# Patient Record
Sex: MALE | Race: WHITE | Employment: OTHER
[De-identification: names, ages, dates, MRNs, and addresses within clinical notes are randomized per-mention and may not be internally consistent; named-entity substitution may affect disease eponyms.]

---

## 2017-02-02 ENCOUNTER — OFFICE VISIT (OUTPATIENT)
Dept: CARDIOLOGY | Facility: CLINIC | Age: 67
End: 2017-02-02

## 2017-02-02 VITALS
HEIGHT: 72 IN | SYSTOLIC BLOOD PRESSURE: 134 MMHG | WEIGHT: 174.8 LBS | BODY MASS INDEX: 23.68 KG/M2 | DIASTOLIC BLOOD PRESSURE: 82 MMHG | HEART RATE: 72 BPM

## 2017-02-02 DIAGNOSIS — I25.10 ASHD (ARTERIOSCLEROTIC HEART DISEASE): ICD-10-CM

## 2017-02-02 DIAGNOSIS — E78.5 DYSLIPIDEMIA: ICD-10-CM

## 2017-02-02 DIAGNOSIS — I20.8 CHRONIC STABLE ANGINA (HCC): Primary | ICD-10-CM

## 2017-02-02 DIAGNOSIS — I10 ESSENTIAL HYPERTENSION: ICD-10-CM

## 2017-02-02 DIAGNOSIS — Z95.820 S/P ANGIOPLASTY WITH STENT: ICD-10-CM

## 2017-02-02 PROCEDURE — 1123F ACP DISCUSS/DSCN MKR DOCD: CPT | Performed by: INTERNAL MEDICINE

## 2017-02-02 PROCEDURE — 93000 ELECTROCARDIOGRAM COMPLETE: CPT | Performed by: INTERNAL MEDICINE

## 2017-02-02 PROCEDURE — 3017F COLORECTAL CA SCREEN DOC REV: CPT | Performed by: INTERNAL MEDICINE

## 2017-02-02 PROCEDURE — 99215 OFFICE O/P EST HI 40 MIN: CPT | Performed by: INTERNAL MEDICINE

## 2017-02-02 PROCEDURE — G8420 CALC BMI NORM PARAMETERS: HCPCS | Performed by: INTERNAL MEDICINE

## 2017-02-02 PROCEDURE — G8427 DOCREV CUR MEDS BY ELIG CLIN: HCPCS | Performed by: INTERNAL MEDICINE

## 2017-02-02 PROCEDURE — G8598 ASA/ANTIPLAT THER USED: HCPCS | Performed by: INTERNAL MEDICINE

## 2017-02-02 PROCEDURE — 1036F TOBACCO NON-USER: CPT | Performed by: INTERNAL MEDICINE

## 2017-02-02 PROCEDURE — 4040F PNEUMOC VAC/ADMIN/RCVD: CPT | Performed by: INTERNAL MEDICINE

## 2017-02-02 PROCEDURE — G8484 FLU IMMUNIZE NO ADMIN: HCPCS | Performed by: INTERNAL MEDICINE

## 2017-02-02 RX ORDER — ISOSORBIDE MONONITRATE 30 MG/1
30 TABLET, EXTENDED RELEASE ORAL DAILY
Qty: 30 TABLET | Refills: 3 | Status: SHIPPED | OUTPATIENT
Start: 2017-02-02 | End: 2017-03-01 | Stop reason: SINTOL

## 2017-02-03 LAB
ABSOLUTE BASO #: 0.1 K/UL (ref 0–0.1)
ABSOLUTE EOS #: 0.2 K/UL (ref 0.1–0.4)
ABSOLUTE LYMPH #: 2.6 K/UL (ref 0.8–5.2)
ABSOLUTE MONO #: 0.8 K/UL (ref 0.1–0.9)
ABSOLUTE NEUT #: 4 K/UL (ref 1.3–9.1)
ANION GAP SERPL CALCULATED.3IONS-SCNC: 16 MMOL/L
BASOPHILS RELATIVE PERCENT: 1.3 % (ref 0–1)
BUN BLDV-MCNC: 19 MG/DL (ref 10–20)
CALCIUM SERPL-MCNC: 10.1 MG/DL (ref 8.7–10.8)
CHLORIDE BLD-SCNC: 102 MMOL/L (ref 95–111)
CO2: 24 MMOL/L (ref 21–32)
CREAT SERPL-MCNC: 1.1 MG/DL (ref 0.5–1.3)
EGFR AFRICAN AMERICAN: 81
EGFR IF NONAFRICAN AMERICAN: 67
EOSINOPHILS RELATIVE PERCENT: 3.1 % (ref 1–4)
GLUCOSE: 196 MG/DL (ref 70–100)
HCT VFR BLD CALC: 39.8 % (ref 41.4–51)
HEMOGLOBIN: 13.1 G/DL (ref 13.8–17)
LYMPHOCYTE %: 33.9 % (ref 16–48)
MCH RBC QN AUTO: 28.3 PG (ref 27–34)
MCHC RBC AUTO-ENTMCNC: 32.9 G/DL (ref 31–36)
MCV RBC AUTO: 86 FL (ref 80–100)
MONOCYTES # BLD: 10.2 % (ref 1–8)
NEUTROPHILS RELATIVE PERCENT: 50.9 % (ref 45–75)
PDW BLD-RTO: 13.1 % (ref 10.8–14.8)
PLATELETS: 234 K/UL (ref 150–450)
POTASSIUM SERPL-SCNC: 5.1 MMOL/L (ref 3.5–5.4)
RBC: 4.63 M/UL (ref 4–5.5)
SODIUM BLD-SCNC: 137 MMOL/L (ref 134–147)
WBC: 7.8 K/UL (ref 3.7–10.8)

## 2017-02-09 ENCOUNTER — HOSPITAL ENCOUNTER (OUTPATIENT)
Dept: CARDIAC CATH/INVASIVE PROCEDURES | Age: 67
Discharge: HOME | End: 2017-02-09
Payer: MEDICARE

## 2017-02-09 VITALS
OXYGEN SATURATION: 99 % | HEIGHT: 72 IN | TEMPERATURE: 98 F | SYSTOLIC BLOOD PRESSURE: 151 MMHG | RESPIRATION RATE: 20 BRPM | DIASTOLIC BLOOD PRESSURE: 77 MMHG | HEART RATE: 67 BPM | WEIGHT: 174 LBS | BODY MASS INDEX: 23.57 KG/M2

## 2017-02-09 PROCEDURE — 93454 CORONARY ARTERY ANGIO S&I: CPT | Performed by: INTERNAL MEDICINE

## 2017-02-09 PROCEDURE — C1769 GUIDE WIRE: HCPCS

## 2017-02-09 PROCEDURE — C1894 INTRO/SHEATH, NON-LASER: HCPCS

## 2017-02-09 PROCEDURE — 6360000002 HC RX W HCPCS

## 2017-02-09 PROCEDURE — C1725 CATH, TRANSLUMIN NON-LASER: HCPCS

## 2017-02-09 PROCEDURE — C1887 CATHETER, GUIDING: HCPCS

## 2017-02-09 RX ORDER — ONDANSETRON 2 MG/ML
4 INJECTION INTRAMUSCULAR; INTRAVENOUS EVERY 6 HOURS PRN
Status: DISCONTINUED | OUTPATIENT
Start: 2017-02-09 | End: 2017-02-10 | Stop reason: HOSPADM

## 2017-02-09 RX ORDER — ACETAMINOPHEN 325 MG/1
650 TABLET ORAL EVERY 4 HOURS PRN
Status: DISCONTINUED | OUTPATIENT
Start: 2017-02-09 | End: 2017-02-10 | Stop reason: HOSPADM

## 2017-02-09 RX ORDER — SODIUM CHLORIDE 0.9 % (FLUSH) 0.9 %
10 SYRINGE (ML) INJECTION EVERY 12 HOURS SCHEDULED
Status: DISCONTINUED | OUTPATIENT
Start: 2017-02-09 | End: 2017-02-10 | Stop reason: HOSPADM

## 2017-02-09 RX ORDER — SODIUM CHLORIDE 9 MG/ML
INJECTION, SOLUTION INTRAVENOUS CONTINUOUS
Status: DISCONTINUED | OUTPATIENT
Start: 2017-02-09 | End: 2017-02-10 | Stop reason: HOSPADM

## 2017-02-09 RX ORDER — SODIUM CHLORIDE 0.9 % (FLUSH) 0.9 %
10 SYRINGE (ML) INJECTION PRN
Status: DISCONTINUED | OUTPATIENT
Start: 2017-02-09 | End: 2017-02-10 | Stop reason: HOSPADM

## 2017-02-09 ASSESSMENT — PAIN SCALES - GENERAL: PAINLEVEL_OUTOF10: 0

## 2017-03-01 ENCOUNTER — OFFICE VISIT (OUTPATIENT)
Dept: CARDIOLOGY | Facility: CLINIC | Age: 67
End: 2017-03-01

## 2017-03-01 VITALS
DIASTOLIC BLOOD PRESSURE: 75 MMHG | HEART RATE: 69 BPM | HEIGHT: 72 IN | SYSTOLIC BLOOD PRESSURE: 149 MMHG | OXYGEN SATURATION: 99 % | WEIGHT: 177 LBS | BODY MASS INDEX: 23.98 KG/M2

## 2017-03-01 DIAGNOSIS — Z95.820 S/P ANGIOPLASTY WITH STENT: ICD-10-CM

## 2017-03-01 DIAGNOSIS — I10 ESSENTIAL HYPERTENSION: ICD-10-CM

## 2017-03-01 DIAGNOSIS — I25.10 ASHD (ARTERIOSCLEROTIC HEART DISEASE): Primary | ICD-10-CM

## 2017-03-01 DIAGNOSIS — E78.5 DYSLIPIDEMIA: ICD-10-CM

## 2017-03-01 DIAGNOSIS — I25.2 HISTORY OF MYOCARDIAL INFARCTION: ICD-10-CM

## 2017-03-01 PROCEDURE — G8427 DOCREV CUR MEDS BY ELIG CLIN: HCPCS | Performed by: INTERNAL MEDICINE

## 2017-03-01 PROCEDURE — G8420 CALC BMI NORM PARAMETERS: HCPCS | Performed by: INTERNAL MEDICINE

## 2017-03-01 PROCEDURE — 1123F ACP DISCUSS/DSCN MKR DOCD: CPT | Performed by: INTERNAL MEDICINE

## 2017-03-01 PROCEDURE — 4040F PNEUMOC VAC/ADMIN/RCVD: CPT | Performed by: INTERNAL MEDICINE

## 2017-03-01 PROCEDURE — G8598 ASA/ANTIPLAT THER USED: HCPCS | Performed by: INTERNAL MEDICINE

## 2017-03-01 PROCEDURE — G8484 FLU IMMUNIZE NO ADMIN: HCPCS | Performed by: INTERNAL MEDICINE

## 2017-03-01 PROCEDURE — 1036F TOBACCO NON-USER: CPT | Performed by: INTERNAL MEDICINE

## 2017-03-01 PROCEDURE — 3017F COLORECTAL CA SCREEN DOC REV: CPT | Performed by: INTERNAL MEDICINE

## 2017-03-01 PROCEDURE — 99214 OFFICE O/P EST MOD 30 MIN: CPT | Performed by: INTERNAL MEDICINE

## 2017-03-01 RX ORDER — RANOLAZINE 500 MG/1
500 TABLET, EXTENDED RELEASE ORAL 2 TIMES DAILY
Qty: 60 TABLET | Refills: 3
Start: 2017-03-01 | End: 2017-04-04 | Stop reason: SDUPTHER

## 2017-03-01 RX ORDER — ATENOLOL 25 MG/1
25 TABLET ORAL DAILY
Qty: 30 TABLET | Refills: 3 | Status: SHIPPED | OUTPATIENT
Start: 2017-03-01 | End: 2017-11-10 | Stop reason: SDUPTHER

## 2017-03-06 RX ORDER — NITROGLYCERIN 0.4 MG/1
0.4 TABLET SUBLINGUAL EVERY 5 MIN PRN
Qty: 25 TABLET | Refills: 3 | Status: SHIPPED | OUTPATIENT
Start: 2017-03-06 | End: 2021-12-16 | Stop reason: SDUPTHER

## 2017-03-09 ENCOUNTER — TELEPHONE (OUTPATIENT)
Dept: CARDIOLOGY | Facility: CLINIC | Age: 67
End: 2017-03-09

## 2017-03-20 RX ORDER — CLOPIDOGREL BISULFATE 75 MG/1
75 TABLET ORAL DAILY
Qty: 90 TABLET | Refills: 3 | Status: SHIPPED | OUTPATIENT
Start: 2017-03-20 | End: 2018-04-10 | Stop reason: SDUPTHER

## 2017-04-04 ENCOUNTER — TELEPHONE (OUTPATIENT)
Dept: CARDIOLOGY | Age: 67
End: 2017-04-04

## 2017-04-04 RX ORDER — RANOLAZINE 500 MG/1
500 TABLET, EXTENDED RELEASE ORAL 2 TIMES DAILY
Qty: 180 TABLET | Refills: 3 | Status: SHIPPED | OUTPATIENT
Start: 2017-04-04 | End: 2017-12-06 | Stop reason: ALTCHOICE

## 2017-05-22 ENCOUNTER — TELEPHONE (OUTPATIENT)
Dept: CARDIOLOGY | Age: 67
End: 2017-05-22

## 2017-06-08 ENCOUNTER — OFFICE VISIT (OUTPATIENT)
Dept: CARDIOLOGY | Age: 67
End: 2017-06-08
Payer: MEDICARE

## 2017-06-08 VITALS
HEIGHT: 72 IN | SYSTOLIC BLOOD PRESSURE: 148 MMHG | WEIGHT: 171 LBS | BODY MASS INDEX: 23.16 KG/M2 | HEART RATE: 66 BPM | DIASTOLIC BLOOD PRESSURE: 83 MMHG

## 2017-06-08 DIAGNOSIS — I25.2 HISTORY OF MYOCARDIAL INFARCTION: ICD-10-CM

## 2017-06-08 DIAGNOSIS — I25.10 ASHD (ARTERIOSCLEROTIC HEART DISEASE): Primary | ICD-10-CM

## 2017-06-08 DIAGNOSIS — E78.5 DYSLIPIDEMIA: ICD-10-CM

## 2017-06-08 DIAGNOSIS — Z95.820 S/P ANGIOPLASTY WITH STENT: ICD-10-CM

## 2017-06-08 DIAGNOSIS — I10 ESSENTIAL HYPERTENSION: ICD-10-CM

## 2017-06-08 PROCEDURE — G8598 ASA/ANTIPLAT THER USED: HCPCS | Performed by: INTERNAL MEDICINE

## 2017-06-08 PROCEDURE — G8420 CALC BMI NORM PARAMETERS: HCPCS | Performed by: INTERNAL MEDICINE

## 2017-06-08 PROCEDURE — G8427 DOCREV CUR MEDS BY ELIG CLIN: HCPCS | Performed by: INTERNAL MEDICINE

## 2017-06-08 PROCEDURE — 4040F PNEUMOC VAC/ADMIN/RCVD: CPT | Performed by: INTERNAL MEDICINE

## 2017-06-08 PROCEDURE — 3017F COLORECTAL CA SCREEN DOC REV: CPT | Performed by: INTERNAL MEDICINE

## 2017-06-08 PROCEDURE — 1036F TOBACCO NON-USER: CPT | Performed by: INTERNAL MEDICINE

## 2017-06-08 PROCEDURE — 93000 ELECTROCARDIOGRAM COMPLETE: CPT | Performed by: INTERNAL MEDICINE

## 2017-06-08 PROCEDURE — 99214 OFFICE O/P EST MOD 30 MIN: CPT | Performed by: INTERNAL MEDICINE

## 2017-06-08 PROCEDURE — 1123F ACP DISCUSS/DSCN MKR DOCD: CPT | Performed by: INTERNAL MEDICINE

## 2017-07-07 RX ORDER — AMLODIPINE BESYLATE 10 MG/1
TABLET ORAL
Qty: 45 TABLET | Refills: 3 | Status: SHIPPED | OUTPATIENT
Start: 2017-07-07 | End: 2018-01-16 | Stop reason: SDUPTHER

## 2017-07-10 ENCOUNTER — TELEPHONE (OUTPATIENT)
Dept: CASE MANAGEMENT | Age: 67
End: 2017-07-10

## 2017-07-12 ENCOUNTER — TELEPHONE (OUTPATIENT)
Dept: CASE MANAGEMENT | Age: 67
End: 2017-07-12

## 2017-07-19 ENCOUNTER — HOSPITAL ENCOUNTER (OUTPATIENT)
Dept: CT IMAGING | Age: 67
Discharge: HOME | End: 2017-07-19
Payer: MEDICARE

## 2017-07-19 DIAGNOSIS — Z13.6 SCREENING FOR AAA (ABDOMINAL AORTIC ANEURYSM): ICD-10-CM

## 2017-07-19 DIAGNOSIS — F17.200 SMOKER: ICD-10-CM

## 2017-07-19 PROCEDURE — 71250 CT THORAX DX C-: CPT

## 2017-09-12 ENCOUNTER — TELEPHONE (OUTPATIENT)
Dept: CASE MANAGEMENT | Age: 67
End: 2017-09-12

## 2017-09-22 ENCOUNTER — HOSPITAL ENCOUNTER (OUTPATIENT)
Dept: PHYSICAL THERAPY | Age: 67
Setting detail: THERAPIES SERIES
Discharge: HOME | End: 2017-09-22
Payer: MEDICARE

## 2017-09-22 PROCEDURE — G8984 CARRY CURRENT STATUS: HCPCS

## 2017-09-22 PROCEDURE — G0283 ELEC STIM OTHER THAN WOUND: HCPCS

## 2017-09-22 PROCEDURE — 97162 PT EVAL MOD COMPLEX 30 MIN: CPT

## 2017-09-22 PROCEDURE — 97035 APP MDLTY 1+ULTRASOUND EA 15: CPT

## 2017-09-22 PROCEDURE — G8985 CARRY GOAL STATUS: HCPCS

## 2017-09-25 ENCOUNTER — HOSPITAL ENCOUNTER (OUTPATIENT)
Dept: PHYSICAL THERAPY | Age: 67
Setting detail: THERAPIES SERIES
Discharge: HOME | End: 2017-09-25
Payer: MEDICARE

## 2017-09-25 PROCEDURE — 97035 APP MDLTY 1+ULTRASOUND EA 15: CPT

## 2017-09-25 PROCEDURE — 97140 MANUAL THERAPY 1/> REGIONS: CPT

## 2017-09-25 PROCEDURE — 97110 THERAPEUTIC EXERCISES: CPT

## 2017-09-25 PROCEDURE — G0283 ELEC STIM OTHER THAN WOUND: HCPCS

## 2017-09-26 ENCOUNTER — APPOINTMENT (OUTPATIENT)
Dept: PHYSICAL THERAPY | Age: 67
End: 2017-09-26
Payer: MEDICARE

## 2017-09-27 ENCOUNTER — HOSPITAL ENCOUNTER (OUTPATIENT)
Dept: PHYSICAL THERAPY | Age: 67
Setting detail: THERAPIES SERIES
Discharge: HOME | End: 2017-09-27
Payer: MEDICARE

## 2017-09-27 PROCEDURE — G0283 ELEC STIM OTHER THAN WOUND: HCPCS

## 2017-09-27 PROCEDURE — 97140 MANUAL THERAPY 1/> REGIONS: CPT

## 2017-09-27 PROCEDURE — 97035 APP MDLTY 1+ULTRASOUND EA 15: CPT

## 2017-09-28 ASSESSMENT — PAIN SCALES - GENERAL: PAINLEVEL_OUTOF10: 6

## 2017-09-29 ENCOUNTER — HOSPITAL ENCOUNTER (OUTPATIENT)
Dept: PHYSICAL THERAPY | Age: 67
Setting detail: THERAPIES SERIES
Discharge: HOME | End: 2017-09-29
Payer: MEDICARE

## 2017-09-29 PROCEDURE — 97035 APP MDLTY 1+ULTRASOUND EA 15: CPT

## 2017-09-29 PROCEDURE — G0283 ELEC STIM OTHER THAN WOUND: HCPCS

## 2017-09-29 PROCEDURE — 97140 MANUAL THERAPY 1/> REGIONS: CPT

## 2017-09-29 ASSESSMENT — PAIN SCALES - GENERAL: PAINLEVEL_OUTOF10: 2

## 2017-10-02 ENCOUNTER — HOSPITAL ENCOUNTER (OUTPATIENT)
Dept: PHYSICAL THERAPY | Age: 67
Setting detail: THERAPIES SERIES
Discharge: HOME | End: 2017-10-02
Payer: MEDICARE

## 2017-10-02 PROCEDURE — 97035 APP MDLTY 1+ULTRASOUND EA 15: CPT

## 2017-10-02 PROCEDURE — 97140 MANUAL THERAPY 1/> REGIONS: CPT

## 2017-10-02 PROCEDURE — 97110 THERAPEUTIC EXERCISES: CPT

## 2017-10-02 PROCEDURE — G0283 ELEC STIM OTHER THAN WOUND: HCPCS

## 2017-10-04 ENCOUNTER — HOSPITAL ENCOUNTER (OUTPATIENT)
Dept: PHYSICAL THERAPY | Age: 67
Setting detail: THERAPIES SERIES
Discharge: HOME | End: 2017-10-04
Payer: MEDICARE

## 2017-10-04 PROCEDURE — 97110 THERAPEUTIC EXERCISES: CPT

## 2017-10-04 PROCEDURE — 97140 MANUAL THERAPY 1/> REGIONS: CPT

## 2017-10-04 PROCEDURE — 97035 APP MDLTY 1+ULTRASOUND EA 15: CPT

## 2017-10-04 PROCEDURE — G0283 ELEC STIM OTHER THAN WOUND: HCPCS

## 2017-10-04 ASSESSMENT — PAIN SCALES - GENERAL: PAINLEVEL_OUTOF10: 2

## 2017-10-06 ENCOUNTER — HOSPITAL ENCOUNTER (OUTPATIENT)
Dept: PHYSICAL THERAPY | Age: 67
Setting detail: THERAPIES SERIES
Discharge: HOME | End: 2017-10-06
Payer: MEDICARE

## 2017-10-06 PROCEDURE — 97140 MANUAL THERAPY 1/> REGIONS: CPT

## 2017-10-06 PROCEDURE — 97110 THERAPEUTIC EXERCISES: CPT

## 2017-10-06 PROCEDURE — G0283 ELEC STIM OTHER THAN WOUND: HCPCS

## 2017-10-06 PROCEDURE — 97035 APP MDLTY 1+ULTRASOUND EA 15: CPT

## 2017-10-06 ASSESSMENT — PAIN SCALES - GENERAL: PAINLEVEL_OUTOF10: 3

## 2017-10-06 ASSESSMENT — PAIN DESCRIPTION - LOCATION: LOCATION: NECK

## 2017-10-06 NOTE — PROGRESS NOTES
Phone: Kylee           Fax: [phone]                           Outpatient Physical Therapy                                                                            Daily Note    Patient: Adina Hernandez : 1950  [facility] #: [id number]   Referring Practitioner:  Dr. Stephenson Every    Referral Date : 17     Date: 10/6/2017    Diagnosis: Pain of R scapular (M89.8X1)  Treatment Diagnosis: R scapular pain    Onset Date: 17  PT Insurance Information: Medicare  Total # of Visits Approved: 18 Per Physician Order  Total # of Visits to Date: 7  No Show: 0  Canceled Appointment: 0      Pre-Treatment Pain:  3/10  Subjective: pt reported 3/10 neck pain prior to tx. pt reported he is feeling more aches and stiffness this morning--\"likely d/t the weather\"    Exercises:  Exercise 2: Pulleys 5min  Exercise 3: shrugs, rolls, retraction 4# x 15  Exercise 4: rows/ext RTB x 20  Exercise 5: 90/90 x 15 2#  Exercise 7: Joystick x15 CW, CCW    Manual:  Soft Tissue Mobalization: upper cervical and mid trap     Modalities:  Ultrasound: x8mins 1.2W/cm2   E-stim (parameters): IFC [id number] R UT and thoracic paraspinal region for muscle tension and pain     Assessment  Assessment: Continued to progress pt per POC and pt tolerance. pt reporting increased cervical tightness/pain--STM to increase mobility and decrease muscle guarding. Patient Education  Initiated new therex--pt able to demonstrate properly. Pt verbalized/demonstrated good understanding:     [x] Yes         [] No, pt required further clarification.     Post Treatment Pain:  3/10    Plan  Times per week: 3  Plan weeks: 6    Goals  (Total # of Visits to Date: 6)   Short Term Goals - Time Frame for Short term goals: 3 weeks   Short term goal 1: Pt will initiate HEP.--met                                         [x]Met  []Partially met  []Not met   Short term goal 2: Pt will be instructed in proper posturing to improve

## 2017-10-09 ENCOUNTER — HOSPITAL ENCOUNTER (OUTPATIENT)
Dept: PHYSICAL THERAPY | Age: 67
Setting detail: THERAPIES SERIES
Discharge: HOME | End: 2017-10-09
Payer: MEDICARE

## 2017-10-09 PROCEDURE — G0283 ELEC STIM OTHER THAN WOUND: HCPCS

## 2017-10-09 PROCEDURE — 97110 THERAPEUTIC EXERCISES: CPT

## 2017-10-09 PROCEDURE — 97140 MANUAL THERAPY 1/> REGIONS: CPT

## 2017-10-09 ASSESSMENT — PAIN SCALES - GENERAL: PAINLEVEL_OUTOF10: 3

## 2017-10-09 NOTE — PROGRESS NOTES
will be instructed in proper posturing to improve cervical and spinal alignment and biomechanics--met/cont  []Met   []Partially met  []Not met      []Met   []Partially met  []Not met      []Met  []Partially met  []Not met     Long Term Goals - Time Frame for Long term goals : 6 weeks   Long term goal 1: Pt will be independent and compliant with HEP. []Met  []Partially met  []Not met   Long term goal 2: Pt will report min/no TTP R upper thoracic paraspinals to indicate improved tissue integrity. []Met  []Partially met  []Not met   Long term goal 3: Pt will report 75% improvement in R scapular pain and overall function for ease of lifting/carrying tasks. []Met  []Partially met  []Not met   Long term goal 4: Pt will improve scapular strength grossly to 4+/5 for improved upright posturing and ease of carrying tasks.   []Met  []Partially met  []Not met     []Met  []Partially met  []Not met       Minutes Tracking:  Time In: 0830  Time Out: 0930  Minutes: 60    Altaf Valdez PTA Date: 10/9/2017

## 2017-10-11 ENCOUNTER — HOSPITAL ENCOUNTER (OUTPATIENT)
Dept: PHYSICAL THERAPY | Age: 67
Setting detail: THERAPIES SERIES
Discharge: HOME | End: 2017-10-11
Payer: MEDICARE

## 2017-10-11 PROCEDURE — G0283 ELEC STIM OTHER THAN WOUND: HCPCS

## 2017-10-11 PROCEDURE — 97110 THERAPEUTIC EXERCISES: CPT

## 2017-10-11 PROCEDURE — 97140 MANUAL THERAPY 1/> REGIONS: CPT

## 2017-10-11 ASSESSMENT — PAIN SCALES - GENERAL: PAINLEVEL_OUTOF10: 3

## 2017-10-11 NOTE — PROGRESS NOTES
Phone: Kylee           Fax: [phone]                           Outpatient Physical Therapy                                                                            Daily Note    Patient: Rainer Elias : 1950  [facility] #: [id number]   Referring Practitioner:  Dr. Aleksandra Smith    Referral Date : 17     Date: 10/11/2017    Diagnosis: Pain of R scapular (M89.8X1)  Treatment Diagnosis: R scapular pain    Onset Date: 17  PT Insurance Information: Medicare  Total # of Visits Approved: 18 Per Physician Order  Total # of Visits to Date: 9  No Show: 0  Canceled Appointment: 0      Pre-Treatment Pain:  3/10  Subjective: Had sharp pain following last session. [place] like pain shooting down scap. Even hurt taking deep breath. Pain this AM is better 3/10     Exercises:        Exercise 3: shrugs, rolls, retraction 4# x 15  Exercise 4: rows/ext RTB x 20  Exercise 5: 90/90 x 15 3#     Exercise 7: Joystick x15 CW, CCW  Exercise 8: UBE 5/5                                              Manual:  Joint mobilization: scapular region                    Modalities:  Moist heat: x15mins--not today       Ultrasound: x8mins 1.2W/cm2   E-stim (parameters): IFC [id number] R UT and thoracic paraspinal region for muscle tension and pain                 Assessment  Assessment: PA mobs to thoracic and scapular region. Restricted mobility noted. SWoft tissue tightness was moderate. Strengthis 4/5 in shld due to pain with over use. Cont to focus on postural exercise and flexabilty     Patient Education   HEP  Pt verbalized/demonstrated good understanding:     [x] Yes         [] No, pt required further clarification.     Post Treatment Pain:  3/10      Plan  Times per week: 3  Plan weeks: 6      Goals  (Total # of Visits to Date: 8)   Short Term Goals - Time Frame for Short term goals: 3 weeks     Short term goal 1: Pt will initiate HEP.--met                                         []Met

## 2017-10-13 ENCOUNTER — HOSPITAL ENCOUNTER (OUTPATIENT)
Dept: PHYSICAL THERAPY | Age: 67
Setting detail: THERAPIES SERIES
Discharge: HOME | End: 2017-10-13
Payer: MEDICARE

## 2017-10-13 PROCEDURE — 97110 THERAPEUTIC EXERCISES: CPT

## 2017-10-13 PROCEDURE — G0283 ELEC STIM OTHER THAN WOUND: HCPCS

## 2017-10-13 PROCEDURE — 97140 MANUAL THERAPY 1/> REGIONS: CPT

## 2017-10-13 PROCEDURE — G8984 CARRY CURRENT STATUS: HCPCS

## 2017-10-13 PROCEDURE — G8985 CARRY GOAL STATUS: HCPCS

## 2017-10-13 NOTE — PROGRESS NOTES
[]Partially met  []Not met   Short term goal 2: Pt will be instructed in proper posturing to improve cervical and spinal alignment and biomechanics--met/cont  [x]Met   []Partially met  []Not met      []Met   []Partially met  []Not met      []Met   []Partially met  []Not met     Long Term Goals - Time Frame for Long term goals : 6 weeks   Long term goal 1: Pt will be independent and compliant with HEP. []Met  []Partially met  []Not met   Long term goal 2: Pt will report min/no TTP R upper thoracic paraspinals to indicate improved tissue integrity. []Met  []Partially met  []Not met   Long term goal 3: Pt will report 75% improvement in R scapular pain and overall function for ease of lifting/carrying tasks. - MET [x]Met  []Partially met  []Not met   Long term goal 4: Pt will improve scapular strength grossly to 4+/5 for improved upright posturing and ease of carrying tasks.   []Met  []Partially met  []Not met     []Met  []Partially met  []Not met       Minutes Tracking:  Time In: 1015  Time Out: [address]  Minutes: [address] [place], [place], DPT  Date: 10/13/2017

## 2017-10-16 ENCOUNTER — HOSPITAL ENCOUNTER (OUTPATIENT)
Dept: PHYSICAL THERAPY | Age: 67
Setting detail: THERAPIES SERIES
Discharge: HOME | End: 2017-10-16
Payer: MEDICARE

## 2017-10-16 PROCEDURE — 97140 MANUAL THERAPY 1/> REGIONS: CPT

## 2017-10-16 PROCEDURE — 97110 THERAPEUTIC EXERCISES: CPT

## 2017-10-16 PROCEDURE — G0283 ELEC STIM OTHER THAN WOUND: HCPCS

## 2017-10-16 NOTE — PROGRESS NOTES
Physical Therapy  Phone: Kylee           Fax: [phone]                           Outpatient Physical Therapy                                                                            Daily Note    Patient: Lynne Smith : [birth year]  [facility] #: [id number]   Referring Practitioner:  Dr. Mike Lu    Referral Date : 17     Date: 10/16/2017    Diagnosis: Pain of R scapular (M89.8X1)  Treatment Diagnosis: R scapular pain    Onset Date: 17  PT Insurance Information: Medicare  Total # of Visits Approved: 18 Per Physician Order  Total # of Visits to Date: 11  No Show: 0  Canceled Appointment: 0      Pre-Treatment Pain:  0/10  Subjective: Pt states he liked the ThermoStim last session and has little or no pain since. He feels the knot is decreased. Exercises:     Exercise 2: Pulleys 5min     Exercise 4: rows/ext RTB x 20  Exercise 5: 90/90 x 15 3#  Exercise 6: cane bench and flexion 9# bar   Exercise 7: Joystick x15 CW, CCW  Exercise 8: UBE 4/4       Manual:  Joint mobilization: scapular region    Soft Tissue Mobalization: ThermoStim probe with heat [id number] R scap region    Modalities:  Moist heat: [id number] IFC   E-stim (parameters): IFC [id number] R UT and thoracic paraspinal region for muscle tension and pain       Assessment  Assessment: Pt did well with exercise, but needs VCs to prevent UT hike substitution pattern. He has decreased tightness through R scapular region, but continues TTP R UT. Patient Education  Decreasing UT compensation with exercise. Pt verbalized/demonstrated good understanding:     [x] Yes         [] No, pt required further clarification. Post Treatment Pain:  0/10      Plan   Continue light strengthening as he tolerates.          Goals  (Total # of Visits to Date: 10)   Short Term Goals - Time Frame for Short term goals: 3 weeks     Short term goal 1: Pt will initiate HEP.--met                                         [x]Met

## 2017-10-18 ENCOUNTER — HOSPITAL ENCOUNTER (OUTPATIENT)
Dept: PHYSICAL THERAPY | Age: 67
Setting detail: THERAPIES SERIES
Discharge: HOME | End: 2017-10-18
Payer: MEDICARE

## 2017-10-18 PROCEDURE — 97110 THERAPEUTIC EXERCISES: CPT

## 2017-10-18 PROCEDURE — 97140 MANUAL THERAPY 1/> REGIONS: CPT

## 2017-10-18 PROCEDURE — G0283 ELEC STIM OTHER THAN WOUND: HCPCS

## 2017-10-18 NOTE — PROGRESS NOTES
Phone: Kylee           Fax: [phone]                           Outpatient Physical Therapy                                                                            Daily Note    Patient: Timothy Villalpando : 1950  CSN #: [id number]   Referring Practitioner:  Dr. Anjali Sanabria    Referral Date : 17     Date: 10/18/2017    Diagnosis: Pain of R scapular (M89.8X1)  Treatment Diagnosis: R scapular pain    Onset Date: 17  PT Insurance Information: Medicare  Total # of Visits Approved: 18 Per Physician Order  Total # of Visits to Date: 12  No Show: 0  Canceled Appointment: 0      Pre-Treatment Pain:  0/10  Subjective: Pt states he feels as though he has made near full recovery and has not has any pain since last Tuesday. Pt states he feels as though he would like to cont with ex at home at this time. Exercises:        Exercise 3: shrugs, rolls, retraction 4# x 15  Exercise 4: rows/ext RTB x 20  Exercise 5: 90/90 x 15 3#     Exercise 7: Joystick x15 CW, CCW  Exercise 8: UBE 4/4                   Manual:     Soft Tissue Mobalization: ThermoStim probe with heat [id number] R scap region    Modalities:  Moist heat: [id number] IFC           E-stim (parameters): IFC [id number] R UT and thoracic paraspinal region for muscle tension and pain       Assessment  Assessment: Pt has completed 12 PT visits to date to address R scapular pain. Pt currently demo min TTP in R superomedial scapula, with palpable tension noted. However, TTP has decreased based on pt report since eval. Pt demonstrates scapular strength grossly 4 to 4+/5. Pt reports >75% improvement thus far. Pt stating he no longer sees need for PT; educated pt on plan for 2 week hold to assess compliance with HEP and to call if he has any issues. Pt demo good understanding. Educated pt on plan to D/C from PT following 2 week hold if no issues reported.      Patient Education  Educated on plan for 2 week hold and

## 2017-10-20 ENCOUNTER — APPOINTMENT (OUTPATIENT)
Dept: PHYSICAL THERAPY | Age: 67
End: 2017-10-20
Payer: MEDICARE

## 2017-10-23 ENCOUNTER — APPOINTMENT (OUTPATIENT)
Dept: PHYSICAL THERAPY | Age: 67
End: 2017-10-23
Payer: MEDICARE

## 2017-10-27 ENCOUNTER — APPOINTMENT (OUTPATIENT)
Dept: PHYSICAL THERAPY | Age: 67
End: 2017-10-27
Payer: MEDICARE

## 2017-11-10 RX ORDER — ATENOLOL 25 MG/1
25 TABLET ORAL DAILY
Qty: 90 TABLET | Refills: 3 | Status: SHIPPED | OUTPATIENT
Start: 2017-11-10 | End: 2018-02-05 | Stop reason: SDUPTHER

## 2017-12-06 ENCOUNTER — OFFICE VISIT (OUTPATIENT)
Dept: CARDIOLOGY | Age: 67
End: 2017-12-06
Payer: MEDICARE

## 2017-12-06 VITALS
HEART RATE: 69 BPM | OXYGEN SATURATION: 98 % | SYSTOLIC BLOOD PRESSURE: 133 MMHG | BODY MASS INDEX: 23.98 KG/M2 | HEIGHT: 72 IN | DIASTOLIC BLOOD PRESSURE: 80 MMHG | WEIGHT: 177 LBS | RESPIRATION RATE: 18 BRPM

## 2017-12-06 DIAGNOSIS — Z72.0 TOBACCO ABUSE: ICD-10-CM

## 2017-12-06 DIAGNOSIS — I25.10 ASHD (ARTERIOSCLEROTIC HEART DISEASE): Primary | ICD-10-CM

## 2017-12-06 DIAGNOSIS — E78.5 DYSLIPIDEMIA: ICD-10-CM

## 2017-12-06 DIAGNOSIS — I25.2 HISTORY OF MI (MYOCARDIAL INFARCTION): ICD-10-CM

## 2017-12-06 DIAGNOSIS — Z71.6 TOBACCO ABUSE COUNSELING: ICD-10-CM

## 2017-12-06 DIAGNOSIS — I10 ESSENTIAL HYPERTENSION: ICD-10-CM

## 2017-12-06 PROCEDURE — 99214 OFFICE O/P EST MOD 30 MIN: CPT | Performed by: INTERNAL MEDICINE

## 2017-12-06 PROCEDURE — 1123F ACP DISCUSS/DSCN MKR DOCD: CPT | Performed by: INTERNAL MEDICINE

## 2017-12-06 PROCEDURE — 4040F PNEUMOC VAC/ADMIN/RCVD: CPT | Performed by: INTERNAL MEDICINE

## 2017-12-06 PROCEDURE — G8484 FLU IMMUNIZE NO ADMIN: HCPCS | Performed by: INTERNAL MEDICINE

## 2017-12-06 PROCEDURE — G8420 CALC BMI NORM PARAMETERS: HCPCS | Performed by: INTERNAL MEDICINE

## 2017-12-06 PROCEDURE — 3017F COLORECTAL CA SCREEN DOC REV: CPT | Performed by: INTERNAL MEDICINE

## 2017-12-06 PROCEDURE — G8598 ASA/ANTIPLAT THER USED: HCPCS | Performed by: INTERNAL MEDICINE

## 2017-12-06 PROCEDURE — G8427 DOCREV CUR MEDS BY ELIG CLIN: HCPCS | Performed by: INTERNAL MEDICINE

## 2017-12-06 PROCEDURE — 1036F TOBACCO NON-USER: CPT | Performed by: INTERNAL MEDICINE

## 2017-12-07 NOTE — PATIENT INSTRUCTIONS
Patient Education        Taking Aspirin to Prevent Heart Attack and Stroke: Care Instructions  Your Care Instructions    Aspirin acts as a \"blood thinner. \" It prevents blood clots from forming. When taken during and after a heart attack, it can reduce your chance of dying. And it's used if you have a stent in your coronary artery. Also, aspirin helps certain people lower their risk of a heart attack or stroke. Be sure you know what dose of aspirin to take and how often to take it. Low-dose aspirin is typically 81 mg. But the dose for daily aspirin can range from 81 mg to 325 mg. Taking aspirin every day can cause bleeding. It may not be safe if you have stomach ulcers. And it may not be safe if you have high blood pressure that is not controlled. If you take aspirin pills every day, do not take ones that have other ingredients such as caffeine or sodium. Before you start to take aspirin, tell your doctor all the medicines, vitamins, herbal products, and supplements you take. Follow-up care is a key part of your treatment and safety. Be sure to make and go to all appointments, and call your doctor if you are having problems. It's also a good idea to know your test results and keep a list of the medicines you take. How can you care for yourself at home? · Take aspirin with a full glass of water unless your doctor tells you not to. Do not lie down right after you take it. · If you have a stent in your coronary artery, take your aspirin as your heart doctor says to. If another doctor says to stop taking the aspirin for any reason, talk to your heart doctor before you stop. · Do not chew or crush the coated or sustained-release forms of aspirin. · Ask your doctor if you can drink alcohol while you take aspirin. And ask how much you can drink. Too much alcohol with aspirin can cause stomach bleeding. · Do not take aspirin if you are pregnant, unless your doctor says it is okay.   · Keep all aspirin out of children's reach. · Throw aspirin away if it starts to smell like vinegar. · Do not take aspirin if you have gout or if you take prescription blood thinners, unless your doctor has told you to. · Do not take prescription or over-the-counter medicines, vitamins, herbal products, or supplements without talking to your doctor first. Olman Pedlar the label before you take another over-the-counter medicine. Many contain aspirin. So they could cause you to take too much aspirin. · Talk with your doctor before you take a pain medicine. Ask which type of medicine you can take and how to take it safely with aspirin. · Tell your doctor or dentist before a surgery or procedure that you take aspirin. He or she will tell you if you should stop taking aspirin before your surgery or procedure. Make sure that you understand exactly what your doctor wants you to do. Where can you learn more? Go to https://[email or web address]. org and sign in to your [employer] account. Enter M694 in the bSafe box to learn more about \"Taking Aspirin to Prevent Heart Attack and Stroke: Care Instructions. \"     If you do not have an account, please click on the \"Sign Up Now\" link. Current as of: September 21, 2016  Content Version: 11.4  © 5118-7120 Healthwise, Incorporated. Care instructions adapted under license by [facility] ([facility]). If you have questions about a medical condition or this instruction, always ask your healthcare professional. [place] [address] any warranty or liability for your use of this information.

## 2017-12-12 NOTE — DISCHARGE SUMMARY
posturing to improve cervical and spinal alignment and biomechanics--met/cont    Long term goals  Long term goal 1: Pt will be independent and compliant with HEP. - met  Long term goal 2: Pt will report min/no TTP R upper thoracic paraspinals to indicate improved tissue integrity. - met  Long term goal 3: Pt will report 75% improvement in R scapular pain and overall function for ease of lifting/carrying tasks. - MET  Long term goal 4: Pt will improve scapular strength grossly to 4+/5 for improved upright posturing and ease of carrying tasks.  - met      Reason for Discharge  [x] Goals Achieved                       [] Poor Follow Through/Attendance                  [] Optimal Function Achieved     [] Patient Discharged Self    [] Hospitalization                         [] Physician discharge      Thank you for this referral      Ellie Dumont PT, DPT                    Date: 12/12/2017

## 2017-12-18 RX ORDER — ATORVASTATIN CALCIUM 20 MG/1
20 TABLET, FILM COATED ORAL DAILY
Qty: 90 TABLET | Refills: 3 | Status: SHIPPED | OUTPATIENT
Start: 2017-12-18 | End: 2018-03-19 | Stop reason: SDUPTHER

## 2018-01-02 RX ORDER — CLOPIDOGREL BISULFATE 75 MG/1
75 TABLET ORAL DAILY
Qty: 90 TABLET | Refills: 3 | Status: CANCELLED | OUTPATIENT
Start: 2018-01-02

## 2018-01-02 RX ORDER — AMLODIPINE BESYLATE 10 MG/1
TABLET ORAL
Qty: 45 TABLET | Refills: 3 | Status: CANCELLED | OUTPATIENT
Start: 2018-01-02

## 2018-01-02 RX ORDER — ATENOLOL 25 MG/1
25 TABLET ORAL DAILY
Qty: 90 TABLET | Refills: 3 | Status: CANCELLED | OUTPATIENT
Start: 2018-01-02

## 2018-01-02 RX ORDER — LINAGLIPTIN 5 MG/1
5 TABLET, FILM COATED ORAL DAILY
Qty: 90 TABLET | Refills: 3 | Status: CANCELLED | OUTPATIENT
Start: 2018-01-02

## 2018-01-16 RX ORDER — AMLODIPINE BESYLATE 10 MG/1
TABLET ORAL
Qty: 45 TABLET | Refills: 3 | Status: SHIPPED | OUTPATIENT
Start: 2018-01-16 | End: 2019-01-15 | Stop reason: SDUPTHER

## 2018-02-05 RX ORDER — ATENOLOL 25 MG/1
25 TABLET ORAL DAILY
Qty: 90 TABLET | Refills: 3 | Status: SHIPPED | OUTPATIENT
Start: 2018-02-05 | End: 2019-01-15 | Stop reason: SDUPTHER

## 2018-03-19 RX ORDER — ATORVASTATIN CALCIUM 20 MG/1
20 TABLET, FILM COATED ORAL DAILY
Qty: 90 TABLET | Refills: 3 | Status: SHIPPED | OUTPATIENT
Start: 2018-03-19 | End: 2019-04-08

## 2018-03-28 ENCOUNTER — HOSPITAL ENCOUNTER (OUTPATIENT)
Dept: MRI IMAGING | Age: 68
Discharge: HOME | End: 2018-03-30
Payer: MEDICARE

## 2018-03-28 DIAGNOSIS — M48.02 CERVICAL SPINAL STENOSIS: ICD-10-CM

## 2018-03-28 PROCEDURE — 72141 MRI NECK SPINE W/O DYE: CPT

## 2018-04-10 RX ORDER — CLOPIDOGREL BISULFATE 75 MG/1
75 TABLET ORAL DAILY
Qty: 90 TABLET | Refills: 3 | Status: SHIPPED | OUTPATIENT
Start: 2018-04-10 | End: 2019-05-20

## 2018-06-11 ENCOUNTER — OFFICE VISIT (OUTPATIENT)
Dept: CARDIOLOGY | Age: 68
End: 2018-06-11
Payer: MEDICARE

## 2018-06-11 ENCOUNTER — HOSPITAL ENCOUNTER (OUTPATIENT)
Dept: NON INVASIVE DIAGNOSTICS | Age: 68
Discharge: HOME | End: 2018-06-11
Payer: MEDICARE

## 2018-06-11 VITALS
HEART RATE: 70 BPM | BODY MASS INDEX: 22.89 KG/M2 | HEIGHT: 72 IN | OXYGEN SATURATION: 98 % | DIASTOLIC BLOOD PRESSURE: 63 MMHG | SYSTOLIC BLOOD PRESSURE: 125 MMHG | RESPIRATION RATE: 18 BRPM | WEIGHT: 169 LBS

## 2018-06-11 DIAGNOSIS — I25.10 CORONARY ARTERY DISEASE INVOLVING NATIVE CORONARY ARTERY OF NATIVE HEART WITHOUT ANGINA PECTORIS: ICD-10-CM

## 2018-06-11 DIAGNOSIS — I10 ESSENTIAL HYPERTENSION: ICD-10-CM

## 2018-06-11 DIAGNOSIS — E78.5 DYSLIPIDEMIA: ICD-10-CM

## 2018-06-11 DIAGNOSIS — Z72.0 TOBACCO ABUSE: ICD-10-CM

## 2018-06-11 DIAGNOSIS — I25.10 ASHD (ARTERIOSCLEROTIC HEART DISEASE): Primary | ICD-10-CM

## 2018-06-11 DIAGNOSIS — Z71.6 TOBACCO ABUSE COUNSELING: ICD-10-CM

## 2018-06-11 DIAGNOSIS — I25.10 ASHD (ARTERIOSCLEROTIC HEART DISEASE): ICD-10-CM

## 2018-06-11 LAB
LV EF: 60 %
LVEF MODALITY: NORMAL

## 2018-06-11 PROCEDURE — 4040F PNEUMOC VAC/ADMIN/RCVD: CPT | Performed by: INTERNAL MEDICINE

## 2018-06-11 PROCEDURE — 3017F COLORECTAL CA SCREEN DOC REV: CPT | Performed by: INTERNAL MEDICINE

## 2018-06-11 PROCEDURE — 1036F TOBACCO NON-USER: CPT | Performed by: INTERNAL MEDICINE

## 2018-06-11 PROCEDURE — 93000 ELECTROCARDIOGRAM COMPLETE: CPT | Performed by: INTERNAL MEDICINE

## 2018-06-11 PROCEDURE — 93306 TTE W/DOPPLER COMPLETE: CPT

## 2018-06-11 PROCEDURE — 99214 OFFICE O/P EST MOD 30 MIN: CPT | Performed by: INTERNAL MEDICINE

## 2018-06-11 PROCEDURE — G8427 DOCREV CUR MEDS BY ELIG CLIN: HCPCS | Performed by: INTERNAL MEDICINE

## 2018-06-11 PROCEDURE — G8420 CALC BMI NORM PARAMETERS: HCPCS | Performed by: INTERNAL MEDICINE

## 2018-06-11 PROCEDURE — 1123F ACP DISCUSS/DSCN MKR DOCD: CPT | Performed by: INTERNAL MEDICINE

## 2018-06-11 PROCEDURE — G8598 ASA/ANTIPLAT THER USED: HCPCS | Performed by: INTERNAL MEDICINE

## 2018-07-30 ENCOUNTER — TELEPHONE (OUTPATIENT)
Dept: ONCOLOGY | Age: 68
End: 2018-07-30

## 2018-07-30 DIAGNOSIS — Z87.891 PERSONAL HISTORY OF TOBACCO USE: Primary | ICD-10-CM

## 2018-07-30 NOTE — TELEPHONE ENCOUNTER
Our records indicate patient is overdue for their annual lung cancer screening follow up testing. I have faxed the order for the scan to  provider.

## 2018-08-29 ENCOUNTER — HOSPITAL ENCOUNTER (OUTPATIENT)
Dept: CT IMAGING | Age: 68
Discharge: HOME | End: 2018-08-31
Payer: MEDICARE

## 2018-08-29 DIAGNOSIS — Z12.2 ENCOUNTER FOR SCREENING FOR LUNG CANCER: ICD-10-CM

## 2018-08-29 PROCEDURE — G0297 LDCT FOR LUNG CA SCREEN: HCPCS

## 2018-10-23 ENCOUNTER — HOSPITAL ENCOUNTER (OUTPATIENT)
Dept: MRI IMAGING | Age: 68
Discharge: HOME | End: 2018-10-25
Payer: MEDICARE

## 2018-10-23 ENCOUNTER — APPOINTMENT (OUTPATIENT)
Dept: LAB | Age: 68
End: 2018-10-23
Payer: MEDICARE

## 2018-10-23 DIAGNOSIS — R43.0 LOSS OF SMELL: ICD-10-CM

## 2018-10-23 DIAGNOSIS — H53.8 BLURRY VISION: ICD-10-CM

## 2018-10-23 DIAGNOSIS — R43.2 LOSS OF TASTE: ICD-10-CM

## 2018-10-23 DIAGNOSIS — R26.89 LOSS OF BALANCE: ICD-10-CM

## 2018-10-23 PROCEDURE — 6360000004 HC RX CONTRAST MEDICATION: Performed by: NURSE PRACTITIONER

## 2018-10-23 PROCEDURE — 70553 MRI BRAIN STEM W/O & W/DYE: CPT

## 2018-10-23 PROCEDURE — A9579 GAD-BASE MR CONTRAST NOS,1ML: HCPCS | Performed by: NURSE PRACTITIONER

## 2018-10-23 RX ADMIN — GADOTERIDOL 15 ML: 279.3 INJECTION, SOLUTION INTRAVENOUS at 12:59

## 2018-10-26 ENCOUNTER — TELEPHONE (OUTPATIENT)
Dept: CARDIOLOGY | Age: 68
End: 2018-10-26

## 2018-11-14 ENCOUNTER — OFFICE VISIT (OUTPATIENT)
Dept: SURGERY | Age: 68
End: 2018-11-14
Payer: MEDICARE

## 2018-11-14 VITALS
DIASTOLIC BLOOD PRESSURE: 72 MMHG | BODY MASS INDEX: 21.86 KG/M2 | TEMPERATURE: 98.1 F | WEIGHT: 161.4 LBS | RESPIRATION RATE: 18 BRPM | HEART RATE: 67 BPM | SYSTOLIC BLOOD PRESSURE: 136 MMHG | HEIGHT: 72 IN

## 2018-11-14 DIAGNOSIS — R10.13 ABDOMINAL PAIN, EPIGASTRIC: Primary | ICD-10-CM

## 2018-11-14 DIAGNOSIS — R63.0 POOR APPETITE: ICD-10-CM

## 2018-11-14 PROCEDURE — G8484 FLU IMMUNIZE NO ADMIN: HCPCS | Performed by: SURGERY

## 2018-11-14 PROCEDURE — 1101F PT FALLS ASSESS-DOCD LE1/YR: CPT | Performed by: SURGERY

## 2018-11-14 PROCEDURE — G8598 ASA/ANTIPLAT THER USED: HCPCS | Performed by: SURGERY

## 2018-11-14 PROCEDURE — 4004F PT TOBACCO SCREEN RCVD TLK: CPT | Performed by: SURGERY

## 2018-11-14 PROCEDURE — G8420 CALC BMI NORM PARAMETERS: HCPCS | Performed by: SURGERY

## 2018-11-14 PROCEDURE — 1123F ACP DISCUSS/DSCN MKR DOCD: CPT | Performed by: SURGERY

## 2018-11-14 PROCEDURE — 3017F COLORECTAL CA SCREEN DOC REV: CPT | Performed by: SURGERY

## 2018-11-14 PROCEDURE — G8427 DOCREV CUR MEDS BY ELIG CLIN: HCPCS | Performed by: SURGERY

## 2018-11-14 PROCEDURE — 99204 OFFICE O/P NEW MOD 45 MIN: CPT | Performed by: SURGERY

## 2018-11-14 PROCEDURE — 4040F PNEUMOC VAC/ADMIN/RCVD: CPT | Performed by: SURGERY

## 2018-11-14 RX ORDER — LISINOPRIL 20 MG/1
20 TABLET ORAL
COMMUNITY
End: 2018-11-14

## 2018-11-14 RX ORDER — TERBINAFINE HYDROCHLORIDE 250 MG/1
TABLET ORAL
COMMUNITY
Start: 2018-08-13 | End: 2018-12-11

## 2018-11-14 RX ORDER — IBUPROFEN 600 MG/1
TABLET ORAL
Refills: 0 | COMMUNITY
Start: 2018-08-09 | End: 2019-11-05

## 2018-11-14 RX ORDER — CLOPIDOGREL BISULFATE 75 MG/1
75 TABLET ORAL
COMMUNITY
End: 2018-11-14

## 2018-11-14 RX ORDER — GLIMEPIRIDE 2 MG/1
2 TABLET ORAL
COMMUNITY
End: 2018-11-14

## 2018-11-14 RX ORDER — ATENOLOL 25 MG/1
25 TABLET ORAL
COMMUNITY
End: 2018-11-14

## 2018-11-14 RX ORDER — AMLODIPINE BESYLATE 10 MG/1
10 TABLET ORAL
COMMUNITY
End: 2018-11-14

## 2018-11-14 RX ORDER — BACLOFEN 10 MG/1
TABLET ORAL
Refills: 0 | COMMUNITY
Start: 2018-10-03 | End: 2018-12-11 | Stop reason: ALTCHOICE

## 2018-11-14 RX ORDER — ZINC GLUCONATE 50 MG
50 TABLET ORAL
COMMUNITY
End: 2019-08-30 | Stop reason: ALTCHOICE

## 2018-11-14 NOTE — PROGRESS NOTES
GENERAL SURGERY CONSULTATION/OFFICE VISIT      Patient's Name/ Date of Birth/ Gender: Kashmir Koch / [birth year] (76 y.o.) / male     PCP: Yaneth Walker    History of present Illness:  Patient is a pleasant 77 yo male kindly referred for abdominal pain. He had foot/toe fungus and says a couple months ago was started on Lamisil and began experiencing side effects from the medicine such as loss os taste and smell sensation, poor appetite weight loss, and epigastric abdominal pain. No fevers or chills, no jaundice. No nausea or vomiting. He was on the medication he says for at least 2 months. He was referred for EGD for epigastric pain. He denies diarrhea, melena, or hematochezia. Labs reviewed, normal.    Past Medical History:  has a past medical history of Abnormal cardiac cath; Actinic keratosis; Arthritis; Calculus of kidney; Cervical stenosis of spine; Diabetes mellitus ([address] GERD (gastroesophageal reflux disease); Gout; H/O echocardiogram; History of cardiac cath; Hyperlipidemia; Hypertension; Raynaud's phenomenon; and S/P angioplasty with stent. Past Surgical History:   Past Surgical History:   Procedure Laterality Date    CARDIAC CATHETERIZATION  2007    patient states had 2 small blockages    CARDIAC CATHETERIZATION  02/09/2017    LMCA: Lesion on LMCA: Distal subsection 20% stenosis. LAD: Lesion on 1st Diag: Mid subsection 75% stenosis. RCA: Lesion on Mid RCA: Mid subsection 100% stenosis.  CARDIAC CATHETERIZATION  02/09/2017    Attempted PCI to chronic total occlusion of RCA was not successful. Unable to cross with multiple wires due to heavy calcification and toruosity.     CATARACT REMOVAL  4/6/15    Right eye - Dr. Soy Escobar  6/8/15    Dr. [employer] - left eye    COLONOSCOPY  1/11/2006    Dr. Shania Madison - internal hemorrhoids, no polps    COLONOSCOPY  5/23/2016    -polyp    HEMORRHOID SURGERY      HERNIA REPAIR      NECK SURGERY  1997    c4-5 laminectomy and fusion    OTHER SURGICAL HISTORY      Right foot - 7 from heal and 1 from great toe at [address]  12/11/14    pain injection - cervical    UPPER GASTROINTESTINAL ENDOSCOPY  12/28/2005    Dr. Le Winston - mild esophagitis and gastritis       Social History:  reports that he has been smoking Cigarettes. He has a 50.00 pack-year smoking history. He has never used smokeless tobacco. He reports that he does not drink alcohol or use drugs. Family History: family history includes Cancer in his brother; Diabetes in his father and mother; Heart Disease in his brother, brother, brother, mother, and sister; High Blood Pressure in his mother. Review of Systems:   General: Denies fever, chills, night sweats, weight loss, malaise, fatigue  HEENT: Denies sore throat, sinus problems, allergic rhinosinusitis  Card: Denies chest pain, palpitations, orthopnea/PND. HTN.+ CAD, heart stent, asa and plavix  Pulm: Denies cough, shortness of breath, dyspnea on exertion  GI:  per HPI. gerd  : Denies polyuria, dysuria, hematuria. + hx kidney stones  Endo: DM  Heme: neg  Neuro: Denies h/o CVA, TIA  Skin: Denies rashes, ulcers. raynauds. Musculoskeletal: no gross motor dysfunction. Arthritis. Allergies: Niacin and related;  Oxycodone hcl; and Other    Current Meds:  Current Outpatient Prescriptions:     linagliptin (TRADJENTA) 5 MG tablet, Take 5 mg by mouth daily , Disp: , Rfl:     clopidogrel (PLAVIX) 75 MG tablet, Take 1 tablet by mouth daily, Disp: 90 tablet, Rfl: 3    atorvastatin (LIPITOR) 20 MG tablet, Take 1 tablet by mouth daily, Disp: 90 tablet, Rfl: 3    atenolol (TENORMIN) 25 MG tablet, Take 1 tablet by mouth daily, Disp: 90 tablet, Rfl: 3    amLODIPine (NORVASC) 10 MG tablet, TAKE ONE-HALF TABLET BY MOUTH DAILY, Disp: 45 tablet, Rfl: 3    metFORMIN (GLUCOPHAGE) 1000 MG tablet, Take 1 tablet by mouth 2 times daily (with meals), Disp: 60 tablet, Rfl: 3    Ranitidine HCl (ZANTAC PO), Take 1 tablet by mouth as needed , Disp: , Rfl:     TRADJENTA 5 MG tablet, Take 5 mg by mouth daily, Disp: , Rfl:     lisinopril (PRINIVIL;ZESTRIL) 20 MG tablet, Take 1 tablet by mouth daily, Disp: 60 tablet, Rfl: 3    glimepiride (AMARYL) 2 MG tablet, Take 1 tablet by mouth 2 times daily, Disp: 180 tablet, Rfl: 3    aspirin 81 MG tablet, Take 81 mg by mouth daily. , Disp: , Rfl:     vitamin A 8000 units capsule, Take 8,000 Units by mouth, Disp: , Rfl:     zinc gluconate 50 MG tablet, Take 50 mg by mouth, Disp: , Rfl:     baclofen (LIORESAL) 10 MG tablet, TAKE 1/2 TO 1 TABLET BY MOUTH TWICE A DAY AS NEEDED FOR MUSCLE SPASMS, Disp: , Rfl: 0    ibuprofen (ADVIL;MOTRIN) 600 MG tablet, TAKE 1 TABLET BY MOUTH WITH FOOD OR MILK AS NEEDED THREE TIMES A DAY FOR 10 DAYS, Disp: , Rfl: 0    terbinafine (LAMISIL) 250 MG tablet, , Disp: , Rfl:     nitroGLYCERIN (NITROSTAT) 0.4 MG SL tablet, Place 1 tablet under the tongue every 5 minutes as needed for Chest pain, Disp: 25 tablet, Rfl: 3    bisacodyl (DULCOLAX) 5 MG EC tablet, Take 5 mg by mouth daily as needed for Constipation Takes 2 daily, Disp: , Rfl:     Physical Exam:  Vital signs and Nurse's note reviewed  Gen:  A&Ox3, NAD. Pleasant and cooperative. HEENT: NCAT, PERRLA, EOMI, no scleral icterus. Forehead cyst noted  Neck: Supple, no goiter  CVS: Regular rate and rhythm  Resp: Good bilateral air entry, clear B/L, no active wheezing, no labored breathing  Abd: soft, non-tender, non-distended, bowel sounds present. Ext: No clubbing, cyanosis, edema   CNS: Moves all extremities, no gross focal motor deficits  Skin: No erythema or ulcerations . Labs:   Scanned to media, October 2018, reviewed    Impressions/Recommendations:     Recent abdominal pain epigastric region, loss of appetite, loss of sense of taste and smell due to medication side effects.     After detailed history obtained and now that patient is symptoms free, I do not think EGD is warranted at this time

## 2018-12-11 ENCOUNTER — OFFICE VISIT (OUTPATIENT)
Dept: CARDIOLOGY | Age: 68
End: 2018-12-11
Payer: MEDICARE

## 2018-12-11 VITALS
WEIGHT: 162 LBS | BODY MASS INDEX: 21.94 KG/M2 | RESPIRATION RATE: 20 BRPM | OXYGEN SATURATION: 98 % | SYSTOLIC BLOOD PRESSURE: 117 MMHG | DIASTOLIC BLOOD PRESSURE: 68 MMHG | HEIGHT: 72 IN | HEART RATE: 68 BPM

## 2018-12-11 DIAGNOSIS — Z95.820 S/P ANGIOPLASTY WITH STENT: ICD-10-CM

## 2018-12-11 DIAGNOSIS — I10 ESSENTIAL HYPERTENSION: ICD-10-CM

## 2018-12-11 DIAGNOSIS — I25.10 ASHD (ARTERIOSCLEROTIC HEART DISEASE): Primary | ICD-10-CM

## 2018-12-11 DIAGNOSIS — Z71.6 TOBACCO ABUSE COUNSELING: ICD-10-CM

## 2018-12-11 DIAGNOSIS — Z72.0 TOBACCO ABUSE: ICD-10-CM

## 2018-12-11 DIAGNOSIS — E78.5 DYSLIPIDEMIA: ICD-10-CM

## 2018-12-11 PROCEDURE — 99213 OFFICE O/P EST LOW 20 MIN: CPT | Performed by: INTERNAL MEDICINE

## 2018-12-11 PROCEDURE — G8427 DOCREV CUR MEDS BY ELIG CLIN: HCPCS | Performed by: INTERNAL MEDICINE

## 2018-12-11 PROCEDURE — G8484 FLU IMMUNIZE NO ADMIN: HCPCS | Performed by: INTERNAL MEDICINE

## 2018-12-11 PROCEDURE — 1101F PT FALLS ASSESS-DOCD LE1/YR: CPT | Performed by: INTERNAL MEDICINE

## 2018-12-11 PROCEDURE — G8598 ASA/ANTIPLAT THER USED: HCPCS | Performed by: INTERNAL MEDICINE

## 2018-12-11 PROCEDURE — 4004F PT TOBACCO SCREEN RCVD TLK: CPT | Performed by: INTERNAL MEDICINE

## 2018-12-11 PROCEDURE — 1123F ACP DISCUSS/DSCN MKR DOCD: CPT | Performed by: INTERNAL MEDICINE

## 2018-12-11 PROCEDURE — 4040F PNEUMOC VAC/ADMIN/RCVD: CPT | Performed by: INTERNAL MEDICINE

## 2018-12-11 PROCEDURE — G8420 CALC BMI NORM PARAMETERS: HCPCS | Performed by: INTERNAL MEDICINE

## 2018-12-11 PROCEDURE — 3017F COLORECTAL CA SCREEN DOC REV: CPT | Performed by: INTERNAL MEDICINE

## 2018-12-11 RX ORDER — PANTOPRAZOLE SODIUM 20 MG/1
40 TABLET, DELAYED RELEASE ORAL 2 TIMES DAILY
COMMUNITY
End: 2019-06-18 | Stop reason: ALTCHOICE

## 2018-12-11 NOTE — PROGRESS NOTES
HEENT: Normocephalic and atraumatic. No JVD present. Carotid bruit is not present. No mass and no thyromegaly present. No lymphadenopathy present. Cardiovascular: Normal rate, regular rhythm, normal heart sounds and intact distal pulses. Exam reveals no gallop and no friction rub. No Murmur  Pulmonary/Chest: Effort normal and breath sounds normal. No respiratory distress. He has no wheezes, rhonchi or rales. Abdominal: Soft, non-tender. Bowel sounds and aorta are normal. He exhibits no organomegaly, mass or bruit. Extremities:  No edema, cyanosis, or clubbing. Pulses are 2+ radial/carotid/femoral/1-2+ dorsalis pedis and posterior tibial bilaterally. Feet warm. Neurological: He is alert and oriented to person, place, and time. No evidence of gross cranial nerve deficit. Coordination appeared normal.   Skin: Skin is warm and dry. There is no rash or diaphoresis. Psychiatric: He has a normal mood and affect. His speech is normal and behavior is normal.        Assessment:    1-Coronary artery disease s/p PCI in in 2003 and 2011. 2-Possible inferior wall myocardial infarction 1/2017, cath showing new total occlusion of mid RCA. Failed attempt to open the infarct related artery. 4-Essential hypertension. PLAN:   Patient with extensive cardiac history as outlined in HPI  Currently stable from cardiovascular standpoint, no chest pain or significant shortness of breath. Atherosclerotic Heart Disease:  Antiplatelet Agent: Continue Aspirin 81 mg daily and clopidogrel (Plavix) 75 mg daily. I also reminded him to watch for signs of blood in his stool or black tarry stools and stop the medication immediately if this develops as this could be life threatening. Beta Blocker Therapy: Continue Atenolol     Anti-anginal medications: Continue amlodipine (Norvasc) 10 mg daily.  I also discussed the potential side effects of this medication including lightheadedness and dizziness and told him to call the office if

## 2018-12-21 ENCOUNTER — TELEPHONE (OUTPATIENT)
Dept: SURGERY | Age: 68
End: 2018-12-21

## 2018-12-28 ENCOUNTER — TELEPHONE (OUTPATIENT)
Dept: GASTROENTEROLOGY | Age: 68
End: 2018-12-28

## 2018-12-28 DIAGNOSIS — R10.13 EPIGASTRIC PAIN: ICD-10-CM

## 2018-12-28 DIAGNOSIS — R10.13 DYSPEPSIA: Primary | ICD-10-CM

## 2019-01-04 PROBLEM — R10.13 DYSPEPSIA: Status: ACTIVE | Noted: 2019-01-04

## 2019-01-04 PROBLEM — R10.13 EPIGASTRIC PAIN: Status: ACTIVE | Noted: 2019-01-04

## 2019-01-13 ENCOUNTER — HOSPITAL ENCOUNTER (OUTPATIENT)
Age: 69
Setting detail: OBSERVATION
LOS: 1 days | Discharge: HOME | End: 2019-01-14
Attending: EMERGENCY MEDICINE | Admitting: EMERGENCY MEDICINE
Payer: MEDICARE

## 2019-01-13 ENCOUNTER — HOSPITAL ENCOUNTER (EMERGENCY)
Age: 69
Discharge: ANOTHER HOSPITAL | End: 2019-01-13
Attending: EMERGENCY MEDICINE
Payer: MEDICARE

## 2019-01-13 ENCOUNTER — APPOINTMENT (OUTPATIENT)
Dept: GENERAL RADIOLOGY | Age: 69
End: 2019-01-13
Payer: MEDICARE

## 2019-01-13 VITALS
RESPIRATION RATE: 18 BRPM | SYSTOLIC BLOOD PRESSURE: 125 MMHG | TEMPERATURE: 96.6 F | OXYGEN SATURATION: 97 % | HEART RATE: 63 BPM | HEIGHT: 72 IN | BODY MASS INDEX: 21.67 KG/M2 | WEIGHT: 160 LBS | DIASTOLIC BLOOD PRESSURE: 65 MMHG

## 2019-01-13 DIAGNOSIS — R07.9 CHEST PAIN, UNSPECIFIED TYPE: Primary | ICD-10-CM

## 2019-01-13 DIAGNOSIS — I20.9 ANGINA PECTORIS (HCC): Primary | ICD-10-CM

## 2019-01-13 LAB
ABSOLUTE EOS #: 0.31 K/UL (ref 0–0.44)
ABSOLUTE IMMATURE GRANULOCYTE: 0.03 K/UL (ref 0–0.3)
ABSOLUTE LYMPH #: 2.41 K/UL (ref 1.1–3.7)
ABSOLUTE MONO #: 0.84 K/UL (ref 0.1–1.2)
ANION GAP SERPL CALCULATED.3IONS-SCNC: 11 MMOL/L (ref 9–17)
BASOPHILS # BLD: 1 % (ref 0–2)
BASOPHILS ABSOLUTE: 0.09 K/UL (ref 0–0.2)
BUN BLDV-MCNC: 19 MG/DL (ref 8–23)
BUN/CREAT BLD: 16 (ref 9–20)
CALCIUM SERPL-MCNC: 9.1 MG/DL (ref 8.6–10.4)
CHLORIDE BLD-SCNC: 104 MMOL/L (ref 98–107)
CO2: 23 MMOL/L (ref 20–31)
CREAT SERPL-MCNC: 1.18 MG/DL (ref 0.7–1.2)
DIFFERENTIAL TYPE: ABNORMAL
EKG ATRIAL RATE: 62 BPM
EKG P AXIS: 57 DEGREES
EKG P-R INTERVAL: 202 MS
EKG Q-T INTERVAL: 390 MS
EKG QRS DURATION: 96 MS
EKG QTC CALCULATION (BAZETT): 395 MS
EKG R AXIS: 58 DEGREES
EKG T AXIS: 71 DEGREES
EKG VENTRICULAR RATE: 62 BPM
EOSINOPHILS RELATIVE PERCENT: 4 % (ref 1–4)
GFR AFRICAN AMERICAN: >60 ML/MIN
GFR NON-AFRICAN AMERICAN: >60 ML/MIN
GFR SERPL CREATININE-BSD FRML MDRD: ABNORMAL ML/MIN/{1.73_M2}
GFR SERPL CREATININE-BSD FRML MDRD: ABNORMAL ML/MIN/{1.73_M2}
GLUCOSE BLD-MCNC: 215 MG/DL (ref 70–99)
HCT VFR BLD CALC: 37.5 % (ref 40.7–50.3)
HEMOGLOBIN: 12.1 G/DL (ref 13–17)
IMMATURE GRANULOCYTES: 0 %
LYMPHOCYTES # BLD: 31 % (ref 24–43)
MCH RBC QN AUTO: 29.2 PG (ref 25.2–33.5)
MCHC RBC AUTO-ENTMCNC: 32.3 G/DL (ref 28.4–34.8)
MCV RBC AUTO: 90.6 FL (ref 82.6–102.9)
MONOCYTES # BLD: 11 % (ref 3–12)
NRBC AUTOMATED: 0 PER 100 WBC
PDW BLD-RTO: 14.1 % (ref 11.8–14.4)
PLATELET # BLD: 217 K/UL (ref 138–453)
PLATELET ESTIMATE: ABNORMAL
PMV BLD AUTO: 10.7 FL (ref 8.1–13.5)
POTASSIUM SERPL-SCNC: 4.2 MMOL/L (ref 3.7–5.3)
RBC # BLD: 4.14 M/UL (ref 4.21–5.77)
RBC # BLD: ABNORMAL 10*6/UL
SEG NEUTROPHILS: 53 % (ref 36–65)
SEGMENTED NEUTROPHILS ABSOLUTE COUNT: 4.15 K/UL (ref 1.5–8.1)
SODIUM BLD-SCNC: 138 MMOL/L (ref 135–144)
TROPONIN INTERP: NORMAL
TROPONIN T: <0.03 NG/ML
TROPONIN, HIGH SENSITIVITY: NORMAL NG/L (ref 0–22)
WBC # BLD: 7.8 K/UL (ref 3.5–11.3)
WBC # BLD: ABNORMAL 10*3/UL

## 2019-01-13 PROCEDURE — 85025 COMPLETE CBC W/AUTO DIFF WBC: CPT

## 2019-01-13 PROCEDURE — 36415 COLL VENOUS BLD VENIPUNCTURE: CPT

## 2019-01-13 PROCEDURE — 99285 EMERGENCY DEPT VISIT HI MDM: CPT

## 2019-01-13 PROCEDURE — 93005 ELECTROCARDIOGRAM TRACING: CPT

## 2019-01-13 PROCEDURE — 84484 ASSAY OF TROPONIN QUANT: CPT

## 2019-01-13 PROCEDURE — 71045 X-RAY EXAM CHEST 1 VIEW: CPT

## 2019-01-13 PROCEDURE — 80048 BASIC METABOLIC PNL TOTAL CA: CPT

## 2019-01-13 ASSESSMENT — ENCOUNTER SYMPTOMS
ALLERGIC/IMMUNOLOGIC NEGATIVE: 1
EYES NEGATIVE: 1
RESPIRATORY NEGATIVE: 1
GASTROINTESTINAL NEGATIVE: 1

## 2019-01-13 ASSESSMENT — HEART SCORE
ECG: 0
ECG: 0

## 2019-01-13 ASSESSMENT — PAIN DESCRIPTION - PAIN TYPE: TYPE: ACUTE PAIN

## 2019-01-13 ASSESSMENT — PAIN DESCRIPTION - LOCATION: LOCATION: CHEST

## 2019-01-13 ASSESSMENT — PAIN SCALES - GENERAL: PAINLEVEL_OUTOF10: 1

## 2019-01-14 ENCOUNTER — APPOINTMENT (OUTPATIENT)
Dept: CARDIAC CATH/INVASIVE PROCEDURES | Age: 69
End: 2019-01-14
Payer: MEDICARE

## 2019-01-14 VITALS
SYSTOLIC BLOOD PRESSURE: 116 MMHG | RESPIRATION RATE: 18 BRPM | OXYGEN SATURATION: 97 % | DIASTOLIC BLOOD PRESSURE: 58 MMHG | HEIGHT: 72 IN | BODY MASS INDEX: 21.67 KG/M2 | HEART RATE: 56 BPM | TEMPERATURE: 98.3 F | WEIGHT: 160 LBS

## 2019-01-14 PROBLEM — R07.2 SUBSTERNAL CHEST PAIN: Status: ACTIVE | Noted: 2019-01-13

## 2019-01-14 LAB
EKG ATRIAL RATE: 59 BPM
EKG ATRIAL RATE: 68 BPM
EKG P AXIS: 6 DEGREES
EKG P AXIS: 61 DEGREES
EKG P-R INTERVAL: 196 MS
EKG P-R INTERVAL: 214 MS
EKG Q-T INTERVAL: 378 MS
EKG Q-T INTERVAL: 394 MS
EKG QRS DURATION: 92 MS
EKG QRS DURATION: 92 MS
EKG QTC CALCULATION (BAZETT): 390 MS
EKG QTC CALCULATION (BAZETT): 401 MS
EKG R AXIS: 5 DEGREES
EKG R AXIS: 69 DEGREES
EKG T AXIS: -5 DEGREES
EKG T AXIS: 72 DEGREES
EKG VENTRICULAR RATE: 59 BPM
EKG VENTRICULAR RATE: 68 BPM
ESTIMATED AVERAGE GLUCOSE: 143 MG/DL
GLUCOSE BLD-MCNC: 136 MG/DL (ref 75–110)
HBA1C MFR BLD: 6.6 % (ref 4–6)
TROPONIN INTERP: NORMAL
TROPONIN INTERP: NORMAL
TROPONIN T: NORMAL NG/ML
TROPONIN T: NORMAL NG/ML
TROPONIN, HIGH SENSITIVITY: 6 NG/L (ref 0–22)
TROPONIN, HIGH SENSITIVITY: 8 NG/L (ref 0–22)

## 2019-01-14 PROCEDURE — C1725 CATH, TRANSLUMIN NON-LASER: HCPCS

## 2019-01-14 PROCEDURE — 93458 L HRT ARTERY/VENTRICLE ANGIO: CPT | Performed by: INTERNAL MEDICINE

## 2019-01-14 PROCEDURE — 84484 ASSAY OF TROPONIN QUANT: CPT

## 2019-01-14 PROCEDURE — 2500000003 HC RX 250 WO HCPCS

## 2019-01-14 PROCEDURE — 36415 COLL VENOUS BLD VENIPUNCTURE: CPT

## 2019-01-14 PROCEDURE — 93005 ELECTROCARDIOGRAM TRACING: CPT

## 2019-01-14 PROCEDURE — G0378 HOSPITAL OBSERVATION PER HR: HCPCS

## 2019-01-14 PROCEDURE — C1894 INTRO/SHEATH, NON-LASER: HCPCS

## 2019-01-14 PROCEDURE — 6370000000 HC RX 637 (ALT 250 FOR IP): Performed by: EMERGENCY MEDICINE

## 2019-01-14 PROCEDURE — 83036 HEMOGLOBIN GLYCOSYLATED A1C: CPT

## 2019-01-14 PROCEDURE — 93880 EXTRACRANIAL BILAT STUDY: CPT

## 2019-01-14 PROCEDURE — 6360000004 HC RX CONTRAST MEDICATION

## 2019-01-14 PROCEDURE — C1769 GUIDE WIRE: HCPCS

## 2019-01-14 PROCEDURE — 82947 ASSAY GLUCOSE BLOOD QUANT: CPT

## 2019-01-14 PROCEDURE — 6360000002 HC RX W HCPCS

## 2019-01-14 PROCEDURE — 2709999900 HC NON-CHARGEABLE SUPPLY

## 2019-01-14 RX ORDER — CLOPIDOGREL BISULFATE 75 MG/1
75 TABLET ORAL DAILY
Status: DISCONTINUED | OUTPATIENT
Start: 2019-01-14 | End: 2019-01-15 | Stop reason: HOSPADM

## 2019-01-14 RX ORDER — PANTOPRAZOLE SODIUM 20 MG/1
20 TABLET, DELAYED RELEASE ORAL 4 TIMES DAILY
Status: DISCONTINUED | OUTPATIENT
Start: 2019-01-14 | End: 2019-01-15 | Stop reason: HOSPADM

## 2019-01-14 RX ORDER — ZINC GLUCONATE 50 MG
50 TABLET ORAL DAILY
Status: DISCONTINUED | OUTPATIENT
Start: 2019-01-14 | End: 2019-01-15 | Stop reason: HOSPADM

## 2019-01-14 RX ORDER — SODIUM CHLORIDE 0.9 % (FLUSH) 0.9 %
10 SYRINGE (ML) INJECTION EVERY 12 HOURS SCHEDULED
Status: DISCONTINUED | OUTPATIENT
Start: 2019-01-14 | End: 2019-01-15 | Stop reason: HOSPADM

## 2019-01-14 RX ORDER — ACETAMINOPHEN 325 MG/1
650 TABLET ORAL EVERY 4 HOURS PRN
Status: DISCONTINUED | OUTPATIENT
Start: 2019-01-14 | End: 2019-01-15 | Stop reason: HOSPADM

## 2019-01-14 RX ORDER — DEXTROSE MONOHYDRATE 50 MG/ML
100 INJECTION, SOLUTION INTRAVENOUS PRN
Status: DISCONTINUED | OUTPATIENT
Start: 2019-01-14 | End: 2019-01-15 | Stop reason: HOSPADM

## 2019-01-14 RX ORDER — ATENOLOL 25 MG/1
25 TABLET ORAL DAILY
Status: DISCONTINUED | OUTPATIENT
Start: 2019-01-14 | End: 2019-01-15 | Stop reason: HOSPADM

## 2019-01-14 RX ORDER — GLIMEPIRIDE 2 MG/1
2 TABLET ORAL 2 TIMES DAILY
Status: DISCONTINUED | OUTPATIENT
Start: 2019-01-14 | End: 2019-01-15 | Stop reason: HOSPADM

## 2019-01-14 RX ORDER — ONDANSETRON 2 MG/ML
4 INJECTION INTRAMUSCULAR; INTRAVENOUS EVERY 8 HOURS PRN
Status: DISCONTINUED | OUTPATIENT
Start: 2019-01-14 | End: 2019-01-15 | Stop reason: HOSPADM

## 2019-01-14 RX ORDER — ACETAMINOPHEN 325 MG/1
650 TABLET ORAL EVERY 4 HOURS PRN
Status: CANCELLED | OUTPATIENT
Start: 2019-01-14

## 2019-01-14 RX ORDER — SODIUM CHLORIDE 0.9 % (FLUSH) 0.9 %
10 SYRINGE (ML) INJECTION PRN
Status: CANCELLED | OUTPATIENT
Start: 2019-01-14

## 2019-01-14 RX ORDER — SODIUM CHLORIDE 0.9 % (FLUSH) 0.9 %
10 SYRINGE (ML) INJECTION EVERY 12 HOURS SCHEDULED
Status: CANCELLED | OUTPATIENT
Start: 2019-01-14

## 2019-01-14 RX ORDER — NICOTINE POLACRILEX 4 MG
15 LOZENGE BUCCAL PRN
Status: DISCONTINUED | OUTPATIENT
Start: 2019-01-14 | End: 2019-01-15 | Stop reason: HOSPADM

## 2019-01-14 RX ORDER — NITROGLYCERIN 0.4 MG/1
0.4 TABLET SUBLINGUAL EVERY 5 MIN PRN
Status: DISCONTINUED | OUTPATIENT
Start: 2019-01-14 | End: 2019-01-15 | Stop reason: HOSPADM

## 2019-01-14 RX ORDER — ATORVASTATIN CALCIUM 20 MG/1
20 TABLET, FILM COATED ORAL DAILY
Status: DISCONTINUED | OUTPATIENT
Start: 2019-01-14 | End: 2019-01-15 | Stop reason: HOSPADM

## 2019-01-14 RX ORDER — ISOSORBIDE MONONITRATE 30 MG/1
30 TABLET, EXTENDED RELEASE ORAL DAILY
Status: CANCELLED | OUTPATIENT
Start: 2019-01-14

## 2019-01-14 RX ORDER — FENTANYL CITRATE 50 UG/ML
50 INJECTION, SOLUTION INTRAMUSCULAR; INTRAVENOUS
Status: DISCONTINUED | OUTPATIENT
Start: 2019-01-14 | End: 2019-01-15 | Stop reason: HOSPADM

## 2019-01-14 RX ORDER — ASPIRIN 81 MG/1
81 TABLET, CHEWABLE ORAL DAILY
Status: DISCONTINUED | OUTPATIENT
Start: 2019-01-14 | End: 2019-01-15 | Stop reason: HOSPADM

## 2019-01-14 RX ORDER — DEXTROSE MONOHYDRATE 25 G/50ML
12.5 INJECTION, SOLUTION INTRAVENOUS PRN
Status: DISCONTINUED | OUTPATIENT
Start: 2019-01-14 | End: 2019-01-15 | Stop reason: HOSPADM

## 2019-01-14 RX ORDER — AMLODIPINE BESYLATE 10 MG/1
10 TABLET ORAL DAILY
Status: DISCONTINUED | OUTPATIENT
Start: 2019-01-14 | End: 2019-01-15 | Stop reason: HOSPADM

## 2019-01-14 RX ORDER — LISINOPRIL 20 MG/1
20 TABLET ORAL DAILY
Status: DISCONTINUED | OUTPATIENT
Start: 2019-01-14 | End: 2019-01-15 | Stop reason: HOSPADM

## 2019-01-14 RX ORDER — SODIUM CHLORIDE 0.9 % (FLUSH) 0.9 %
10 SYRINGE (ML) INJECTION PRN
Status: DISCONTINUED | OUTPATIENT
Start: 2019-01-14 | End: 2019-01-15 | Stop reason: HOSPADM

## 2019-01-14 RX ORDER — SODIUM CHLORIDE 9 MG/ML
INJECTION, SOLUTION INTRAVENOUS CONTINUOUS
Status: DISCONTINUED | OUTPATIENT
Start: 2019-01-14 | End: 2019-01-15 | Stop reason: HOSPADM

## 2019-01-14 RX ORDER — FENTANYL CITRATE 50 UG/ML
25 INJECTION, SOLUTION INTRAMUSCULAR; INTRAVENOUS
Status: DISCONTINUED | OUTPATIENT
Start: 2019-01-14 | End: 2019-01-15 | Stop reason: HOSPADM

## 2019-01-14 RX ADMIN — CLOPIDOGREL 75 MG: 75 TABLET, FILM COATED ORAL at 10:12

## 2019-01-14 RX ADMIN — ASPIRIN 81 MG: 81 TABLET, CHEWABLE ORAL at 10:12

## 2019-01-14 RX ADMIN — LISINOPRIL 20 MG: 20 TABLET ORAL at 10:12

## 2019-01-14 RX ADMIN — PANTOPRAZOLE SODIUM 20 MG: 20 TABLET, DELAYED RELEASE ORAL at 10:12

## 2019-01-14 RX ADMIN — ATENOLOL 25 MG: 25 TABLET ORAL at 10:12

## 2019-01-14 RX ADMIN — ATORVASTATIN CALCIUM 20 MG: 20 TABLET, FILM COATED ORAL at 10:11

## 2019-01-14 ASSESSMENT — ENCOUNTER SYMPTOMS
NAUSEA: 0
RHINORRHEA: 0
VOMITING: 0
SHORTNESS OF BREATH: 1
EYE PAIN: 0
ABDOMINAL PAIN: 0
COUGH: 0

## 2019-01-14 ASSESSMENT — PAIN SCALES - GENERAL
PAINLEVEL_OUTOF10: 0

## 2019-01-15 RX ORDER — ATENOLOL 25 MG/1
25 TABLET ORAL DAILY
Qty: 90 TABLET | Refills: 3 | Status: SHIPPED | OUTPATIENT
Start: 2019-01-15 | End: 2020-02-13 | Stop reason: SDUPTHER

## 2019-01-15 RX ORDER — AMLODIPINE BESYLATE 5 MG/1
5 TABLET ORAL DAILY
Qty: 90 TABLET | Refills: 3 | Status: SHIPPED | OUTPATIENT
Start: 2019-01-15 | End: 2020-03-16

## 2019-01-18 ENCOUNTER — HOSPITAL ENCOUNTER (OUTPATIENT)
Dept: NON INVASIVE DIAGNOSTICS | Age: 69
Discharge: HOME | End: 2019-01-18
Payer: MEDICARE

## 2019-01-18 ENCOUNTER — OFFICE VISIT (OUTPATIENT)
Dept: CARDIOLOGY | Age: 69
End: 2019-01-18
Payer: MEDICARE

## 2019-01-18 ENCOUNTER — HOSPITAL ENCOUNTER (OUTPATIENT)
Age: 69
Discharge: HOME | End: 2019-01-18
Payer: MEDICARE

## 2019-01-18 VITALS
DIASTOLIC BLOOD PRESSURE: 70 MMHG | BODY MASS INDEX: 22.11 KG/M2 | RESPIRATION RATE: 16 BRPM | WEIGHT: 163.2 LBS | SYSTOLIC BLOOD PRESSURE: 129 MMHG | HEART RATE: 64 BPM | HEIGHT: 72 IN

## 2019-01-18 DIAGNOSIS — I10 ESSENTIAL HYPERTENSION: ICD-10-CM

## 2019-01-18 DIAGNOSIS — E78.2 MIXED HYPERLIPIDEMIA: ICD-10-CM

## 2019-01-18 DIAGNOSIS — I25.2 HISTORY OF MYOCARDIAL INFARCTION: ICD-10-CM

## 2019-01-18 DIAGNOSIS — I25.10 ASHD (ARTERIOSCLEROTIC HEART DISEASE): ICD-10-CM

## 2019-01-18 DIAGNOSIS — Z72.0 TOBACCO ABUSE: ICD-10-CM

## 2019-01-18 DIAGNOSIS — I51.9 LEFT VENTRICULAR SYSTOLIC DYSFUNCTION: ICD-10-CM

## 2019-01-18 DIAGNOSIS — I25.10 ASHD (ARTERIOSCLEROTIC HEART DISEASE): Primary | ICD-10-CM

## 2019-01-18 LAB
CHOLESTEROL/HDL RATIO: 3.1
CHOLESTEROL: 89 MG/DL
HDLC SERPL-MCNC: 29 MG/DL
LDL CHOLESTEROL: 35 MG/DL (ref 0–130)
LV EF: 65 %
LVEF MODALITY: NORMAL
TRIGL SERPL-MCNC: 127 MG/DL
VLDLC SERPL CALC-MCNC: ABNORMAL MG/DL (ref 1–30)

## 2019-01-18 PROCEDURE — G8598 ASA/ANTIPLAT THER USED: HCPCS | Performed by: INTERNAL MEDICINE

## 2019-01-18 PROCEDURE — G8484 FLU IMMUNIZE NO ADMIN: HCPCS | Performed by: INTERNAL MEDICINE

## 2019-01-18 PROCEDURE — G8420 CALC BMI NORM PARAMETERS: HCPCS | Performed by: INTERNAL MEDICINE

## 2019-01-18 PROCEDURE — 4004F PT TOBACCO SCREEN RCVD TLK: CPT | Performed by: INTERNAL MEDICINE

## 2019-01-18 PROCEDURE — 99214 OFFICE O/P EST MOD 30 MIN: CPT | Performed by: INTERNAL MEDICINE

## 2019-01-18 PROCEDURE — 3017F COLORECTAL CA SCREEN DOC REV: CPT | Performed by: INTERNAL MEDICINE

## 2019-01-18 PROCEDURE — 4040F PNEUMOC VAC/ADMIN/RCVD: CPT | Performed by: INTERNAL MEDICINE

## 2019-01-18 PROCEDURE — 1111F DSCHRG MED/CURRENT MED MERGE: CPT | Performed by: INTERNAL MEDICINE

## 2019-01-18 PROCEDURE — 1123F ACP DISCUSS/DSCN MKR DOCD: CPT | Performed by: INTERNAL MEDICINE

## 2019-01-18 PROCEDURE — 80061 LIPID PANEL: CPT

## 2019-01-18 PROCEDURE — G8427 DOCREV CUR MEDS BY ELIG CLIN: HCPCS | Performed by: INTERNAL MEDICINE

## 2019-01-18 PROCEDURE — 1101F PT FALLS ASSESS-DOCD LE1/YR: CPT | Performed by: INTERNAL MEDICINE

## 2019-01-18 PROCEDURE — 93306 TTE W/DOPPLER COMPLETE: CPT

## 2019-01-18 PROCEDURE — 36415 COLL VENOUS BLD VENIPUNCTURE: CPT

## 2019-01-18 RX ORDER — LISINOPRIL 20 MG/1
10 TABLET ORAL DAILY
Qty: 90 TABLET | Refills: 3 | Status: SHIPPED | OUTPATIENT
Start: 2019-01-18 | End: 2021-12-16 | Stop reason: SDUPTHER

## 2019-01-18 RX ORDER — ISOSORBIDE MONONITRATE 30 MG/1
30 TABLET, EXTENDED RELEASE ORAL DAILY
Qty: 90 TABLET | Refills: 3 | Status: SHIPPED | OUTPATIENT
Start: 2019-01-18 | End: 2019-04-22

## 2019-01-18 RX ORDER — FERROUS SULFATE 325(65) MG
325 TABLET ORAL
COMMUNITY
End: 2019-06-18 | Stop reason: ALTCHOICE

## 2019-01-18 RX ORDER — CHLORPHENIRAMINE MALEATE 4 MG/1
4 TABLET ORAL EVERY 6 HOURS PRN
COMMUNITY
End: 2021-08-12

## 2019-01-18 RX ORDER — LISINOPRIL 20 MG/1
10 TABLET ORAL DAILY
Qty: 90 TABLET | Refills: 3 | Status: CANCELLED | OUTPATIENT
Start: 2019-01-18

## 2019-01-21 ENCOUNTER — TELEPHONE (OUTPATIENT)
Dept: CARDIOLOGY | Age: 69
End: 2019-01-21

## 2019-02-05 ENCOUNTER — HOSPITAL ENCOUNTER (OUTPATIENT)
Age: 69
Setting detail: OUTPATIENT SURGERY
Discharge: HOME | End: 2019-02-05
Attending: INTERNAL MEDICINE | Admitting: INTERNAL MEDICINE
Payer: MEDICARE

## 2019-02-05 ENCOUNTER — ANESTHESIA (OUTPATIENT)
Dept: OPERATING ROOM | Age: 69
End: 2019-02-05
Payer: MEDICARE

## 2019-02-05 ENCOUNTER — ANESTHESIA EVENT (OUTPATIENT)
Dept: OPERATING ROOM | Age: 69
End: 2019-02-05
Payer: MEDICARE

## 2019-02-05 VITALS
HEIGHT: 72 IN | WEIGHT: 162 LBS | SYSTOLIC BLOOD PRESSURE: 145 MMHG | DIASTOLIC BLOOD PRESSURE: 74 MMHG | BODY MASS INDEX: 21.94 KG/M2 | RESPIRATION RATE: 16 BRPM | TEMPERATURE: 97.8 F | HEART RATE: 64 BPM | OXYGEN SATURATION: 99 %

## 2019-02-05 VITALS
DIASTOLIC BLOOD PRESSURE: 61 MMHG | OXYGEN SATURATION: 100 % | SYSTOLIC BLOOD PRESSURE: 122 MMHG | RESPIRATION RATE: 16 BRPM

## 2019-02-05 PROCEDURE — 7100000011 HC PHASE II RECOVERY - ADDTL 15 MIN: Performed by: INTERNAL MEDICINE

## 2019-02-05 PROCEDURE — 6360000002 HC RX W HCPCS: Performed by: NURSE ANESTHETIST, CERTIFIED REGISTERED

## 2019-02-05 PROCEDURE — 2500000003 HC RX 250 WO HCPCS: Performed by: NURSE ANESTHETIST, CERTIFIED REGISTERED

## 2019-02-05 PROCEDURE — 2580000003 HC RX 258: Performed by: INTERNAL MEDICINE

## 2019-02-05 PROCEDURE — 2709999900 HC NON-CHARGEABLE SUPPLY: Performed by: INTERNAL MEDICINE

## 2019-02-05 PROCEDURE — 43239 EGD BIOPSY SINGLE/MULTIPLE: CPT | Performed by: INTERNAL MEDICINE

## 2019-02-05 PROCEDURE — 87077 CULTURE AEROBIC IDENTIFY: CPT

## 2019-02-05 PROCEDURE — 82947 ASSAY GLUCOSE BLOOD QUANT: CPT

## 2019-02-05 PROCEDURE — 7100000010 HC PHASE II RECOVERY - FIRST 15 MIN: Performed by: INTERNAL MEDICINE

## 2019-02-05 PROCEDURE — 3609012400 HC EGD TRANSORAL BIOPSY SINGLE/MULTIPLE: Performed by: INTERNAL MEDICINE

## 2019-02-05 PROCEDURE — 3700000000 HC ANESTHESIA ATTENDED CARE: Performed by: INTERNAL MEDICINE

## 2019-02-05 PROCEDURE — 3700000001 HC ADD 15 MINUTES (ANESTHESIA): Performed by: INTERNAL MEDICINE

## 2019-02-05 RX ORDER — PROPOFOL 10 MG/ML
INJECTION, EMULSION INTRAVENOUS PRN
Status: DISCONTINUED | OUTPATIENT
Start: 2019-02-05 | End: 2019-02-05 | Stop reason: SDUPTHER

## 2019-02-05 RX ORDER — LIDOCAINE HYDROCHLORIDE 20 MG/ML
INJECTION, SOLUTION EPIDURAL; INFILTRATION; INTRACAUDAL; PERINEURAL PRN
Status: DISCONTINUED | OUTPATIENT
Start: 2019-02-05 | End: 2019-02-05 | Stop reason: SDUPTHER

## 2019-02-05 RX ORDER — SODIUM CHLORIDE, SODIUM LACTATE, POTASSIUM CHLORIDE, CALCIUM CHLORIDE 600; 310; 30; 20 MG/100ML; MG/100ML; MG/100ML; MG/100ML
INJECTION, SOLUTION INTRAVENOUS CONTINUOUS
Status: DISCONTINUED | OUTPATIENT
Start: 2019-02-05 | End: 2019-02-05 | Stop reason: HOSPADM

## 2019-02-05 RX ADMIN — PROPOFOL 80 MG: 10 INJECTION, EMULSION INTRAVENOUS at 11:34

## 2019-02-05 RX ADMIN — LIDOCAINE HYDROCHLORIDE 200 MG: 20 INJECTION, SOLUTION EPIDURAL; INFILTRATION; INTRACAUDAL; PERINEURAL at 11:34

## 2019-02-05 RX ADMIN — SODIUM CHLORIDE, POTASSIUM CHLORIDE, SODIUM LACTATE AND CALCIUM CHLORIDE: 600; 310; 30; 20 INJECTION, SOLUTION INTRAVENOUS at 10:36

## 2019-02-05 ASSESSMENT — LIFESTYLE VARIABLES: SMOKING_STATUS: 1

## 2019-02-05 ASSESSMENT — PAIN SCALES - GENERAL
PAINLEVEL_OUTOF10: 0
PAINLEVEL_OUTOF10: 0

## 2019-02-05 ASSESSMENT — ENCOUNTER SYMPTOMS: SHORTNESS OF BREATH: 0

## 2019-02-06 LAB
DIRECT EXAM: NEGATIVE
Lab: NORMAL
SPECIMEN DESCRIPTION: NORMAL
STATUS: NORMAL

## 2019-02-11 LAB — GLUCOSE BLD-MCNC: 113 MG/DL (ref 74–100)

## 2019-04-08 RX ORDER — ATORVASTATIN CALCIUM 20 MG/1
20 TABLET, FILM COATED ORAL DAILY
Qty: 90 TABLET | Refills: 3 | Status: SHIPPED | OUTPATIENT
Start: 2019-04-08 | End: 2020-12-15 | Stop reason: SDUPTHER

## 2019-04-22 RX ORDER — ISOSORBIDE MONONITRATE 30 MG/1
30 TABLET, EXTENDED RELEASE ORAL DAILY
Qty: 90 TABLET | Refills: 3 | Status: SHIPPED | OUTPATIENT
Start: 2019-04-22 | End: 2019-06-18 | Stop reason: DRUGHIGH

## 2019-05-20 RX ORDER — CLOPIDOGREL BISULFATE 75 MG/1
75 TABLET ORAL DAILY
Qty: 90 TABLET | Refills: 3 | Status: SHIPPED | OUTPATIENT
Start: 2019-05-20 | End: 2020-03-16

## 2019-06-18 ENCOUNTER — OFFICE VISIT (OUTPATIENT)
Dept: CARDIOLOGY | Age: 69
End: 2019-06-18
Payer: MEDICARE

## 2019-06-18 VITALS
HEART RATE: 70 BPM | DIASTOLIC BLOOD PRESSURE: 71 MMHG | BODY MASS INDEX: 24.79 KG/M2 | OXYGEN SATURATION: 96 % | RESPIRATION RATE: 16 BRPM | HEIGHT: 70 IN | SYSTOLIC BLOOD PRESSURE: 137 MMHG | WEIGHT: 173.2 LBS

## 2019-06-18 DIAGNOSIS — I10 ESSENTIAL HYPERTENSION: ICD-10-CM

## 2019-06-18 DIAGNOSIS — E78.2 MIXED HYPERLIPIDEMIA: ICD-10-CM

## 2019-06-18 DIAGNOSIS — Z95.820 S/P ANGIOPLASTY WITH STENT: ICD-10-CM

## 2019-06-18 DIAGNOSIS — I25.2 HISTORY OF MYOCARDIAL INFARCTION: ICD-10-CM

## 2019-06-18 DIAGNOSIS — I25.10 ASHD (ARTERIOSCLEROTIC HEART DISEASE): Primary | ICD-10-CM

## 2019-06-18 DIAGNOSIS — Z72.0 TOBACCO ABUSE: ICD-10-CM

## 2019-06-18 PROCEDURE — 3017F COLORECTAL CA SCREEN DOC REV: CPT | Performed by: INTERNAL MEDICINE

## 2019-06-18 PROCEDURE — 4040F PNEUMOC VAC/ADMIN/RCVD: CPT | Performed by: INTERNAL MEDICINE

## 2019-06-18 PROCEDURE — G8598 ASA/ANTIPLAT THER USED: HCPCS | Performed by: INTERNAL MEDICINE

## 2019-06-18 PROCEDURE — 99214 OFFICE O/P EST MOD 30 MIN: CPT | Performed by: INTERNAL MEDICINE

## 2019-06-18 PROCEDURE — 4004F PT TOBACCO SCREEN RCVD TLK: CPT | Performed by: INTERNAL MEDICINE

## 2019-06-18 PROCEDURE — 1123F ACP DISCUSS/DSCN MKR DOCD: CPT | Performed by: INTERNAL MEDICINE

## 2019-06-18 PROCEDURE — 93000 ELECTROCARDIOGRAM COMPLETE: CPT | Performed by: INTERNAL MEDICINE

## 2019-06-18 PROCEDURE — G8427 DOCREV CUR MEDS BY ELIG CLIN: HCPCS | Performed by: INTERNAL MEDICINE

## 2019-06-18 PROCEDURE — G8420 CALC BMI NORM PARAMETERS: HCPCS | Performed by: INTERNAL MEDICINE

## 2019-06-18 RX ORDER — ISOSORBIDE MONONITRATE 60 MG/1
60 TABLET, EXTENDED RELEASE ORAL DAILY
Qty: 90 TABLET | Refills: 3 | Status: SHIPPED | OUTPATIENT
Start: 2019-06-18 | End: 2019-09-11

## 2019-06-18 NOTE — PATIENT INSTRUCTIONS
SURVEY:    You may be receiving a survey from [employer] regarding your visit today. Please complete the survey to enable us to provide the highest quality of care to you and your family. If you cannot score us a very good on any question, please call the office to discuss how we could have made your experience a very good one. Thank you.

## 2019-06-18 NOTE — PROGRESS NOTES
Kurtis Laird am scribing for and in the presence of Oscar Peng MD, F.A.C.C..    Patient: Bela Smith  : 1950  Date of Visit: 2019    REASON FOR VISIT / CONSULTATION:   Chief Complaint   Patient presents with    6 Month Follow-Up     Hx: CAD, HTN, Hypelipidemia, MI, Stent. Echo . Patient feels radha good. just this week he had a couple stabbing pains in his chest that last 5 mins both times. Denies: SOB, lightheaded/dizziness,palpitations. Dear Dr. Armen Griffin had the pleasure of seeing Bela Smith today for follow up on his cardiac status. As you know, Mr. Damaris Nolasco is 76 y.o male with PMH of hypertension, hyperlipidemia, type II diabetes and coronary artery disease with  heart cath done on 2013 at [facility]. Vs at that time he has NEDRA to OM1. The cath also showed mild disease of the other coronaries and normal left ventricular function by ventriculogram.      Earlier in , patient had an episode of prolonged chest pain. Retrosternal, burning in nature without radiation. Pain occurred at rest with partial relief with sublingual nitrates. Repeat cardiac cath on  2017 showed new totally occluded mid RCA. An attempt to open the totally occluded RCA on 2017 has failed. Patient treated medically. Echo done on 2018, global left ventricular systolic function appears preserved with an estimated ejection fraction of 60%. Mildly increased left ventricular wall thickness with a normal left ventricular cavity size. No definite specific wall motion abnormalities were identified. No significant valvular abnormalities. Evidence of mild diastolic dysfunction is seen. Echo done on 2019 that showed EF of 65%. LV wall thickness is mildly increased. Aortic valve leaflets are mildly thickened. Mr. Damaris Nolasco is here for his 6 month follow up.  Two days ago he had a stabbing chest pain that did not radiate (when he would cough, it would let up) and lased for around 5 minutes. He stated he did not have to take any Nitro for this pain. He has been staying active and working on his grandsons siding for his house. He states he is drinking enough fluids and trying to stay hydrated. He currently denies any more chest pain, pressure or tightness. No significant shortness of breath. No orthopnea or PND. ECG today showed no acute ischemic changes. Past Medical History:   Diagnosis Date    Abnormal cardiac cath 9/20/13    LMCA; Mild irregularities 10-20%. LAD: Abnormal.  Mild to moderate irregularities throughout the LAD and branches. LCx:  abnormal.  80-90% stenosis in a moderate sized OM1. RCA: Abnormal.  40-50% mid RCA stenosis.  Actinic keratosis     Arthritis     neck, fingers    CAD (coronary artery disease)     Calculus of kidney     Cervical stenosis of spine     Diabetes mellitus (HCC)     GERD (gastroesophageal reflux disease)     Gout     H/O echocardiogram 04/29/2016    Stress echo. Normal resting LV systolic function,normal systolic restponse to exercise. No evidence of reversible  ischemia on this maximal,symptom limited,treadmill exerxiess stress echocardiography study    History of cardiac cath 02/09/2017    LMCA: Lesion on LMCA: Distal subsection . 20% stenosis. LAD: Lesion on 1st Diag: Mid subsection . 75% stenosis. RCA: Lesion on Mid RCA: Mid subsection . 100% stenosis. Comments: The distal RCA fills by left to right collateralization. Dominance: Mixed. LV function assessed as: Normal. EF 65%.  History of echocardiogram 06/11/2018    EF 60%. Mildly increased LV wall thickness. Evidence of mild diastolic dysfunction seen.  History of echocardiogram 01/18/2019    EF of 65%. LV wall thickness is mildly increased. Aortic valve leaflets are mildly thickened.      Hyperlipidemia     Hypertension     Raynaud's phenomenon     S/P angioplasty with stent 9/20/13    PTCA-NEDRA of  the OM 1    Tobacco abuse     1 ppd         Past Surgical History:   Procedure Laterality Date    CARDIAC CATHETERIZATION      patient states had 2 small blockages    CARDIAC CATHETERIZATION  2017    LMCA: Lesion on LMCA: Distal subsection 20% stenosis. LAD: Lesion on 1st Diag: Mid subsection 75% stenosis. RCA: Lesion on Mid RCA: Mid subsection 100% stenosis.  CARDIAC CATHETERIZATION  2017    Attempted PCI to chronic total occlusion of RCA was not successful. Unable to cross with multiple wires due to heavy calcification and toruosity.  CARDIAC CATHETERIZATION  2019    CATARACT REMOVAL  4/6/15    Right eye - Dr. Abena Royal  6/8/15    Dr. Lo Sanchez - left eye    COLONOSCOPY  2006    Dr. Castro - internal hemorrhoids, no polps    COLONOSCOPY  2016    -polyp    HEMORRHOID SURGERY      HERNIA REPAIR      NECK SURGERY      c4-5 laminectomy and fusion    OTHER SURGICAL HISTORY      Right foot - 7 from heal and 1 from great toe at [address]  14    pain injection - cervical    UPPER GASTROINTESTINAL ENDOSCOPY  2005    Dr. Castro - mild esophagitis and gastritis    UPPER GASTROINTESTINAL ENDOSCOPY N/A 2019    -bx(neg H-Pylori)     Family History   Problem Relation Age of Onset    Heart Disease Mother     Diabetes Mother     High Blood Pressure Mother     Diabetes Father     Heart Disease Sister         CABG    Heart Disease Brother     Heart Disease Brother     Heart Disease Brother     Cancer Brother          Social History     Tobacco Use    Smoking status: Current Every Day Smoker     Packs/day: 0.50     Years: 50.00     Pack years: 25.00     Types: Cigarettes     Last attempt to quit: 2013     Years since quittin.7    Smokeless tobacco: Never Used    Tobacco comment: Patient recently had accupuncture to help stop.   Goes next week for another   Substance Use Topics    Alcohol use: No    Drug use: No     Family History   Problem Relation Age of Onset    Heart Disease Mother     Diabetes Mother     High Blood Pressure Mother     Diabetes Father     Heart Disease Sister         CABG    Heart Disease Brother     Heart Disease Brother     Heart Disease Brother     Cancer Brother          Current Outpatient Medications:     sitaGLIPtan-metformin (JANUMET)  MG per tablet, Take 1 tablet by mouth 2 times daily (with meals), Disp: , Rfl:     isosorbide mononitrate (IMDUR) 60 MG extended release tablet, Take 1 tablet by mouth daily, Disp: 90 tablet, Rfl: 3    clopidogrel (PLAVIX) 75 MG tablet, Take 1 tablet by mouth daily, Disp: 90 tablet, Rfl: 3    atorvastatin (LIPITOR) 20 MG tablet, Take 1 tablet by mouth daily, Disp: 90 tablet, Rfl: 3    chlorpheniramine (EQ CHLORTABS) 4 MG tablet, Take 4 mg by mouth every 6 hours as needed for Allergies, Disp: , Rfl:     lisinopril (PRINIVIL;ZESTRIL) 20 MG tablet, Take 0.5 tablets by mouth daily, Disp: 90 tablet, Rfl: 3    atenolol (TENORMIN) 25 MG tablet, Take 1 tablet by mouth daily (Patient taking differently: Take 12.5 mg by mouth daily ), Disp: 90 tablet, Rfl: 3    amLODIPine (NORVASC) 5 MG tablet, Take 1 tablet by mouth daily, Disp: 90 tablet, Rfl: 3    vitamin A 8000 units capsule, Take 8,000 Units by mouth, Disp: , Rfl:     ibuprofen (ADVIL;MOTRIN) 600 MG tablet, TAKE 1 TABLET BY MOUTH WITH FOOD OR MILK AS NEEDED THREE TIMES A DAY FOR 10 DAYS, Disp: , Rfl: 0    nitroGLYCERIN (NITROSTAT) 0.4 MG SL tablet, Place 1 tablet under the tongue every 5 minutes as needed for Chest pain, Disp: 25 tablet, Rfl: 3    glimepiride (AMARYL) 2 MG tablet, Take 1 tablet by mouth 2 times daily, Disp: 180 tablet, Rfl: 3    aspirin 81 MG tablet, Take 81 mg by mouth daily. , Disp: , Rfl:     zinc gluconate 50 MG tablet, Take 50 mg by mouth, Disp: , Rfl:     Ranitidine HCl (ZANTAC PO), Take 2 tablets by mouth 2 times daily , Disp: , Rfl:     Allergies   Allergen Reactions    Niacin And Related     Oxycodone Hcl     Other Nausea And Vomiting     Patient states on all pain medications he gets nausea and vomiting. ROS: 14 systems reviewed and negative except for pertinent positives as noted above. PHYSICAL EXAM:   /71 (Site: Right Upper Arm, Position: Sitting, Cuff Size: Medium Adult)   Pulse 70   Resp 16   Ht 5' 10\" (1.778 m)   Wt 173 lb 3.2 oz (78.6 kg)   SpO2 96%   BMI 24.85 kg/m²  Body mass index is 24.85 kg/m². Constitutional: He is oriented to person, place, and time. He appears well-developed and well-nourished. In no acute distress. HEENT: Normocephalic and atraumatic. No JVD present. Carotid bruit is not present. No mass and no thyromegaly present. No lymphadenopathy present. Cardiovascular: Normal rate, regular rhythm, normal heart sounds. Exam reveals no gallop and no friction rubs. No heart murmur heard. Pulmonary/Chest: Effort normal and breath sounds normal. No respiratory distress. He has no wheezes, rhonchi or rales. Abdominal: Soft, non-tender. Bowel sounds and aorta are normal. He exhibits no organomegaly, mass or bruit. Extremities: No edema. No cyanosis and no clubbing. Pulses are 2+ radial and carotid pulses. 2+ dorsalis pedis and posterior tibial pulses bilaterally. Neurological: He is alert and oriented to person, place, and time. No evidence of gross cranial nerve deficit. Coordination appeared normal.   Skin: Skin is warm and dry. There is no rash or diaphoresis. Psychiatric: He has a normal mood and affect. His speech is normal and behavior is normal.       Diagnosis Orders   1. ASHD (arteriosclerotic heart disease)  EKG 12 lead    isosorbide mononitrate (IMDUR) 60 MG extended release tablet   2. Essential hypertension  isosorbide mononitrate (IMDUR) 60 MG extended release tablet   3. Mixed hyperlipidemia  isosorbide mononitrate (IMDUR) 60 MG extended release tablet   4. Tobacco abuse     5.  History of myocardial infarction 6. S/P angioplasty with stent       Assessment:    1-Coronary artery disease s/p PCI in in 2003 and 2011. 2-Possible inferior wall myocardial infarction 1/2017, cath showing new total occlusion of mid RCA. Failed attempt to open the infarct related artery. 3-Essential hypertension. 4- Dyslipidemia  5-Tobacco abuse    PLAN:   Atherosclerotic Heart Disease: S/P PCI in in 2003 and 2011. Antiplatelet Agent: Continue Aspirin 81 mg daily and clopidogrel (Plavix) 75 mg daily. I also reminded him to watch for signs of blood in his stool or black tarry stools and stop the medication immediately if this develops as this could be life threatening. Beta Blocker Therapy: Continue Atenolol 12.5 mg daily  Anti-anginal medications: Continue amlodipine (Norvasc) 5 mg daily. I also discussed the potential side effects of this medication including lightheadedness and dizziness and told him to call the office if this occurs. Anti-anginal medications: Increase isosorbide mononitrate (Imdur) 60 mg daily. I also discussed the potential side effects of this medication including lightheadedness and dizziness and told him to call the office if this occurs. Statin Therapy: Continue atorvastatin (Lipitor) 20 mg nightly. His most recent chest pain is nonischemic by history. Pain increases with coughing. No new ECG changes noted. Counseled patient extensively to come to the emergency room if he has any persistent chest pain that lasted more than 5 to 7 minutes. Encouraged the patient to take nitroglycerin as needed. Essential Hypertension: Controlled  ACE Inibitor/ARB: Continue lisinopril 10 mg once daily. I also discussed the potential side effects of this medication including lightheadedness and dizziness and told him to stop the medication of this occurs and call our office if this occurs.   Calcium Channel Blocker: Continue amlodipine (Norvasc)      Beta Blocker Therapy: Continue atenolol 12.5 mg  daily     Increase

## 2019-07-09 ENCOUNTER — TELEPHONE (OUTPATIENT)
Dept: CARDIOLOGY | Age: 69
End: 2019-07-09

## 2019-08-21 ENCOUNTER — TELEPHONE (OUTPATIENT)
Dept: ONCOLOGY | Age: 69
End: 2019-08-21

## 2019-08-30 ENCOUNTER — HOSPITAL ENCOUNTER (EMERGENCY)
Age: 69
Discharge: ANOTHER HOSPITAL | End: 2019-08-30
Attending: EMERGENCY MEDICINE
Payer: MEDICARE

## 2019-08-30 ENCOUNTER — APPOINTMENT (OUTPATIENT)
Dept: GENERAL RADIOLOGY | Age: 69
End: 2019-08-30
Payer: MEDICARE

## 2019-08-30 VITALS
TEMPERATURE: 98.3 F | SYSTOLIC BLOOD PRESSURE: 114 MMHG | RESPIRATION RATE: 18 BRPM | BODY MASS INDEX: 24.68 KG/M2 | HEART RATE: 62 BPM | OXYGEN SATURATION: 94 % | WEIGHT: 172 LBS | DIASTOLIC BLOOD PRESSURE: 65 MMHG

## 2019-08-30 DIAGNOSIS — R07.9 ACUTE CHEST PAIN: Primary | ICD-10-CM

## 2019-08-30 LAB
ABSOLUTE EOS #: 0.28 K/UL (ref 0–0.44)
ABSOLUTE IMMATURE GRANULOCYTE: 0.04 K/UL (ref 0–0.3)
ABSOLUTE LYMPH #: 2.16 K/UL (ref 1.1–3.7)
ABSOLUTE MONO #: 0.84 K/UL (ref 0.1–1.2)
ALBUMIN SERPL-MCNC: 4.4 G/DL (ref 3.5–5.2)
ALBUMIN/GLOBULIN RATIO: 1.6 (ref 1–2.5)
ALP BLD-CCNC: 65 U/L (ref 40–129)
ALT SERPL-CCNC: 26 U/L (ref 5–41)
ANION GAP SERPL CALCULATED.3IONS-SCNC: 14 MMOL/L (ref 9–17)
AST SERPL-CCNC: 23 U/L
BASOPHILS # BLD: 1 % (ref 0–2)
BASOPHILS ABSOLUTE: 0.08 K/UL (ref 0–0.2)
BILIRUB SERPL-MCNC: 0.39 MG/DL (ref 0.3–1.2)
BNP INTERPRETATION: NORMAL
BUN BLDV-MCNC: 17 MG/DL (ref 8–23)
BUN/CREAT BLD: 11 (ref 9–20)
CALCIUM SERPL-MCNC: 9.5 MG/DL (ref 8.6–10.4)
CHLORIDE BLD-SCNC: 106 MMOL/L (ref 98–107)
CO2: 22 MMOL/L (ref 20–31)
CREAT SERPL-MCNC: 1.56 MG/DL (ref 0.7–1.2)
D-DIMER QUANTITATIVE: 0.38 MG/L FEU (ref 0.19–0.5)
DIFFERENTIAL TYPE: ABNORMAL
EKG ATRIAL RATE: 68 BPM
EKG P AXIS: 15 DEGREES
EKG P-R INTERVAL: 190 MS
EKG Q-T INTERVAL: 382 MS
EKG QRS DURATION: 92 MS
EKG QTC CALCULATION (BAZETT): 406 MS
EKG R AXIS: 41 DEGREES
EKG T AXIS: 51 DEGREES
EKG VENTRICULAR RATE: 68 BPM
EOSINOPHILS RELATIVE PERCENT: 3 % (ref 1–4)
GFR AFRICAN AMERICAN: 54 ML/MIN
GFR NON-AFRICAN AMERICAN: 44 ML/MIN
GFR SERPL CREATININE-BSD FRML MDRD: ABNORMAL ML/MIN/{1.73_M2}
GFR SERPL CREATININE-BSD FRML MDRD: ABNORMAL ML/MIN/{1.73_M2}
GLUCOSE BLD-MCNC: 75 MG/DL (ref 70–99)
HCT VFR BLD CALC: 36.2 % (ref 40.7–50.3)
HEMOGLOBIN: 11.9 G/DL (ref 13–17)
IMMATURE GRANULOCYTES: 0 %
LIPASE: 40 U/L (ref 13–60)
LYMPHOCYTES # BLD: 24 % (ref 24–43)
MCH RBC QN AUTO: 29.8 PG (ref 25.2–33.5)
MCHC RBC AUTO-ENTMCNC: 32.9 G/DL (ref 28.4–34.8)
MCV RBC AUTO: 90.7 FL (ref 82.6–102.9)
MONOCYTES # BLD: 9 % (ref 3–12)
NRBC AUTOMATED: 0 PER 100 WBC
PDW BLD-RTO: 13.1 % (ref 11.8–14.4)
PLATELET # BLD: 214 K/UL (ref 138–453)
PLATELET ESTIMATE: ABNORMAL
PMV BLD AUTO: 10.9 FL (ref 8.1–13.5)
POTASSIUM SERPL-SCNC: 4.4 MMOL/L (ref 3.7–5.3)
PRO-BNP: 96 PG/ML
RBC # BLD: 3.99 M/UL (ref 4.21–5.77)
RBC # BLD: ABNORMAL 10*6/UL
SEG NEUTROPHILS: 63 % (ref 36–65)
SEGMENTED NEUTROPHILS ABSOLUTE COUNT: 5.6 K/UL (ref 1.5–8.1)
SODIUM BLD-SCNC: 142 MMOL/L (ref 135–144)
TOTAL PROTEIN: 7.1 G/DL (ref 6.4–8.3)
TROPONIN INTERP: NORMAL
TROPONIN INTERP: NORMAL
TROPONIN T: <0.03 NG/ML
TROPONIN T: <0.03 NG/ML
TROPONIN, HIGH SENSITIVITY: NORMAL NG/L (ref 0–22)
TROPONIN, HIGH SENSITIVITY: NORMAL NG/L (ref 0–22)
WBC # BLD: 9 K/UL (ref 3.5–11.3)
WBC # BLD: ABNORMAL 10*3/UL

## 2019-08-30 PROCEDURE — 83880 ASSAY OF NATRIURETIC PEPTIDE: CPT

## 2019-08-30 PROCEDURE — 84484 ASSAY OF TROPONIN QUANT: CPT

## 2019-08-30 PROCEDURE — 80053 COMPREHEN METABOLIC PANEL: CPT

## 2019-08-30 PROCEDURE — 99285 EMERGENCY DEPT VISIT HI MDM: CPT

## 2019-08-30 PROCEDURE — 85025 COMPLETE CBC W/AUTO DIFF WBC: CPT

## 2019-08-30 PROCEDURE — 85379 FIBRIN DEGRADATION QUANT: CPT

## 2019-08-30 PROCEDURE — 6370000000 HC RX 637 (ALT 250 FOR IP): Performed by: EMERGENCY MEDICINE

## 2019-08-30 PROCEDURE — 93010 ELECTROCARDIOGRAM REPORT: CPT | Performed by: INTERNAL MEDICINE

## 2019-08-30 PROCEDURE — 83690 ASSAY OF LIPASE: CPT

## 2019-08-30 PROCEDURE — 93005 ELECTROCARDIOGRAM TRACING: CPT | Performed by: EMERGENCY MEDICINE

## 2019-08-30 PROCEDURE — 36415 COLL VENOUS BLD VENIPUNCTURE: CPT

## 2019-08-30 PROCEDURE — 71045 X-RAY EXAM CHEST 1 VIEW: CPT

## 2019-08-30 RX ORDER — NITROGLYCERIN 0.4 MG/1
0.4 TABLET SUBLINGUAL EVERY 5 MIN PRN
Status: DISCONTINUED | OUTPATIENT
Start: 2019-08-30 | End: 2019-08-30 | Stop reason: HOSPADM

## 2019-08-30 RX ORDER — ASPIRIN 325 MG
325 TABLET ORAL ONCE
Status: COMPLETED | OUTPATIENT
Start: 2019-08-30 | End: 2019-08-30

## 2019-08-30 RX ADMIN — ASPIRIN 325 MG: 325 TABLET, COATED ORAL at 17:00

## 2019-08-30 ASSESSMENT — PAIN DESCRIPTION - DESCRIPTORS: DESCRIPTORS: TIGHTNESS

## 2019-08-30 ASSESSMENT — PAIN DESCRIPTION - ORIENTATION: ORIENTATION: MID;ANTERIOR

## 2019-08-30 ASSESSMENT — PAIN SCALES - GENERAL
PAINLEVEL_OUTOF10: 7
PAINLEVEL_OUTOF10: 1

## 2019-08-30 ASSESSMENT — ENCOUNTER SYMPTOMS
EYE PAIN: 0
ABDOMINAL PAIN: 0
SHORTNESS OF BREATH: 1

## 2019-08-30 ASSESSMENT — PAIN DESCRIPTION - PAIN TYPE: TYPE: ACUTE PAIN

## 2019-08-30 ASSESSMENT — PAIN DESCRIPTION - LOCATION: LOCATION: CHEST

## 2019-08-30 NOTE — ED PROVIDER NOTES
[facility] ED  eMERGENCY dEPARTMENT eNCOUnter      Pt Name: Arleen Jon  MRN: [id number]  [place] 1950  Date of evaluation: 8/30/2019  Provider: Tello Tay MD    CHIEF COMPLAINT     Chief Complaint   Patient presents with    Chest Pain     mid sternal pain , onset in the past 30 min. HISTORY OF PRESENT ILLNESS    Arleen Jon is a 71 y.o. male who presents to the emergency department for evaluation of chest pain. Pain is located to the middle of his chest.  He describes the pain as pressure. Pain has been intermittent over the last month and getting worse for the last 2 days. Pain is worse with exertion. He has associated shortness of breath. Patient has a history of coronary artery disease requiring one stent in the past.  Patient states he also has a history of diabetes, hypertension, hyperlipidemia. Patient also smokes. PAST MEDICAL HISTORY     Past Medical History:   Diagnosis Date    Abnormal cardiac cath 9/20/13    LMCA; Mild irregularities 10-20%. LAD: Abnormal.  Mild to moderate irregularities throughout the LAD and branches. LCx:  abnormal.  80-90% stenosis in a moderate sized OM1. RCA: Abnormal.  40-50% mid RCA stenosis.  Actinic keratosis     Arthritis     neck, fingers    CAD (coronary artery disease)     Calculus of kidney     Cervical stenosis of spine     Diabetes mellitus (HCC)     GERD (gastroesophageal reflux disease)     Gout     H/O echocardiogram 04/29/2016    Stress echo. Normal resting LV systolic function,normal systolic restponse to exercise. No evidence of reversible  ischemia on this maximal,symptom limited,treadmill exerxiess stress echocardiography study    History of cardiac cath 02/09/2017    LMCA: Lesion on LMCA: Distal subsection . 20% stenosis. LAD: Lesion on 1st Diag: Mid subsection . 75% stenosis. RCA: Lesion on Mid RCA: Mid subsection . 100% stenosis. Comments: The distal RCA fills by left to right collateralization. MG TABLET    Take 1 tablet by mouth daily    ATORVASTATIN (LIPITOR) 20 MG TABLET    Take 1 tablet by mouth daily    CHLORPHENIRAMINE (EQ CHLORTABS) 4 MG TABLET    Take 4 mg by mouth every 6 hours as needed for Allergies    CLOPIDOGREL (PLAVIX) 75 MG TABLET    Take 1 tablet by mouth daily    GLIMEPIRIDE (AMARYL) 2 MG TABLET    Take 1 tablet by mouth 2 times daily    IBUPROFEN (ADVIL;MOTRIN) 600 MG TABLET    TAKE 1 TABLET BY MOUTH WITH FOOD OR MILK AS NEEDED THREE TIMES A DAY FOR 10 DAYS    ISOSORBIDE MONONITRATE (IMDUR) 60 MG EXTENDED RELEASE TABLET    Take 1 tablet by mouth daily    LISINOPRIL (PRINIVIL;ZESTRIL) 20 MG TABLET    Take 0.5 tablets by mouth daily    NITROGLYCERIN (NITROSTAT) 0.4 MG SL TABLET    Place 1 tablet under the tongue every 5 minutes as needed for Chest pain    RANITIDINE HCL (ZANTAC PO)    Take 2 tablets by mouth 2 times daily     SITAGLIPTAN-METFORMIN (JANUMET)  MG PER TABLET    Take 1 tablet by mouth 2 times daily (with meals)       ALLERGIES     Niacin and related;  Oxycodone hcl; and Other    FAMILY HISTORY       Family History   Problem Relation Age of Onset    Heart Disease Mother     Diabetes Mother     High Blood Pressure Mother     Diabetes Father     Heart Disease Sister         CABG    Heart Disease Brother     Heart Disease Brother     Heart Disease Brother     Cancer Brother         SOCIAL HISTORY       Social History     Socioeconomic History    Marital status:      Spouse name: Not on file    Number of children: Not on file    Years of education: Not on file    Highest education level: Not on file   Occupational History    Not on file   Social Needs    Financial resource strain: Not on file    Food insecurity:     Worry: Not on file     Inability: Not on file    Transportation needs:     Medical: Not on file     Non-medical: Not on file   Tobacco Use    Smoking status: Current Some Day Smoker     Packs/day: 0.50     Years: 50.00     Pack years:

## 2019-09-09 ENCOUNTER — TELEPHONE (OUTPATIENT)
Dept: ONCOLOGY | Age: 69
End: 2019-09-09

## 2019-09-11 ENCOUNTER — OFFICE VISIT (OUTPATIENT)
Dept: CARDIOLOGY | Age: 69
End: 2019-09-11
Payer: MEDICARE

## 2019-09-11 VITALS
OXYGEN SATURATION: 97 % | HEIGHT: 71 IN | HEART RATE: 69 BPM | DIASTOLIC BLOOD PRESSURE: 65 MMHG | SYSTOLIC BLOOD PRESSURE: 113 MMHG | RESPIRATION RATE: 18 BRPM | WEIGHT: 171.4 LBS | BODY MASS INDEX: 24 KG/M2

## 2019-09-11 DIAGNOSIS — R00.2 PALPITATIONS: ICD-10-CM

## 2019-09-11 DIAGNOSIS — I25.2 HISTORY OF MYOCARDIAL INFARCTION: ICD-10-CM

## 2019-09-11 DIAGNOSIS — E78.5 DYSLIPIDEMIA: ICD-10-CM

## 2019-09-11 DIAGNOSIS — Z95.820 S/P ANGIOPLASTY WITH STENT: ICD-10-CM

## 2019-09-11 DIAGNOSIS — E78.2 MIXED HYPERLIPIDEMIA: ICD-10-CM

## 2019-09-11 DIAGNOSIS — I25.10 ASHD (ARTERIOSCLEROTIC HEART DISEASE): Primary | ICD-10-CM

## 2019-09-11 DIAGNOSIS — Z72.0 TOBACCO ABUSE: ICD-10-CM

## 2019-09-11 DIAGNOSIS — I10 ESSENTIAL HYPERTENSION: ICD-10-CM

## 2019-09-11 PROCEDURE — 99214 OFFICE O/P EST MOD 30 MIN: CPT | Performed by: INTERNAL MEDICINE

## 2019-09-11 PROCEDURE — 4040F PNEUMOC VAC/ADMIN/RCVD: CPT | Performed by: INTERNAL MEDICINE

## 2019-09-11 PROCEDURE — 4004F PT TOBACCO SCREEN RCVD TLK: CPT | Performed by: INTERNAL MEDICINE

## 2019-09-11 PROCEDURE — 1123F ACP DISCUSS/DSCN MKR DOCD: CPT | Performed by: INTERNAL MEDICINE

## 2019-09-11 PROCEDURE — 3017F COLORECTAL CA SCREEN DOC REV: CPT | Performed by: INTERNAL MEDICINE

## 2019-09-11 PROCEDURE — G8420 CALC BMI NORM PARAMETERS: HCPCS | Performed by: INTERNAL MEDICINE

## 2019-09-11 PROCEDURE — G8427 DOCREV CUR MEDS BY ELIG CLIN: HCPCS | Performed by: INTERNAL MEDICINE

## 2019-09-11 PROCEDURE — G8598 ASA/ANTIPLAT THER USED: HCPCS | Performed by: INTERNAL MEDICINE

## 2019-09-11 RX ORDER — ISOSORBIDE MONONITRATE 30 MG/1
60 TABLET, EXTENDED RELEASE ORAL DAILY
Qty: 90 TABLET | Refills: 3 | Status: SHIPPED | OUTPATIENT
Start: 2019-09-11 | End: 2019-09-26 | Stop reason: SINTOL

## 2019-09-11 NOTE — PROGRESS NOTES
wall thickness. Evidence of mild diastolic dysfunction seen.  History of echocardiogram 01/18/2019    EF of 65%. LV wall thickness is mildly increased. Aortic valve leaflets are mildly thickened.  Hyperlipidemia     Hypertension     Raynaud's phenomenon     S/P angioplasty with stent 9/20/13    PTCA-NEDRA of  the OM 1    Tobacco abuse     1 ppd         Past Surgical History:   Procedure Laterality Date    CARDIAC CATHETERIZATION  2007    patient states had 2 small blockages    CARDIAC CATHETERIZATION  02/09/2017    LMCA: Lesion on LMCA: Distal subsection 20% stenosis. LAD: Lesion on 1st Diag: Mid subsection 75% stenosis. RCA: Lesion on Mid RCA: Mid subsection 100% stenosis.  CARDIAC CATHETERIZATION  02/09/2017    Attempted PCI to chronic total occlusion of RCA was not successful. Unable to cross with multiple wires due to heavy calcification and toruosity.     CARDIAC CATHETERIZATION  01/14/2019    CATARACT REMOVAL  4/6/15    Right eye - Dr. Sanchez  6/8/15    Dr. Nicholas Joy - left eye    COLONOSCOPY  1/11/2006    Dr. Rosina Morgan - internal hemorrhoids, no polps    COLONOSCOPY  5/23/2016    -polyp    HEMORRHOID SURGERY      HERNIA REPAIR      NECK SURGERY  1997    c4-5 laminectomy and fusion    OTHER SURGICAL HISTORY      Right foot - 7 from heal and 1 from great toe at [address]  12/11/14    pain injection - cervical    UPPER GASTROINTESTINAL ENDOSCOPY  12/28/2005    Dr. Rosina Morgan - mild esophagitis and gastritis    UPPER GASTROINTESTINAL ENDOSCOPY N/A 2/5/2019    -bx(neg H-Pylori)     Family History   Problem Relation Age of Onset    Heart Disease Mother     Diabetes Mother     High Blood Pressure Mother     Diabetes Father     Heart Disease Sister         CABG    Heart Disease Brother     Heart Disease Brother     Heart Disease Brother     Cancer Brother          Social History     Tobacco Use    Smoking status:

## 2019-09-11 NOTE — PATIENT INSTRUCTIONS
SURVEY:    You may be receiving a survey from [employer] regarding your visit today. Please complete the survey to enable us to provide the highest quality of care to you and your family. If you cannot score us a very good on any question, please call the office to discuss how we could have made your experience a very good one. Thank you.

## 2019-09-12 ENCOUNTER — TELEPHONE (OUTPATIENT)
Dept: CARDIOLOGY | Age: 69
End: 2019-09-12

## 2019-09-20 ENCOUNTER — HOSPITAL ENCOUNTER (OUTPATIENT)
Dept: NON INVASIVE DIAGNOSTICS | Age: 69
Discharge: HOME | End: 2019-09-20
Payer: MEDICARE

## 2019-09-20 DIAGNOSIS — I25.10 ASHD (ARTERIOSCLEROTIC HEART DISEASE): ICD-10-CM

## 2019-09-20 DIAGNOSIS — R00.2 PALPITATIONS: ICD-10-CM

## 2019-09-20 LAB
LV EF: 65 %
LVEF MODALITY: NORMAL

## 2019-09-20 PROCEDURE — 93306 TTE W/DOPPLER COMPLETE: CPT | Performed by: INTERNAL MEDICINE

## 2019-09-20 PROCEDURE — 93225 XTRNL ECG REC<48 HRS REC: CPT

## 2019-09-20 PROCEDURE — 93306 TTE W/DOPPLER COMPLETE: CPT

## 2019-09-20 PROCEDURE — 93226 XTRNL ECG REC<48 HR SCAN A/R: CPT

## 2019-09-23 ENCOUNTER — HOSPITAL ENCOUNTER (OUTPATIENT)
Dept: CT IMAGING | Age: 69
Discharge: HOME | End: 2019-09-25
Payer: MEDICARE

## 2019-09-23 DIAGNOSIS — Z87.891 PERSONAL HISTORY OF NICOTINE DEPENDENCE: ICD-10-CM

## 2019-09-23 DIAGNOSIS — F17.209 NICOTINE DEPENDENCE WITH NICOTINE-INDUCED DISORDER, UNSPECIFIED NICOTINE PRODUCT TYPE: ICD-10-CM

## 2019-09-23 PROCEDURE — G0297 LDCT FOR LUNG CA SCREEN: HCPCS

## 2019-09-24 ENCOUNTER — TELEPHONE (OUTPATIENT)
Dept: CARDIOLOGY | Age: 69
End: 2019-09-24

## 2019-09-24 LAB
ACQUISITION DURATION: NORMAL S
AVERAGE HEART RATE: 69 BPM
EKG DIAGNOSIS: NORMAL
HOLTER MAX HEART RATE: 110 BPM
HOOKUP DATE: NORMAL
HOOKUP TIME: NORMAL
MAX HEART RATE TIME/DATE: NORMAL
MIN HEART RATE TIME/DATE: NORMAL
MIN HEART RATE: 54 BPM
NUMBER OF QRS COMPLEXES: NORMAL
NUMBER OF SUPRAVENTRICULAR BEATS IN RUNS: 0
NUMBER OF SUPRAVENTRICULAR COUPLETS: 0
NUMBER OF SUPRAVENTRICULAR ECTOPICS: 9
NUMBER OF SUPRAVENTRICULAR ISOLATED BEATS: 9
NUMBER OF SUPRAVENTRICULAR RUNS: 0
NUMBER OF VENTRICULAR BEATS IN RUNS: 0
NUMBER OF VENTRICULAR BIGEMINAL CYCLES: 0
NUMBER OF VENTRICULAR COUPLETS: 0
NUMBER OF VENTRICULAR ECTOPICS: 0
NUMBER OF VENTRICULAR ISOLATED BEATS: 0
NUMBER OF VENTRICULAR RUNS: 0

## 2019-09-26 ENCOUNTER — OFFICE VISIT (OUTPATIENT)
Dept: PULMONOLOGY | Age: 69
End: 2019-09-26
Payer: MEDICARE

## 2019-09-26 VITALS
OXYGEN SATURATION: 98 % | RESPIRATION RATE: 16 BRPM | HEIGHT: 72 IN | WEIGHT: 173 LBS | SYSTOLIC BLOOD PRESSURE: 115 MMHG | DIASTOLIC BLOOD PRESSURE: 60 MMHG | TEMPERATURE: 97.7 F | HEART RATE: 63 BPM | BODY MASS INDEX: 23.43 KG/M2

## 2019-09-26 DIAGNOSIS — F17.210 SMOKING GREATER THAN 40 PACK YEARS: ICD-10-CM

## 2019-09-26 DIAGNOSIS — J44.9 COPD, SEVERITY TO BE DETERMINED (HCC): ICD-10-CM

## 2019-09-26 DIAGNOSIS — R91.1 PULMONARY NODULE/LESION, SOLITARY: Primary | ICD-10-CM

## 2019-09-26 PROCEDURE — 1123F ACP DISCUSS/DSCN MKR DOCD: CPT | Performed by: INTERNAL MEDICINE

## 2019-09-26 PROCEDURE — 99204 OFFICE O/P NEW MOD 45 MIN: CPT | Performed by: INTERNAL MEDICINE

## 2019-09-26 PROCEDURE — G8427 DOCREV CUR MEDS BY ELIG CLIN: HCPCS | Performed by: INTERNAL MEDICINE

## 2019-09-26 PROCEDURE — 3023F SPIROM DOC REV: CPT | Performed by: INTERNAL MEDICINE

## 2019-09-26 PROCEDURE — 4004F PT TOBACCO SCREEN RCVD TLK: CPT | Performed by: INTERNAL MEDICINE

## 2019-09-26 PROCEDURE — G8420 CALC BMI NORM PARAMETERS: HCPCS | Performed by: INTERNAL MEDICINE

## 2019-09-26 PROCEDURE — 4040F PNEUMOC VAC/ADMIN/RCVD: CPT | Performed by: INTERNAL MEDICINE

## 2019-09-26 PROCEDURE — G8926 SPIRO NO PERF OR DOC: HCPCS | Performed by: INTERNAL MEDICINE

## 2019-09-26 PROCEDURE — G8598 ASA/ANTIPLAT THER USED: HCPCS | Performed by: INTERNAL MEDICINE

## 2019-09-26 PROCEDURE — 3017F COLORECTAL CA SCREEN DOC REV: CPT | Performed by: INTERNAL MEDICINE

## 2019-09-26 ASSESSMENT — ENCOUNTER SYMPTOMS
EYES NEGATIVE: 1
SHORTNESS OF BREATH: 1
COUGH: 1

## 2019-09-26 NOTE — PROGRESS NOTES
Subjective:      Patient ID: Arleen Jon is a 71 y.o. male. HPI  New patient visit, referred by Monika Hernandez, for evaluation of enlarging pulmonary nodule. Retired over the road . Nearly 100-pack-year smoking history. Currently smoking 1/2 to 1 pack/day. Previously had smoked 2-3 packs/day. As part of his health evaluation, the patient had a low-dose screening CT scan of the chest dating back to 7/19/2017. The radiologist reported a 7mm nodule in the right upper lobe laterally. Most current scan done on 9/23/2019 the nodule had increased to 8 mm x 5 mm from previously measured 7 x 4 mm in 2017. Mild centrilobular emphysema was noted primarily in the apices. All imaging reviewed. Patient reports shortness of breath with moderate to heavy exertion. Daily cough productive of mucoid phlegm. Denies hemoptysis. Previous episode of spontaneous left pneumothorax requiring chest tube in 1980s. Also history of arteriosclerotic heart disease post PTCA and stenting. There is. No family history of lung disease or cancer. Denies constitutional symptoms such as weight loss or decreased appetite. No recent pulmonary function studies. Eyes childhood asthma or allergies.     Current Outpatient Medications   Medication Sig Dispense Refill    sitaGLIPtan-metformin (JANUMET)  MG per tablet Take 1 tablet by mouth 2 times daily (with meals)      clopidogrel (PLAVIX) 75 MG tablet Take 1 tablet by mouth daily 90 tablet 3    atorvastatin (LIPITOR) 20 MG tablet Take 1 tablet by mouth daily 90 tablet 3    chlorpheniramine (EQ CHLORTABS) 4 MG tablet Take 4 mg by mouth every 6 hours as needed for Allergies      lisinopril (PRINIVIL;ZESTRIL) 20 MG tablet Take 0.5 tablets by mouth daily 90 tablet 3    atenolol (TENORMIN) 25 MG tablet Take 1 tablet by mouth daily 90 tablet 3    amLODIPine (NORVASC) 5 MG tablet Take 1 tablet by mouth daily 90 tablet 3    ibuprofen (ADVIL;MOTRIN) 600 MG tablet heart rate  69 ranging from 54 to110 bpm.2. Rare premature supraventricular ectopic beats consisting of 9 isolated PACs. 3. There were no ventricular ectopic beats. 4. Diary returned with entry of \"sharp pain middle of chest\" with no ectopy noted. Sinus rhythm with rare isolated PACs, otherwise unremarkable Holter. Symptoms as above, were not correlated with any heart rate or rhythm abnormalities. Confirmed by Raj Carrasco ([id number]) on 9/24/2019 12:51:11 PM       Assessment:      1. Pulmonary nodule/lesion, solitary    2. Smoking greater than 40 pack years    3. COPD, severity to be determined ([facility] [address]          Plan:      1. Slow growth over period of 2 years argues against malignancy, however high risk factor profile and size are mandates for further evaluation. 2. Proceed with whole-body PET scan. 3. Pulmonary function studies to screen for and stage COPD. 4. Complete smoking cessation. Discussed strategies. 5. Flu shot in fall. 6. Return after above. Thanks for the kind referral.  We will keep you posted as the work-up proceeds.       Electronically signed by Kamini Tony DO on 9/26/2019 at 4:03 PM

## 2019-10-02 ENCOUNTER — HOSPITAL ENCOUNTER (OUTPATIENT)
Dept: PET IMAGING | Age: 69
Discharge: HOME | End: 2019-10-04
Payer: MEDICARE

## 2019-10-02 DIAGNOSIS — R91.1 PULMONARY NODULE/LESION, SOLITARY: ICD-10-CM

## 2019-10-02 PROCEDURE — A9552 F18 FDG: HCPCS | Performed by: INTERNAL MEDICINE

## 2019-10-02 PROCEDURE — 3430000000 HC RX DIAGNOSTIC RADIOPHARMACEUTICAL: Performed by: INTERNAL MEDICINE

## 2019-10-02 PROCEDURE — 78815 PET IMAGE W/CT SKULL-THIGH: CPT

## 2019-10-02 RX ORDER — FLUDEOXYGLUCOSE F 18 200 MCI/ML
13 INJECTION, SOLUTION INTRAVENOUS
Status: COMPLETED | OUTPATIENT
Start: 2019-10-02 | End: 2019-10-02

## 2019-10-02 RX ADMIN — FLUDEOXYGLUCOSE F 18 13 MILLICURIE: 200 INJECTION, SOLUTION INTRAVENOUS at 11:15

## 2019-10-04 ENCOUNTER — TELEPHONE (OUTPATIENT)
Dept: GASTROENTEROLOGY | Age: 69
End: 2019-10-04

## 2019-10-04 ENCOUNTER — TELEPHONE (OUTPATIENT)
Dept: PULMONOLOGY | Age: 69
End: 2019-10-04

## 2019-10-04 DIAGNOSIS — R93.3 ABNORMAL CT SCAN, STOMACH: ICD-10-CM

## 2019-10-04 DIAGNOSIS — R94.8 ABNORMAL GASTROINTESTINAL PET SCAN: Primary | ICD-10-CM

## 2019-10-05 PROBLEM — R94.8 ABNORMAL GASTROINTESTINAL PET SCAN: Status: ACTIVE | Noted: 2019-10-05

## 2019-10-05 PROBLEM — R93.3 ABNORMAL CT SCAN, STOMACH: Status: ACTIVE | Noted: 2019-10-05

## 2019-10-08 ENCOUNTER — HOSPITAL ENCOUNTER (OUTPATIENT)
Dept: PULMONOLOGY | Age: 69
Discharge: HOME | End: 2019-10-08
Payer: MEDICARE

## 2019-10-08 DIAGNOSIS — J44.9 COPD, SEVERITY TO BE DETERMINED (HCC): ICD-10-CM

## 2019-10-08 PROCEDURE — 94729 DIFFUSING CAPACITY: CPT

## 2019-10-08 PROCEDURE — 94060 EVALUATION OF WHEEZING: CPT

## 2019-10-08 PROCEDURE — 94726 PLETHYSMOGRAPHY LUNG VOLUMES: CPT

## 2019-10-08 PROCEDURE — 94664 DEMO&/EVAL PT USE INHALER: CPT

## 2019-10-08 PROCEDURE — 6360000002 HC RX W HCPCS: Performed by: INTERNAL MEDICINE

## 2019-10-08 RX ORDER — ALBUTEROL SULFATE 2.5 MG/3ML
2.5 SOLUTION RESPIRATORY (INHALATION) ONCE
Status: COMPLETED | OUTPATIENT
Start: 2019-10-08 | End: 2019-10-08

## 2019-10-08 RX ADMIN — ALBUTEROL SULFATE 2.5 MG: 2.5 SOLUTION RESPIRATORY (INHALATION) at 11:01

## 2019-10-15 ENCOUNTER — TELEPHONE (OUTPATIENT)
Dept: CASE MANAGEMENT | Age: 69
End: 2019-10-15

## 2019-10-22 ENCOUNTER — ANESTHESIA EVENT (OUTPATIENT)
Dept: OPERATING ROOM | Age: 69
End: 2019-10-22
Payer: MEDICARE

## 2019-10-22 ENCOUNTER — HOSPITAL ENCOUNTER (OUTPATIENT)
Age: 69
Setting detail: OUTPATIENT SURGERY
Discharge: HOME | End: 2019-10-22
Attending: INTERNAL MEDICINE | Admitting: INTERNAL MEDICINE
Payer: MEDICARE

## 2019-10-22 ENCOUNTER — ANESTHESIA (OUTPATIENT)
Dept: OPERATING ROOM | Age: 69
End: 2019-10-22
Payer: MEDICARE

## 2019-10-22 VITALS
RESPIRATION RATE: 14 BRPM | OXYGEN SATURATION: 96 % | SYSTOLIC BLOOD PRESSURE: 89 MMHG | DIASTOLIC BLOOD PRESSURE: 50 MMHG

## 2019-10-22 VITALS
SYSTOLIC BLOOD PRESSURE: 99 MMHG | BODY MASS INDEX: 23.43 KG/M2 | RESPIRATION RATE: 16 BRPM | HEART RATE: 62 BPM | TEMPERATURE: 97.1 F | DIASTOLIC BLOOD PRESSURE: 61 MMHG | WEIGHT: 173 LBS | HEIGHT: 72 IN | OXYGEN SATURATION: 96 %

## 2019-10-22 PROCEDURE — 7100000011 HC PHASE II RECOVERY - ADDTL 15 MIN: Performed by: INTERNAL MEDICINE

## 2019-10-22 PROCEDURE — 87077 CULTURE AEROBIC IDENTIFY: CPT

## 2019-10-22 PROCEDURE — 3609012400 HC EGD TRANSORAL BIOPSY SINGLE/MULTIPLE: Performed by: INTERNAL MEDICINE

## 2019-10-22 PROCEDURE — 6360000002 HC RX W HCPCS: Performed by: NURSE ANESTHETIST, CERTIFIED REGISTERED

## 2019-10-22 PROCEDURE — 43239 EGD BIOPSY SINGLE/MULTIPLE: CPT | Performed by: INTERNAL MEDICINE

## 2019-10-22 PROCEDURE — 3700000001 HC ADD 15 MINUTES (ANESTHESIA): Performed by: INTERNAL MEDICINE

## 2019-10-22 PROCEDURE — 6370000000 HC RX 637 (ALT 250 FOR IP): Performed by: NURSE ANESTHETIST, CERTIFIED REGISTERED

## 2019-10-22 PROCEDURE — 2500000003 HC RX 250 WO HCPCS: Performed by: NURSE ANESTHETIST, CERTIFIED REGISTERED

## 2019-10-22 PROCEDURE — 2580000003 HC RX 258: Performed by: INTERNAL MEDICINE

## 2019-10-22 PROCEDURE — 2709999900 HC NON-CHARGEABLE SUPPLY: Performed by: INTERNAL MEDICINE

## 2019-10-22 PROCEDURE — 3700000000 HC ANESTHESIA ATTENDED CARE: Performed by: INTERNAL MEDICINE

## 2019-10-22 PROCEDURE — 7100000010 HC PHASE II RECOVERY - FIRST 15 MIN: Performed by: INTERNAL MEDICINE

## 2019-10-22 RX ORDER — PROPOFOL 10 MG/ML
INJECTION, EMULSION INTRAVENOUS PRN
Status: DISCONTINUED | OUTPATIENT
Start: 2019-10-22 | End: 2019-10-22 | Stop reason: SDUPTHER

## 2019-10-22 RX ORDER — ALBUTEROL SULFATE 90 UG/1
AEROSOL, METERED RESPIRATORY (INHALATION) PRN
Status: DISCONTINUED | OUTPATIENT
Start: 2019-10-22 | End: 2019-10-22 | Stop reason: SDUPTHER

## 2019-10-22 RX ORDER — LIDOCAINE HYDROCHLORIDE 20 MG/ML
INJECTION, SOLUTION EPIDURAL; INFILTRATION; INTRACAUDAL; PERINEURAL PRN
Status: DISCONTINUED | OUTPATIENT
Start: 2019-10-22 | End: 2019-10-22 | Stop reason: SDUPTHER

## 2019-10-22 RX ORDER — SODIUM CHLORIDE, SODIUM LACTATE, POTASSIUM CHLORIDE, CALCIUM CHLORIDE 600; 310; 30; 20 MG/100ML; MG/100ML; MG/100ML; MG/100ML
INJECTION, SOLUTION INTRAVENOUS CONTINUOUS
Status: DISCONTINUED | OUTPATIENT
Start: 2019-10-22 | End: 2019-10-22 | Stop reason: HOSPADM

## 2019-10-22 RX ORDER — CLONIDINE 100 UG/ML
INJECTION, SOLUTION EPIDURAL PRN
Status: DISCONTINUED | OUTPATIENT
Start: 2019-10-22 | End: 2019-10-22 | Stop reason: SDUPTHER

## 2019-10-22 RX ORDER — FENTANYL CITRATE 50 UG/ML
INJECTION, SOLUTION INTRAMUSCULAR; INTRAVENOUS PRN
Status: DISCONTINUED | OUTPATIENT
Start: 2019-10-22 | End: 2019-10-22 | Stop reason: SDUPTHER

## 2019-10-22 RX ADMIN — CLONIDINE HYDROCHLORIDE 50 MCG: 100 INJECTION, SOLUTION INTRAVENOUS at 08:20

## 2019-10-22 RX ADMIN — LIDOCAINE HYDROCHLORIDE 5 ML: 20 INJECTION, SOLUTION EPIDURAL; INFILTRATION; INTRACAUDAL at 08:14

## 2019-10-22 RX ADMIN — Medication 2 PUFF: at 08:05

## 2019-10-22 RX ADMIN — CLONIDINE HYDROCHLORIDE 50 MCG: 100 INJECTION, SOLUTION INTRAVENOUS at 08:16

## 2019-10-22 RX ADMIN — SODIUM CHLORIDE, POTASSIUM CHLORIDE, SODIUM LACTATE AND CALCIUM CHLORIDE: 600; 310; 30; 20 INJECTION, SOLUTION INTRAVENOUS at 07:51

## 2019-10-22 RX ADMIN — PROPOFOL 50 MG: 10 INJECTION, EMULSION INTRAVENOUS at 08:20

## 2019-10-22 RX ADMIN — PROPOFOL 50 MG: 10 INJECTION, EMULSION INTRAVENOUS at 08:22

## 2019-10-22 RX ADMIN — PROPOFOL 50 MG: 10 INJECTION, EMULSION INTRAVENOUS at 08:24

## 2019-10-22 RX ADMIN — PROPOFOL 20 MG: 10 INJECTION, EMULSION INTRAVENOUS at 08:18

## 2019-10-22 RX ADMIN — FENTANYL CITRATE 50 MCG: 50 INJECTION INTRAMUSCULAR; INTRAVENOUS at 08:12

## 2019-10-22 RX ADMIN — PROPOFOL 30 MG: 10 INJECTION, EMULSION INTRAVENOUS at 08:16

## 2019-10-22 ASSESSMENT — PAIN SCALES - GENERAL
PAINLEVEL_OUTOF10: 0
PAINLEVEL_OUTOF10: 0

## 2019-10-22 ASSESSMENT — PAIN - FUNCTIONAL ASSESSMENT: PAIN_FUNCTIONAL_ASSESSMENT: 0-10

## 2019-10-22 ASSESSMENT — LIFESTYLE VARIABLES: SMOKING_STATUS: 1

## 2019-10-22 ASSESSMENT — ENCOUNTER SYMPTOMS: SHORTNESS OF BREATH: 1

## 2019-10-23 LAB
DIRECT EXAM: NEGATIVE
Lab: NORMAL
SPECIMEN DESCRIPTION: NORMAL

## 2019-10-29 ENCOUNTER — TELEPHONE (OUTPATIENT)
Dept: GASTROENTEROLOGY | Age: 69
End: 2019-10-29

## 2019-11-05 ENCOUNTER — OFFICE VISIT (OUTPATIENT)
Dept: GASTROENTEROLOGY | Age: 69
End: 2019-11-05
Payer: MEDICARE

## 2019-11-05 VITALS
BODY MASS INDEX: 21.97 KG/M2 | TEMPERATURE: 97.6 F | HEART RATE: 70 BPM | SYSTOLIC BLOOD PRESSURE: 105 MMHG | RESPIRATION RATE: 18 BRPM | DIASTOLIC BLOOD PRESSURE: 57 MMHG | WEIGHT: 171.2 LBS | HEIGHT: 74 IN

## 2019-11-05 DIAGNOSIS — R93.3 ABNORMAL CT SCAN, STOMACH: Primary | ICD-10-CM

## 2019-11-05 DIAGNOSIS — R19.8 ABNORMAL FINDINGS ON ESOPHAGOGASTRODUODENOSCOPY (EGD): ICD-10-CM

## 2019-11-05 DIAGNOSIS — R94.8 ABNORMAL GASTROINTESTINAL PET SCAN: ICD-10-CM

## 2019-11-05 PROCEDURE — G8420 CALC BMI NORM PARAMETERS: HCPCS | Performed by: INTERNAL MEDICINE

## 2019-11-05 PROCEDURE — 4004F PT TOBACCO SCREEN RCVD TLK: CPT | Performed by: INTERNAL MEDICINE

## 2019-11-05 PROCEDURE — G8598 ASA/ANTIPLAT THER USED: HCPCS | Performed by: INTERNAL MEDICINE

## 2019-11-05 PROCEDURE — G8484 FLU IMMUNIZE NO ADMIN: HCPCS | Performed by: INTERNAL MEDICINE

## 2019-11-05 PROCEDURE — G8427 DOCREV CUR MEDS BY ELIG CLIN: HCPCS | Performed by: INTERNAL MEDICINE

## 2019-11-05 PROCEDURE — 1123F ACP DISCUSS/DSCN MKR DOCD: CPT | Performed by: INTERNAL MEDICINE

## 2019-11-05 PROCEDURE — 3017F COLORECTAL CA SCREEN DOC REV: CPT | Performed by: INTERNAL MEDICINE

## 2019-11-05 PROCEDURE — 4040F PNEUMOC VAC/ADMIN/RCVD: CPT | Performed by: INTERNAL MEDICINE

## 2019-11-05 PROCEDURE — 99213 OFFICE O/P EST LOW 20 MIN: CPT | Performed by: INTERNAL MEDICINE

## 2019-12-12 ENCOUNTER — OFFICE VISIT (OUTPATIENT)
Dept: CARDIOLOGY | Age: 69
End: 2019-12-12
Payer: MEDICARE

## 2019-12-12 VITALS
SYSTOLIC BLOOD PRESSURE: 137 MMHG | DIASTOLIC BLOOD PRESSURE: 66 MMHG | HEART RATE: 62 BPM | WEIGHT: 171 LBS | OXYGEN SATURATION: 93 % | RESPIRATION RATE: 18 BRPM | HEIGHT: 71 IN | BODY MASS INDEX: 23.94 KG/M2

## 2019-12-12 DIAGNOSIS — I10 ESSENTIAL HYPERTENSION: ICD-10-CM

## 2019-12-12 DIAGNOSIS — E78.2 MIXED HYPERLIPIDEMIA: ICD-10-CM

## 2019-12-12 DIAGNOSIS — Z71.6 TOBACCO ABUSE COUNSELING: ICD-10-CM

## 2019-12-12 DIAGNOSIS — I25.10 ASHD (ARTERIOSCLEROTIC HEART DISEASE): Primary | ICD-10-CM

## 2019-12-12 DIAGNOSIS — Z95.820 S/P ANGIOPLASTY WITH STENT: ICD-10-CM

## 2019-12-12 PROCEDURE — G8427 DOCREV CUR MEDS BY ELIG CLIN: HCPCS | Performed by: INTERNAL MEDICINE

## 2019-12-12 PROCEDURE — G8484 FLU IMMUNIZE NO ADMIN: HCPCS | Performed by: INTERNAL MEDICINE

## 2019-12-12 PROCEDURE — G8598 ASA/ANTIPLAT THER USED: HCPCS | Performed by: INTERNAL MEDICINE

## 2019-12-12 PROCEDURE — 4040F PNEUMOC VAC/ADMIN/RCVD: CPT | Performed by: INTERNAL MEDICINE

## 2019-12-12 PROCEDURE — 99214 OFFICE O/P EST MOD 30 MIN: CPT | Performed by: INTERNAL MEDICINE

## 2019-12-12 PROCEDURE — 4004F PT TOBACCO SCREEN RCVD TLK: CPT | Performed by: INTERNAL MEDICINE

## 2019-12-12 PROCEDURE — 3017F COLORECTAL CA SCREEN DOC REV: CPT | Performed by: INTERNAL MEDICINE

## 2019-12-12 PROCEDURE — 1123F ACP DISCUSS/DSCN MKR DOCD: CPT | Performed by: INTERNAL MEDICINE

## 2019-12-12 PROCEDURE — G8420 CALC BMI NORM PARAMETERS: HCPCS | Performed by: INTERNAL MEDICINE

## 2019-12-12 RX ORDER — ISOSORBIDE MONONITRATE 30 MG/1
TABLET, EXTENDED RELEASE ORAL
COMMUNITY
Start: 2019-11-13 | End: 2020-02-13 | Stop reason: SDUPTHER

## 2020-02-13 ENCOUNTER — TELEPHONE (OUTPATIENT)
Dept: CARDIOLOGY | Age: 70
End: 2020-02-13

## 2020-02-13 RX ORDER — ATENOLOL 25 MG/1
25 TABLET ORAL DAILY
Qty: 90 TABLET | Refills: 3 | Status: SHIPPED | OUTPATIENT
Start: 2020-02-13 | End: 2020-12-15 | Stop reason: SDUPTHER

## 2020-02-13 RX ORDER — ISOSORBIDE MONONITRATE 30 MG/1
30 TABLET, EXTENDED RELEASE ORAL DAILY
Qty: 90 TABLET | Refills: 3 | Status: SHIPPED | OUTPATIENT
Start: 2020-02-13 | End: 2021-01-20

## 2020-03-16 RX ORDER — CLOPIDOGREL BISULFATE 75 MG/1
75 TABLET ORAL DAILY
Qty: 90 TABLET | Refills: 3 | Status: SHIPPED | OUTPATIENT
Start: 2020-03-16 | End: 2020-12-15 | Stop reason: SDUPTHER

## 2020-03-16 RX ORDER — AMLODIPINE BESYLATE 5 MG/1
5 TABLET ORAL DAILY
Qty: 90 TABLET | Refills: 3 | Status: SHIPPED | OUTPATIENT
Start: 2020-03-16 | End: 2020-12-15 | Stop reason: SDUPTHER

## 2020-04-06 ENCOUNTER — TELEPHONE (OUTPATIENT)
Dept: CASE MANAGEMENT | Age: 70
End: 2020-04-06

## 2020-04-06 NOTE — TELEPHONE ENCOUNTER
Patient should schedule for follow up for other pulmonary issues. Current office schedule closed due to [address].

## 2020-04-08 ENCOUNTER — TELEPHONE (OUTPATIENT)
Dept: CASE MANAGEMENT | Age: 70
End: 2020-04-08

## 2020-04-28 ENCOUNTER — TELEPHONE (OUTPATIENT)
Dept: CASE MANAGEMENT | Age: 70
End: 2020-04-28

## 2020-05-21 ENCOUNTER — TELEPHONE (OUTPATIENT)
Dept: ONCOLOGY | Age: 70
End: 2020-05-21

## 2020-06-10 ENCOUNTER — TELEPHONE (OUTPATIENT)
Dept: CASE MANAGEMENT | Age: 70
End: 2020-06-10

## 2020-12-15 ENCOUNTER — OFFICE VISIT (OUTPATIENT)
Dept: CARDIOLOGY | Age: 70
End: 2020-12-15
Payer: MEDICARE

## 2020-12-15 VITALS
BODY MASS INDEX: 23.85 KG/M2 | RESPIRATION RATE: 18 BRPM | SYSTOLIC BLOOD PRESSURE: 125 MMHG | WEIGHT: 170.4 LBS | HEIGHT: 71 IN | DIASTOLIC BLOOD PRESSURE: 62 MMHG | HEART RATE: 63 BPM | OXYGEN SATURATION: 100 %

## 2020-12-15 PROCEDURE — G8420 CALC BMI NORM PARAMETERS: HCPCS | Performed by: INTERNAL MEDICINE

## 2020-12-15 PROCEDURE — G8427 DOCREV CUR MEDS BY ELIG CLIN: HCPCS | Performed by: INTERNAL MEDICINE

## 2020-12-15 PROCEDURE — 3017F COLORECTAL CA SCREEN DOC REV: CPT | Performed by: INTERNAL MEDICINE

## 2020-12-15 PROCEDURE — 4040F PNEUMOC VAC/ADMIN/RCVD: CPT | Performed by: INTERNAL MEDICINE

## 2020-12-15 PROCEDURE — 93005 ELECTROCARDIOGRAM TRACING: CPT | Performed by: INTERNAL MEDICINE

## 2020-12-15 PROCEDURE — 1123F ACP DISCUSS/DSCN MKR DOCD: CPT | Performed by: INTERNAL MEDICINE

## 2020-12-15 PROCEDURE — 93010 ELECTROCARDIOGRAM REPORT: CPT | Performed by: INTERNAL MEDICINE

## 2020-12-15 PROCEDURE — 99211 OFF/OP EST MAY X REQ PHY/QHP: CPT | Performed by: INTERNAL MEDICINE

## 2020-12-15 PROCEDURE — 99214 OFFICE O/P EST MOD 30 MIN: CPT | Performed by: INTERNAL MEDICINE

## 2020-12-15 PROCEDURE — 4004F PT TOBACCO SCREEN RCVD TLK: CPT | Performed by: INTERNAL MEDICINE

## 2020-12-15 PROCEDURE — G8484 FLU IMMUNIZE NO ADMIN: HCPCS | Performed by: INTERNAL MEDICINE

## 2020-12-15 RX ORDER — ATENOLOL 25 MG/1
25 TABLET ORAL DAILY
Qty: 90 TABLET | Refills: 3 | Status: SHIPPED | OUTPATIENT
Start: 2020-12-15 | End: 2021-06-07 | Stop reason: SDUPTHER

## 2020-12-15 RX ORDER — CLOPIDOGREL BISULFATE 75 MG/1
75 TABLET ORAL DAILY
Qty: 90 TABLET | Refills: 3 | Status: SHIPPED | OUTPATIENT
Start: 2020-12-15 | End: 2021-02-18

## 2020-12-15 RX ORDER — ATORVASTATIN CALCIUM 20 MG/1
20 TABLET, FILM COATED ORAL DAILY
Qty: 90 TABLET | Refills: 3 | Status: SHIPPED | OUTPATIENT
Start: 2020-12-15

## 2020-12-15 RX ORDER — AMLODIPINE BESYLATE 5 MG/1
5 TABLET ORAL DAILY
Qty: 90 TABLET | Refills: 3 | Status: SHIPPED | OUTPATIENT
Start: 2020-12-15 | End: 2021-02-18

## 2020-12-15 NOTE — PROGRESS NOTES
Stanley Mahmood am scribing for and in the presence of Dora Mcgee MD, F.A.C.C. Patient: Sincere Dumont  : 1950  Date of Visit: December 15, 2020    REASON FOR VISIT / CONSULTATION:   Chief Complaint   Patient presents with    1 Year Follow Up     EKG done today. HX:ASHD, s/p stent, HTN, HLD, tobacco abuse Pt is here for yearly follow up he states doing well other than  he had some sharp CP that lasted most of day he took Nitro seemed to help he did paint house Sat Denies:SOB,lightheaded/dizziness,palpitaitons     Dear Dr. Jaida Johnson had the pleasure of seeing Sincere Dumont today for follow up on his cardiac status. As you know, Mr. Sarahi Be is 71 y.o male with PMH of hypertension, hyperlipidemia, type II diabetes and coronary artery disease with  heart cath done on 2013 at [facility]. Vs at that time he has NEDRA to OM1. The cath also showed mild disease of the other coronaries and normal left ventricular function by ventriculogram.      Earlier in , patient had an episode of prolonged chest pain. Retrosternal, burning in nature without radiation. Pain occurred at rest with partial relief with sublingual nitrates. Repeat cardiac cath on  2017 showed new totally occluded mid RCA. An attempt to open the totally occluded RCA on 2017 has failed. Patient treated medically. Echo done on 2018, global left ventricular systolic function appears preserved with an estimated ejection fraction of 60%. Mild LVH. No definite specific wall motion abnormalities were identified. No significant valvular abnormalities. Evidence of mild diastolic dysfunction is seen. Echo done on 2019 that showed EF of 65%. LV wall thickness is mildly increased. Aortic valve leaflets are mildly thickened. Mr. Sarahi Be is here for yearly follow up today. He has been doing good heart wise. He states he had some chest pain on  that was sharp and lasted half of the day on and off. He reports he also took a Nitro and that did seem to help ease the pain. He was also painting his living room on Saturday. No further episodes of chest pain since. He is very active physically and he has normal exertional symptoms. No chest pain at rest.  Denies any shortness of breath, orthopnea or PND. No problems with his current medications. No fever or cough. No nausea, vomiting or abdominal pain. No problem moving his bowel. No blood in his urine or stool. He has gained some weight recently, he said he is eating better. He states he is drinking enough fluids and trying to stay hydrated. EKG done today in office 12/15/2020- sinus Bradycardia 58 bpm    Past Medical History:   Diagnosis Date    Abnormal cardiac cath 9/20/13    LMCA; Mild irregularities 10-20%. LAD: Abnormal.  Mild to moderate irregularities throughout the LAD and branches. LCx:  abnormal.  80-90% stenosis in a moderate sized OM1. RCA: Abnormal.  40-50% mid RCA stenosis.  Actinic keratosis     Arthritis     neck, fingers    CAD (coronary artery disease)     Calculus of kidney     Cervical stenosis of spine     Diabetes mellitus (HCC)     GERD (gastroesophageal reflux disease)     Gout     H/O echocardiogram 04/29/2016    Stress echo. Normal resting LV systolic function,normal systolic restponse to exercise. No evidence of reversible  ischemia on this maximal,symptom limited,treadmill exerxiess stress echocardiography study    History of cardiac cath 02/09/2017    LMCA: Lesion on LMCA: Distal subsection . 20% stenosis. LAD: Lesion on 1st Diag: Mid subsection . 75% stenosis. RCA: Lesion on Mid RCA: Mid subsection . 100% stenosis. Comments: The distal RCA fills by left to right collateralization. Dominance: Mixed. LV function assessed as: Normal. EF 65%.  History of echocardiogram 06/11/2018    EF 60%. Mildly increased LV wall thickness. Evidence of mild diastolic dysfunction seen.     History of echocardiogram 01/18/2019    EF of 65%. LV wall thickness is mildly increased. Aortic valve leaflets are mildly thickened.  History of heart attack     Hyperlipidemia     Hypertension     Raynaud's phenomenon     S/P angioplasty with stent 9/20/13    PTCA-NEDRA of  the OM 1    Tobacco abuse     1 ppd         Past Surgical History:   Procedure Laterality Date    CARDIAC CATHETERIZATION  2007    patient states had 2 small blockages    CARDIAC CATHETERIZATION  02/09/2017    LMCA: Lesion on LMCA: Distal subsection 20% stenosis. LAD: Lesion on 1st Diag: Mid subsection 75% stenosis. RCA: Lesion on Mid RCA: Mid subsection 100% stenosis.  CARDIAC CATHETERIZATION  02/09/2017    Attempted PCI to chronic total occlusion of RCA was not successful. Unable to cross with multiple wires due to heavy calcification and toruosity.     CARDIAC CATHETERIZATION  01/14/2019    CATARACT REMOVAL  4/6/15    Right eye - Dr. Hayley Nayak  6/8/15    Dr. [employer] - left eye    COLONOSCOPY  1/11/2006    Dr. Betsy Musa - internal hemorrhoids, no polps    COLONOSCOPY  5/23/2016    -polyp    HEMORRHOID SURGERY      HERNIA REPAIR      NECK SURGERY  1997    c4-5 laminectomy and fusion    OTHER SURGICAL HISTORY      Right foot - 7 from heal and 1 from great toe at [address]  12/11/14    pain injection - cervical    UPPER GASTROINTESTINAL ENDOSCOPY  12/28/2005    Dr. Betsy Musa - mild esophagitis and gastritis    UPPER GASTROINTESTINAL ENDOSCOPY N/A 2/5/2019    -bx(neg H-Pylori)retained gastric content, possible submucosal mass of gastric cardia    UPPER GASTROINTESTINAL ENDOSCOPY N/A 10/22/2019    EGD BIOPSY performed by Blanca Trujillo MD at [facility] History   Problem Relation Age of Onset    Heart Disease Mother     Diabetes Mother     High Blood Pressure Mother     Diabetes Father     Alzheimer's Disease Father     Heart Disease Sister         CABG    Heart 3    nitroGLYCERIN (NITROSTAT) 0.4 MG SL tablet, Place 1 tablet under the tongue every 5 minutes as needed for Chest pain, Disp: 25 tablet, Rfl: 3    glimepiride (AMARYL) 2 MG tablet, Take 1 tablet by mouth 2 times daily, Disp: 180 tablet, Rfl: 3    aspirin 81 MG tablet, Take 81 mg by mouth daily. , Disp: , Rfl:     Allergies   Allergen Reactions    Niacin And Related     Oxycodone Hcl     Other Nausea And Vomiting     Patient states on all pain medications he gets nausea and vomiting. ROS: 14 systems reviewed and negative except for pertinent positives as noted above. PHYSICAL EXAM:   /62 (Site: Left Upper Arm, Position: Sitting, Cuff Size: Medium Adult)   Pulse 63   Resp 18   Ht 5' 11\" (1.803 m)   Wt 170 lb 6.4 oz (77.3 kg)   SpO2 100%   BMI 23.77 kg/m²  Body mass index is 23.77 kg/m². Constitutional: He is oriented to person, place, and time. He appears well-developed and well-nourished. In no acute distress. HEENT: Normocephalic and atraumatic. No JVD present. Carotid bruit is not present. No mass and no thyromegaly present. No lymphadenopathy present. Cardiovascular: Normal rate, regular rhythm, normal heart sounds. Exam reveals no gallop and no friction rubs. No heart murmur heard. Pulmonary/Chest: Effort normal and breath sounds normal. No respiratory distress. He has no wheezes, rhonchi or rales. Abdominal: Soft, non-tender. Bowel sounds and aorta are normal. He exhibits no organomegaly, mass or bruit. Extremities: No edema. No cyanosis and no clubbing. Pulses are 2+ radial and carotid pulses. 2+ dorsalis pedis and posterior tibial pulses bilaterally. Neurological: He is alert and oriented to person, place, and time. No evidence of gross cranial nerve deficit. Coordination appeared normal.   Skin: Skin is warm and dry. There is no rash or diaphoresis. Psychiatric: He has a normal mood and affect.  His speech is normal and behavior is normal.       Diagnosis Orders Continue atorvastatin (Lipitor) 20 mg nightly. Tobacco Abuse Counseling: I spent several minutes discussing the dangers of tobacco abuse as well as multiple methods for trying to quit smoking. In the end, Mr. Michelle Oneil said that he did not want any assistance at this time but would continue to try and quit reduce and eventually quit smoking in the near future. He did report that he has cut back a lot on his smoking since last visit. · Atypical Chest Pain   Antiplatelet Agent: Continue clopidogrel (Plavix) 75 mg daily. I also reminded him to watch for signs of blood in his stool or black tarry stools and stop the medication immediately if this develops as this could be life threatening. · Beta Blocker Therapy: Continue Atenolol 25 mg daily    · Calcium Channel Blocker: Continue amlodipine (Norvasc) 5 mg daily     Other Anti-anginal medications: Continue Isosorbide mononitrate (Imdur) 15 mg daily   ·  Statin Therapy: Continue atorvastatin (Lipitor) 20 mg nightly. · Additional Testing: I do not think further ischemic work-up is indicated at this point. He has no further episodes of pain. I told him to call my cell phone number if he has any further chest pain or simply come to the emergency room. Patient verbalized understanding. · Counseling: I advised Mr. Michelle Oneil to call our office or go to the emergency room if he develops worsening or persistent chest pain or shortness of breath as this could be life threatening. Finally, I recommended that he continue his other medications and follow up with you as previously scheduled. FOLLOW UP:   I told Mr. Michelle Oneil to call my office if he had any problems, but otherwise told him to Return in about 1 year (around 12/15/2021). However, I would be happy to see him sooner should the need arise.       Sincerely,  Zeke [address], [address] [place] [address]  Phone: [phone]; Fax: [phone]    I believe that

## 2020-12-15 NOTE — PATIENT INSTRUCTIONS
SURVEY:    You may be receiving a survey from [employer] regarding your visit today. Please complete the survey to enable us to provide the highest quality of care to you and your family. If you cannot score us a very good on any question, please call the office to discuss how we could have made your experience a very good one. Thank you.     Your MA today was Ovidio Aragon

## 2020-12-21 ENCOUNTER — HOSPITAL ENCOUNTER (OUTPATIENT)
Age: 70
Discharge: HOME | End: 2020-12-23
Payer: MEDICARE

## 2020-12-21 ENCOUNTER — HOSPITAL ENCOUNTER (OUTPATIENT)
Dept: GENERAL RADIOLOGY | Age: 70
Discharge: HOME | End: 2020-12-23
Payer: MEDICARE

## 2020-12-21 PROCEDURE — 73590 X-RAY EXAM OF LOWER LEG: CPT

## 2020-12-25 ENCOUNTER — APPOINTMENT (OUTPATIENT)
Dept: GENERAL RADIOLOGY | Age: 70
End: 2020-12-25
Payer: MEDICARE

## 2020-12-25 ENCOUNTER — HOSPITAL ENCOUNTER (EMERGENCY)
Age: 70
Discharge: HOME | End: 2020-12-26
Attending: EMERGENCY MEDICINE
Payer: MEDICARE

## 2020-12-25 VITALS
TEMPERATURE: 97.7 F | HEIGHT: 72 IN | RESPIRATION RATE: 16 BRPM | SYSTOLIC BLOOD PRESSURE: 141 MMHG | DIASTOLIC BLOOD PRESSURE: 64 MMHG | OXYGEN SATURATION: 100 % | BODY MASS INDEX: 23.3 KG/M2 | WEIGHT: 172 LBS | HEART RATE: 73 BPM

## 2020-12-25 PROCEDURE — 96372 THER/PROPH/DIAG INJ SC/IM: CPT

## 2020-12-25 PROCEDURE — 99283 EMERGENCY DEPT VISIT LOW MDM: CPT

## 2020-12-25 PROCEDURE — 6360000002 HC RX W HCPCS: Performed by: EMERGENCY MEDICINE

## 2020-12-25 PROCEDURE — 73610 X-RAY EXAM OF ANKLE: CPT

## 2020-12-25 PROCEDURE — 71101 X-RAY EXAM UNILAT RIBS/CHEST: CPT

## 2020-12-25 RX ORDER — ORPHENADRINE CITRATE 30 MG/ML
60 INJECTION INTRAMUSCULAR; INTRAVENOUS ONCE
Status: COMPLETED | OUTPATIENT
Start: 2020-12-25 | End: 2020-12-25

## 2020-12-25 RX ORDER — TIZANIDINE 4 MG/1
4 TABLET ORAL EVERY 8 HOURS PRN
Qty: 15 TABLET | Refills: 0 | Status: SHIPPED | OUTPATIENT
Start: 2020-12-25 | End: 2021-12-16 | Stop reason: SDUPTHER

## 2020-12-25 RX ORDER — LIDOCAINE 50 MG/G
1 PATCH TOPICAL DAILY
Qty: 10 PATCH | Refills: 0 | Status: SHIPPED | OUTPATIENT
Start: 2020-12-25 | End: 2021-01-04

## 2020-12-25 RX ADMIN — ORPHENADRINE CITRATE 60 MG: 60 INJECTION INTRAMUSCULAR; INTRAVENOUS at 23:07

## 2020-12-25 ASSESSMENT — PAIN DESCRIPTION - PAIN TYPE: TYPE: ACUTE PAIN

## 2020-12-25 ASSESSMENT — PAIN DESCRIPTION - ORIENTATION: ORIENTATION: RIGHT

## 2020-12-25 ASSESSMENT — PAIN DESCRIPTION - LOCATION: LOCATION: RIB CAGE

## 2020-12-25 ASSESSMENT — PAIN SCALES - GENERAL: PAINLEVEL_OUTOF10: 6

## 2020-12-26 ASSESSMENT — ENCOUNTER SYMPTOMS
ABDOMINAL PAIN: 0
CHEST TIGHTNESS: 0
COLOR CHANGE: 0
NAUSEA: 0
VOMITING: 0
SHORTNESS OF BREATH: 0

## 2020-12-26 NOTE — ED PROVIDER NOTES
[facility] ED  Emergency Department Encounter  EmergencyMedicine Attending     Pt Ashley Horn  MRN: [id number]  [place] 1950  Date of evaluation: 12/25/20  PCP:  [facility]       Chief Complaint   Patient presents with    Rib Pain     Pt states he turned in bed and heard a pop in his rt ribs. HISTORY OF PRESENT ILLNESS  (Location/Symptom, Timing/Onset, Context/Setting, Quality, Duration, Modifying Factors, Severity.)      Adrienne Jon is a 79 y.o. male who presents with right lower rib pain, point tenderness over the 11th rib laterally on the right side. Patient states that he turned in bed, heard a pop in his right lower rib and then after that developed exquisite pain with point tenderness. Pain at this time is 8 out of 10, right lateral lower rib, no radiations, started today, no radiations. Also complaining of pain in his right ankle, no fall today however recently a few days ago he did have a fall, head x-rays done of his leg already and was evaluated at the hospital already but reports not getting any x-ray of his right ankle where he has been having pain since the event. PAST MEDICAL / SURGICAL / SOCIAL / FAMILY HISTORY      has a past medical history of Abnormal cardiac cath, Actinic keratosis, Arthritis, CAD (coronary artery disease), Calculus of kidney, Cervical stenosis of spine, Diabetes mellitus ([address] GERD (gastroesophageal reflux disease), Gout, H/O echocardiogram, History of cardiac cath, History of echocardiogram, History of echocardiogram, History of heart attack, Hyperlipidemia, Hypertension, Raynaud's phenomenon, S/P angioplasty with stent, and Tobacco abuse.     has a past surgical history that includes Cardiac catheterization (2007); Hemorrhoid surgery; hernia repair; Neck surgery (1997); other surgical history (); other surgical history (12/11/14); Cataract removal (4/6/15);  Upper gastrointestinal endoscopy  Alzheimer's Disease Father     Heart Disease Sister         CABG    Heart Disease Brother     Heart Disease Brother     Heart Disease Brother     Cancer Brother        Allergies:  Niacin and related, Oxycodone hcl, and Other    Home Medications:  Prior to Admission medications    Medication Sig Start Date End Date Taking?  Authorizing Provider   tiZANidine (ZANAFLEX) 4 MG tablet Take 1 tablet by mouth every 8 hours as needed (Rib pain) 12/25/20  Yes Cory Shirley MD   lidocaine (LIDODERM) 5 % Place 1 patch onto the skin daily for 10 days 12 hours on, 12 hours off. 12/25/20 1/4/21 Yes Cory Shirley MD   Insulin Glargine, 2 Unit Dial, 300 UNIT/ML SOPN Insulin Glargine Insulin Glargine U-300 Conc Active 10 UNIT Subcutaneous Every morning May 7th, 2020 11:47am 05-  [facility] ([id number]) 5/7/20  Yes Historical Provider, MD   atenolol (TENORMIN) 25 MG tablet Take 1 tablet by mouth daily 12/15/20  Yes King Meneses MD   clopidogrel (PLAVIX) 75 MG tablet Take 1 tablet by mouth daily 12/15/20  Yes King Meneses MD   amLODIPine (NORVASC) 5 MG tablet Take 1 tablet by mouth daily 12/15/20  Yes King Meneses MD   atorvastatin (LIPITOR) 20 MG tablet Take 1 tablet by mouth daily 12/15/20  Yes King Meneses MD   isosorbide mononitrate (IMDUR) 30 MG extended release tablet Take 1 tablet by mouth daily  Patient taking differently: Take 15 mg by mouth daily  2/13/20  Yes King Meneses MD   sitaGLIPtan-metformin (JANUMET)  MG per tablet Take 1 tablet by mouth 2 times daily (with meals)   Yes Historical Provider, MD   lisinopril (PRINIVIL;ZESTRIL) 20 MG tablet Take 0.5 tablets by mouth daily  Patient taking differently: Take 20 mg by mouth daily  1/18/19  Yes King Meneses MD   nitroGLYCERIN (NITROSTAT) 0.4 MG SL tablet Place 1 tablet under the tongue every 5 minutes as needed for Chest pain 3/6/17  Yes King Meneses MD   glimepiride (AMARYL) 2 MG tablet Take 1 tablet by mouth 2 times daily 5/22/15  Yes Mari Hicks MD   aspirin 81 MG tablet Take 81 mg by mouth daily. Yes Historical Provider, MD   chlorpheniramine (EQ CHLORTABS) 4 MG tablet Take 4 mg by mouth every 6 hours as needed for Allergies    Historical Provider, MD       REVIEW OF SYSTEMS    (2-9 systems for level 4, 10 or more for level 5)      Review of Systems   Constitutional: Negative for chills and fever. Respiratory: Negative for chest tightness and shortness of breath. Cardiovascular: Negative for chest pain.        + rib pain   Gastrointestinal: Negative for abdominal pain, nausea and vomiting. Musculoskeletal: Positive for arthralgias. Skin: Negative for color change. Neurological: Negative for weakness and numbness. Psychiatric/Behavioral: Negative for confusion. PHYSICAL EXAM   (up to 7 for level 4, 8 or more for level 5)      INITIAL VITALS:   BP (!) 141/64   Pulse 73   Temp 97.7 °F (36.5 °C) (Oral)   Resp 16   Ht 6' (1.829 m)   Wt 172 lb (78 kg)   SpO2 100%   BMI 23.33 kg/m²     Physical Exam  Vitals signs reviewed. Constitutional:       General: He is not in acute distress. Appearance: He is well-developed. HENT:      Head: Normocephalic and atraumatic. Cardiovascular:      Rate and Rhythm: Normal rate and regular rhythm. Heart sounds: Normal heart sounds. No murmur. No friction rub. No gallop. Pulmonary:      Effort: Pulmonary effort is normal. No respiratory distress. Breath sounds: Normal breath sounds. No wheezing or rales. Abdominal:      General: There is no distension. Palpations: Abdomen is soft. Tenderness: There is no abdominal tenderness. There is no guarding or rebound. Musculoskeletal:         General: Tenderness present. Comments: Exquisite point tenderness over the right lateral lower ribs mostly around ribs 11 and 12    Medial swelling of the right ankle with tenderness medially. No tenderness over the foot itself.    Skin: General: Skin is warm and dry. Findings: No erythema. DIFFERENTIAL  DIAGNOSIS     PLAN (LABS / IMAGING / EKG):  Orders Placed This Encounter   Procedures    XR RIBS RIGHT INCLUDE CHEST (MIN 3 VIEWS)    XR ANKLE RIGHT (MIN 3 VIEWS)    Incentive spirometry nursing       MEDICATIONS ORDERED:  Orders Placed This Encounter   Medications    orphenadrine (NORFLEX) injection 60 mg    tiZANidine (ZANAFLEX) 4 MG tablet     Sig: Take 1 tablet by mouth every 8 hours as needed (Rib pain)     Dispense:  15 tablet     Refill:  0    lidocaine (LIDODERM) 5 %     Sig: Place 1 patch onto the skin daily for 10 days 12 hours on, 12 hours off. Dispense:  10 patch     Refill:  0       DDX: Rib fracture versus rib contusion    DIAGNOSTIC RESULTS / EMERGENCY DEPARTMENT COURSE / MDM   :  No results found for this visit on 12/25/20. IMPRESSION: 66-year-old male comes in with right lateral rib pain, felt a pop, significant amount of tenderness on examination there is reproducible. Rib series with chest x-ray did not show any acute fracture, right ankle did not show any fracture either, patient is able to bear weight and ambulate. We will continue pain control, give the patient an incentive spirometer, and send him home with pain control with muscle relaxers and lidocaine patches. Follow-up with PCP, return to ER worsening symptoms. RADIOLOGY:  None    EKG  None    All EKG's are interpreted by the Emergency Department Physician who either signs or Co-signs this chart in the absence of a cardiologist.      PROCEDURES:  None    CONSULTS:  None    CRITICAL CARE:  None    FINAL IMPRESSION      1.  Contusion of rib on right side, initial encounter          DISPOSITION / PLAN     DISPOSITION Decision To Discharge 12/25/2020 11:50:37 PM      PATIENT REFERRED TO:  [address]  [facility] [id number]  [phone]    Call in 1 day        DISCHARGE MEDICATIONS:  Discharge Medication List as of 12/25/2020 11:52 PM      START taking these medications    Details   tiZANidine (ZANAFLEX) 4 MG tablet Take 1 tablet by mouth every 8 hours as needed (Rib pain), Disp-15 tablet, R-0Print      lidocaine (LIDODERM) 5 % Place 1 patch onto the skin daily for 10 days 12 hours on, 12 hours off., Disp-10 patch, R-0Print             Tito Olivo MD  Emergency Medicine Attending    (Please note that portions of thisnote were completed with a voice recognition program.  Efforts were made to edit the dictations but occasionally words are mis-transcribed.)       Tito Olivo MD  12/26/20 [id number]

## 2021-01-20 ENCOUNTER — TELEPHONE (OUTPATIENT)
Dept: CASE MANAGEMENT | Age: 71
End: 2021-01-20

## 2021-01-20 DIAGNOSIS — I10 ESSENTIAL HYPERTENSION: ICD-10-CM

## 2021-01-20 DIAGNOSIS — I25.10 ASHD (ARTERIOSCLEROTIC HEART DISEASE): ICD-10-CM

## 2021-01-20 DIAGNOSIS — Z95.820 S/P ANGIOPLASTY WITH STENT: ICD-10-CM

## 2021-01-20 RX ORDER — ISOSORBIDE MONONITRATE 30 MG/1
TABLET, EXTENDED RELEASE ORAL
Qty: 90 TABLET | Refills: 3 | Status: SHIPPED | OUTPATIENT
Start: 2021-01-20 | End: 2021-09-22 | Stop reason: SDUPTHER

## 2021-02-18 RX ORDER — AMLODIPINE BESYLATE 5 MG/1
TABLET ORAL
Qty: 90 TABLET | Refills: 3 | Status: SHIPPED | OUTPATIENT
Start: 2021-02-18 | End: 2021-03-12 | Stop reason: SDUPTHER

## 2021-02-18 RX ORDER — CLOPIDOGREL BISULFATE 75 MG/1
TABLET ORAL
Qty: 90 TABLET | Refills: 3 | Status: SHIPPED | OUTPATIENT
Start: 2021-02-18 | End: 2021-03-12 | Stop reason: SDUPTHER

## 2021-03-04 ENCOUNTER — TELEPHONE (OUTPATIENT)
Dept: CASE MANAGEMENT | Age: 71
End: 2021-03-04

## 2021-03-04 NOTE — TELEPHONE ENCOUNTER
Layne Guadarrama   5/18/2018   Anticoagulation Therapy Visit    Description:  76 year old female   Provider:  Hannah Quiroz, RN   Department:  [facility]           INR as of 5/18/2018     Today's INR 3.30!      Anticoagulation Summary as of 5/18/2018     INR goal 2.0-3.0   Today's INR 3.30!   Full instructions 5/23: 1.5 mg; Otherwise 1.5 mg on Mon, Fri; 3 mg all other days   Next INR check 5/25/2018    Indications   Long-term (current) use of anticoagulants [Z79.01] [Z79.01]  Pulmonary embolism with infarction (HCC) [I26.99] [I26.99]  LVAD (left ventricular assist device) present (H) [Z95.811]         May 2018 Details    Sun Mon Tue Wed Thu Fri Sat       1               2               3               4               5                 6               7               8               9               10               11               12                 13               14               15               16               17               18      1.5 mg   See details      19      3 mg           20      3 mg         21      1.5 mg         22      3 mg         23      1.5 mg         24      3 mg         25            26                 27               28               29               30               31                  Date Details   05/18 This INR check       Date of next INR:  5/25/2018         How to take your warfarin dose     To take:  1.5 mg Take 0.5 of a 3 mg tablet.    To take:  3 mg Take 1 of the 3 mg tablets.            Lung Navigator reviewing chart and pt. Overdue for Lung Screening F/U. Mara/Leticia please have scheduling reach out to pt.  Please, Thanks, Char Chávez

## 2021-03-12 RX ORDER — CLOPIDOGREL BISULFATE 75 MG/1
75 TABLET ORAL DAILY
Qty: 90 TABLET | Refills: 3 | Status: SHIPPED | OUTPATIENT
Start: 2021-03-12 | End: 2021-03-22 | Stop reason: SDUPTHER

## 2021-03-12 RX ORDER — AMLODIPINE BESYLATE 5 MG/1
5 TABLET ORAL DAILY
Qty: 90 TABLET | Refills: 3 | Status: SHIPPED | OUTPATIENT
Start: 2021-03-12 | End: 2021-03-22 | Stop reason: SDUPTHER

## 2021-03-22 DIAGNOSIS — I25.10 ASHD (ARTERIOSCLEROTIC HEART DISEASE): Primary | ICD-10-CM

## 2021-03-22 DIAGNOSIS — I25.10 ASHD (ARTERIOSCLEROTIC HEART DISEASE): ICD-10-CM

## 2021-03-22 RX ORDER — CLOPIDOGREL BISULFATE 75 MG/1
75 TABLET ORAL DAILY
Qty: 30 TABLET | Refills: 0 | Status: SHIPPED | OUTPATIENT
Start: 2021-03-22 | End: 2021-07-15 | Stop reason: SDUPTHER

## 2021-03-22 RX ORDER — AMLODIPINE BESYLATE 5 MG/1
5 TABLET ORAL DAILY
Qty: 30 TABLET | Refills: 0 | Status: SHIPPED | OUTPATIENT
Start: 2021-03-22 | End: 2021-07-15 | Stop reason: SDUPTHER

## 2021-03-22 RX ORDER — AMLODIPINE BESYLATE 5 MG/1
5 TABLET ORAL DAILY
Qty: 90 TABLET | Refills: 3 | Status: SHIPPED | OUTPATIENT
Start: 2021-03-22 | End: 2021-03-22 | Stop reason: SDUPTHER

## 2021-03-22 RX ORDER — CLOPIDOGREL BISULFATE 75 MG/1
75 TABLET ORAL DAILY
Qty: 90 TABLET | Refills: 3 | Status: SHIPPED | OUTPATIENT
Start: 2021-03-22 | End: 2021-03-22 | Stop reason: SDUPTHER

## 2021-05-05 ENCOUNTER — TELEPHONE (OUTPATIENT)
Dept: CASE MANAGEMENT | Age: 71
End: 2021-05-05

## 2021-05-05 DIAGNOSIS — R91.1 LUNG NODULE: Primary | ICD-10-CM

## 2021-05-05 NOTE — TELEPHONE ENCOUNTER
Lung Navigator reviewing chart and pt. Overdue for Lung Screening F/U . Franco /Mara pend new order if needed and have scheduling reach out.

## 2021-05-26 ENCOUNTER — HOSPITAL ENCOUNTER (OUTPATIENT)
Dept: GENERAL RADIOLOGY | Age: 71
Discharge: HOME | End: 2021-05-28
Payer: MEDICARE

## 2021-05-26 ENCOUNTER — HOSPITAL ENCOUNTER (OUTPATIENT)
Age: 71
Discharge: HOME | End: 2021-05-28
Payer: MEDICARE

## 2021-05-26 DIAGNOSIS — M54.50 LOW BACK PAIN, UNSPECIFIED BACK PAIN LATERALITY, UNSPECIFIED CHRONICITY, UNSPECIFIED WHETHER SCIATICA PRESENT: ICD-10-CM

## 2021-05-26 DIAGNOSIS — M47.9 ARTHRITIS OF BACK: ICD-10-CM

## 2021-05-26 DIAGNOSIS — M47.9 ADVANCED OSTEOARTHRITIS OF SPINE: ICD-10-CM

## 2021-05-26 PROCEDURE — 72114 X-RAY EXAM L-S SPINE BENDING: CPT

## 2021-05-26 PROCEDURE — 72072 X-RAY EXAM THORAC SPINE 3VWS: CPT

## 2021-05-26 PROCEDURE — 73522 X-RAY EXAM HIPS BI 3-4 VIEWS: CPT

## 2021-05-27 ENCOUNTER — HOSPITAL ENCOUNTER (EMERGENCY)
Age: 71
Discharge: HOME | End: 2021-05-27
Attending: EMERGENCY MEDICINE
Payer: MEDICARE

## 2021-05-27 ENCOUNTER — APPOINTMENT (OUTPATIENT)
Dept: CT IMAGING | Age: 71
End: 2021-05-27
Payer: MEDICARE

## 2021-05-27 VITALS
DIASTOLIC BLOOD PRESSURE: 51 MMHG | TEMPERATURE: 97.5 F | RESPIRATION RATE: 16 BRPM | WEIGHT: 173 LBS | HEART RATE: 55 BPM | SYSTOLIC BLOOD PRESSURE: 120 MMHG | OXYGEN SATURATION: 97 % | BODY MASS INDEX: 23.46 KG/M2

## 2021-05-27 DIAGNOSIS — S39.012A STRAIN OF LUMBAR REGION, INITIAL ENCOUNTER: Primary | ICD-10-CM

## 2021-05-27 LAB
ABSOLUTE EOS #: 0.1 K/UL (ref 0–0.44)
ABSOLUTE IMMATURE GRANULOCYTE: 0.04 K/UL (ref 0–0.3)
ABSOLUTE LYMPH #: 1.14 K/UL (ref 1.1–3.7)
ABSOLUTE MONO #: 0.31 K/UL (ref 0.1–1.2)
ALBUMIN SERPL-MCNC: 4 G/DL (ref 3.5–5.2)
ALBUMIN/GLOBULIN RATIO: 1.4 (ref 1–2.5)
ALP BLD-CCNC: 79 U/L (ref 40–129)
ALT SERPL-CCNC: 23 U/L (ref 5–41)
AMYLASE: 68 U/L (ref 28–100)
ANION GAP SERPL CALCULATED.3IONS-SCNC: 10 MMOL/L (ref 9–17)
AST SERPL-CCNC: 19 U/L
BASOPHILS # BLD: 1 % (ref 0–2)
BASOPHILS ABSOLUTE: 0.06 K/UL (ref 0–0.2)
BILIRUB SERPL-MCNC: 0.42 MG/DL (ref 0.3–1.2)
BILIRUBIN DIRECT: <0.08 MG/DL
BILIRUBIN, INDIRECT: NORMAL MG/DL (ref 0–1)
BUN BLDV-MCNC: 15 MG/DL (ref 8–23)
BUN/CREAT BLD: 16 (ref 9–20)
CALCIUM SERPL-MCNC: 8.9 MG/DL (ref 8.6–10.4)
CHLORIDE BLD-SCNC: 106 MMOL/L (ref 98–107)
CO2: 23 MMOL/L (ref 20–31)
CREAT SERPL-MCNC: 0.95 MG/DL (ref 0.7–1.2)
DIFFERENTIAL TYPE: ABNORMAL
EOSINOPHILS RELATIVE PERCENT: 2 % (ref 1–4)
GFR AFRICAN AMERICAN: >60 ML/MIN
GFR NON-AFRICAN AMERICAN: >60 ML/MIN
GFR SERPL CREATININE-BSD FRML MDRD: ABNORMAL ML/MIN/{1.73_M2}
GFR SERPL CREATININE-BSD FRML MDRD: ABNORMAL ML/MIN/{1.73_M2}
GLOBULIN: NORMAL G/DL (ref 1.5–3.8)
GLUCOSE BLD-MCNC: 232 MG/DL (ref 70–99)
HCT VFR BLD CALC: 35.3 % (ref 40.7–50.3)
HEMOGLOBIN: 11.4 G/DL (ref 13–17)
IMMATURE GRANULOCYTES: 1 %
LIPASE: 36 U/L (ref 13–60)
LYMPHOCYTES # BLD: 17 % (ref 24–43)
MCH RBC QN AUTO: 28.7 PG (ref 25.2–33.5)
MCHC RBC AUTO-ENTMCNC: 32.3 G/DL (ref 28.4–34.8)
MCV RBC AUTO: 88.9 FL (ref 82.6–102.9)
MONOCYTES # BLD: 5 % (ref 3–12)
NRBC AUTOMATED: 0 PER 100 WBC
PDW BLD-RTO: 14.6 % (ref 11.8–14.4)
PLATELET # BLD: 199 K/UL (ref 138–453)
PLATELET ESTIMATE: ABNORMAL
PMV BLD AUTO: 10.7 FL (ref 8.1–13.5)
POTASSIUM SERPL-SCNC: 4.4 MMOL/L (ref 3.7–5.3)
RBC # BLD: 3.97 M/UL (ref 4.21–5.77)
RBC # BLD: ABNORMAL 10*6/UL
SEG NEUTROPHILS: 74 % (ref 36–65)
SEGMENTED NEUTROPHILS ABSOLUTE COUNT: 5.24 K/UL (ref 1.5–8.1)
SODIUM BLD-SCNC: 139 MMOL/L (ref 135–144)
TOTAL PROTEIN: 6.8 G/DL (ref 6.4–8.3)
WBC # BLD: 6.9 K/UL (ref 3.5–11.3)
WBC # BLD: ABNORMAL 10*3/UL

## 2021-05-27 PROCEDURE — 80048 BASIC METABOLIC PNL TOTAL CA: CPT

## 2021-05-27 PROCEDURE — 96376 TX/PRO/DX INJ SAME DRUG ADON: CPT

## 2021-05-27 PROCEDURE — 6360000002 HC RX W HCPCS: Performed by: EMERGENCY MEDICINE

## 2021-05-27 PROCEDURE — 82150 ASSAY OF AMYLASE: CPT

## 2021-05-27 PROCEDURE — 6360000004 HC RX CONTRAST MEDICATION: Performed by: EMERGENCY MEDICINE

## 2021-05-27 PROCEDURE — 96375 TX/PRO/DX INJ NEW DRUG ADDON: CPT

## 2021-05-27 PROCEDURE — 36415 COLL VENOUS BLD VENIPUNCTURE: CPT

## 2021-05-27 PROCEDURE — 83690 ASSAY OF LIPASE: CPT

## 2021-05-27 PROCEDURE — 2580000003 HC RX 258: Performed by: EMERGENCY MEDICINE

## 2021-05-27 PROCEDURE — 99284 EMERGENCY DEPT VISIT MOD MDM: CPT

## 2021-05-27 PROCEDURE — 96374 THER/PROPH/DIAG INJ IV PUSH: CPT

## 2021-05-27 PROCEDURE — 74174 CTA ABD&PLVS W/CONTRAST: CPT

## 2021-05-27 PROCEDURE — 85025 COMPLETE CBC W/AUTO DIFF WBC: CPT

## 2021-05-27 PROCEDURE — 80076 HEPATIC FUNCTION PANEL: CPT

## 2021-05-27 RX ORDER — OXYCODONE HYDROCHLORIDE AND ACETAMINOPHEN 5; 325 MG/1; MG/1
1 TABLET ORAL EVERY 6 HOURS PRN
Qty: 15 TABLET | Refills: 0 | Status: SHIPPED | OUTPATIENT
Start: 2021-05-27 | End: 2021-06-01

## 2021-05-27 RX ORDER — ETODOLAC 400 MG/1
400 TABLET, FILM COATED ORAL 2 TIMES DAILY
Qty: 30 TABLET | Refills: 0 | Status: SHIPPED | OUTPATIENT
Start: 2021-05-27 | End: 2021-12-16

## 2021-05-27 RX ORDER — PROMETHAZINE HYDROCHLORIDE 25 MG/ML
12.5 INJECTION, SOLUTION INTRAMUSCULAR; INTRAVENOUS ONCE
Status: COMPLETED | OUTPATIENT
Start: 2021-05-27 | End: 2021-05-27

## 2021-05-27 RX ORDER — ORPHENADRINE CITRATE 30 MG/ML
30 INJECTION INTRAMUSCULAR; INTRAVENOUS ONCE
Status: COMPLETED | OUTPATIENT
Start: 2021-05-27 | End: 2021-05-27

## 2021-05-27 RX ORDER — KETOROLAC TROMETHAMINE 15 MG/ML
30 INJECTION, SOLUTION INTRAMUSCULAR; INTRAVENOUS ONCE
Status: COMPLETED | OUTPATIENT
Start: 2021-05-27 | End: 2021-05-27

## 2021-05-27 RX ORDER — SODIUM CHLORIDE 9 MG/ML
150 INJECTION, SOLUTION INTRAVENOUS CONTINUOUS
Status: DISCONTINUED | OUTPATIENT
Start: 2021-05-27 | End: 2021-05-27 | Stop reason: HOSPADM

## 2021-05-27 RX ORDER — PROMETHAZINE HYDROCHLORIDE 25 MG/1
25 TABLET ORAL EVERY 4 HOURS PRN
Qty: 30 TABLET | Refills: 0 | Status: SHIPPED | OUTPATIENT
Start: 2021-05-27 | End: 2021-06-03

## 2021-05-27 RX ORDER — METHYLPREDNISOLONE SODIUM SUCCINATE 125 MG/2ML
125 INJECTION, POWDER, LYOPHILIZED, FOR SOLUTION INTRAMUSCULAR; INTRAVENOUS ONCE
Status: COMPLETED | OUTPATIENT
Start: 2021-05-27 | End: 2021-05-27

## 2021-05-27 RX ORDER — HYDROCODONE BITARTRATE AND ACETAMINOPHEN 5; 325 MG/1; MG/1
1 TABLET ORAL EVERY 6 HOURS PRN
COMMUNITY
End: 2021-05-27

## 2021-05-27 RX ADMIN — SODIUM CHLORIDE 150 ML/HR: 9 INJECTION, SOLUTION INTRAVENOUS at 11:29

## 2021-05-27 RX ADMIN — METHYLPREDNISOLONE SODIUM SUCCINATE 125 MG: 125 INJECTION, POWDER, FOR SOLUTION INTRAMUSCULAR; INTRAVENOUS at 11:31

## 2021-05-27 RX ADMIN — HYDROMORPHONE HYDROCHLORIDE 1 MG: 1 INJECTION, SOLUTION INTRAMUSCULAR; INTRAVENOUS; SUBCUTANEOUS at 14:59

## 2021-05-27 RX ADMIN — ORPHENADRINE CITRATE 30 MG: 60 INJECTION INTRAMUSCULAR; INTRAVENOUS at 11:36

## 2021-05-27 RX ADMIN — PROMETHAZINE HYDROCHLORIDE 12.5 MG: 25 INJECTION, SOLUTION INTRAMUSCULAR; INTRAVENOUS at 11:32

## 2021-05-27 RX ADMIN — HYDROMORPHONE HYDROCHLORIDE 1 MG: 1 INJECTION, SOLUTION INTRAMUSCULAR; INTRAVENOUS; SUBCUTANEOUS at 11:38

## 2021-05-27 RX ADMIN — KETOROLAC TROMETHAMINE 30 MG: 15 INJECTION, SOLUTION INTRAMUSCULAR; INTRAVENOUS at 11:29

## 2021-05-27 RX ADMIN — IOPAMIDOL 75 ML: 755 INJECTION, SOLUTION INTRAVENOUS at 12:57

## 2021-05-27 ASSESSMENT — ENCOUNTER SYMPTOMS
ABDOMINAL DISTENTION: 0
CONSTIPATION: 0
TROUBLE SWALLOWING: 0
BACK PAIN: 1
CHOKING: 0
DIARRHEA: 0
COUGH: 0
SHORTNESS OF BREATH: 0
ABDOMINAL PAIN: 1
CHEST TIGHTNESS: 0
NAUSEA: 0
VOMITING: 0

## 2021-05-27 ASSESSMENT — PAIN DESCRIPTION - ORIENTATION: ORIENTATION: LOWER

## 2021-05-27 ASSESSMENT — PAIN DESCRIPTION - PAIN TYPE
TYPE: ACUTE PAIN
TYPE: ACUTE PAIN

## 2021-05-27 ASSESSMENT — PAIN DESCRIPTION - DESCRIPTORS
DESCRIPTORS: ACHING
DESCRIPTORS: SHARP;SHOOTING;STABBING

## 2021-05-27 ASSESSMENT — PAIN DESCRIPTION - FREQUENCY: FREQUENCY: CONTINUOUS

## 2021-05-27 ASSESSMENT — PAIN SCALES - GENERAL
PAINLEVEL_OUTOF10: 2
PAINLEVEL_OUTOF10: 8
PAINLEVEL_OUTOF10: 2
PAINLEVEL_OUTOF10: 3

## 2021-05-27 ASSESSMENT — PAIN DESCRIPTION - LOCATION
LOCATION: BACK
LOCATION: BACK

## 2021-05-27 ASSESSMENT — PAIN DESCRIPTION - PROGRESSION: CLINICAL_PROGRESSION: GRADUALLY IMPROVING

## 2021-05-27 NOTE — ED PROVIDER NOTES
[address] [facility] [place] ED  EMERGENCY DEPARTMENT ENCOUNTER      Pt Leighton Graham  MRN: [id number]  Birthdate 1950  Date of evaluation: 5/27/2021  Provider: Ruben Perez MD    [address] [place] [address]     Chief Complaint   Patient presents with    Back Pain     lower back pain for past 3 weeks worse today         HISTORY OF PRESENT ILLNESS   (Location/Symptom, Timing/Onset, Context/Setting, Quality, Duration, Modifying Factors, Severity)  Note limiting factors. HPI the patient is a 43-year-old male who presents with right lower lumbar pain. The pain started after he cleaned out his garage. This was 3 weeks ago. He currently has a level 10 pain. Any movement makes the pain worse. Palpation of the abdomen increases his abdominal pain. He knows he has osteoarthritis. He has not been incontinent of urine or stool. He has had no fever or chills. He is having upper abdominal pain. Nursing Notes were reviewed. REVIEW OF SYSTEMS    (2-9 systems for level 4, 10 or more for level 5)     Review of Systems   Constitutional: Positive for activity change. Negative for fever. HENT: Negative for congestion and trouble swallowing. Respiratory: Negative for cough, choking, chest tightness and shortness of breath. Cardiovascular: Negative for chest pain, palpitations and leg swelling. Gastrointestinal: Positive for abdominal pain. Negative for abdominal distention, constipation, diarrhea, nausea and vomiting. Genitourinary: Negative for difficulty urinating, dysuria, flank pain and frequency. Musculoskeletal: Positive for back pain and gait problem. Negative for neck pain. Skin: Negative. Neurological: Negative for dizziness, light-headedness and headaches. Psychiatric/Behavioral: Negative for confusion, decreased concentration and dysphoric mood.               MEDICAL HISTORY     Past Medical History:   Diagnosis Date    Abnormal cardiac cath 9/20/13    LMCA; Mild irregularities 10-20%. LAD: Abnormal.  Mild to moderate irregularities throughout the LAD and branches. LCx:  abnormal.  80-90% stenosis in a moderate sized OM1. RCA: Abnormal.  40-50% mid RCA stenosis.  Actinic keratosis     Arthritis     neck, fingers    CAD (coronary artery disease)     Calculus of kidney     Cervical stenosis of spine     Diabetes mellitus (HCC)     GERD (gastroesophageal reflux disease)     Gout     H/O echocardiogram 04/29/2016    Stress echo. Normal resting LV systolic function,normal systolic restponse to exercise. No evidence of reversible  ischemia on this maximal,symptom limited,treadmill exerxiess stress echocardiography study    History of cardiac cath 02/09/2017    LMCA: Lesion on LMCA: Distal subsection . 20% stenosis. LAD: Lesion on 1st Diag: Mid subsection . 75% stenosis. RCA: Lesion on Mid RCA: Mid subsection . 100% stenosis. Comments: The distal RCA fills by left to right collateralization. Dominance: Mixed. LV function assessed as: Normal. EF 65%.  History of echocardiogram 06/11/2018    EF 60%. Mildly increased LV wall thickness. Evidence of mild diastolic dysfunction seen.  History of echocardiogram 01/18/2019    EF of 65%. LV wall thickness is mildly increased. Aortic valve leaflets are mildly thickened.  History of heart attack     Hyperlipidemia     Hypertension     Raynaud's phenomenon     S/P angioplasty with stent 9/20/13    PTCA-NEDRA of  the OM 1    Tobacco abuse     1 ppd         SURGICAL HISTORY       Past Surgical History:   Procedure Laterality Date    CARDIAC CATHETERIZATION  2007    patient states had 2 small blockages    CARDIAC CATHETERIZATION  02/09/2017    LMCA: Lesion on LMCA: Distal subsection 20% stenosis. LAD: Lesion on 1st Diag: Mid subsection 75% stenosis. RCA: Lesion on Mid RCA: Mid subsection 100% stenosis.  CARDIAC CATHETERIZATION  02/09/2017    Attempted PCI to chronic total occlusion of RCA was not successful.  Unable to cross with multiple wires due to heavy calcification and toruosity.  CARDIAC CATHETERIZATION  01/14/2019    CATARACT REMOVAL  4/6/15    Right eye - Dr. Franks Butt  6/8/15    Dr. Trent Marin - left eye    COLONOSCOPY  1/11/2006    Dr. Jumana Vogel - internal hemorrhoids, no polps    COLONOSCOPY  5/23/2016    -polyp    HEMORRHOID SURGERY      HERNIA REPAIR      NECK SURGERY  1997    c4-5 laminectomy and fusion    OTHER SURGICAL HISTORY      Right foot - 7 from heal and 1 from great toe at [address]  12/11/14    pain injection - cervical    UPPER GASTROINTESTINAL ENDOSCOPY  12/28/2005    Dr. Jumana Vogel - mild esophagitis and gastritis    UPPER GASTROINTESTINAL ENDOSCOPY N/A 2/5/2019    -bx(neg H-Pylori)retained gastric content, possible submucosal mass of gastric cardia    UPPER GASTROINTESTINAL ENDOSCOPY N/A 10/22/2019    EGD BIOPSY performed by Cruz Gutiérrez MD at [facility]       Previous Medications    AMLODIPINE (NORVASC) 5 MG TABLET    Take 1 tablet by mouth daily    ASPIRIN 81 MG TABLET    Take 81 mg by mouth daily.     ATENOLOL (TENORMIN) 25 MG TABLET    Take 1 tablet by mouth daily    ATORVASTATIN (LIPITOR) 20 MG TABLET    Take 1 tablet by mouth daily    CHLORPHENIRAMINE (EQ CHLORTABS) 4 MG TABLET    Take 4 mg by mouth every 6 hours as needed for Allergies    CLOPIDOGREL (PLAVIX) 75 MG TABLET    Take 1 tablet by mouth daily    GLIMEPIRIDE (AMARYL) 2 MG TABLET    Take 1 tablet by mouth 2 times daily    INSULIN GLARGINE, 2 UNIT DIAL, 300 UNIT/ML SOPN    Insulin Glargine Insulin Glargine U-300 Conc Active 10 UNIT Subcutaneous Every morning May 7th, 2020 11:47am 05-  [facility] ([id number])    ISOSORBIDE MONONITRATE (IMDUR) 30 MG EXTENDED RELEASE TABLET    TAKE 1 TABLET DAILY    LISINOPRIL (PRINIVIL;ZESTRIL) 20 MG TABLET    Take 0.5 tablets by mouth daily    NITROGLYCERIN (NITROSTAT) 0.4 MG SL TABLET Place 1 tablet under the tongue every 5 minutes as needed for Chest pain    SITAGLIPTAN-METFORMIN (JANUMET)  MG PER TABLET    Take 1 tablet by mouth 2 times daily (with meals)    TIZANIDINE (ZANAFLEX) 4 MG TABLET    Take 1 tablet by mouth every 8 hours as needed (Rib pain)       ALLERGIES     Niacin and related, Oxycodone hcl, and Other    FAMILY HISTORY       Family History   Problem Relation Age of Onset    Heart Disease Mother     Diabetes Mother     High Blood Pressure Mother     Diabetes Father     Alzheimer's Disease Father     Heart Disease Sister         CABG    Heart Disease Brother     Heart Disease Brother     Heart Disease Brother     Cancer Brother           SOCIAL HISTORY       Social History     Socioeconomic History    Marital status:      Spouse name: None    Number of children: None    Years of education: None    Highest education level: None   Occupational History    None   Tobacco Use    Smoking status: Current Some Day Smoker     Years: 54.00     Types: Cigarettes    Smokeless tobacco: Never Used    Tobacco comment: Patient recently had accupuncture to help stop. Goes next week for another   Vaping Use    Vaping Use: Never used   Substance and Sexual Activity    Alcohol use: No    Drug use: No    Sexual activity: None   Other Topics Concern    None   Social History Narrative    None     Social Determinants of Health     Financial Resource Strain:     Difficulty of Paying Living Expenses:    Food Insecurity:     Worried About Running Out of Food in the Last Year:     Ran Out of Food in the Last Year:    Transportation Needs:     Lack of Transportation (Medical):      Lack of Transportation (Non-Medical):    Physical Activity:     Days of Exercise per Week:     Minutes of Exercise per Session:    Stress:     Feeling of Stress :    Social Connections:     Frequency of Communication with Friends and Family:     Frequency of Social Gatherings with Friends and Family:     Attends [employer] Services:     Active Member of Clubs or Organizations:     Attends Club or Organization Meetings:     Marital Status:    Intimate Partner Violence:     Fear of Current or Ex-Partner:     Emotionally Abused:     Physically Abused:     Sexually Abused:        SCREENINGS             PHYSICAL EXAM  (up to 7 for level 4, 8 or more for level 5)     ED Triage Vitals   BP Temp Temp src Pulse Resp SpO2 Height Weight   -- -- -- -- -- -- -- --       Physical Exam  Constitutional:       Appearance: Normal appearance. He is normal weight. Comments: The patient appears to be very uncomfortable. HENT:      Head: Normocephalic and atraumatic. Mouth/Throat:      Mouth: Mucous membranes are moist.      Pharynx: Oropharynx is clear. Eyes:      Extraocular Movements: Extraocular movements intact. Conjunctiva/sclera: Conjunctivae normal.      Pupils: Pupils are equal, round, and reactive to light. Cardiovascular:      Rate and Rhythm: Normal rate and regular rhythm. Pulses: Normal pulses. Heart sounds: Normal heart sounds. Pulmonary:      Effort: Pulmonary effort is normal.      Breath sounds: Normal breath sounds. Abdominal:      General: Abdomen is flat. Palpations: There is no mass. Tenderness: There is abdominal tenderness. Musculoskeletal:         General: No swelling or tenderness. Normal range of motion. Cervical back: Normal range of motion and neck supple. Skin:     General: Skin is warm and dry. Neurological:      General: No focal deficit present. Mental Status: He is alert and oriented to person, place, and time. Mental status is at baseline. Cranial Nerves: No cranial nerve deficit. Gait: Gait abnormal.   Psychiatric:         Mood and Affect: Mood normal.         Behavior: Behavior normal.         Thought Content:  Thought content normal.         Judgment: Judgment normal.         DIAGNOSTIC RESULTS EKG: All EKG's are interpreted by the Emergency Department Physician whoeither signs or Co-signs this chart in the absence of a cardiologist.      RADIOLOGY:   Non-plain film images such as CT, Ultrasound and MRI are read by the radiologist. Plain radiographic images are visualized and preliminarily interpreted by the emergency physician     Interpretation per the Radiologist below, if available at the time of this note:    CTA ABDOMEN PELVIS W WO CONTRAST   Preliminary Result   1. Predominantly moderate scattered calcified plaque in the aorta and major   branches without evidence of aneurysm or dissection. Largest aortic size 2.3   x 2.7 cm.      2.  Numerous small gallstones in the gallbladder. Mildly distended jejunal loops, fluid-filled suggesting enteritis. 3.  Prosthetic enlargement. Fat-containing inguinal hernias without   strangulation. Other findings as above. ED BEDSIDE ULTRASOUND:   Performed by ED Physician - none    LABS:  Labs Reviewed   BASIC METABOLIC PANEL - Abnormal; Notable for the following components:       Result Value    Glucose 232 (*)     All other components within normal limits   CBC WITH AUTO DIFFERENTIAL - Abnormal; Notable for the following components:    RBC 3.97 (*)     Hemoglobin 11.4 (*)     Hematocrit 35.3 (*)     RDW 14.6 (*)     Seg Neutrophils 74 (*)     Lymphocytes 17 (*)     Immature Granulocytes 1 (*)     All other components within normal limits   HEPATIC FUNCTION PANEL   AMYLASE   LIPASE       EMERGENCY DEPARTMENT COURSE and DIFFERENTIAL DIAGNOSIS/MDM:   Vitals:    Vitals:    05/27/21 1215 05/27/21 1230 05/27/21 1245 05/27/21 1339   BP: (!) 125/54 (!) 116/39 (!) 118/43 (!) 125/47   Pulse:   50 55   Resp:   16 16   Temp:       TempSrc:       SpO2: 95% 97% 96% 96%   Weight:               MDM patient is doing much better at time of discharge. I will give him Percocet to get him through the weekend.   He has been advised to follow-up with his PCP.    CONSULTS:  None    PROCEDURES:  Unless otherwise noted below, none     Procedures    FINAL IMPRESSION      1. Strain of lumbar region, initial encounter          DISPOSITION/PLAN   DISPOSITION Decision To Discharge 2021 02:47:36 PM      PATIENT REFERRED TO:  Yaneth Walker  [address] [address]  [phone]    Schedule an appointment as soon as possible for a visit         DISCHARGE MEDICATIONS:  New Prescriptions    ETODOLAC (LODINE) 400 MG TABLET    Take 1 tablet by mouth 2 times daily    OXYCODONE-ACETAMINOPHEN (PERCOCET) 5-325 MG PER TABLET    Take 1 tablet by mouth every 6 hours as needed for Pain for up to 5 days.     PROMETHAZINE (PHENERGAN) 25 MG TABLET    Take 1 tablet by mouth every 4 hours as needed for Nausea              (Please note that portions ofthis note were completed with a voice recognition program.  Efforts were made to edit the dictations but occasionally words are mis-transcribed.)      Adolfo Beckham MD (electronically signed)  Attending Emergency Physician          Nelly Gaytan MD  [phone]

## 2021-06-07 DIAGNOSIS — I10 ESSENTIAL HYPERTENSION: ICD-10-CM

## 2021-06-07 DIAGNOSIS — Z95.820 S/P ANGIOPLASTY WITH STENT: ICD-10-CM

## 2021-06-07 DIAGNOSIS — I25.10 ASHD (ARTERIOSCLEROTIC HEART DISEASE): ICD-10-CM

## 2021-06-07 RX ORDER — ATENOLOL 25 MG/1
25 TABLET ORAL DAILY
Qty: 90 TABLET | Refills: 3 | Status: SHIPPED | OUTPATIENT
Start: 2021-06-07 | End: 2022-09-06 | Stop reason: SDUPTHER

## 2021-06-10 ENCOUNTER — HOSPITAL ENCOUNTER (OUTPATIENT)
Dept: GENERAL RADIOLOGY | Age: 71
Discharge: HOME | End: 2021-06-12
Payer: MEDICARE

## 2021-06-10 ENCOUNTER — HOSPITAL ENCOUNTER (OUTPATIENT)
Age: 71
Discharge: HOME | End: 2021-06-12
Payer: MEDICARE

## 2021-06-10 ENCOUNTER — HOSPITAL ENCOUNTER (OUTPATIENT)
Dept: CT IMAGING | Age: 71
Discharge: HOME | End: 2021-06-12
Payer: MEDICARE

## 2021-06-10 DIAGNOSIS — M47.9 ARTHRITIS OF BACK: ICD-10-CM

## 2021-06-10 DIAGNOSIS — R91.1 LUNG NODULE: ICD-10-CM

## 2021-06-10 PROCEDURE — 71250 CT THORAX DX C-: CPT

## 2021-06-10 PROCEDURE — 72072 X-RAY EXAM THORAC SPINE 3VWS: CPT

## 2021-06-10 PROCEDURE — 73522 X-RAY EXAM HIPS BI 3-4 VIEWS: CPT

## 2021-06-10 PROCEDURE — 72114 X-RAY EXAM L-S SPINE BENDING: CPT

## 2021-06-11 DIAGNOSIS — R91.1 LUNG NODULE: Primary | ICD-10-CM

## 2021-06-30 ENCOUNTER — HOSPITAL ENCOUNTER (OUTPATIENT)
Dept: PET IMAGING | Age: 71
Discharge: HOME | End: 2021-07-02
Payer: MEDICARE

## 2021-06-30 DIAGNOSIS — R91.1 LUNG NODULE: ICD-10-CM

## 2021-06-30 PROCEDURE — A9552 F18 FDG: HCPCS | Performed by: NURSE PRACTITIONER

## 2021-06-30 PROCEDURE — 78815 PET IMAGE W/CT SKULL-THIGH: CPT

## 2021-06-30 PROCEDURE — 3430000000 HC RX DIAGNOSTIC RADIOPHARMACEUTICAL: Performed by: NURSE PRACTITIONER

## 2021-06-30 RX ORDER — FLUDEOXYGLUCOSE F 18 200 MCI/ML
14.99 INJECTION, SOLUTION INTRAVENOUS
Status: COMPLETED | OUTPATIENT
Start: 2021-06-30 | End: 2021-06-30

## 2021-06-30 RX ADMIN — FLUDEOXYGLUCOSE F 18 14.99 MILLICURIE: 200 INJECTION, SOLUTION INTRAVENOUS at 07:40

## 2021-07-07 ENCOUNTER — TELEPHONE (OUTPATIENT)
Dept: PULMONOLOGY | Age: 71
End: 2021-07-07

## 2021-07-07 NOTE — TELEPHONE ENCOUNTER
Please review my note to you regarding the patient results and that he hasnt been seen in the clinic since 2019. Should we bring him into the clinic?

## 2021-07-08 NOTE — TELEPHONE ENCOUNTER
Spoke with Dr. Noah Reyes and he recommended the patient to f/u in the clinic. Called patient and [facility] regarding calling and making an appt. Review the results.

## 2021-07-15 ENCOUNTER — OFFICE VISIT (OUTPATIENT)
Dept: PULMONOLOGY | Age: 71
End: 2021-07-15
Payer: MEDICARE

## 2021-07-15 VITALS
OXYGEN SATURATION: 98 % | SYSTOLIC BLOOD PRESSURE: 121 MMHG | DIASTOLIC BLOOD PRESSURE: 59 MMHG | HEIGHT: 72 IN | HEART RATE: 69 BPM | TEMPERATURE: 97.6 F | RESPIRATION RATE: 18 BRPM | WEIGHT: 167.1 LBS | BODY MASS INDEX: 22.63 KG/M2

## 2021-07-15 DIAGNOSIS — J41.1 MUCOPURULENT CHRONIC BRONCHITIS (HCC): ICD-10-CM

## 2021-07-15 DIAGNOSIS — F17.210 SMOKING GREATER THAN 40 PACK YEARS: ICD-10-CM

## 2021-07-15 DIAGNOSIS — I25.10 ASHD (ARTERIOSCLEROTIC HEART DISEASE): ICD-10-CM

## 2021-07-15 DIAGNOSIS — R91.1 RIGHT MIDDLE LOBE PULMONARY NODULE: Primary | ICD-10-CM

## 2021-07-15 PROCEDURE — 4040F PNEUMOC VAC/ADMIN/RCVD: CPT | Performed by: INTERNAL MEDICINE

## 2021-07-15 PROCEDURE — G8420 CALC BMI NORM PARAMETERS: HCPCS | Performed by: INTERNAL MEDICINE

## 2021-07-15 PROCEDURE — 99214 OFFICE O/P EST MOD 30 MIN: CPT | Performed by: INTERNAL MEDICINE

## 2021-07-15 PROCEDURE — G8427 DOCREV CUR MEDS BY ELIG CLIN: HCPCS | Performed by: INTERNAL MEDICINE

## 2021-07-15 PROCEDURE — 1123F ACP DISCUSS/DSCN MKR DOCD: CPT | Performed by: INTERNAL MEDICINE

## 2021-07-15 PROCEDURE — 3023F SPIROM DOC REV: CPT | Performed by: INTERNAL MEDICINE

## 2021-07-15 PROCEDURE — 4004F PT TOBACCO SCREEN RCVD TLK: CPT | Performed by: INTERNAL MEDICINE

## 2021-07-15 PROCEDURE — 3017F COLORECTAL CA SCREEN DOC REV: CPT | Performed by: INTERNAL MEDICINE

## 2021-07-15 PROCEDURE — G8926 SPIRO NO PERF OR DOC: HCPCS | Performed by: INTERNAL MEDICINE

## 2021-07-15 RX ORDER — CLOPIDOGREL BISULFATE 75 MG/1
75 TABLET ORAL DAILY
Qty: 90 TABLET | Refills: 3 | Status: SHIPPED | OUTPATIENT
Start: 2021-07-15 | End: 2022-03-04 | Stop reason: SDUPTHER

## 2021-07-15 RX ORDER — AMLODIPINE BESYLATE 5 MG/1
5 TABLET ORAL DAILY
Qty: 90 TABLET | Refills: 3 | Status: SHIPPED | OUTPATIENT
Start: 2021-07-15 | End: 2022-03-21 | Stop reason: SDUPTHER

## 2021-07-15 ASSESSMENT — ENCOUNTER SYMPTOMS
EYES NEGATIVE: 1
COUGH: 1

## 2021-07-16 ENCOUNTER — TELEPHONE (OUTPATIENT)
Dept: PULMONOLOGY | Age: 71
End: 2021-07-16

## 2021-07-16 NOTE — PROGRESS NOTES
Subjective:      Patient ID: Dixie Jacobsen is a 70 y.o. male being seen in my clinic for   Chief Complaint   Patient presents with    Follow-up     f/u PET scan       HPI  Follow-up visit for enlarging right middle lobe fissural pulmonary nodule. Patient was last seen nearly 2 years ago. At that time he had an approximately 8-9 mm nodule which had grown slightly since CT scan done in 2018. Whole-body PET scan done in October 2019 showed no FDG uptake. Parenthetically, the nodule in 2018 was unchanged when compared with CT scan done in 2017. There was also uptake in the fundus of the stomach but apparently upper endoscopy was negative. Patient is a chronic smoker and continues to smoke. 40-pack-year history. Pulmonary function studies done in October 2019 showed normal flows and lung volumes. Diffusing capacity was mildly decreased. The patient did not follow through with a repeat CT scan last year. He had a low-dose screening CT scan of the chest done in June which showed significant increase in the size of the right middle lobe nodule. It now was approximately 1 x 1.6 cm and had spiculations. This prompted a whole-body PET scan which showed FDG uptake in this nodule with an SUV of 3.2. There was a tiny punctate area of uptake in the right hilum. Some mild uptake of unclear clinical significance was noted in the cervical and thoracic spine. All imaging reviewed    The patient denies weight loss, hemoptysis, or chest pain. No back pain. Denies shortness of breath. Daily cough productive of mucoid phlegm. Patient currently on Plavix and aspirin for previous PTCA and stenting of the coronary arteries. Many years ago. Patient states that he received both of his Covid vaccinations. Review of Systems   Constitutional: Negative. HENT: Negative. Eyes: Negative. Respiratory: Positive for cough. Cardiovascular: Negative.     All other systems reviewed and are Exam  Vitals and nursing note reviewed. Constitutional:       Appearance: He is well-developed. HENT:      Nose: Nose normal. No congestion or rhinorrhea. Mouth/Throat:      Mouth: Mucous membranes are moist.      Pharynx: No oropharyngeal exudate. Eyes:      General: No scleral icterus. Conjunctiva/sclera: Conjunctivae normal.   Neck:      Thyroid: No thyromegaly. Vascular: No JVD. Trachea: No tracheal deviation. Cardiovascular:      Rate and Rhythm: Normal rate and regular rhythm. Heart sounds: Normal heart sounds. No murmur heard. No gallop. Pulmonary:      Effort: Pulmonary effort is normal. No respiratory distress. Breath sounds: No wheezing or rales. Chest:      Chest wall: No tenderness. Abdominal:      Palpations: Abdomen is soft. Tenderness: There is no abdominal tenderness. Musculoskeletal:      Cervical back: Neck supple. Right lower leg: No edema. Left lower leg: No edema. Comments: Not clubbed   Lymphadenopathy:      Cervical: No cervical adenopathy. Skin:     General: Skin is warm and dry. Neurological:      Mental Status: He is alert and oriented to person, place, and time.        Wt Readings from Last 3 Encounters:   07/15/21 167 lb 1.6 oz (75.8 kg)   05/27/21 173 lb (78.5 kg)   12/25/20 172 lb (78 kg)     Results for orders placed or performed during the hospital encounter of 62/91/99   Basic Metabolic Panel   Result Value Ref Range    Glucose 232 (H) 70 - 99 mg/dL    BUN 15 8 - 23 mg/dL    CREATININE 0.95 0.70 - 1.20 mg/dL    Bun/Cre Ratio 16 9 - 20    Calcium 8.9 8.6 - 10.4 mg/dL    Sodium 139 135 - 144 mmol/L    Potassium 4.4 3.7 - 5.3 mmol/L    Chloride 106 98 - 107 mmol/L    CO2 23 20 - 31 mmol/L    Anion Gap 10 9 - 17 mmol/L    GFR Non-African American >60 >60 mL/min    GFR African American >60 >60 mL/min    GFR Comment          GFR Staging         Hepatic Function Panel   Result Value Ref Range    Albumin 4.0 3.5 - 5.2 g/dL    Alkaline Phosphatase 79 40 - 129 U/L    ALT 23 5 - 41 U/L    AST 19 <40 U/L    Total Bilirubin 0.42 0.3 - 1.2 mg/dL    Bilirubin, Direct <0.08 <0.31 mg/dL    Bilirubin, Indirect CANNOT BE CALCULATED 0.00 - 1.00 mg/dL    Total Protein 6.8 6.4 - 8.3 g/dL    Globulin NOT REPORTED 1.5 - 3.8 g/dL    Albumin/Globulin Ratio 1.4 1.0 - 2.5   Amylase   Result Value Ref Range    Amylase 68 28 - 100 U/L   Lipase   Result Value Ref Range    Lipase 36 13 - 60 U/L   CBC Auto Differential   Result Value Ref Range    WBC 6.9 3.5 - 11.3 k/uL    RBC 3.97 (L) 4.21 - 5.77 m/uL    Hemoglobin 11.4 (L) 13.0 - 17.0 g/dL    Hematocrit 35.3 (L) 40.7 - 50.3 %    MCV 88.9 82.6 - 102.9 fL    MCH 28.7 25.2 - 33.5 pg    MCHC 32.3 28.4 - 34.8 g/dL    RDW 14.6 (H) 11.8 - 14.4 %    Platelets 728 266 - 983 k/uL    MPV 10.7 8.1 - 13.5 fL    NRBC Automated 0.0 0.0 per 100 WBC    Differential Type NOT REPORTED     Seg Neutrophils 74 (H) 36 - 65 %    Lymphocytes 17 (L) 24 - 43 %    Monocytes 5 3 - 12 %    Eosinophils % 2 1 - 4 %    Basophils 1 0 - 2 %    Immature Granulocytes 1 (H) 0 %    Segs Absolute 5.24 1.50 - 8.10 k/uL    Absolute Lymph # 1.14 1.10 - 3.70 k/uL    Absolute Mono # 0.31 0.10 - 1.20 k/uL    Absolute Eos # 0.10 0.00 - 0.44 k/uL    Basophils Absolute 0.06 0.00 - 0.20 k/uL    Absolute Immature Granulocyte 0.04 0.00 - 0.30 k/uL    WBC Morphology NOT REPORTED     RBC Morphology NOT REPORTED     Platelet Estimate NOT REPORTED        :      1. Right middle lobe pulmonary nodule    2. Smoking greater than 40 pack years    3. Mucopurulent chronic bronchitis ([facility]      Patient Active Problem List   Diagnosis    Angina pectoris ([address]    ASHD (arteriosclerotic heart disease)    Hypertension    Diabetes ([facility]    Hyperlipidemia    Shortness of breath, post procedure improving probably Effient side effect.      S/P angioplasty with stent, obtuse marginal    Type II or unspecified type diabetes mellitus without mention of complication, not stated as uncontrolled    Calculus of kidney    Actinic keratosis    Senile nuclear sclerosis    Dyspepsia    Epigastric pain    Substernal chest pain    Abnormal CT scan, stomach    Abnormal gastrointestinal PET scan    Strain of lumbar region         Plan:      1. Discussed with patient. Growth and FDG uptake suggest a malignant diagnosis. 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 2. Proceed with transthoracic needle aspiration. 3. Hold Plavix and aspirin for 5 days prior to above. 4. Strongly admonished him to quit smoking. 5. Follow-up thereafter. No orders of the defined types were placed in this encounter. Orders Placed This Encounter   Procedures    CT NEEDLE BIOPSY LUNG PERCUTANEOUS     Standing Status:   Future     Standing Expiration Date:   7/15/2022     Scheduling Instructions:      Pt on Plavix and ASA; Hold for 5 days. Order Specific Question:   Reason for exam:     Answer:   enlarging RLL nodule with FDG uptake and high risk factor profile     Return for After testing complete.        Electronically signed by Jose Lee DO on 7/15/2021at 9:41 PM

## 2021-07-28 ENCOUNTER — HOSPITAL ENCOUNTER (INPATIENT)
Dept: CT IMAGING | Age: 71
LOS: 2 days | Discharge: HOME | DRG: 201 | End: 2021-07-30
Attending: INTERNAL MEDICINE | Admitting: INTERNAL MEDICINE
Payer: MEDICARE

## 2021-07-28 DIAGNOSIS — R91.1 RIGHT MIDDLE LOBE PULMONARY NODULE: ICD-10-CM

## 2021-07-28 PROBLEM — J95.811 PNEUMOTHORAX AFTER BIOPSY: Status: ACTIVE | Noted: 2021-07-28

## 2021-07-28 PROBLEM — J95.811 PNEUMOTHORAX, POST BIOPSY, RIGHT: Status: ACTIVE | Noted: 2021-07-28

## 2021-07-28 LAB
ABSOLUTE EOS #: 0.26 K/UL (ref 0–0.44)
ABSOLUTE IMMATURE GRANULOCYTE: 0.03 K/UL (ref 0–0.3)
ABSOLUTE LYMPH #: 2.19 K/UL (ref 1.1–3.7)
ABSOLUTE MONO #: 0.96 K/UL (ref 0.1–1.2)
ALBUMIN SERPL-MCNC: 4.6 G/DL (ref 3.5–5.2)
ALBUMIN/GLOBULIN RATIO: 1.6 (ref 1–2.5)
ALP BLD-CCNC: 71 U/L (ref 40–129)
ALT SERPL-CCNC: 21 U/L (ref 5–41)
ANION GAP SERPL CALCULATED.3IONS-SCNC: 11 MMOL/L (ref 9–17)
AST SERPL-CCNC: 20 U/L
BASOPHILS # BLD: 1 % (ref 0–2)
BASOPHILS ABSOLUTE: 0.1 K/UL (ref 0–0.2)
BILIRUB SERPL-MCNC: 0.51 MG/DL (ref 0.3–1.2)
BUN BLDV-MCNC: 21 MG/DL (ref 8–23)
BUN/CREAT BLD: 18 (ref 9–20)
CALCIUM SERPL-MCNC: 9.3 MG/DL (ref 8.6–10.4)
CHLORIDE BLD-SCNC: 104 MMOL/L (ref 98–107)
CO2: 26 MMOL/L (ref 20–31)
CREAT SERPL-MCNC: 1.18 MG/DL (ref 0.7–1.2)
DIFFERENTIAL TYPE: ABNORMAL
EOSINOPHILS RELATIVE PERCENT: 3 % (ref 1–4)
GFR AFRICAN AMERICAN: >60 ML/MIN
GFR NON-AFRICAN AMERICAN: >60 ML/MIN
GFR SERPL CREATININE-BSD FRML MDRD: ABNORMAL ML/MIN/{1.73_M2}
GFR SERPL CREATININE-BSD FRML MDRD: ABNORMAL ML/MIN/{1.73_M2}
GLUCOSE BLD-MCNC: 104 MG/DL (ref 70–99)
GLUCOSE BLD-MCNC: 128 MG/DL (ref 74–100)
GLUCOSE BLD-MCNC: 214 MG/DL (ref 74–100)
HCT VFR BLD CALC: 38.1 % (ref 40.7–50.3)
HEMOGLOBIN: 12.4 G/DL (ref 13–17)
IMMATURE GRANULOCYTES: 0 %
INR BLD: 1
LYMPHOCYTES # BLD: 24 % (ref 24–43)
MCH RBC QN AUTO: 29.2 PG (ref 25.2–33.5)
MCHC RBC AUTO-ENTMCNC: 32.5 G/DL (ref 28.4–34.8)
MCV RBC AUTO: 89.9 FL (ref 82.6–102.9)
MONOCYTES # BLD: 11 % (ref 3–12)
NRBC AUTOMATED: 0 PER 100 WBC
PARTIAL THROMBOPLASTIN TIME: 27.7 SEC (ref 23.9–33.8)
PDW BLD-RTO: 14.2 % (ref 11.8–14.4)
PLATELET # BLD: 190 K/UL (ref 138–453)
PLATELET ESTIMATE: ABNORMAL
PMV BLD AUTO: 10.4 FL (ref 8.1–13.5)
POTASSIUM SERPL-SCNC: 4.5 MMOL/L (ref 3.7–5.3)
PROTHROMBIN TIME: 13.4 SEC (ref 11.5–14.2)
RBC # BLD: 4.24 M/UL (ref 4.21–5.77)
RBC # BLD: ABNORMAL 10*6/UL
SEG NEUTROPHILS: 61 % (ref 36–65)
SEGMENTED NEUTROPHILS ABSOLUTE COUNT: 5.49 K/UL (ref 1.5–8.1)
SODIUM BLD-SCNC: 141 MMOL/L (ref 135–144)
TOTAL PROTEIN: 7.4 G/DL (ref 6.4–8.3)
WBC # BLD: 9 K/UL (ref 3.5–11.3)
WBC # BLD: ABNORMAL 10*3/UL

## 2021-07-28 PROCEDURE — 99222 1ST HOSP IP/OBS MODERATE 55: CPT | Performed by: SURGERY

## 2021-07-28 PROCEDURE — 6370000000 HC RX 637 (ALT 250 FOR IP): Performed by: RADIOLOGY

## 2021-07-28 PROCEDURE — 88313 SPECIAL STAINS GROUP 2: CPT

## 2021-07-28 PROCEDURE — 2500000003 HC RX 250 WO HCPCS: Performed by: RADIOLOGY

## 2021-07-28 PROCEDURE — 88305 TISSUE EXAM BY PATHOLOGIST: CPT

## 2021-07-28 PROCEDURE — 2709999900 CT NEEDLE BIOPSY LUNG PERCUTANEOUS W IMAGING GUIDANCE

## 2021-07-28 PROCEDURE — 2580000003 HC RX 258: Performed by: NURSE PRACTITIONER

## 2021-07-28 PROCEDURE — 88312 SPECIAL STAINS GROUP 1: CPT

## 2021-07-28 PROCEDURE — 6360000002 HC RX W HCPCS: Performed by: RADIOLOGY

## 2021-07-28 PROCEDURE — 2580000003 HC RX 258: Performed by: RADIOLOGY

## 2021-07-28 PROCEDURE — 82947 ASSAY GLUCOSE BLOOD QUANT: CPT

## 2021-07-28 PROCEDURE — 88333 PATH CONSLTJ SURG CYTO XM 1: CPT

## 2021-07-28 PROCEDURE — 94761 N-INVAS EAR/PLS OXIMETRY MLT: CPT

## 2021-07-28 PROCEDURE — 93005 ELECTROCARDIOGRAM TRACING: CPT | Performed by: NURSE PRACTITIONER

## 2021-07-28 PROCEDURE — 85610 PROTHROMBIN TIME: CPT

## 2021-07-28 PROCEDURE — 6370000000 HC RX 637 (ALT 250 FOR IP): Performed by: NURSE PRACTITIONER

## 2021-07-28 PROCEDURE — 1200000000 HC SEMI PRIVATE

## 2021-07-28 PROCEDURE — 85025 COMPLETE CBC W/AUTO DIFF WBC: CPT

## 2021-07-28 PROCEDURE — 0W9930Z DRAINAGE OF RIGHT PLEURAL CAVITY WITH DRAINAGE DEVICE, PERCUTANEOUS APPROACH: ICD-10-PCS | Performed by: RADIOLOGY

## 2021-07-28 PROCEDURE — 85730 THROMBOPLASTIN TIME PARTIAL: CPT

## 2021-07-28 PROCEDURE — 80053 COMPREHEN METABOLIC PANEL: CPT

## 2021-07-28 RX ORDER — FENTANYL CITRATE 50 UG/ML
INJECTION, SOLUTION INTRAMUSCULAR; INTRAVENOUS
Status: COMPLETED | OUTPATIENT
Start: 2021-07-28 | End: 2021-07-28

## 2021-07-28 RX ORDER — SODIUM CHLORIDE 9 MG/ML
25 INJECTION, SOLUTION INTRAVENOUS PRN
Status: DISCONTINUED | OUTPATIENT
Start: 2021-07-28 | End: 2021-07-30 | Stop reason: HOSPADM

## 2021-07-28 RX ORDER — DEXTROSE MONOHYDRATE 50 MG/ML
100 INJECTION, SOLUTION INTRAVENOUS PRN
Status: DISCONTINUED | OUTPATIENT
Start: 2021-07-28 | End: 2021-07-30 | Stop reason: HOSPADM

## 2021-07-28 RX ORDER — FAMOTIDINE 20 MG/1
20 TABLET, FILM COATED ORAL 2 TIMES DAILY
Status: DISCONTINUED | OUTPATIENT
Start: 2021-07-28 | End: 2021-07-30 | Stop reason: HOSPADM

## 2021-07-28 RX ORDER — POLYETHYLENE GLYCOL 3350 17 G/17G
17 POWDER, FOR SOLUTION ORAL DAILY PRN
Status: DISCONTINUED | OUTPATIENT
Start: 2021-07-28 | End: 2021-07-30 | Stop reason: HOSPADM

## 2021-07-28 RX ORDER — SODIUM CHLORIDE 9 MG/ML
INJECTION, SOLUTION INTRAVENOUS CONTINUOUS
Status: DISCONTINUED | OUTPATIENT
Start: 2021-07-28 | End: 2021-07-28

## 2021-07-28 RX ORDER — CLOPIDOGREL BISULFATE 75 MG/1
75 TABLET ORAL DAILY
Status: DISCONTINUED | OUTPATIENT
Start: 2021-07-28 | End: 2021-07-30 | Stop reason: HOSPADM

## 2021-07-28 RX ORDER — HYDROCODONE BITARTRATE AND ACETAMINOPHEN 5; 325 MG/1; MG/1
1 TABLET ORAL EVERY 6 HOURS PRN
Status: DISCONTINUED | OUTPATIENT
Start: 2021-07-28 | End: 2021-07-30 | Stop reason: HOSPADM

## 2021-07-28 RX ORDER — POLYETHYLENE GLYCOL 3350 17 G/17G
17 POWDER, FOR SOLUTION ORAL DAILY PRN
Status: DISCONTINUED | OUTPATIENT
Start: 2021-07-28 | End: 2021-07-28

## 2021-07-28 RX ORDER — DEXTROSE MONOHYDRATE 25 G/50ML
12.5 INJECTION, SOLUTION INTRAVENOUS PRN
Status: DISCONTINUED | OUTPATIENT
Start: 2021-07-28 | End: 2021-07-30 | Stop reason: HOSPADM

## 2021-07-28 RX ORDER — ACETAMINOPHEN 325 MG/1
650 TABLET ORAL EVERY 6 HOURS PRN
Status: DISCONTINUED | OUTPATIENT
Start: 2021-07-28 | End: 2021-07-30 | Stop reason: HOSPADM

## 2021-07-28 RX ORDER — INSULIN GLARGINE 100 [IU]/ML
8 INJECTION, SOLUTION SUBCUTANEOUS EVERY MORNING
Status: DISCONTINUED | OUTPATIENT
Start: 2021-07-29 | End: 2021-07-30 | Stop reason: HOSPADM

## 2021-07-28 RX ORDER — SODIUM CHLORIDE 0.9 % (FLUSH) 0.9 %
10 SYRINGE (ML) INJECTION EVERY 12 HOURS SCHEDULED
Status: DISCONTINUED | OUTPATIENT
Start: 2021-07-28 | End: 2021-07-30 | Stop reason: HOSPADM

## 2021-07-28 RX ORDER — ACETAMINOPHEN 650 MG/1
650 SUPPOSITORY RECTAL EVERY 6 HOURS PRN
Status: DISCONTINUED | OUTPATIENT
Start: 2021-07-28 | End: 2021-07-30 | Stop reason: HOSPADM

## 2021-07-28 RX ORDER — ONDANSETRON 2 MG/ML
4 INJECTION INTRAMUSCULAR; INTRAVENOUS EVERY 6 HOURS PRN
Status: DISCONTINUED | OUTPATIENT
Start: 2021-07-28 | End: 2021-07-30 | Stop reason: HOSPADM

## 2021-07-28 RX ORDER — AMLODIPINE BESYLATE 5 MG/1
5 TABLET ORAL DAILY
Status: DISCONTINUED | OUTPATIENT
Start: 2021-07-29 | End: 2021-07-30 | Stop reason: HOSPADM

## 2021-07-28 RX ORDER — LIDOCAINE HYDROCHLORIDE 10 MG/ML
INJECTION, SOLUTION EPIDURAL; INFILTRATION; INTRACAUDAL; PERINEURAL
Status: COMPLETED | OUTPATIENT
Start: 2021-07-28 | End: 2021-07-28

## 2021-07-28 RX ORDER — GLIPIZIDE 5 MG/1
5 TABLET ORAL
Status: DISCONTINUED | OUTPATIENT
Start: 2021-07-29 | End: 2021-07-30 | Stop reason: HOSPADM

## 2021-07-28 RX ORDER — ASPIRIN 81 MG/1
81 TABLET ORAL DAILY
Status: DISCONTINUED | OUTPATIENT
Start: 2021-07-28 | End: 2021-07-30 | Stop reason: HOSPADM

## 2021-07-28 RX ORDER — ACETAMINOPHEN 325 MG/1
650 TABLET ORAL EVERY 6 HOURS PRN
Status: DISCONTINUED | OUTPATIENT
Start: 2021-07-28 | End: 2021-07-28

## 2021-07-28 RX ORDER — MIDAZOLAM HYDROCHLORIDE 1 MG/ML
INJECTION INTRAMUSCULAR; INTRAVENOUS
Status: COMPLETED | OUTPATIENT
Start: 2021-07-28 | End: 2021-07-28

## 2021-07-28 RX ORDER — ISOSORBIDE MONONITRATE 30 MG/1
15 TABLET, EXTENDED RELEASE ORAL DAILY
Status: DISCONTINUED | OUTPATIENT
Start: 2021-07-29 | End: 2021-07-30 | Stop reason: HOSPADM

## 2021-07-28 RX ORDER — LISINOPRIL 20 MG/1
20 TABLET ORAL DAILY
Status: DISCONTINUED | OUTPATIENT
Start: 2021-07-29 | End: 2021-07-30 | Stop reason: HOSPADM

## 2021-07-28 RX ORDER — SODIUM CHLORIDE 0.9 % (FLUSH) 0.9 %
10 SYRINGE (ML) INJECTION PRN
Status: DISCONTINUED | OUTPATIENT
Start: 2021-07-28 | End: 2021-07-30 | Stop reason: HOSPADM

## 2021-07-28 RX ORDER — ACETAMINOPHEN 650 MG/1
650 SUPPOSITORY RECTAL EVERY 6 HOURS PRN
Status: DISCONTINUED | OUTPATIENT
Start: 2021-07-28 | End: 2021-07-28

## 2021-07-28 RX ORDER — NICOTINE POLACRILEX 4 MG
15 LOZENGE BUCCAL PRN
Status: DISCONTINUED | OUTPATIENT
Start: 2021-07-28 | End: 2021-07-30 | Stop reason: HOSPADM

## 2021-07-28 RX ORDER — FENTANYL CITRATE 50 UG/ML
50 INJECTION, SOLUTION INTRAMUSCULAR; INTRAVENOUS ONCE
Status: DISCONTINUED | OUTPATIENT
Start: 2021-07-28 | End: 2021-07-30 | Stop reason: HOSPADM

## 2021-07-28 RX ORDER — ACETAMINOPHEN 500 MG
1000 TABLET ORAL EVERY 8 HOURS
Status: DISCONTINUED | OUTPATIENT
Start: 2021-07-28 | End: 2021-07-30 | Stop reason: HOSPADM

## 2021-07-28 RX ORDER — ATENOLOL 25 MG/1
25 TABLET ORAL DAILY
Status: DISCONTINUED | OUTPATIENT
Start: 2021-07-29 | End: 2021-07-30 | Stop reason: HOSPADM

## 2021-07-28 RX ORDER — ATORVASTATIN CALCIUM 20 MG/1
20 TABLET, FILM COATED ORAL DAILY
Status: DISCONTINUED | OUTPATIENT
Start: 2021-07-29 | End: 2021-07-30 | Stop reason: HOSPADM

## 2021-07-28 RX ORDER — ONDANSETRON 4 MG/1
4 TABLET, ORALLY DISINTEGRATING ORAL EVERY 8 HOURS PRN
Status: DISCONTINUED | OUTPATIENT
Start: 2021-07-28 | End: 2021-07-30 | Stop reason: HOSPADM

## 2021-07-28 RX ORDER — TIZANIDINE 4 MG/1
4 TABLET ORAL EVERY 8 HOURS PRN
Status: DISCONTINUED | OUTPATIENT
Start: 2021-07-28 | End: 2021-07-30 | Stop reason: HOSPADM

## 2021-07-28 RX ORDER — KETOROLAC TROMETHAMINE 30 MG/ML
30 INJECTION, SOLUTION INTRAMUSCULAR; INTRAVENOUS ONCE
Status: COMPLETED | OUTPATIENT
Start: 2021-07-28 | End: 2021-07-28

## 2021-07-28 RX ORDER — ONDANSETRON 4 MG/1
4 TABLET, ORALLY DISINTEGRATING ORAL EVERY 8 HOURS PRN
Status: DISCONTINUED | OUTPATIENT
Start: 2021-07-28 | End: 2021-07-28

## 2021-07-28 RX ORDER — ALOGLIPTIN 25 MG/1
25 TABLET, FILM COATED ORAL DAILY
Status: DISCONTINUED | OUTPATIENT
Start: 2021-07-29 | End: 2021-07-30 | Stop reason: HOSPADM

## 2021-07-28 RX ORDER — SODIUM CHLORIDE 0.9 % (FLUSH) 0.9 %
5-40 SYRINGE (ML) INJECTION EVERY 12 HOURS SCHEDULED
Status: DISCONTINUED | OUTPATIENT
Start: 2021-07-28 | End: 2021-07-30 | Stop reason: HOSPADM

## 2021-07-28 RX ORDER — ONDANSETRON 2 MG/ML
4 INJECTION INTRAMUSCULAR; INTRAVENOUS EVERY 6 HOURS PRN
Status: DISCONTINUED | OUTPATIENT
Start: 2021-07-28 | End: 2021-07-28

## 2021-07-28 RX ADMIN — KETOROLAC TROMETHAMINE 30 MG: 30 INJECTION, SOLUTION INTRAMUSCULAR; INTRAVENOUS at 11:43

## 2021-07-28 RX ADMIN — Medication 25 MCG: at 10:50

## 2021-07-28 RX ADMIN — FAMOTIDINE 20 MG: 20 TABLET ORAL at 20:33

## 2021-07-28 RX ADMIN — SODIUM CHLORIDE: 9 INJECTION, SOLUTION INTRAVENOUS at 10:13

## 2021-07-28 RX ADMIN — MIDAZOLAM 1 MG: 1 INJECTION INTRAMUSCULAR; INTRAVENOUS at 10:16

## 2021-07-28 RX ADMIN — ACETAMINOPHEN 1000 MG: 500 TABLET, FILM COATED ORAL at 18:38

## 2021-07-28 RX ADMIN — LIDOCAINE HYDROCHLORIDE 3 ML: 10 INJECTION, SOLUTION EPIDURAL; INFILTRATION; INTRACAUDAL; PERINEURAL at 10:19

## 2021-07-28 RX ADMIN — METFORMIN HYDROCHLORIDE 1000 MG: 500 TABLET ORAL at 18:38

## 2021-07-28 RX ADMIN — SODIUM CHLORIDE, PRESERVATIVE FREE 10 ML: 5 INJECTION INTRAVENOUS at 20:33

## 2021-07-28 RX ADMIN — Medication 25 MCG: at 11:08

## 2021-07-28 RX ADMIN — Medication 25 MCG: at 10:31

## 2021-07-28 RX ADMIN — ACETAMINOPHEN 1000 MG: 500 TABLET, FILM COATED ORAL at 11:45

## 2021-07-28 RX ADMIN — LIDOCAINE HYDROCHLORIDE 1 ML: 10 INJECTION, SOLUTION EPIDURAL; INFILTRATION; INTRACAUDAL; PERINEURAL at 10:21

## 2021-07-28 ASSESSMENT — PAIN SCALES - GENERAL
PAINLEVEL_OUTOF10: 10
PAINLEVEL_OUTOF10: 10
PAINLEVEL_OUTOF10: 0
PAINLEVEL_OUTOF10: 9

## 2021-07-28 ASSESSMENT — PAIN DESCRIPTION - LOCATION: LOCATION: CHEST

## 2021-07-28 ASSESSMENT — PAIN DESCRIPTION - DESCRIPTORS: DESCRIPTORS: STABBING

## 2021-07-28 ASSESSMENT — PAIN DESCRIPTION - ORIENTATION: ORIENTATION: RIGHT

## 2021-07-28 ASSESSMENT — PAIN DESCRIPTION - PAIN TYPE: TYPE: ACUTE PAIN;SURGICAL PAIN

## 2021-07-28 ASSESSMENT — PAIN - FUNCTIONAL ASSESSMENT: PAIN_FUNCTIONAL_ASSESSMENT: 0-10

## 2021-07-28 NOTE — PLAN OF CARE
Problem: Pain:  Goal: Pain level will decrease  Description: Pain level will decrease  Outcome: Ongoing  Note: Patient complains of no pain at this time  Goal: Control of acute pain  Description: Control of acute pain  Outcome: Ongoing  Note: Patient in no acute pain     Problem: Skin Integrity:  Goal: Will show no infection signs and symptoms  Description: Will show no infection signs and symptoms  Outcome: Ongoing  Note: Patients WBC remains WNL

## 2021-07-28 NOTE — PROGRESS NOTES
Pt doing much better after toradol, pain tolerable currently, satting well on room air. IR will not be in house tomorrow, 7/29. Please call Radiology to get a hold of me with any questions.     Ramiro Nelson MD  Interventional Radiology  Pager: [phone]

## 2021-07-28 NOTE — PROGRESS NOTES
Family at bedside. Respirations easy. Saturations WNL on room air. Toradol and tylenol administered. Dr Brennan Rodriguez checked in on patient. To order another dose of Fentanyl.

## 2021-07-28 NOTE — BRIEF OP NOTE
Brief Postoperative Note    Mundo Walker  YOB: 1950  [id number]    Pre-operative Diagnosis: R middle lobe nodule    Post-operative Diagnosis: Same    Procedure: R middle lobe nodule core bx, 8F chest tube insertion    Anesthesia: Local and Moderate Sedation    Surgeons/Assistants: Zachary Jackson MD    Estimated Blood Loss: less than 5mL     Complications: small R pneumothorax     Specimens: Was Obtained: 20g core x 2    Findings: successful R middle lobe nodule bx, small ptx treated immediately with 8F chest tube insertion     Electronically signed by Zachary Jackson MD on 7/28/2021 at 11:26 AM

## 2021-07-28 NOTE — PROGRESS NOTES
Patient returned to pre/post area. Alert and oriented, rates pain 9/10 to right chest tube insertion area. Will continue to monitor.

## 2021-07-28 NOTE — PROGRESS NOTES
Patient brought to [facility] . Ivana REZA given report. Patient alert,oriented, and denies pain. No respiratory distress noted. Family at bedside.

## 2021-07-28 NOTE — POST SEDATION
Sedation Post Procedure Note    Patient Name: Klarissa Wan   YOB: 1950  Room/Bed: Room/bed info not found  Medical Record Number: [id number]  Date: 7/28/2021   Time: 11:25 AM         Physicians/Assistants: Zo Mckeon MD, MD    Procedure Performed:  R lung bx    Post-Sedation Vital Signs:  Vitals:    07/28/21 1120   BP: 138/60   Pulse: 57   Resp: 20   Temp:    SpO2: 96%      Vital signs were reviewed and were stable after the procedure (see flow sheet for vitals)            Post-Sedation Exam: pt remains stable           Complications: small ptx, chest tube placed     Electronically signed by Zo Mckeon MD on 7/28/2021 at 11:25 AM

## 2021-07-28 NOTE — PRE SEDATION
Sedation Pre-Procedure Note    Patient Name: Becca Thurman   YOB: 1950  Room/Bed: Room/bed info not found  Medical Record Number: [id number]  Date: 7/28/2021   Time: 9:32 AM       Indication:  R lung nodule bx    Consent: I have discussed with the patient and/or the patient representative the indication, alternatives, and the possible risks and/or complications of the planned procedure and the anesthesia methods. The patient and/or patient representative appear to understand and agree to proceed. Vital Signs:   Vitals:    07/28/21 0904   BP: (!) 150/48   Pulse: 54   Resp: 16   Temp: 97.5 °F (36.4 °C)   SpO2: 100%       Past Medical History:   has a past medical history of Abnormal cardiac cath, Actinic keratosis, Arthritis, CAD (coronary artery disease), Calculus of kidney, Cervical stenosis of spine, Diabetes mellitus ([facility] [address] GERD (gastroesophageal reflux disease), Gout, H/O echocardiogram, History of cardiac cath, History of echocardiogram, History of echocardiogram, History of heart attack, Hyperlipidemia, Hypertension, Raynaud's phenomenon, S/P angioplasty with stent, and Tobacco abuse. Past Surgical History:   has a past surgical history that includes Cardiac catheterization (2007); Hemorrhoid surgery; hernia repair; Neck surgery (1997); other surgical history (); other surgical history (12/11/14); Cataract removal (4/6/15); Upper gastrointestinal endoscopy (12/28/2005); Cataract removal (6/8/15); Colonoscopy (1/11/2006); Colonoscopy (5/23/2016); Cardiac catheterization (02/09/2017); Cardiac catheterization (02/09/2017); Cardiac catheterization (01/14/2019); Upper gastrointestinal endoscopy (N/A, 2/5/2019); and Upper gastrointestinal endoscopy (N/A, 10/22/2019). Medications:   Scheduled Meds:   Continuous Infusions:    sodium chloride       PRN Meds:   Home Meds:   Prior to Admission medications    Medication Sig Start Date End Date Taking?  Authorizing Provider   amLODIPine (NORVASC) 5 MG tablet Take 1 tablet by mouth daily 7/15/21  Yes Darnell Woodard MD   clopidogrel (PLAVIX) 75 MG tablet Take 1 tablet by mouth daily 7/15/21  Yes Darnell Woodard MD   atenolol (TENORMIN) 25 MG tablet Take 1 tablet by mouth daily 6/7/21  Yes Darnell Woodard MD   Insulin Glargine, 2 Unit Dial, 300 UNIT/ML SOPN Insulin Glargine Insulin Glargine U-300 Conc Active 10 UNIT Subcutaneous Every morning May 7th, 2020 11:47am 05-  [facility] ([id number]) 5/7/20  Yes Historical Provider, MD   atorvastatin (LIPITOR) 20 MG tablet Take 1 tablet by mouth daily 12/15/20  Yes Darnell Woodard MD   sitaGLIPtan-metformin (JANUMET)  MG per tablet Take 1 tablet by mouth 2 times daily (with meals)   Yes Historical Provider, MD   lisinopril (PRINIVIL;ZESTRIL) 20 MG tablet Take 0.5 tablets by mouth daily  Patient taking differently: Take 20 mg by mouth daily  1/18/19  Yes Darnell Woodard MD   glimepiride (AMARYL) 2 MG tablet Take 1 tablet by mouth 2 times daily 5/22/15  Yes John Shepard MD   aspirin 81 MG tablet Take 81 mg by mouth daily.    Yes Historical Provider, MD   etodolac (LODINE) 400 MG tablet Take 1 tablet by mouth 2 times daily 5/27/21   Jocelyn Estrada MD   isosorbide mononitrate (IMDUR) 30 MG extended release tablet TAKE 1 TABLET DAILY  Patient taking differently: Take 15 mg by mouth daily  1/20/21   Darnell Woodard MD   tiZANidine (ZANAFLEX) 4 MG tablet Take 1 tablet by mouth every 8 hours as needed (Rib pain) 12/25/20   Godfrey Pimentel MD   chlorpheniramine (EQ CHLORTABS) 4 MG tablet Take 4 mg by mouth every 6 hours as needed for Allergies    Historical Provider, MD   nitroGLYCERIN (NITROSTAT) 0.4 MG SL tablet Place 1 tablet under the tongue every 5 minutes as needed for Chest pain 3/6/17   Darnell Woodard MD     Coumadin Use Last 7 Days:  no  Antiplatelet drug therapy use last 7 days: yes - ASA/plavix (held 5 d)  Other anticoagulant use last 7 days: no     Pre-Sedation Documentation and Exam:   I have reviewed the patient's history and review of systems.     Mallampati Airway Assessment:  Mallampati Class II - (soft palate, fauces & uvula are visible)    Prior History of Anesthesia Complications:   none    ASA Classification:  Class 2 - A normal healthy patient with mild systemic disease    Sedation/ Anesthesia Plan:   intravenous sedation    Medications Planned:   midazolam (Versed) intravenously and fentanyl intravenously    Patient is an appropriate candidate for plan of sedation: yes    Electronically signed by Zo Mckeon MD on 7/28/2021 at 9:32 AM

## 2021-07-28 NOTE — SEDATION DOCUMENTATION
Dr placed needle to site and readjusted placement based on CT scan. Patient tolerating procedure well.

## 2021-07-28 NOTE — CONSULTS
GENERAL SURGERY CONSULTATION- Inpatient      Patient's Name/ Date of Birth/ Gender: Arleen Velasco / 1950 [de-identified]70 y.o.) / male     PCP: Yaneth Walker  Referring:     History of present Illness:  Patient is a pleasant 70 y.o. male   Underwent CT guided lung biopsy right side 7/28/21 am, iatrogenic pneumothorax with immediate chest tube placed. Stable. Admitted. General surgery consulted for chest tube monitoring/management. I received call from RN for consult at 7:40 PM. Imaging reviewed. Patient doing well. Seen and examined this am. Family present. Chart reviewed notes and imaging. Abnormal PET scan. Heavy smoker. Up in chair, no SOB. No chest pain. Pleasant and conversational.     Past Medical History:  has a past medical history of Abnormal cardiac cath, Actinic keratosis, Arthritis, CAD (coronary artery disease), Calculus of kidney, Cervical stenosis of spine, Diabetes mellitus ([facility] [address] GERD (gastroesophageal reflux disease), Gout, H/O echocardiogram, History of cardiac cath, History of echocardiogram, History of echocardiogram, History of heart attack, Hyperlipidemia, Hypertension, Raynaud's phenomenon, S/P angioplasty with stent, and Tobacco abuse. Past Surgical History:   Past Surgical History:   Procedure Laterality Date    CARDIAC CATHETERIZATION  2007    patient states had 2 small blockages    CARDIAC CATHETERIZATION  02/09/2017    LMCA: Lesion on LMCA: Distal subsection 20% stenosis. LAD: Lesion on 1st Diag: Mid subsection 75% stenosis. RCA: Lesion on Mid RCA: Mid subsection 100% stenosis.  CARDIAC CATHETERIZATION  02/09/2017    Attempted PCI to chronic total occlusion of RCA was not successful. Unable to cross with multiple wires due to heavy calcification and toruosity.     CARDIAC CATHETERIZATION  01/14/2019    CATARACT REMOVAL  04/06/2015    Right eye - Dr. Angelica Yusuf  06/08/2015    Dr. Dom Arce - left eye    COLONOSCOPY  01/11/2006    Dr. Elena Yeager - internal hemorrhoids, no polps    COLONOSCOPY  05/23/2016    -polyp    CT NEEDLE BIOPSY LUNG PERCUTANEOUS  7/28/2021    CT NEEDLE BIOPSY LUNG PERCUTANEOUS 7/28/2021 [facility] CT SCAN    HEMORRHOID SURGERY      HERNIA REPAIR      LUNG BIOPSY Right 07/28/2021    Dr Jono Cervantes    c4-5 laminectomy and fusion    OTHER SURGICAL HISTORY  10/2014    Right foot - 7 from heal and 1 from great toe at [address]  12/11/2014    pain injection - cervical    UPPER GASTROINTESTINAL ENDOSCOPY  12/28/2005    Dr. Romayne Bunk - mild esophagitis and gastritis    UPPER GASTROINTESTINAL ENDOSCOPY N/A 02/05/2019    -bx(neg H-Pylori)retained gastric content, possible submucosal mass of gastric cardia    UPPER GASTROINTESTINAL ENDOSCOPY N/A 10/22/2019    EGD BIOPSY performed by Jackie Khan MD at [address] [place] [address] History:  reports that he has been smoking cigarettes. He has a 13.50 pack-year smoking history. He has never used smokeless tobacco. He reports that he does not drink alcohol and does not use drugs. Family History: family history includes Alzheimer's Disease in his father; Cancer in his brother; Diabetes in his father and mother; Heart Disease in his brother, brother, brother, mother, and sister; High Blood Pressure in his mother.     Review of Systems:   General: Completed and, except as mentioned above, was negative or noncontributory  Psychological:  Completed and, except as mentioned above, was negative or noncontributory  Ophthalmic:  Completed and, except as mentioned above, was negative or noncontributory  ENT:  Completed and, except as mentioned above, was negative or noncontributory  Allergy and Immunology:  Completed and, except as mentioned above, was negative or noncontributory  Hematological and Lymphatic:  Completed and, except as mentioned above, was negative or noncontributory  Endocrine: Completed and, except as mentioned above, was negative 15 mg at 07/29/21 0844    lisinopril (PRINIVIL;ZESTRIL) tablet 20 mg, 20 mg, Oral, Daily, ALONDRA Don - CNP, 20 mg at 07/29/21 0844    tiZANidine (ZANAFLEX) tablet 4 mg, 4 mg, Oral, Q8H PRN, Brittney Riccit, APRN - CNP    glucose (GLUTOSE) 40 % oral gel 15 g, 15 g, Oral, PRN, Brittney Riccit, APRN - CNP    dextrose 50 % IV solution, 12.5 g, Intravenous, PRN, Brittney Keiryt, APRN - CNP    glucagon (rDNA) injection 1 mg, 1 mg, Intramuscular, PRN, Brittney Shepard, APRN - CNP    dextrose 5 % solution, 100 mL/hr, Intravenous, PRN, Brittney Riccit, APRN - CNP    sodium chloride flush 0.9 % injection 10 mL, 10 mL, Intravenous, 2 times per day, Brittney Shepard, APRN - CNP, 10 mL at 07/29/21 0845    sodium chloride flush 0.9 % injection 10 mL, 10 mL, Intravenous, PRN, Brittney Shepard, APRN - CNP    0.9 % sodium chloride infusion, 25 mL, Intravenous, PRN, Brittney Shepard, APRN - CNP    ondansetron (ZOFRAN-ODT) disintegrating tablet 4 mg, 4 mg, Oral, Q8H PRN **OR** ondansetron (ZOFRAN) injection 4 mg, 4 mg, Intravenous, Q6H PRN, Brittney Shepard, APRN - CNP    polyethylene glycol (GLYCOLAX) packet 17 g, 17 g, Oral, Daily PRN, Brittney Devyn, APRN - CNP    famotidine (PEPCID) tablet 20 mg, 20 mg, Oral, BID, Brittney Shepard, APRN - CNP, 20 mg at 07/29/21 0844    acetaminophen (TYLENOL) tablet 650 mg, 650 mg, Oral, Q6H PRN **OR** acetaminophen (TYLENOL) suppository 650 mg, 650 mg, Rectal, Q6H PRN, Brittney Keiryt, APRN - CNP    sodium chloride flush 0.9 % injection 5-40 mL, 5-40 mL, Intravenous, 2 times per day, Freda Shah MD, 10 mL at 07/29/21 0953    sodium chloride flush 0.9 % injection 10 mL, 10 mL, Intravenous, PRN, Freda Shah MD    0.9 % sodium chloride infusion, 25 mL, Intravenous, PRN, Freda Shah MD    HYDROcodone-acetaminophen [facility]) 5-325 MG per tablet 1 tablet, 1 tablet, Oral, Q6H PRN, Freda Shah MD    alogliptin (NESINA) tablet 25 mg, 25 mg, Oral, Daily, Buddy Ibanez APRN - CNP, 25 mg at 07/29/21 8204    metFORMIN (GLUCOPHAGE) tablet 1,000 mg, 1,000 mg, Oral, BID WC, Buddy Ibanez APRN - CNP, 1,000 mg at 07/29/21 0845    Physical Exam:  Vital signs and Nurse's note reviewed. /66   Pulse 56   Temp 96.8 °F (36 °C) (Temporal)   Resp 18   Ht 6' (1.829 m)   Wt 167 lb 3.2 oz (75.8 kg)   SpO2 97%   BMI 22.68 kg/m²    height is 6' (1.829 m) and weight is 167 lb 3.2 oz (75.8 kg). His temporal temperature is 96.8 °F (36 °C). His blood pressure is 127/66 and his pulse is 56. His respiration is 18 and oxygen saturation is 97%. Gen:  A&Ox3, NAD. Pleasant and cooperative. HEENT: PERRLA, EOMI, no scleral icterus  Neck:  no goiter  CVS: Regular rate and rhythm  Resp: right small Armenian chest tube clamped. CXR residual apical ptx.    Abd: soft, non-tender, non-distended, bowel sounds present  Ext: Moves all extremities, no gross focal motor deficits  Skin: No erythema or ulcerations     Labs:   Lab Results   Component Value Date    WBC 9.4 07/29/2021    HGB 11.2 07/29/2021    HCT 34.2 07/29/2021    MCV 90.7 07/29/2021     07/29/2021     Lab Results   Component Value Date     07/29/2021    K 4.5 07/29/2021     07/29/2021    CO2 22 07/29/2021    BUN 25 07/29/2021    CREATININE 1.00 07/29/2021    GLUCOSE 87 07/29/2021    GLUCOSE 196 02/02/2017    CALCIUM 9.0 07/29/2021     Lab Results   Component Value Date    ALKPHOS 71 07/28/2021    ALT 21 07/28/2021    AST 20 07/28/2021    PROT 7.4 07/28/2021    BILITOT 0.51 07/28/2021    BILIDIR <0.08 05/27/2021    LABALBU 4.6 07/28/2021     Lab Results   Component Value Date    AMYLASE 68 05/27/2021     Lab Results   Component Value Date    LIPASE 36 05/27/2021     Lab Results   Component Value Date    INR 1.0 07/28/2021       Radiologic Studies:  PROCEDURE:   CT NEEDLE BIOPSY LUNG PERCUTANEOUS       MODERATE CONSCIOUS SEDATION       7/28/2021       HISTORY:   ORDERING SYSTEM PROVIDED HISTORY: Right middle lobe pulmonary nodule   TECHNOLOGIST PROVIDED HISTORY:   enlarging RLL nodule with FDG uptake and high risk factor profile       TECHNIQUE:   Dose modulation, iterative reconstruction, and/or weight based adjustment of   the mA/kV was utilized to reduce the radiation dose to as low as reasonably   achievable.       CONTRAST:   None       SEDATION:   1 mg versed and 75 mcg fentanyl were titrated intravenously for moderate       sedation monitored under my direction.  Total intra-service time of sedation       was 30 minutes.  The patient's vital signs were monitored throughout the       procedure and recorded in the patient's medical record by a qualified   procedure nurse.       DESCRIPTION OF PROCEDURE:   Informed consent was obtained after a detailed discussion about the procedure   including the risk, benefits, and alternatives.  Universal protocol was   followed including a time-out to confirm the correct patient and procedure.       The patient was positioned supine on the CT table. Axial CT images were   obtained through the chest and a suitable skin site was prepped and draped in   sterile fashion.  Local anesthesia was achieved with lidocaine.  Under repeat   CT guidance, a 19 gauge coaxial needle was advanced into the right middle   lobe pulmonary nodule.  Final needle position was confirmed prior to biopsy. Three 20 gauge core biopsy specimens were obtained through the coaxial needle   and given to Pathology who was present to help confirm sample adequacy.    However, the 3rd sample appeared to contain minimal solid tissue.  Repeat CT   was performed demonstrating a small but significant pneumothorax.  A chest   tube was placed immediately.  The coaxial needle was repositioned under   repeat CT guidance until air was freely aspirated.  A 0.018 wire was advanced   through the needle.  The needle was removed and a 6 Albanian coaxial dilator   and sheath was advanced over the wire.  The wire and dilator were removed and   a 0.038 guidewire was advanced through the sheath.  The sheath was removed   and an 8 Tanzanian pigtail catheter was advanced over the wire.  The wire was   removed and the pigtail formed.  The catheter was sutured in place with 2-0   silk.  Air was aspirated and the catheter was connected to an atrium.  A   sterile occlusive dressing was applied.  Final CT was performed.       Patient tolerated the procedure well.  Estimated blood loss: Less than 5 mL.       FINDINGS:    CT redemonstrates the right middle lobe pulmonary nodule and guides   coaxial needle placement.  CT immediately prior to biopsy demonstrates   satisfactory coaxial needle placement in the periphery of the nodule.  CT   after biopsy demonstrates a small but significant right pneumothorax.  Final   CT demonstrates satisfactory right chest tube placement with near complete   aspiration of the pneumothorax.           Impression   Successful right middle lobe pulmonary nodule biopsy.       Iatrogenic right pneumothorax treated immediately with 8 Tanzanian chest tube   insertion.             EXAMINATION:   TWO XRAY VIEWS OF THE CHEST       7/29/2021 9:15 am       COMPARISON:   12/25/2020       HISTORY:   ORDERING SYSTEM PROVIDED HISTORY: Right pneumothorax status post right middle   lobe pulmonary nodule biopsy   TECHNOLOGIST PROVIDED HISTORY:   chest tube       FINDINGS:   Right chest tube projects over the right mid chest.  Trace right apical   pneumothorax suspected.  The lungs otherwise appear clear.  Cardiomediastinal   silhouette within normal limits.  Calcifications noted of the aortic arch. No acute osseous abnormality.           Impression   Trace right apical pneumothorax suspected.             Impressions/Recommendations:     Right sided pneumothorax iatrogenic. Residual apical ptx on cxr. Clamped. Repeat 4 hours. Incentive spirometry. Very stable. Family present and updated. RN present.    I will remove chest tube when indicated.     Adilson Painter DO, MPH, [address] [facility] Surgery, [address] [place] [address] Rd office [phone]  [place] office [phone]

## 2021-07-29 ENCOUNTER — APPOINTMENT (OUTPATIENT)
Dept: GENERAL RADIOLOGY | Age: 71
DRG: 201 | End: 2021-07-29
Attending: INTERNAL MEDICINE
Payer: MEDICARE

## 2021-07-29 LAB
ABSOLUTE EOS #: 0.27 K/UL (ref 0–0.44)
ABSOLUTE IMMATURE GRANULOCYTE: 0.04 K/UL (ref 0–0.3)
ABSOLUTE LYMPH #: 1.89 K/UL (ref 1.1–3.7)
ABSOLUTE MONO #: 0.93 K/UL (ref 0.1–1.2)
ANION GAP SERPL CALCULATED.3IONS-SCNC: 10 MMOL/L (ref 9–17)
BASOPHILS # BLD: 1 % (ref 0–2)
BASOPHILS ABSOLUTE: 0.06 K/UL (ref 0–0.2)
BUN BLDV-MCNC: 25 MG/DL (ref 8–23)
BUN/CREAT BLD: 25 (ref 9–20)
CALCIUM SERPL-MCNC: 9 MG/DL (ref 8.6–10.4)
CHLORIDE BLD-SCNC: 107 MMOL/L (ref 98–107)
CO2: 22 MMOL/L (ref 20–31)
CREAT SERPL-MCNC: 1 MG/DL (ref 0.7–1.2)
DIFFERENTIAL TYPE: ABNORMAL
EKG ATRIAL RATE: 51 BPM
EKG P AXIS: 67 DEGREES
EKG P-R INTERVAL: 210 MS
EKG Q-T INTERVAL: 438 MS
EKG QRS DURATION: 102 MS
EKG QTC CALCULATION (BAZETT): 403 MS
EKG R AXIS: 53 DEGREES
EKG T AXIS: 69 DEGREES
EKG VENTRICULAR RATE: 51 BPM
EOSINOPHILS RELATIVE PERCENT: 3 % (ref 1–4)
GFR AFRICAN AMERICAN: >60 ML/MIN
GFR NON-AFRICAN AMERICAN: >60 ML/MIN
GFR SERPL CREATININE-BSD FRML MDRD: ABNORMAL ML/MIN/{1.73_M2}
GFR SERPL CREATININE-BSD FRML MDRD: ABNORMAL ML/MIN/{1.73_M2}
GLUCOSE BLD-MCNC: 101 MG/DL (ref 74–100)
GLUCOSE BLD-MCNC: 195 MG/DL (ref 74–100)
GLUCOSE BLD-MCNC: 77 MG/DL (ref 74–100)
GLUCOSE BLD-MCNC: 81 MG/DL (ref 74–100)
GLUCOSE BLD-MCNC: 87 MG/DL (ref 70–99)
HCT VFR BLD CALC: 34.2 % (ref 40.7–50.3)
HEMOGLOBIN: 11.2 G/DL (ref 13–17)
IMMATURE GRANULOCYTES: 0 %
LYMPHOCYTES # BLD: 20 % (ref 24–43)
MCH RBC QN AUTO: 29.7 PG (ref 25.2–33.5)
MCHC RBC AUTO-ENTMCNC: 32.7 G/DL (ref 28.4–34.8)
MCV RBC AUTO: 90.7 FL (ref 82.6–102.9)
MONOCYTES # BLD: 10 % (ref 3–12)
NRBC AUTOMATED: 0 PER 100 WBC
PDW BLD-RTO: 14.2 % (ref 11.8–14.4)
PLATELET # BLD: 166 K/UL (ref 138–453)
PLATELET ESTIMATE: ABNORMAL
PMV BLD AUTO: 11 FL (ref 8.1–13.5)
POTASSIUM SERPL-SCNC: 4.5 MMOL/L (ref 3.7–5.3)
RBC # BLD: 3.77 M/UL (ref 4.21–5.77)
RBC # BLD: ABNORMAL 10*6/UL
SEG NEUTROPHILS: 66 % (ref 36–65)
SEGMENTED NEUTROPHILS ABSOLUTE COUNT: 6.17 K/UL (ref 1.5–8.1)
SODIUM BLD-SCNC: 139 MMOL/L (ref 135–144)
WBC # BLD: 9.4 K/UL (ref 3.5–11.3)
WBC # BLD: ABNORMAL 10*3/UL

## 2021-07-29 PROCEDURE — 6370000000 HC RX 637 (ALT 250 FOR IP): Performed by: NURSE PRACTITIONER

## 2021-07-29 PROCEDURE — 71046 X-RAY EXAM CHEST 2 VIEWS: CPT

## 2021-07-29 PROCEDURE — 97162 PT EVAL MOD COMPLEX 30 MIN: CPT

## 2021-07-29 PROCEDURE — 6370000000 HC RX 637 (ALT 250 FOR IP): Performed by: RADIOLOGY

## 2021-07-29 PROCEDURE — 94761 N-INVAS EAR/PLS OXIMETRY MLT: CPT

## 2021-07-29 PROCEDURE — 97116 GAIT TRAINING THERAPY: CPT

## 2021-07-29 PROCEDURE — 2580000003 HC RX 258: Performed by: NURSE PRACTITIONER

## 2021-07-29 PROCEDURE — 80048 BASIC METABOLIC PNL TOTAL CA: CPT

## 2021-07-29 PROCEDURE — 2580000003 HC RX 258: Performed by: INTERNAL MEDICINE

## 2021-07-29 PROCEDURE — 85025 COMPLETE CBC W/AUTO DIFF WBC: CPT

## 2021-07-29 PROCEDURE — 97166 OT EVAL MOD COMPLEX 45 MIN: CPT

## 2021-07-29 PROCEDURE — 82947 ASSAY GLUCOSE BLOOD QUANT: CPT

## 2021-07-29 PROCEDURE — 36415 COLL VENOUS BLD VENIPUNCTURE: CPT

## 2021-07-29 PROCEDURE — 1200000000 HC SEMI PRIVATE

## 2021-07-29 PROCEDURE — 93010 ELECTROCARDIOGRAM REPORT: CPT | Performed by: FAMILY MEDICINE

## 2021-07-29 RX ADMIN — FAMOTIDINE 20 MG: 20 TABLET ORAL at 08:44

## 2021-07-29 RX ADMIN — METFORMIN HYDROCHLORIDE 1000 MG: 500 TABLET ORAL at 16:51

## 2021-07-29 RX ADMIN — GLIPIZIDE 5 MG: 5 TABLET ORAL at 07:22

## 2021-07-29 RX ADMIN — LISINOPRIL 20 MG: 20 TABLET ORAL at 08:44

## 2021-07-29 RX ADMIN — SODIUM CHLORIDE, PRESERVATIVE FREE 10 ML: 5 INJECTION INTRAVENOUS at 08:45

## 2021-07-29 RX ADMIN — ACETAMINOPHEN 1000 MG: 500 TABLET, FILM COATED ORAL at 18:51

## 2021-07-29 RX ADMIN — SODIUM CHLORIDE, PRESERVATIVE FREE 10 ML: 5 INJECTION INTRAVENOUS at 09:53

## 2021-07-29 RX ADMIN — INSULIN GLARGINE 8 UNITS: 100 INJECTION, SOLUTION SUBCUTANEOUS at 08:45

## 2021-07-29 RX ADMIN — SODIUM CHLORIDE, PRESERVATIVE FREE 10 ML: 5 INJECTION INTRAVENOUS at 20:47

## 2021-07-29 RX ADMIN — ALOGLIPTIN 25 MG: 25 TABLET, FILM COATED ORAL at 09:24

## 2021-07-29 RX ADMIN — AMLODIPINE BESYLATE 5 MG: 5 TABLET ORAL at 08:44

## 2021-07-29 RX ADMIN — METFORMIN HYDROCHLORIDE 1000 MG: 500 TABLET ORAL at 08:45

## 2021-07-29 RX ADMIN — ATORVASTATIN CALCIUM 20 MG: 20 TABLET, FILM COATED ORAL at 08:43

## 2021-07-29 RX ADMIN — ACETAMINOPHEN 1000 MG: 500 TABLET, FILM COATED ORAL at 03:45

## 2021-07-29 RX ADMIN — ISOSORBIDE MONONITRATE 15 MG: 30 TABLET, EXTENDED RELEASE ORAL at 08:44

## 2021-07-29 ASSESSMENT — PAIN SCALES - GENERAL
PAINLEVEL_OUTOF10: 0
PAINLEVEL_OUTOF10: 2
PAINLEVEL_OUTOF10: 3
PAINLEVEL_OUTOF10: 0

## 2021-07-29 ASSESSMENT — PAIN DESCRIPTION - PAIN TYPE
TYPE: ACUTE PAIN
TYPE: ACUTE PAIN

## 2021-07-29 ASSESSMENT — PAIN DESCRIPTION - LOCATION
LOCATION: CHEST
LOCATION: CHEST

## 2021-07-29 ASSESSMENT — PAIN DESCRIPTION - PROGRESSION: CLINICAL_PROGRESSION: NOT CHANGED

## 2021-07-29 ASSESSMENT — PAIN - FUNCTIONAL ASSESSMENT: PAIN_FUNCTIONAL_ASSESSMENT: ACTIVITIES ARE NOT PREVENTED

## 2021-07-29 ASSESSMENT — PAIN DESCRIPTION - DESCRIPTORS
DESCRIPTORS: ACHING
DESCRIPTORS: ACHING

## 2021-07-29 ASSESSMENT — PAIN DESCRIPTION - ORIENTATION
ORIENTATION: RIGHT
ORIENTATION: RIGHT

## 2021-07-29 ASSESSMENT — PAIN DESCRIPTION - ONSET: ONSET: ON-GOING

## 2021-07-29 ASSESSMENT — PAIN DESCRIPTION - FREQUENCY: FREQUENCY: CONTINUOUS

## 2021-07-29 NOTE — PROGRESS NOTES
Occupational Therapy   Occupational Therapy Initial Assessment  Date: 2021   Patient Name: Klarissa Wan  MRN: [id number]     : 1950    Date of Service: 2021    Discharge Recommendations:  Continue to assess pending progress, Home with assist PRN       Assessment   Performance deficits / Impairments: Decreased functional mobility ; Decreased balance;Decreased coordination;Decreased ADL status; Decreased endurance;Decreased strength  Assessment: Patient is a 70year old male admitted for solitary pulmonary nodule. 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 The patient presentse with decreased strength and endurance with functional mobility and overall ability to complete ADL/IADLs as he did previously. The patient would benefit from occupational therapy services to address these deficits and improve independence. Treatment Diagnosis: generalized weakness  Prognosis: Fair  Decision Making: Medium Complexity  OT Education: OT Role;Plan of Care  REQUIRES OT FOLLOW UP: Yes  Activity Tolerance  Activity Tolerance: Patient limited by fatigue  Activity Tolerance: min fatigue with ambulation to therapy room  Safety Devices  Safety Devices in place: Yes  Type of devices:  (pt left in care of treating PTA)           Patient Diagnosis(es): The encounter diagnosis was Right middle lobe pulmonary nodule.      has a past medical history of Abnormal cardiac cath, Actinic keratosis, Arthritis, CAD (coronary artery disease), Calculus of kidney, Cervical stenosis of spine, Diabetes mellitus ([address] GERD (gastroesophageal reflux disease), Gout, H/O echocardiogram, History of cardiac cath, History of echocardiogram, History of echocardiogram, History of heart attack, Hyperlipidemia, Hypertension, Raynaud's phenomenon, S/P angioplasty with stent, and Tobacco abuse.   has a past surgical history that includes Cardiac catheterization (2007); Hemorrhoid surgery; hernia repair; Neck surgery (1997); other surgical history (10/2014); other surgical history (12/11/2014); Cataract removal (04/06/2015); Upper gastrointestinal endoscopy (12/28/2005); Cataract removal (06/08/2015); Colonoscopy (01/11/2006); Colonoscopy (05/23/2016); Cardiac catheterization (02/09/2017); Cardiac catheterization (02/09/2017); Cardiac catheterization (01/14/2019); Upper gastrointestinal endoscopy (N/A, 02/05/2019); Upper gastrointestinal endoscopy (N/A, 10/22/2019); Lung biopsy (Right, 07/28/2021); and CT NEEDLE BIOPSY LUNG PERCUTANEOUS (7/28/2021). Treatment Diagnosis: generalized weakness      Restrictions       Subjective   General  Chart Reviewed: Yes  Patient assessed for rehabilitation services?: Yes  Family / Caregiver Present: Yes  Referring Practitioner: Dr. Antonio Linder  Diagnosis: solitary pulmonary nodule  General Comment  Comments: Patient laying in bed upon OT arrival. Agreeable to evaluation.   Patient Currently in Pain: Yes  Pain Assessment  Pain Assessment: 0-10  Pain Level: 0  Vital Signs  Temp: 97.5 °F (36.4 °C)  Temp Source: Temporal  Pulse: 67  Heart Rate Source: Monitor  Resp: 16  BP: (!) 125/54  BP Location: Right upper arm  MAP (mmHg): 85  Patient Position: Semi fowlers  Level of Consciousness: Alert (0)  MEWS Score: 1  Patient Currently in Pain: Yes  Oxygen Therapy  SpO2: 100 %  Pulse Oximeter Device Mode: Intermittent  Pulse Oximeter Device Location: Left;Finger  O2 Device: None (Room air)  Social/Functional History  Social/Functional History  Lives With: Spouse  Type of Home: House  Home Layout: Two level, Able to Live on Main level with bedroom/bathroom  Home Access: Stairs to enter with rails  Entrance Stairs - Number of Steps: 4  Entrance Stairs - Rails: Right  Bathroom Shower/Tub: Tub/Shower unit  ADL Assistance: Independent  Homemaking Assistance: Independent  Homemaking Responsibilities: Yes  Ambulation Assistance: Independent  Transfer Assistance: Independent  Active : Yes  Occupation: Retired, Other(comment) (remodels homes)       Objective   Vision: Impaired  Vision Exceptions: Wears glasses for reading  H[facility]       Observation/Palpation  Posture: Good  Observation: no concerns and helps monitor tubes  Balance  Sitting Balance: Stand by assistance  Standing Balance: Stand by assistance  Functional Mobility  Functional - Mobility Device: No device  Activity: To/From therapy gym  Assist Level: Stand by assistance  ADL  Feeding: Independent  Grooming: Stand by assistance  UE Bathing: Stand by assistance  LE Bathing: Stand by assistance  UE Dressing: Stand by assistance  LE Dressing: Stand by assistance  Toileting: Stand by assistance        Bed mobility  Supine to Sit: Modified independent  Sit to Supine: Stand by assistance  Scooting: Modified independent  Transfers  Sit to stand: Modified independent; Independent  Stand to sit: Modified independent; Independent       Plan   Plan  Times per week: 7  Times per day: Daily  Current Treatment Recommendations: Strengthening, Endurance Training, Self-Care / ADL, Functional Mobility Training, Safety Education & Training, Balance Training      AM-PAC Score        -[facility] Inpatient Daily Activity Raw Score: 22 (07/29/21 1459)  -PAC Inpatient ADL T-Scale Score : 47.1 (07/29/21 1459)  ADL Inpatient CMS 0-100% Score: 25.8 (07/29/21 1459)  ADL Inpatient CMS G-Code Modifier : Gearl Tiago (07/29/21 1459)    Goals  Short term goals  Time Frame for Short term goals: 20 visits  Short term goal 1: Patient will tolerate >10 minutes of BUE ther ex with min fatigue to improve functional strength for ADLs  Short term goal 2: Patient will complete self-care activitiy standing sinkside with min fatigue to improve functional endurance for ADLs. Short term goal 3: Patient will complete functional mobility activity with LRAD to improve safety and independence with mobility.        Therapy Time   Individual Concurrent Group Co-treatment   Time In 0230         Time Out 0245         Minutes Ilsa. [place] , [place]

## 2021-07-29 NOTE — PROGRESS NOTES
Comprehensive Nutrition Assessment    Type and Reason for Visit:  Initial    Nutrition Recommendations/Plan:  Encourage 3 meals daily    Nutrition Assessment:  Increased nutrient needs r/t acute injury or trauma, AEB post op biopsy healing. Known diabetes, not consistent in eating pattern pta. History of weight declines recently, but attributes this to working on [employer]. Denies ingestion issues, n/v etc. Well controlled diabetes per available data. Discussed eating 3 regularly timed meals if nothing else for his diabetes and weight stability. Malnutrition Assessment:  Malnutrition Status: At risk for malnutrition (Comment)    Context:  Acute Illness     Findings of the 6 clinical characteristics of malnutrition:  Energy Intake:  No significant decrease in energy intake  Weight Loss:  No significant weight loss     Body Fat Loss:  No significant body fat loss     Muscle Mass Loss:  No significant muscle mass loss    Fluid Accumulation:  No significant fluid accumulation     Strength:  Not Performed    Estimated Daily Nutrient Needs:  Energy (kcal):  7343-3864 (23-28); Weight Used for Energy Requirements:  Current     Protein (g):  91-99 (1.2-1.3); Weight Used for Protein Requirements:  Current        Fluid (ml/day):  2100; Method Used for Fluid Requirements:  1 ml/kcal      Nutrition Related Findings:  no malnutrition indices      Wounds:  Open Wounds (chest tube)       Current Nutrition Therapies:    ADULT DIET; Regular; 3 carb choices (45 gm/meal)    Anthropometric Measures:  · Height: 6' (182.9 cm)  · Current Body Weight: 167 lb 3.2 oz (75.8 kg)   · Admission Body Weight: 167 lb (75.8 kg)    · Usual Body Weight: 173 lb (78.5 kg)     · Ideal Body Weight: 178 lbs; % Ideal Body Weight 93.9 %   · BMI: 22.7  · Adjusted Body Weight:  ; No Adjustment   · BMI Categories: Normal Weight (BMI 18.5-24. 9)       Nutrition Diagnosis:   · Increased nutrient needs related to acute injury/trauma as evidenced by wounds    Lab Results   Component Value Date     07/29/2021    K 4.5 07/29/2021     07/29/2021    CO2 22 07/29/2021    BUN 25 (H) 07/29/2021    CREATININE 1.00 07/29/2021    GLUCOSE 87 07/29/2021    CALCIUM 9.0 07/29/2021    PROT 7.4 07/28/2021    LABALBU 4.6 07/28/2021    BILITOT 0.51 07/28/2021    ALKPHOS 71 07/28/2021    AST 20 07/28/2021    ALT 21 07/28/2021    LABGLOM >60 07/29/2021    GFRAA >60 07/29/2021    GLOB NOT REPORTED 05/27/2021     Lab Results   Component Value Date    LABA1C 6.6 (H) 01/14/2019     Lab Results   Component Value Date     01/14/2019     No results found for: VITD25    Nutrition Interventions:   Food and/or Nutrient Delivery:  Continue Current Diet  Nutrition Education/Counseling:  Education initiated   Coordination of Nutrition Care:  Continue to monitor while inpatient    Goals:  PO >75% x3 meals daily, now and at home       Nutrition Monitoring and Evaluation:   Behavioral-Environmental Outcomes:  Readiness for Change   Food/Nutrient Intake Outcomes:  Food and Nutrient Intake  Physical Signs/Symptoms Outcomes:  Biochemical Data, Weight     Discharge Planning:    No discharge needs at this time     Electronically signed by Bela Galaviz RD, LD on 7/29/21 at 11:51 AM EDT    Contact: [phone]

## 2021-07-29 NOTE — PROGRESS NOTES
Pt. Is alert and oriented x 4. Laying in bed at time of assessment. Pt. Reports pain 3/10 at chest tub site. Dressing assessed, dressing has a scant amount of bloody drainage on it.chest tube checked, no leaks or kinks in it no apparent complications at this time. Pt. Is tolerating well. Pt. Vitals are assessed, vitals are WDL. Pt. In bed resting, Call light is in reach gripper socks are on, no current signs of distress. Will continue to monitor.

## 2021-07-29 NOTE — PROGRESS NOTES
Chest tube clamped and removed from suction, Pt instructed on incentive spirometry pt. Demonstrates understanding.

## 2021-07-29 NOTE — PLAN OF CARE
Problem: Pain:  Description: Pain management should include both nonpharmacologic and pharmacologic interventions. Goal: Pain level will decrease  Description: Pain level will decrease  7/29/2021 0954 by Gabriella Hill RN  Note: Pain assessed every four hours and as needed using 0-10 pain scale. Pt educated on scale and uses scale appropriately. Encourage pt to notify staff of pain before pain becomes uncontrollable. Correlate periods of heavy activity after pain medication administration. Use pharmacological and non pharmacological methods for pain relief such as: warm blankets, ice, television, reading, or rest.        Problem: Skin Integrity:  Goal: Will show no infection signs and symptoms  Description: Will show no infection signs and symptoms  7/29/2021 0954 by Gabriella Hill RN  Note: Robbie scale monitoring per protocol. Inspect skin for breakdown frequently. Encourage pt to make frequent large adjustments in position or assist patient with turning. Document all areas of breakdown. Problem: Infection:  Goal: Will remain free from infection  Description: Will remain free from infection  7/29/2021 0954 by Gabriella Hill RN  Note: Monitor lab work. IV antibiotics per orders. Will continue to monitor. Problem: Safety:  Goal: Free from accidental physical injury  Description: Free from accidental physical injury  7/29/2021 0954 by Gabriella Hill RN  Note: Monitor pt., hourly rounding. Problem: Daily Care:  Goal: Daily care needs are met  Description: Daily care needs are met  7/29/2021 0954 by Gabriella Hill RN  Note: Needs assessed hourly, pt alert and oriented able to express needs or meet needs independently.  cares

## 2021-07-29 NOTE — PROGRESS NOTES
Physical Therapy    Facility/Department: [facility] MED SURG  Initial Assessment    NAME: Senait Gregorio  : 1950  MRN: [id number]    Date of Service: 2021    Discharge Recommendations:  Continue to assess pending progress        Assessment   Assessment: Pt was referred post-op. Pt will be progressed as tolerated until he is independent with mobility to improve endurance with hospitalization. Treatment Diagnosis: limited mobility  Prognosis: Good  Decision Making: Medium Complexity  Patient Education: Pt educated regarding chest tube and PT POC  REQUIRES PT FOLLOW UP: Yes  Activity Tolerance  Activity Tolerance: Patient Tolerated treatment well       Patient Diagnosis(es): The encounter diagnosis was Right middle lobe pulmonary nodule. has a past medical history of Abnormal cardiac cath, Actinic keratosis, Arthritis, CAD (coronary artery disease), Calculus of kidney, Cervical stenosis of spine, Diabetes mellitus ([address] GERD (gastroesophageal reflux disease), Gout, H/O echocardiogram, History of cardiac cath, History of echocardiogram, History of echocardiogram, History of heart attack, Hyperlipidemia, Hypertension, Raynaud's phenomenon, S/P angioplasty with stent, and Tobacco abuse.   has a past surgical history that includes Cardiac catheterization (); Hemorrhoid surgery; hernia repair; Neck surgery (); other surgical history (10/2014); other surgical history (2014); Cataract removal (2015); Upper gastrointestinal endoscopy (2005); Cataract removal (2015); Colonoscopy (2006); Colonoscopy (2016); Cardiac catheterization (2017); Cardiac catheterization (2017); Cardiac catheterization (2019); Upper gastrointestinal endoscopy (N/A, 2019); Upper gastrointestinal endoscopy (N/A, 10/22/2019); Lung biopsy (Right, 2021); and CT NEEDLE BIOPSY LUNG PERCUTANEOUS (2021).     Restrictions     Vision/Hearing  Vision: Impaired  Vision Exceptions: Wears glasses for reading  Hearing: Within functional limits     Subjective  General  Chart Reviewed: Yes  Follows Commands: Within Functional Limits  General Comment  Comments: Chest tube in place and can be disconnected but stand needs to be maintained under chest level  Subjective  Subjective: Pt states his pain is pretty high this morning and states about 4/10 but he just took a pill.   Pain Screening  Patient Currently in Pain: Yes          Orientation  Orientation  Overall Orientation Status: Within Normal Limits  Social/Functional History  Social/Functional History  Lives With: Spouse  Type of Home: House  Home Layout: Two level, Able to Live on Main level with bedroom/bathroom  Home Access: Stairs to enter with rails  Entrance Stairs - Number of Steps: 4  Entrance Stairs - Rails: Right  Ambulation Assistance: Independent  Transfer Assistance: Independent  Active : Yes  Occupation: Retired, Other(comment) ([employer])  Cognition    Alert and oriented x4    Objective     Observation/Palpation  Posture: Good  Observation: no concerns and helps monitor tubes    AROM RLE (degrees)  RLE AROM: WFL  AROM LLE (degrees)  LLE AROM : WFL  Strength RLE  Strength RLE: WFL  Strength LLE  Strength LLE: WFL        Bed mobility  Rolling to Right: Stand by assistance  Supine to Sit: Stand by assistance  Sit to Supine: Stand by assistance  Scooting: Stand by assistance  Comment: writer assisted with lines and to assure drain stayed in proper position  Transfers  Sit to Stand: Stand by assistance  Stand to sit: Stand by assistance  Bed to Chair: Stand by assistance  Stand Pivot Transfers: Stand by assistance  Comment: writer assisted with drain and tubes for safety  Ambulation  Ambulation?: Yes  WB Status: unrestricted  More Ambulation?: Yes  Ambulation 1  Surface: level tile  Device: No Device  Assistance: Modified Independent  Distance: 10 feet to bedside chair     Balance  Posture: Good  Sitting - Static:

## 2021-07-29 NOTE — PLAN OF CARE
Problem: Pain:  Goal: Pain level will decrease  Description: Pain level will decrease  7/28/2021 2059 by Libia Jacobs RN  Outcome: Ongoing  Note: Pain assessed routinely and as needed with a 0-10 scale. PRN pain medication given as appropriate per orders. Pain reassessed within an hour, will continue to monitor    7/28/2021 1654 by Kevin Liang RN  Outcome: Ongoing  Note: Patient complains of no pain at this time  Goal: Control of acute pain  Description: Control of acute pain  7/28/2021 2059 by Libia Jacobs RN  Outcome: Ongoing  7/28/2021 1654 by Kevin Liang RN  Outcome: Ongoing  Note: Patient in no acute pain  Goal: Control of chronic pain  Description: Control of chronic pain  Outcome: Ongoing  Goal: Patient's pain/discomfort is manageable  Description: Patient's pain/discomfort is manageable  Outcome: Ongoing     Problem: Skin Integrity:  Goal: Will show no infection signs and symptoms  Description: Will show no infection signs and symptoms  7/28/2021 2059 by Libia Jacobs RN  Outcome: Ongoing  7/28/2021 1654 by Kevin Liang RN  Outcome: Ongoing  Note: Patients WBC remains WNL  Goal: Absence of new skin breakdown  Description: Absence of new skin breakdown  Outcome: Ongoing     Problem: Infection:  Goal: Will remain free from infection  Description: Will remain free from infection  Outcome: Ongoing  Note: No s/s of infection, afebrile. Will continue to monitor. Problem: Safety:  Goal: Free from accidental physical injury  Description: Free from accidental physical injury  Outcome: Ongoing  Note: Bed in lowest position, wheels are locked, call light within reach, ID band on. Fall risk is assessed. Will continue to monitor.      Goal: Free from intentional harm  Description: Free from intentional harm  Outcome: Ongoing     Problem: Daily Care:  Goal: Daily care needs are met  Description: Daily care needs are met  Outcome: Ongoing  Note: Daily care needs are met with assistance with maximum

## 2021-07-29 NOTE — H&P
LAD: Lesion on 1st Diag: Mid subsection . 75% stenosis. RCA: Lesion on Mid RCA: Mid subsection . 100% stenosis. Comments: The distal RCA fills by left to right collateralization. Dominance: Mixed. LV function assessed as: Normal. EF 65%.  History of echocardiogram 06/11/2018    EF 60%. Mildly increased LV wall thickness. Evidence of mild diastolic dysfunction seen.  History of echocardiogram 01/18/2019    EF of 65%. LV wall thickness is mildly increased. Aortic valve leaflets are mildly thickened.  History of heart attack     Hyperlipidemia     Hypertension     Raynaud's phenomenon     S/P angioplasty with stent 9/20/13    PTCA-NEDRA of  the OM 1    Tobacco abuse     1 ppd       Past Surgical History:        Procedure Laterality Date    CARDIAC CATHETERIZATION  2007    patient states had 2 small blockages    CARDIAC CATHETERIZATION  02/09/2017    LMCA: Lesion on LMCA: Distal subsection 20% stenosis. LAD: Lesion on 1st Diag: Mid subsection 75% stenosis. RCA: Lesion on Mid RCA: Mid subsection 100% stenosis.  CARDIAC CATHETERIZATION  02/09/2017    Attempted PCI to chronic total occlusion of RCA was not successful. Unable to cross with multiple wires due to heavy calcification and toruosity.     CARDIAC CATHETERIZATION  01/14/2019    CATARACT REMOVAL  04/06/2015    Right eye - Dr. Melonie Cockayne  06/08/2015     [facility] - left eye    COLONOSCOPY  01/11/2006    Dr. Romayne Bunk - internal hemorrhoids, no polps    COLONOSCOPY  05/23/2016    -polyp    CT NEEDLE BIOPSY LUNG PERCUTANEOUS  7/28/2021    CT NEEDLE BIOPSY LUNG PERCUTANEOUS 7/28/2021 [facility] CT SCAN    HEMORRHOID SURGERY      HERNIA REPAIR      LUNG BIOPSY Right 07/28/2021    Dr Jono Cervantes    c4-5 laminectomy and fusion    OTHER SURGICAL HISTORY  10/2014    Right foot - 7 from heal and 1 from great toe at [address]  12/11/2014    pain injection - cervical    UPPER GASTROINTESTINAL ENDOSCOPY  12/28/2005    Dr. Tiffany Mims - mild esophagitis and gastritis    UPPER GASTROINTESTINAL ENDOSCOPY N/A 02/05/2019    -bx(neg H-Pylori)retained gastric content, possible submucosal mass of gastric cardia    UPPER GASTROINTESTINAL ENDOSCOPY N/A 10/22/2019    EGD BIOPSY performed by Rebecca Mendoza MD at [place] [address] History:       Problem Relation Age of Onset    Heart Disease Mother     Diabetes Mother     High Blood Pressure Mother     Diabetes Father     Alzheimer's Disease Father     Heart Disease Sister         CABG    Heart Disease Brother     Heart Disease Brother     Heart Disease Brother     Cancer Brother        Social History:   TOBACCO:   reports that he has been smoking cigarettes. He has a 13.50 pack-year smoking history. He has never used smokeless tobacco.  ETOH:   reports no history of alcohol use. ELICIT DRUG USE:    Social History     Substance and Sexual Activity   Drug Use No     OCCUPATION: Retired      Allergies:  Niacin and related, Oxycodone hcl, and Other    Medications Prior to Admission:    Prior to Admission medications    Medication Sig Start Date End Date Taking?  Authorizing Provider   amLODIPine (NORVASC) 5 MG tablet Take 1 tablet by mouth daily 7/15/21  Yes Abbie Heimlich, MD   clopidogrel (PLAVIX) 75 MG tablet Take 1 tablet by mouth daily 7/15/21  Yes Abbie Heimlich, MD   atenolol (TENORMIN) 25 MG tablet Take 1 tablet by mouth daily 6/7/21  Yes Abbie Heimlich, MD   Insulin Glargine, 2 Unit Dial, 300 UNIT/ML SOPN Insulin Glargine Insulin Glargine U-300 Conc Active 10 UNIT Subcutaneous Every morning May 7th, 2020 11:47am 05-  [facility] ([id number]) 5/7/20  Yes Historical Provider, MD   atorvastatin (LIPITOR) 20 MG tablet Take 1 tablet by mouth daily 12/15/20  Yes Abbie Heimlich, MD   sitaGLIPtan-metformin (JANUMET)  MG per tablet Take 1 tablet by mouth 2 times daily (with meals)   Yes Historical Provider, MD   lisinopril (PRINIVIL;ZESTRIL) 20 MG tablet Take 0.5 tablets by mouth daily  Patient taking differently: Take 20 mg by mouth daily  1/18/19  Yes Wilfredo Argueta MD   glimepiride (AMARYL) 2 MG tablet Take 1 tablet by mouth 2 times daily 5/22/15  Yes Linh Pacheco MD   aspirin 81 MG tablet Take 81 mg by mouth daily. Yes Historical Provider, MD   etodolac (LODINE) 400 MG tablet Take 1 tablet by mouth 2 times daily 5/27/21   Amanda Manuel MD   isosorbide mononitrate (IMDUR) 30 MG extended release tablet TAKE 1 TABLET DAILY  Patient taking differently: Take 15 mg by mouth daily  1/20/21   Wilfredo Argueta MD   tiZANidine (ZANAFLEX) 4 MG tablet Take 1 tablet by mouth every 8 hours as needed (Rib pain) 12/25/20   Levi Winter MD   chlorpheniramine (EQ CHLORTABS) 4 MG tablet Take 4 mg by mouth every 6 hours as needed for Allergies    Historical Provider, MD   nitroGLYCERIN (NITROSTAT) 0.4 MG SL tablet Place 1 tablet under the tongue every 5 minutes as needed for Chest pain 3/6/17   Wilfredo Argueta MD       Review of Systems:  Constitutional:negative  for fevers, and negative for chills. Eyes: negative for visual disturbance   ENT: negative for sore throat, negative nasal congestion, and negative for earache  Respiratory: negative for shortness of breath, negative for cough, and negative for wheezing  Cardiovascular: negative for chest pain, negative for palpitations, and negative for syncope  Gastrointestinal: negative for abdominal pain, negative for nausea,negative for vomiting, negative for diarrhea, negative for constipation, and negative for hematochezia or melena  Genitourinary: negative for dysuria, negative for urinary urgency, negative for urinary frequency, and negative for hematuria  Skin: negative for skin rash, and negative for skin lesions  Neurological: negative for unilateral weakness, numbness or tingling.     Physical Exam:    Vitals:   Temp: 96.8 °F (36 °C)  BP: 127/66  Resp: 18  Pulse: 56  SpO2: 97 %  24HR INTAKE/OUTPUT:      Intake/Output Summary (Last 24 hours) at 7/29/2021 1227  Last data filed at 7/29/2021 1010  Gross per 24 hour   Intake 2010 ml   Output 900 ml   Net 1110 ml       Exam:  GEN:  alert and oriented to person, place and time, well-developed and well-nourished, in no acute distress  EYES: No gross abnormalities. , PERRL and EOMI  NECK: normal, supple, no lymphadenopathy,  no carotid bruits  PULM: clear to auscultation bilaterally- no wheezes, rales or rhonchi, normal air movement, no respiratory distress and decreased breath sounds noted-right.   Dressing to right chest clean dry and intact, patient currently on suction to the wall with 80 mmHg and 20 mmHg on the atrium  COR: regular rate & rhythm, no murmurs, no gallops, S1 normal and S2 normal  ABD:  soft, non-tender, non-distended, normal bowel sounds, no masses or organomegaly  EXT:   no cyanosis, clubbing or edema present    NEURO: follows commands, PACK, no deficits  SKIN:  no rashes or significant lesions  -----------------------------------------------------------------  Diagnostic Data:   Lab Results   Component Value Date    WBC 9.4 07/29/2021    HGB 11.2 (L) 07/29/2021     07/29/2021       Lab Results   Component Value Date    BUN 25 (H) 07/29/2021    CREATININE 1.00 07/29/2021     07/29/2021    K 4.5 07/29/2021    CALCIUM 9.0 07/29/2021     07/29/2021    CO2 22 07/29/2021    LABGLOM >60 07/29/2021       No results found for: Lorette Person, EPITHUA, LEUKOCYTESUR, SPECGRAV, GLUCOSEU, KETUA, PROTEINU, HGBUR, CASTUA, CRYSTUA, BACTERIA, YEAST    Lab Results   Component Value Date    TROPONINT <0.03 08/30/2019    PROBNP 96 08/30/2019       XR CHEST (2 VW)    Result Date: 7/29/2021  EXAMINATION: TWO XRAY VIEWS OF THE CHEST 7/29/2021 9:15 am COMPARISON: 12/25/2020 HISTORY: ORDERING SYSTEM PROVIDED HISTORY: Right pneumothorax status post right middle lobe pulmonary nodule biopsy TECHNOLOGIST PROVIDED HISTORY: chest tube FINDINGS: Right chest tube projects over the right mid chest.  Trace right apical pneumothorax suspected. The lungs otherwise appear clear. Cardiomediastinal silhouette within normal limits. Calcifications noted of the aortic arch. No acute osseous abnormality. Trace right apical pneumothorax suspected. CT NEEDLE BIOPSY LUNG PERCUTANEOUS    Result Date: 7/28/2021  PROCEDURE: CT NEEDLE BIOPSY LUNG PERCUTANEOUS MODERATE CONSCIOUS SEDATION 7/28/2021 HISTORY: ORDERING SYSTEM PROVIDED HISTORY: Right middle lobe pulmonary nodule TECHNOLOGIST PROVIDED HISTORY: enlarging RLL nodule with FDG uptake and high risk factor profile TECHNIQUE: Dose modulation, iterative reconstruction, and/or weight based adjustment of the mA/kV was utilized to reduce the radiation dose to as low as reasonably achievable. CONTRAST: None SEDATION: 1 mg versed and 75 mcg fentanyl were titrated intravenously for moderate sedation monitored under my direction. Total intra-service time of sedation was 30 minutes. The patient's vital signs were monitored throughout the procedure and recorded in the patient's medical record by a qualified procedure nurse. DESCRIPTION OF PROCEDURE: Informed consent was obtained after a detailed discussion about the procedure including the risk, benefits, and alternatives. Universal protocol was followed including a time-out to confirm the correct patient and procedure. The patient was positioned supine on the CT table. Axial CT images were obtained through the chest and a suitable skin site was prepped and draped in sterile fashion. Local anesthesia was achieved with lidocaine. Under repeat CT guidance, a 19 gauge coaxial needle was advanced into the right middle lobe pulmonary nodule. Final needle position was confirmed prior to biopsy.  Three 20 gauge core biopsy specimens were obtained through the coaxial needle and given to Pathology who was present to help confirm sample adequacy. However, the 3rd sample appeared to contain minimal solid tissue. Repeat CT was performed demonstrating a small but significant pneumothorax. A chest tube was placed immediately. The coaxial needle was repositioned under repeat CT guidance until air was freely aspirated. A 0.018 wire was advanced through the needle. The needle was removed and a 6 Cambodian coaxial dilator and sheath was advanced over the wire. The wire and dilator were removed and a 0.038 guidewire was advanced through the sheath. The sheath was removed and an 8 Cambodian pigtail catheter was advanced over the wire. The wire was removed and the pigtail formed. The catheter was sutured in place with 2-0 silk. Air was aspirated and the catheter was connected to an atrium. A sterile occlusive dressing was applied. Final CT was performed. Patient tolerated the procedure well. Estimated blood loss: Less than 5 mL. FINDINGS:  CT redemonstrates the right middle lobe pulmonary nodule and guides coaxial needle placement. CT immediately prior to biopsy demonstrates satisfactory coaxial needle placement in the periphery of the nodule. CT after biopsy demonstrates a small but significant right pneumothorax. Final CT demonstrates satisfactory right chest tube placement with near complete aspiration of the pneumothorax. Successful right middle lobe pulmonary nodule biopsy. Iatrogenic right pneumothorax treated immediately with 8 Cambodian chest tube insertion. XR CHEST (2 VW)   Final Result   Trace right apical pneumothorax suspected. CT NEEDLE BIOPSY LUNG PERCUTANEOUS   Final Result   Successful right middle lobe pulmonary nodule biopsy. Iatrogenic right pneumothorax treated immediately with 8 Cambodian chest tube   insertion.          XR CHEST (2 VW)    (Results Pending)       Assessment:    Active Problems:    Pneumothorax after biopsy    Pneumothorax, post biopsy, right  Resolved Problems:    * No resolved hospital problems. *      Patient Active Problem List    Diagnosis Date Noted    Pneumothorax after biopsy 07/28/2021    Pneumothorax, post biopsy, right 07/28/2021    Strain of lumbar region 05/27/2021    Abnormal CT scan, stomach 10/05/2019    Abnormal gastrointestinal PET scan 10/05/2019    Substernal chest pain 01/13/2019    Dyspepsia 01/04/2019    Epigastric pain 01/04/2019    Senile nuclear sclerosis 06/08/2015    Type II or unspecified type diabetes mellitus without mention of complication, not stated as uncontrolled     Calculus of kidney     Actinic keratosis     Shortness of breath, post procedure improving probably Effient side effect. 09/24/2013    S/P angioplasty with stent, obtuse marginal 09/24/2013    Angina pectoris ([address] 09/10/2013    ASHD (arteriosclerotic heart disease) 09/10/2013    Hypertension 09/10/2013    Diabetes ([facility] [address] 09/10/2013    Hyperlipidemia 09/10/2013       Plan:     · This patient requires inpatient admission because of pneumothorax after biopsy  · Factors affecting the medical complexity of this patient include hypertension, diabetes  · Estimated length of stay is 2 days  · Pneumothorax after biopsy  · X-ray this morning-shows residual right pneumothorax  · Appreciate general surgery  · Will clamp chest tube  · Repeat chest x-ray at 2 PM this afternoon.   If no pneumothorax or very small residual we will plan to remove chest tube  · Pain control  · DVT prophylaxis: Status post lung biopsy with pneumothorax and chest tube placement  · Peptic ulcer prophylaxis: Pepcid  · High risk medications: none  · Social Service and Case Management consults for DC planning  · Dietician consult initiated    CORE MEASURES  DVT prophylaxis: Status post lung biopsy with chest tube placement due to pneumothorax  Decubitus ulcer present on admission: No  CODE STATUS: FULL CODE  Nutrition Status: good   Physical therapy: No   Old Charts reviewed: Yes  EKG Reviewed: Yes  Advance Directive Addressed: Yes    ALONDRA Lynch - CNP, APRFELICIA, NP-C  7/29/2021, 12:27 PM

## 2021-07-29 NOTE — PROGRESS NOTES
SW met with pt, spouse and dtr to complete assessment. Pt is alert and oriented and cooperative with assessment. Pt is 70year old  male admitted for lung pulmonary nodule biopsy and right sided pneumothorax iatrogenic. Pt lives in his home in [facility] with his spouse. Pt does not use any DME or community services at home. Pt drives himself to appointments and has no concerns with this. Pt is a full code and follows with Yaneth Walker CNP as PCP. Pt reports that he has a living will at home and copy requested at their convenience. Pt states that his spouse and dtr would be decision makers for him if he was unable to answer. Pt reports that his medications are affordable with his insurance. Pt plans to return home at discharge. Pt and spouse identify no discharge needs or concerns at this time. Spouse reports that she will assist with dressing changes as needed. SW will remain available as needed.  Dragan PATELW 7/29/2021

## 2021-07-29 NOTE — PROGRESS NOTES
Physical Therapy  Facility/Department: [facility] AT THE [facility]  Daily Treatment Note  NAME: Racquel Forrest  : 1950  MRN: [id number]    Date of Service: 2021    Discharge Recommendations:  Continue to assess pending progress     Patient Diagnosis(es): The encounter diagnosis was Right middle lobe pulmonary nodule. has a past medical history of Abnormal cardiac cath, Actinic keratosis, Arthritis, CAD (coronary artery disease), Calculus of kidney, Cervical stenosis of spine, Diabetes mellitus ([address] GERD (gastroesophageal reflux disease), Gout, H/O echocardiogram, History of cardiac cath, History of echocardiogram, History of echocardiogram, History of heart attack, Hyperlipidemia, Hypertension, Raynaud's phenomenon, S/P angioplasty with stent, and Tobacco abuse.   has a past surgical history that includes Cardiac catheterization (); Hemorrhoid surgery; hernia repair; Neck surgery (); other surgical history (10/2014); other surgical history (2014); Cataract removal (2015); Upper gastrointestinal endoscopy (2005); Cataract removal (2015); Colonoscopy (2006); Colonoscopy (2016); Cardiac catheterization (2017); Cardiac catheterization (2017); Cardiac catheterization (2019); Upper gastrointestinal endoscopy (N/A, 2019); Upper gastrointestinal endoscopy (N/A, 10/22/2019); Lung biopsy (Right, 2021); and CT NEEDLE BIOPSY LUNG PERCUTANEOUS (2021). Restrictions     Subjective   General  Chart Reviewed: Yes  Response To Previous Treatment: Patient with no complaints from previous session. Family / Caregiver Present: Yes  Subjective  Subjective: Pt denied pain prior to treatment, pleasant and agreeable to going for a walk but declined completing therex despite 2 attempts to complete this date. Pt reporting he was too tired to complete therex.           Orientation  Orientation  Overall Orientation Status: Within Functional Limits  Cognition      Objective      Transfers  Sit to Stand: Stand by assistance  Stand to sit: Stand by assistance  Stand Pivot Transfers: Stand by assistance  Ambulation  Ambulation?: Yes  Ambulation 1  Surface: level tile  Device: No Device  Assistance: Modified Independent  Distance: 150ft  Comments: No LOB. Pt able to complete 180 degree turn x 2 without LOB. Balance  Standing - Static: Good  Standing - Dynamic: Good    Goals  Short term goals  Time Frame for Short term goals: 20 days  Short term goal 1: Pt will be able to ambulate independently about the room and in halls as desired to maintain endurance for discharge home. Short term goal 2: Pt will negotiate at least 4 steps with 1 rail to allow him to enter and exit his home with good safety. Short term goal 3: Pt high level balance will be good to allow sharpened rhomberg of at least 30 seconds to reduce risk for fall and ambulation on non-compliant terrain. Patient Goals   Patient goals : return to his longterm job and to home    Plan    Plan  Times per week: 7  Times per day: Twice a day (with exception of weekends, daily)  Current Treatment Recommendations: Endurance Training, Balance Training, Functional Mobility Training, Gait Training, Stair training  Plan Comment: Initiate POC  Safety Devices  Type of devices: All fall risk precautions in place, Call light within reach, Left in chair, Nurse notified (No alarm present upon entering pt room.  RN notified to tend to pt tubes following tx.)     Therapy Time   Individual Concurrent Group Co-treatment   Time In 0758         Time Out 1201         Minutes 10         Timed Code Treatment Minutes: Tiny 149, Juanita 26

## 2021-07-29 NOTE — PROGRESS NOTES
Christiano Rush RN received okay for pt to go off unit for chest xray with chest tube disconnected from wall suction. Pt disconnected at this time, and taken by wheelchair to radiology.

## 2021-07-30 ENCOUNTER — APPOINTMENT (OUTPATIENT)
Dept: GENERAL RADIOLOGY | Age: 71
DRG: 201 | End: 2021-07-30
Attending: INTERNAL MEDICINE
Payer: MEDICARE

## 2021-07-30 VITALS
SYSTOLIC BLOOD PRESSURE: 120 MMHG | RESPIRATION RATE: 16 BRPM | BODY MASS INDEX: 22.11 KG/M2 | WEIGHT: 163.2 LBS | TEMPERATURE: 98.4 F | HEART RATE: 61 BPM | DIASTOLIC BLOOD PRESSURE: 54 MMHG | OXYGEN SATURATION: 97 % | HEIGHT: 72 IN

## 2021-07-30 LAB
ABSOLUTE EOS #: 0.31 K/UL (ref 0–0.44)
ABSOLUTE IMMATURE GRANULOCYTE: 0.03 K/UL (ref 0–0.3)
ABSOLUTE LYMPH #: 1.9 K/UL (ref 1.1–3.7)
ABSOLUTE MONO #: 0.87 K/UL (ref 0.1–1.2)
ANION GAP SERPL CALCULATED.3IONS-SCNC: 13 MMOL/L (ref 9–17)
BASOPHILS # BLD: 1 % (ref 0–2)
BASOPHILS ABSOLUTE: 0.05 K/UL (ref 0–0.2)
BUN BLDV-MCNC: 23 MG/DL (ref 8–23)
BUN/CREAT BLD: 24 (ref 9–20)
CALCIUM SERPL-MCNC: 9.1 MG/DL (ref 8.6–10.4)
CHLORIDE BLD-SCNC: 106 MMOL/L (ref 98–107)
CO2: 21 MMOL/L (ref 20–31)
CREAT SERPL-MCNC: 0.97 MG/DL (ref 0.7–1.2)
DIFFERENTIAL TYPE: ABNORMAL
EOSINOPHILS RELATIVE PERCENT: 4 % (ref 1–4)
GFR AFRICAN AMERICAN: >60 ML/MIN
GFR NON-AFRICAN AMERICAN: >60 ML/MIN
GFR SERPL CREATININE-BSD FRML MDRD: ABNORMAL ML/MIN/{1.73_M2}
GFR SERPL CREATININE-BSD FRML MDRD: ABNORMAL ML/MIN/{1.73_M2}
GLUCOSE BLD-MCNC: 109 MG/DL (ref 74–100)
GLUCOSE BLD-MCNC: 111 MG/DL (ref 70–99)
GLUCOSE BLD-MCNC: 117 MG/DL (ref 74–100)
GLUCOSE BLD-MCNC: 214 MG/DL (ref 74–100)
HCT VFR BLD CALC: 35.7 % (ref 40.7–50.3)
HEMOGLOBIN: 11.4 G/DL (ref 13–17)
IMMATURE GRANULOCYTES: 0 %
LYMPHOCYTES # BLD: 22 % (ref 24–43)
MCH RBC QN AUTO: 28.9 PG (ref 25.2–33.5)
MCHC RBC AUTO-ENTMCNC: 31.9 G/DL (ref 28.4–34.8)
MCV RBC AUTO: 90.6 FL (ref 82.6–102.9)
MONOCYTES # BLD: 10 % (ref 3–12)
NRBC AUTOMATED: 0 PER 100 WBC
PDW BLD-RTO: 14 % (ref 11.8–14.4)
PLATELET # BLD: 173 K/UL (ref 138–453)
PLATELET ESTIMATE: ABNORMAL
PMV BLD AUTO: 11.2 FL (ref 8.1–13.5)
POTASSIUM SERPL-SCNC: 4.4 MMOL/L (ref 3.7–5.3)
RBC # BLD: 3.94 M/UL (ref 4.21–5.77)
RBC # BLD: ABNORMAL 10*6/UL
SEG NEUTROPHILS: 63 % (ref 36–65)
SEGMENTED NEUTROPHILS ABSOLUTE COUNT: 5.38 K/UL (ref 1.5–8.1)
SODIUM BLD-SCNC: 140 MMOL/L (ref 135–144)
SURGICAL PATHOLOGY REPORT: NORMAL
WBC # BLD: 8.5 K/UL (ref 3.5–11.3)
WBC # BLD: ABNORMAL 10*3/UL

## 2021-07-30 PROCEDURE — 85025 COMPLETE CBC W/AUTO DIFF WBC: CPT

## 2021-07-30 PROCEDURE — 32552 REMOVE LUNG CATHETER: CPT | Performed by: SURGERY

## 2021-07-30 PROCEDURE — 82947 ASSAY GLUCOSE BLOOD QUANT: CPT

## 2021-07-30 PROCEDURE — 71046 X-RAY EXAM CHEST 2 VIEWS: CPT

## 2021-07-30 PROCEDURE — 6370000000 HC RX 637 (ALT 250 FOR IP): Performed by: RADIOLOGY

## 2021-07-30 PROCEDURE — 36415 COLL VENOUS BLD VENIPUNCTURE: CPT

## 2021-07-30 PROCEDURE — 2580000003 HC RX 258: Performed by: NURSE PRACTITIONER

## 2021-07-30 PROCEDURE — 6370000000 HC RX 637 (ALT 250 FOR IP): Performed by: NURSE PRACTITIONER

## 2021-07-30 PROCEDURE — 80048 BASIC METABOLIC PNL TOTAL CA: CPT

## 2021-07-30 RX ADMIN — FAMOTIDINE 20 MG: 20 TABLET ORAL at 08:42

## 2021-07-30 RX ADMIN — SODIUM CHLORIDE, PRESERVATIVE FREE 10 ML: 5 INJECTION INTRAVENOUS at 08:42

## 2021-07-30 RX ADMIN — ACETAMINOPHEN 1000 MG: 500 TABLET, FILM COATED ORAL at 11:04

## 2021-07-30 RX ADMIN — METFORMIN HYDROCHLORIDE 1000 MG: 500 TABLET ORAL at 08:42

## 2021-07-30 RX ADMIN — ALOGLIPTIN 25 MG: 25 TABLET, FILM COATED ORAL at 08:42

## 2021-07-30 RX ADMIN — ATORVASTATIN CALCIUM 20 MG: 20 TABLET, FILM COATED ORAL at 08:41

## 2021-07-30 RX ADMIN — ATENOLOL 25 MG: 25 TABLET ORAL at 08:41

## 2021-07-30 RX ADMIN — ISOSORBIDE MONONITRATE 15 MG: 30 TABLET, EXTENDED RELEASE ORAL at 08:41

## 2021-07-30 RX ADMIN — LISINOPRIL 20 MG: 20 TABLET ORAL at 08:41

## 2021-07-30 RX ADMIN — AMLODIPINE BESYLATE 5 MG: 5 TABLET ORAL at 08:41

## 2021-07-30 RX ADMIN — INSULIN GLARGINE 8 UNITS: 100 INJECTION, SOLUTION SUBCUTANEOUS at 08:42

## 2021-07-30 RX ADMIN — GLIPIZIDE 5 MG: 5 TABLET ORAL at 07:29

## 2021-07-30 ASSESSMENT — PAIN DESCRIPTION - ORIENTATION: ORIENTATION: RIGHT

## 2021-07-30 ASSESSMENT — PAIN SCALES - GENERAL
PAINLEVEL_OUTOF10: 2
PAINLEVEL_OUTOF10: 0

## 2021-07-30 ASSESSMENT — PAIN DESCRIPTION - FREQUENCY: FREQUENCY: INTERMITTENT

## 2021-07-30 ASSESSMENT — PAIN DESCRIPTION - ONSET: ONSET: GRADUAL

## 2021-07-30 ASSESSMENT — PAIN - FUNCTIONAL ASSESSMENT: PAIN_FUNCTIONAL_ASSESSMENT: ACTIVITIES ARE NOT PREVENTED

## 2021-07-30 ASSESSMENT — PAIN DESCRIPTION - DESCRIPTORS: DESCRIPTORS: ACHING

## 2021-07-30 ASSESSMENT — PAIN DESCRIPTION - LOCATION: LOCATION: OTHER (COMMENT)

## 2021-07-30 ASSESSMENT — PAIN DESCRIPTION - PAIN TYPE: TYPE: ACUTE PAIN

## 2021-07-30 NOTE — PROGRESS NOTES
Progress Note    SUBJECTIVE:    Patient seen for f/u of right pneumothorax. He is up ambulating in the room without difficulty. Denies short of breath. States he is sore today. He is currently off suction. Afebrile. ROS:   Constitutional: negative  for fevers, and negative for chills. Respiratory: negative for shortness of breath, negative for cough, and negative for wheezing  Cardiovascular: negative for chest pain, and negative for palpitations  Gastrointestinal: negative for abdominal pain, negative for nausea,negative for vomiting, negative for diarrhea, and negative for constipation     All other systems were reviewed with the patient and are negative unless otherwise stated in HPI      OBJECTIVE:      Vitals:   Vitals:    07/30/21 0721   BP: (!) 120/54   Pulse: 61   Resp: 16   Temp: 98.4 °F (36.9 °C)   SpO2: 97%     Weight: 163 lb 3.2 oz (74 kg)   Height: 6' (182.9 cm)   -----------------------------------------------------------------  Exam:    GEN:  alert and oriented to person, place and time, well-developed and well-nourished, in no acute distress  EYES:  No gross abnormalities. , PERRL and EOMI  NECK: normal, supple, no lymphadenopathy,  no carotid bruits  PULM: clear to auscultation bilaterally- no wheezes, rales or rhonchi, normal air movement, no respiratory distress and decreased breath sounds noted-right.   Dressing to right chest clean dry and intact, patient currently off wall suction   COR:   regular rate & rhythm, no murmurs, no gallops, S1 normal and S2 normal  ABD:    soft, non-tender, non-distended, normal bowel sounds, no masses or organomegaly  EXT:    no cyanosis, clubbing or edema present    NEURO: follows commands, PACK, no deficits  SKIN:   no rashes or significant lesions  -----------------------------------------------------------------    Diagnostic Data:    · All available data reviewed  Lab Results   Component Value Date    WBC 8.5 07/30/2021    HGB 11.4 (L) 07/30/2021    MCV 90.6 07/30/2021     07/30/2021      Lab Results   Component Value Date    GLUCOSE 111 (H) 07/30/2021    BUN 23 07/30/2021    CREATININE 0.97 07/30/2021     07/30/2021    K 4.4 07/30/2021    CALCIUM 9.1 07/30/2021     07/30/2021    CO2 21 07/30/2021       XR CHEST (2 VW)   Final Result   Right chest tube with stable small right apical pneumothorax         XR CHEST (2 VW)   Final Result   Right chest tube without definite change in small right apical pneumothorax         XR CHEST (2 VW)   Final Result   Trace right apical pneumothorax suspected. CT NEEDLE BIOPSY LUNG PERCUTANEOUS   Final Result   Successful right middle lobe pulmonary nodule biopsy. Iatrogenic right pneumothorax treated immediately with 8 Trinidadian chest tube   insertion. ASSESSMENT / PLAN:  · Pneumothorax after biopsy  ? X-ray this morning-shows residual right pneumothorax  ? Appreciate general surgery  § Continue to clamp chest tube  § Repeat chest x-ray today-Stable small pneumothorax  § Incentive spirometer  ?  Pain control  · DVT prophylaxis: Status post lung biopsy with pneumothorax and chest tube placement  · Peptic ulcer prophylaxis: Pepcid  · High risk medications: none  · Disposition:  Discharge plan is home      ALONDRA Lynch - CNP , APRN, NP-C

## 2021-07-30 NOTE — PROGRESS NOTES
7/30/21- inpatient rounds    EXAMINATION:   TWO XRAY VIEWS OF THE CHEST       7/30/2021 6:47 am       COMPARISON:   July 29, 2021, chest examination       HISTORY:   ORDERING SYSTEM PROVIDED HISTORY: pneumo   TECHNOLOGIST PROVIDED HISTORY:   pneumo       FINDINGS:   Normal cardiopericardial silhouette       stable position/appearance of right chest tube without significant change in   small right apical pneumothorax with 28 mm craniocaudad dimension       There are no new significant pleural, parenchymal or mediastinal findings       Remote remodeled right rib fracture           Impression   Right chest tube with stable small right apical pneumothorax         Patient stable. No issues overnight. Chest tube clamped for over 24 hours. Chest tube removed. Procedure:    Diagnosis: iatrogenic right side pneumothorax s/p CT guided lung biopsy, chest tube was placed by radiology. Surgery consulted to remove chest tube  Surgeon: Dr. Joaquin Caballero  Anesthesia: none needed  Procedure: removal chest tube right side  EBL: none  Complications: none  Specimens: none  Details: patient positioned appropriately. Dressing removed. Chest tube disconnected from Pleuravac. Sutures cut. Site prepped. The pigtail black suture wire was cut to release the pig tail tip of tube catheter in the chest cavity. The securing small blue C-clamp released. The patient exhales as the tube catheter is removed easily and occlusive dressing placed. Patient tolerated well. Discharge instructions discussed with patient and RN and CNP for discharge planning today.

## 2021-07-30 NOTE — DISCHARGE SUMMARY
Discharge Summary    Katarina Mcneil  :  1950  MRN:  [id number]    Admit date:  2021      Discharge date: 2021     Admitting Physician:  Ligia Blandon MD    Discharge Diagnoses: Active Problems:    Pneumothorax after biopsy    Pneumothorax, post biopsy, right  Resolved Problems:    * No resolved hospital problems. *      Hospital Course:   Katarina Mcneil is a 70 y.o. male admitted with Pneumothorax after lung biopsy. He presented for elective lung biopsy as an outpatient yesterday. Patient tolerated the procedure well however during the procedure he developed a right pneumothorax. Patient had chest tube placed by interventional radiologist and was admitted. Patient stated that several years ago he had a spontaneous pneumothorax when in an airplane. He stated he was in a small plane that was going up and down in the air and he developed the pneumothorax. Patient stated approximately 1 year ago there was a spot on his lung however they continue to watch. He stated repeat study showed us another spot that was next to it and biopsy was planned. Patient currently is sitting up denies pain. No respiratory distress or hypoxia. Repeat chest x-ray this morning showed residual pneumothorax. General surgery was consulted. Chest tube was clamped for 24 hours. Patient continues to have a small but stable pneumothorax. Chest tube was removed and he was cleared for discharge by general surgery. He will resume Plavix and ASA. Consultants:  Dr. Hailey Carlson, gen surg    Procedures: none    Complications: none    Discharge Condition: fair    Exam:  GEN:  alert and oriented to person, place and time, well-developed and well-nourished, in no acute distress  EYES:  No gross abnormalities. , PERRL and EOMI  NECK: normal, supple, no lymphadenopathy,  no carotid bruits  PULM: clear to auscultation bilaterally- no wheezes, rales or rhonchi, normal air movement, no respiratory distress and decreased breath sounds noted-right.  Dressing to right chest clean dry and intact, patient currently off wall suction   COR:   regular rate & rhythm, no murmurs, no gallops, S1 normal and S2 normal  ABD:    soft, non-tender, non-distended, normal bowel sounds, no masses or organomegaly  EXT:    no cyanosis, clubbing or edema present    NEURO: follows commands, PACK, no deficits  SKIN:   no rashes or significant lesions    Significant Diagnostic Studies:   Lab Results   Component Value Date    WBC 8.5 07/30/2021    HGB 11.4 (L) 07/30/2021     07/30/2021       Lab Results   Component Value Date    BUN 23 07/30/2021    CREATININE 0.97 07/30/2021     07/30/2021    K 4.4 07/30/2021    CALCIUM 9.1 07/30/2021     07/30/2021    CO2 21 07/30/2021    LABGLOM >60 07/30/2021       No results found for: [place], EPITHUA, LEUKOCYTESUR, SPECGRAV, GLUCOSEU, KETUA, PROTEINU, HGBUR, CASTUA, CRYSTUA, BACTERIA, YEAST    XR CHEST (2 VW)    Result Date: 7/29/2021  EXAMINATION: TWO XRAY VIEWS OF THE CHEST 7/29/2021 2:03 pm COMPARISON: July 29, 2021, chest examination HISTORY: ORDERING SYSTEM PROVIDED HISTORY: follow up of pneumothorax TECHNOLOGIST PROVIDED HISTORY: follow up of pneumothorax FINDINGS: Stable normal cardiopericardial silhouette Stable right chest tube without definite change in small right apical pneumothorax No significant pleural process Remote remodeled left rib fracture. Postsurgical changes of the lower cervical spine     Right chest tube without definite change in small right apical pneumothorax     XR CHEST (2 VW)    Result Date: 7/29/2021  EXAMINATION: TWO XRAY VIEWS OF THE CHEST 7/29/2021 9:15 am COMPARISON: 12/25/2020 HISTORY: ORDERING SYSTEM PROVIDED HISTORY: Right pneumothorax status post right middle lobe pulmonary nodule biopsy TECHNOLOGIST PROVIDED HISTORY: chest tube FINDINGS: Right chest tube projects over the right mid chest.  Trace right apical pneumothorax suspected.   The lungs otherwise appear clear. Cardiomediastinal silhouette within normal limits. Calcifications noted of the aortic arch. No acute osseous abnormality. Trace right apical pneumothorax suspected. CT NEEDLE BIOPSY LUNG PERCUTANEOUS    Result Date: 7/28/2021  PROCEDURE: CT NEEDLE BIOPSY LUNG PERCUTANEOUS MODERATE CONSCIOUS SEDATION 7/28/2021 HISTORY: ORDERING SYSTEM PROVIDED HISTORY: Right middle lobe pulmonary nodule TECHNOLOGIST PROVIDED HISTORY: enlarging RLL nodule with FDG uptake and high risk factor profile TECHNIQUE: Dose modulation, iterative reconstruction, and/or weight based adjustment of the mA/kV was utilized to reduce the radiation dose to as low as reasonably achievable. CONTRAST: None SEDATION: 1 mg versed and 75 mcg fentanyl were titrated intravenously for moderate sedation monitored under my direction. Total intra-service time of sedation was 30 minutes. The patient's vital signs were monitored throughout the procedure and recorded in the patient's medical record by a qualified procedure nurse. DESCRIPTION OF PROCEDURE: Informed consent was obtained after a detailed discussion about the procedure including the risk, benefits, and alternatives. Universal protocol was followed including a time-out to confirm the correct patient and procedure. The patient was positioned supine on the CT table. Axial CT images were obtained through the chest and a suitable skin site was prepped and draped in sterile fashion. Local anesthesia was achieved with lidocaine. Under repeat CT guidance, a 19 gauge coaxial needle was advanced into the right middle lobe pulmonary nodule. Final needle position was confirmed prior to biopsy. Three 20 gauge core biopsy specimens were obtained through the coaxial needle and given to Pathology who was present to help confirm sample adequacy. However, the 3rd sample appeared to contain minimal solid tissue. Repeat CT was performed demonstrating a small but significant pneumothorax.   A chest tube was placed immediately. The coaxial needle was repositioned under repeat CT guidance until air was freely aspirated. A 0.018 wire was advanced through the needle. The needle was removed and a 6 [place]utanese coaxial dilator and sheath was advanced over the wire. The wire and dilator were removed and a 0.038 guidewire was advanced through the sheath. The sheath was removed and an 8 [place] pigtail catheter was advanced over the wire. The wire was removed and the pigtail formed. The catheter was sutured in place with 2-0 silk. Air was aspirated and the catheter was connected to an atrium. A sterile occlusive dressing was applied. Final CT was performed. Patient tolerated the procedure well. Estimated blood loss: Less than 5 mL. FINDINGS:  CT redemonstrates the right middle lobe pulmonary nodule and guides coaxial needle placement. CT immediately prior to biopsy demonstrates satisfactory coaxial needle placement in the periphery of the nodule. CT after biopsy demonstrates a small but significant right pneumothorax. Final CT demonstrates satisfactory right chest tube placement with near complete aspiration of the pneumothorax. Successful right middle lobe pulmonary nodule biopsy. Iatrogenic right pneumothorax treated immediately with 8 [place] chest tube insertion.        Assessment and Plan:  Patient Active Problem List    Diagnosis Date Noted    Pneumothorax after biopsy 07/28/2021    Pneumothorax, post biopsy, right 07/28/2021    Strain of lumbar region 05/27/2021    Abnormal CT scan, stomach 10/05/2019    Abnormal gastrointestinal PET scan 10/05/2019    Substernal chest pain 01/13/2019    Dyspepsia 01/04/2019    Epigastric pain 01/04/2019    Senile nuclear sclerosis 06/08/2015    Type II or unspecified type diabetes mellitus without mention of complication, not stated as uncontrolled     Calculus of kidney     Actinic keratosis     Shortness of breath, post procedure improving probably Effient side effect. 09/24/2013    S/P angioplasty with stent, obtuse marginal 09/24/2013    Angina pectoris ([facility] [address] 09/10/2013    ASHD (arteriosclerotic heart disease) 09/10/2013    Hypertension 09/10/2013    Diabetes ([facility] [address] 09/10/2013    Hyperlipidemia 09/10/2013        Discharge Medications:       Sathya Beckett   Home Medication Instructions RWO:[id number]    Printed on:07/30/21 1148   Medication Information                      amLODIPine (NORVASC) 5 MG tablet  Take 1 tablet by mouth daily             aspirin 81 MG tablet  Take 81 mg by mouth daily. atenolol (TENORMIN) 25 MG tablet  Take 1 tablet by mouth daily             atorvastatin (LIPITOR) 20 MG tablet  Take 1 tablet by mouth daily             chlorpheniramine (EQ CHLORTABS) 4 MG tablet  Take 4 mg by mouth every 6 hours as needed for Allergies             clopidogrel (PLAVIX) 75 MG tablet  Take 1 tablet by mouth daily             etodolac (LODINE) 400 MG tablet  Take 1 tablet by mouth 2 times daily             glimepiride (AMARYL) 2 MG tablet  Take 1 tablet by mouth 2 times daily             Insulin Glargine, 2 Unit Dial, 300 UNIT/ML SOPN  Insulin Glargine Insulin Glargine U-300 Conc Active 10 UNIT Subcutaneous Every morning May 7th, 2020 11:47am 05-  [facility] ([id number])             isosorbide mononitrate (IMDUR) 30 MG extended release tablet  TAKE 1 TABLET DAILY             lisinopril (PRINIVIL;ZESTRIL) 20 MG tablet  Take 0.5 tablets by mouth daily             nitroGLYCERIN (NITROSTAT) 0.4 MG SL tablet  Place 1 tablet under the tongue every 5 minutes as needed for Chest pain             sitaGLIPtan-metformin (JANUMET)  MG per tablet  Take 1 tablet by mouth 2 times daily (with meals)             tiZANidine (ZANAFLEX) 4 MG tablet  Take 1 tablet by mouth every 8 hours as needed (Rib pain)                 Patient Instructions:    Activity: activity as tolerated  Diet: regular diet  Wound Care: keep wound clean and dry x 24 hours then remove  Other: None     Disposition:   Discharge to Home    Follow up:  Patient will be followed by Alexander Velez in 1-2 weeks    CORE MEASURES on Discharge (if applicable)  ACE/ARB in CHF: NA  Statin in MI: NA  ASA in MI: NA  Statin in CVA: NA  Antiplatelet in CVA: NA    Total time spent on discharge services: 40 minutes    Including the following activities:  Evaluation and Management of patient  Discussion with patient and/or surrogate about current care plan  Coordination with Case Management and/or   Coordination of care with Consultants (if applicable)   Coordination of care with Receiving Facility Physician (if applicable)  Completion of DME forms (if applicable)  Preparation of Discharge Summary  Preparation of Medication Reconciliation  Preparation of Discharge Prescriptions    Signed:  ALONDRA Manning CNP, ALONDRA, NP-C  7/30/2021, 11:47 AM

## 2021-07-30 NOTE — PROGRESS NOTES
[facility]  Inpatient/Observation/Outpatient Rehabilitation    Date: 2021  Patient Name: Amy Dey       [x] Inpatient Acute/Observation       []  Outpatient  : 1950       Attempted to see pt but he refused and stated \"I didn't sleep well last night and don't want to do any walking or exercises. \" Educated pt at length on the importance of participating in physical therapy treatment with pt continuing to refuse.        Stefanie Najera Date: 2021

## 2021-07-30 NOTE — PLAN OF CARE
Problem: Pain:  Goal: Pain level will decrease  Description: Pain level will decrease  Outcome: Ongoing  Pt able to verbalize when in pain. States a 0 on a 0-10 pain scale. Will continue to monitor. Problem: Skin Integrity:  Goal: Will show no infection signs and symptoms  Description: Will show no infection signs and symptoms  Outcome: Ongoing  Pt has incision to right chest for chest tube. Dressing is dry, intact, small amount of old drainage noted.       Problem: Infection:  Goal: Will remain free from infection  Description: Will remain free from infection  Outcome: Ongoing     Problem: Daily Care:  Goal: Daily care needs are met  Description: Daily care needs are met  Outcome: Ongoing

## 2021-07-30 NOTE — PROGRESS NOTES
Writer at beside for second assessment. Pt A&Ox4. Pt denies shortness of breath or chest pain. Chest tube remains in place to right chest. Chest tube is clamped at this time. Old drainage noted to dressing to right chest. Pt resting comfortably with eyes closed and call light and belongings within reach. Denies any needs at this time.

## 2021-08-12 ENCOUNTER — OFFICE VISIT (OUTPATIENT)
Dept: PULMONOLOGY | Age: 71
End: 2021-08-12
Payer: MEDICARE

## 2021-08-12 VITALS
SYSTOLIC BLOOD PRESSURE: 103 MMHG | HEIGHT: 72 IN | TEMPERATURE: 97.9 F | RESPIRATION RATE: 18 BRPM | BODY MASS INDEX: 22.12 KG/M2 | OXYGEN SATURATION: 98 % | HEART RATE: 68 BPM | DIASTOLIC BLOOD PRESSURE: 57 MMHG | WEIGHT: 163.3 LBS

## 2021-08-12 DIAGNOSIS — J41.1 MUCOPURULENT CHRONIC BRONCHITIS (HCC): ICD-10-CM

## 2021-08-12 DIAGNOSIS — R43.2 DYSGEUSIA: ICD-10-CM

## 2021-08-12 DIAGNOSIS — R91.1 RIGHT MIDDLE LOBE PULMONARY NODULE: Primary | ICD-10-CM

## 2021-08-12 DIAGNOSIS — Z87.891 STOPPED SMOKING WITH GREATER THAN 40 PACK YEAR HISTORY: ICD-10-CM

## 2021-08-12 PROCEDURE — G8427 DOCREV CUR MEDS BY ELIG CLIN: HCPCS | Performed by: INTERNAL MEDICINE

## 2021-08-12 PROCEDURE — 3023F SPIROM DOC REV: CPT | Performed by: INTERNAL MEDICINE

## 2021-08-12 PROCEDURE — 3017F COLORECTAL CA SCREEN DOC REV: CPT | Performed by: INTERNAL MEDICINE

## 2021-08-12 PROCEDURE — 1123F ACP DISCUSS/DSCN MKR DOCD: CPT | Performed by: INTERNAL MEDICINE

## 2021-08-12 PROCEDURE — 99213 OFFICE O/P EST LOW 20 MIN: CPT | Performed by: INTERNAL MEDICINE

## 2021-08-12 PROCEDURE — 1111F DSCHRG MED/CURRENT MED MERGE: CPT | Performed by: INTERNAL MEDICINE

## 2021-08-12 PROCEDURE — 99214 OFFICE O/P EST MOD 30 MIN: CPT | Performed by: INTERNAL MEDICINE

## 2021-08-12 PROCEDURE — 4040F PNEUMOC VAC/ADMIN/RCVD: CPT | Performed by: INTERNAL MEDICINE

## 2021-08-12 PROCEDURE — G8420 CALC BMI NORM PARAMETERS: HCPCS | Performed by: INTERNAL MEDICINE

## 2021-08-12 PROCEDURE — G8926 SPIRO NO PERF OR DOC: HCPCS | Performed by: INTERNAL MEDICINE

## 2021-08-12 PROCEDURE — 1036F TOBACCO NON-USER: CPT | Performed by: INTERNAL MEDICINE

## 2021-08-12 ASSESSMENT — ENCOUNTER SYMPTOMS
COUGH: 1
EYES NEGATIVE: 1

## 2021-08-12 NOTE — PATIENT INSTRUCTIONS
SURVEY:    You may be receiving a survey from [employer] regarding your visit today. Please complete the survey to enable us to provide the highest quality of care to you and your family. If you cannot score us a very good on any question, please call the office to discuss how we could have made your experience a very good one. Thank you.

## 2021-08-12 NOTE — PROGRESS NOTES
Subjective:      Patient ID: Racquel Forrest is a 70 y.o. male being seen in my clinic for   Chief Complaint   Patient presents with    Pulmonary Nodule       HPI  Follow-up visit for needle biopsy right middle lobe nodule on 7/28. Biopsy showed benign lung parenchyma with focal fibrosis and mild chronic inflammation with intra-alveolar histiocytes. GMS and AFB stains were negative. Unfortunately, the comp the procedure was complicated by pneumothorax requiring tube thoracostomy and hospitalization for 3 days. He has since recovered. He reports dyschezia. Claims that he lost his taste about 2 months ago. Family believes that this is been a chronic problem which has waxed and waned much longer. Specifically denies symptoms of COVID-19 infection. Received both of his vaccinations. No medications typically noted provoke this symptom. States that he is losing weight because his food does not taste good. I suggested he follow-up with his ear nose and throat physician. Patient states that he is quit smoking now for a month. \"I will never go back. \"  Cough improved. Review of Systems   Constitutional: Positive for unexpected weight change. HENT: Negative. Eyes: Negative. Respiratory: Positive for cough. Cardiovascular: Negative. All other systems reviewed and are negative.       Objective:     Vitals:    08/12/21 1639   BP: (!) 103/57   Site: Left Upper Arm   Position: Sitting   Cuff Size: Medium Adult   Pulse: 68   Resp: 18   Temp: 97.9 °F (36.6 °C)   TempSrc: Temporal   SpO2: 98%   Weight: 163 lb 4.8 oz (74.1 kg)   Height: 6' (1.829 m)     Current Outpatient Medications   Medication Sig Dispense Refill    amLODIPine (NORVASC) 5 MG tablet Take 1 tablet by mouth daily 90 tablet 3    clopidogrel (PLAVIX) 75 MG tablet Take 1 tablet by mouth daily 90 tablet 3    atenolol (TENORMIN) 25 MG tablet Take 1 tablet by mouth daily 90 tablet 3    etodolac (LODINE) 400 MG tablet Take 1 tablet by mouth 2 times daily 30 tablet 0    isosorbide mononitrate (IMDUR) 30 MG extended release tablet TAKE 1 TABLET DAILY (Patient taking differently: Take 15 mg by mouth daily ) 90 tablet 3    tiZANidine (ZANAFLEX) 4 MG tablet Take 1 tablet by mouth every 8 hours as needed (Rib pain) 15 tablet 0    Insulin Glargine, 2 Unit Dial, 300 UNIT/ML SOPN Insulin Glargine Insulin Glargine U-300 Conc Active 10 UNIT Subcutaneous Every morning May 7th, 2020 11:47am 05-  [facility] ([id number])      atorvastatin (LIPITOR) 20 MG tablet Take 1 tablet by mouth daily 90 tablet 3    sitaGLIPtan-metformin (JANUMET)  MG per tablet Take 1 tablet by mouth 2 times daily (with meals)      lisinopril (PRINIVIL;ZESTRIL) 20 MG tablet Take 0.5 tablets by mouth daily (Patient taking differently: Take 20 mg by mouth daily ) 90 tablet 3    nitroGLYCERIN (NITROSTAT) 0.4 MG SL tablet Place 1 tablet under the tongue every 5 minutes as needed for Chest pain 25 tablet 3    glimepiride (AMARYL) 2 MG tablet Take 1 tablet by mouth 2 times daily 180 tablet 3    aspirin 81 MG tablet Take 81 mg by mouth daily. No current facility-administered medications for this visit. Physical Exam  Vitals and nursing note reviewed. Constitutional:       Appearance: He is well-developed. HENT:      Nose: Nose normal. No congestion. Mouth/Throat:      Mouth: Mucous membranes are moist.      Pharynx: Oropharynx is clear. No oropharyngeal exudate. Eyes:      General: No scleral icterus. Conjunctiva/sclera: Conjunctivae normal.   Neck:      Thyroid: No thyromegaly. Vascular: No JVD. Trachea: No tracheal deviation. Comments: No adenopathy. Cardiovascular:      Rate and Rhythm: Normal rate and regular rhythm. Heart sounds: Normal heart sounds. No murmur heard. No gallop. Pulmonary:      Effort: Pulmonary effort is normal. No respiratory distress. Breath sounds: No wheezing or rales. Chest:      Chest wall: No tenderness. Abdominal:      Palpations: Abdomen is soft. Tenderness: There is no abdominal tenderness. Musculoskeletal:      Cervical back: Neck supple. Lymphadenopathy:      Cervical: No cervical adenopathy. Skin:     General: Skin is warm and dry. Neurological:      Mental Status: He is alert and oriented to person, place, and time.        Wt Readings from Last 3 Encounters:   08/12/21 163 lb 4.8 oz (74.1 kg)   07/30/21 163 lb 3.2 oz (74 kg)   07/15/21 167 lb 1.6 oz (75.8 kg)     Results for orders placed or performed during the hospital encounter of 07/28/21   Protime-INR   Result Value Ref Range    Protime 13.4 11.5 - 14.2 sec    INR 1.0    CBC auto differential   Result Value Ref Range    WBC 9.0 3.5 - 11.3 k/uL    RBC 4.24 4.21 - 5.77 m/uL    Hemoglobin 12.4 (L) 13.0 - 17.0 g/dL    Hematocrit 38.1 (L) 40.7 - 50.3 %    MCV 89.9 82.6 - 102.9 fL    MCH 29.2 25.2 - 33.5 pg    MCHC 32.5 28.4 - 34.8 g/dL    RDW 14.2 11.8 - 14.4 %    Platelets 540 243 - 824 k/uL    MPV 10.4 8.1 - 13.5 fL    NRBC Automated 0.0 0.0 per 100 WBC    Differential Type NOT REPORTED     Seg Neutrophils 61 36 - 65 %    Lymphocytes 24 24 - 43 %    Monocytes 11 3 - 12 %    Eosinophils % 3 1 - 4 %    Basophils 1 0 - 2 %    Immature Granulocytes 0 0 %    Segs Absolute 5.49 1.50 - 8.10 k/uL    Absolute Lymph # 2.19 1.10 - 3.70 k/uL    Absolute Mono # 0.96 0.10 - 1.20 k/uL    Absolute Eos # 0.26 0.00 - 0.44 k/uL    Basophils Absolute 0.10 0.00 - 0.20 k/uL    Absolute Immature Granulocyte 0.03 0.00 - 0.30 k/uL    WBC Morphology NOT REPORTED     RBC Morphology NOT REPORTED     Platelet Estimate NOT REPORTED    Comprehensive metabolic panel   Result Value Ref Range    Glucose 104 (H) 70 - 99 mg/dL    BUN 21 8 - 23 mg/dL    CREATININE 1.18 0.70 - 1.20 mg/dL    Bun/Cre Ratio 18 9 - 20    Calcium 9.3 8.6 - 10.4 mg/dL    Sodium 141 135 - 144 mmol/L    Potassium 4.5 3.7 - 5.3 mmol/L    Chloride 104 98 - 107 mmol/L CO2 26 20 - 31 mmol/L    Anion Gap 11 9 - 17 mmol/L    Alkaline Phosphatase 71 40 - 129 U/L    ALT 21 5 - 41 U/L    AST 20 <40 U/L    Total Bilirubin 0.51 0.3 - 1.2 mg/dL    Total Protein 7.4 6.4 - 8.3 g/dL    Albumin 4.6 3.5 - 5.2 g/dL    Albumin/Globulin Ratio 1.6 1.0 - 2.5    GFR Non-African American >60 >60 mL/min    GFR African American >60 >60 mL/min    GFR Comment          GFR Staging         APTT   Result Value Ref Range    PTT 27.7 23.9 - 33.8 sec   Surgical Pathology   Result Value Ref Range    Surgical Pathology Report       -- Diagnosis --  RIGHT LUNG, NEEDLE CORE BIOPSY:  -BENIGN LUNG PARENCHYMA WITH FOCAL FIBROSIS AND MILD CHRONIC  INFLAMMATION WITH INTRA-ALVEOLAR HISTIOCYTES. Rama Ramirez M.D  **Electronically Signed Out**         sam/2021       Clinical Information  Operative Findings:  R LUNG BIOPSY     Source of Specimen  1: R LUNG BIOPSY    Gross Description  \"ANA DAVIS, R LUNG\" Two tan cores, 0.6 and 0.7 cm in length, <  0.1 cm in diameter. There is also a tan tissue fragment, 0.1 cm in  greatest dimension. Entirely 1cs. mpb tm     Intraoperative Diagnosis  Core 1:  Atypical cells. Core 2:  Rare atypical cells. Core 3:  Blood. Procedure stopped due to pneumothorax.  (SAM)    Microscopic Description  Microscopic examination performed. Sections show small cores of  benign lung parenchyma with focal fibrosis and chronic inflammation  with intra-alveolar histiocytes with cytoplasmic debris. GMS and AFB  stains are negative. Iron stain is negative in the intra-alveolar  histioc ytes. Controls react as expected. SURGICAL PATHOLOGY CONSULTATION       Patient Name: Taya Chao: [id number]  Path Number: [id number]    [facility] [address]   [facility], [address]  [phone]  Fax: [phone]     Basic Metabolic Panel w/ Reflex to MG   Result Value Ref Range    Glucose 87 70 - 99 mg/dL    BUN 25 (H) 8 - 23 mg/dL    CREATININE 1.00 0.70 - 1.20 mg/dL    Bun/Cre Ratio 25 (H) 9 - 20    Calcium 9.0 8.6 - 10.4 mg/dL    Sodium 139 135 - 144 mmol/L    Potassium 4.5 3.7 - 5.3 mmol/L    Chloride 107 98 - 107 mmol/L    CO2 22 20 - 31 mmol/L    Anion Gap 10 9 - 17 mmol/L    GFR Non-African American >60 >60 mL/min    GFR African American >60 >60 mL/min    GFR Comment          GFR Staging         CBC auto differential   Result Value Ref Range    WBC 9.4 3.5 - 11.3 k/uL    RBC 3.77 (L) 4.21 - 5.77 m/uL    Hemoglobin 11.2 (L) 13.0 - 17.0 g/dL    Hematocrit 34.2 (L) 40.7 - 50.3 %    MCV 90.7 82.6 - 102.9 fL    MCH 29.7 25.2 - 33.5 pg    MCHC 32.7 28.4 - 34.8 g/dL    RDW 14.2 11.8 - 14.4 %    Platelets 644 574 - 545 k/uL    MPV 11.0 8.1 - 13.5 fL    NRBC Automated 0.0 0.0 per 100 WBC    Differential Type NOT REPORTED     Seg Neutrophils 66 (H) 36 - 65 %    Lymphocytes 20 (L) 24 - 43 %    Monocytes 10 3 - 12 %    Eosinophils % 3 1 - 4 %    Basophils 1 0 - 2 %    Immature Granulocytes 0 0 %    Segs Absolute 6.17 1.50 - 8.10 k/uL    Absolute Lymph # 1.89 1.10 - 3.70 k/uL    Absolute Mono # 0.93 0.10 - 1.20 k/uL    Absolute Eos # 0.27 0.00 - 0.44 k/uL    Basophils Absolute 0.06 0.00 - 0.20 k/uL    Absolute Immature Granulocyte 0.04 0.00 - 0.30 k/uL    WBC Morphology NOT REPORTED     RBC Morphology NOT REPORTED     Platelet Estimate NOT REPORTED    Glucose, Whole Blood   Result Value Ref Range    POC Glucose 128 (H) 74 - 100 mg/dL   Glucose, Whole Blood   Result Value Ref Range    POC Glucose 214 (H) 74 - 100 mg/dL   Glucose, Whole Blood   Result Value Ref Range    POC Glucose 101 (H) 74 - 100 mg/dL   Glucose, Whole Blood   Result Value Ref Range    POC Glucose 195 (H) 74 - 100 mg/dL   Glucose, Whole Blood   Result Value Ref Range    POC Glucose 77 74 - 100 mg/dL   Basic Metabolic Panel w/ Reflex to MG   Result Value Ref Range    Glucose 111 (H) 70 - 99 mg/dL    BUN 23 8 - 23 mg/dL    CREATININE 0.97 0.70 - 1.20 mg/dL    Bun/Cre Ratio 24 (H) 9 - 20    Calcium 9.1 8.6 - 10.4 mg/dL    Sodium 140 135 - 144 mmol/L    Potassium 4.4 3.7 - 5.3 mmol/L    Chloride 106 98 - 107 mmol/L    CO2 21 20 - 31 mmol/L    Anion Gap 13 9 - 17 mmol/L    GFR Non-African American >60 >60 mL/min    GFR African American >60 >60 mL/min    GFR Comment          GFR Staging         CBC auto differential   Result Value Ref Range    WBC 8.5 3.5 - 11.3 k/uL    RBC 3.94 (L) 4.21 - 5.77 m/uL    Hemoglobin 11.4 (L) 13.0 - 17.0 g/dL    Hematocrit 35.7 (L) 40.7 - 50.3 %    MCV 90.6 82.6 - 102.9 fL    MCH 28.9 25.2 - 33.5 pg    MCHC 31.9 28.4 - 34.8 g/dL    RDW 14.0 11.8 - 14.4 %    Platelets 319 173 - 953 k/uL    MPV 11.2 8.1 - 13.5 fL    NRBC Automated 0.0 0.0 per 100 WBC    Differential Type NOT REPORTED     Seg Neutrophils 63 36 - 65 %    Lymphocytes 22 (L) 24 - 43 %    Monocytes 10 3 - 12 %    Eosinophils % 4 1 - 4 %    Basophils 1 0 - 2 %    Immature Granulocytes 0 0 %    Segs Absolute 5.38 1.50 - 8.10 k/uL    Absolute Lymph # 1.90 1.10 - 3.70 k/uL    Absolute Mono # 0.87 0.10 - 1.20 k/uL    Absolute Eos # 0.31 0.00 - 0.44 k/uL    Basophils Absolute 0.05 0.00 - 0.20 k/uL    Absolute Immature Granulocyte 0.03 0.00 - 0.30 k/uL    WBC Morphology NOT REPORTED     RBC Morphology NOT REPORTED     Platelet Estimate NOT REPORTED    Glucose, Whole Blood   Result Value Ref Range    POC Glucose 81 74 - 100 mg/dL   Glucose, Whole Blood   Result Value Ref Range    POC Glucose 117 (H) 74 - 100 mg/dL   Glucose, Whole Blood   Result Value Ref Range    POC Glucose 109 (H) 74 - 100 mg/dL   Glucose, Whole Blood   Result Value Ref Range    POC Glucose 214 (H) 74 - 100 mg/dL   EKG 12 lead   Result Value Ref Range    Ventricular Rate 51 BPM    Atrial Rate 51 BPM    P-R Interval 210 ms    QRS Duration 102 ms    Q-T Interval 438 ms    QTc Calculation (Bazett) 403 ms    P Axis 67 degrees    R Axis 53 degrees    T Axis 69 degrees       :      1. Right middle lobe pulmonary nodule 2. Stopped smoking with greater than 40 pack year history    3. Mucopurulent chronic bronchitis ([address]    4. Dysgeusia      Patient Active Problem List   Diagnosis    Angina pectoris ([address]    ASHD (arteriosclerotic heart disease)    Hypertension    Diabetes ([address]    Hyperlipidemia    Shortness of breath, post procedure improving probably Effient side effect.  S/P angioplasty with stent, obtuse marginal    Type II or unspecified type diabetes mellitus without mention of complication, not stated as uncontrolled    Calculus of kidney    Actinic keratosis    Senile nuclear sclerosis    Dyspepsia    Epigastric pain    Substernal chest pain    Abnormal CT scan, stomach    Abnormal gastrointestinal PET scan    Strain of lumbar region    Pneumothorax after biopsy    Pneumothorax, post biopsy, right         Plan:      1. Discussed with patient and family. Negative cytology reassuring, however malignancy not completely excluded. Will need 2 years of documented stability to exclude malignancy. 2. Repeat CT scan of the chest in 3 months. If lesion grows, recommend excisional biopsy. 3. Continued smoking cessation. 4. Return in 3 months. No orders of the defined types were placed in this encounter. Orders Placed This Encounter   Procedures    CT CHEST WO CONTRAST     Standing Status:   Future     Standing Expiration Date:   8/12/2022     Order Specific Question:   Reason for exam:     Answer:   FDG avid RML nodule with neg cytology and high risk factor profile. Interval change     Return in about 3 months (around 11/12/2021).        Electronically signed by Sherrie Wilson DO on 8/12/2021at 5:07 PM

## 2021-08-19 ENCOUNTER — HOSPITAL ENCOUNTER (OUTPATIENT)
Dept: GENERAL RADIOLOGY | Age: 71
Discharge: HOME | End: 2021-08-21
Payer: MEDICARE

## 2021-08-19 ENCOUNTER — HOSPITAL ENCOUNTER (OUTPATIENT)
Age: 71
Discharge: HOME | End: 2021-08-21
Payer: MEDICARE

## 2021-08-19 DIAGNOSIS — K59.00 CONSTIPATION, UNSPECIFIED CONSTIPATION TYPE: ICD-10-CM

## 2021-08-19 PROCEDURE — 74018 RADEX ABDOMEN 1 VIEW: CPT

## 2021-09-22 DIAGNOSIS — I25.10 ASHD (ARTERIOSCLEROTIC HEART DISEASE): ICD-10-CM

## 2021-09-22 DIAGNOSIS — I10 ESSENTIAL HYPERTENSION: ICD-10-CM

## 2021-09-22 DIAGNOSIS — Z95.820 S/P ANGIOPLASTY WITH STENT: ICD-10-CM

## 2021-09-22 RX ORDER — ISOSORBIDE MONONITRATE 30 MG/1
15 TABLET, EXTENDED RELEASE ORAL DAILY
Qty: 30 TABLET | Refills: 5 | Status: SHIPPED
Start: 2021-09-22 | End: 2021-12-16 | Stop reason: SDUPTHER

## 2021-11-02 ENCOUNTER — HOSPITAL ENCOUNTER (OUTPATIENT)
Dept: CT IMAGING | Age: 71
Discharge: HOME | End: 2021-11-04
Payer: MEDICARE

## 2021-11-02 DIAGNOSIS — R91.1 RIGHT MIDDLE LOBE PULMONARY NODULE: ICD-10-CM

## 2021-11-02 DIAGNOSIS — Z87.891 STOPPED SMOKING WITH GREATER THAN 40 PACK YEAR HISTORY: ICD-10-CM

## 2021-11-02 PROCEDURE — 71250 CT THORAX DX C-: CPT

## 2021-11-11 ENCOUNTER — OFFICE VISIT (OUTPATIENT)
Dept: PULMONOLOGY | Age: 71
End: 2021-11-11
Payer: MEDICARE

## 2021-11-11 VITALS
HEIGHT: 72 IN | DIASTOLIC BLOOD PRESSURE: 61 MMHG | SYSTOLIC BLOOD PRESSURE: 110 MMHG | BODY MASS INDEX: 22.35 KG/M2 | TEMPERATURE: 97.6 F | HEART RATE: 71 BPM | WEIGHT: 165 LBS | OXYGEN SATURATION: 99 % | RESPIRATION RATE: 18 BRPM

## 2021-11-11 DIAGNOSIS — J41.1 MUCOPURULENT CHRONIC BRONCHITIS (HCC): ICD-10-CM

## 2021-11-11 DIAGNOSIS — R91.1 RIGHT MIDDLE LOBE PULMONARY NODULE: Primary | ICD-10-CM

## 2021-11-11 DIAGNOSIS — Z87.891 STOPPED SMOKING WITH GREATER THAN 40 PACK YEAR HISTORY: ICD-10-CM

## 2021-11-11 PROCEDURE — G8926 SPIRO NO PERF OR DOC: HCPCS | Performed by: INTERNAL MEDICINE

## 2021-11-11 PROCEDURE — 4040F PNEUMOC VAC/ADMIN/RCVD: CPT | Performed by: INTERNAL MEDICINE

## 2021-11-11 PROCEDURE — G8427 DOCREV CUR MEDS BY ELIG CLIN: HCPCS | Performed by: INTERNAL MEDICINE

## 2021-11-11 PROCEDURE — 3023F SPIROM DOC REV: CPT | Performed by: INTERNAL MEDICINE

## 2021-11-11 PROCEDURE — 99214 OFFICE O/P EST MOD 30 MIN: CPT | Performed by: INTERNAL MEDICINE

## 2021-11-11 PROCEDURE — 1036F TOBACCO NON-USER: CPT | Performed by: INTERNAL MEDICINE

## 2021-11-11 PROCEDURE — G8484 FLU IMMUNIZE NO ADMIN: HCPCS | Performed by: INTERNAL MEDICINE

## 2021-11-11 PROCEDURE — 3017F COLORECTAL CA SCREEN DOC REV: CPT | Performed by: INTERNAL MEDICINE

## 2021-11-11 PROCEDURE — G8420 CALC BMI NORM PARAMETERS: HCPCS | Performed by: INTERNAL MEDICINE

## 2021-11-11 PROCEDURE — 1123F ACP DISCUSS/DSCN MKR DOCD: CPT | Performed by: INTERNAL MEDICINE

## 2021-11-11 ASSESSMENT — ENCOUNTER SYMPTOMS
EYES NEGATIVE: 1
RESPIRATORY NEGATIVE: 1

## 2021-11-11 NOTE — PROGRESS NOTES
Subjective:      Patient ID: Justin Hammond is a 70 y.o. male being seen in my clinic for   Chief Complaint   Patient presents with    Follow-up     CT scan follow up       HPI  Follow-up visit for right middle lobe pulmonary nodule. Since his last visit 3 months ago he had a repeat CT scan of the chest done in 11/2/2021. The previously biopsied, somewhat bilobed nodule involving the right middle lobe is larger. Both visually and by measurements. Now appears to be 1.8 x 1.0 cm as opposed to 1.8 x 0.6 cm. Biopsy done on 7/28/2021 showed benign parenchyma with some chronic inflammation. The lesion was FDG positive. Pulmonary function studies done in 2019 showed normal lung function. FEV1 was 3.37 L (98% of predicted). States that he quit smoking more than a year ago. 54-pack-year smoking history. Denies cough, sputum production, shortness of breath, or hemoptysis. No constitutional symptoms. Review of Systems   Constitutional: Negative. HENT: Negative. Eyes: Negative. Respiratory: Negative. Cardiovascular: Negative. All other systems reviewed and are negative.       Objective:     Vitals:    11/11/21 1157   BP: 110/61   Site: Left Upper Arm   Position: Sitting   Cuff Size: Medium Adult   Pulse: 71   Resp: 18   Temp: 97.6 °F (36.4 °C)   TempSrc: Temporal   SpO2: 99%   Weight: 165 lb (74.8 kg)   Height: 6' (1.829 m)     Current Outpatient Medications   Medication Sig Dispense Refill    isosorbide mononitrate (IMDUR) 30 MG extended release tablet Take 0.5 tablets by mouth daily 30 tablet 5    amLODIPine (NORVASC) 5 MG tablet Take 1 tablet by mouth daily 90 tablet 3    clopidogrel (PLAVIX) 75 MG tablet Take 1 tablet by mouth daily 90 tablet 3    atenolol (TENORMIN) 25 MG tablet Take 1 tablet by mouth daily 90 tablet 3    etodolac (LODINE) 400 MG tablet Take 1 tablet by mouth 2 times daily 30 tablet 0    tiZANidine (ZANAFLEX) 4 MG tablet Take 1 tablet by mouth every 8 hours as needed (Rib pain) 15 tablet 0    Insulin Glargine, 2 Unit Dial, 300 UNIT/ML SOPN Insulin Glargine Insulin Glargine U-300 Conc Active 10 UNIT Subcutaneous Every morning May 7th, 2020 11:47am 05-  [facility] ([id number])      atorvastatin (LIPITOR) 20 MG tablet Take 1 tablet by mouth daily 90 tablet 3    sitaGLIPtan-metformin (JANUMET)  MG per tablet Take 1 tablet by mouth 2 times daily (with meals)      lisinopril (PRINIVIL;ZESTRIL) 20 MG tablet Take 0.5 tablets by mouth daily (Patient taking differently: Take 20 mg by mouth daily ) 90 tablet 3    nitroGLYCERIN (NITROSTAT) 0.4 MG SL tablet Place 1 tablet under the tongue every 5 minutes as needed for Chest pain 25 tablet 3    glimepiride (AMARYL) 2 MG tablet Take 1 tablet by mouth 2 times daily 180 tablet 3    aspirin 81 MG tablet Take 81 mg by mouth daily. No current facility-administered medications for this visit. Physical Exam  Vitals and nursing note reviewed. Constitutional:       Appearance: He is well-developed. Eyes:      General: No scleral icterus. Conjunctiva/sclera: Conjunctivae normal.   Neck:      Thyroid: No thyromegaly. Vascular: No JVD. Trachea: No tracheal deviation. Cardiovascular:      Rate and Rhythm: Normal rate and regular rhythm. Heart sounds: Normal heart sounds. No murmur heard. No gallop. Pulmonary:      Effort: Pulmonary effort is normal. No respiratory distress. Breath sounds: No wheezing or rales. Chest:      Chest wall: No tenderness. Abdominal:      Palpations: Abdomen is soft. Tenderness: There is no abdominal tenderness. Musculoskeletal:      Cervical back: Neck supple. Lymphadenopathy:      Cervical: No cervical adenopathy. Skin:     General: Skin is warm and dry. Neurological:      Mental Status: He is alert and oriented to person, place, and time.        Wt Readings from Last 3 Encounters:   11/11/21 165 lb (74.8 kg)   08/12/21 163 lb 4.8 oz (74.1 kg)   07/30/21 163 lb 3.2 oz (74 kg)     Results for orders placed or performed during the hospital encounter of 07/28/21   Protime-INR   Result Value Ref Range    Protime 13.4 11.5 - 14.2 sec    INR 1.0    CBC auto differential   Result Value Ref Range    WBC 9.0 3.5 - 11.3 k/uL    RBC 4.24 4.21 - 5.77 m/uL    Hemoglobin 12.4 (L) 13.0 - 17.0 g/dL    Hematocrit 38.1 (L) 40.7 - 50.3 %    MCV 89.9 82.6 - 102.9 fL    MCH 29.2 25.2 - 33.5 pg    MCHC 32.5 28.4 - 34.8 g/dL    RDW 14.2 11.8 - 14.4 %    Platelets 303 808 - 116 k/uL    MPV 10.4 8.1 - 13.5 fL    NRBC Automated 0.0 0.0 per 100 WBC    Differential Type NOT REPORTED     Seg Neutrophils 61 36 - 65 %    Lymphocytes 24 24 - 43 %    Monocytes 11 3 - 12 %    Eosinophils % 3 1 - 4 %    Basophils 1 0 - 2 %    Immature Granulocytes 0 0 %    Segs Absolute 5.49 1.50 - 8.10 k/uL    Absolute Lymph # 2.19 1.10 - 3.70 k/uL    Absolute Mono # 0.96 0.10 - 1.20 k/uL    Absolute Eos # 0.26 0.00 - 0.44 k/uL    Basophils Absolute 0.10 0.00 - 0.20 k/uL    Absolute Immature Granulocyte 0.03 0.00 - 0.30 k/uL    WBC Morphology NOT REPORTED     RBC Morphology NOT REPORTED     Platelet Estimate NOT REPORTED    Comprehensive metabolic panel   Result Value Ref Range    Glucose 104 (H) 70 - 99 mg/dL    BUN 21 8 - 23 mg/dL    CREATININE 1.18 0.70 - 1.20 mg/dL    Bun/Cre Ratio 18 9 - 20    Calcium 9.3 8.6 - 10.4 mg/dL    Sodium 141 135 - 144 mmol/L    Potassium 4.5 3.7 - 5.3 mmol/L    Chloride 104 98 - 107 mmol/L    CO2 26 20 - 31 mmol/L    Anion Gap 11 9 - 17 mmol/L    Alkaline Phosphatase 71 40 - 129 U/L    ALT 21 5 - 41 U/L    AST 20 <40 U/L    Total Bilirubin 0.51 0.3 - 1.2 mg/dL    Total Protein 7.4 6.4 - 8.3 g/dL    Albumin 4.6 3.5 - 5.2 g/dL    Albumin/Globulin Ratio 1.6 1.0 - 2.5    GFR Non-African American >60 >60 mL/min    GFR African American >60 >60 mL/min    GFR Comment          GFR Staging         APTT   Result Value Ref Range    PTT 27.7 23.9 - 33.8 sec   Surgical Pathology   Result Value Ref Range    Surgical Pathology Report       -- Diagnosis --  RIGHT LUNG, NEEDLE CORE BIOPSY:  -BENIGN LUNG PARENCHYMA WITH FOCAL FIBROSIS AND MILD CHRONIC  INFLAMMATION WITH INTRA-ALVEOLAR HISTIOCYTES. Anne Good M.D  **Electronically Signed Out**         sam/7/29/2021       Clinical Information  Operative Findings:  R LUNG BIOPSY     Source of Specimen  1: R LUNG BIOPSY    Gross Description  \"ANA DAVIS, R LUNG\" Two tan cores, 0.6 and 0.7 cm in length, <  0.1 cm in diameter. There is also a tan tissue fragment, 0.1 cm in  greatest dimension. Entirely 1cs. mpb tm     Intraoperative Diagnosis  Core 1:  Atypical cells. Core 2:  Rare atypical cells. Core 3:  Blood. Procedure stopped due to pneumothorax.  (SAM)    Microscopic Description  Microscopic examination performed. Sections show small cores of  benign lung parenchyma with focal fibrosis and chronic inflammation  with intra-alveolar histiocytes with cytoplasmic debris. GMS and AFB  stains are negative. Iron stain is negative in the intra-alveolar  histioc ytes. Controls react as expected. SURGICAL PATHOLOGY CONSULTATION       Patient Name: Frantz Galvez: [id number]  Path Number: [id number]    [facility] [address]   [facility], [address]  [phone]  Fax: [phone]     Basic Metabolic Panel w/ Reflex to MG   Result Value Ref Range    Glucose 87 70 - 99 mg/dL    BUN 25 (H) 8 - 23 mg/dL    CREATININE 1.00 0.70 - 1.20 mg/dL    Bun/Cre Ratio 25 (H) 9 - 20    Calcium 9.0 8.6 - 10.4 mg/dL    Sodium 139 135 - 144 mmol/L    Potassium 4.5 3.7 - 5.3 mmol/L    Chloride 107 98 - 107 mmol/L    CO2 22 20 - 31 mmol/L    Anion Gap 10 9 - 17 mmol/L    GFR Non-African American >60 >60 mL/min    GFR African American >60 >60 mL/min    GFR Comment          GFR Staging         CBC auto differential   Result Value Ref Range    WBC 9.4 3.5 - 11.3 k/uL    RBC 3.77 (L) 4.21 - 5.77 m/uL    Hemoglobin 11.2 (L) 13.0 - 17.0 g/dL    Hematocrit 34.2 (L) 40.7 - 50.3 %    MCV 90.7 82.6 - 102.9 fL    MCH 29.7 25.2 - 33.5 pg    MCHC 32.7 28.4 - 34.8 g/dL    RDW 14.2 11.8 - 14.4 %    Platelets 542 128 - 999 k/uL    MPV 11.0 8.1 - 13.5 fL    NRBC Automated 0.0 0.0 per 100 WBC    Differential Type NOT REPORTED     Seg Neutrophils 66 (H) 36 - 65 %    Lymphocytes 20 (L) 24 - 43 %    Monocytes 10 3 - 12 %    Eosinophils % 3 1 - 4 %    Basophils 1 0 - 2 %    Immature Granulocytes 0 0 %    Segs Absolute 6.17 1.50 - 8.10 k/uL    Absolute Lymph # 1.89 1.10 - 3.70 k/uL    Absolute Mono # 0.93 0.10 - 1.20 k/uL    Absolute Eos # 0.27 0.00 - 0.44 k/uL    Basophils Absolute 0.06 0.00 - 0.20 k/uL    Absolute Immature Granulocyte 0.04 0.00 - 0.30 k/uL    WBC Morphology NOT REPORTED     RBC Morphology NOT REPORTED     Platelet Estimate NOT REPORTED    Glucose, Whole Blood   Result Value Ref Range    POC Glucose 128 (H) 74 - 100 mg/dL   Glucose, Whole Blood   Result Value Ref Range    POC Glucose 214 (H) 74 - 100 mg/dL   Glucose, Whole Blood   Result Value Ref Range    POC Glucose 101 (H) 74 - 100 mg/dL   Glucose, Whole Blood   Result Value Ref Range    POC Glucose 195 (H) 74 - 100 mg/dL   Glucose, Whole Blood   Result Value Ref Range    POC Glucose 77 74 - 100 mg/dL   Basic Metabolic Panel w/ Reflex to MG   Result Value Ref Range    Glucose 111 (H) 70 - 99 mg/dL    BUN 23 8 - 23 mg/dL    CREATININE 0.97 0.70 - 1.20 mg/dL    Bun/Cre Ratio 24 (H) 9 - 20    Calcium 9.1 8.6 - 10.4 mg/dL    Sodium 140 135 - 144 mmol/L    Potassium 4.4 3.7 - 5.3 mmol/L    Chloride 106 98 - 107 mmol/L    CO2 21 20 - 31 mmol/L    Anion Gap 13 9 - 17 mmol/L    GFR Non-African American >60 >60 mL/min    GFR African American >60 >60 mL/min    GFR Comment          GFR Staging         CBC auto differential   Result Value Ref Range    WBC 8.5 3.5 - 11.3 k/uL    RBC 3.94 (L) 4.21 - 5.77 m/uL    Hemoglobin 11.4 (L) 13.0 - 17.0 g/dL    Hematocrit 35.7 (L) 40.7 - 50.3 %    MCV 90.6 82.6 - 102.9 fL    MCH 28.9 25.2 - 33.5 pg    MCHC 31.9 28.4 - 34.8 g/dL    RDW 14.0 11.8 - 14.4 %    Platelets 544 191 - 819 k/uL    MPV 11.2 8.1 - 13.5 fL    NRBC Automated 0.0 0.0 per 100 WBC    Differential Type NOT REPORTED     Seg Neutrophils 63 36 - 65 %    Lymphocytes 22 (L) 24 - 43 %    Monocytes 10 3 - 12 %    Eosinophils % 4 1 - 4 %    Basophils 1 0 - 2 %    Immature Granulocytes 0 0 %    Segs Absolute 5.38 1.50 - 8.10 k/uL    Absolute Lymph # 1.90 1.10 - 3.70 k/uL    Absolute Mono # 0.87 0.10 - 1.20 k/uL    Absolute Eos # 0.31 0.00 - 0.44 k/uL    Basophils Absolute 0.05 0.00 - 0.20 k/uL    Absolute Immature Granulocyte 0.03 0.00 - 0.30 k/uL    WBC Morphology NOT REPORTED     RBC Morphology NOT REPORTED     Platelet Estimate NOT REPORTED    Glucose, Whole Blood   Result Value Ref Range    POC Glucose 81 74 - 100 mg/dL   Glucose, Whole Blood   Result Value Ref Range    POC Glucose 117 (H) 74 - 100 mg/dL   Glucose, Whole Blood   Result Value Ref Range    POC Glucose 109 (H) 74 - 100 mg/dL   Glucose, Whole Blood   Result Value Ref Range    POC Glucose 214 (H) 74 - 100 mg/dL   EKG 12 lead   Result Value Ref Range    Ventricular Rate 51 BPM    Atrial Rate 51 BPM    P-R Interval 210 ms    QRS Duration 102 ms    Q-T Interval 438 ms    QTc Calculation (Bazett) 403 ms    P Axis 67 degrees    R Axis 53 degrees    T Axis 69 degrees       :      1. Right middle lobe pulmonary nodule    2. Mucopurulent chronic bronchitis (HCC)    3. Stopped smoking with greater than 40 pack year history      Patient Active Problem List   Diagnosis    Angina pectoris ([facility] [address]    ASHD (arteriosclerotic heart disease)    Hypertension    Diabetes ([facility] [address]    Hyperlipidemia    Shortness of breath, post procedure improving probably Effient side effect.      S/P angioplasty with stent, obtuse marginal    Type II or unspecified type diabetes mellitus without mention of complication, not stated as uncontrolled    Calculus of kidney    Actinic keratosis    Senile nuclear sclerosis    Dyspepsia    Epigastric pain    Substernal chest pain    Abnormal CT scan, stomach    Abnormal gastrointestinal PET scan    Strain of lumbar region    Pneumothorax after biopsy    Pneumothorax, post biopsy, right         Plan:      1. Long discussion with patient. Negative biopsy does not exclude malignancy given high risk factor profile and modest growth on CT scan. Recommend excisional biopsy. 2. Refer to Dr. Kassandra Ryan. 3. Patient received both of his Covid vaccinations. 4. Return after above. No orders of the defined types were placed in this encounter. Orders Placed This Encounter   Procedures   Katlin Grossman MD, Cardiothoracic Surgery, [place]     Referral Priority:   Routine     Referral Type:   Eval and Treat     Referral Reason:   Specialty Services Required     Referred to Provider:   Hellen Irvin MD     Requested Specialty:   Cardiothoracic Surgery     Number of Visits Requested:   1     Return for After testing complete.        Electronically signed by Mily Garcia DO on 11/11/2021at 1:01 PM

## 2021-11-15 ENCOUNTER — TELEPHONE (OUTPATIENT)
Dept: CASE MANAGEMENT | Age: 71
End: 2021-11-15

## 2021-11-15 NOTE — TELEPHONE ENCOUNTER
Lung Navigator reviewing pulmonary notes and pt. Needing excisional biopsy. Pt. Referred to Dr. Mariela Patten, plan to follow.

## 2021-11-18 ENCOUNTER — TELEPHONE (OUTPATIENT)
Dept: CASE MANAGEMENT | Age: 71
End: 2021-11-18

## 2021-11-18 DIAGNOSIS — R91.1 PULMONARY NODULE: Primary | ICD-10-CM

## 2021-11-18 NOTE — TELEPHONE ENCOUNTER
Lung Navigator reviewing chart and following up with CTS office on referral for excisional Bx? Writer spoke with David Cordon and brought to her attention. Office will reach out to pt. Plan to follow.

## 2021-11-19 ENCOUNTER — TELEPHONE (OUTPATIENT)
Dept: VASCULAR SURGERY | Age: 71
End: 2021-11-19

## 2021-11-19 NOTE — TELEPHONE ENCOUNTER
----- Message from Michelle Bahena LPN sent at 23/63/0548  1:14 PM EST -----  Regarding: Schedule Consult visit Jon - Referrl in chart  LVM for the patient to call back and schedule consult appt - referred by Kaylin RT middle lobe pulmonary nodule.   #chest CT 11/2 - needs PFT (ordered) which needs to be signed

## 2021-12-07 ENCOUNTER — HOSPITAL ENCOUNTER (OUTPATIENT)
Dept: PULMONOLOGY | Age: 71
Discharge: HOME | End: 2021-12-07
Payer: MEDICARE

## 2021-12-07 DIAGNOSIS — R91.1 PULMONARY NODULE: ICD-10-CM

## 2021-12-07 PROCEDURE — 94726 PLETHYSMOGRAPHY LUNG VOLUMES: CPT

## 2021-12-07 PROCEDURE — 94729 DIFFUSING CAPACITY: CPT

## 2021-12-07 PROCEDURE — 94010 BREATHING CAPACITY TEST: CPT

## 2021-12-15 ENCOUNTER — OFFICE VISIT (OUTPATIENT)
Dept: CARDIOLOGY | Age: 71
End: 2021-12-15
Payer: MEDICARE

## 2021-12-15 ENCOUNTER — HOSPITAL ENCOUNTER (OUTPATIENT)
Dept: NON INVASIVE DIAGNOSTICS | Age: 71
Discharge: HOME | End: 2021-12-15
Payer: MEDICARE

## 2021-12-15 VITALS
RESPIRATION RATE: 18 BRPM | OXYGEN SATURATION: 98 % | BODY MASS INDEX: 22.27 KG/M2 | SYSTOLIC BLOOD PRESSURE: 122 MMHG | HEART RATE: 58 BPM | HEIGHT: 72 IN | WEIGHT: 164.4 LBS | DIASTOLIC BLOOD PRESSURE: 62 MMHG

## 2021-12-15 DIAGNOSIS — R07.89 ATYPICAL CHEST PAIN: ICD-10-CM

## 2021-12-15 DIAGNOSIS — E78.2 MIXED HYPERLIPIDEMIA: ICD-10-CM

## 2021-12-15 DIAGNOSIS — Z95.820 S/P ANGIOPLASTY WITH STENT: ICD-10-CM

## 2021-12-15 DIAGNOSIS — Z01.810 PREOP CARDIOVASCULAR EXAM: ICD-10-CM

## 2021-12-15 DIAGNOSIS — I25.10 ASHD (ARTERIOSCLEROTIC HEART DISEASE): Primary | ICD-10-CM

## 2021-12-15 DIAGNOSIS — Z71.6 TOBACCO ABUSE COUNSELING: ICD-10-CM

## 2021-12-15 DIAGNOSIS — I10 ESSENTIAL HYPERTENSION: ICD-10-CM

## 2021-12-15 DIAGNOSIS — I25.10 ASHD (ARTERIOSCLEROTIC HEART DISEASE): ICD-10-CM

## 2021-12-15 PROBLEM — R91.1 PULMONARY NODULE: Status: ACTIVE | Noted: 2021-12-15

## 2021-12-15 LAB
LV EF: 60 %
LVEF MODALITY: NORMAL

## 2021-12-15 PROCEDURE — 4040F PNEUMOC VAC/ADMIN/RCVD: CPT | Performed by: INTERNAL MEDICINE

## 2021-12-15 PROCEDURE — 1123F ACP DISCUSS/DSCN MKR DOCD: CPT | Performed by: INTERNAL MEDICINE

## 2021-12-15 PROCEDURE — G8484 FLU IMMUNIZE NO ADMIN: HCPCS | Performed by: INTERNAL MEDICINE

## 2021-12-15 PROCEDURE — 93306 TTE W/DOPPLER COMPLETE: CPT

## 2021-12-15 PROCEDURE — G8420 CALC BMI NORM PARAMETERS: HCPCS | Performed by: INTERNAL MEDICINE

## 2021-12-15 PROCEDURE — 1036F TOBACCO NON-USER: CPT | Performed by: INTERNAL MEDICINE

## 2021-12-15 PROCEDURE — G8427 DOCREV CUR MEDS BY ELIG CLIN: HCPCS | Performed by: INTERNAL MEDICINE

## 2021-12-15 PROCEDURE — 99214 OFFICE O/P EST MOD 30 MIN: CPT | Performed by: INTERNAL MEDICINE

## 2021-12-15 PROCEDURE — 3017F COLORECTAL CA SCREEN DOC REV: CPT | Performed by: INTERNAL MEDICINE

## 2021-12-15 PROCEDURE — 99211 OFF/OP EST MAY X REQ PHY/QHP: CPT | Performed by: INTERNAL MEDICINE

## 2021-12-15 NOTE — PATIENT INSTRUCTIONS
Patient Education        Learning About Lung Nodules  What is a lung nodule? A lung nodule is a growth in the lung. A single nodule surrounded by lung tissue is called a solitary pulmonary nodule. A lung nodule might not cause any symptoms. Your doctor may have found one or more nodules on your lung when you were having a chest X-ray or CT scan. Or it may have been found during a lung cancer screening. A lung nodule may be caused by an old infection or cancer. It might also be a noncancerous growth. Lung nodules can cause a screening to give an abnormal result. Most nodules do not cause any harm. But without further tests, your doctor can't tell whether an abnormal finding is cancer, a harmless nodule, or something else. What can you expect when you have a lung nodule? Your doctor will look at several risk factors to see how likely it is that the nodule is cancer. He or she will look at:  · Whether you smoke or have ever smoked. · Your age and your family's medical history. · Whether you have ever had lung cancer. · The size, density, and other characteristics of the nodule. · Whether the nodule has changed in size. Your doctor may look at past chest X-rays or CT scans, if available, and compare them. Or you may have a series of CT scans to see if the nodule grows over time. What happens next depends on the risk of the nodule being cancer. · If you have no risk factors and the nodule is small, your doctor may advise doing nothing. · If the risk is small, your doctor may schedule follow-up appointments and CT scans later to see if the nodule is growing. · If there's a higher risk of cancer, your doctor may:  ? Do a PET scan, which may help tell if the nodule is cancerous or not. ? Take a sample of tissue from the nodule for testing. This is called a biopsy. ? Remove the nodule with surgery. Follow-up care is a key part of your treatment and safety.  Be sure to make and go to all appointments, and call your doctor if you are having problems. It's also a good idea to know your test results and keep a list of the medicines you take. Where can you learn more? Go to [email or web address] org and sign in to your [employer] account. Enter G712 in the [employer] box to learn more about \"Learning About Lung Nodules. \"     If you do not have an account, please click on the \"Sign Up Now\" link. Current as of: December 17, 2020               Content Version: 13.0  © 6314-1046 [employer], [employer]. Care instructions adapted under license by [facility] ([facility]). If you have questions about a medical condition or this instruction, always ask your healthcare professional. [address] any warranty or liability for your use of this information.

## 2021-12-15 NOTE — PROGRESS NOTES
[facility] Cardiothoracic Surgery  CONSULTATION      CC: Pulmonary nodule, right middle lobe    HPI:  Mr. Amena Villagomez is a 70 y.o. [address] [facility] male who presents with a known pulmonary nodule of the right middle lobe. Pertinent medical history includes CAD with previous stenting, diabetes, hypertension and hyperlipidemia and history of smoking. Patient quit smoking 2 months ago. He has been smoking since he was 15years old with a 2 ppd habit. CT lung screening in June revealed the nodule had increased in size and subsequent PET scan was positive for metabolic activity, including possible lymph node involvement. Patient underwent a needle biopsy that was negative. Patient denies chest pain, shortness of breath, fever/chills, unplanned weight loss and night sweats. He has been referred for consideration of surgical resection. PMH:   has a past medical history of Abnormal cardiac cath (09/20/2013), Actinic keratosis, Arthritis, CAD (coronary artery disease), Calculus of kidney, Cervical stenosis of spine, Diabetes mellitus ([facility] [address] Full dentures, GERD (gastroesophageal reflux disease), H/O echocardiogram (04/29/2016), History of cardiac cath (02/09/2017), History of echocardiogram (06/11/2018), History of echocardiogram (01/18/2019), History of heart attack, History of tobacco abuse, Hyperlipidemia, Hypertension, Pulmonary nodule (12/2021), Raynaud's phenomenon, S/P angioplasty with stent (09/20/2013), and Wears hearing aid in both ears. PSH:   has a past surgical history that includes Cardiac catheterization (2007); Hemorrhoid surgery; Neck surgery (1997); other surgical history (10/2014); other surgical history (12/11/2014); Cataract removal (04/06/2015); Upper gastrointestinal endoscopy (12/28/2005); Cataract removal (06/08/2015); Colonoscopy (01/11/2006); Colonoscopy (05/23/2016); Cardiac catheterization (02/09/2017); Cardiac catheterization (02/09/2017); Cardiac catheterization (01/14/2019);  Upper gastrointestinal endoscopy (N/A, 02/05/2019); Upper gastrointestinal endoscopy (N/A, 10/22/2019); Lung biopsy (Right, 07/28/2021); CT NEEDLE BIOPSY LUNG PERCUTANEOUS (7/28/2021); and hernia repair (Left). Allergies: Allergies   Allergen Reactions    Niacin And Related      Turns red, no trouble breathing    Minocycline Hcl Rash    Other Nausea And Vomiting     Patient states on all pain medications he gets nausea and vomiting. Doctor ordered a medicine (nausea) to take before pain medication    Oxycodone Hcl      vomits       Medications:   Current Outpatient Medications:     ferrous sulfate (IRON 325) 325 (65 Fe) MG tablet, Take 325 mg by mouth daily , Disp: , Rfl:     isosorbide mononitrate (IMDUR) 30 MG extended release tablet, 1/2 tablet in the morning, Disp: , Rfl:     lisinopril (PRINIVIL;ZESTRIL) 20 MG tablet, Take 20 mg by mouth daily , Disp: , Rfl:     nitroGLYCERIN (NITRODUR) 0.4 MG/HR, 1 dose under tongue as needed for chest pain.  max of 3 in one day, Disp: , Rfl:     pantoprazole (PROTONIX) 40 MG injection, as needed , Disp: , Rfl:     amLODIPine (NORVASC) 5 MG tablet, Take 1 tablet by mouth daily, Disp: 90 tablet, Rfl: 3    clopidogrel (PLAVIX) 75 MG tablet, Take 1 tablet by mouth daily (Patient taking differently: Take 75 mg by mouth daily Ordered by heart doctor, Dr. Magy Hodge, [place]), Disp: 90 tablet, Rfl: 3    atenolol (TENORMIN) 25 MG tablet, Take 1 tablet by mouth daily, Disp: 90 tablet, Rfl: 3    Insulin Glargine, 2 Unit Dial, 300 UNIT/ML SOPN, Insulin Glargine Insulin Glargine U-300 Conc Active 10 UNIT Subcutaneous Every morning May 7th, 2020 11:47am 05-  [facility] ([id number]), Disp: , Rfl:     atorvastatin (LIPITOR) 20 MG tablet, Take 1 tablet by mouth daily, Disp: 90 tablet, Rfl: 3    sitaGLIPtan-metformin (JANUMET)  MG per tablet, Take 1 tablet by mouth 2 times daily (with meals), Disp: , Rfl:     glimepiride (AMARYL) 2 MG tablet, Take 1 tablet by mouth 2 times daily, Disp: 180 tablet, Rfl: 3    aspirin 81 MG tablet, Take 81 mg by mouth daily Ordered by heart doctor, Chan Caruso, Disp: , Rfl:     Social Hx:   reports that he quit smoking about 2 months ago. His smoking use included cigarettes. He has a 13.50 pack-year smoking history. He has never used smokeless tobacco.    Family Hx: family history includes Alzheimer's Disease in his father; Cancer in his brother; Diabetes in his father and mother; Heart Disease in his brother, brother, brother, mother, and sister; High Blood Pressure in his mother. ROS:    Review of Systems   Constitutional: Negative for activity change, appetite change, chills, diaphoresis, fatigue, fever and unexpected weight change. HENT: Negative for congestion. Eyes: Negative for visual disturbance. Respiratory: Negative for shortness of breath. Cardiovascular: Negative for chest pain. Gastrointestinal: Negative for abdominal distention, constipation and diarrhea. Genitourinary: Negative for dysuria. Musculoskeletal: Negative for gait problem. Skin: Negative for color change. Neurological: Negative for dizziness and weakness. Psychiatric/Behavioral: Negative for suicidal ideas. The patient is not nervous/anxious. Physical Examination     Vitals:  Vitals:    12/16/21 0839   BP: 131/65   Pulse: 62   Resp: 17   Temp: 98.4 °F (36.9 °C)   SpO2: 99%     Physical Exam  Constitutional:       General: He is not in acute distress. Appearance: Normal appearance. He is normal weight. He is not ill-appearing. HENT:      Head: Normocephalic. Nose: Nose normal.      Mouth/Throat:      Mouth: Mucous membranes are moist.      Pharynx: Oropharynx is clear. Eyes:      Conjunctiva/sclera: Conjunctivae normal.      Pupils: Pupils are equal, round, and reactive to light. Cardiovascular:      Rate and Rhythm: Normal rate and regular rhythm. Pulses: Normal pulses. Heart sounds: Normal heart sounds. Pulmonary:      Effort: Pulmonary effort is normal.      Breath sounds: Normal breath sounds. Abdominal:      Palpations: Abdomen is soft. Tenderness: There is no abdominal tenderness. Musculoskeletal:         General: Normal range of motion. Cervical back: Normal range of motion. Right lower leg: No edema. Left lower leg: No edema. Skin:     General: Skin is warm and dry. Capillary Refill: Capillary refill takes less than 2 seconds. Neurological:      General: No focal deficit present. Mental Status: He is alert and oriented to person, place, and time. Psychiatric:         Mood and Affect: Mood normal.         Behavior: Behavior normal.         Labs:   CBC:   Recent Labs     12/16/21  1254   WBC 8.4   HGB 12.4*   HCT 38.5*   MCV 91.9        BMP:  Recent Labs     12/16/21  1254      K 4.7      CO2 25   BUN 17   CREATININE 0.98     Accucheck Glucoses:No results for input(s): POCGLU in the last 72 hours. Cardiac Enzymes: No results for input(s): CKTOTAL, CKMB, CKMBINDEX, TROPONINI in the last 72 hours. PT/INR:   Recent Labs     12/16/21  1254   PROTIME 10.3   INR 1.0     APTT:   Recent Labs     12/16/21  1254   APTT 23.9     Liver Profile:  Lab Results   Component Value Date    AST 18 12/16/2021    ALT 17 12/16/2021    BILIDIR <0.08 05/27/2021    BILITOT 0.35 12/16/2021    ALKPHOS 70 12/16/2021     Lab Results   Component Value Date    CHOL 89 01/18/2019    HDL 29 01/18/2019    TRIG 127 01/18/2019     TSH: No results found for: TSH  UA: No results found for: NITRITE, COLORU, PHUR, LABCAST, WBCUA, RBCUA, MUCUS, TRICHOMONAS, YEAST, BACTERIA, CLARITYU, SPECGRAV, LEUKOCYTESUR, UROBILINOGEN, BILIRUBINUR, BLOODU, GLUCOSEU, AMORPHOUS      Testing:  Pet Scan:   WHOLE BODY PET/CT       6/30/2021       TECHNIQUE:   Following IV injection of 15 mCi of F 18 -FDG, PET  tumor imaging was   acquired from the base of the skull to the mid thighs.   Computed tomography   was used for purposes of attenuation correction and anatomic localization. Fusion imaging was utilized for interpretation. Uptake time 55 mins. Glucose level 94 mg/dl. COMPARISON:   Low-dose chest CT 06/10/2021. CT abdomen and pelvis 05/27/2021. PET-CT   10/02/2019. Chest CT 09/23/2019. HISTORY:   ORDERING SYSTEM PROVIDED HISTORY: Lung nodule       FINDINGS:   Reference values:       Mediastinal/blood pool SUV max: 2.4       Liver SUV max: 2.9       HEAD/NECK: No abnormal metabolic activity. CHEST: Previously described irregular nodule in the right middle lobe   demonstrates abnormal metabolic activity (SUV max 3.4). Tiny focus of   increased metabolic activity in the right hilum is noted (SUV max 3.2),   suggestive of underlying lymph node, which may represent disease involvement. ABDOMEN/PELVIS: No abnormal metabolic activity. Nonspecific diffuse activity   in the stomach and bowel. BONES/SOFT TISSUE: No convincing evidence for focal metabolic activity. Soft   tissue overlap is noted in regions in the upper cervical, upper thoracic and   lumbar spine, which may be accounted for by motion artifact and   misregistration. INCIDENTAL CT FINDINGS: Calcified atheromatous plaque and coronary   calcification. Impression   1. Previously noted growing right middle lobe nodule is now metabolically   active, consistent with developing neoplastic disease. 2.  Tiny focus of metabolic activity in the right hilum suspicious for   disease involvement. 3.  Areas of metabolic activity overlapping the cervical, thoracic and lumbar   spine without convincing evidence for a discrete bone lesion. This may be   accounted for by slight motion artifact and misregistration. If symptoms   warrant, consider further evaluation for bone scan to exclude underlying   osseous metastatic disease. 4.  Nonspecific relatively diffuse metabolic activity in the stomach.   The fat density area in the gastric cardia is not well delineated on this exam.         Pathology:  -- Diagnosis --   RIGHT LUNG, NEEDLE CORE BIOPSY:   -BENIGN LUNG PARENCHYMA WITH FOCAL FIBROSIS AND MILD CHRONIC   INFLAMMATION WITH INTRA-ALVEOLAR HISTIOCYTES. Becki Olivia M.D   **Electronically Signed Out**         sam/7/29/2021        Clinical Information   Operative Findings:  R LUNG BIOPSY     Source of Specimen   1: R LUNG BIOPSY     Gross Description   \"ANA DAVIS, R LUNG\" Two tan cores, 0.6 and 0.7 cm in length, <  0.1 cm in diameter. There is also a tan tissue fragment, 0.1 cm in   greatest dimension. Entirely 1cs. mpb tm     Intraoperative Diagnosis   Core 1:  Atypical cells. Core 2:  Rare atypical cells. Core 3:  Blood. Procedure stopped due to pneumothorax.  (SAM)     Microscopic Description   Microscopic examination performed. Sections show small cores of   benign lung parenchyma with focal fibrosis and chronic inflammation   with intra-alveolar histiocytes with cytoplasmic debris. GMS and AFB   stains are negative. Iron stain is negative in the intra-alveolar   histiocytes. Controls react as expected. SURGICAL PATHOLOGY CONSULTATION        Patient Name: Mychal Ar: [id number]   Path Number: [id number]     [address] [place] [address]. [facility], [address]   [phone]   Fax: [phone]       Echo:   Davian Ja left ventricular systolic function appears preserved with an  estimated ejection fraction of >60%. The left ventricular cavity size is within normal limits and the left  ventricular wall thickness is within normal limits. No definite specific wall motion abnormalities were identified. No significant valvular abnormalities.   Mild diastolic dysfunction.     Compared to the previous study of 9/20/2019, no significant change was seen.     Signature  ----------------------------------------------------------------------------   Electronically signed by Alysia Thomas(Sonographer) on 12/15/2021 09:50   AM  ----------------------------------------------------------------------------     ----------------------------------------------------------------------------   Electronically signed by [place] Stahl(Interpreting physician) on 12/15/2021   03:14 PM  ----------------------------------------------------------------------------  FINDINGS  Left Atrium  Left atrium is normal in size. Left Ventricle  Global left ventricular systolic function appears preserved with an  estimated ejection fraction of >60%. The left ventricular cavity size is within normal limits and the left  ventricular wall thickness is within normal limits. No definite specific wall motion abnormalities were identified. Right Atrium  Right atrium is normal in size. Right Ventricle  Normal right ventricular size and function. Mitral Valve  Normal mitral valve structure with trivial mitral regurgitation. Aortic Valve  Aortic leaflets show calcification without restriction of motion. Tricuspid Valve  Normal tricuspid valve structure with trivial tricuspid regurgitation. Pulmonic Valve  The pulmonic valve is normal in structure with trivial regurgitation. Pericardial Effusion  An anterior echo free space is seen suggestive of a small effusion or fat  pad.     Miscellaneous  Normal aortic root diameter. Mild diastolic dysfunction.       CT scan:   CT OF THE CHEST WITHOUT CONTRAST 11/2/2021 7:31 am       TECHNIQUE:   CT of the chest was performed without the administration of intravenous   contrast. Multiplanar reformatted images are provided for review. Dose   modulation, iterative reconstruction, and/or weight based adjustment of the   mA/kV was utilized to reduce the radiation dose to as low as reasonably   achievable. COMPARISON:   Low-dose CT chest Ker[place] 10, 2021. HISTORY:   ORDERING SYSTEM PROVIDED HISTORY: Right middle lobe pulmonary nodule   TECHNOLOGIST PROVIDED HISTORY:   FDG avid RML nodule with neg cytology and high risk factor profile. Interval   change       FINDINGS:   Mediastinum: Thoracic aorta demonstrates moderate calcification without   aneurysm. Pulmonary trunk appears nondilated. No pericardial effusion. No   lymphadenopathy. The esophagus is grossly unremarkable. Small hiatal hernia. Lungs/pleura: Emphysematous changes with biapical scarring. No focal   consolidation, pneumothorax or pleural effusion. The previously biopsied somewhat bilobed nodule involving the right middle   lobe has slightly increased in size since the prior study from [place] 10, 2021   now measuring 1.8 x 1.0 cm, once measuring 1.8 x 0.6 cm. There appears to be   some spiculation. No new pulmonary nodules are noted. Upper Abdomen: Cholelithiasis. No acute findings. Soft Tissues/Bones: Visualized soft tissues surrounding the chest wall   demonstrate no acute findings. Osseous structures demonstrate degenerative   change. Impression   The previously biopsied, somewhat bilobed nodule involving right middle lobe   is slightly increased in size since the prior study now measuring 1.8 x 1.0   cm, once measuring 1.8 x 0.6 cm. There does appear to be some spiculation. No new pulmonary nodule is seen. RECOMMENDATIONS:   Lung rads:       Category 4B, Very Suspicious (Findings for which additional diagnostic   testing and/or tissue sampling is recommended). Management: Chest CT with or without contrast, PET/CT and/or tissue sampling   depending on the probability of malignancy and comorbidities. PET/CT may be   used when there is a > 8 mm solid component. For new large nodules that   develop on an annual repeat screening CT, a 1 month LDCT may be recommended   to address potentially infectious or inflammatory conditions.        LUNGRADS ASSESSMENT^LUNGRADS^LUNG RADS SCREENING ASSESSMENT^LUNGRADS^4B         Imaging Studies:  I have personally reviewed the testing/imaging above with Dr Dewayne White, he is in agreement with the findings listed above. In summary, Mr. Meka Matthews is a 70y.o. year-old male with a known pulmonary nodule. Problem List:    Pulmonary nodule of the right middle lobe  o Increased in size, 1.8 x 1.0 cm from 1.8 x 0.6 cm  o Bilobed, spiculated  o S/p needle biopsy - negative  o Metabolically active on PET with SUV 3.4  - Possible lymph node involvement noted to right hilum, SUV 3.2   Mucopurulent chronic bronchitis   Multivessel CAD s/p stenting   Angina pectoris   Diabetes mellitus type 2 with long term use of insulin   Hypertension   Hyperlipidemia      Recommendation: Per discussion with Dr Dewayne White, I believe Mr. Meka Matthews would benefit optimally from right middle lobectomy with lymph node dissection. I have discussed the proposed procedure, along with its risk, benefits, and therapeutic alternatives. Specific risks discussedincluded death, stroke, mediastinitis, re-operation for bleeding, and atrial fibrillation. We have also discussed the potential need for blood transfusion, along with its risks and benefits. He appears to understand,and consents to proceed. Surgical intervention has been tentatively scheduled for Tuesday, 12/21/2021. On this date 12/16/2021 I have spent 50 minutes reviewing previous notes, test results and face to face with the patient discussing the diagnosis and importance of compliance with the treatment plan as well as documenting on the day of the visit. At least 50% of the time documented was spent with the patient to provide counseling and/or coordination of care. Agree with the above  Plan for wedge biopsy and possible middle lobectomy  Have discussed the options of surgery open versus minimally invasive with patient.   He will decide which approach he feels most comfortable with  I do appreciate the consult and very happy to help assistance patient's care  Naomi Chaudhari, APRN - CNP

## 2021-12-15 NOTE — PATIENT INSTRUCTIONS
SURVEY:    You may be receiving a survey from [employer] regarding your visit today. Please complete the survey to enable us to provide the highest quality of care to you and your family. If you cannot score us a very good on any question, please call the office to discuss how we could have made your experience a very good one. Thank you.

## 2021-12-15 NOTE — PROGRESS NOTES
Rolando Madison am scribing for and in the presence of Sridhar Angulo MD, F.A.C.C. Patient: Laurie Bautista  : 1950  Date of Visit: December 15, 2021    REASON FOR VISIT / CONSULTATION:   Chief Complaint   Patient presents with    Follow-up     Hx: ASHD, HTN, HLD, tobacco, CP. Pt is here for 1 year f/u he states he is doing well he has spot on lung and is seeing surgeon tomorrow for consult Denies:CP,SOB,lightheaded/dizziness,palp     Dear Dr. Kevin Blum had the pleasure of seeing Laurie Bautista today for follow up on his cardiac status. As you know, Mr. Rowdy Sparrow is 71 y.o male with PMH of hypertension, hyperlipidemia, type II diabetes and coronary artery disease with  heart cath done on 2013 at [facility]. Vs at that time he has NEDRA to OM1. The cath also showed mild disease of the other coronaries and normal left ventricular function by ventriculogram.      Earlier in , patient had an episode of prolonged chest pain. Retrosternal, burning in nature without radiation. Pain occurred at rest with partial relief with sublingual nitrates. Repeat cardiac cath on  2017 showed new totally occluded mid RCA. An attempt to open the totally occluded RCA on 2017 has failed. Patient treated medically. Echo done on 2018, global left ventricular systolic function appears preserved with an estimated ejection fraction of 60%. Mild LVH. No definite specific wall motion abnormalities were identified. No significant valvular abnormalities. Evidence of mild diastolic dysfunction is seen. Echo done on 2019 that showed EF of 65%. LV wall thickness is mildly increased. Aortic valve leaflets are mildly thickened. Mr. Rowdy Sparrow is here for yearly follow up today. He has been doing good heart wise. He is seeing surgeon tomorrow for consultation of mass of lung seen on CT. No further episodes of chest pain since I saw him last time.   He is very active physically and he has no significant exertional symptoms. No chest pain at rest.  Denies any shortness of breath, orthopnea or PND. No problems with his current medications. No fever or cough. No nausea, vomiting or abdominal pain. No problem moving his bowel. No blood in his urine or stool. Denies cough or fever. He has lost about 7 pounds since last visit but reports not having much of appetite since grandson passed away due to [employer]. EKG done in office 12/15/2020- sinus Bradycardia 58 bpm.  No acute ischemic changes. Past Medical History:   Diagnosis Date    Abnormal cardiac cath 9/20/13    LMCA; Mild irregularities 10-20%. LAD: Abnormal.  Mild to moderate irregularities throughout the LAD and branches. LCx:  abnormal.  80-90% stenosis in a moderate sized OM1. RCA: Abnormal.  40-50% mid RCA stenosis.  Actinic keratosis     Arthritis     neck, fingers    CAD (coronary artery disease)     Calculus of kidney     Cervical stenosis of spine     Diabetes mellitus (HCC)     GERD (gastroesophageal reflux disease)     Gout     H/O echocardiogram 04/29/2016    Stress echo. Normal resting LV systolic function,normal systolic restponse to exercise. No evidence of reversible  ischemia on this maximal,symptom limited,treadmill exerxiess stress echocardiography study    History of cardiac cath 02/09/2017    LMCA: Lesion on LMCA: Distal subsection . 20% stenosis. LAD: Lesion on 1st Diag: Mid subsection . 75% stenosis. RCA: Lesion on Mid RCA: Mid subsection . 100% stenosis. Comments: The distal RCA fills by left to right collateralization. Dominance: Mixed. LV function assessed as: Normal. EF 65%.  History of echocardiogram 06/11/2018    EF 60%. Mildly increased LV wall thickness. Evidence of mild diastolic dysfunction seen.  History of echocardiogram 01/18/2019    EF of 65%. LV wall thickness is mildly increased. Aortic valve leaflets are mildly thickened.      History of heart attack     Hyperlipidemia     Hypertension     Raynaud's phenomenon     S/P angioplasty with stent 9/20/13    PTCA-NEDRA of  the OM 1    Tobacco abuse     1 ppd         Past Surgical History:   Procedure Laterality Date    CARDIAC CATHETERIZATION  2007    patient states had 2 small blockages    CARDIAC CATHETERIZATION  02/09/2017    LMCA: Lesion on LMCA: Distal subsection 20% stenosis. LAD: Lesion on 1st Diag: Mid subsection 75% stenosis. RCA: Lesion on Mid RCA: Mid subsection 100% stenosis.  CARDIAC CATHETERIZATION  02/09/2017    Attempted PCI to chronic total occlusion of RCA was not successful. Unable to cross with multiple wires due to heavy calcification and toruosity.     CARDIAC CATHETERIZATION  01/14/2019    CATARACT REMOVAL  04/06/2015    Right eye - Dr. Juliet Blair  06/08/2015    Dr. Eneida Patino - left eye    COLONOSCOPY  01/11/2006    Dr. Bethel Brito - internal hemorrhoids, no polps    COLONOSCOPY  05/23/2016    -polyp    CT NEEDLE BIOPSY LUNG PERCUTANEOUS  7/28/2021    CT NEEDLE BIOPSY LUNG PERCUTANEOUS 7/28/2021 [facility] CT SCAN    HEMORRHOID SURGERY      HERNIA REPAIR      LUNG BIOPSY Right 07/28/2021    Dr Sherrel Gosselin    c4-5 laminectomy and fusion    OTHER SURGICAL HISTORY  10/2014    Right foot - 7 from heal and 1 from great toe at [address]  12/11/2014    pain injection - cervical    UPPER GASTROINTESTINAL ENDOSCOPY  12/28/2005    Dr. Bethel Brito - mild esophagitis and gastritis    UPPER GASTROINTESTINAL ENDOSCOPY N/A 02/05/2019    -bx(neg H-Pylori)retained gastric content, possible submucosal mass of gastric cardia    UPPER GASTROINTESTINAL ENDOSCOPY N/A 10/22/2019    EGD BIOPSY performed by Zandra Mathew MD at [place] [id number] History   Problem Relation Age of Onset    Heart Disease Mother     Diabetes Mother     High Blood Pressure Mother     Diabetes Father     Alzheimer's Disease Father     Heart Disease Sister CABG    Heart Disease Brother     Heart Disease Brother     Heart Disease Brother     Cancer Brother          Social History     Tobacco Use    Smoking status: Former Smoker     Packs/day: 0.25     Years: 54.00     Pack years: 13.50     Types: Cigarettes     Quit date: 10/1/2021     Years since quittin.2    Smokeless tobacco: Never Used   Vaping Use    Vaping Use: Never used   Substance Use Topics    Alcohol use: No    Drug use: No     Family History   Problem Relation Age of Onset    Heart Disease Mother     Diabetes Mother     High Blood Pressure Mother     Diabetes Father     Alzheimer's Disease Father     Heart Disease Sister         CABG    Heart Disease Brother     Heart Disease Brother     Heart Disease Brother     Cancer Brother          Current Outpatient Medications:     isosorbide mononitrate (IMDUR) 30 MG extended release tablet, Take 0.5 tablets by mouth daily, Disp: 30 tablet, Rfl: 5    amLODIPine (NORVASC) 5 MG tablet, Take 1 tablet by mouth daily, Disp: 90 tablet, Rfl: 3    clopidogrel (PLAVIX) 75 MG tablet, Take 1 tablet by mouth daily, Disp: 90 tablet, Rfl: 3    atenolol (TENORMIN) 25 MG tablet, Take 1 tablet by mouth daily, Disp: 90 tablet, Rfl: 3    tiZANidine (ZANAFLEX) 4 MG tablet, Take 1 tablet by mouth every 8 hours as needed (Rib pain), Disp: 15 tablet, Rfl: 0    Insulin Glargine, 2 Unit Dial, 300 UNIT/ML SOPN, Insulin Glargine Insulin Glargine U-300 Conc Active 10 UNIT Subcutaneous Every morning May 7th, 2020 11:47am 2020  [facility] ([id number]), Disp: , Rfl:     atorvastatin (LIPITOR) 20 MG tablet, Take 1 tablet by mouth daily, Disp: 90 tablet, Rfl: 3    sitaGLIPtan-metformin (JANUMET)  MG per tablet, Take 1 tablet by mouth 2 times daily (with meals), Disp: , Rfl:     lisinopril (PRINIVIL;ZESTRIL) 20 MG tablet, Take 0.5 tablets by mouth daily (Patient taking differently: Take 20 mg by mouth daily ), Disp: 90 tablet, Rfl: 3    nitroGLYCERIN (NITROSTAT) 0.4 MG SL tablet, Place 1 tablet under the tongue every 5 minutes as needed for Chest pain, Disp: 25 tablet, Rfl: 3    glimepiride (AMARYL) 2 MG tablet, Take 1 tablet by mouth 2 times daily, Disp: 180 tablet, Rfl: 3    aspirin 81 MG tablet, Take 81 mg by mouth daily. , Disp: , Rfl:     etodolac (LODINE) 400 MG tablet, Take 1 tablet by mouth 2 times daily (Patient not taking: Reported on 12/15/2021), Disp: 30 tablet, Rfl: 0    Allergies   Allergen Reactions    Niacin And Related     Oxycodone Hcl     Other Nausea And Vomiting     Patient states on all pain medications he gets nausea and vomiting. ROS: 14 systems reviewed and negative except for pertinent positives as noted above. PHYSICAL EXAM:   /62 (Site: Left Upper Arm, Position: Sitting, Cuff Size: Medium Adult)   Pulse 58   Resp 18   Ht 6' (1.829 m)   Wt 164 lb 6.4 oz (74.6 kg)   SpO2 98%   BMI 22.30 kg/m²  Body mass index is 22.3 kg/m². Constitutional: He is oriented to person, place, and time. He appears well-developed and well-nourished. In no acute distress. HEENT: Normocephalic and atraumatic. No JVD present. Carotid bruit is not present. No mass and no thyromegaly present. No lymphadenopathy present. Cardiovascular: Normal rate, regular rhythm, normal heart sounds. Exam reveals no gallop and no friction rubs. No heart murmur heard. Pulmonary/Chest: Effort normal and breath sounds normal. No respiratory distress. He has no wheezes, rhonchi or rales. Abdominal: Soft, non-tender. Bowel sounds and aorta are normal. He exhibits no organomegaly, mass or bruit. Extremities: No edema. No cyanosis and no clubbing. Pulses are 2+ radial and carotid pulses. 2+ dorsalis pedis and posterior tibial pulses bilaterally. Neurological: He is alert and oriented to person, place, and time. No evidence of gross cranial nerve deficit. Coordination appeared normal.   Skin: Skin is warm and dry.  There is no rash or diaphoresis. Psychiatric: He has a normal mood and affect. His speech is normal and behavior is normal.       Diagnosis Orders   1. ASHD (arteriosclerotic heart disease)     2. S/P angioplasty with stent, obtuse marginal     3. Essential hypertension     4. Mixed hyperlipidemia     5. Tobacco abuse counseling     6. Atypical chest pain       PLAN:   Atherosclerotic Heart Disease: S/P PCI in in 2003 and 2011. Cardiac cath in 2017 showed totally occluded RCA, an attempt to open the RCA failed. Patient has good left-to-right collaterals. Treated medically since that time and is doing very good. Antiplatelet Agent: Continue Aspirin 81 mg daily and clopidogrel (Plavix) 75 mg daily. I also reminded him to watch for signs of blood in his stool or black tarry stools and stop the medication immediately if this develops as this could be life threatening. Beta Blocker Therapy: Continue Atenolol 25 mg daily  Anti-anginal medications: Continue amlodipine (Norvasc) 5 mg daily. I also discussed the potential side effects of this medication including lightheadedness and dizziness and told him to call the office if this occurs. Anti-anginal medications: Continue isosorbide mononitrate (Imdur) 15 mg daily I also discussed the potential side effects of this medication including lightheadedness and dizziness and told him to call the office if this occurs. Statin Therapy: Continue atorvastatin (Lipitor) 20 mg nightly. Excellent LDL. Essential Hypertension: Controlled  ACE Inibitor/ARB: Continue lisinopril 10 mg once daily. I also discussed the potential side effects of this medication including lightheadedness and dizziness and told him to stop the medication of this occurs and call our office if this occurs.   Calcium Channel Blocker: Continue amlodipine (Norvasc)      Beta Blocker Therapy: Continue atenolol 25 mg  daily       · Hyperlipidemia: Mixed, LDL done on 1/18/2019 was 35 mg/dL  · Statin Therapy: Continue atorvastatin (Lipitor) 20 mg nightly. Tobacco Abuse Counseling: I spent several minutes discussing the dangers of tobacco abuse as well as multiple methods for trying to quit smoking. In the end, Mr. Robert Simons said that he did not want any assistance at this time but would continue to try and quit reduce and eventually quit smoking in the near future. He did report that he quit about 2 months ago. · Atypical Chest Pain   Antiplatelet Agent: Continue aspirin 81 mg daily and clopidogrel (Plavix 75 mg daily. I also reminded him to watch for signs of blood in his stool or black tarry stools and stop the medication immediately if this develops as this could be life threatening. · Beta Blocker Therapy: Continue Atenolol 25 mg daily    · Calcium Channel Blocker: Continue amlodipine (Norvasc) 5 mg daily     Other Anti-anginal medications: Continue Isosorbide mononitrate (Imdur) 15 mg daily   ·  Statin Therapy: Continue atorvastatin (Lipitor) 20 mg nightly. · Counseling: I advised Mr. Robert Simons to call our office or go to the emergency room if he develops worsening or persistent chest pain or shortness of breath as this could be life threatening. · Pre-Op Clearance: Excisional  Biopsy not scheduled yet. · Currently stable from cardiovascular standpoint. · Denies any chest pain or worsening shortness of breath. · ECG showed no acute ischemic changes. · No evidence of acute coronary syndrome, heart failure or significant arrhythmia. · No significant valvular abnormalities on clinical examination.     · Pre-Operative Risk assessment using 2014 ACC/AHA guidelines   · Emergent procedure No  · Active Cardiac Condition No (decompensated HF, Arrhythmia, MI <3 weeks, severe valve disease)  · Risk Level of Procedure Intermediate Risk (intraperitoneal, intrathoracic, HENT, orthopedic, or carotid endarterectomy, etc.)  · Revised Cardiac Risk Index Risk factors: History of ischemic heart disease  · Measurement of Exercise Tolerance before Surgery >4 No  · According to the 2014 ACC/AHA pre-operative risk assessment guidelines Salvador Carballo is at intermediate risk for major cardiac complications during a intermediate risk procedure and may continue as planned. Specific medication recommendations are listed below. Medications recommended to continue should be taken with a sip of water even when NPO.  Medical management to reduce perioperative risk:   Antiplatelet Agent: I would prefer that his aspirin 81 mg be continued throughout the perioperative period but if bleeding risk is to high, this can be stopped 5-7 days prior to the planned procedure but please restart this as soon as safely possible.    Stop Plavix 2-3 days prior.  Additional Recommendations: I would also suggest that he continue his beta blocker and statin throughout the perioperative period.  Additional Testing List: I ordered a echocardiogram to better assess for the etiology of this problem and to help guide future management    Finally, I recommended that he continue his other medications and follow up with you as previously scheduled. FOLLOW UP:   I told Mr. Prachi Medrano to call my office if he had any problems, but otherwise told him to Return in about 1 year (around 12/15/2022). However, I would be happy to see him sooner should the need arise. Sincerely,  Zeke [address], [address] [facility]  Phone: [phone]; Fax: [phone]    I believe that the risk of significant morbidity and mortality related to the patient's current medical conditions are: intermediate-high. >30 minutes were spent during prep work, discussion and exam of the patient, and follow up documentation and all of their questions were answered. The documentation recorded by the scribe, accurately and completely reflects the services I personally performed and the decisions made by me. Daily Moya MD, F.A.C.C. December 15, 2021

## 2021-12-16 ENCOUNTER — INITIAL CONSULT (OUTPATIENT)
Dept: CARDIOTHORACIC SURGERY | Age: 71
End: 2021-12-16
Payer: MEDICARE

## 2021-12-16 ENCOUNTER — HOSPITAL ENCOUNTER (OUTPATIENT)
Dept: PREADMISSION TESTING | Age: 71
Discharge: HOME | End: 2021-12-20
Payer: MEDICARE

## 2021-12-16 ENCOUNTER — HOSPITAL ENCOUNTER (OUTPATIENT)
Dept: GENERAL RADIOLOGY | Age: 71
Discharge: HOME | End: 2021-12-18
Payer: MEDICARE

## 2021-12-16 ENCOUNTER — TELEPHONE (OUTPATIENT)
Dept: CARDIOLOGY | Age: 71
End: 2021-12-16

## 2021-12-16 VITALS
SYSTOLIC BLOOD PRESSURE: 120 MMHG | HEART RATE: 52 BPM | DIASTOLIC BLOOD PRESSURE: 64 MMHG | WEIGHT: 164 LBS | TEMPERATURE: 97.4 F | OXYGEN SATURATION: 97 % | HEIGHT: 72 IN | BODY MASS INDEX: 22.21 KG/M2 | RESPIRATION RATE: 18 BRPM

## 2021-12-16 VITALS
TEMPERATURE: 98.4 F | HEART RATE: 62 BPM | OXYGEN SATURATION: 99 % | WEIGHT: 165 LBS | RESPIRATION RATE: 17 BRPM | HEIGHT: 72 IN | DIASTOLIC BLOOD PRESSURE: 65 MMHG | SYSTOLIC BLOOD PRESSURE: 131 MMHG | BODY MASS INDEX: 22.35 KG/M2

## 2021-12-16 DIAGNOSIS — R91.1 PULMONARY NODULE: ICD-10-CM

## 2021-12-16 LAB
ABSOLUTE EOS #: 0.18 K/UL (ref 0–0.44)
ABSOLUTE IMMATURE GRANULOCYTE: 0.04 K/UL (ref 0–0.3)
ABSOLUTE LYMPH #: 2.24 K/UL (ref 1.1–3.7)
ABSOLUTE MONO #: 0.84 K/UL (ref 0.1–1.2)
ALBUMIN SERPL-MCNC: 4.4 G/DL (ref 3.5–5.2)
ALBUMIN/GLOBULIN RATIO: 1.5 (ref 1–2.5)
ALLEN TEST: ABNORMAL
ALP BLD-CCNC: 70 U/L (ref 40–129)
ALT SERPL-CCNC: 17 U/L (ref 5–41)
ANION GAP SERPL CALCULATED.3IONS-SCNC: 9 MMOL/L (ref 9–17)
AST SERPL-CCNC: 18 U/L
BASOPHILS # BLD: 1 % (ref 0–2)
BASOPHILS ABSOLUTE: 0.1 K/UL (ref 0–0.2)
BILIRUB SERPL-MCNC: 0.35 MG/DL (ref 0.3–1.2)
BUN BLDV-MCNC: 17 MG/DL (ref 8–23)
BUN/CREAT BLD: ABNORMAL (ref 9–20)
CALCIUM SERPL-MCNC: 9.4 MG/DL (ref 8.6–10.4)
CHLORIDE BLD-SCNC: 107 MMOL/L (ref 98–107)
CO2: 25 MMOL/L (ref 20–31)
COLLAGEN ADENOSINE-5'-DIPHOSPHATE (ADP) TIME: 83 SEC (ref 67–112)
COLLAGEN EPINEPHRINE TIME: 159 SEC (ref 85–172)
CREAT SERPL-MCNC: 0.98 MG/DL (ref 0.7–1.2)
DIFFERENTIAL TYPE: ABNORMAL
EOSINOPHILS RELATIVE PERCENT: 2 % (ref 1–4)
ESTIMATED AVERAGE GLUCOSE: 137 MG/DL
FIO2: ABNORMAL
GFR AFRICAN AMERICAN: >60 ML/MIN
GFR NON-AFRICAN AMERICAN: >60 ML/MIN
GFR SERPL CREATININE-BSD FRML MDRD: ABNORMAL ML/MIN/{1.73_M2}
GFR SERPL CREATININE-BSD FRML MDRD: ABNORMAL ML/MIN/{1.73_M2}
GLUCOSE BLD-MCNC: 57 MG/DL (ref 70–99)
HBA1C MFR BLD: 6.4 % (ref 4–6)
HCO3 VENOUS: 24.1 MMOL/L (ref 22–29)
HCT VFR BLD CALC: 38.5 % (ref 40.7–50.3)
HEMOGLOBIN: 12.4 G/DL (ref 13–17)
IMMATURE GRANULOCYTES: 1 %
INR BLD: 1
LYMPHOCYTES # BLD: 27 % (ref 24–43)
MCH RBC QN AUTO: 29.6 PG (ref 25.2–33.5)
MCHC RBC AUTO-ENTMCNC: 32.2 G/DL (ref 28.4–34.8)
MCV RBC AUTO: 91.9 FL (ref 82.6–102.9)
MODE: ABNORMAL
MONOCYTES # BLD: 10 % (ref 3–12)
NEGATIVE BASE EXCESS, VEN: ABNORMAL (ref 0–2)
NRBC AUTOMATED: 0 PER 100 WBC
O2 DEVICE/FLOW/%: ABNORMAL
O2 SAT, VEN: 62 % (ref 60–85)
PARTIAL THROMBOPLASTIN TIME: 23.9 SEC (ref 20.5–30.5)
PATIENT TEMP: ABNORMAL
PCO2, VEN: 37.5 MM HG (ref 41–51)
PDW BLD-RTO: 13 % (ref 11.8–14.4)
PH VENOUS: 7.42 (ref 7.32–7.43)
PLATELET # BLD: 226 K/UL (ref 138–453)
PLATELET ESTIMATE: ABNORMAL
PLATELET FUNCTION INTERP: NORMAL
PMV BLD AUTO: 11.1 FL (ref 8.1–13.5)
PO2, VEN: 31.3 MM HG (ref 30–50)
POC PCO2 TEMP: ABNORMAL MM HG
POC PH TEMP: ABNORMAL
POC PO2 TEMP: ABNORMAL MM HG
POSITIVE BASE EXCESS, VEN: 0 (ref 0–3)
POTASSIUM SERPL-SCNC: 4.7 MMOL/L (ref 3.7–5.3)
PROTHROMBIN TIME: 10.3 SEC (ref 9.1–12.3)
RBC # BLD: 4.19 M/UL (ref 4.21–5.77)
RBC # BLD: ABNORMAL 10*6/UL
SAMPLE SITE: ABNORMAL
SEG NEUTROPHILS: 59 % (ref 36–65)
SEGMENTED NEUTROPHILS ABSOLUTE COUNT: 4.96 K/UL (ref 1.5–8.1)
SODIUM BLD-SCNC: 141 MMOL/L (ref 135–144)
TOTAL CO2, VENOUS: ABNORMAL MMOL/L (ref 23–30)
TOTAL PROTEIN: 7.3 G/DL (ref 6.4–8.3)
WBC # BLD: 8.4 K/UL (ref 3.5–11.3)
WBC # BLD: ABNORMAL 10*3/UL

## 2021-12-16 PROCEDURE — 86850 RBC ANTIBODY SCREEN: CPT

## 2021-12-16 PROCEDURE — 85576 BLOOD PLATELET AGGREGATION: CPT

## 2021-12-16 PROCEDURE — 86900 BLOOD TYPING SEROLOGIC ABO: CPT

## 2021-12-16 PROCEDURE — 86920 COMPATIBILITY TEST SPIN: CPT

## 2021-12-16 PROCEDURE — G8420 CALC BMI NORM PARAMETERS: HCPCS | Performed by: THORACIC SURGERY (CARDIOTHORACIC VASCULAR SURGERY)

## 2021-12-16 PROCEDURE — 1123F ACP DISCUSS/DSCN MKR DOCD: CPT | Performed by: THORACIC SURGERY (CARDIOTHORACIC VASCULAR SURGERY)

## 2021-12-16 PROCEDURE — 83036 HEMOGLOBIN GLYCOSYLATED A1C: CPT

## 2021-12-16 PROCEDURE — 4040F PNEUMOC VAC/ADMIN/RCVD: CPT | Performed by: THORACIC SURGERY (CARDIOTHORACIC VASCULAR SURGERY)

## 2021-12-16 PROCEDURE — 3017F COLORECTAL CA SCREEN DOC REV: CPT | Performed by: THORACIC SURGERY (CARDIOTHORACIC VASCULAR SURGERY)

## 2021-12-16 PROCEDURE — 36415 COLL VENOUS BLD VENIPUNCTURE: CPT

## 2021-12-16 PROCEDURE — 87641 MR-STAPH DNA AMP PROBE: CPT

## 2021-12-16 PROCEDURE — 71046 X-RAY EXAM CHEST 2 VIEWS: CPT

## 2021-12-16 PROCEDURE — 85610 PROTHROMBIN TIME: CPT

## 2021-12-16 PROCEDURE — 87086 URINE CULTURE/COLONY COUNT: CPT

## 2021-12-16 PROCEDURE — 85730 THROMBOPLASTIN TIME PARTIAL: CPT

## 2021-12-16 PROCEDURE — 85025 COMPLETE CBC W/AUTO DIFF WBC: CPT

## 2021-12-16 PROCEDURE — 86901 BLOOD TYPING SEROLOGIC RH(D): CPT

## 2021-12-16 PROCEDURE — 1036F TOBACCO NON-USER: CPT | Performed by: THORACIC SURGERY (CARDIOTHORACIC VASCULAR SURGERY)

## 2021-12-16 PROCEDURE — G8484 FLU IMMUNIZE NO ADMIN: HCPCS | Performed by: THORACIC SURGERY (CARDIOTHORACIC VASCULAR SURGERY)

## 2021-12-16 PROCEDURE — 93005 ELECTROCARDIOGRAM TRACING: CPT | Performed by: THORACIC SURGERY (CARDIOTHORACIC VASCULAR SURGERY)

## 2021-12-16 PROCEDURE — 80053 COMPREHEN METABOLIC PANEL: CPT

## 2021-12-16 PROCEDURE — 82803 BLOOD GASES ANY COMBINATION: CPT

## 2021-12-16 PROCEDURE — G8427 DOCREV CUR MEDS BY ELIG CLIN: HCPCS | Performed by: THORACIC SURGERY (CARDIOTHORACIC VASCULAR SURGERY)

## 2021-12-16 PROCEDURE — 99204 OFFICE O/P NEW MOD 45 MIN: CPT | Performed by: THORACIC SURGERY (CARDIOTHORACIC VASCULAR SURGERY)

## 2021-12-16 RX ORDER — LISINOPRIL 20 MG/1
20 TABLET ORAL DAILY
Status: ON HOLD | COMMUNITY
End: 2022-09-29 | Stop reason: HOSPADM

## 2021-12-16 RX ORDER — POLYETHYLENE GLYCOL 3350 17 G/17G
POWDER, FOR SOLUTION ORAL
COMMUNITY
Start: 2021-08-11 | End: 2021-12-16

## 2021-12-16 RX ORDER — CLOPIDOGREL BISULFATE 75 MG/1
TABLET ORAL
COMMUNITY
End: 2021-12-16 | Stop reason: SDUPTHER

## 2021-12-16 RX ORDER — ATENOLOL 25 MG/1
TABLET ORAL
COMMUNITY
End: 2021-12-16 | Stop reason: SDUPTHER

## 2021-12-16 RX ORDER — TIZANIDINE HYDROCHLORIDE 4 MG/1
CAPSULE, GELATIN COATED ORAL
COMMUNITY
End: 2021-12-16 | Stop reason: SDUPTHER

## 2021-12-16 RX ORDER — AMLODIPINE BESYLATE 5 MG/1
TABLET ORAL
COMMUNITY
End: 2021-12-16 | Stop reason: SDUPTHER

## 2021-12-16 RX ORDER — ISOSORBIDE MONONITRATE 30 MG/1
TABLET, EXTENDED RELEASE ORAL
COMMUNITY
End: 2022-10-24

## 2021-12-16 RX ORDER — PANTOPRAZOLE SODIUM 40 MG/10ML
INJECTION, POWDER, LYOPHILIZED, FOR SOLUTION INTRAVENOUS PRN
Status: ON HOLD | COMMUNITY
End: 2022-02-09

## 2021-12-16 RX ORDER — ATORVASTATIN CALCIUM 20 MG/1
TABLET, FILM COATED ORAL
COMMUNITY
End: 2021-12-16 | Stop reason: SDUPTHER

## 2021-12-16 RX ORDER — NITROGLYCERIN 80 MG/1
PATCH TRANSDERMAL
COMMUNITY
End: 2022-10-06 | Stop reason: SDUPTHER

## 2021-12-16 RX ORDER — FERROUS SULFATE 325(65) MG
325 TABLET ORAL DAILY
COMMUNITY
End: 2022-08-11 | Stop reason: ALTCHOICE

## 2021-12-16 RX ORDER — GLIMEPIRIDE 2 MG/1
TABLET ORAL
COMMUNITY
End: 2021-12-16 | Stop reason: SDUPTHER

## 2021-12-16 ASSESSMENT — ENCOUNTER SYMPTOMS
ABDOMINAL DISTENTION: 0
DIARRHEA: 0
CONSTIPATION: 0
COLOR CHANGE: 0
SHORTNESS OF BREATH: 0

## 2021-12-16 NOTE — PROGRESS NOTES
Anesthesia Focused Assessment    Hx of anesthesia complications:  no  Family hx of anesthesia complications:  no      Prior + Covid-19 test? no        STOP-BANG Sleep Apnea Questionnaire    SNORE loudly (heard through closed doors)? No  TIRED, fatigued, sleepy during daytime? No  OBSERVED stopping breathing during sleep? No  High blood PRESSURE or being treated? Yes    BMI over 35? No  AGE over 48? Yes  NECK circumference over 16\"? No  GENDER (male)? Yes             Total 2  High risk 5-8  Intermediate risk 3-4  Low risk 0-2    ----------------------------------------------------------------------------------------------------------------------  CHERRI                              No  If yes, machine? DM1                                            No  DM2                   Yes    Coronary Artery Disease      Yes  HTN         Yes  Defib/AICD/Pacemaker               No             Renal Failure                   No  If yes, on dialysis           Active smoker? No, former  Drinks alcohol? No  Illicit drugs? No  Dentition?        benign      Past Medical History:   Diagnosis Date    Abnormal cardiac cath 09/20/2013    LMCA; Mild irregularities 10-20%. LAD: Abnormal.  Mild to moderate irregularities throughout the LAD and branches. LCx:  abnormal.  80-90% stenosis in a moderate sized OM1. RCA: Abnormal.  40-50% mid RCA stenosis.  Actinic keratosis     Arthritis     neck, fingers    CAD (coronary artery disease)     Calculus of kidney     Cervical stenosis of spine     Diabetes mellitus (HCC)     Dr. Gutierrez Desai, CNP, [place]    Full dentures     GERD (gastroesophageal reflux disease)     H/O echocardiogram 04/29/2016    Stress echo. Normal resting LV systolic function,normal systolic restponse to exercise.  No evidence of reversible  ischemia on this maximal,symptom limited,treadmill exerxiess stress echocardiography study    History of cardiac cath 02/09/2017    LMCA: Lesion on LMCA: Distal subsection . 20% stenosis. LAD: Lesion on 1st Diag: Mid subsection . 75% stenosis. RCA: Lesion on Mid RCA: Mid subsection . 100% stenosis. Comments: The distal RCA fills by left to right collateralization. Dominance: Mixed. LV function assessed as: Normal. EF 65%.  History of echocardiogram 06/11/2018    EF 60%. Mildly increased LV wall thickness. Evidence of mild diastolic dysfunction seen.  History of echocardiogram 01/18/2019    EF of 65%. LV wall thickness is mildly increased. Aortic valve leaflets are mildly thickened.  History of heart attack     History of tobacco abuse     Quit 10/2021, 1/4 ppd for 47 years    Hyperlipidemia     Hypertension     Dr. Rl Moncada, CNP    Pulmonary nodule 12/2021    Right middle lobe    Raynaud's phenomenon     S/P angioplasty with stent 09/20/2013    PTCA-NEDRA of  the OM 1    Wears hearing aid in both ears          Patient was evaluated in PAT & anesthesia guidelines were applied. NPO guidelines, medication instructions and scheduled arrival time were reviewed with patient. Patient is aware of instructions regarding holding plavix and OTC medications preoperatively provided by surgeon. Anesthesia contacted:   no    Medical or cardiac clearance ordered: no, patient denies any shortness of breath or chest pain; follows with cardiology, last visit on file from 12/15/2021.     ALONDRA Hart CNP   12/16/21  12:27 PM

## 2021-12-16 NOTE — TELEPHONE ENCOUNTER
----- Message from Marc Gerard MD sent at 12/16/2021  1:07 PM EST -----  Echo didn't change from before.

## 2021-12-16 NOTE — PROGRESS NOTES
Instructed on NPO. No mint, gum or cigarettes. Leave all personal belongs and jewelry at home. Wear comfortable clothing the day of surgery. May shower before surgery with antibacterial soap. Verbalized understanding.

## 2021-12-17 LAB
CULTURE: NO GROWTH
EKG ATRIAL RATE: 52 BPM
EKG P AXIS: 73 DEGREES
EKG P-R INTERVAL: 192 MS
EKG Q-T INTERVAL: 418 MS
EKG QRS DURATION: 98 MS
EKG QTC CALCULATION (BAZETT): 388 MS
EKG R AXIS: 74 DEGREES
EKG T AXIS: 67 DEGREES
EKG VENTRICULAR RATE: 52 BPM
Lab: NORMAL
MRSA, DNA, NASAL: NORMAL
SPECIMEN DESCRIPTION: NORMAL
SPECIMEN DESCRIPTION: NORMAL

## 2021-12-17 PROCEDURE — 93010 ELECTROCARDIOGRAM REPORT: CPT | Performed by: INTERNAL MEDICINE

## 2021-12-18 ENCOUNTER — ANESTHESIA EVENT (OUTPATIENT)
Dept: OPERATING ROOM | Age: 71
DRG: 164 | End: 2021-12-18
Payer: MEDICARE

## 2021-12-20 ENCOUNTER — TELEPHONE (OUTPATIENT)
Dept: CARDIOTHORACIC SURGERY | Age: 71
End: 2021-12-20

## 2021-12-20 DIAGNOSIS — I25.10 CORONARY ARTERY DISEASE INVOLVING NATIVE CORONARY ARTERY OF NATIVE HEART WITHOUT ANGINA PECTORIS: Primary | ICD-10-CM

## 2021-12-20 NOTE — TELEPHONE ENCOUNTER
Jan. 5th 9am  works for new date  Make sure off plavix 5 days-lovenox in mean time when off plavix  Re-lab in the AM

## 2021-12-20 NOTE — TELEPHONE ENCOUNTER
Spoke to CNP Mercer Lanes regarding this. He states to move patients surgery out to the first week of January. Rescheduled with Mayelin Lantigua  from Surgery scheduling to Jan 5, 2022 at [facility]. Per CNP Mercer Lanes patient can have all preop lab work done morning of surgery. Explained this to patients wife. Also reviewed preop instructions with her including stopping Plavix on 1/1/22. Pts wife verbalized understanding to all instructions and states she will inform pt once he wakes up.

## 2021-12-20 NOTE — TELEPHONE ENCOUNTER
Pt calls stating that he did not stop his Plavix on Friday prior to surgery as instructed previously. He states his last dose was on Saturday.    Please advise

## 2021-12-21 ENCOUNTER — ANESTHESIA (OUTPATIENT)
Dept: OPERATING ROOM | Age: 71
DRG: 164 | End: 2021-12-21
Payer: MEDICARE

## 2021-12-22 ENCOUNTER — TELEPHONE (OUTPATIENT)
Dept: CARDIOTHORACIC SURGERY | Age: 71
End: 2021-12-22

## 2021-12-22 DIAGNOSIS — Z79.01 ON BRIDGING TREATMENT WITH LOVENOX: Primary | ICD-10-CM

## 2022-01-03 PROCEDURE — APPNB30 APP NON BILLABLE TIME 0-30 MINS: Performed by: NURSE PRACTITIONER

## 2022-01-03 ASSESSMENT — ENCOUNTER SYMPTOMS
CONSTIPATION: 0
DIARRHEA: 0
ABDOMINAL DISTENTION: 0
COLOR CHANGE: 0
SHORTNESS OF BREATH: 0

## 2022-01-03 NOTE — H&P
[facility] Cardiothoracic Surgery  History & Physical  (As previously charted in clinic)      CC: Pulmonary nodule, right middle lobe    HPI:  Mr. Malachi Kumar is a 70 y.o. [address] [facility] male who presents with a known pulmonary nodule of the right middle lobe. Pertinent medical history includes CAD with previous stenting, diabetes, hypertension and hyperlipidemia and history of smoking. Patient quit smoking 2 months ago. He has been smoking since he was 15years old with a 2 ppd habit. CT lung screening in June revealed the nodule had increased in size and subsequent PET scan was positive for metabolic activity, including possible lymph node involvement. Patient underwent a needle biopsy that was negative. Patient denies chest pain, shortness of breath, fever/chills, unplanned weight loss and night sweats. He has been referred for consideration of surgical resection. PMH:   has a past medical history of Abnormal cardiac cath (09/20/2013), Actinic keratosis, Arthritis, CAD (coronary artery disease), Calculus of kidney, Cervical stenosis of spine, Diabetes mellitus ([address] Full dentures, GERD (gastroesophageal reflux disease), H/O echocardiogram (04/29/2016), History of cardiac cath (02/09/2017), History of echocardiogram (06/11/2018), History of echocardiogram (01/18/2019), History of heart attack, History of tobacco abuse, Hyperlipidemia, Hypertension, Pulmonary nodule (12/2021), Raynaud's phenomenon, S/P angioplasty with stent (09/20/2013), and Wears hearing aid in both ears. PSH:   has a past surgical history that includes Cardiac catheterization (2007); Hemorrhoid surgery; Neck surgery (1997); other surgical history (10/2014); other surgical history (12/11/2014); Cataract removal (04/06/2015); Upper gastrointestinal endoscopy (12/28/2005); Cataract removal (06/08/2015); Colonoscopy (01/11/2006); Colonoscopy (05/23/2016); Cardiac catheterization (02/09/2017); Cardiac catheterization (02/09/2017);  Cardiac catheterization (01/14/2019); Upper gastrointestinal endoscopy (N/A, 02/05/2019); Upper gastrointestinal endoscopy (N/A, 10/22/2019); Lung biopsy (Right, 07/28/2021); CT NEEDLE BIOPSY LUNG PERCUTANEOUS (7/28/2021); and hernia repair (Left). Allergies: Allergies   Allergen Reactions    Niacin And Related      Turns red, no trouble breathing    Minocycline Hcl Rash    Other Nausea And Vomiting     Patient states on all pain medications he gets nausea and vomiting. Doctor ordered a medicine (nausea) to take before pain medication    Oxycodone Hcl      vomits       Medications:   Current Outpatient Medications:     enoxaparin (LOVENOX) 120 MG/0.8ML injection, Inject 0.5 mLs into the skin 2 times daily for 5 days, Disp: 10 each, Rfl: 3    ferrous sulfate (IRON 325) 325 (65 Fe) MG tablet, Take 325 mg by mouth daily , Disp: , Rfl:     isosorbide mononitrate (IMDUR) 30 MG extended release tablet, 1/2 tablet in the morning, Disp: , Rfl:     lisinopril (PRINIVIL;ZESTRIL) 20 MG tablet, Take 20 mg by mouth daily , Disp: , Rfl:     nitroGLYCERIN (NITRODUR) 0.4 MG/HR, 1 dose under tongue as needed for chest pain.  max of 3 in one day, Disp: , Rfl:     pantoprazole (PROTONIX) 40 MG injection, as needed , Disp: , Rfl:     amLODIPine (NORVASC) 5 MG tablet, Take 1 tablet by mouth daily, Disp: 90 tablet, Rfl: 3    clopidogrel (PLAVIX) 75 MG tablet, Take 1 tablet by mouth daily (Patient taking differently: Take 75 mg by mouth daily [facility] Disp: 90 tablet, Rfl: 3    atenolol (TENORMIN) 25 MG tablet, Take 1 tablet by mouth daily, Disp: 90 tablet, Rfl: 3    Insulin Glargine, 2 Unit Dial, 300 UNIT/ML SOPN, Insulin Glargine Insulin Glargine U-300 Conc Active 10 UNIT Subcutaneous Every morning May 7th, 2020 11:47am 05-  [facility] ([id number]), Disp: , Rfl:     atorvastatin (LIPITOR) 20 MG tablet, Take 1 tablet by mouth daily, Disp: 90 tablet, Rfl: 3    sitaGLIPtan-metformin (JANUMET)  MG per tablet, Take 1 tablet by mouth 2 times daily (with meals), Disp: , Rfl:     glimepiride (AMARYL) 2 MG tablet, Take 1 tablet by mouth 2 times daily, Disp: 180 tablet, Rfl: 3    aspirin 81 MG tablet, Take 81 mg by mouth daily Ordered by heart doctor, Dr. Oli Dobbs Lutz, Disp: , Rfl:     Social Hx:   reports that he quit smoking about 3 months ago. His smoking use included cigarettes. He has a 13.50 pack-year smoking history. He has never used smokeless tobacco.    Family Hx: family history includes Alzheimer's Disease in his father; Cancer in his brother; Diabetes in his father and mother; Heart Disease in his brother, brother, brother, mother, and sister; High Blood Pressure in his mother. ROS:    Review of Systems   Constitutional: Negative for activity change, appetite change, chills, diaphoresis, fatigue, fever and unexpected weight change. HENT: Negative for congestion. Eyes: Negative for visual disturbance. Respiratory: Negative for shortness of breath. Cardiovascular: Negative for chest pain. Gastrointestinal: Negative for abdominal distention, constipation and diarrhea. Genitourinary: Negative for dysuria. Musculoskeletal: Negative for gait problem. Skin: Negative for color change. Neurological: Negative for dizziness and weakness. Psychiatric/Behavioral: Negative for suicidal ideas. The patient is not nervous/anxious. Physical Examination     Vitals: There were no vitals filed for this visit. Physical Exam  Constitutional:       General: He is not in acute distress. Appearance: Normal appearance. He is normal weight. He is not ill-appearing. HENT:      Head: Normocephalic. Nose: Nose normal.      Mouth/Throat:      Mouth: Mucous membranes are moist.      Pharynx: Oropharynx is clear. Eyes:      Conjunctiva/sclera: Conjunctivae normal.      Pupils: Pupils are equal, round, and reactive to light.    Cardiovascular:      Rate and Rhythm: Normal rate and regular rhythm. Pulses: Normal pulses. Heart sounds: Normal heart sounds. Pulmonary:      Effort: Pulmonary effort is normal.      Breath sounds: Normal breath sounds. Abdominal:      Palpations: Abdomen is soft. Tenderness: There is no abdominal tenderness. Musculoskeletal:         General: Normal range of motion. Cervical back: Normal range of motion. Right lower leg: No edema. Left lower leg: No edema. Skin:     General: Skin is warm and dry. Capillary Refill: Capillary refill takes less than 2 seconds. Neurological:      General: No focal deficit present. Mental Status: He is alert and oriented to person, place, and time. Psychiatric:         Mood and Affect: Mood normal.         Behavior: Behavior normal.         Labs:   CBC:   No results for input(s): WBC, HGB, HCT, MCV, PLT in the last 72 hours. BMP:  No results for input(s): NA, K, CL, CO2, PHOS, BUN, CREATININE, CA, MG in the last 72 hours. Accucheck Glucoses:No results for input(s): POCGLU in the last 72 hours. Cardiac Enzymes: No results for input(s): CKTOTAL, CKMB, CKMBINDEX, TROPONINI in the last 72 hours. PT/INR:   No results for input(s): PROTIME, INR in the last 72 hours. APTT:   No results for input(s): APTT in the last 72 hours.   Liver Profile:  Lab Results   Component Value Date    AST 18 12/16/2021    ALT 17 12/16/2021    BILIDIR <0.08 05/27/2021    BILITOT 0.35 12/16/2021    ALKPHOS 70 12/16/2021     Lab Results   Component Value Date    CHOL 89 01/18/2019    HDL 29 01/18/2019    TRIG 127 01/18/2019     TSH: No results found for: TSH  UA: No results found for: NITRITE, COLORU, PHUR, LABCAST, WBCUA, RBCUA, MUCUS, TRICHOMONAS, YEAST, BACTERIA, CLARITYU, SPECGRAV, LEUKOCYTESUR, UROBILINOGEN, BILIRUBINUR, BLOODU, GLUCOSEU, AMORPHOUS      Testing:  Pet Scan:   WHOLE BODY PET/CT       6/30/2021       TECHNIQUE:   Following IV injection of 15 mCi of F 18 -FDG, PET  tumor imaging was   acquired from the base of the skull to the mid thighs. Computed tomography   was used for purposes of attenuation correction and anatomic localization. Fusion imaging was utilized for interpretation. Uptake time 55 mins. Glucose level 94 mg/dl. COMPARISON:   Low-dose chest CT 06/10/2021. CT abdomen and pelvis 05/27/2021. PET-CT   10/02/2019. Chest CT 09/23/2019. HISTORY:   ORDERING SYSTEM PROVIDED HISTORY: Lung nodule       FINDINGS:   Reference values:       Mediastinal/blood pool SUV max: 2.4       Liver SUV max: 2.9       HEAD/NECK: No abnormal metabolic activity. CHEST: Previously described irregular nodule in the right middle lobe   demonstrates abnormal metabolic activity (SUV max 3.4). Tiny focus of   increased metabolic activity in the right hilum is noted (SUV max 3.2),   suggestive of underlying lymph node, which may represent disease involvement. ABDOMEN/PELVIS: No abnormal metabolic activity. Nonspecific diffuse activity   in the stomach and bowel. BONES/SOFT TISSUE: No convincing evidence for focal metabolic activity. Soft   tissue overlap is noted in regions in the upper cervical, upper thoracic and   lumbar spine, which may be accounted for by motion artifact and   misregistration. INCIDENTAL CT FINDINGS: Calcified atheromatous plaque and coronary   calcification. Impression   1. Previously noted growing right middle lobe nodule is now metabolically   active, consistent with developing neoplastic disease. 2.  Tiny focus of metabolic activity in the right hilum suspicious for   disease involvement. 3.  Areas of metabolic activity overlapping the cervical, thoracic and lumbar   spine without convincing evidence for a discrete bone lesion. This may be   accounted for by slight motion artifact and misregistration.   If symptoms   warrant, consider further evaluation for bone scan to exclude underlying   osseous metastatic disease. 4.  Nonspecific relatively diffuse metabolic activity in the stomach. The   fat density area in the gastric cardia is not well delineated on this exam.         Pathology:  -- Diagnosis --   RIGHT LUNG, NEEDLE CORE BIOPSY:   -BENIGN LUNG PARENCHYMA WITH FOCAL FIBROSIS AND MILD CHRONIC   INFLAMMATION WITH INTRA-ALVEOLAR HISTIOCYTES. Olimpia Messer M.D   **Electronically Signed Out**         sam/7/29/2021        Clinical Information   Operative Findings:  R LUNG BIOPSY     Source of Specimen   1: R LUNG BIOPSY     Gross Description   \"ANA DAVIS, R LUNG\" Two tan cores, 0.6 and 0.7 cm in length, <  0.1 cm in diameter. There is also a tan tissue fragment, 0.1 cm in   greatest dimension. Entirely 1cs. mpb tm     Intraoperative Diagnosis   Core 1:  Atypical cells. Core 2:  Rare atypical cells. Core 3:  Blood. Procedure stopped due to pneumothorax.  (SAM)     Microscopic Description   Microscopic examination performed. Sections show small cores of   benign lung parenchyma with focal fibrosis and chronic inflammation   with intra-alveolar histiocytes with cytoplasmic debris. GMS and AFB   stains are negative. Iron stain is negative in the intra-alveolar   histiocytes. Controls react as expected. SURGICAL PATHOLOGY CONSULTATION        Patient Name: Zeinab Plasencia: Link   Path Number: [id number]     [address] [place] [address]. [place], [address]   [phone]   Fax: [phone]       Echo:   Mariah Saleh left ventricular systolic function appears preserved with an  estimated ejection fraction of >60%. The left ventricular cavity size is within normal limits and the left  ventricular wall thickness is within normal limits. No definite specific wall motion abnormalities were identified. No significant valvular abnormalities.   Mild diastolic dysfunction.     Compared to the previous study of 9/20/2019, no significant change was seen.     Signature  ----------------------------------------------------------------------------   Electronically signed by Alysia Thoams(Sonographer) on 12/15/2021 09:50   AM  ----------------------------------------------------------------------------     ----------------------------------------------------------------------------   Electronically signed by Viola Stahl(Interpreting physician) on 12/15/2021   03:14 PM  ----------------------------------------------------------------------------  FINDINGS  Left Atrium  Left atrium is normal in size. Left Ventricle  Global left ventricular systolic function appears preserved with an  estimated ejection fraction of >60%. The left ventricular cavity size is within normal limits and the left  ventricular wall thickness is within normal limits. No definite specific wall motion abnormalities were identified. Right Atrium  Right atrium is normal in size. Right Ventricle  Normal right ventricular size and function. Mitral Valve  Normal mitral valve structure with trivial mitral regurgitation. Aortic Valve  Aortic leaflets show calcification without restriction of motion. Tricuspid Valve  Normal tricuspid valve structure with trivial tricuspid regurgitation. Pulmonic Valve  The pulmonic valve is normal in structure with trivial regurgitation. Pericardial Effusion  An anterior echo free space is seen suggestive of a small effusion or fat  pad.     Miscellaneous  Normal aortic root diameter. Mild diastolic dysfunction.       CT scan:   CT OF THE CHEST WITHOUT CONTRAST 11/2/2021 7:31 am       TECHNIQUE:   CT of the chest was performed without the administration of intravenous   contrast. Multiplanar reformatted images are provided for review. Dose   modulation, iterative reconstruction, and/or weight based adjustment of the   mA/kV was utilized to reduce the radiation dose to as low as reasonably   achievable.        COMPARISON: Low-dose CT chest [place] 10, 2021. HISTORY:   ORDERING SYSTEM PROVIDED HISTORY: Right middle lobe pulmonary nodule   TECHNOLOGIST PROVIDED HISTORY:   FDG avid RML nodule with neg cytology and high risk factor profile. Interval   change       FINDINGS:   Mediastinum: Thoracic aorta demonstrates moderate calcification without   aneurysm. Pulmonary trunk appears nondilated. No pericardial effusion. No   lymphadenopathy. The esophagus is grossly unremarkable. Small hiatal hernia. Lungs/pleura: Emphysematous changes with biapical scarring. No focal   consolidation, pneumothorax or pleural effusion. The previously biopsied somewhat bilobed nodule involving the right middle   lobe has slightly increased in size since the prior study from [place] 10, 2021   now measuring 1.8 x 1.0 cm, once measuring 1.8 x 0.6 cm. There appears to be   some spiculation. No new pulmonary nodules are noted. Upper Abdomen: Cholelithiasis. No acute findings. Soft Tissues/Bones: Visualized soft tissues surrounding the chest wall   demonstrate no acute findings. Osseous structures demonstrate degenerative   change. Impression   The previously biopsied, somewhat bilobed nodule involving right middle lobe   is slightly increased in size since the prior study now measuring 1.8 x 1.0   cm, once measuring 1.8 x 0.6 cm. There does appear to be some spiculation. No new pulmonary nodule is seen. RECOMMENDATIONS:   Lung rads:       Category 4B, Very Suspicious (Findings for which additional diagnostic   testing and/or tissue sampling is recommended). Management: Chest CT with or without contrast, PET/CT and/or tissue sampling   depending on the probability of malignancy and comorbidities. PET/CT may be   used when there is a > 8 mm solid component.  For new large nodules that   develop on an annual repeat screening CT, a 1 month LDCT may be recommended   to address potentially infectious or inflammatory conditions. LUNGRADS ASSESSMENT^LUNGRADS^LUNG RADS SCREENING ASSESSMENT^LUNGRADS^4B         Imaging Studies:  I have personally reviewed the testing/imaging above with Dr Nai Lee, he is in agreement with the findings listed above. In summary, Mr. Celia Bennett is a 70y.o. year-old male with a known pulmonary nodule. Problem List:    Pulmonary nodule of the right middle lobe  o Increased in size, 1.8 x 1.0 cm from 1.8 x 0.6 cm  o Bilobed, spiculated  o S/p needle biopsy - negative  o Metabolically active on PET with SUV 3.4  - Possible lymph node involvement noted to right hilum, SUV 3.2   Mucopurulent chronic bronchitis   Multivessel CAD s/p stenting   Angina pectoris   Diabetes mellitus type 2 with long term use of insulin   Hypertension   Hyperlipidemia      Recommendation: Per discussion with Dr Nai Lee, I believe Mr. Celia Bennett would benefit optimally from right middle lobectomy with lymph node dissection. I have discussed the proposed procedure, along with its risk, benefits, and therapeutic alternatives. Specific risks discussedincluded death, stroke, mediastinitis, re-operation for bleeding, and atrial fibrillation. We have also discussed the potential need for blood transfusion, along with its risks and benefits. He appears to understand,and consents to proceed. Surgical intervention has been tentatively scheduled for Tuesday, 1/4/2021. On this date 12/16/2021 I have spent 50 minutes reviewing previous notes, test results and face to face with the patient discussing the diagnosis and importance of compliance with the treatment plan as well as documenting on the day of the visit. At least 50% of the time documented was spent with the patient to provide counseling and/or coordination of care. Agree with the above  Plan for wedge biopsy and possible middle lobectomy  Have discussed the options of surgery open versus minimally invasive with patient.   He will decide which approach he feels most comfortable with  I do appreciate the consult and very happy to help assistance patient's care  Maricruz Garcia, APRN - CNP

## 2022-01-04 ENCOUNTER — APPOINTMENT (OUTPATIENT)
Dept: GENERAL RADIOLOGY | Age: 72
DRG: 164 | End: 2022-01-04
Attending: THORACIC SURGERY (CARDIOTHORACIC VASCULAR SURGERY)
Payer: MEDICARE

## 2022-01-04 ENCOUNTER — HOSPITAL ENCOUNTER (INPATIENT)
Age: 72
LOS: 11 days | Discharge: HOME | DRG: 164 | End: 2022-01-15
Attending: THORACIC SURGERY (CARDIOTHORACIC VASCULAR SURGERY) | Admitting: THORACIC SURGERY (CARDIOTHORACIC VASCULAR SURGERY)
Payer: MEDICARE

## 2022-01-04 VITALS — OXYGEN SATURATION: 99 % | TEMPERATURE: 96.9 F | DIASTOLIC BLOOD PRESSURE: 55 MMHG | SYSTOLIC BLOOD PRESSURE: 116 MMHG

## 2022-01-04 DIAGNOSIS — Z98.890 S/P THORACOTOMY: Primary | ICD-10-CM

## 2022-01-04 LAB
ABSOLUTE EOS #: 0 K/UL (ref 0–0.4)
ABSOLUTE IMMATURE GRANULOCYTE: 0 K/UL (ref 0–0.3)
ABSOLUTE LYMPH #: 0.84 K/UL (ref 1–4.8)
ABSOLUTE MONO #: 0.25 K/UL (ref 0.1–0.8)
ALLEN TEST: ABNORMAL
ALLEN TEST: ABNORMAL
ALLEN TEST: POSITIVE
ANION GAP SERPL CALCULATED.3IONS-SCNC: 10 MMOL/L (ref 9–17)
ANION GAP SERPL CALCULATED.3IONS-SCNC: 11 MMOL/L (ref 9–17)
ANION GAP: 10 MMOL/L (ref 7–16)
ANION GAP: 11 MMOL/L (ref 7–16)
BASOPHILS # BLD: 0 % (ref 0–2)
BASOPHILS ABSOLUTE: 0 K/UL (ref 0–0.2)
BUN BLDV-MCNC: 17 MG/DL (ref 8–23)
BUN BLDV-MCNC: 18 MG/DL (ref 8–23)
BUN/CREAT BLD: ABNORMAL (ref 9–20)
BUN/CREAT BLD: ABNORMAL (ref 9–20)
CALCIUM SERPL-MCNC: 8.3 MG/DL (ref 8.6–10.4)
CALCIUM SERPL-MCNC: 9.2 MG/DL (ref 8.6–10.4)
CHLORIDE BLD-SCNC: 102 MMOL/L (ref 98–107)
CHLORIDE BLD-SCNC: 104 MMOL/L (ref 98–107)
CO2: 20 MMOL/L (ref 20–31)
CO2: 22 MMOL/L (ref 20–31)
CREAT SERPL-MCNC: 0.99 MG/DL (ref 0.7–1.2)
CREAT SERPL-MCNC: 1.07 MG/DL (ref 0.7–1.2)
DIFFERENTIAL TYPE: ABNORMAL
EOSINOPHILS RELATIVE PERCENT: 0 % (ref 1–4)
FIO2: 4
FIO2: ABNORMAL
FIO2: NORMAL
GFR AFRICAN AMERICAN: >60 ML/MIN
GFR AFRICAN AMERICAN: >60 ML/MIN
GFR NON-AFRICAN AMERICAN: >60 ML/MIN
GFR SERPL CREATININE-BSD FRML MDRD: >60 ML/MIN
GFR SERPL CREATININE-BSD FRML MDRD: ABNORMAL ML/MIN/{1.73_M2}
GFR SERPL CREATININE-BSD FRML MDRD: NORMAL ML/MIN/{1.73_M2}
GLUCOSE BLD-MCNC: 114 MG/DL (ref 70–99)
GLUCOSE BLD-MCNC: 245 MG/DL (ref 75–110)
GLUCOSE BLD-MCNC: 269 MG/DL (ref 70–99)
GLUCOSE BLD-MCNC: 291 MG/DL (ref 74–100)
GLUCOSE BLD-MCNC: 89 MG/DL (ref 74–100)
GLUCOSE BLD-MCNC: 95 MG/DL (ref 74–100)
HCT VFR BLD CALC: 33.5 % (ref 40.7–50.3)
HCT VFR BLD CALC: 39.9 % (ref 40.7–50.3)
HEMOGLOBIN: 11.3 G/DL (ref 13–17)
HEMOGLOBIN: 12.9 G/DL (ref 13–17)
IMMATURE GRANULOCYTES: 0 %
LYMPHOCYTES # BLD: 10 % (ref 24–44)
MCH RBC QN AUTO: 29.1 PG (ref 25.2–33.5)
MCH RBC QN AUTO: 30 PG (ref 25.2–33.5)
MCHC RBC AUTO-ENTMCNC: 32.3 G/DL (ref 28.4–34.8)
MCHC RBC AUTO-ENTMCNC: 33.7 G/DL (ref 28.4–34.8)
MCV RBC AUTO: 88.9 FL (ref 82.6–102.9)
MCV RBC AUTO: 90.1 FL (ref 82.6–102.9)
MODE: ABNORMAL
MODE: ABNORMAL
MODE: NORMAL
MONOCYTES # BLD: 3 % (ref 1–7)
MORPHOLOGY: NORMAL
NEGATIVE BASE EXCESS, ART: 2 (ref 0–2)
NEGATIVE BASE EXCESS, ART: 3 (ref 0–2)
NEGATIVE BASE EXCESS, ART: 3 (ref 0–2)
NRBC AUTOMATED: 0 PER 100 WBC
NRBC AUTOMATED: 0 PER 100 WBC
O2 DEVICE/FLOW/%: ABNORMAL
O2 DEVICE/FLOW/%: ABNORMAL
O2 DEVICE/FLOW/%: NORMAL
PATIENT TEMP: ABNORMAL
PATIENT TEMP: ABNORMAL
PATIENT TEMP: NORMAL
PDW BLD-RTO: 12.8 % (ref 11.8–14.4)
PDW BLD-RTO: 13 % (ref 11.8–14.4)
PLATELET # BLD: 148 K/UL (ref 138–453)
PLATELET # BLD: 175 K/UL (ref 138–453)
PLATELET ESTIMATE: ABNORMAL
PMV BLD AUTO: 11.7 FL (ref 8.1–13.5)
PMV BLD AUTO: 11.8 FL (ref 8.1–13.5)
POC BUN: 19 MG/DL (ref 8–26)
POC CHLORIDE: 103 MMOL/L (ref 98–107)
POC CHLORIDE: 106 MMOL/L (ref 98–107)
POC CREATININE: 1.16 MG/DL (ref 0.51–1.19)
POC HCO3: 22.5 MMOL/L (ref 21–28)
POC HCO3: 22.8 MMOL/L (ref 21–28)
POC HCO3: 23.2 MMOL/L (ref 21–28)
POC HEMATOCRIT: 33 % (ref 41–53)
POC HEMATOCRIT: 33 % (ref 41–53)
POC HEMOGLOBIN: 11.1 G/DL (ref 13.5–17.5)
POC HEMOGLOBIN: 11.3 G/DL (ref 13.5–17.5)
POC IONIZED CALCIUM: 0.83 MMOL/L (ref 1.15–1.33)
POC IONIZED CALCIUM: 1.18 MMOL/L (ref 1.15–1.33)
POC LACTIC ACID: 1.47 MMOL/L (ref 0.56–1.39)
POC O2 SATURATION: 100 % (ref 94–98)
POC O2 SATURATION: 94 % (ref 94–98)
POC O2 SATURATION: 97 % (ref 94–98)
POC PCO2 TEMP: ABNORMAL MM HG
POC PCO2 TEMP: ABNORMAL MM HG
POC PCO2 TEMP: NORMAL MM HG
POC PCO2: 35.6 MM HG (ref 35–48)
POC PCO2: 40.7 MM HG (ref 35–48)
POC PCO2: 46.1 MM HG (ref 35–48)
POC PH TEMP: ABNORMAL
POC PH TEMP: ABNORMAL
POC PH TEMP: NORMAL
POC PH: 7.31 (ref 7.35–7.45)
POC PH: 7.36 (ref 7.35–7.45)
POC PH: 7.41 (ref 7.35–7.45)
POC PO2 TEMP: ABNORMAL MM HG
POC PO2 TEMP: ABNORMAL MM HG
POC PO2 TEMP: NORMAL MM HG
POC PO2: 272.3 MM HG (ref 83–108)
POC PO2: 76.2 MM HG (ref 83–108)
POC PO2: 85.9 MM HG (ref 83–108)
POC POTASSIUM: 3.8 MMOL/L (ref 3.5–4.5)
POC POTASSIUM: 4.5 MMOL/L (ref 3.5–4.5)
POC SODIUM: 136 MMOL/L (ref 138–146)
POC SODIUM: 140 MMOL/L (ref 138–146)
POC TCO2: 24 MMOL/L (ref 22–30)
POC TCO2: 24 MMOL/L (ref 22–30)
POSITIVE BASE EXCESS, ART: ABNORMAL (ref 0–3)
POSITIVE BASE EXCESS, ART: ABNORMAL (ref 0–3)
POSITIVE BASE EXCESS, ART: NORMAL (ref 0–3)
POTASSIUM SERPL-SCNC: 4.4 MMOL/L (ref 3.7–5.3)
POTASSIUM SERPL-SCNC: 4.6 MMOL/L (ref 3.7–5.3)
RBC # BLD: 3.77 M/UL (ref 4.21–5.77)
RBC # BLD: 4.43 M/UL (ref 4.21–5.77)
RBC # BLD: ABNORMAL 10*6/UL
SAMPLE SITE: ABNORMAL
SAMPLE SITE: ABNORMAL
SAMPLE SITE: NORMAL
SEG NEUTROPHILS: 87 % (ref 36–66)
SEGMENTED NEUTROPHILS ABSOLUTE COUNT: 7.31 K/UL (ref 1.8–7.7)
SODIUM BLD-SCNC: 132 MMOL/L (ref 135–144)
SODIUM BLD-SCNC: 137 MMOL/L (ref 135–144)
TCO2 (CALC), ART: ABNORMAL MMOL/L (ref 22–29)
TCO2 (CALC), ART: ABNORMAL MMOL/L (ref 22–29)
TCO2 (CALC), ART: NORMAL MMOL/L (ref 22–29)
WBC # BLD: 6.9 K/UL (ref 3.5–11.3)
WBC # BLD: 8.4 K/UL (ref 3.5–11.3)
WBC # BLD: ABNORMAL 10*3/UL

## 2022-01-04 PROCEDURE — 88341 IMHCHEM/IMCYTCHM EA ADD ANTB: CPT

## 2022-01-04 PROCEDURE — 6360000002 HC RX W HCPCS: Performed by: NURSE ANESTHETIST, CERTIFIED REGISTERED

## 2022-01-04 PROCEDURE — C1729 CATH, DRAINAGE: HCPCS | Performed by: THORACIC SURGERY (CARDIOTHORACIC VASCULAR SURGERY)

## 2022-01-04 PROCEDURE — 0BB50ZZ EXCISION OF RIGHT MIDDLE LOBE BRONCHUS, OPEN APPROACH: ICD-10-PCS | Performed by: THORACIC SURGERY (CARDIOTHORACIC VASCULAR SURGERY)

## 2022-01-04 PROCEDURE — 85025 COMPLETE CBC W/AUTO DIFF WBC: CPT

## 2022-01-04 PROCEDURE — 38746 REMOVE THORACIC LYMPH NODES: CPT | Performed by: THORACIC SURGERY (CARDIOTHORACIC VASCULAR SURGERY)

## 2022-01-04 PROCEDURE — 32480 PARTIAL REMOVAL OF LUNG: CPT | Performed by: THORACIC SURGERY (CARDIOTHORACIC VASCULAR SURGERY)

## 2022-01-04 PROCEDURE — 80048 BASIC METABOLIC PNL TOTAL CA: CPT

## 2022-01-04 PROCEDURE — 3700000001 HC ADD 15 MINUTES (ANESTHESIA): Performed by: THORACIC SURGERY (CARDIOTHORACIC VASCULAR SURGERY)

## 2022-01-04 PROCEDURE — 87075 CULTR BACTERIA EXCEPT BLOOD: CPT

## 2022-01-04 PROCEDURE — 83605 ASSAY OF LACTIC ACID: CPT

## 2022-01-04 PROCEDURE — 2580000003 HC RX 258: Performed by: NURSE ANESTHETIST, CERTIFIED REGISTERED

## 2022-01-04 PROCEDURE — P9045 ALBUMIN (HUMAN), 5%, 250 ML: HCPCS | Performed by: NURSE PRACTITIONER

## 2022-01-04 PROCEDURE — 85014 HEMATOCRIT: CPT

## 2022-01-04 PROCEDURE — 88331 PATH CONSLTJ SURG 1 BLK 1SPC: CPT

## 2022-01-04 PROCEDURE — 93005 ELECTROCARDIOGRAM TRACING: CPT | Performed by: NURSE PRACTITIONER

## 2022-01-04 PROCEDURE — 6360000002 HC RX W HCPCS: Performed by: NURSE PRACTITIONER

## 2022-01-04 PROCEDURE — 85027 COMPLETE CBC AUTOMATED: CPT

## 2022-01-04 PROCEDURE — 2500000003 HC RX 250 WO HCPCS: Performed by: THORACIC SURGERY (CARDIOTHORACIC VASCULAR SURGERY)

## 2022-01-04 PROCEDURE — 2720000010 HC SURG SUPPLY STERILE: Performed by: THORACIC SURGERY (CARDIOTHORACIC VASCULAR SURGERY)

## 2022-01-04 PROCEDURE — 2580000003 HC RX 258: Performed by: NURSE PRACTITIONER

## 2022-01-04 PROCEDURE — 87205 SMEAR GRAM STAIN: CPT

## 2022-01-04 PROCEDURE — 82330 ASSAY OF CALCIUM: CPT

## 2022-01-04 PROCEDURE — 6370000000 HC RX 637 (ALT 250 FOR IP): Performed by: NURSE PRACTITIONER

## 2022-01-04 PROCEDURE — 3600000015 HC SURGERY LEVEL 5 ADDTL 15MIN: Performed by: THORACIC SURGERY (CARDIOTHORACIC VASCULAR SURGERY)

## 2022-01-04 PROCEDURE — 2100000001 HC CVICU R&B

## 2022-01-04 PROCEDURE — 88313 SPECIAL STAINS GROUP 2: CPT

## 2022-01-04 PROCEDURE — 94761 N-INVAS EAR/PLS OXIMETRY MLT: CPT

## 2022-01-04 PROCEDURE — 71045 X-RAY EXAM CHEST 1 VIEW: CPT

## 2022-01-04 PROCEDURE — 2700000000 HC OXYGEN THERAPY PER DAY

## 2022-01-04 PROCEDURE — 82565 ASSAY OF CREATININE: CPT

## 2022-01-04 PROCEDURE — 87070 CULTURE OTHR SPECIMN AEROBIC: CPT

## 2022-01-04 PROCEDURE — 6370000000 HC RX 637 (ALT 250 FOR IP): Performed by: ANESTHESIOLOGY

## 2022-01-04 PROCEDURE — 84520 ASSAY OF UREA NITROGEN: CPT

## 2022-01-04 PROCEDURE — 37799 UNLISTED PX VASCULAR SURGERY: CPT

## 2022-01-04 PROCEDURE — 88309 TISSUE EXAM BY PATHOLOGIST: CPT

## 2022-01-04 PROCEDURE — 3700000000 HC ANESTHESIA ATTENDED CARE: Performed by: THORACIC SURGERY (CARDIOTHORACIC VASCULAR SURGERY)

## 2022-01-04 PROCEDURE — 7100000000 HC PACU RECOVERY - FIRST 15 MIN

## 2022-01-04 PROCEDURE — 80051 ELECTROLYTE PANEL: CPT

## 2022-01-04 PROCEDURE — 3600000005 HC SURGERY LEVEL 5 BASE: Performed by: THORACIC SURGERY (CARDIOTHORACIC VASCULAR SURGERY)

## 2022-01-04 PROCEDURE — 82803 BLOOD GASES ANY COMBINATION: CPT

## 2022-01-04 PROCEDURE — 32668 THORACOSCOPY W/W RESECT DIAG: CPT | Performed by: THORACIC SURGERY (CARDIOTHORACIC VASCULAR SURGERY)

## 2022-01-04 PROCEDURE — 87176 TISSUE HOMOGENIZATION CULTR: CPT

## 2022-01-04 PROCEDURE — 88305 TISSUE EXAM BY PATHOLOGIST: CPT

## 2022-01-04 PROCEDURE — 36600 WITHDRAWAL OF ARTERIAL BLOOD: CPT

## 2022-01-04 PROCEDURE — 2709999900 HC NON-CHARGEABLE SUPPLY: Performed by: THORACIC SURGERY (CARDIOTHORACIC VASCULAR SURGERY)

## 2022-01-04 PROCEDURE — 7100000001 HC PACU RECOVERY - ADDTL 15 MIN

## 2022-01-04 PROCEDURE — 2580000003 HC RX 258: Performed by: THORACIC SURGERY (CARDIOTHORACIC VASCULAR SURGERY)

## 2022-01-04 PROCEDURE — 88342 IMHCHEM/IMCYTCHM 1ST ANTB: CPT

## 2022-01-04 PROCEDURE — 0BBD4ZZ EXCISION OF RIGHT MIDDLE LUNG LOBE, PERCUTANEOUS ENDOSCOPIC APPROACH: ICD-10-PCS | Performed by: THORACIC SURGERY (CARDIOTHORACIC VASCULAR SURGERY)

## 2022-01-04 PROCEDURE — C1768 GRAFT, VASCULAR: HCPCS | Performed by: THORACIC SURGERY (CARDIOTHORACIC VASCULAR SURGERY)

## 2022-01-04 PROCEDURE — 2500000003 HC RX 250 WO HCPCS: Performed by: NURSE ANESTHETIST, CERTIFIED REGISTERED

## 2022-01-04 PROCEDURE — 82947 ASSAY GLUCOSE BLOOD QUANT: CPT

## 2022-01-04 RX ORDER — GLYCOPYRROLATE 1 MG/5 ML
SYRINGE (ML) INTRAVENOUS PRN
Status: DISCONTINUED | OUTPATIENT
Start: 2022-01-04 | End: 2022-01-04 | Stop reason: SDUPTHER

## 2022-01-04 RX ORDER — SODIUM CHLORIDE 0.9 % (FLUSH) 0.9 %
10 SYRINGE (ML) INJECTION EVERY 12 HOURS SCHEDULED
Status: DISCONTINUED | OUTPATIENT
Start: 2022-01-04 | End: 2022-01-15 | Stop reason: HOSPADM

## 2022-01-04 RX ORDER — VANCOMYCIN HYDROCHLORIDE 1 G/200ML
INJECTION, SOLUTION INTRAVENOUS PRN
Status: DISCONTINUED | OUTPATIENT
Start: 2022-01-04 | End: 2022-01-04 | Stop reason: SDUPTHER

## 2022-01-04 RX ORDER — DEXTROSE MONOHYDRATE 50 MG/ML
100 INJECTION, SOLUTION INTRAVENOUS PRN
Status: DISCONTINUED | OUTPATIENT
Start: 2022-01-04 | End: 2022-01-15 | Stop reason: HOSPADM

## 2022-01-04 RX ORDER — FENTANYL CITRATE 50 UG/ML
INJECTION, SOLUTION INTRAMUSCULAR; INTRAVENOUS PRN
Status: DISCONTINUED | OUTPATIENT
Start: 2022-01-04 | End: 2022-01-04 | Stop reason: SDUPTHER

## 2022-01-04 RX ORDER — LANOLIN ALCOHOL/MO/W.PET/CERES
325 CREAM (GRAM) TOPICAL DAILY
Status: DISCONTINUED | OUTPATIENT
Start: 2022-01-04 | End: 2022-01-15 | Stop reason: HOSPADM

## 2022-01-04 RX ORDER — ROCURONIUM BROMIDE 10 MG/ML
INJECTION, SOLUTION INTRAVENOUS PRN
Status: DISCONTINUED | OUTPATIENT
Start: 2022-01-04 | End: 2022-01-04 | Stop reason: SDUPTHER

## 2022-01-04 RX ORDER — LIDOCAINE HYDROCHLORIDE 10 MG/ML
INJECTION, SOLUTION EPIDURAL; INFILTRATION; INTRACAUDAL; PERINEURAL PRN
Status: DISCONTINUED | OUTPATIENT
Start: 2022-01-04 | End: 2022-01-04 | Stop reason: SDUPTHER

## 2022-01-04 RX ORDER — PHENYLEPHRINE HCL IN 0.9% NACL 0.5 MG/5ML
SYRINGE (ML) INTRAVENOUS PRN
Status: DISCONTINUED | OUTPATIENT
Start: 2022-01-04 | End: 2022-01-04 | Stop reason: SDUPTHER

## 2022-01-04 RX ORDER — SODIUM CHLORIDE, SODIUM LACTATE, POTASSIUM CHLORIDE, CALCIUM CHLORIDE 600; 310; 30; 20 MG/100ML; MG/100ML; MG/100ML; MG/100ML
INJECTION, SOLUTION INTRAVENOUS CONTINUOUS
Status: DISCONTINUED | OUTPATIENT
Start: 2022-01-04 | End: 2022-01-04

## 2022-01-04 RX ORDER — ASPIRIN 81 MG/1
81 TABLET ORAL DAILY
Status: DISCONTINUED | OUTPATIENT
Start: 2022-01-04 | End: 2022-01-04

## 2022-01-04 RX ORDER — NICOTINE POLACRILEX 4 MG
15 LOZENGE BUCCAL PRN
Status: DISCONTINUED | OUTPATIENT
Start: 2022-01-04 | End: 2022-01-15 | Stop reason: HOSPADM

## 2022-01-04 RX ORDER — CHLORHEXIDINE GLUCONATE 0.12 MG/ML
15 RINSE ORAL ONCE
Status: COMPLETED | OUTPATIENT
Start: 2022-01-04 | End: 2022-01-04

## 2022-01-04 RX ORDER — PROPOFOL 10 MG/ML
INJECTION, EMULSION INTRAVENOUS PRN
Status: DISCONTINUED | OUTPATIENT
Start: 2022-01-04 | End: 2022-01-04 | Stop reason: SDUPTHER

## 2022-01-04 RX ORDER — FENTANYL CITRATE 50 UG/ML
25 INJECTION, SOLUTION INTRAMUSCULAR; INTRAVENOUS EVERY 5 MIN PRN
Status: DISCONTINUED | OUTPATIENT
Start: 2022-01-04 | End: 2022-01-04 | Stop reason: HOSPADM

## 2022-01-04 RX ORDER — SODIUM CHLORIDE 0.9 % (FLUSH) 0.9 %
5-40 SYRINGE (ML) INJECTION EVERY 12 HOURS SCHEDULED
Status: DISCONTINUED | OUTPATIENT
Start: 2022-01-04 | End: 2022-01-04

## 2022-01-04 RX ORDER — ATORVASTATIN CALCIUM 20 MG/1
20 TABLET, FILM COATED ORAL DAILY
Status: DISCONTINUED | OUTPATIENT
Start: 2022-01-04 | End: 2022-01-15 | Stop reason: HOSPADM

## 2022-01-04 RX ORDER — CHLORHEXIDINE GLUCONATE 4 G/100ML
SOLUTION TOPICAL SEE ADMIN INSTRUCTIONS
Status: DISCONTINUED | OUTPATIENT
Start: 2022-01-04 | End: 2022-01-04 | Stop reason: HOSPADM

## 2022-01-04 RX ORDER — ALBUMIN, HUMAN INJ 5% 5 %
12.5 SOLUTION INTRAVENOUS ONCE
Status: COMPLETED | OUTPATIENT
Start: 2022-01-04 | End: 2022-01-04

## 2022-01-04 RX ORDER — FENTANYL CITRATE 50 UG/ML
50 INJECTION, SOLUTION INTRAMUSCULAR; INTRAVENOUS EVERY 5 MIN PRN
Status: DISCONTINUED | OUTPATIENT
Start: 2022-01-04 | End: 2022-01-04 | Stop reason: HOSPADM

## 2022-01-04 RX ORDER — BISACODYL 10 MG
10 SUPPOSITORY, RECTAL RECTAL DAILY PRN
Status: DISCONTINUED | OUTPATIENT
Start: 2022-01-04 | End: 2022-01-09

## 2022-01-04 RX ORDER — DEXTROSE MONOHYDRATE 25 G/50ML
12.5 INJECTION, SOLUTION INTRAVENOUS PRN
Status: DISCONTINUED | OUTPATIENT
Start: 2022-01-04 | End: 2022-01-15 | Stop reason: HOSPADM

## 2022-01-04 RX ORDER — LIDOCAINE 4 G/G
1 PATCH TOPICAL DAILY
Status: DISCONTINUED | OUTPATIENT
Start: 2022-01-04 | End: 2022-01-15 | Stop reason: HOSPADM

## 2022-01-04 RX ORDER — MORPHINE SULFATE 2 MG/ML
2 INJECTION, SOLUTION INTRAMUSCULAR; INTRAVENOUS
Status: DISCONTINUED | OUTPATIENT
Start: 2022-01-04 | End: 2022-01-15 | Stop reason: HOSPADM

## 2022-01-04 RX ORDER — ONDANSETRON 2 MG/ML
4 INJECTION INTRAMUSCULAR; INTRAVENOUS
Status: DISCONTINUED | OUTPATIENT
Start: 2022-01-04 | End: 2022-01-04 | Stop reason: HOSPADM

## 2022-01-04 RX ORDER — MIDAZOLAM HYDROCHLORIDE 1 MG/ML
INJECTION INTRAMUSCULAR; INTRAVENOUS PRN
Status: DISCONTINUED | OUTPATIENT
Start: 2022-01-04 | End: 2022-01-04 | Stop reason: SDUPTHER

## 2022-01-04 RX ORDER — OXYCODONE HYDROCHLORIDE 5 MG/1
10 TABLET ORAL EVERY 4 HOURS PRN
Status: DISCONTINUED | OUTPATIENT
Start: 2022-01-04 | End: 2022-01-15 | Stop reason: HOSPADM

## 2022-01-04 RX ORDER — MAGNESIUM HYDROXIDE 1200 MG/15ML
LIQUID ORAL CONTINUOUS PRN
Status: COMPLETED | OUTPATIENT
Start: 2022-01-04 | End: 2022-01-04

## 2022-01-04 RX ORDER — POLYETHYLENE GLYCOL 3350 17 G/17G
17 POWDER, FOR SOLUTION ORAL DAILY
Status: DISCONTINUED | OUTPATIENT
Start: 2022-01-04 | End: 2022-01-15 | Stop reason: HOSPADM

## 2022-01-04 RX ORDER — ISOSORBIDE MONONITRATE 30 MG/1
30 TABLET, EXTENDED RELEASE ORAL DAILY
Status: DISCONTINUED | OUTPATIENT
Start: 2022-01-04 | End: 2022-01-15 | Stop reason: HOSPADM

## 2022-01-04 RX ORDER — GLIPIZIDE 5 MG/1
5 TABLET ORAL
Status: DISCONTINUED | OUTPATIENT
Start: 2022-01-04 | End: 2022-01-15 | Stop reason: HOSPADM

## 2022-01-04 RX ORDER — ACETAMINOPHEN 500 MG
1000 TABLET ORAL EVERY 4 HOURS PRN
Status: DISCONTINUED | OUTPATIENT
Start: 2022-01-04 | End: 2022-01-15 | Stop reason: HOSPADM

## 2022-01-04 RX ORDER — FENTANYL CITRATE 50 UG/ML
25 INJECTION, SOLUTION INTRAMUSCULAR; INTRAVENOUS ONCE
Status: COMPLETED | OUTPATIENT
Start: 2022-01-04 | End: 2022-01-04

## 2022-01-04 RX ORDER — SODIUM CHLORIDE 0.9 % (FLUSH) 0.9 %
10 SYRINGE (ML) INJECTION PRN
Status: DISCONTINUED | OUTPATIENT
Start: 2022-01-04 | End: 2022-01-04

## 2022-01-04 RX ORDER — BUPIVACAINE HYDROCHLORIDE AND EPINEPHRINE 5; 5 MG/ML; UG/ML
INJECTION, SOLUTION PERINEURAL PRN
Status: DISCONTINUED | OUTPATIENT
Start: 2022-01-04 | End: 2022-01-04 | Stop reason: HOSPADM

## 2022-01-04 RX ORDER — SODIUM CHLORIDE 9 MG/ML
25 INJECTION, SOLUTION INTRAVENOUS PRN
Status: DISCONTINUED | OUTPATIENT
Start: 2022-01-04 | End: 2022-01-07

## 2022-01-04 RX ORDER — NEOSTIGMINE METHYLSULFATE 5 MG/5 ML
SYRINGE (ML) INTRAVENOUS PRN
Status: DISCONTINUED | OUTPATIENT
Start: 2022-01-04 | End: 2022-01-04 | Stop reason: SDUPTHER

## 2022-01-04 RX ORDER — SCOLOPAMINE TRANSDERMAL SYSTEM 1 MG/1
1 PATCH, EXTENDED RELEASE TRANSDERMAL ONCE
Status: COMPLETED | OUTPATIENT
Start: 2022-01-04 | End: 2022-01-07

## 2022-01-04 RX ORDER — SODIUM CHLORIDE 0.9 % (FLUSH) 0.9 %
10 SYRINGE (ML) INJECTION PRN
Status: DISCONTINUED | OUTPATIENT
Start: 2022-01-04 | End: 2022-01-15 | Stop reason: HOSPADM

## 2022-01-04 RX ORDER — GLIMEPIRIDE 2 MG/1
2 TABLET ORAL 2 TIMES DAILY
Status: DISCONTINUED | OUTPATIENT
Start: 2022-01-04 | End: 2022-01-04

## 2022-01-04 RX ORDER — LISINOPRIL 20 MG/1
20 TABLET ORAL DAILY
Status: DISCONTINUED | OUTPATIENT
Start: 2022-01-04 | End: 2022-01-15 | Stop reason: HOSPADM

## 2022-01-04 RX ORDER — ALPRAZOLAM 0.5 MG/1
0.5 TABLET ORAL ONCE
Status: COMPLETED | OUTPATIENT
Start: 2022-01-04 | End: 2022-01-04

## 2022-01-04 RX ORDER — DEXAMETHASONE SODIUM PHOSPHATE 10 MG/ML
INJECTION INTRAMUSCULAR; INTRAVENOUS PRN
Status: DISCONTINUED | OUTPATIENT
Start: 2022-01-04 | End: 2022-01-04 | Stop reason: SDUPTHER

## 2022-01-04 RX ORDER — FAMOTIDINE 20 MG/1
20 TABLET, FILM COATED ORAL 2 TIMES DAILY
Status: DISCONTINUED | OUTPATIENT
Start: 2022-01-04 | End: 2022-01-08

## 2022-01-04 RX ORDER — KETOROLAC TROMETHAMINE 30 MG/ML
30 INJECTION, SOLUTION INTRAMUSCULAR; INTRAVENOUS ONCE
Status: COMPLETED | OUTPATIENT
Start: 2022-01-04 | End: 2022-01-04

## 2022-01-04 RX ORDER — SODIUM CHLORIDE 9 MG/ML
25 INJECTION, SOLUTION INTRAVENOUS PRN
Status: DISCONTINUED | OUTPATIENT
Start: 2022-01-04 | End: 2022-01-04

## 2022-01-04 RX ORDER — ALOGLIPTIN 12.5 MG/1
12.5 TABLET, FILM COATED ORAL 2 TIMES DAILY WITH MEALS
Status: DISCONTINUED | OUTPATIENT
Start: 2022-01-04 | End: 2022-01-15 | Stop reason: HOSPADM

## 2022-01-04 RX ORDER — OXYCODONE HYDROCHLORIDE 5 MG/1
5 TABLET ORAL EVERY 4 HOURS PRN
Status: DISCONTINUED | OUTPATIENT
Start: 2022-01-04 | End: 2022-01-15 | Stop reason: HOSPADM

## 2022-01-04 RX ORDER — ONDANSETRON 2 MG/ML
INJECTION INTRAMUSCULAR; INTRAVENOUS PRN
Status: DISCONTINUED | OUTPATIENT
Start: 2022-01-04 | End: 2022-01-04 | Stop reason: SDUPTHER

## 2022-01-04 RX ORDER — KETOROLAC TROMETHAMINE 30 MG/ML
INJECTION, SOLUTION INTRAMUSCULAR; INTRAVENOUS PRN
Status: DISCONTINUED | OUTPATIENT
Start: 2022-01-04 | End: 2022-01-04 | Stop reason: SDUPTHER

## 2022-01-04 RX ADMIN — FERROUS SULFATE TAB EC 325 MG (65 MG FE EQUIVALENT) 325 MG: 325 (65 FE) TABLET DELAYED RESPONSE at 20:12

## 2022-01-04 RX ADMIN — Medication 0.6 MG: at 16:07

## 2022-01-04 RX ADMIN — ALOGLIPTIN 12.5 MG: 12.5 TABLET, FILM COATED ORAL at 20:13

## 2022-01-04 RX ADMIN — Medication 100 MCG: at 12:23

## 2022-01-04 RX ADMIN — Medication 100 MCG: at 13:29

## 2022-01-04 RX ADMIN — MIDAZOLAM HYDROCHLORIDE 2 MG: 1 INJECTION, SOLUTION INTRAMUSCULAR; INTRAVENOUS at 12:05

## 2022-01-04 RX ADMIN — PROPOFOL 150 MG: 10 INJECTION, EMULSION INTRAVENOUS at 12:14

## 2022-01-04 RX ADMIN — METFORMIN HYDROCHLORIDE 1000 MG: 500 TABLET ORAL at 20:13

## 2022-01-04 RX ADMIN — ROCURONIUM BROMIDE 30 MG: 10 INJECTION INTRAVENOUS at 13:07

## 2022-01-04 RX ADMIN — Medication 100 MCG: at 12:59

## 2022-01-04 RX ADMIN — Medication 100 MCG: at 12:25

## 2022-01-04 RX ADMIN — Medication 100 MCG: at 12:53

## 2022-01-04 RX ADMIN — MORPHINE SULFATE 2 MG: 2 INJECTION, SOLUTION INTRAMUSCULAR; INTRAVENOUS at 22:18

## 2022-01-04 RX ADMIN — FENTANYL CITRATE 25 MCG: 50 INJECTION INTRAMUSCULAR; INTRAVENOUS at 18:31

## 2022-01-04 RX ADMIN — INSULIN LISPRO 2 UNITS: 100 INJECTION, SOLUTION INTRAVENOUS; SUBCUTANEOUS at 20:46

## 2022-01-04 RX ADMIN — KETOROLAC TROMETHAMINE 30 MG: 30 INJECTION, SOLUTION INTRAMUSCULAR at 17:38

## 2022-01-04 RX ADMIN — FENTANYL CITRATE 100 MCG: 50 INJECTION, SOLUTION INTRAMUSCULAR; INTRAVENOUS at 13:07

## 2022-01-04 RX ADMIN — Medication 200 MCG: at 12:37

## 2022-01-04 RX ADMIN — DEXAMETHASONE SODIUM PHOSPHATE 10 MG: 10 INJECTION INTRAMUSCULAR; INTRAVENOUS at 12:25

## 2022-01-04 RX ADMIN — Medication 0.2 MG: at 12:50

## 2022-01-04 RX ADMIN — KETOROLAC TROMETHAMINE 15 MG: 30 INJECTION, SOLUTION INTRAMUSCULAR; INTRAVENOUS at 16:20

## 2022-01-04 RX ADMIN — SODIUM CHLORIDE, PRESERVATIVE FREE 10 ML: 5 INJECTION INTRAVENOUS at 20:03

## 2022-01-04 RX ADMIN — PHENYLEPHRINE HYDROCHLORIDE 25 MCG/MIN: 10 INJECTION INTRAVENOUS at 13:05

## 2022-01-04 RX ADMIN — ROCURONIUM BROMIDE 20 MG: 10 INJECTION INTRAVENOUS at 15:17

## 2022-01-04 RX ADMIN — FAMOTIDINE 20 MG: 20 TABLET, FILM COATED ORAL at 20:13

## 2022-01-04 RX ADMIN — Medication 0.2 MG: at 12:59

## 2022-01-04 RX ADMIN — FENTANYL CITRATE 100 MCG: 50 INJECTION, SOLUTION INTRAMUSCULAR; INTRAVENOUS at 12:14

## 2022-01-04 RX ADMIN — Medication 100 MCG: at 12:34

## 2022-01-04 RX ADMIN — ROCURONIUM BROMIDE 20 MG: 10 INJECTION INTRAVENOUS at 14:02

## 2022-01-04 RX ADMIN — Medication 3.5 MG: at 16:07

## 2022-01-04 RX ADMIN — ROCURONIUM BROMIDE 50 MG: 10 INJECTION INTRAVENOUS at 12:15

## 2022-01-04 RX ADMIN — METOPROLOL TARTRATE 12.5 MG: 25 TABLET, FILM COATED ORAL at 09:49

## 2022-01-04 RX ADMIN — MORPHINE SULFATE 2 MG: 2 INJECTION, SOLUTION INTRAMUSCULAR; INTRAVENOUS at 20:02

## 2022-01-04 RX ADMIN — Medication 100 MCG: at 12:49

## 2022-01-04 RX ADMIN — Medication 81 MG: at 10:05

## 2022-01-04 RX ADMIN — ATORVASTATIN CALCIUM 20 MG: 20 TABLET, FILM COATED ORAL at 20:13

## 2022-01-04 RX ADMIN — CEFAZOLIN 2000 MG: 10 INJECTION, POWDER, FOR SOLUTION INTRAVENOUS at 12:45

## 2022-01-04 RX ADMIN — ALPRAZOLAM 0.5 MG: 0.5 TABLET ORAL at 09:48

## 2022-01-04 RX ADMIN — FENTANYL CITRATE 50 MCG: 50 INJECTION, SOLUTION INTRAMUSCULAR; INTRAVENOUS at 12:50

## 2022-01-04 RX ADMIN — MUPIROCIN: 20 OINTMENT TOPICAL at 09:49

## 2022-01-04 RX ADMIN — SODIUM CHLORIDE, PRESERVATIVE FREE 10 ML: 5 INJECTION INTRAVENOUS at 10:07

## 2022-01-04 RX ADMIN — GLIPIZIDE 5 MG: 5 TABLET ORAL at 20:12

## 2022-01-04 RX ADMIN — LIDOCAINE HYDROCHLORIDE 50 MG: 10 INJECTION, SOLUTION EPIDURAL; INFILTRATION; INTRACAUDAL; PERINEURAL at 12:14

## 2022-01-04 RX ADMIN — SODIUM CHLORIDE, POTASSIUM CHLORIDE, SODIUM LACTATE AND CALCIUM CHLORIDE: 600; 310; 30; 20 INJECTION, SOLUTION INTRAVENOUS at 09:00

## 2022-01-04 RX ADMIN — ONDANSETRON 4 MG: 2 INJECTION INTRAMUSCULAR; INTRAVENOUS at 16:08

## 2022-01-04 RX ADMIN — CHLORHEXIDINE GLUCONATE 0.12% ORAL RINSE 15 ML: 1.2 LIQUID ORAL at 09:48

## 2022-01-04 RX ADMIN — VANCOMYCIN HYDROCHLORIDE 1 G: 1 INJECTION, SOLUTION INTRAVENOUS at 12:55

## 2022-01-04 RX ADMIN — ALBUMIN (HUMAN) 12.5 G: 12.5 INJECTION, SOLUTION INTRAVENOUS at 19:06

## 2022-01-04 RX ADMIN — BUPIVACAINE HYDROCHLORIDE 400 ML: 5 INJECTION, SOLUTION PERINEURAL at 16:47

## 2022-01-04 ASSESSMENT — PULMONARY FUNCTION TESTS
PIF_VALUE: 16
PIF_VALUE: 16
PIF_VALUE: 15
PIF_VALUE: 18
PIF_VALUE: 16
PIF_VALUE: 22
PIF_VALUE: 16
PIF_VALUE: 18
PIF_VALUE: 16
PIF_VALUE: 14
PIF_VALUE: 16
PIF_VALUE: 21
PIF_VALUE: 3
PIF_VALUE: 16
PIF_VALUE: 16
PIF_VALUE: 15
PIF_VALUE: 3
PIF_VALUE: 15
PIF_VALUE: 19
PIF_VALUE: 16
PIF_VALUE: 17
PIF_VALUE: 1
PIF_VALUE: 15
PIF_VALUE: 17
PIF_VALUE: 15
PIF_VALUE: 17
PIF_VALUE: 13
PIF_VALUE: 16
PIF_VALUE: 17
PIF_VALUE: 15
PIF_VALUE: 15
PIF_VALUE: 17
PIF_VALUE: 15
PIF_VALUE: 18
PIF_VALUE: 18
PIF_VALUE: 16
PIF_VALUE: 17
PIF_VALUE: 23
PIF_VALUE: 18
PIF_VALUE: 22
PIF_VALUE: 15
PIF_VALUE: 11
PIF_VALUE: 20
PIF_VALUE: 14
PIF_VALUE: 15
PIF_VALUE: 17
PIF_VALUE: 22
PIF_VALUE: 0
PIF_VALUE: 17
PIF_VALUE: 18
PIF_VALUE: 15
PIF_VALUE: 16
PIF_VALUE: 18
PIF_VALUE: 16
PIF_VALUE: 17
PIF_VALUE: 15
PIF_VALUE: 16
PIF_VALUE: 15
PIF_VALUE: 15
PIF_VALUE: 20
PIF_VALUE: 16
PIF_VALUE: 16
PIF_VALUE: 15
PIF_VALUE: 4
PIF_VALUE: 16
PIF_VALUE: 16
PIF_VALUE: 3
PIF_VALUE: 21
PIF_VALUE: 14
PIF_VALUE: 16
PIF_VALUE: 15
PIF_VALUE: 20
PIF_VALUE: 18
PIF_VALUE: 22
PIF_VALUE: 17
PIF_VALUE: 17
PIF_VALUE: 19
PIF_VALUE: 15
PIF_VALUE: 16
PIF_VALUE: 15
PIF_VALUE: 15
PIF_VALUE: 17
PIF_VALUE: 16
PIF_VALUE: 6
PIF_VALUE: 16
PIF_VALUE: 16
PIF_VALUE: 18
PIF_VALUE: 16
PIF_VALUE: 18
PIF_VALUE: 17
PIF_VALUE: 16
PIF_VALUE: 16
PIF_VALUE: 14
PIF_VALUE: 18
PIF_VALUE: 15
PIF_VALUE: 18
PIF_VALUE: 11
PIF_VALUE: 17
PIF_VALUE: 8
PIF_VALUE: 16
PIF_VALUE: 17
PIF_VALUE: 15
PIF_VALUE: 18
PIF_VALUE: 1
PIF_VALUE: 18
PIF_VALUE: 16
PIF_VALUE: 16
PIF_VALUE: 15
PIF_VALUE: 17
PIF_VALUE: 16
PIF_VALUE: 1
PIF_VALUE: 16
PIF_VALUE: 16
PIF_VALUE: 18
PIF_VALUE: 15
PIF_VALUE: 16
PIF_VALUE: 18
PIF_VALUE: 16
PIF_VALUE: 5
PIF_VALUE: 17
PIF_VALUE: 17
PIF_VALUE: 16
PIF_VALUE: 15
PIF_VALUE: 18
PIF_VALUE: 16
PIF_VALUE: 16
PIF_VALUE: 18
PIF_VALUE: 17
PIF_VALUE: 5
PIF_VALUE: 15
PIF_VALUE: 14
PIF_VALUE: 16
PIF_VALUE: 16
PIF_VALUE: 15
PIF_VALUE: 18
PIF_VALUE: 17
PIF_VALUE: 20
PIF_VALUE: 19
PIF_VALUE: 17
PIF_VALUE: 16
PIF_VALUE: 17
PIF_VALUE: 16
PIF_VALUE: 22
PIF_VALUE: 16
PIF_VALUE: 23
PIF_VALUE: 0
PIF_VALUE: 3
PIF_VALUE: 15
PIF_VALUE: 6
PIF_VALUE: 18
PIF_VALUE: 22
PIF_VALUE: 17
PIF_VALUE: 16
PIF_VALUE: 17
PIF_VALUE: 16
PIF_VALUE: 15
PIF_VALUE: 15
PIF_VALUE: 16
PIF_VALUE: 22
PIF_VALUE: 16
PIF_VALUE: 17
PIF_VALUE: 16
PIF_VALUE: 16
PIF_VALUE: 15
PIF_VALUE: 18
PIF_VALUE: 3
PIF_VALUE: 15
PIF_VALUE: 26
PIF_VALUE: 22
PIF_VALUE: 16
PIF_VALUE: 15
PIF_VALUE: 18
PIF_VALUE: 15
PIF_VALUE: 2
PIF_VALUE: 15
PIF_VALUE: 18
PIF_VALUE: 17
PIF_VALUE: 15
PIF_VALUE: 16
PIF_VALUE: 17
PIF_VALUE: 15
PIF_VALUE: 3
PIF_VALUE: 15
PIF_VALUE: 15
PIF_VALUE: 16
PIF_VALUE: 7
PIF_VALUE: 15
PIF_VALUE: 17
PIF_VALUE: 16
PIF_VALUE: 16
PIF_VALUE: 17
PIF_VALUE: 16
PIF_VALUE: 2
PIF_VALUE: 16
PIF_VALUE: 15
PIF_VALUE: 16
PIF_VALUE: 16
PIF_VALUE: 15
PIF_VALUE: 33
PIF_VALUE: 22
PIF_VALUE: 16
PIF_VALUE: 15
PIF_VALUE: 15
PIF_VALUE: 16
PIF_VALUE: 18
PIF_VALUE: 20
PIF_VALUE: 16
PIF_VALUE: 15
PIF_VALUE: 18
PIF_VALUE: 15
PIF_VALUE: 17
PIF_VALUE: 15
PIF_VALUE: 17
PIF_VALUE: 16
PIF_VALUE: 15
PIF_VALUE: 18
PIF_VALUE: 18
PIF_VALUE: 17
PIF_VALUE: 1
PIF_VALUE: 17
PIF_VALUE: 16
PIF_VALUE: 15
PIF_VALUE: 16
PIF_VALUE: 16
PIF_VALUE: 1
PIF_VALUE: 15
PIF_VALUE: 16
PIF_VALUE: 17
PIF_VALUE: 16
PIF_VALUE: 17
PIF_VALUE: 15
PIF_VALUE: 1
PIF_VALUE: 18
PIF_VALUE: 17
PIF_VALUE: 24
PIF_VALUE: 15
PIF_VALUE: 16
PIF_VALUE: 0
PIF_VALUE: 22
PIF_VALUE: 20
PIF_VALUE: 16
PIF_VALUE: 18
PIF_VALUE: 16
PIF_VALUE: 16
PIF_VALUE: 17
PIF_VALUE: 17
PIF_VALUE: 16
PIF_VALUE: 0
PIF_VALUE: 16
PIF_VALUE: 13
PIF_VALUE: 15
PIF_VALUE: 17
PIF_VALUE: 15
PIF_VALUE: 16
PIF_VALUE: 18
PIF_VALUE: 18
PIF_VALUE: 16
PIF_VALUE: 17
PIF_VALUE: 15
PIF_VALUE: 1
PIF_VALUE: 22
PIF_VALUE: 18
PIF_VALUE: 18
PIF_VALUE: 16
PIF_VALUE: 16

## 2022-01-04 ASSESSMENT — ENCOUNTER SYMPTOMS: SHORTNESS OF BREATH: 1

## 2022-01-04 ASSESSMENT — PAIN SCALES - GENERAL
PAINLEVEL_OUTOF10: 9

## 2022-01-04 ASSESSMENT — LIFESTYLE VARIABLES: SMOKING_STATUS: 1

## 2022-01-04 NOTE — ANESTHESIA PRE PROCEDURE
(with meals)    Historical Provider, MD   glimepiride (AMARYL) 2 MG tablet Take 1 tablet by mouth 2 times daily 5/22/15   Harinder Arzate MD   aspirin 81 MG tablet Take 81 mg by mouth daily [facility] doctor[facility]    Historical Provider, MD       Current medications:    Current Facility-Administered Medications   Medication Dose Route Frequency Provider Last Rate Last Admin    lactated ringers infusion   IntraVENous Continuous ALONDRA Winters - CNP        sodium chloride flush 0.9 % injection 5-40 mL  5-40 mL IntraVENous 2 times per day ALONDRA Winters - CNP        sodium chloride flush 0.9 % injection 10 mL  10 mL IntraVENous PRN ALONDRA Winters CNP        0.9 % sodium chloride infusion  25 mL IntraVENous PRN ALONDRA Winters - CNP        ceFAZolin (ANCEF) 2000 mg in dextrose 5 % 50 mL IVPB  2,000 mg IntraVENous On Call to [address] [place] [address], APRN - CNP        metoprolol tartrate (LOPRESSOR) tablet 12.5 mg  12.5 mg Oral Once ALONDRA Winters CNP        mupirocin (BACTROBAN) 2 % ointment   Nasal BID ALONDRA Winters CNP        chlorhexidine (PERIDEX) 0.12 % solution 15 mL  15 mL Mouth/Throat Once ALONDRA Winters CNP        chlorhexidine (HIBICLENS) 4 % liquid   Topical See Admin Instructions ALONDRA Winters CNP        aspirin EC tablet 81 mg  81 mg Oral Daily ALONDRA Winters CNP           Allergies: Allergies   Allergen Reactions    Niacin And Related      Turns red, no trouble breathing    Minocycline Hcl Rash    Other Nausea And Vomiting     Patient states on all pain medications he gets nausea and vomiting.  Doctor ordered a medicine (nausea) to take before pain medication    Oxycodone Hcl      vomits       Problem List:    Patient Active Problem List   Diagnosis Code    Angina pectoris (HCC) I20.9    ASHD (arteriosclerotic heart disease) I25.10    Hypertension I10    Diabetes ([facility] [address] E11.9    Hyperlipidemia E78.5    Shortness of breath, post procedure improving probably Effient side effect. R06.02    S/P angioplasty with stent, obtuse marginal Z95.820    Type II or unspecified type diabetes mellitus without mention of complication, not stated as uncontrolled E11.9    Calculus of kidney N20.0    Actinic keratosis L57.0    Senile nuclear sclerosis H25.10    Dyspepsia R10.13    Epigastric pain R10.13    Substernal chest pain R07.2    Abnormal CT scan, stomach R93.3    Abnormal gastrointestinal PET scan R94.8    Strain of lumbar region S39.012A    Pneumothorax after biopsy J95.811    Pneumothorax, post biopsy, right J95.811    Pulmonary nodule R91.1       Past Medical History:        Diagnosis Date    Abnormal cardiac cath 09/20/2013    LMCA; Mild irregularities 10-20%. LAD: Abnormal.  Mild to moderate irregularities throughout the LAD and branches. LCx:  abnormal.  80-90% stenosis in a moderate sized OM1. RCA: Abnormal.  40-50% mid RCA stenosis.  Actinic keratosis     Arthritis     neck, fingers    CAD (coronary artery disease)     Calculus of kidney     Cervical stenosis of spine     Diabetes mellitus (HCC)     Dr. Jeyson Turner, CNP, [place]    Full dentures     GERD (gastroesophageal reflux disease)     H/O echocardiogram 04/29/2016    Stress echo. Normal resting LV systolic function,normal systolic restponse to exercise. No evidence of reversible  ischemia on this maximal,symptom limited,treadmill exerxiess stress echocardiography study    History of cardiac cath 02/09/2017    LMCA: Lesion on LMCA: Distal subsection . 20% stenosis. LAD: Lesion on 1st Diag: Mid subsection . 75% stenosis. RCA: Lesion on Mid RCA: Mid subsection . 100% stenosis. Comments: The distal RCA fills by left to right collateralization. Dominance: Mixed. LV function assessed as: Normal. EF 65%.  History of echocardiogram 06/11/2018    EF 60%. Mildly increased LV wall thickness.  Evidence of mild diastolic dysfunction seen.  History of echocardiogram 01/18/2019    EF of 65%. LV wall thickness is mildly increased. Aortic valve leaflets are mildly thickened.  History of heart attack     History of tobacco abuse     Quit 10/2021, 1/4 ppd for 47 years    Hyperlipidemia     Hypertension     Dr. Debra Beck, CNP    Pulmonary nodule 12/2021    Right middle lobe    Raynaud's phenomenon     S/P angioplasty with stent 09/20/2013    PTCA-NEDRA of  the OM 1    Wears hearing aid in both ears        Past Surgical History:        Procedure Laterality Date   [address]  2007    patient states had 2 small blockages    CARDIAC CATHETERIZATION  02/09/2017    LMCA: Lesion on LMCA: Distal subsection 20% stenosis. LAD: Lesion on 1st Diag: Mid subsection 75% stenosis. RCA: Lesion on Mid RCA: Mid subsection 100% stenosis.  CARDIAC CATHETERIZATION  02/09/2017    Attempted PCI to chronic total occlusion of RCA was not successful. Unable to cross with multiple wires due to heavy calcification and toruosity.     CARDIAC CATHETERIZATION  01/14/2019    CATARACT REMOVAL  04/06/2015    Right eye - Dr. Lon Lara  06/08/2015    Dr. Tanvir Jaffe - left eye    COLONOSCOPY  01/11/2006    Dr. Sami Bills - internal hemorrhoids, no polps    COLONOSCOPY  05/23/2016    -polyp    CT NEEDLE BIOPSY LUNG PERCUTANEOUS  7/28/2021    CT NEEDLE BIOPSY LUNG PERCUTANEOUS 7/28/2021 [facility] CT SCAN    HEMORRHOID SURGERY      HERNIA REPAIR Left     inguinal    LUNG BIOPSY Right 07/28/2021    Dr Eusebia Wiley    c4-5 laminectomy and fusion    OTHER SURGICAL HISTORY  10/2014    Right foot - 7 from heal and 1 from great toe at [address]  12/11/2014    pain injection - cervical    UPPER GASTROINTESTINAL ENDOSCOPY  12/28/2005    Dr. Sami Bills - mild esophagitis and gastritis    UPPER GASTROINTESTINAL ENDOSCOPY N/A 02/05/2019    -bx(neg H-Pylori)retained gastric content, possible submucosal mass of gastric cardia    UPPER GASTROINTESTINAL ENDOSCOPY N/A 10/22/2019    EGD BIOPSY performed by Justin Buitrago MD at [facility] History:    Social History     Tobacco Use    Smoking status: Former Smoker     Packs/day: 0.25     Years: 54.00     Pack years: 13.50     Types: Cigarettes     Quit date: 10/1/2021     Years since quittin.2    Smokeless tobacco: Never Used   Substance Use Topics    Alcohol use: No                                Counseling given: Not Answered      Vital Signs (Current): There were no vitals filed for this visit. BP Readings from Last 3 Encounters:   21 120/64   21 131/65   12/15/21 122/62       NPO Status:                                                                                 BMI:   Wt Readings from Last 3 Encounters:   21 164 lb (74.4 kg)   21 165 lb (74.8 kg)   12/15/21 164 lb 6.4 oz (74.6 kg)     There is no height or weight on file to calculate BMI.    CBC:   Lab Results   Component Value Date    WBC 8.4 2021    RBC 4.19 2021    RBC 4.63 2017    HGB 12.4 2021    HCT 38.5 2021    MCV 91.9 2021    RDW 13.0 2021     2021       CMP:   Lab Results   Component Value Date     2021    K 4.7 2021     2021    CO2 25 2021    BUN 17 2021    CREATININE 0.98 2021    GFRAA >60 2021    LABGLOM >60 2021    GLUCOSE 57 2021    GLUCOSE 196 2017    PROT 7.3 2021    CALCIUM 9.4 2021    BILITOT 0.35 2021    ALKPHOS 70 2021    AST 18 2021    ALT 17 2021       POC Tests: No results for input(s): POCGLU, POCNA, POCK, POCCL, POCBUN, POCHEMO, POCHCT in the last 72 hours.     Coags:   Lab Results   Component Value Date    PROTIME 10.3 2021    INR 1.0 2021    APTT 23.9 2021       HCG (If Applicable): No results found for: PREGTESTUR, PREGSERUM, HCG, HCGQUANT     ABGs: No results found for: PHART, PO2ART, ZRR0CXE, PFS8HZI, BEART, J4NJCONK     Type & Screen (If Applicable):  No results found for: LABABO, LABRH    Anesthesia Evaluation   history of anesthetic complications:   Airway: Mallampati: III  TM distance: >3 FB   Neck ROM: limited  Mouth opening: > = 3 FB Dental:    (+) edentulous      Pulmonary:   (+) shortness of breath:  wheezes,  current smoker          Patient did not smoke on day of surgery. ROS comment: Chest CT shows enlarging lung mass   Cardiovascular:    (+) hypertension:, angina (chest pain a month ago relieved nitro, came to the hospital was admitted and discharged):, past MI:, CAD:, CABG/stent (stent placed 10 years ago):, hyperlipidemia      ECG reviewed      Echocardiogram reviewed    Cleared by cardiology              Neuro/Psych:   Negative Neuro/Psych ROS  (+) neuromuscular disease:,             GI/Hepatic/Renal:   (+) GERD: well controlled,           Endo/Other:    (+) DiabetesType II DM, well controlled, , : arthritis: OA., .                 Abdominal:             Vascular: negative vascular ROS. Other Findings:               Anesthesia Plan      general     ASA 4     (Anticipate FOB-assisted YOU placement,  1LV)  Induction: intravenous. arterial line  MIPS: Postoperative opioids intended. Anesthetic plan and risks discussed with patient, child/children and spouse. Use of blood products discussed with patient whom consented to blood products. Plan discussed with CRNA.                 Sharri Riggins MD   1/4/2022

## 2022-01-04 NOTE — PROGRESS NOTES
[id number] [facility] Cardiothoracic Surgery  Pre-op Note    1/4/2022    David Ly is scheduled for surgery today. All of the pertinent studies have been reviewed and patient is NPO. I have discussed the procedure with the patient and family, and they have been given opportunity to ask questions. The attendant risks, benefits, and possible outcomes have been discussed with them. They understand and have signed informed consent.       Deborah Carlson MD

## 2022-01-04 NOTE — BRIEF OP NOTE
Brief Postoperative Note      Patient: Marilou Velazquez  YOB: 1950  MRN: [id number]    Date of Procedure: 1/4/2022    Pre-Op Diagnosis: RIGHT MIDDLE LOBE NODULE    Post-Op Diagnosis: Same       Procedure(s):   MIDDLE WEDGE RESECTION, POSSIBLE MIDDLE LOBECTOMY VIA THORACOTOMY    Surgeon(s):  Xavier Sheppard MD    Assistant:  First Assistant: Marbella Degroot RN    Anesthesia: General    Estimated Blood Loss (mL): less than 666     Complications: None    Specimens:   ID Type Source Tests Collected by Time Destination   1 : RIGHT MIDDLE LOBE WEDGE Tissue Lung CULTURE, TISSUE Xavier Sheppard MD 1/4/2022 1344    A : MIDDLE LOBE OF RIGHT LUNG Tissue Lung SURGICAL PATHOLOGY Xavier hSeppard MD 1/4/2022 1341    B : LYMPH NODE #9 OF LUNG  Tissue Lymph Node SURGICAL PATHOLOGY Xavier Sheppard MD 1/4/2022 1352    C : LYMPH NODE #7 Tissue Lymph Node SURGICAL PATHOLOGY Xavier Sheppard MD 1/4/2022 1400    D : RIGHT MIDDLE LOBE Tissue Lung SURGICAL PATHOLOGY Xavier Sheppard MD 1/4/2022 1555        Implants:  * No implants in log *      Drains:   Chest Tube 1 Right Midaxillary 39 [place] (Active)       Chest Tube 2 Right 39 [place] (Active)       Urethral Catheter Temperature probe 16 fr (Active)       Findings: Middle lobe biopsy had NSCLC with completion right middle lobectomy    Electronically signed by Loree Teresa MD on 1/4/2022 at 4:24 PM

## 2022-01-04 NOTE — ANESTHESIA PROCEDURE NOTES
Arterial Line:    An arterial line was placed using ultrasound guidance and surface landmarks, in the OR for the following indication(s): continuous blood pressure monitoring and blood sampling needed. A 20 gauge (size), 1 and 3/4 inch (length), Arrow (type) catheter was placed, Seldinger technique not used, into the left radial artery, secured by Tegaderm and tape. Anesthesia type: General    Events:  patient tolerated procedure well with no complications. 1/4/2022 12:25 PM1/4/2022 12:30 PM  Anesthesiologist: Abida Obrien MD  Resident/CRNA: ALONDRA Crandall - CRNA  Other anesthesia staff: Daryl Mott RN  Performed:  Other anesthesia staff   Preanesthetic Checklist  Completed: patient identified, IV checked, site marked, risks and benefits discussed, surgical consent, monitors and equipment checked, pre-op evaluation, timeout performed, anesthesia consent given, oxygen available and patient being monitored

## 2022-01-04 NOTE — FLOWSHEET NOTE
SPIRITUAL CARE DEPARTMENT - [facility] [address]  PROGRESS NOTE    Shift date: 2022  Shift day: Tuesday   Shift # 1    Room # 0768/4875-24   Name: Monica Fish            Age: 70 y.o. Gender: male          Orthodoxy: Pentecostal   Place of Congregation: Unknown    Referral: Consult    Admit Date & Time: 2022  7:49 AM    PATIENT/EVENT DESCRIPTION:  Monica Fish is a 70 y.o. male  Who is going into surgery this afternoon for lung biopsy. Son Yvrose is at bedside. Both are tearful and grief stricken as Shay's 34 yr old son has just  from covid in [place]. SPIRITUAL ASSESSMENT/INTERVENTION:   provide active listening, emotional support for pt. And son. Son Yvrose shares time line of his son's death just a few weeks after getting . The whole family is grief stricken and now has concerns about father Octavio Laird.  offers prayer at end of visit and family is accepting and appreciative. SPIRITUAL CARE FOLLOW-UP PLAN:  Chaplains will remain available to offer spiritual and emotional support as needed. Electronically signed by Nemo Rodríguez on 2022 at 11:46 AM.  [facility]  [phone]     22 1144   Encounter Summary   Services provided to: Patient and family together   Referral/Consult From: Nurse   Support System Children;Spouse; Family members   Place [address]   (2021)   Complexity of Encounter High   Length of Encounter 1 hour   Spiritual Assessment Completed Yes   Crisis   Type Emotional distress   Assessment Tearful;Grieving   Intervention Active listening;Explored feelings, thoughts, concerns;Explored coping resources;Nurtured hope;Prayer;Sustaining presence/ Ministry of presence   Outcome Acceptance; Connection/belonging;Comfort;Expressed gratitude;Coping;Tearful;Grieving

## 2022-01-05 ENCOUNTER — APPOINTMENT (OUTPATIENT)
Dept: GENERAL RADIOLOGY | Age: 72
DRG: 164 | End: 2022-01-05
Attending: THORACIC SURGERY (CARDIOTHORACIC VASCULAR SURGERY)
Payer: MEDICARE

## 2022-01-05 LAB
ABO/RH: NORMAL
ABSOLUTE EOS #: <0.03 K/UL (ref 0–0.44)
ABSOLUTE IMMATURE GRANULOCYTE: 0.04 K/UL (ref 0–0.3)
ABSOLUTE LYMPH #: 0.76 K/UL (ref 1.1–3.7)
ABSOLUTE MONO #: 1.09 K/UL (ref 0.1–1.2)
ANION GAP SERPL CALCULATED.3IONS-SCNC: 12 MMOL/L (ref 9–17)
ANTIBODY SCREEN: NEGATIVE
ARM BAND NUMBER: NORMAL
BASOPHILS # BLD: 0 % (ref 0–2)
BASOPHILS ABSOLUTE: 0.03 K/UL (ref 0–0.2)
BLD PROD TYP BPU: NORMAL
BLD PROD TYP BPU: NORMAL
BUN BLDV-MCNC: 22 MG/DL (ref 8–23)
BUN/CREAT BLD: ABNORMAL (ref 9–20)
CALCIUM SERPL-MCNC: 8.3 MG/DL (ref 8.6–10.4)
CHLORIDE BLD-SCNC: 104 MMOL/L (ref 98–107)
CO2: 19 MMOL/L (ref 20–31)
CREAT SERPL-MCNC: 0.94 MG/DL (ref 0.7–1.2)
CROSSMATCH RESULT: NORMAL
CROSSMATCH RESULT: NORMAL
DIFFERENTIAL TYPE: ABNORMAL
DISPENSE STATUS BLOOD BANK: NORMAL
DISPENSE STATUS BLOOD BANK: NORMAL
EKG ATRIAL RATE: 63 BPM
EKG P AXIS: 56 DEGREES
EKG P-R INTERVAL: 198 MS
EKG Q-T INTERVAL: 386 MS
EKG QRS DURATION: 90 MS
EKG QTC CALCULATION (BAZETT): 395 MS
EKG R AXIS: 46 DEGREES
EKG T AXIS: 34 DEGREES
EKG VENTRICULAR RATE: 63 BPM
EOSINOPHILS RELATIVE PERCENT: 0 % (ref 1–4)
EXPIRATION DATE: NORMAL
GFR AFRICAN AMERICAN: >60 ML/MIN
GFR NON-AFRICAN AMERICAN: >60 ML/MIN
GFR SERPL CREATININE-BSD FRML MDRD: ABNORMAL ML/MIN/{1.73_M2}
GFR SERPL CREATININE-BSD FRML MDRD: ABNORMAL ML/MIN/{1.73_M2}
GLUCOSE BLD-MCNC: 123 MG/DL (ref 75–110)
GLUCOSE BLD-MCNC: 134 MG/DL (ref 75–110)
GLUCOSE BLD-MCNC: 181 MG/DL (ref 70–99)
GLUCOSE BLD-MCNC: 190 MG/DL (ref 75–110)
HCT VFR BLD CALC: 30.2 % (ref 40.7–50.3)
HEMOGLOBIN: 10.2 G/DL (ref 13–17)
IMMATURE GRANULOCYTES: 0 %
LYMPHOCYTES # BLD: 7 % (ref 24–43)
MCH RBC QN AUTO: 29.7 PG (ref 25.2–33.5)
MCHC RBC AUTO-ENTMCNC: 33.8 G/DL (ref 28.4–34.8)
MCV RBC AUTO: 88 FL (ref 82.6–102.9)
MONOCYTES # BLD: 11 % (ref 3–12)
NRBC AUTOMATED: 0 PER 100 WBC
PDW BLD-RTO: 12.8 % (ref 11.8–14.4)
PLATELET # BLD: 141 K/UL (ref 138–453)
PLATELET ESTIMATE: ABNORMAL
PMV BLD AUTO: 11.2 FL (ref 8.1–13.5)
POTASSIUM SERPL-SCNC: 4.4 MMOL/L (ref 3.7–5.3)
RBC # BLD: 3.43 M/UL (ref 4.21–5.77)
RBC # BLD: ABNORMAL 10*6/UL
SEG NEUTROPHILS: 81 % (ref 36–65)
SEGMENTED NEUTROPHILS ABSOLUTE COUNT: 8.32 K/UL (ref 1.5–8.1)
SODIUM BLD-SCNC: 135 MMOL/L (ref 135–144)
TRANSFUSION STATUS: NORMAL
TRANSFUSION STATUS: NORMAL
UNIT DIVISION: 0
UNIT DIVISION: 0
UNIT NUMBER: NORMAL
UNIT NUMBER: NORMAL
WBC # BLD: 10.2 K/UL (ref 3.5–11.3)
WBC # BLD: ABNORMAL 10*3/UL

## 2022-01-05 PROCEDURE — 80048 BASIC METABOLIC PNL TOTAL CA: CPT

## 2022-01-05 PROCEDURE — 6360000002 HC RX W HCPCS: Performed by: NURSE PRACTITIONER

## 2022-01-05 PROCEDURE — 71045 X-RAY EXAM CHEST 1 VIEW: CPT

## 2022-01-05 PROCEDURE — 6370000000 HC RX 637 (ALT 250 FOR IP): Performed by: NURSE PRACTITIONER

## 2022-01-05 PROCEDURE — 2580000003 HC RX 258: Performed by: NURSE PRACTITIONER

## 2022-01-05 PROCEDURE — 82947 ASSAY GLUCOSE BLOOD QUANT: CPT

## 2022-01-05 PROCEDURE — 99024 POSTOP FOLLOW-UP VISIT: CPT | Performed by: NURSE PRACTITIONER

## 2022-01-05 PROCEDURE — 93010 ELECTROCARDIOGRAM REPORT: CPT | Performed by: INTERNAL MEDICINE

## 2022-01-05 PROCEDURE — 85025 COMPLETE CBC W/AUTO DIFF WBC: CPT

## 2022-01-05 PROCEDURE — APPNB30 APP NON BILLABLE TIME 0-30 MINS: Performed by: NURSE PRACTITIONER

## 2022-01-05 PROCEDURE — 99232 SBSQ HOSP IP/OBS MODERATE 35: CPT | Performed by: INTERNAL MEDICINE

## 2022-01-05 PROCEDURE — 2100000001 HC CVICU R&B

## 2022-01-05 RX ORDER — GABAPENTIN 100 MG/1
100 CAPSULE ORAL 3 TIMES DAILY
Status: DISCONTINUED | OUTPATIENT
Start: 2022-01-05 | End: 2022-01-15 | Stop reason: HOSPADM

## 2022-01-05 RX ORDER — METOCLOPRAMIDE 5 MG/1
5 TABLET ORAL
Status: DISCONTINUED | OUTPATIENT
Start: 2022-01-05 | End: 2022-01-08

## 2022-01-05 RX ORDER — ONDANSETRON 2 MG/ML
4 INJECTION INTRAMUSCULAR; INTRAVENOUS EVERY 6 HOURS PRN
Status: DISCONTINUED | OUTPATIENT
Start: 2022-01-05 | End: 2022-01-15 | Stop reason: HOSPADM

## 2022-01-05 RX ADMIN — GLIPIZIDE 5 MG: 5 TABLET ORAL at 17:00

## 2022-01-05 RX ADMIN — LISINOPRIL 20 MG: 20 TABLET ORAL at 09:00

## 2022-01-05 RX ADMIN — OXYCODONE HYDROCHLORIDE 10 MG: 5 TABLET ORAL at 05:06

## 2022-01-05 RX ADMIN — ISOSORBIDE MONONITRATE 30 MG: 30 TABLET ORAL at 09:00

## 2022-01-05 RX ADMIN — GABAPENTIN 100 MG: 100 CAPSULE ORAL at 20:46

## 2022-01-05 RX ADMIN — POLYETHYLENE GLYCOL 3350 17 G: 17 POWDER, FOR SOLUTION ORAL at 09:46

## 2022-01-05 RX ADMIN — GABAPENTIN 100 MG: 100 CAPSULE ORAL at 13:53

## 2022-01-05 RX ADMIN — SODIUM CHLORIDE, PRESERVATIVE FREE 10 ML: 5 INJECTION INTRAVENOUS at 20:46

## 2022-01-05 RX ADMIN — FERROUS SULFATE TAB EC 325 MG (65 MG FE EQUIVALENT) 325 MG: 325 (65 FE) TABLET DELAYED RESPONSE at 09:00

## 2022-01-05 RX ADMIN — INSULIN LISPRO 2 UNITS: 100 INJECTION, SOLUTION INTRAVENOUS; SUBCUTANEOUS at 07:15

## 2022-01-05 RX ADMIN — ATORVASTATIN CALCIUM 20 MG: 20 TABLET, FILM COATED ORAL at 09:00

## 2022-01-05 RX ADMIN — METFORMIN HYDROCHLORIDE 1000 MG: 500 TABLET ORAL at 17:00

## 2022-01-05 RX ADMIN — ALOGLIPTIN 12.5 MG: 12.5 TABLET, FILM COATED ORAL at 09:00

## 2022-01-05 RX ADMIN — BENZOCAINE AND MENTHOL 1 LOZENGE: 15; 3.6 LOZENGE ORAL at 05:28

## 2022-01-05 RX ADMIN — GLIPIZIDE 5 MG: 5 TABLET ORAL at 08:00

## 2022-01-05 RX ADMIN — ACETAMINOPHEN 1000 MG: 500 TABLET ORAL at 19:35

## 2022-01-05 RX ADMIN — GABAPENTIN 100 MG: 100 CAPSULE ORAL at 09:00

## 2022-01-05 RX ADMIN — ONDANSETRON 4 MG: 2 INJECTION INTRAMUSCULAR; INTRAVENOUS at 05:28

## 2022-01-05 RX ADMIN — FAMOTIDINE 20 MG: 20 TABLET, FILM COATED ORAL at 20:46

## 2022-01-05 RX ADMIN — METOCLOPRAMIDE 5 MG: 5 TABLET ORAL at 20:46

## 2022-01-05 RX ADMIN — SODIUM CHLORIDE, PRESERVATIVE FREE 10 ML: 5 INJECTION INTRAVENOUS at 09:51

## 2022-01-05 RX ADMIN — INSULIN LISPRO 2 UNITS: 100 INJECTION, SOLUTION INTRAVENOUS; SUBCUTANEOUS at 12:02

## 2022-01-05 RX ADMIN — METOCLOPRAMIDE 5 MG: 5 TABLET ORAL at 17:00

## 2022-01-05 RX ADMIN — MORPHINE SULFATE 2 MG: 2 INJECTION, SOLUTION INTRAMUSCULAR; INTRAVENOUS at 02:33

## 2022-01-05 RX ADMIN — MORPHINE SULFATE 2 MG: 2 INJECTION, SOLUTION INTRAMUSCULAR; INTRAVENOUS at 09:46

## 2022-01-05 RX ADMIN — FAMOTIDINE 20 MG: 20 TABLET, FILM COATED ORAL at 09:00

## 2022-01-05 RX ADMIN — METFORMIN HYDROCHLORIDE 1000 MG: 500 TABLET ORAL at 09:00

## 2022-01-05 RX ADMIN — ALOGLIPTIN 12.5 MG: 12.5 TABLET, FILM COATED ORAL at 17:00

## 2022-01-05 RX ADMIN — METOCLOPRAMIDE 5 MG: 5 TABLET ORAL at 12:00

## 2022-01-05 ASSESSMENT — PAIN SCALES - GENERAL
PAINLEVEL_OUTOF10: 9
PAINLEVEL_OUTOF10: 8
PAINLEVEL_OUTOF10: 4
PAINLEVEL_OUTOF10: 0
PAINLEVEL_OUTOF10: 7
PAINLEVEL_OUTOF10: 8
PAINLEVEL_OUTOF10: 0

## 2022-01-05 ASSESSMENT — PAIN DESCRIPTION - LOCATION: LOCATION: SHOULDER

## 2022-01-05 ASSESSMENT — PAIN DESCRIPTION - DESCRIPTORS: DESCRIPTORS: ACHING

## 2022-01-05 ASSESSMENT — PAIN DESCRIPTION - ORIENTATION: ORIENTATION: RIGHT

## 2022-01-05 NOTE — ANESTHESIA POSTPROCEDURE EVALUATION
Department of Anesthesiology  Postprocedure Note    Patient: Marilou Velazquez  MRN: [id number]  YOB: 1950  Date of evaluation: 1/4/2022  Time:  8:24 PM     Procedure Summary     Date: 01/04/22 Room / Location: [address] [place] [address]    Anesthesia Start: 8122 Anesthesia Stop: 9739    Procedure: MIDDLE WEDGE RESECTION, POSSIBLE MIDDLE LOBECTOMY VIA THORACOTOMY (Right ) Diagnosis: (RIGHT MIDDLE LOBE NODULE)    Surgeons: Xavier Sheppard MD Responsible Provider: Naila Elmore MD    Anesthesia Type: general ASA Status: 4          Anesthesia Type: general    Loren Phase I: Loren Score: 8    Loren Phase II:      Last vitals: Reviewed and per EMR flowsheets.      POST-OP ANESTHESIA NOTE       BP (!) 127/52   Pulse 82   Temp 98.5 °F (36.9 °C) (Oral)   Resp 18   SpO2 99%       Pain Level: 9       Anesthesia Post Evaluation    Patient location during evaluation: ICU  Patient participation: complete - patient participated  Level of consciousness: awake  Pain score: 9  Airway patency: patent  Nausea & Vomiting: no nausea and no vomiting  Complications: no  Cardiovascular status: hemodynamically stable  Respiratory status: acceptable  Hydration status: stable

## 2022-01-05 NOTE — CONSULTS
Today's Date: 1/5/2022  Patient Name: Monica Fish  Date of admission: 1/4/2022  7:49 AM  Patient's age: 70 y.o., 1950  Admission Dx: Pulmonary nodule [R91.1]    Reason for Consult: cancer findings VATS  Requesting Physician: Silver Jewell MD    CHIEF COMPLAINT:  No chief complaint on file. History Obtained From:  patient and chart    HISTORY OF PRESENT ILLNESS:      Monica Fish is a 70 y.o. male who is admitted to the hospital on 1/4/2022  for VATS and wedge resection  Patient has history of tobacco smoking and patient has been following with pulmonology for her right middle lobe lung nodule and his recent scan on 11/2/2021 showed increase in the size of lung nodule. Patient has had biopsy on 7/28/2021 which showed benign parenchyma with chronic inflammation however this was very active on the PET scan. Patient underwent VATS, wedge resection of the right middle lobe lung mass on 1/4/2022. Surgical pathology is awaited. Oncology consult for further recommendations. Past Medical History:   has a past medical history of Abnormal cardiac cath, Actinic keratosis, Arthritis, CAD (coronary artery disease), Calculus of kidney, Cervical stenosis of spine, Diabetes mellitus ([address] Full dentures, GERD (gastroesophageal reflux disease), H/O echocardiogram, History of cardiac cath, History of echocardiogram, History of echocardiogram, History of heart attack, History of tobacco abuse, Hyperlipidemia, Hypertension, Pulmonary nodule, Raynaud's phenomenon, S/P angioplasty with stent, and Wears hearing aid in both ears. Past Surgical History:   has a past surgical history that includes Cardiac catheterization (2007); Hemorrhoid surgery; Neck surgery (1997); other surgical history (10/2014); other surgical history (12/11/2014); Cataract removal (04/06/2015); Upper gastrointestinal endoscopy (12/28/2005); Cataract removal (06/08/2015); Colonoscopy (01/11/2006);  Colonoscopy (05/23/2016); Cardiac catheterization (02/09/2017); Cardiac catheterization (02/09/2017); Cardiac catheterization (01/14/2019); Upper gastrointestinal endoscopy (N/A, 02/05/2019); Upper gastrointestinal endoscopy (N/A, 10/22/2019); Lung biopsy (Right, 07/28/2021); CT NEEDLE BIOPSY LUNG PERCUTANEOUS (7/28/2021); and hernia repair (Left). Medications:    Prior to Admission medications    Medication Sig Start Date End Date Taking? Authorizing Provider   enoxaparin (LOVENOX) 120 MG/0.8ML injection Inject 0.5 mLs into the skin 2 times daily for 5 days 12/31/21 1/5/22  ALONDRA Johnson NP   ferrous sulfate (IRON 325) 325 (65 Fe) MG tablet Take 325 mg by mouth daily     Historical Provider, MD   isosorbide mononitrate (IMDUR) 30 MG extended release tablet 1/2 tablet in the morning    Historical Provider, MD   lisinopril (PRINIVIL;ZESTRIL) 20 MG tablet Take 20 mg by mouth daily     Historical Provider, MD   nitroGLYCERIN (NITRODUR) 0.4 MG/HR 1 dose under tongue as needed for chest pain.  max of 3 in one day    Historical Provider, MD   pantoprazole (PROTONIX) 40 MG injection as needed     Historical Provider, MD   amLODIPine (NORVASC) 5 MG tablet Take 1 tablet by mouth daily 7/15/21   Vanda Rodriguez MD   clopidogrel (PLAVIX) 75 MG tablet Take 1 tablet by mouth daily  Patient taking differently: Take 75 mg by mouth daily Ordered by heart doctor, Chan Dunlap 7/15/21   Vanda Rordiguez MD   atenolol (TENORMIN) 25 MG tablet Take 1 tablet by mouth daily 6/7/21   Vanda Rodriguez MD   Insulin Glargine, 2 Unit Dial, 300 UNIT/ML SOPN Insulin Glargine Insulin Glargine U-300 Conc Active 10 UNIT Subcutaneous Every morning May 7th, 2020 11:47am 05-  [facility] ([id number]) 5/7/20   Historical Provider, MD   atorvastatin (LIPITOR) 20 MG tablet Take 1 tablet by mouth daily 12/15/20   Vanda Rodriguez MD   sitaGLIPtan-metformin (JANUMET)  MG per tablet Take 1 tablet by mouth 2 times daily (with meals) Historical Provider, MD   glimepiride (AMARYL) 2 MG tablet Take 1 tablet by mouth 2 times daily 5/22/15   Michell Travis MD   aspirin 81 MG tablet Take 81 mg by mouth daily Ordered by heart doctor, Dr. Alexander Espinosa Cotter    Historical Provider, MD     Current Facility-Administered Medications   Medication Dose Route Frequency Provider Last Rate Last Admin    benzocaine-menthol (CEPACOL SORE THROAT) lozenge 1 lozenge  1 lozenge Oral Q2H PRN ALONDRA العلي - NP   1 lozenge at 01/05/22 0528    ondansetron (ZOFRAN) injection 4 mg  4 mg IntraVENous Q6H PRN Reina Lainez APRN - NP   4 mg at 01/05/22 0528    metoclopramide (REGLAN) tablet 5 mg  5 mg Oral 4x Daily AC & HS ALONDRA Winters - CNP   5 mg at 01/05/22 1200    gabapentin (NEURONTIN) capsule 100 mg  100 mg Oral TID ALONDRA Winters CNP   100 mg at 01/05/22 1353    scopolamine (TRANSDERM-SCOP) transdermal patch 1 patch  1 patch TransDERmal Once Isaak Condon MD   1 patch at 01/04/22 0923    lisinopril (PRINIVIL;ZESTRIL) tablet 20 mg  20 mg Oral Daily ALONDRA العلي - NP   20 mg at 01/05/22 0900    isosorbide mononitrate (IMDUR) extended release tablet 30 mg  30 mg Oral Daily ALONDRA العلي - NP   30 mg at 01/05/22 0900    ferrous sulfate (FE TABS 325) EC tablet 325 mg  325 mg Oral Daily Renia Lainez APRN - NP   325 mg at 01/05/22 0900    atorvastatin (LIPITOR) tablet 20 mg  20 mg Oral Daily ALONDRA العلي - NP   20 mg at 01/05/22 0900    sodium chloride flush 0.9 % injection 10 mL  10 mL IntraVENous 2 times per day ALONDRA العلي - NP   10 mL at 01/05/22 0951    sodium chloride flush 0.9 % injection 10 mL  10 mL IntraVENous PRN ALONDRA العلي - NP        0.9 % sodium chloride infusion  25 mL IntraVENous PRN Waylan Salen, APRN - NP        famotidine (PEPCID) tablet 20 mg  20 mg Oral BID Reina Lainez APRN - NP   20 mg at 01/05/22 0900    Or    famotidine (PEPCID) injection 20 mg  20 mg IntraVENous BID Artice Rigo, APRN - NP        acetaminophen (TYLENOL) tablet 1,000 mg  1,000 mg Oral Q4H PRN Artice Rigo, APRN - NP        oxyCODONE (ROXICODONE) immediate release tablet 5 mg  5 mg Oral Q4H PRN Artice Rigo, APRN - NP        Or   [facility] oxyCODONE (ROXICODONE) immediate release tablet 10 mg  10 mg Oral Q4H PRN Artice Rigo, APRN - NP   10 mg at 01/05/22 0506    morphine (PF) injection 2 mg  2 mg IntraVENous Q3H PRN Artice Rigo, APRN - NP   2 mg at 01/05/22 0946    polyethylene glycol (GLYCOLAX) packet 17 g  17 g Oral Daily Artice Rigo, APRN - NP   17 g at 01/05/22 0946    magnesium hydroxide (MILK OF MAGNESIA) 400 MG/5ML suspension 30 mL  30 mL Oral Daily PRN Artice Rigo, APRN - NP        bisacodyl (DULCOLAX) suppository 10 mg  10 mg Rectal Daily PRN Artice Rigo, APRN - NP        glucose (GLUTOSE) 40 % oral gel 15 g  15 g Oral PRN Artice Rigo, APRN - NP        dextrose 50 % IV solution  12.5 g IntraVENous PRN Artice Rigo, APRN - NP        glucagon (rDNA) injection 1 mg  1 mg IntraMUSCular PRN Artice Rigo, APRN - NP        dextrose 5 % solution  100 mL/hr IntraVENous PRN Artice Rigo, APRN - NP        glucose (GLUTOSE) 40 % oral gel 15 g  15 g Oral PRN Artice Rigo, APRN - NP        dextrose 50 % IV solution  12.5 g IntraVENous PRN Artice Rigo, APRN - NP        glucagon (rDNA) injection 1 mg  1 mg IntraMUSCular PRN Artice Rigo, APRN - NP        dextrose 5 % solution  100 mL/hr IntraVENous PRN Artice Rigo, APRN - NP        bupivacaine 0.5% (MARCAINE) elastomeric infusion 400 mL  400 mL Infiltration Continuous Zayra Jamison MD   400 mL at 01/04/22 1647    lidocaine 4 % external patch 1 patch  1 patch TransDERmal Daily Artice Rigo, APRN - NP   1 patch at 01/05/22 0951    alogliptin (NESINA) tablet 12.5 mg  12.5 mg Oral BID WC Artice Rigo, APRN - NP   12.5 mg at 01/05/22 0900    And  metFORMIN (GLUCOPHAGE) tablet 1,000 mg  1,000 mg Oral BID WC Ling Card, APRN - NP   1,000 mg at 01/05/22 0900    glipiZIDE (GLUCOTROL) tablet 5 mg  5 mg Oral BID AC Ling Card, APRN - NP   5 mg at 01/05/22 0800    insulin lispro (HUMALOG) injection vial 0-12 Units  0-12 Units SubCUTAneous TID WC Ling Card, APRN - NP   2 Units at 01/05/22 1202    insulin lispro (HUMALOG) injection vial 0-6 Units  0-6 Units SubCUTAneous Nightly Ling Card, APRN - NP   2 Units at 01/04/22 2046       Allergies:  Niacin and related, Minocycline hcl, Other, and Oxycodone hcl    Social History:   reports that he quit smoking about 3 months ago. His smoking use included cigarettes. He has a 13.50 pack-year smoking history. He has never used smokeless tobacco. He reports that he does not drink alcohol and does not use drugs. Family History: family history includes Alzheimer's Disease in his father; Cancer in his brother; Diabetes in his father and mother; Heart Disease in his brother, brother, brother, mother, and sister; High Blood Pressure in his mother. REVIEW OF SYSTEMS:    Constitutional: No fever or chills. No night sweats, no weight loss   Eyes: No eye discharge, double vision, or eye pain   HEENT: negative for sore mouth, sore throat, hoarseness and voice change   Respiratory: negative for cough , sputum, dyspnea, wheezing, hemoptysis, chest pain   Cardiovascular: negative for chest pain, dyspnea, palpitations, orthopnea, PND   Gastrointestinal: negative for nausea, vomiting, diarrhea, constipation, abdominal pain, Dysphagia, hematemesis and hematochezia   Genitourinary: negative for frequency, dysuria, nocturia, urinary incontinence, and hematuria   Integument: negative for rash, skin lesions, bruises.    Hematologic/Lymphatic: negative for easy bruising, bleeding, lymphadenopathy, or petechiae   Endocrine: negative for heat or cold intolerance,weight changes, change in bowel habits and hair loss   Musculoskeletal: negative for myalgias, arthralgias, pain, joint swelling,and bone pain   Neurological: negative for headaches, dizziness, seizures, weakness, numbness    PHYSICAL EXAM:      BP 99/67   Pulse 72   Temp 98.1 °F (36.7 °C) (Oral)   Resp 18   Wt 157 lb 10.1 oz (71.5 kg)   SpO2 94%   BMI 21.38 kg/m²    Temp (24hrs), Av.5 °F (35.8 °C), Min:95.6 °F (35.3 °C), Max:98.5 °F (36.9 °C)    General appearance - well appearing, no in pain or distress   Mental status - alert and cooperative   Eyes - pupils equal and reactive, extraocular eye movements intact   Ears - bilateral TM's and external ear canals normal   Mouth - mucous membranes moist, pharynx normal without lesions   Neck - supple, no significant adenopathy   Lymphatics - no palpable lymphadenopathy, no hepatosplenomegaly   Chest - clear to auscultation, no wheezes, rales or rhonchi, symmetric air entry   Heart - normal rate, regular rhythm, normal S1, S2, no murmurs  Abdomen - soft, nontender, nondistended, no masses or organomegaly   Neurological - alert, oriented, normal speech, no focal findings or movement disorder noted   Musculoskeletal - no joint tenderness, deformity or swelling   Extremities - peripheral pulses normal, no pedal edema, no clubbing or cyanosis   Skin - normal coloration and turgor, no rashes, no suspicious skin lesions noted ,    DATA:    Labs:   CBC:   Recent Labs     22  1729 22  0515   WBC 8.4 10.2   HGB 11.3* 10.2*   HCT 33.5* 30.2*    141     BMP:   Recent Labs     22  1729 22  0515   * 135   K 4.6 4.4   CO2 20 19*   BUN 18 22   CREATININE 0.99 0.94   LABGLOM >60 >60   GLUCOSE 269* 181*     PT/INR: No results for input(s): PROTIME, INR in the last 72 hours. IMAGING DATA:  XR CHEST PORTABLE   Final Result   No significant interval change. Right chest tubes in place with persistent   small right apical pneumothorax.          XR CHEST PORTABLE   Final Result   Interval right-sided thoracotomy with right-sided chest tubes in-situ. Small   right apical pneumothorax. Right hilar surgical clips related to recent   surgery. .         XR CHEST PORTABLE    (Results Pending)     CT chest 11/2/2021:  Impression   The previously biopsied, somewhat bilobed nodule involving right middle lobe   is slightly increased in size since the prior study now measuring 1.8 x 1.0   cm, once measuring 1.8 x 0.6 cm.  There does appear to be some spiculation. No new pulmonary nodule is seen.           Primary Problem  <principal problem not specified>    Active Hospital Problems    Diagnosis Date Noted    Pulmonary nodule [R91.1] 12/15/2021         IMPRESSION:   1. Right middle lobe lung nodule, status post wedge resection on 1/4/2022  2. History of tobacco abuse    RECOMMENDATIONS:  1. I reviewed [facility], imaging studies, prior records, possible diagnosis and treatment recommendations with patient  2. Awaiting surgical pathology results  3. Further recommendation based on the pathology  4. Patient will need restaging work-up including PET scan and MRI brain as outpatient  5. Continue postoperative care as per thoracic surgery  6. We will follow  7. Follow-up with medical oncology after discharge    Discussed with patient and Nurse. Thank you for asking us to see this patient. Ty Riggins MD  Hematologist/Medical Oncologist    Cell: [phone]    This note is created with the assistance of a speech recognition program.  While intending to generate a document that actually reflects the content of the visit, the document can still have some errors including those of syntax and sound a like substitutions which may escape proof reading. It such instances, actual meaning can be extrapolated by contextual diversion.

## 2022-01-05 NOTE — PROGRESS NOTES
[facility]  Daily Progress Note    Surgeon: Dr Marialuisa Dunn   S/P:  Right VATS with RML lobectomy  POD#: 1  EF: 60%      Subjective:  Mr. Lennie Tinoco feels well today with no acute complaints. Pain is moderately controlled on current medication regimen, will add neurontin. Chest tubes remain in place to wall suction with moderate output over night. Mild air leak noted with cough. Small right apical pneumothorax noted on morning CXR. Continue to monitor closely. OOBTC and ambulating. Last BMs prior to arrival. Bowel regimen in place. Decreased appetite. Denies chest pain or shortness of breath. Plan of care reviewed and questions answered. Physical Exam  Vital Signs: BP (!) 155/96   Pulse 87   Temp 98.3 °F (36.8 °C) (Oral)   Resp 19   Wt 157 lb 10.1 oz (71.5 kg)   SpO2 95%   BMI 21.38 kg/m²  O2 Flow Rate (L/min): 4 L/min   Admit Weight: Weight: 157 lb 10.1 oz (71.5 kg)   WEIGHTWeight: 157 lb 10.1 oz (71.5 kg)     General: alert and oriented to person, place and time, well-developed and well-nourished, in no acute distress. Up in chair  Heart: Normal S1 and S2.  Regular rhythm. No murmurs, gallops, or rubs. Lungs: clear to auscultation bilaterally and diminished breath sounds bibasilar. Chest tubes in place to strict wall suction  Abdomen: soft, non tender, non distended, BSx4  Extremities: no edema noted  Wounds: clean and dry, healing appropriately.       Scheduled Meds:    metoclopramide  5 mg Oral 4x Daily AC & HS    gabapentin  100 mg Oral TID    scopolamine  1 patch TransDERmal Once    lisinopril  20 mg Oral Daily    isosorbide mononitrate  30 mg Oral Daily    ferrous sulfate  325 mg Oral Daily    atorvastatin  20 mg Oral Daily    sodium chloride flush  10 mL IntraVENous 2 times per day    famotidine  20 mg Oral BID    Or    famotidine (PEPCID) injection  20 mg IntraVENous BID    polyethylene glycol  17 g Oral Daily    lidocaine  1 patch TransDERmal Daily    alogliptin  12.5 mg Oral BID WC    And    metFORMIN  1,000 mg Oral BID WC    glipiZIDE  5 mg Oral BID AC    insulin lispro  0-12 Units SubCUTAneous TID WC    insulin lispro  0-6 Units SubCUTAneous Nightly     Continuous Infusions:    sodium chloride      dextrose      dextrose      bupivacaine 0.5%         Data:  CBC:   Recent Labs     01/04/22  0853 01/04/22  1729 01/05/22  0515   WBC 6.9 8.4 10.2   HGB 12.9* 11.3* 10.2*   HCT 39.9* 33.5* 30.2*   MCV 90.1 88.9 88.0    148 141     BMP:   Recent Labs     01/04/22  0853 01/04/22  1717 01/04/22  1729 01/05/22  0515     --  132* 135   K 4.4  --  4.6 4.4     --  102 104   CO2 22  --  20 19*   BUN 17  --  18 22   CREATININE 1.07 1.16 0.99 0.94     PT/INR: No results for input(s): PROTIME, INR in the last 72 hours. APTT: No results for input(s): APTT in the last 72 hours. Chest X-Ray:   Image reviewed, report pending. I/O:  I/O last 3 completed shifts:   In: 1350 [I.V.:1100; IV Piggyback:250]  Out: 80 [Urine:415; Blood:15; Chest Tube:150]      Assessment:  Pulmonary nodule s/p Right VATS with RML lobectomy and lymph node dissection   POD 1, without complication  Increased in size, 1.8 x 1.0 cm from 1.8 x 0.6 cm  Bilobed, spiculated  S/p needle biopsy - negative  Metabolically active on PET with SUV 3.4  Possible lymph node involvement noted to right hilum, SUV 3.2  Mucopurulent chronic bronchitis  Multivessel CAD s/p stenting  Angina pectoris  Diabetes mellitus type 2 with long term use of insulin  Hypertension  Hyperlipidemia        Plan:   Remains inpatient on telemetry,   Monitor vitals closely including continuous pulse oximetry  Oxygen as needed via nasal cannula to maintain SpO2 > 92%  Chest x-ray daily  Maintain Chest tubes to continuous wall suction  Encourage incentive spirometry   Monitor CBC, BMP, INR and Mag daily  Home medications resumed  Oxycodone for pain control  Lovenox for DVT prophylaxis, SCDs while stationary   Pepcid for GI prophylaxis  Replace electrolytes as needed per sliding scale and recheck per policy  PT/OT evalutation for discharge recommendation and ambulation 3x daily  Case Management consult for discharge planning        The above recommendations including medications and orders were discussed and agreed upon with Dr. Arianna Jamison, the attending on service for the cardiothoracic surgery group today. Electronically signed by ALONDRA Amado CNP on 1/5/2022 at 7:14 AM    On this date 1/5/2022 I have spent 28 minutes reviewing previous notes, test results and face to face with the patient discussing the diagnosis and importance of compliance with the treatment plan as well as documenting on the day of the visit. At least 50% of the time documented was spent with the patient to provide counseling and/or coordination of care. This note was created with the assistance of a speech-recognition program.  Although the intention is to generate a document that actually reflects the content of the visit, no guarantees can be provided that every mistake has been identified and corrected by editing. Agree with the above  Continue chest tube to suction  Consult oncology  Note was updated later by me after  physical examination and  completion of the assessment.

## 2022-01-05 NOTE — CARE COORDINATION
Case Management Initial Discharge Plan  Krupa Suarez,             Met with:patient to discuss discharge plans. Information verified: address, contacts, phone number, , insurance Yes  Insurance Provider: Medicare    Emergency Contact/Next of Kin name & number: spouse Kerri Avery [phone]  Who are involved in patient's support system? Family    PCP: Arya Walker  Date of last visit: 1/3/22      Discharge Planning    Living Arrangements:  Spouse/Significant Other     Home has 2 stories  5 stairs to climb to get into front door, 1 flight stairs to climb to reach second floor  Location of bedroom/bathroom in home main level    Patient able to perform ADL's:Independent    Current Services (outpatient & in home)N/A  DME equipment: GLUCOMETER  DME provider: unknown    Is patient receiving oral anticoagulation therapy? No    If indicated:   Physician managing anticoagulation treatment: N/A  Where does patient obtain lab work for ATC treatment? N/A      Potential Assistance Needed:  Kenrick Moran    Patient agreeable to home care: Yes  Freedom of choice provided:  n/a    Prior SNF/Rehab Placement and Facility: N/A  Agreeable to SNF/Rehab: Yes  [place] of choice provided: yes,SNF list provided     Evaluation: n/a    Expected Discharge date:   unknown    Patient expects to be discharged to:   SNF    If home: is the family and/or caregiver wiling & able to provide support at home? yes  Who will be providing this support? family    Follow Up Appointment: Best Day/ Time:      Transportation provider: family  Transportation arrangements needed for discharge: No    Readmission Risk              Risk of Unplanned Readmission:  15             Does patient have a readmission risk score greater than 14?: Yes  If yes, follow-up appointment must be made within 7 days of discharge.      Goals of Care: to get stronger , pain control      Educated patient on transitional options, provided freedom of choice and are agreeable with plan      Discharge Plan: to go to SNF and get stronger and return home          Electronically signed by Richar Williamson RN on 1/5/22 at 11:06 AM EST

## 2022-01-05 NOTE — CARE COORDINATION
Transitional planning    Assessment completed, discussed d/c plan patient provided SNF list, states he will have transporation home, will continue to update.

## 2022-01-05 NOTE — OP NOTE
[address] [facility]                                OPERATIVE REPORT    PATIENT NAME: Tracy Alba                  :        1950  MED REC NO:   [id number]                             ROOM:       [id number]  ACCOUNT NO:   [de-identified]                           ADMIT DATE: 2022  PROVIDER:     Bronwyn Garrison MD    DATE OF PROCEDURE:  2022    PREOPERATIVE DIAGNOSIS:  Right middle lobe nodule. POSTOPERATIVE DIAGNOSIS:  Right middle lobe lung cancer. PROCEDURES:  1. Right VATS. 2.  Wedge resection. 3.  Conversion to right thoracotomy and right middle lobectomy  completion. 4.  Platelet gel. SURGEON:  Bronwyn Garrison MD    ASSISTANT:  Morgan Kovacs. Jose Tapia RN, RFA    EBL:  100 mL. SPECIMEN:  Right middle lobe. DRAINS:  Two 36-Wol[place] chest tubes. OPERATIVE PROCEDURE:  The patient had the risks, benefits, and  intentions fully discussed. The patient presented with an enlarging  right middle lobe nodule. Consent was obtained. He was taken back to  the operating room and placed in supine position. General anesthesia  was induced. At this time, a double-lumen endotracheal tube, A-line,  and Arce were placed. He was placed in the left lateral decubitus  position. All the appropriate padding and precautions were taken. Time-out was performed. Incisions were made for VATS. Three  Thoracoport were made; two between the sixth intercostal space and one  in the ninth intercostal space. Camera was inserted. At this time, the  nodule in the right middle lobe was identified. It was excised using an  [place] 45 stapler, sent down for pathology. A diagnosis of  adenocarcinoma was given. At this time, a mini-thoracotomy was made between the upper two  Thoracoport sites, continued in depth through the latissimus and  serratus muscles. The ribs were spread. The thorax was entered.   The  fissure was developed. Pulmonary veins were identified. The veins were  individually tied using 0 silk ties and hemoclips. The veins were  divided. Pulmonary artery was identified in the major fissure. At this  time, three branches were tied off to the middle lobe and divided. The  bronchus was then stapled using green 45 staple load. The fissure was  completed using 45 staplers. At this time, platelet gel and Progel were applied to the cut surfaces  of the lung and chest wall. Two 36-Iranian chest tubes were placed. It  should be noted that #9 lymph nodes and #7 lymph nodes were harvested. #4 lymph nodes were absent. All the lymph nodes were negative. At this time, closure was performed using #1 Vicryl sutures to  reapproximate the ribs, 0 Vicryl for the serratus and latissimus muscles  was used to close. The patient had the skin closed using 2-0 and 4-0  Vicryl running suture. Pain ball was placed, dual-lumen catheters; one  in the incision and one paraspinally. 4x4s and tape were applied to the  chest.    This procedure could not have been performed without the assistance of  Mikhail Juarez who was invaluable for operating the camera and also  for doing the thoracotomy and closing.         Mitra Null MD    D: 01/05/2022 10:56:34       T: 01/05/2022 10:59:17     [email or web address]  Job#: [id number]     Doc#: [id number]    CC:

## 2022-01-06 ENCOUNTER — APPOINTMENT (OUTPATIENT)
Dept: GENERAL RADIOLOGY | Age: 72
DRG: 164 | End: 2022-01-06
Attending: THORACIC SURGERY (CARDIOTHORACIC VASCULAR SURGERY)
Payer: MEDICARE

## 2022-01-06 LAB
ABSOLUTE EOS #: <0.03 K/UL (ref 0–0.44)
ABSOLUTE IMMATURE GRANULOCYTE: <0.03 K/UL (ref 0–0.3)
ABSOLUTE LYMPH #: 0.96 K/UL (ref 1.1–3.7)
ABSOLUTE MONO #: 1.15 K/UL (ref 0.1–1.2)
ANION GAP SERPL CALCULATED.3IONS-SCNC: 11 MMOL/L (ref 9–17)
BASOPHILS # BLD: 0 % (ref 0–2)
BASOPHILS ABSOLUTE: 0.03 K/UL (ref 0–0.2)
BUN BLDV-MCNC: 30 MG/DL (ref 8–23)
BUN/CREAT BLD: ABNORMAL (ref 9–20)
CALCIUM SERPL-MCNC: 8.2 MG/DL (ref 8.6–10.4)
CHLORIDE BLD-SCNC: 99 MMOL/L (ref 98–107)
CO2: 18 MMOL/L (ref 20–31)
CREAT SERPL-MCNC: 1.17 MG/DL (ref 0.7–1.2)
DIFFERENTIAL TYPE: ABNORMAL
EOSINOPHILS RELATIVE PERCENT: 0 % (ref 1–4)
GFR AFRICAN AMERICAN: >60 ML/MIN
GFR NON-AFRICAN AMERICAN: >60 ML/MIN
GFR SERPL CREATININE-BSD FRML MDRD: ABNORMAL ML/MIN/{1.73_M2}
GFR SERPL CREATININE-BSD FRML MDRD: ABNORMAL ML/MIN/{1.73_M2}
GLUCOSE BLD-MCNC: 125 MG/DL (ref 75–110)
GLUCOSE BLD-MCNC: 131 MG/DL (ref 70–99)
GLUCOSE BLD-MCNC: 133 MG/DL (ref 75–110)
GLUCOSE BLD-MCNC: 152 MG/DL (ref 75–110)
GLUCOSE BLD-MCNC: 163 MG/DL (ref 75–110)
HCT VFR BLD CALC: 29.8 % (ref 40.7–50.3)
HEMOGLOBIN: 9.7 G/DL (ref 13–17)
IMMATURE GRANULOCYTES: 0 %
LYMPHOCYTES # BLD: 13 % (ref 24–43)
MCH RBC QN AUTO: 29.2 PG (ref 25.2–33.5)
MCHC RBC AUTO-ENTMCNC: 32.6 G/DL (ref 28.4–34.8)
MCV RBC AUTO: 89.8 FL (ref 82.6–102.9)
MONOCYTES # BLD: 16 % (ref 3–12)
NRBC AUTOMATED: 0 PER 100 WBC
PDW BLD-RTO: 13.2 % (ref 11.8–14.4)
PLATELET # BLD: 160 K/UL (ref 138–453)
PLATELET ESTIMATE: ABNORMAL
PMV BLD AUTO: 11.8 FL (ref 8.1–13.5)
POTASSIUM SERPL-SCNC: 4.2 MMOL/L (ref 3.7–5.3)
RBC # BLD: 3.32 M/UL (ref 4.21–5.77)
RBC # BLD: ABNORMAL 10*6/UL
SEG NEUTROPHILS: 71 % (ref 36–65)
SEGMENTED NEUTROPHILS ABSOLUTE COUNT: 5.08 K/UL (ref 1.5–8.1)
SODIUM BLD-SCNC: 128 MMOL/L (ref 135–144)
SURGICAL PATHOLOGY REPORT: NORMAL
WBC # BLD: 7.2 K/UL (ref 3.5–11.3)
WBC # BLD: ABNORMAL 10*3/UL

## 2022-01-06 PROCEDURE — 6370000000 HC RX 637 (ALT 250 FOR IP): Performed by: NURSE PRACTITIONER

## 2022-01-06 PROCEDURE — 99233 SBSQ HOSP IP/OBS HIGH 50: CPT | Performed by: INTERNAL MEDICINE

## 2022-01-06 PROCEDURE — 80048 BASIC METABOLIC PNL TOTAL CA: CPT

## 2022-01-06 PROCEDURE — 71045 X-RAY EXAM CHEST 1 VIEW: CPT

## 2022-01-06 PROCEDURE — 99024 POSTOP FOLLOW-UP VISIT: CPT | Performed by: NURSE PRACTITIONER

## 2022-01-06 PROCEDURE — 82947 ASSAY GLUCOSE BLOOD QUANT: CPT

## 2022-01-06 PROCEDURE — 36415 COLL VENOUS BLD VENIPUNCTURE: CPT

## 2022-01-06 PROCEDURE — 6360000002 HC RX W HCPCS: Performed by: NURSE PRACTITIONER

## 2022-01-06 PROCEDURE — APPSS30 APP SPLIT SHARED TIME 16-30 MINUTES: Performed by: NURSE PRACTITIONER

## 2022-01-06 PROCEDURE — 85025 COMPLETE CBC W/AUTO DIFF WBC: CPT

## 2022-01-06 PROCEDURE — 2580000003 HC RX 258: Performed by: NURSE PRACTITIONER

## 2022-01-06 PROCEDURE — 2140000001 HC CVICU INTERMEDIATE R&B

## 2022-01-06 RX ADMIN — METOCLOPRAMIDE 5 MG: 5 TABLET ORAL at 09:17

## 2022-01-06 RX ADMIN — ACETAMINOPHEN 1000 MG: 500 TABLET ORAL at 21:54

## 2022-01-06 RX ADMIN — GABAPENTIN 100 MG: 100 CAPSULE ORAL at 09:13

## 2022-01-06 RX ADMIN — METFORMIN HYDROCHLORIDE 1000 MG: 500 TABLET ORAL at 08:58

## 2022-01-06 RX ADMIN — GLIPIZIDE 5 MG: 5 TABLET ORAL at 16:34

## 2022-01-06 RX ADMIN — ACETAMINOPHEN 1000 MG: 500 TABLET ORAL at 04:29

## 2022-01-06 RX ADMIN — GABAPENTIN 100 MG: 100 CAPSULE ORAL at 21:12

## 2022-01-06 RX ADMIN — METOCLOPRAMIDE 5 MG: 5 TABLET ORAL at 16:33

## 2022-01-06 RX ADMIN — INSULIN LISPRO 1 UNITS: 100 INJECTION, SOLUTION INTRAVENOUS; SUBCUTANEOUS at 21:24

## 2022-01-06 RX ADMIN — SODIUM CHLORIDE, PRESERVATIVE FREE 10 ML: 5 INJECTION INTRAVENOUS at 21:12

## 2022-01-06 RX ADMIN — GABAPENTIN 100 MG: 100 CAPSULE ORAL at 14:24

## 2022-01-06 RX ADMIN — MORPHINE SULFATE 2 MG: 2 INJECTION, SOLUTION INTRAMUSCULAR; INTRAVENOUS at 04:29

## 2022-01-06 RX ADMIN — ALOGLIPTIN 12.5 MG: 12.5 TABLET, FILM COATED ORAL at 08:57

## 2022-01-06 RX ADMIN — SODIUM CHLORIDE, PRESERVATIVE FREE 10 ML: 5 INJECTION INTRAVENOUS at 09:14

## 2022-01-06 RX ADMIN — BENZOCAINE AND MENTHOL 1 LOZENGE: 15; 3.6 LOZENGE ORAL at 09:09

## 2022-01-06 RX ADMIN — ACETAMINOPHEN 1000 MG: 500 TABLET ORAL at 00:37

## 2022-01-06 RX ADMIN — FAMOTIDINE 20 MG: 20 TABLET, FILM COATED ORAL at 09:13

## 2022-01-06 RX ADMIN — METFORMIN HYDROCHLORIDE 1000 MG: 500 TABLET ORAL at 16:33

## 2022-01-06 RX ADMIN — POLYETHYLENE GLYCOL 3350 17 G: 17 POWDER, FOR SOLUTION ORAL at 09:14

## 2022-01-06 RX ADMIN — INSULIN LISPRO 2 UNITS: 100 INJECTION, SOLUTION INTRAVENOUS; SUBCUTANEOUS at 17:00

## 2022-01-06 RX ADMIN — ISOSORBIDE MONONITRATE 30 MG: 30 TABLET ORAL at 09:13

## 2022-01-06 RX ADMIN — FAMOTIDINE 20 MG: 20 TABLET, FILM COATED ORAL at 21:11

## 2022-01-06 RX ADMIN — ONDANSETRON 4 MG: 2 INJECTION INTRAMUSCULAR; INTRAVENOUS at 09:08

## 2022-01-06 RX ADMIN — METOCLOPRAMIDE 5 MG: 5 TABLET ORAL at 21:12

## 2022-01-06 RX ADMIN — ATORVASTATIN CALCIUM 20 MG: 20 TABLET, FILM COATED ORAL at 09:19

## 2022-01-06 RX ADMIN — METOCLOPRAMIDE 5 MG: 5 TABLET ORAL at 11:54

## 2022-01-06 RX ADMIN — GLIPIZIDE 5 MG: 5 TABLET ORAL at 08:58

## 2022-01-06 RX ADMIN — FERROUS SULFATE TAB EC 325 MG (65 MG FE EQUIVALENT) 325 MG: 325 (65 FE) TABLET DELAYED RESPONSE at 09:13

## 2022-01-06 RX ADMIN — MORPHINE SULFATE 2 MG: 2 INJECTION, SOLUTION INTRAMUSCULAR; INTRAVENOUS at 13:57

## 2022-01-06 RX ADMIN — LISINOPRIL 20 MG: 20 TABLET ORAL at 09:12

## 2022-01-06 RX ADMIN — ACETAMINOPHEN 1000 MG: 500 TABLET ORAL at 16:51

## 2022-01-06 RX ADMIN — MORPHINE SULFATE 2 MG: 2 INJECTION, SOLUTION INTRAMUSCULAR; INTRAVENOUS at 09:08

## 2022-01-06 RX ADMIN — ALOGLIPTIN 12.5 MG: 12.5 TABLET, FILM COATED ORAL at 16:33

## 2022-01-06 ASSESSMENT — PAIN SCALES - GENERAL
PAINLEVEL_OUTOF10: 0
PAINLEVEL_OUTOF10: 0
PAINLEVEL_OUTOF10: 6
PAINLEVEL_OUTOF10: 5
PAINLEVEL_OUTOF10: 2
PAINLEVEL_OUTOF10: 1
PAINLEVEL_OUTOF10: 8
PAINLEVEL_OUTOF10: 2
PAINLEVEL_OUTOF10: 0
PAINLEVEL_OUTOF10: 9
PAINLEVEL_OUTOF10: 4
PAINLEVEL_OUTOF10: 7
PAINLEVEL_OUTOF10: 5

## 2022-01-06 ASSESSMENT — PAIN DESCRIPTION - PAIN TYPE
TYPE: ACUTE PAIN
TYPE: ACUTE PAIN

## 2022-01-06 ASSESSMENT — PAIN DESCRIPTION - FREQUENCY: FREQUENCY: CONTINUOUS

## 2022-01-06 ASSESSMENT — PAIN DESCRIPTION - ONSET: ONSET: ON-GOING

## 2022-01-06 ASSESSMENT — PAIN - FUNCTIONAL ASSESSMENT: PAIN_FUNCTIONAL_ASSESSMENT: ACTIVITIES ARE NOT PREVENTED

## 2022-01-06 ASSESSMENT — PAIN DESCRIPTION - ORIENTATION: ORIENTATION: RIGHT

## 2022-01-06 ASSESSMENT — PAIN DESCRIPTION - LOCATION: LOCATION: SHOULDER

## 2022-01-06 ASSESSMENT — PAIN DESCRIPTION - DESCRIPTORS: DESCRIPTORS: ACHING;SORE

## 2022-01-06 ASSESSMENT — PAIN DESCRIPTION - PROGRESSION: CLINICAL_PROGRESSION: NOT CHANGED

## 2022-01-06 NOTE — PROGRESS NOTES
[facility]  Daily Progress Note    Surgeon: Dr Margart Snellen   S/P:  Right VATS with RML lobectomy  POD#: 2    Subjective:  Mr. Drema Simmonds feels well today. Complains of a dry mouth and pain located in his back. The patient's pain is moderately controlled on current medication regimen. Denies chest pain or shortness of breath. Plan of care reviewed and questions answered. Physical Exam  Vital Signs: /73   Pulse 76   Temp 98.6 °F (37 °C)   Resp 20   Wt 157 lb (71.2 kg)   SpO2 95%   BMI 21.29 kg/m²  O2 Flow Rate (L/min): 4 L/min   Admit Weight: Weight: 157 lb 10.1 oz (71.5 kg)   WEIGHTWeight: 157 lb (71.2 kg)     General: alert and oriented to person, place and time, well-developed and well-nourished, in no acute distress. Heart: Normal S1 and S2.  Regular rhythm. No murmurs, gallops, or rubs. Lungs: clear to auscultation bilaterally and diminished breath sounds bibasilar. Chest tubes in place to strict wall suction. 1+ intermittent air leak noted  Abdomen: soft, non tender, non distended, BS x4  Extremities: no edema noted  Wounds: clean and dry, healing appropriately.       Scheduled Meds:    metoclopramide  5 mg Oral 4x Daily AC & HS    gabapentin  100 mg Oral TID    scopolamine  1 patch TransDERmal Once    lisinopril  20 mg Oral Daily    isosorbide mononitrate  30 mg Oral Daily    ferrous sulfate  325 mg Oral Daily    atorvastatin  20 mg Oral Daily    sodium chloride flush  10 mL IntraVENous 2 times per day    famotidine  20 mg Oral BID    Or    famotidine (PEPCID) injection  20 mg IntraVENous BID    polyethylene glycol  17 g Oral Daily    lidocaine  1 patch TransDERmal Daily    alogliptin  12.5 mg Oral BID WC    And    metFORMIN  1,000 mg Oral BID WC    glipiZIDE  5 mg Oral BID AC    insulin lispro  0-12 Units SubCUTAneous TID WC    insulin lispro  0-6 Units SubCUTAneous Nightly     Continuous Infusions:    sodium chloride      dextrose      dextrose  bupivacaine 0.5%         Data:  CBC:   Recent Labs     01/04/22  1729 01/05/22  0515 01/06/22  0433   WBC 8.4 10.2 7.2   HGB 11.3* 10.2* 9.7*   HCT 33.5* 30.2* 29.8*   MCV 88.9 88.0 89.8    141 160     BMP:   Recent Labs     01/04/22  1729 01/05/22  0515 01/06/22  0433   * 135 128*   K 4.6 4.4 4.2    104 99   CO2 20 19* 18*   BUN 18 22 30*   CREATININE 0.99 0.94 1.17     PT/INR: No results for input(s): PROTIME, INR in the last 72 hours. APTT: No results for input(s): APTT in the last 72 hours. Chest X-Ray:   Image reviewed, report pending. I/O:  I/O last 3 completed shifts: In: 7525 [P.O.:720; I.V.:939]  Out: 2020 [Urine:1450; Chest Tube:570]      Assessment:  1. Pulmonary nodule s/p Right VATS with RML lobectomy and lymph node dissection   1. POD 2, without complication  2. Increased in size, 1.8 x 1.0 cm from 1.8 x 0.6 cm  3. Bilobed, spiculated  4. S/p needle biopsy - negative  5. Metabolically active on PET with SUV 3.4  § Possible lymph node involvement noted to right hilum, SUV 3.2  2. Mucopurulent chronic bronchitis  3. Multivessel CAD s/p stenting  4. Angina pectoris  5. Diabetes mellitus type 2 with long term use of insulin  6. Hypertension  7. Hyperlipidemia    Plan:    Oxygen as needed via nasal cannula to maintain SpO2 > 92%  o Wean as tolerated   Chest x-ray daily   Keep Chest tubes to continuous wall suction   Encourage incentive spirometry    Continue pain medication as prescribed   Add mouth spray for dry mouth   Appreciate consultation's recommendations.  Replace electrolytes as needed per sliding scale and recheck per policy   PT/OT evalutation for discharge recommendation and ambulation 3x daily   Case Management consult for discharge planning    The above recommendations including medications and orders were discussed and agreed upon with Dr. Wil Marcus, the attending on service for the cardiothoracic surgery group today.      Electronically signed by Lise Pittman, APRN - CNP on 1/6/2022 at 7:40 AM    On this date 1/6/2022 I have spent 28 minutes reviewing previous notes, test results and face to face with the patient discussing the diagnosis and importance of compliance with the treatment plan as well as documenting on the day of the visit. At least 50% of the time documented was spent with the patient to provide counseling and/or coordination of care. This note was created with the assistance of a speech-recognition program.  Although the intention is to generate a document that actually reflects the content of the visit, no guarantees can be provided that every mistake has been identified and corrected by editing. Agree with the above  Chest x-ray daily  Waterseal chest tubes today  Toradol and Lidoderm patch for pain  Mylanta and Colace  PT to ambulate patient    Note was updated later by me after  physical examination and  completion of the assessment.

## 2022-01-06 NOTE — PROGRESS NOTES
Today's Date: 1/6/2022  Patient Name: Krupa Suarez  Date of admission: 1/4/2022  7:49 AM  Patient's age: 70 y.o., 1950  Admission Dx: Pulmonary nodule [R91.1]    Reason for Consult: cancer findings VATS  Requesting Physician: Judson Sheppard MD    CHIEF COMPLAINT:  No chief complaint on file. INTERVAL HISTORY:    Patient seen and examined  Discussed with family about pathology  Pt feeling better  His pathology showed adenocarcinoma but with visceral pleural involvement  No Fever chills    HISTORY OF PRESENT ILLNESS IN BRIEF:    Krupa Suarez is a 70 y.o. male who is admitted to the hospital on 1/4/2022  for VATS and wedge resection  Patient has history of tobacco smoking and patient has been following with pulmonology for her right middle lobe lung nodule and his recent scan on 11/2/2021 showed increase in the size of lung nodule. Patient has had biopsy on 7/28/2021 which showed benign parenchyma with chronic inflammation however this was very active on the PET scan. Patient underwent VATS, wedge resection of the right middle lobe lung mass on 1/4/2022. Surgical pathology is awaited. Oncology consult for further recommendations. Past Medical History:   has a past medical history of Abnormal cardiac cath, Actinic keratosis, Arthritis, CAD (coronary artery disease), Calculus of kidney, Cervical stenosis of spine, Diabetes mellitus ([address] Full dentures, GERD (gastroesophageal reflux disease), H/O echocardiogram, History of cardiac cath, History of echocardiogram, History of echocardiogram, History of heart attack, History of tobacco abuse, Hyperlipidemia, Hypertension, Pulmonary nodule, Raynaud's phenomenon, S/P angioplasty with stent, and Wears hearing aid in both ears. Past Surgical History:   has a past surgical history that includes Cardiac catheterization (2007); Hemorrhoid surgery;  Neck surgery (1997); other surgical history (10/2014); other surgical history (12/11/2014); Cataract removal (04/06/2015); Upper gastrointestinal endoscopy (12/28/2005); Cataract removal (06/08/2015); Colonoscopy (01/11/2006); Colonoscopy (05/23/2016); Cardiac catheterization (02/09/2017); Cardiac catheterization (02/09/2017); Cardiac catheterization (01/14/2019); Upper gastrointestinal endoscopy (N/A, 02/05/2019); Upper gastrointestinal endoscopy (N/A, 10/22/2019); Lung biopsy (Right, 07/28/2021); CT NEEDLE BIOPSY LUNG PERCUTANEOUS (7/28/2021); hernia repair (Left); and Lung removal, total (Right, 1/4/2022). Medications:    Prior to Admission medications    Medication Sig Start Date End Date Taking? Authorizing Provider   ferrous sulfate (IRON 325) 325 (65 Fe) MG tablet Take 325 mg by mouth daily     Historical Provider, MD   isosorbide mononitrate (IMDUR) 30 MG extended release tablet 1/2 tablet in the morning    Historical Provider, MD   lisinopril (PRINIVIL;ZESTRIL) 20 MG tablet Take 20 mg by mouth daily     Historical Provider, MD   nitroGLYCERIN (NITRODUR) 0.4 MG/HR 1 dose under tongue as needed for chest pain.  max of 3 in one day    Historical Provider, MD   pantoprazole (PROTONIX) 40 MG injection as needed     Historical Provider, MD   amLODIPine (NORVASC) 5 MG tablet Take 1 tablet by mouth daily 7/15/21   Valdo Pham MD   clopidogrel (PLAVIX) 75 MG tablet Take 1 tablet by mouth daily  Patient taking differently: Take 75 mg by mouth daily Ordered by heart doctor, Chan Stephenson 7/15/21   Valdo Pham MD   atenolol (TENORMIN) 25 MG tablet Take 1 tablet by mouth daily 6/7/21   Valdo Pham MD   Insulin Glargine, 2 Unit Dial, 300 UNIT/ML SOPN Insulin Glargine Insulin Glargine U-300 Conc Active 10 UNIT Subcutaneous Every morning May 7th, 2020 11:47am 05-  [facility] ([id number]) 5/7/20   Historical Provider, MD   atorvastatin (LIPITOR) 20 MG tablet Take 1 tablet by mouth daily 12/15/20   Valdo Pham MD   sitaGLIPtan-metformin (JANUMET)  MG per tablet Take 1 tablet by mouth 2 times daily (with meals)    Historical Provider, MD   glimepiride (AMARYL) 2 MG tablet Take 1 tablet by mouth 2 times daily 5/22/15   Severiano Fellows, MD   aspirin 81 MG tablet Take 81 mg by mouth daily Ordered by heart doctor, Dr. Kathy Cuenca [place]    Historical Provider, MD     Current Facility-Administered Medications   Medication Dose Route Frequency Provider Last Rate Last Admin    glycerin moisturizing mouth spray (OASIS) 35 % 2 spray  2 spray Mouth/Throat PRN ALONDRA Morales CNP        benzocaine-menthol (CEPACOL SORE THROAT) lozenge 1 lozenge  1 lozenge Oral Q2H PRN ALONDRA Gilliland NP   1 lozenge at 01/06/22 0909    ondansetron (ZOFRAN) injection 4 mg  4 mg IntraVENous Q6H PRN ALONDRA Gilliland - NP   4 mg at 01/06/22 0908    metoclopramide (REGLAN) tablet 5 mg  5 mg Oral 4x Daily AC & HS ALONDRA Winters CNP   5 mg at 01/06/22 1154    gabapentin (NEURONTIN) capsule 100 mg  100 mg Oral TID ALONDRA Winters CNP   100 mg at 01/06/22 0913    scopolamine (TRANSDERM-SCOP) transdermal patch 1 patch  1 patch TransDERmal Once Sameer Moody MD   1 patch at 01/04/22 0923    lisinopril (PRINIVIL;ZESTRIL) tablet 20 mg  20 mg Oral Daily ALONDRA Gilliland NP   20 mg at 01/06/22 0912    isosorbide mononitrate (IMDUR) extended release tablet 30 mg  30 mg Oral Daily ALONDRA Gilliland - NP   30 mg at 01/06/22 0913    ferrous sulfate (FE TABS 325) EC tablet 325 mg  325 mg Oral Daily ALONDRA Gilliland - NP   325 mg at 01/06/22 0913    atorvastatin (LIPITOR) tablet 20 mg  20 mg Oral Daily ALONDRA Gilliland - NP   20 mg at 01/06/22 0919    sodium chloride flush 0.9 % injection 10 mL  10 mL IntraVENous 2 times per day ALONDRA Gilliland - NP   10 mL at 01/06/22 0914    sodium chloride flush 0.9 % injection 10 mL  10 mL IntraVENous PRN ALONDRA Gilliland NP        0.9 % sodium chloride infusion  25 mL IntraVENous PRN Margueritte Balls, APRN - NP        famotidine (PEPCID) tablet 20 mg  20 mg Oral BID Margueritte Balls, APRN - NP   20 mg at 01/06/22 0913    Or    famotidine (PEPCID) injection 20 mg  20 mg IntraVENous BID Margueritte Balls, APRN - NP        acetaminophen (TYLENOL) tablet 1,000 mg  1,000 mg Oral Q4H PRN Margueritte Balls, APRN - NP   1,000 mg at 01/06/22 0429    oxyCODONE (ROXICODONE) immediate release tablet 5 mg  5 mg Oral Q4H PRN Margueritte Balls, APRN - NP        Or   Larance Malheur oxyCODONE (ROXICODONE) immediate release tablet 10 mg  10 mg Oral Q4H PRN Margueritte Balls, APRN - NP   10 mg at 01/05/22 0506    morphine (PF) injection 2 mg  2 mg IntraVENous Q3H PRN Margueritte Balls, APRN - NP   2 mg at 01/06/22 0908    polyethylene glycol (GLYCOLAX) packet 17 g  17 g Oral Daily Margueritte Balls, APRN - NP   17 g at 01/06/22 0914    magnesium hydroxide (MILK OF MAGNESIA) 400 MG/5ML suspension 30 mL  30 mL Oral Daily PRN Margueritte Balls, APRN - NP        bisacodyl (DULCOLAX) suppository 10 mg  10 mg Rectal Daily PRN Margueritte Balls, APRN - NP        glucose (GLUTOSE) 40 % oral gel 15 g  15 g Oral PRN Margueritte Balls, APRN - NP        dextrose 50 % IV solution  12.5 g IntraVENous PRN Margueritte Balls, APRN - NP        glucagon (rDNA) injection 1 mg  1 mg IntraMUSCular PRN Margueritte Balls, APRN - NP        dextrose 5 % solution  100 mL/hr IntraVENous PRN Margueritte Balls, APRN - NP        glucose (GLUTOSE) 40 % oral gel 15 g  15 g Oral PRN Margueritte Balls, APRN - NP        dextrose 50 % IV solution  12.5 g IntraVENous PRN Margueritte Balls, APRN - NP        glucagon (rDNA) injection 1 mg  1 mg IntraMUSCular PRN Margueritte Balls, APRN - NP        dextrose 5 % solution  100 mL/hr IntraVENous PRN ALONDRA Martin - NP        bupivacaine 0.5% (MARCAINE) elastomeric infusion 400 mL  400 mL Infiltration Continuous Yanique Crane MD   400 mL at 01/04/22 1647    lidocaine 4 % external patch 1 patch  1 patch TransDERmal Daily Harvin Dakins, APRN - NP   1 patch at 01/06/22 0914    alogliptin (NESINA) tablet 12.5 mg  12.5 mg Oral BID  Harvin Dakins, APRN - NP   12.5 mg at 01/06/22 0857    And    metFORMIN (GLUCOPHAGE) tablet 1,000 mg  1,000 mg Oral BID  Harvin Dakins, APRN - NP   1,000 mg at 01/06/22 0858    glipiZIDE (GLUCOTROL) tablet 5 mg  5 mg Oral BID  Harvin Dakins, APRN - NP   5 mg at 01/06/22 0858    insulin lispro (HUMALOG) injection vial 0-12 Units  0-12 Units SubCUTAneous TID  Harvin Dakins, APRN - NP   2 Units at 01/05/22 1202    insulin lispro (HUMALOG) injection vial 0-6 Units  0-6 Units SubCUTAneous Nightly Lashaun Dakins, APRN - NP   2 Units at 01/04/22 2046       Allergies:  Niacin and related, Minocycline hcl, Other, and Oxycodone hcl    Social History:   reports that he quit smoking about 3 months ago. His smoking use included cigarettes. He has a 13.50 pack-year smoking history. He has never used smokeless tobacco. He reports that he does not drink alcohol and does not use drugs. Family History: family history includes Alzheimer's Disease in his father; Cancer in his brother; Diabetes in his father and mother; Heart Disease in his brother, brother, brother, mother, and sister; High Blood Pressure in his mother. REVIEW OF SYSTEMS:    Constitutional: No fever or chills. No night sweats, no weight loss   Eyes: No eye discharge, double vision, or eye pain   HEENT: negative for sore mouth, sore throat, hoarseness and voice change   Respiratory: negative for cough , sputum, dyspnea, wheezing, hemoptysis, chest pain   Cardiovascular: negative for chest pain, dyspnea, palpitations, orthopnea, PND   Gastrointestinal: negative for nausea, vomiting, diarrhea, constipation, abdominal pain, Dysphagia, hematemesis and hematochezia   Genitourinary: negative for frequency, dysuria, nocturia, urinary incontinence, and hematuria   Integument: negative for rash, skin lesions, bruises. Hematologic/Lymphatic: negative for easy bruising, bleeding, lymphadenopathy, or petechiae   Endocrine: negative for heat or cold intolerance,weight changes, change in bowel habits and hair loss   Musculoskeletal: negative for myalgias, arthralgias, pain, joint swelling,and bone pain   Neurological: negative for headaches, dizziness, seizures, weakness, numbness    PHYSICAL EXAM:      /65   Pulse 100   Temp 98 °F (36.7 °C) (Oral)   Resp 17   Wt 157 lb (71.2 kg)   SpO2 94%   BMI 21.29 kg/m²    Temp (24hrs), Av.1 °F (36.7 °C), Min:97.5 °F (36.4 °C), Max:98.6 °F (37 °C)    General appearance - well appearing, no in pain or distress   Mental status - alert and cooperative   Eyes - pupils equal and reactive, extraocular eye movements intact   Ears - bilateral TM's and external ear canals normal   Mouth - mucous membranes moist, pharynx normal without lesions   Neck - supple, no significant adenopathy   Lymphatics - no palpable lymphadenopathy, no hepatosplenomegaly   Chest - clear to auscultation, no wheezes, rales or rhonchi, symmetric air entry   Heart - normal rate, regular rhythm, normal S1, S2, no murmurs  Abdomen - soft, nontender, nondistended, no masses or organomegaly   Neurological - alert, oriented, normal speech, no focal findings or movement disorder noted   Musculoskeletal - no joint tenderness, deformity or swelling   Extremities - peripheral pulses normal, no pedal edema, no clubbing or cyanosis   Skin - normal coloration and turgor, no rashes, no suspicious skin lesions noted ,    DATA:    Labs:   CBC:   Recent Labs     22  0515 22  0433   WBC 10.2 7.2   HGB 10.2* 9.7*   HCT 30.2* 29.8*    160     BMP:   Recent Labs     22  0515 22  0433    128*   K 4.4 4.2   CO2 19* 18*   BUN 22 30*   CREATININE 0.94 1.17   LABGLOM >60 >60   GLUCOSE 181* 131*     PT/INR: No results for input(s): PROTIME, INR in the last 72 hours.     Surgical Pathology Report  SURGICAL PATHOLOGY REPORT  Collected: 01/04/22 1423   Lab status: Final   Resulting lab: [facility]   Value: -- Diagnosis --     1.  Right lung, middle lobe wedge biopsy:     -  Adenocarcinoma. -  Tumor is 1.8 cm. -  Carcinoma invades into but not through the visceral pleura. 2.  Lymph node #9, biopsy:     -  Negative for metastatic carcinoma. 3.  Lymph node #7, biopsy:     -  Negative for metastatic carcinoma. 4.  Right middle lobe lung, lobectomy:     -  Margins are free of involvement by carcinoma. -  Peribronchial lymph node, negative for metastatic carcinoma. -  Nonneoplastic lung shows emphysematous change.       Darlyn Juarez M.D.   **Electronically Signed Out**         rdd/1/6/2022         Clinical Information   Pre-op Diagnosis:  RIGHT MIDDLE LOBE NODULE   Operative Findings:  MIDDLE LOBE OF RIGHT LUNG; LYMPH NODE #9 OF LUNG;   LYMPH NODE #7; RIGHT MIDDLE LOBE   Operation Performed:  MIDDLE WEDGE RESECTION, POSSIBLE MIDDLE   LOBECTOMY VIA THORACOTOMY     Source of Specimen   1: MIDDLE LOBE OF RIGHT LUNG (A)   2: LYMPH NODE #9 OF LUNG (B)   3: LYMPH NODE #7   4: RIGHT MIDDLE LOBE     Gross Description   1.  \"ANA SUSAN, MIDDLE LOBE OF RIGHT LUNG\" Received fresh is a   6.4 x 4.2 x 2.8 cm lung wedge with staple line.  The pleura is   pink-tan with an area of slightly thickening (inked black). Sectioning reveals a 1.8 x 1.2 x 0.8 cm subpleural tan-white mass   lesion that is 0.8 cm from the staple line margin.  The remaining   parenchyma is pink-tan with no other lesions.  1TP, 1DQ, 1FS, Cassette   summary:  \"A\" frozen section lesion, \"B\" remainder of lesion with   pleura inked black, \"C\" staple line margin en face. 2.  \"ANA SUSAN, LYMPH NODE #9 OF LUNG\" Received fresh are two   fragments, 0.3 cm each.  1TP, 1FS, 1cs.    3.  \"ANA SUSAN, LYMPH NODE #7\" Received fresh are two   anthracotic fragments, 0.6 and 0.7 cm in greatest dimension.  1TP, 1FS, 1cs.     4.  \"ANA DAVIS, RIGHT MIDDLE LOBE\" Received fresh is a 50 gram,   8.5 x 6.8 x 3.5 cm lobe of lung with multiple staple lines.  The   pleura is wrinkled purple-gray.  Sectioning reveals spongy dark red   parenchyma with no masses.  No markedly enlarged nodes are identified   at the hilum.  Cassette summary:  \"A\" bronchial margin frozen section   cassette resubmitted as received, \"B\" hilar soft tissue and vascular   margin, \"C\" uninvolved lung (along staple line).  tm     Intraoperative Diagnosis   1.  FSDX:  Non-small cell carcinoma, favor adenocarcinoma.  (14:00,   RDD)   2.  FSDX:  Fibrofatty tissue.  (14:11, RDD)   3.  FSDX:  Lymph node, negative for carcinoma.  (14:19, RDD)   4.  FSDX:  Bronchial margin, negative for carcinoma.  (16:17, RDD)     Microscopic Description   1-4.  Microscopic examination performed. PROCEDURE: Wedge biopsy, followed by lobectomy       SPECIMEN LATERALITY: Right   TUMOR FOCALITY: Single tumor       TUMOR SITE: Right middle lobe   TUMOR SIZE --     1.8 x 1.2 x 0.8 cm           HISTOLOGIC TYPE: Adenocarcinoma. Sections show a large and anaplastic non-small cell malignancy with   cohesion and a few instances of glandular differentiation.  Neoplastic   cells are large with copious eosinophilic cytoplasm and large   vesicular nuclei.  Tumor is characterized further utilizing control   appropriate immunostains.  Cells of carcinoma are negative for p40 and   they are positive for TTF-1 and CK7.  Morphology and immunophenotype   are that of an adenocarcinoma, lung primary.    Note: The case is reviewed by a second Pathologist for quality   assurance with consensus (DS).       HISTOLOGIC GRADE: Grade 2 (of 4)           VISCERAL PLEURA INVASION:Reticulin stain with appropriately reactive   control is utilized to evaluate pleura.  Tumor extends beyond the   elastic layer into the pleura but not to the pleural surface (PL1)     DIRECT INVASION OF ADJACENT STRUCTURES: No TREATMENT EFFECT: None apparent. LYMPHOVASCULAR INVASION: Absent                         MARGINS --   -BRONCHIAL: Free of tumor       -VASCULAR: Free of tumor       -PARENCHYMAL: Free of tumor       -OTHER ATTACHED TISSUE MARGIN (IF APPLICABLE): N/A       REGIONAL LYMPH NODES: 3   LYMPH NODES(S) FROM PRIOR PROCEDURES: No       NUMBER AND STATION(S) EXAMINED:   See diagnoses   NUMBER AND STATION(S) WITH METASTASIS: 0         DISTANT METASTASIS--   DISTANT SITE(S) INVOLVED, IF APPLICABLE: N/A              IMAGING DATA:  XR CHEST PORTABLE   Final Result   No significant change in small right apical pneumothorax with chest tubes in   place. XR CHEST PORTABLE   Final Result   No significant interval change. Right chest tubes in place with persistent   small right apical pneumothorax. XR CHEST PORTABLE   Final Result   Interval right-sided thoracotomy with right-sided chest tubes in-situ. Small   right apical pneumothorax. Right hilar surgical clips related to recent   surgery. .         XR CHEST PORTABLE    (Results Pending)     CT chest 11/2/2021:  Impression   The previously biopsied, somewhat bilobed nodule involving right middle lobe   is slightly increased in size since the prior study now measuring 1.8 x 1.0   cm, once measuring 1.8 x 0.6 cm.  There does appear to be some spiculation. No new pulmonary nodule is seen.           Primary Problem  <principal problem not specified>    Active Hospital Problems    Diagnosis Date Noted    Pulmonary nodule [R91.1] 12/15/2021       IMPRESSION:   1. Right middle lobe lung adenocarcinoma, status post wedge resection on 1/4/2022. Surgical pathology showing [id number] stage IB, with high risk feature of visceral pleura involvement  2. History of tobacco abuse    RECOMMENDATIONS:  1. I reviewed [facility], imaging studies, prior records, possible diagnosis and treatment recommendations with patient  2. discussed with family pathology results  3.  I reviewed the surgical pathology results with patient and family. 4. I reviewed the NCCN guidelines and goals of care with patient and family  5. As he has visceral pleural involvement which is a high risk feature, adjuvant systemic chemotherapy be recommended  6. Continue postoperative care as per thoracic surgery  7. We will follow  8. Follow-up with medical oncology at [address] after discharge    Discussed with patient and Nurse. Thank you for asking us to see this patient. Ty Veras MD  Hematologist/Medical Oncologist    Cell: [phone]    This note is created with the assistance of a speech recognition program.  While intending to generate a document that actually reflects the content of the visit, the document can still have some errors including those of syntax and sound a like substitutions which may escape proof reading. It such instances, actual meaning can be extrapolated by contextual diversion.

## 2022-01-07 ENCOUNTER — APPOINTMENT (OUTPATIENT)
Dept: CT IMAGING | Age: 72
DRG: 164 | End: 2022-01-07
Attending: THORACIC SURGERY (CARDIOTHORACIC VASCULAR SURGERY)
Payer: MEDICARE

## 2022-01-07 ENCOUNTER — APPOINTMENT (OUTPATIENT)
Dept: GENERAL RADIOLOGY | Age: 72
DRG: 164 | End: 2022-01-07
Attending: THORACIC SURGERY (CARDIOTHORACIC VASCULAR SURGERY)
Payer: MEDICARE

## 2022-01-07 LAB
ABSOLUTE EOS #: 0.14 K/UL (ref 0–0.44)
ABSOLUTE IMMATURE GRANULOCYTE: 0.04 K/UL (ref 0–0.3)
ABSOLUTE LYMPH #: 1.35 K/UL (ref 1.1–3.7)
ABSOLUTE MONO #: 1.22 K/UL (ref 0.1–1.2)
ANION GAP SERPL CALCULATED.3IONS-SCNC: 13 MMOL/L (ref 9–17)
BASOPHILS # BLD: 1 % (ref 0–2)
BASOPHILS ABSOLUTE: 0.05 K/UL (ref 0–0.2)
BILIRUBIN URINE: NEGATIVE
BUN BLDV-MCNC: 23 MG/DL (ref 8–23)
BUN/CREAT BLD: ABNORMAL (ref 9–20)
CALCIUM SERPL-MCNC: 8.6 MG/DL (ref 8.6–10.4)
CHLORIDE BLD-SCNC: 96 MMOL/L (ref 98–107)
CO2: 19 MMOL/L (ref 20–31)
COLOR: YELLOW
COMMENT UA: NORMAL
CREAT SERPL-MCNC: 1.06 MG/DL (ref 0.7–1.2)
DIFFERENTIAL TYPE: ABNORMAL
EOSINOPHILS RELATIVE PERCENT: 2 % (ref 1–4)
GFR AFRICAN AMERICAN: >60 ML/MIN
GFR NON-AFRICAN AMERICAN: >60 ML/MIN
GFR SERPL CREATININE-BSD FRML MDRD: ABNORMAL ML/MIN/{1.73_M2}
GFR SERPL CREATININE-BSD FRML MDRD: ABNORMAL ML/MIN/{1.73_M2}
GLUCOSE BLD-MCNC: 102 MG/DL (ref 70–99)
GLUCOSE BLD-MCNC: 107 MG/DL (ref 75–110)
GLUCOSE BLD-MCNC: 116 MG/DL (ref 75–110)
GLUCOSE BLD-MCNC: 129 MG/DL (ref 75–110)
GLUCOSE BLD-MCNC: 133 MG/DL (ref 75–110)
GLUCOSE URINE: NEGATIVE
HCT VFR BLD CALC: 29.2 % (ref 40.7–50.3)
HEMOGLOBIN: 9.6 G/DL (ref 13–17)
IMMATURE GRANULOCYTES: 1 %
KETONES, URINE: NEGATIVE
LEUKOCYTE ESTERASE, URINE: NEGATIVE
LYMPHOCYTES # BLD: 18 % (ref 24–43)
MCH RBC QN AUTO: 28.7 PG (ref 25.2–33.5)
MCHC RBC AUTO-ENTMCNC: 32.9 G/DL (ref 28.4–34.8)
MCV RBC AUTO: 87.4 FL (ref 82.6–102.9)
MONOCYTES # BLD: 16 % (ref 3–12)
NITRITE, URINE: NEGATIVE
NRBC AUTOMATED: 0 PER 100 WBC
PDW BLD-RTO: 13.1 % (ref 11.8–14.4)
PH UA: 6.5 (ref 5–8)
PLATELET # BLD: 165 K/UL (ref 138–453)
PLATELET ESTIMATE: ABNORMAL
PMV BLD AUTO: 11.3 FL (ref 8.1–13.5)
POTASSIUM SERPL-SCNC: 4 MMOL/L (ref 3.7–5.3)
PROTEIN UA: NEGATIVE
RBC # BLD: 3.34 M/UL (ref 4.21–5.77)
RBC # BLD: ABNORMAL 10*6/UL
SEG NEUTROPHILS: 62 % (ref 36–65)
SEGMENTED NEUTROPHILS ABSOLUTE COUNT: 4.62 K/UL (ref 1.5–8.1)
SODIUM BLD-SCNC: 128 MMOL/L (ref 135–144)
SPECIFIC GRAVITY UA: 1.01 (ref 1–1.03)
TURBIDITY: CLEAR
URINE HGB: NEGATIVE
UROBILINOGEN, URINE: NORMAL
WBC # BLD: 7.4 K/UL (ref 3.5–11.3)
WBC # BLD: ABNORMAL 10*3/UL

## 2022-01-07 PROCEDURE — 80048 BASIC METABOLIC PNL TOTAL CA: CPT

## 2022-01-07 PROCEDURE — 82947 ASSAY GLUCOSE BLOOD QUANT: CPT

## 2022-01-07 PROCEDURE — 6370000000 HC RX 637 (ALT 250 FOR IP): Performed by: NURSE PRACTITIONER

## 2022-01-07 PROCEDURE — 6360000002 HC RX W HCPCS: Performed by: PHYSICIAN ASSISTANT

## 2022-01-07 PROCEDURE — 2500000003 HC RX 250 WO HCPCS: Performed by: NURSE PRACTITIONER

## 2022-01-07 PROCEDURE — 36415 COLL VENOUS BLD VENIPUNCTURE: CPT

## 2022-01-07 PROCEDURE — 99232 SBSQ HOSP IP/OBS MODERATE 35: CPT | Performed by: INTERNAL MEDICINE

## 2022-01-07 PROCEDURE — 6360000002 HC RX W HCPCS: Performed by: NURSE PRACTITIONER

## 2022-01-07 PROCEDURE — 2500000003 HC RX 250 WO HCPCS: Performed by: PHYSICIAN ASSISTANT

## 2022-01-07 PROCEDURE — C9113 INJ PANTOPRAZOLE SODIUM, VIA: HCPCS | Performed by: PHYSICIAN ASSISTANT

## 2022-01-07 PROCEDURE — 81003 URINALYSIS AUTO W/O SCOPE: CPT

## 2022-01-07 PROCEDURE — 2140000001 HC CVICU INTERMEDIATE R&B

## 2022-01-07 PROCEDURE — 74176 CT ABD & PELVIS W/O CONTRAST: CPT

## 2022-01-07 PROCEDURE — 99024 POSTOP FOLLOW-UP VISIT: CPT | Performed by: NURSE PRACTITIONER

## 2022-01-07 PROCEDURE — 2580000003 HC RX 258: Performed by: PHYSICIAN ASSISTANT

## 2022-01-07 PROCEDURE — 2580000003 HC RX 258: Performed by: NURSE PRACTITIONER

## 2022-01-07 PROCEDURE — 85025 COMPLETE CBC W/AUTO DIFF WBC: CPT

## 2022-01-07 PROCEDURE — 71045 X-RAY EXAM CHEST 1 VIEW: CPT

## 2022-01-07 RX ORDER — SODIUM CHLORIDE 9 MG/ML
10 INJECTION INTRAVENOUS DAILY
Status: DISCONTINUED | OUTPATIENT
Start: 2022-01-07 | End: 2022-01-13

## 2022-01-07 RX ORDER — DEXMEDETOMIDINE HYDROCHLORIDE 4 UG/ML
.2-1.4 INJECTION, SOLUTION INTRAVENOUS CONTINUOUS PRN
Status: DISCONTINUED | OUTPATIENT
Start: 2022-01-07 | End: 2022-01-14

## 2022-01-07 RX ORDER — SODIUM CHLORIDE 9 MG/ML
25 INJECTION, SOLUTION INTRAVENOUS CONTINUOUS
Status: DISCONTINUED | OUTPATIENT
Start: 2022-01-07 | End: 2022-01-07

## 2022-01-07 RX ORDER — TAMSULOSIN HYDROCHLORIDE 0.4 MG/1
0.4 CAPSULE ORAL DAILY
COMMUNITY

## 2022-01-07 RX ORDER — SODIUM CHLORIDE 9 MG/ML
INJECTION, SOLUTION INTRAVENOUS CONTINUOUS
Status: DISCONTINUED | OUTPATIENT
Start: 2022-01-08 | End: 2022-01-10

## 2022-01-07 RX ORDER — LEVOFLOXACIN 5 MG/ML
500 INJECTION, SOLUTION INTRAVENOUS EVERY 24 HOURS
Status: DISCONTINUED | OUTPATIENT
Start: 2022-01-07 | End: 2022-01-07

## 2022-01-07 RX ORDER — OXYCODONE HYDROCHLORIDE 5 MG/1
5 TABLET ORAL EVERY 4 HOURS PRN
Qty: 42 TABLET | Refills: 0 | Status: SHIPPED | OUTPATIENT
Start: 2022-01-07 | End: 2022-01-15

## 2022-01-07 RX ORDER — HALOPERIDOL 5 MG/ML
5 INJECTION INTRAMUSCULAR ONCE
Status: COMPLETED | OUTPATIENT
Start: 2022-01-07 | End: 2022-01-07

## 2022-01-07 RX ORDER — QUETIAPINE FUMARATE 25 MG/1
25 TABLET, FILM COATED ORAL NIGHTLY
Status: DISCONTINUED | OUTPATIENT
Start: 2022-01-07 | End: 2022-01-15 | Stop reason: HOSPADM

## 2022-01-07 RX ORDER — TIZANIDINE HYDROCHLORIDE 4 MG/1
4 CAPSULE, GELATIN COATED ORAL 3 TIMES DAILY PRN
Status: ON HOLD | COMMUNITY
End: 2022-09-29 | Stop reason: HOSPADM

## 2022-01-07 RX ORDER — PANTOPRAZOLE SODIUM 40 MG/10ML
40 INJECTION, POWDER, LYOPHILIZED, FOR SOLUTION INTRAVENOUS DAILY
Status: DISCONTINUED | OUTPATIENT
Start: 2022-01-07 | End: 2022-01-13

## 2022-01-07 RX ORDER — CHOLECALCIFEROL (VITAMIN D3) 125 MCG
5 CAPSULE ORAL NIGHTLY
Status: DISCONTINUED | OUTPATIENT
Start: 2022-01-07 | End: 2022-01-15 | Stop reason: HOSPADM

## 2022-01-07 RX ADMIN — LISINOPRIL 20 MG: 20 TABLET ORAL at 10:53

## 2022-01-07 RX ADMIN — METOCLOPRAMIDE 5 MG: 5 TABLET ORAL at 10:45

## 2022-01-07 RX ADMIN — DEXMEDETOMIDINE HYDROCHLORIDE 0.1 MCG/KG/HR: 4 INJECTION, SOLUTION INTRAVENOUS at 23:21

## 2022-01-07 RX ADMIN — FERROUS SULFATE TAB EC 325 MG (65 MG FE EQUIVALENT) 325 MG: 325 (65 FE) TABLET DELAYED RESPONSE at 10:46

## 2022-01-07 RX ADMIN — PANTOPRAZOLE SODIUM 40 MG: 40 INJECTION, POWDER, FOR SOLUTION INTRAVENOUS at 20:06

## 2022-01-07 RX ADMIN — SODIUM CHLORIDE, PRESERVATIVE FREE 10 ML: 5 INJECTION INTRAVENOUS at 18:59

## 2022-01-07 RX ADMIN — GLIPIZIDE 5 MG: 5 TABLET ORAL at 10:45

## 2022-01-07 RX ADMIN — GABAPENTIN 100 MG: 100 CAPSULE ORAL at 10:45

## 2022-01-07 RX ADMIN — FAMOTIDINE 20 MG: 10 INJECTION INTRAVENOUS at 10:46

## 2022-01-07 RX ADMIN — METFORMIN HYDROCHLORIDE 1000 MG: 500 TABLET ORAL at 10:46

## 2022-01-07 RX ADMIN — MORPHINE SULFATE 2 MG: 2 INJECTION, SOLUTION INTRAMUSCULAR; INTRAVENOUS at 15:49

## 2022-01-07 RX ADMIN — ONDANSETRON 4 MG: 2 INJECTION INTRAMUSCULAR; INTRAVENOUS at 15:49

## 2022-01-07 RX ADMIN — ATORVASTATIN CALCIUM 20 MG: 20 TABLET, FILM COATED ORAL at 10:44

## 2022-01-07 RX ADMIN — HALOPERIDOL LACTATE 5 MG: 5 INJECTION, SOLUTION INTRAMUSCULAR at 22:20

## 2022-01-07 RX ADMIN — Medication 2 SPRAY: at 11:00

## 2022-01-07 RX ADMIN — MORPHINE SULFATE 2 MG: 2 INJECTION, SOLUTION INTRAMUSCULAR; INTRAVENOUS at 02:22

## 2022-01-07 RX ADMIN — GABAPENTIN 100 MG: 100 CAPSULE ORAL at 14:12

## 2022-01-07 RX ADMIN — ONDANSETRON 4 MG: 2 INJECTION INTRAMUSCULAR; INTRAVENOUS at 02:21

## 2022-01-07 RX ADMIN — SODIUM CHLORIDE, PRESERVATIVE FREE 10 ML: 5 INJECTION INTRAVENOUS at 10:28

## 2022-01-07 RX ADMIN — ALOGLIPTIN 12.5 MG: 12.5 TABLET, FILM COATED ORAL at 10:47

## 2022-01-07 RX ADMIN — SODIUM CHLORIDE 25 ML: 0.9 INJECTION, SOLUTION INTRAVENOUS at 19:26

## 2022-01-07 ASSESSMENT — PAIN SCALES - GENERAL
PAINLEVEL_OUTOF10: 9
PAINLEVEL_OUTOF10: 6

## 2022-01-07 NOTE — PROGRESS NOTES
Today's Date: 1/7/2022  Patient Name: Yoana Ramesh  Date of admission: 1/4/2022  7:49 AM  Patient's age: 70 y.o., 1950  Admission Dx: Pulmonary nodule [R91.1]    Reason for Consult: cancer findings VATS  Requesting Physician: Zayra Jamison MD    CHIEF COMPLAINT:  No chief complaint on file. INTERVAL HISTORY:    Patient seen and examined  No new events  Recovering well  No Fever chills    HISTORY OF PRESENT ILLNESS IN BRIEF:    Yoana Ramesh is a 70 y.o. male who is admitted to the hospital on 1/4/2022  for VATS and wedge resection  Patient has history of tobacco smoking and patient has been following with pulmonology for her right middle lobe lung nodule and his recent scan on 11/2/2021 showed increase in the size of lung nodule. Patient has had biopsy on 7/28/2021 which showed benign parenchyma with chronic inflammation however this was very active on the PET scan. Patient underwent VATS, wedge resection of the right middle lobe lung mass on 1/4/2022. Surgical pathology is awaited. Oncology consult for further recommendations. Past Medical History:   has a past medical history of Abnormal cardiac cath, Actinic keratosis, Arthritis, CAD (coronary artery disease), Calculus of kidney, Cervical stenosis of spine, Diabetes mellitus ([address] Full dentures, GERD (gastroesophageal reflux disease), H/O echocardiogram, History of cardiac cath, History of echocardiogram, History of echocardiogram, History of heart attack, History of tobacco abuse, Hyperlipidemia, Hypertension, Pulmonary nodule, Raynaud's phenomenon, S/P angioplasty with stent, and Wears hearing aid in both ears. Past Surgical History:   has a past surgical history that includes Cardiac catheterization (2007); Hemorrhoid surgery; Neck surgery (1997); other surgical history (10/2014); other surgical history (12/11/2014); Cataract removal (04/06/2015); Upper gastrointestinal endoscopy (12/28/2005);  Cataract removal (06/08/2015); Colonoscopy (01/11/2006); Colonoscopy (05/23/2016); Cardiac catheterization (02/09/2017); Cardiac catheterization (02/09/2017); Cardiac catheterization (01/14/2019); Upper gastrointestinal endoscopy (N/A, 02/05/2019); Upper gastrointestinal endoscopy (N/A, 10/22/2019); Lung biopsy (Right, 07/28/2021); CT NEEDLE BIOPSY LUNG PERCUTANEOUS (7/28/2021); hernia repair (Left); and Lung removal, total (Right, 1/4/2022). Medications:    Prior to Admission medications    Medication Sig Start Date End Date Taking? Authorizing Provider   oxyCODONE (ROXICODONE) 5 MG immediate release tablet Take 1 tablet by mouth every 4 hours as needed for Pain for up to 7 days. 1/7/22 1/14/22 Yes ALONDRA Winters - CNP   SERTRALINE HCL PO Take 50 mg by mouth nightly 12/1/21  Yes Historical Provider, MD   tamsulosin (FLOMAX) 0.4 MG capsule Take 0.4 mg by mouth daily    Historical Provider, MD   tiZANidine (ZANAFLEX) 4 MG capsule Take 4 mg by mouth 3 times daily as needed    Historical Provider, MD   ferrous sulfate (IRON 325) 325 (65 Fe) MG tablet Take 325 mg by mouth daily     Historical Provider, MD   isosorbide mononitrate (IMDUR) 30 MG extended release tablet 1/2 tablet in the morning    Historical Provider, MD   lisinopril (PRINIVIL;ZESTRIL) 20 MG tablet Take 20 mg by mouth daily     Historical Provider, MD   nitroGLYCERIN (NITRODUR) 0.4 MG/HR 1 dose under tongue as needed for chest pain.  max of 3 in one day    Historical Provider, MD   pantoprazole (PROTONIX) 40 MG injection as needed     Historical Provider, MD   amLODIPine (NORVASC) 5 MG tablet Take 1 tablet by mouth daily 7/15/21   Lauren Jones MD   clopidogrel (PLAVIX) 75 MG tablet Take 1 tablet by mouth daily  Patient taking differently: Take 75 mg by mouth daily Ordered by heart doctor, Chan Ledbetter 7/15/21   Lauren Jones MD   atenolol (TENORMIN) 25 MG tablet Take 1 tablet by mouth daily 6/7/21   Lauren Jones MD   Insulin Glargine, 2 Unit Dial, 300 UNIT/ML SOPN Insulin Glargine Insulin Glargine U-300 Conc Active 10 UNIT Subcutaneous Every morning May 7th, 2020 11:47am 05-  [facility] ([id number]) 5/7/20   Historical Provider, MD   atorvastatin (LIPITOR) 20 MG tablet Take 1 tablet by mouth daily 12/15/20   Sully Hollis MD   sitaGLIPtan-metformin (JANUMET)  MG per tablet Take 1 tablet by mouth 2 times daily (with meals)    Historical Provider, MD   glimepiride (AMARYL) 2 MG tablet Take 1 tablet by mouth 2 times daily 5/22/15   Airelle Silver MD   aspirin 81 MG tablet Take 81 mg by mouth daily Ordered by heart doctor, Dr. Oli Dobbs [place]    Historical Provider, MD     Current Facility-Administered Medications   Medication Dose Route Frequency Provider Last Rate Last Admin    melatonin tablet 5 mg  5 mg Oral Nightly ALONDRA Winters - JOSE ANGEL        sertraline (ZOLOFT) tablet 50 mg  50 mg Oral Nightly Marga Banuelos APRN - CNP        glycerin moisturizing mouth spray (OASIS) 35 % 2 spray  2 spray Mouth/Throat PRN Od[place] ALONDRA Hilliard CNP   2 spray at 01/07/22 1100    benzocaine-menthol (CEPACOL SORE THROAT) lozenge 1 lozenge  1 lozenge Oral Q2H PRN ALONDRA Vila NP   1 lozenge at 01/06/22 0909    ondansetron (ZOFRAN) injection 4 mg  4 mg IntraVENous Q6H PRN ALONDRA Vila NP   4 mg at 01/07/22 0221    metoclopramide (REGLAN) tablet 5 mg  5 mg Oral 4x Daily AC & HS ALONDRA Winters CNP   5 mg at 01/07/22 1045    gabapentin (NEURONTIN) capsule 100 mg  100 mg Oral TID ALONDRA Stiles CNP   100 mg at 01/07/22 1412    lisinopril (PRINIVIL;ZESTRIL) tablet 20 mg  20 mg Oral Daily ALONDRA Vila - NP   20 mg at 01/07/22 1053    isosorbide mononitrate (IMDUR) extended release tablet 30 mg  30 mg Oral Daily ALONDRA Vila NP   30 mg at 01/06/22 0913    ferrous sulfate (FE TABS 325) EC tablet 325 mg  325 mg Oral Daily ALONDRA Vila NP   325 mg at 01/07/22 1046    atorvastatin (LIPITOR) tablet 20 mg  20 mg Oral Daily Gloria Riser, APRN - NP   20 mg at 01/07/22 1044    sodium chloride flush 0.9 % injection 10 mL  10 mL IntraVENous 2 times per day Gloria Riser, APRN - NP   10 mL at 01/07/22 1028    sodium chloride flush 0.9 % injection 10 mL  10 mL IntraVENous PRN Gloria Riser, APRN - NP        0.9 % sodium chloride infusion  25 mL IntraVENous PRN Gloria Riser, APRN - NP        famotidine (PEPCID) tablet 20 mg  20 mg Oral BID Gloria Riser, APRN - NP   20 mg at 01/06/22 2111    Or    famotidine (PEPCID) injection 20 mg  20 mg IntraVENous BID Gloria Riser, APRN - NP   20 mg at 01/07/22 1046    acetaminophen (TYLENOL) tablet 1,000 mg  1,000 mg Oral Q4H PRN Gloria Riser, APRN - NP   1,000 mg at 01/06/22 2154    oxyCODONE (ROXICODONE) immediate release tablet 5 mg  5 mg Oral Q4H PRN Gloria Riser, APRN - NP        Or   [place] oxyCODONE (ROXICODONE) immediate release tablet 10 mg  10 mg Oral Q4H PRN Gloria Riser, APRN - NP   10 mg at 01/05/22 0506    morphine (PF) injection 2 mg  2 mg IntraVENous Q3H PRN Gloria Riser, APRN - NP   2 mg at 01/07/22 0222    polyethylene glycol (GLYCOLAX) packet 17 g  17 g Oral Daily Gloria Riser, APRN - NP   17 g at 01/06/22 0914    magnesium hydroxide (MILK OF MAGNESIA) 400 MG/5ML suspension 30 mL  30 mL Oral Daily PRN Gloria Riser, APRN - NP        bisacodyl (DULCOLAX) suppository 10 mg  10 mg Rectal Daily PRN Gloria Riser, APRN - NP        glucose (GLUTOSE) 40 % oral gel 15 g  15 g Oral PRN Gloria Riser, APRN - NP        dextrose 50 % IV solution  12.5 g IntraVENous PRN Gloria Riser, APRN - NP        glucagon (rDNA) injection 1 mg  1 mg IntraMUSCular PRN Gloria Riser, APRN - NP        dextrose 5 % solution  100 mL/hr IntraVENous PRN Gloria Riser, APRN - NP        glucose (GLUTOSE) 40 % oral gel 15 g  15 g Oral PRN Gloria Riser, APRN - NP       [place] dextrose 50 % IV solution  12.5 g IntraVENous PRN [place] Pat, APRN - NP        glucagon (rDNA) injection 1 mg  1 mg IntraMUSCular PRN [place] Pat, APRN - NP        dextrose 5 % solution  100 mL/hr IntraVENous PRN [place] Pat, APRN - NP        bupivacaine 0.5% (MARCAINE) elastomeric infusion 400 mL  400 mL Infiltration Continuous Isi Payan MD   400 mL at 01/04/22 1647    lidocaine 4 % external patch 1 patch  1 patch TransDERmal Daily [place] Pat, APRN - NP   1 patch at 01/07/22 1049    alogliptin (NESINA) tablet 12.5 mg  12.5 mg Oral BID WC [place] Pat, APRN - NP   12.5 mg at 01/07/22 1047    And    metFORMIN (GLUCOPHAGE) tablet 1,000 mg  1,000 mg Oral BID WC [place] Pat, APRN - NP   1,000 mg at 01/07/22 1046    glipiZIDE (GLUCOTROL) tablet 5 mg  5 mg Oral BID  [place] Pat, APRN - NP   5 mg at 01/07/22 1045    insulin lispro (HUMALOG) injection vial 0-12 Units  0-12 Units SubCUTAneous TID WC [place] Pat, APRN - NP   2 Units at 01/06/22 1700    insulin lispro (HUMALOG) injection vial 0-6 Units  0-6 Units SubCUTAneous Nightly [place] Pat, APRN - NP   1 Units at 01/06/22 2124       Allergies:  Niacin and related, Minocycline hcl, Other, and Oxycodone hcl    Social History:   reports that he quit smoking about 3 months ago. His smoking use included cigarettes. He has a 13.50 pack-year smoking history. He has never used smokeless tobacco. He reports that he does not drink alcohol and does not use drugs. Family History: family history includes Alzheimer's Disease in his father; Cancer in his brother; Diabetes in his father and mother; Heart Disease in his brother, brother, brother, mother, and sister; High Blood Pressure in his mother. REVIEW OF SYSTEMS:    Constitutional: No fever or chills.  No night sweats, no weight loss   Eyes: No eye discharge, double vision, or eye pain   HEENT: negative for sore mouth, sore throat, hoarseness and voice change Respiratory: negative for cough , sputum, dyspnea, wheezing, hemoptysis, chest pain   Cardiovascular: negative for chest pain, dyspnea, palpitations, orthopnea, PND   Gastrointestinal: negative for nausea, vomiting, diarrhea, constipation, abdominal pain, Dysphagia, hematemesis and hematochezia   Genitourinary: negative for frequency, dysuria, nocturia, urinary incontinence, and hematuria   Integument: negative for rash, skin lesions, bruises.    Hematologic/Lymphatic: negative for easy bruising, bleeding, lymphadenopathy, or petechiae   Endocrine: negative for heat or cold intolerance,weight changes, change in bowel habits and hair loss   Musculoskeletal: negative for myalgias, arthralgias, pain, joint swelling,and bone pain   Neurological: negative for headaches, dizziness, seizures, weakness, numbness    PHYSICAL EXAM:      BP (!) 119/59   Pulse 85   Temp 98.2 °F (36.8 °C) (Oral)   Resp 16   Wt 157 lb (71.2 kg)   SpO2 95%   BMI 21.29 kg/m²    Temp (24hrs), Av.4 °F (36.9 °C), Min:98 °F (36.7 °C), Max:99 °F (37.2 °C)    General appearance - well appearing, no in pain or distress   Mental status - alert and cooperative   Eyes - pupils equal and reactive, extraocular eye movements intact   Ears - bilateral TM's and external ear canals normal   Mouth - mucous membranes moist, pharynx normal without lesions   Neck - supple, no significant adenopathy   Lymphatics - no palpable lymphadenopathy, no hepatosplenomegaly   Chest - clear to auscultation, no wheezes, rales or rhonchi, symmetric air entry   Heart - normal rate, regular rhythm, normal S1, S2, no murmurs  Abdomen - soft, nontender, nondistended, no masses or organomegaly   Neurological - alert, oriented, normal speech, no focal findings or movement disorder noted   Musculoskeletal - no joint tenderness, deformity or swelling   Extremities - peripheral pulses normal, no pedal edema, no clubbing or cyanosis   Skin - normal coloration and turgor, no rashes, no suspicious skin lesions noted ,    DATA:    Labs:   CBC:   Recent Labs     01/06/22  0433 01/07/22  0242   WBC 7.2 7.4   HGB 9.7* 9.6*   HCT 29.8* 29.2*    165     BMP:   Recent Labs     01/06/22  0433 01/07/22  0242   * 128*   K 4.2 4.0   CO2 18* 19*   BUN 30* 23   CREATININE 1.17 1.06   LABGLOM >60 >60   GLUCOSE 131* 102*     PT/INR: No results for input(s): PROTIME, INR in the last 72 hours. Surgical Pathology Report  SURGICAL PATHOLOGY REPORT  Collected: 01/04/22 1423   Lab status: Final   Resulting lab: [facility]   Value: -- Diagnosis --     1.  Right lung, middle lobe wedge biopsy:     -  Adenocarcinoma. -  Tumor is 1.8 cm. -  Carcinoma invades into but not through the visceral pleura. 2.  Lymph node #9, biopsy:     -  Negative for metastatic carcinoma. 3.  Lymph node #7, biopsy:     -  Negative for metastatic carcinoma. 4.  Right middle lobe lung, lobectomy:     -  Margins are free of involvement by carcinoma. -  Peribronchial lymph node, negative for metastatic carcinoma. -  Nonneoplastic lung shows emphysematous change.       Aldo Bello M.D.   **Electronically Signed Out**         rdd/1/6/2022         Clinical Information   Pre-op Diagnosis:  RIGHT MIDDLE LOBE NODULE   Operative Findings:  MIDDLE LOBE OF RIGHT LUNG; LYMPH NODE #9 OF LUNG;   LYMPH NODE #7; RIGHT MIDDLE LOBE   Operation Performed:  MIDDLE WEDGE RESECTION, POSSIBLE MIDDLE   LOBECTOMY VIA THORACOTOMY     Source of Specimen   1: MIDDLE LOBE OF RIGHT LUNG (A)   2: LYMPH NODE #9 OF LUNG (B)   3: LYMPH NODE #7   4: RIGHT MIDDLE LOBE     Gross Description   1.  \"ANA DAVIS, MIDDLE LOBE OF RIGHT LUNG\" Received fresh is a   6.4 x 4.2 x 2.8 cm lung wedge with staple line.  The pleura is   pink-tan with an area of slightly thickening (inked black).    Sectioning reveals a 1.8 x 1.2 x 0.8 cm subpleural tan-white mass   lesion that is 0.8 cm from the staple line margin.  The remaining   parenchyma is pink-tan with no other lesions.  1TP, 1DQ, 1FS, Cassette   summary:  \"A\" frozen section lesion, \"B\" remainder of lesion with   pleura inked black, \"C\" staple line margin en face. 2.  \"ANA DAVIS, LYMPH NODE #9 OF LUNG\" Received fresh are two   fragments, 0.3 cm each.  1TP, 1FS, 1cs. 3.  \"ANA DAVIS, LYMPH NODE #7\" Received fresh are two   anthracotic fragments, 0.6 and 0.7 cm in greatest dimension.  1TP,   1FS, 1cs.     4.  \"ANA DAVIS, RIGHT MIDDLE LOBE\" Received fresh is a 50 gram,   8.5 x 6.8 x 3.5 cm lobe of lung with multiple staple lines.  The   pleura is wrinkled purple-gray.  Sectioning reveals spongy dark red   parenchyma with no masses.  No markedly enlarged nodes are identified   at the hilum.  Cassette summary:  \"A\" bronchial margin frozen section   cassette resubmitted as received, \"B\" hilar soft tissue and vascular   margin, \"C\" uninvolved lung (along staple line).  tm     Intraoperative Diagnosis   1.  FSDX:  Non-small cell carcinoma, favor adenocarcinoma.  (14:00,   RDD)   2.  FSDX:  Fibrofatty tissue.  (14:11, RDD)   3.  FSDX:  Lymph node, negative for carcinoma.  (14:19, RDD)   4.  FSDX:  Bronchial margin, negative for carcinoma.  (16:17, RDD)     Microscopic Description   1-4.  Microscopic examination performed. PROCEDURE: Wedge biopsy, followed by lobectomy       SPECIMEN LATERALITY: Right   TUMOR FOCALITY: Single tumor       TUMOR SITE: Right middle lobe   TUMOR SIZE --     1.8 x 1.2 x 0.8 cm           HISTOLOGIC TYPE: Adenocarcinoma.    Sections show a large and anaplastic non-small cell malignancy with   cohesion and a few instances of glandular differentiation.  Neoplastic   cells are large with copious eosinophilic cytoplasm and large   vesicular nuclei.  Tumor is characterized further utilizing control   appropriate immunostains.  Cells of carcinoma are negative for p40 and   they are positive for TTF-1 and CK7.  Morphology and immunophenotype are that of an adenocarcinoma, lung primary. Note: The case is reviewed by a second Pathologist for quality   assurance with consensus (DS).       HISTOLOGIC GRADE: Grade 2 (of 4)           VISCERAL PLEURA INVASION:Reticulin stain with appropriately reactive   control is utilized to evaluate pleura.  Tumor extends beyond the   elastic layer into the pleura but not to the pleural surface (PL1)     DIRECT INVASION OF ADJACENT STRUCTURES: No     TREATMENT EFFECT: None apparent. LYMPHOVASCULAR INVASION: Absent                         MARGINS --   -BRONCHIAL: Free of tumor       -VASCULAR: Free of tumor       -PARENCHYMAL: Free of tumor       -OTHER ATTACHED TISSUE MARGIN (IF APPLICABLE): N/A       REGIONAL LYMPH NODES: 3   LYMPH NODES(S) FROM PRIOR PROCEDURES: No       NUMBER AND STATION(S) EXAMINED:   See diagnoses   NUMBER AND STATION(S) WITH METASTASIS: 0         DISTANT METASTASIS--   DISTANT SITE(S) INVOLVED, IF APPLICABLE: N/A              IMAGING DATA:  XR CHEST PORTABLE   Final Result   Persistent small right apical pneumothorax with 2 right chest tubes in place. Resolving bilateral lower lung subsegmental atelectasis         XR CHEST PORTABLE   Final Result   No significant change in small right apical pneumothorax with chest tubes in   place. XR CHEST PORTABLE   Final Result   No significant interval change. Right chest tubes in place with persistent   small right apical pneumothorax. XR CHEST PORTABLE   Final Result   Interval right-sided thoracotomy with right-sided chest tubes in-situ. Small   right apical pneumothorax. Right hilar surgical clips related to recent   surgery. .         XR CHEST PORTABLE    (Results Pending)     CT chest 11/2/2021:  Impression   The previously biopsied, somewhat bilobed nodule involving right middle lobe   is slightly increased in size since the prior study now measuring 1.8 x 1.0   cm, once measuring 1.8 x 0.6 cm.  There does appear to be some spiculation. No new pulmonary nodule is seen.           Primary Problem  <principal problem not specified>    Active Hospital Problems    Diagnosis Date Noted    Pulmonary nodule [R91.1] 12/15/2021       IMPRESSION:   1. Right middle lobe lung adenocarcinoma, status post wedge resection on 1/4/2022. Surgical pathology showing [id number] stage IB, with high risk feature of visceral pleura involvement  2. History of tobacco abuse    RECOMMENDATIONS:  1. I reviewed [facility], imaging studies, prior records, possible diagnosis and treatment recommendations with patient  2. discussed with family pathology results  3. I reviewed the surgical pathology results with patient and family. 4. I reviewed the NCCN guidelines and goals of care with patient and family  5. As he has visceral pleural involvement which is a high risk feature, adjuvant systemic chemotherapy be recommended  6. Continue postoperative care as per thoracic surgery  7. We will follow  8. Follow-up with medical oncology at [address] after discharge    Discussed with patient and Nurse. Thank you for asking us to see this patient. Ty Riggins MD  Hematologist/Medical Oncologist    Cell: [phone]    This note is created with the assistance of a speech recognition program.  While intending to generate a document that actually reflects the content of the visit, the document can still have some errors including those of syntax and sound a like substitutions which may escape proof reading. It such instances, actual meaning can be extrapolated by contextual diversion.

## 2022-01-07 NOTE — PROGRESS NOTES
Pt confused and combative, pulling off lines and trying to get out of bed; orders received per [facility] NP

## 2022-01-07 NOTE — CARE COORDINATION
[facility] Flow/Interdisciplinary Rounds Progress Note    Quality Flow Rounds held on January 7, 2022 at [address] [facility] Attending:  Bedside Nurse, ,  and Nursing Unit Leadership    Barriers to Discharge:     Anticipated Discharge Date:       Anticipated Discharge Disposition: Home    Readmission Risk              Risk of Unplanned Readmission:  14           Discussed patient goal for the day, patient clinical progression, and barriers to discharge. The following Goal(s) of the Day/Commitment(s) have been identified:  Activity Progression  Transitional Care Plan    Goal is home pending PT/OT recommendations.    Need to be seen in AM    Awilda Dhillon RN  January 7, 2022

## 2022-01-07 NOTE — CONSULTS
General Surgery:  Consult Note        PATIENT NAME: Mirella Villalta   YOB: 1950    ADMISSION DATE: 1/4/2022  7:49 AM     Admitting Provider: Dr. Rafy Rush Physician: Dr. Jhonatan Gutierrez: 1/7/2022    Chief Complaint:  S/p R thoracotomy and R lobectomy   Consult Regarding:  abd pain     HISTORY OF PRESENT ILLNESS:  The patient is a 70 y.o. male  who was admitted on 1/3/21 for R lobectomy for a R pulm nodule. Pt denies any current abdominal pain but states he has had some cramping discomfort throughout the day  Patient unfortunately is partially disoriented but is answering most questions appropriately and family members are at bedside. Patient is unsure of last bowel movement or last time he had flatus. Patient denies any current fever, chills or emesis. Patient denies being nauseous but he is noted to be hiccuping. General surgery consulted for abdominal discomfort. Family denies any Hx of known colon cancer or GI issues. Past Medical History:        Diagnosis Date    Abnormal cardiac cath 09/20/2013    LMCA; Mild irregularities 10-20%. LAD: Abnormal.  Mild to moderate irregularities throughout the LAD and branches. LCx:  abnormal.  80-90% stenosis in a moderate sized OM1. RCA: Abnormal.  40-50% mid RCA stenosis. Actinic keratosis     Arthritis     neck, fingers    CAD (coronary artery disease)     Calculus of kidney     Cervical stenosis of spine     Diabetes mellitus ([facility] [address]     Dr. Jazmine Guzman, CNP, [place]    Full dentures     GERD (gastroesophageal reflux disease)     H/O echocardiogram 04/29/2016    Stress echo. Normal resting LV systolic function,normal systolic restponse to exercise. No evidence of reversible  ischemia on this maximal,symptom limited,treadmill exerxiess stress echocardiography study    History of cardiac cath 02/09/2017    LMCA: Lesion on LMCA: Distal subsection . 20% stenosis. LAD: Lesion on 1st Diag: Mid subsection . 75% stenosis.  RCA: Lesion on Mid RCA: Mid subsection . 100% stenosis. Comments: The distal RCA fills by left to right collateralization. Dominance: Mixed. LV function assessed as: Normal. EF 65%. History of echocardiogram 06/11/2018    EF 60%. Mildly increased LV wall thickness. Evidence of mild diastolic dysfunction seen. History of echocardiogram 01/18/2019    EF of 65%. LV wall thickness is mildly increased. Aortic valve leaflets are mildly thickened. History of heart attack     History of tobacco abuse     Quit 10/2021, 1/4 ppd for 54 years    Hyperlipidemia     Hypertension     Dr. Noelle Rosado, CNP    Pulmonary nodule 12/2021    Right middle lobe    Raynaud's phenomenon     S/P angioplasty with stent 09/20/2013    PTCA-NEDRA of  the OM 1    Wears hearing aid in both ears        Past Surgical History:        Procedure Laterality Date    CARDIAC CATHETERIZATION  2007    patient states had 2 small blockages    CARDIAC CATHETERIZATION  02/09/2017    LMCA: Lesion on LMCA: Distal subsection 20% stenosis. LAD: Lesion on 1st Diag: Mid subsection 75% stenosis. RCA: Lesion on Mid RCA: Mid subsection 100% stenosis. CARDIAC CATHETERIZATION  02/09/2017    Attempted PCI to chronic total occlusion of RCA was not successful. Unable to cross with multiple wires due to heavy calcification and toruosity.     CARDIAC CATHETERIZATION  01/14/2019    CATARACT REMOVAL  04/06/2015    Right eye - Dr. Katelyn Brasher  06/08/2015    Dr. Michelle Shine - left eye    COLONOSCOPY  01/11/2006    Dr. Fadi Anaya - internal hemorrhoids, no polps    COLONOSCOPY  05/23/2016    -polyp    CT NEEDLE BIOPSY LUNG PERCUTANEOUS  7/28/2021    CT NEEDLE BIOPSY LUNG PERCUTANEOUS 7/28/2021 [facility] CT SCAN    HEMORRHOID SURGERY      HERNIA REPAIR Left     inguinal    LUNG BIOPSY Right 07/28/2021    Dr Jessica Silva, TOTAL Right 1/4/2022    MIDDLE WEDGE RESECTION, POSSIBLE MIDDLE LOBECTOMY VIA THORACOTOMY performed by Agnieszka Martinez MD at [facility]    c4-5 laminectomy and fusion    OTHER SURGICAL HISTORY  10/2014    Right foot - 7 from heal and 1 from great toe at [address]  2014    pain injection - cervical    UPPER GASTROINTESTINAL ENDOSCOPY  2005    Dr. Juana Campos - mild esophagitis and gastritis    UPPER GASTROINTESTINAL ENDOSCOPY N/A 2019    -bx(neg H-Pylori)retained gastric content, possible submucosal mass of gastric cardia    UPPER GASTROINTESTINAL ENDOSCOPY N/A 10/22/2019    EGD BIOPSY performed by Perla Bustamante MD at [address]       Medications Prior to Admission:   Medications Prior to Admission: SERTRALINE HCL PO, Take 50 mg by mouth nightly  tamsulosin (FLOMAX) 0.4 MG capsule, Take 0.4 mg by mouth daily  tiZANidine (ZANAFLEX) 4 MG capsule, Take 4 mg by mouth 3 times daily as needed  [] enoxaparin (LOVENOX) 120 MG/0.8ML injection, Inject 0.5 mLs into the skin 2 times daily for 5 days  ferrous sulfate (IRON 325) 325 (65 Fe) MG tablet, Take 325 mg by mouth daily   isosorbide mononitrate (IMDUR) 30 MG extended release tablet, 1/2 tablet in the morning  lisinopril (PRINIVIL;ZESTRIL) 20 MG tablet, Take 20 mg by mouth daily   nitroGLYCERIN (NITRODUR) 0.4 MG/HR, 1 dose under tongue as needed for chest pain.  max of 3 in one day  pantoprazole (PROTONIX) 40 MG injection, as needed   amLODIPine (NORVASC) 5 MG tablet, Take 1 tablet by mouth daily  clopidogrel (PLAVIX) 75 MG tablet, Take 1 tablet by mouth daily (Patient taking differently: Take 75 mg by mouth daily Ordered by heart doctor, Dr. Bria Sanchez, [place])  atenolol (TENORMIN) 25 MG tablet, Take 1 tablet by mouth daily  Insulin Glargine, 2 Unit Dial, 300 UNIT/ML SOPN, Insulin Glargine Insulin Glargine U-300 Conc Active 10 UNIT Subcutaneous Every morning May 7th, 2020 11:47am 2020  [facility] ([id number])  atorvastatin (LIPITOR) 20 MG tablet, Take 1 tablet by mouth daily  sitaGLIPtan-metformin (JANUMET)  MG per tablet, Take 1 tablet by mouth 2 times daily (with meals)  glimepiride (AMARYL) 2 MG tablet, Take 1 tablet by mouth 2 times daily  aspirin 81 MG tablet, Take 81 mg by mouth daily Ordered by heart doctor, Chan Ledbetter    Allergies:  Niacin and related, Minocycline hcl, Other, and Oxycodone hcl    Social History:   Social History     Socioeconomic History    Marital status:      Spouse name: Not on file    Number of children: Not on file    Years of education: Not on file    Highest education level: Not on file   Occupational History    Not on file   Tobacco Use    Smoking status: Former Smoker     Packs/day: 0.25     Years: 54.00     Pack years: 13.50     Types: Cigarettes     Quit date: 10/1/2021     Years since quittin.2    Smokeless tobacco: Never Used   Vaping Use    Vaping Use: Never used   Substance and Sexual Activity    Alcohol use: No    Drug use: No    Sexual activity: Not on file   Other Topics Concern    Not on file   Social History Narrative    Not on file     Social Determinants of Health     Financial Resource Strain:     Difficulty of Paying Living Expenses: Not on file   Food Insecurity:     Worried About [address] in the Last Year: Not on file    Luis E of Food in the Last Year: Not on file   Transportation Needs:     Lack of Transportation (Medical): Not on file    Lack of Transportation (Non-Medical):  Not on file   Physical Activity:     Days of Exercise per Week: Not on file    Minutes of Exercise per Session: Not on file   Stress:     Feeling of Stress : Not on file   Social Connections:     Frequency of Communication with Friends and Family: Not on file    Frequency of Social Gatherings with Friends and Family: Not on file    Attends [employer] Services: Not on file    Active Member of Clubs or Organizations: Not on file    Attends Club or Organization Meetings: Not on file    Marital Status: Not on file Intimate Partner Violence:     Fear of Current or Ex-Partner: Not on file    Emotionally Abused: Not on file    Physically Abused: Not on file    Sexually Abused: Not on file   Housing Stability:     Unable to Pay for Housing in the Last Year: Not on file    Number of Places Lived in the Last Year: Not on file    Unstable Housing in the Last Year: Not on file       Family History:       Problem Relation Age of Onset    Heart Disease Mother     Diabetes Mother     High Blood Pressure Mother     Diabetes Father     Alzheimer's Disease Father     Heart Disease Sister         CABG    Heart Disease Brother     Heart Disease Brother     Heart Disease Brother     Cancer Brother        REVIEW OF SYSTEMS:    CONSTITUTIONAL: Denies recent weight loss, fatigue, fevers, chills. HEENT: Denies rhinorrhea, dysphagia, odynphagia. CARDIOVASCULAR: Denies history of MI, recent chest pain. RESPIRATORY: Denies recent history of shortness of breath or history of PE. GASTROINTESTINAL: per hpi  GENITOURINARY: Denies increased frequency or dysuria. HEMATOLOGIC/LYMPHATIC: Denies history of anemia or DVTs. ENDOCRINE: Denies history of thyroid problems or diabetes. NEURO: Denies history of CVA, TIA. Review of systems negative unless listed above. PHYSICAL EXAM:    VITALS:  BP (!) 150/86   Pulse 93   Temp 98.1 °F (36.7 °C) (Axillary)   Resp 26   Wt 157 lb (71.2 kg)   SpO2 95%   BMI 21.29 kg/m²   INTAKE/OUTPUT:   No intake or output data in the 24 hours ending 01/07/22 1828    CONSTITUTIONAL:  awake, alert, not distressed   HEENT: Normocephalic/atraumatic, without obvious abnormality. NECK:  Supple, symmetrical, trachea midline   CARDIOVASCULAR: Regular rate and rhythm without murmurs. LUNGS: Clear to auscultation bilaterally without evidence of wheezing or tachypnea.   ABDOMEN: soft, minimal discomfort in lower quadrants, no rebound or guarding, non peritoneal, non distended    MUSCULOSKELETAL: Muscle strength intact in all extremities bilaterally. NEUROLOGIC: Gross motor intact without focal weakness. SKIN: No cyanosis, rashes, or edema noted. Orientation: To self     CBC with Differential:    Lab Results   Component Value Date    WBC 7.4 01/07/2022    RBC 3.34 01/07/2022    RBC 4.63 02/02/2017    HGB 9.6 01/07/2022    HCT 29.2 01/07/2022     01/07/2022    MCV 87.4 01/07/2022    MCH 28.7 01/07/2022    MCHC 32.9 01/07/2022    RDW 13.1 01/07/2022    LYMPHOPCT 18 01/07/2022    LYMPHOPCT 33.9 02/02/2017    MONOPCT 16 01/07/2022    EOSPCT 3.1 02/02/2017    BASOPCT 1 01/07/2022    BASOPCT 1.3 02/02/2017    MONOSABS 1.22 01/07/2022    LYMPHSABS 1.35 01/07/2022    EOSABS 0.14 01/07/2022    BASOSABS 0.05 01/07/2022    DIFFTYPE NOT REPORTED 01/07/2022     CMP:    Lab Results   Component Value Date     01/07/2022    K 4.0 01/07/2022    CL 96 01/07/2022    CO2 19 01/07/2022    BUN 23 01/07/2022    CREATININE 1.06 01/07/2022    GFRAA >60 01/07/2022    LABGLOM >60 01/07/2022    GLUCOSE 102 01/07/2022    GLUCOSE 196 02/02/2017    PROT 7.3 12/16/2021    LABALBU 4.4 12/16/2021    CALCIUM 8.6 01/07/2022    BILITOT 0.35 12/16/2021    ALKPHOS 70 12/16/2021    AST 18 12/16/2021    ALT 17 12/16/2021       Pertinent Radiology:   XR CHEST (2 VW)    Result Date: 12/16/2021  EXAMINATION: TWO XRAY VIEWS OF THE CHEST 12/16/2021 12:25 pm COMPARISON: CT of the chest November 2, 2021. PA and lateral chest July 30, 2021. HISTORY: ORDERING SYSTEM PROVIDED HISTORY: preop TECHNOLOGIST PROVIDED HISTORY: preop Reason for Exam: upright PA/LT FINDINGS: The heart is normal in size. Some calcification is present within the aorta. No evidence of pneumothorax, pleural effusion, infiltrate, or abnormal lung mass. There is old healed deformity of the posterolateral left 8th rib. There is flattening of the hemidiaphragms. Minimal spondylitic changes are present in the vertebral endplates. Senescent changes compatible with the age of the patient. Possible COPD. No evidence of acute cardiopulmonary process. ASSESSMENT:  Active Hospital Problems    Diagnosis Date Noted    Pulmonary nodule [R91.1] 12/15/2021     69 yo M s/p R lobectomy with post operative ileus/ extensive stool burden noted on CT A/P 1/7/21    Plan:  Continue medical mgmt and supportive care per primary  Recommend for enema   If has emesis place NGT and set to LIWS, other wise ok for sips with meds and depending on response to enema and symptoms will start aggressive PO bowel regimen  Continue to monitor abd exam and bowel function, goal K+ 4.0, Mg 2.0   Encourage IS use and ambulation/ activity     Discussed with Dr. Barbie Montes     Thank you     Electronically signed by Cassie Puckett DO  on 1/7/2022 at 6:28 PM     Attending Physician Statement  I have discussed the case with Dr Tamir Golden, including pertinent history and exam findings with the resident. I have seen and examined the patient and the key elements of the encounter have been performed by me. I agree with the assessment, plan and orders as documented by the resident.       Electronically signed by Amari Marquez DO  on 1/10/2022 at 12:31 PM

## 2022-01-07 NOTE — PLAN OF CARE
Removed chest tube that did not have airleak noted. Single chest tube in place. Pt tolerated removal well with no issues.  Follow up CXR in the AM.     Please change out atrium at bedside

## 2022-01-07 NOTE — PROGRESS NOTES
[id number] [facility] Cardiothoracic Surgery  Progress Note    1/7/2022 8:15 AM  Surgeon: Arianna Jamison  POD # 3  S/P:  Right VATS with RML lobectomy     Subjective:  Mr. Jessica Neely   Resting in chair with no SOB or distress  Objective:  BP (!) 150/129   Pulse 85   Temp 98.2 °F (36.8 °C) (Oral)   Resp 16   Wt 157 lb (71.2 kg)   SpO2 95%   BMI 21.29 kg/m²   Chest: pacing wires: no, chest tubes:yes,  2 +  CV: no murmur noted, Normal S1, S2,  Lungs: clear to auscultation, no wheezes, rales, or rhonchi  Abd: normal bowel sounds   Lower Extremities: Trace edema    CXR-small right apical pneumothorax that's stable    Labs: Labs reviewed today  CBC: Recent Labs     01/05/22 0515 01/06/22  0433 01/07/22  0242   WBC 10.2 7.2 7.4   HGB 10.2* 9.7* 9.6*   HCT 30.2* 29.8* 29.2*   MCV 88.0 89.8 87.4    160 165     BMP:   Recent Labs     01/05/22 0515 01/06/22  0433 01/07/22  0242    128* 128*   K 4.4 4.2 4.0    99 96*   CO2 19* 18* 19*   BUN 22 30* 23   CREATININE 0.94 1.17 1.06       I/O: I/O last 3 completed shifts: In: 36 [P.O.:720;  I.V.:10]  Out: 970 [Urine:750; Chest Tube:220]  Scheduled Meds:   metoclopramide  5 mg Oral 4x Daily AC & HS    gabapentin  100 mg Oral TID    scopolamine  1 patch TransDERmal Once    lisinopril  20 mg Oral Daily    isosorbide mononitrate  30 mg Oral Daily    ferrous sulfate  325 mg Oral Daily    atorvastatin  20 mg Oral Daily    sodium chloride flush  10 mL IntraVENous 2 times per day    famotidine  20 mg Oral BID    Or    famotidine (PEPCID) injection  20 mg IntraVENous BID    polyethylene glycol  17 g Oral Daily    lidocaine  1 patch TransDERmal Daily    alogliptin  12.5 mg Oral BID WC    And    metFORMIN  1,000 mg Oral BID WC    glipiZIDE  5 mg Oral BID AC    insulin lispro  0-12 Units SubCUTAneous TID WC    insulin lispro  0-6 Units SubCUTAneous Nightly     Continuous Infusions:   sodium chloride      dextrose      dextrose      bupivacaine 0.5%       PRN Meds:glycerin moisturizing mouth spray, benzocaine-menthol, ondansetron, sodium chloride flush, sodium chloride, acetaminophen, oxyCODONE **OR** oxyCODONE, morphine, magnesium hydroxide, bisacodyl, glucose, dextrose, glucagon (rDNA), dextrose, glucose, dextrose, glucagon (rDNA), dextrose      Daily Nursing Care:  Please keep SCDS in place as DVT prophylaxis  If not intubated, Please have patient use IS and acapella 10X/hr or during commercial breaks  Please continue BM management  Daily labs and CXR  Daily PT/OT and ambulation  Continue with Case Management for DC planning      Assessment/ Plan:    Water seal chest tubes CXR around 11AM check for stability  Evaluate to remove chest tubes today or tomorrow AM  Hyponatremia-please bgin <1000ml PO fluid restriction today and Sodium chloride tabs BID  PT evaluation  RT evaluation and treatment  Plan DC- SNF vs home        Time spent with patient 10  Time spent in [address], APRN - NP

## 2022-01-08 ENCOUNTER — APPOINTMENT (OUTPATIENT)
Dept: GENERAL RADIOLOGY | Age: 72
DRG: 164 | End: 2022-01-08
Attending: THORACIC SURGERY (CARDIOTHORACIC VASCULAR SURGERY)
Payer: MEDICARE

## 2022-01-08 PROBLEM — R41.0 DELIRIUM: Status: ACTIVE | Noted: 2022-01-08

## 2022-01-08 PROBLEM — E87.1 HYPONATREMIA: Status: ACTIVE | Noted: 2022-01-08

## 2022-01-08 PROBLEM — C34.91 PRIMARY ADENOCARCINOMA OF RIGHT LUNG (HCC): Status: ACTIVE | Noted: 2021-12-15

## 2022-01-08 PROBLEM — K91.89 POSTOPERATIVE ILEUS (HCC): Status: ACTIVE | Noted: 2022-01-08

## 2022-01-08 PROBLEM — K56.7 POSTOPERATIVE ILEUS (HCC): Status: ACTIVE | Noted: 2022-01-08

## 2022-01-08 LAB
ANION GAP SERPL CALCULATED.3IONS-SCNC: 13 MMOL/L (ref 9–17)
BUN BLDV-MCNC: 18 MG/DL (ref 8–23)
BUN/CREAT BLD: ABNORMAL (ref 9–20)
CALCIUM SERPL-MCNC: 8.1 MG/DL (ref 8.6–10.4)
CHLORIDE BLD-SCNC: 100 MMOL/L (ref 98–107)
CO2: 20 MMOL/L (ref 20–31)
CREAT SERPL-MCNC: 0.73 MG/DL (ref 0.7–1.2)
GFR AFRICAN AMERICAN: >60 ML/MIN
GFR NON-AFRICAN AMERICAN: >60 ML/MIN
GFR SERPL CREATININE-BSD FRML MDRD: ABNORMAL ML/MIN/{1.73_M2}
GFR SERPL CREATININE-BSD FRML MDRD: ABNORMAL ML/MIN/{1.73_M2}
GLUCOSE BLD-MCNC: 109 MG/DL (ref 75–110)
GLUCOSE BLD-MCNC: 135 MG/DL (ref 75–110)
GLUCOSE BLD-MCNC: 158 MG/DL (ref 75–110)
GLUCOSE BLD-MCNC: 170 MG/DL (ref 75–110)
GLUCOSE BLD-MCNC: 189 MG/DL (ref 75–110)
GLUCOSE BLD-MCNC: 198 MG/DL (ref 70–99)
POTASSIUM SERPL-SCNC: 4.1 MMOL/L (ref 3.7–5.3)
SODIUM BLD-SCNC: 133 MMOL/L (ref 135–144)

## 2022-01-08 PROCEDURE — 2580000003 HC RX 258: Performed by: NURSE PRACTITIONER

## 2022-01-08 PROCEDURE — 99232 SBSQ HOSP IP/OBS MODERATE 35: CPT | Performed by: INTERNAL MEDICINE

## 2022-01-08 PROCEDURE — 2500000003 HC RX 250 WO HCPCS: Performed by: PHYSICIAN ASSISTANT

## 2022-01-08 PROCEDURE — 97166 OT EVAL MOD COMPLEX 45 MIN: CPT

## 2022-01-08 PROCEDURE — 99024 POSTOP FOLLOW-UP VISIT: CPT | Performed by: PHYSICIAN ASSISTANT

## 2022-01-08 PROCEDURE — 97162 PT EVAL MOD COMPLEX 30 MIN: CPT

## 2022-01-08 PROCEDURE — 36415 COLL VENOUS BLD VENIPUNCTURE: CPT

## 2022-01-08 PROCEDURE — 2580000003 HC RX 258: Performed by: PHYSICIAN ASSISTANT

## 2022-01-08 PROCEDURE — 80048 BASIC METABOLIC PNL TOTAL CA: CPT

## 2022-01-08 PROCEDURE — 2500000003 HC RX 250 WO HCPCS: Performed by: NURSE PRACTITIONER

## 2022-01-08 PROCEDURE — 2580000003 HC RX 258: Performed by: THORACIC SURGERY (CARDIOTHORACIC VASCULAR SURGERY)

## 2022-01-08 PROCEDURE — 6370000000 HC RX 637 (ALT 250 FOR IP): Performed by: NURSE PRACTITIONER

## 2022-01-08 PROCEDURE — 99223 1ST HOSP IP/OBS HIGH 75: CPT | Performed by: INTERNAL MEDICINE

## 2022-01-08 PROCEDURE — 71045 X-RAY EXAM CHEST 1 VIEW: CPT

## 2022-01-08 PROCEDURE — 6360000002 HC RX W HCPCS: Performed by: PHYSICIAN ASSISTANT

## 2022-01-08 PROCEDURE — 97535 SELF CARE MNGMENT TRAINING: CPT

## 2022-01-08 PROCEDURE — 97530 THERAPEUTIC ACTIVITIES: CPT

## 2022-01-08 PROCEDURE — C9113 INJ PANTOPRAZOLE SODIUM, VIA: HCPCS | Performed by: PHYSICIAN ASSISTANT

## 2022-01-08 PROCEDURE — 2100000001 HC CVICU R&B

## 2022-01-08 RX ORDER — HALOPERIDOL 5 MG/ML
5 INJECTION INTRAMUSCULAR EVERY 6 HOURS PRN
Status: DISCONTINUED | OUTPATIENT
Start: 2022-01-08 | End: 2022-01-15 | Stop reason: HOSPADM

## 2022-01-08 RX ADMIN — PANTOPRAZOLE SODIUM 40 MG: 40 INJECTION, POWDER, FOR SOLUTION INTRAVENOUS at 09:13

## 2022-01-08 RX ADMIN — DEXMEDETOMIDINE HYDROCHLORIDE 1.4 MCG/KG/HR: 4 INJECTION, SOLUTION INTRAVENOUS at 14:24

## 2022-01-08 RX ADMIN — SODIUM CHLORIDE: 9 INJECTION, SOLUTION INTRAVENOUS at 00:26

## 2022-01-08 RX ADMIN — SODIUM CHLORIDE, PRESERVATIVE FREE 10 ML: 5 INJECTION INTRAVENOUS at 09:38

## 2022-01-08 RX ADMIN — DEXMEDETOMIDINE HYDROCHLORIDE 1.4 MCG/KG/HR: 4 INJECTION, SOLUTION INTRAVENOUS at 11:40

## 2022-01-08 RX ADMIN — FAMOTIDINE 20 MG: 10 INJECTION INTRAVENOUS at 09:13

## 2022-01-08 RX ADMIN — INSULIN LISPRO 2 UNITS: 100 INJECTION, SOLUTION INTRAVENOUS; SUBCUTANEOUS at 14:28

## 2022-01-08 RX ADMIN — DEXMEDETOMIDINE HYDROCHLORIDE 1.4 MCG/KG/HR: 4 INJECTION, SOLUTION INTRAVENOUS at 09:04

## 2022-01-08 RX ADMIN — SODIUM CHLORIDE, PRESERVATIVE FREE 10 ML: 5 INJECTION INTRAVENOUS at 20:15

## 2022-01-08 RX ADMIN — HALOPERIDOL LACTATE 5 MG: 5 INJECTION, SOLUTION INTRAMUSCULAR at 11:08

## 2022-01-08 RX ADMIN — DEXMEDETOMIDINE HYDROCHLORIDE 1.4 MCG/HR: 4 INJECTION, SOLUTION INTRAVENOUS at 17:38

## 2022-01-08 RX ADMIN — Medication 5 MG: at 20:13

## 2022-01-08 RX ADMIN — DEXMEDETOMIDINE HYDROCHLORIDE 1.4 MCG/KG/HR: 4 INJECTION, SOLUTION INTRAVENOUS at 21:16

## 2022-01-08 RX ADMIN — SODIUM CHLORIDE, PRESERVATIVE FREE 10 ML: 5 INJECTION INTRAVENOUS at 09:13

## 2022-01-08 RX ADMIN — INSULIN LISPRO 1 UNITS: 100 INJECTION, SOLUTION INTRAVENOUS; SUBCUTANEOUS at 20:15

## 2022-01-08 RX ADMIN — QUETIAPINE FUMARATE 25 MG: 25 TABLET ORAL at 20:13

## 2022-01-08 ASSESSMENT — PAIN DESCRIPTION - PROGRESSION
CLINICAL_PROGRESSION: NOT CHANGED

## 2022-01-08 ASSESSMENT — PAIN SCALES - GENERAL: PAINLEVEL_OUTOF10: 0

## 2022-01-08 NOTE — FLOWSHEET NOTE
Patient c/o pain at CT insertion site. Patient permitted writer to assess site. Site dressing wnl, no new drainage, no crepitus noted. CT drainage minimal serosanguinous. CT continues to water seal.  Patient refuses pain medication, refuses vital signs, refuses CT dressing change. Patient continues oriented to name only, sitter continues at bedside for patient safety.

## 2022-01-08 NOTE — PROGRESS NOTES
General Surgery Progress Note       PATIENT NAME: Regine Alvarado   TODAY'S DATE: 1/8/2022, 6:18 AM    SUBJECTIVE:    Pt seen and examined. No acute issues overnight aside for continued ICU delirium. Denies any f/c, n/v, sob or abd pain at this time. Unsure of flatus or BM. Did not receive enema overnight per RN due to being too agitated and needed a sitter. They will re-attempt in am when family is potentially around. OBJECTIVE:   Vitals:  /67   Pulse 74   Temp 97.7 °F (36.5 °C) (Oral)   Resp 15   Wt 157 lb (71.2 kg)   SpO2 95%   BMI 21.29 kg/m²      INTAKE/OUTPUT:      Intake/Output Summary (Last 24 hours) at 1/8/2022 0618  Last data filed at 1/8/2022 0600  Gross per 24 hour   Intake --   Output 555 ml   Net -555 ml     General: Alert to self, NAD  Lungs: Symmetrical chest rise bilaterally  Heart: RRR  Abdomen: Soft, ND, Non tender, non peritoneal, no rebound   Extremity: moves all extremities x4,    Data Review:  CBC:   Recent Labs     01/06/22  0433 01/07/22  0242   WBC 7.2 7.4   HGB 9.7* 9.6*    165     BMP:    Recent Labs     01/06/22  0433 01/07/22  0242   * 128*   K 4.2 4.0   CL 99 96*   CO2 18* 19*   BUN 30* 23   CREATININE 1.17 1.06   GLUCOSE 131* 102*     Hepatic: No results for input(s): AST, ALT, ALB, ALKPHOS, BILITOT, BILIDIR in the last 72 hours. Coagulation: No results for input(s): APTT, PROT, INR in the last 72 hours. ASSESSMENT     Patient Active Problem List   Diagnosis    Angina pectoris ([address]    ASHD (arteriosclerotic heart disease)    Hypertension    Diabetes ([address]    Hyperlipidemia    Shortness of breath, post procedure improving probably Effient side effect.      S/P angioplasty with stent, obtuse marginal    Type II or unspecified type diabetes mellitus without mention of complication, not stated as uncontrolled    Calculus of kidney    Actinic keratosis    Senile nuclear sclerosis    Dyspepsia    Epigastric pain    Substernal chest pain    Abnormal CT scan, stomach    Abnormal gastrointestinal PET scan    Strain of lumbar region    Pneumothorax after biopsy    Pneumothorax, post biopsy, right    Pulmonary nodule       PLAN  69 yo M s/p 1/4/21 R lobectomy with post operative ileus/ extensive stool burden noted on CT A/P 1/7/21     Plan:  1. Continue medical mgmt and supportive care per primary  2. Recommend for enema, please give today   3. If has emesis place NGT and set to [facility] THEE, other wise ok for sips with meds and depending on response to enema and symptoms will start aggressive PO bowel regimen  4. Continue to monitor abd exam and bowel function, goal K+ 4.0, Mg 2.0   5. Encourage IS use and ambulation/ activity       Electronically signed by Fam Morales DO  on 1/8/2022 at 6:18 AM       General Surgery Attending     I have seen and examined this patient, including review of pertinent history and exam findings, labs, imaging, vitals. I discussed patient this am with chief resident Dr. Ricardo. I have performed the key elements of the encounter. I agree with the assessment, plan, and orders as documented by the surgical resident and any pertinent changes or updates have been made. His abdomen is quite soft and benign on exam. Spoke with RN. Enema was ordered earlier. [id number] Sophia Odom also for suppository- apparently he refused the enema. He does not have a bowel obstruction on exam. Imaging reviewed. I spoke to wife and daughter outside room as well.      Dr. Sravani Barlow

## 2022-01-08 NOTE — PROGRESS NOTES
Patient is still confusion but much calm and cooperative. Yoni. Soft restraints discontinued at this time. Sitter remains at bedside for safety reason.

## 2022-01-08 NOTE — PROGRESS NOTES
[facility] Cardiothoracic Surgery  Progress Note    1/8/2022 12:04 PM  Surgeon: Mindi Lopez  POD # 4  S/P:  Right VATS with RML lobectomy     Subjective:  Mr. Davion Gonzales   Resting in chair with no SOB or distress  Objective:  BP (!) 142/70   Pulse 82   Temp 97.4 °F (36.3 °C) (Axillary)   Resp 20   Wt 157 lb (71.2 kg)   SpO2 96%   BMI 21.29 kg/m²     CXR-small right apical pneumothorax that's stable    Labs: Labs reviewed today  CBC:   Recent Labs     01/06/22  0433 01/07/22  0242   WBC 7.2 7.4   HGB 9.7* 9.6*   HCT 29.8* 29.2*   MCV 89.8 87.4    165     BMP:   Recent Labs     01/06/22 0433 01/07/22  0242   * 128*   K 4.2 4.0   CL 99 96*   CO2 18* 19*   BUN 30* 23   CREATININE 1.17 1.06       I/O: I/O last 3 completed shifts:  In: -   Out: 555 [Urine:500; Chest Tube:55]  Scheduled Meds:   melatonin  5 mg Oral Nightly    [Held by provider] sertraline  50 mg Oral Nightly    QUEtiapine  25 mg Oral Nightly    pantoprazole  40 mg IntraVENous Daily    And    sodium chloride (PF)  10 mL IntraVENous Daily    [Held by provider] gabapentin  100 mg Oral TID    lisinopril  20 mg Oral Daily    isosorbide mononitrate  30 mg Oral Daily    ferrous sulfate  325 mg Oral Daily    atorvastatin  20 mg Oral Daily    sodium chloride flush  10 mL IntraVENous 2 times per day    polyethylene glycol  17 g Oral Daily    lidocaine  1 patch TransDERmal Daily    alogliptin  12.5 mg Oral BID WC    And    metFORMIN  1,000 mg Oral BID WC    glipiZIDE  5 mg Oral BID AC    insulin lispro  0-12 Units SubCUTAneous TID WC    insulin lispro  0-6 Units SubCUTAneous Nightly     Continuous Infusions:   dexmedetomidine 1.4 mcg/kg/hr (01/08/22 1140)    sodium chloride 75 mL/hr at 01/08/22 0026    dextrose      dextrose      bupivacaine 0.5%       PRN Meds:haloperidol lactate, dexmedetomidine, glycerin moisturizing mouth spray, benzocaine-menthol, ondansetron, sodium chloride flush, acetaminophen, oxyCODONE **OR** oxyCODONE, morphine, magnesium hydroxide, bisacodyl, glucose, dextrose, glucagon (rDNA), dextrose, glucose, dextrose, glucagon (rDNA), dextrose      A/P:  Overnight events noted. Confusion/ Sleep deprivation. Haldol and Precedex non violent restraints ordered, (hospital protocol followed). CT scan abdomen review. General surgery consulted. Appreciate assistance  Consult IM for acute psychosis and medical management. After combative episodes CXR shows new apical pneumo. Place chest tube back to suction.     USHA Russ

## 2022-01-08 NOTE — FLOWSHEET NOTE
Patient continues confused, attempting to get out of bed, difficult to redirect, continues verbally abusive to staff. Refusing care, precedex infusion started per orders. Sitter continues at bedside for patient safety.

## 2022-01-08 NOTE — FLOWSHEET NOTE
Patient continues restless, attempting to get out of bed and pull at lines, tubes, equipment. Verbally abusive to staff, confused to place and time, intermittently oriented to recent surgery. Difficult to reorient. Patient requesting his shoes and clothes so he can leave. Writer able to educate patient that his family has his personal property, patient returned to bed without incident. Sitter continues at bedside for patient safety.

## 2022-01-08 NOTE — PROGRESS NOTES
Patient had bilateral soft restraints applied at 4 am and called thoracic surgeon on call USHA Whiting to obtain restraints order. He answered and states he would write the order when he is up.

## 2022-01-08 NOTE — PROGRESS NOTES
Patient is out of bed and does not follow commands and get very agitated when he is told to go back to bed and verbally abuse staff, combative. It took 5 staff to get him back to bed and denis soft restrains applied. thoracic surgeon on call PA Lawrnce Oppenheim notified the situation and asked him again to put the restraints order as I told him patient's restraints was taken off at 0800 because there was no order within 4 hours after application per protocal. He said he is on the way to the hospital and informed me he can sign the order within 24 hours if patient is required to apply the restraints in an emergency situation.  Writer spoke to day shift nursing supervisor Cassandra White and he will clarify and provide me the restraints order protocal.

## 2022-01-08 NOTE — PROGRESS NOTES
Occupational Therapy   Occupational Therapy Initial Assessment  Date: 2022   Patient Name: Mandie Chowdhury  MRN: [id number]     : 1950    Date of Service: 2022    Discharge Recommendations:  Patient would benefit from continued therapy after discharge Pt is currently unsafe to return to their prior living arrangements without / assist/supervision to safely engage in all aspects of ADLs, IADLs and functional mobility tasks. OT Equipment Recommendations  Equipment Needed: No    Assessment   Performance deficits / Impairments: Decreased functional mobility ; Decreased ADL status; Decreased endurance;Decreased high-level IADLs;Decreased cognition;Decreased safe awareness  Assessment: Pt standing at EOB with RN upon arrival. Pt slightly agitated upon arrival but wanted to complete functional mobility. No AD. Pt completed functional mobility in room and hallway. When asked to return to room pt's agitation increased. Max direction pt returned to seated position. He impulsively stood up with no awareness of lines/tubes and pushed writer away. Writer attempted to hold gait belt d/t pt's risk of tripping and pt hit and was punching at [address]. Writer called out for additional staff. Pt agitation did not decreased and was returned supine and placed in bilateral soft restraints. Pt is expected to require skilled OT services during their acute hospitalization stay to address the above noted deficits through skilled occupational therapy intervention for promotion of increased independence throughout ADLs, IADLs and functional mobility tasks.    Prognosis: Good  Decision Making: Medium Complexity  Patient Education: Educated on OT role, OT POC, activity promotion, purpose of gait belt, purpose of attending to lines/IVs, importance of receiving medical care prior to leaving hosptial-poor return  Barriers to Learning: Cognition/agitation  REQUIRES OT FOLLOW UP: Yes  Activity Tolerance  Activity Tolerance: Treatment Yes  Family / Caregiver Present: No  General Comment  Comments: RN ok'd for OT/PT eval this AM. Pt agreeable to session, but confused throughout. Agitated easily throughout. Pt fixated on wanting to leave hosptial and go home despite education. Patient Currently in Pain:  (Did not appear to be in pain throughout session)  Pain Assessment  Clinical Progression: Not changed  Response to Pain Intervention: Patient Satisfied  RASS Score: Agitated  Vital Signs  Patient Currently in Pain:  (Did not appear to be in pain throughout session)     Social/Functional History  Social/Functional History  Lives With: Spouse  Type of Home: House  Home Layout: Two level,Performs ADL's on one level  Home Access: Stairs to enter without rails  Entrance Stairs - Number of Steps: 5  Receives Help From: Family  ADL Assistance: Independent  Homemaking Assistance: Independent  Ambulation Assistance: Independent  Transfer Assistance: Independent  Active : Yes  Mode of Transportation: Car  Additional Comments: social per chart, pt irritable max edu to redirect plus addional staf, uable to obtain further PLOF       Objective   Vision:  (RICCI d/t agitation/cognition)  Hearing:  (RICCI d/t agitation/cognition)      Orientation  Overall Orientation Status: Impaired  Orientation Level: Disoriented to place; Disoriented to situation     Balance  Sitting Balance: Contact guard assistance (CGA d/t impulsivity, not waiting for writer's direction/readiness to transfer to standing and no awareness of lines.  Seated EOB for ~5 minutes.)  Standing Balance: Contact guard assistance  Standing Balance  Time: 3 min  Activity: static standing EOB, dynamically standing to push and swing at writer w/ no LOB  Comment: No LOB; however, pt significantly impulsive with no awareness, almost tripping over chest tube multiple times    Functional Mobility  Functional - Mobility Device: No device  Activity: Other  Assist Level: Contact guard assistance  Functional Mobility Comments: Completed mobility in room and around hosptial unit for household/community distances. Pt began session agitated and wanted to complete mobility. Once back in room, pt's agitation increased d/t having to return to room. He returned to sitting position and after ~1 minute impulsivly stood w/ no attention to lines/tubes. Writer went to place hand on gait belt to ensure balance for safety and physically pushed writer away. Writer attempted to stabalize pt to ensure balance again and pt began to punch and hit writer. RN and sitter in room. Writer screamed for more assistance and 2-3 additional RNs entered room. 5-6 people in room attempting to direct back to bed. Pt cued to return supine and placed into bilateral soft restraints. ADL  Feeding: Stand by assistance;Setup;Verbal cueing  Grooming: Setup; Increased time to complete;Verbal cueing;Contact guard assistance;Stand by assistance  UE Bathing: Contact guard assistance;Setup;Verbal cueing; Increased time to complete  LE Bathing: Contact guard assistance;Verbal cueing;Setup; Increased time to complete  UE Dressing: Contact guard assistance;Verbal cueing;Setup; Increased time to complete  LE Dressing: Contact guard assistance;Setup;Verbal cueing; Increased time to complete  Toileting: Contact guard assistance;Setup; Increased time to complete  Additional Comments: D/t pt agitation after mobility, not appropriate to continue on to functional activities and ADLs. Quality of Movement Other  Comment: RICCI d/t agitation/cognition     Bed mobility  Sit to Supine: Contact guard assistance (Pt reluctantly agreed to return supine with max encoruagement.)  Scooting: Contact guard assistance  Comment: Standing edge of bed with RN upon arrival. Retired supine with nursing team at end of session.     Transfers  Sit to stand: Contact guard assistance  Stand to sit: Contact guard assistance  Transfer Comments: No AD     Cognition  Overall Cognitive Status: Exceptions  Arousal/Alertness: Inconsistent responses to stimuli  Following Commands: Follows one step commands with repetition  Attention Span: Difficulty dividing attention; Difficulty attending to directions  Safety Judgement: Decreased awareness of need for assistance;Decreased awareness of need for safety  Problem Solving: Assistance required to identify errors made;Assistance required to generate solutions;Assistance required to implement solutions  Insights: Not aware of deficits  Initiation: Requires cues for some  Sequencing: Requires cues for some  Cognition Comment: Pt significantly impulsive throughout entire session. Pt fixated on going home despite education. Pt with no awareness of being attached to chest tube and IV pole. Pt became physically agressive during session.         Sensation  Overall Sensation Status:  (RICCI d/t cognition/agitation)        LUE AROM (degrees)  LUE AROM : WFL  LUE General AROM: Not formally assessed d/t agitation, appears WFL throughout session  Left Hand AROM (degrees)  Left Hand AROM: WFL  RUE AROM (degrees)  RUE AROM : WFL  RUE General AROM: Not formally assessed d/t agitation, appears WFL throughout session  Right Hand AROM (degrees)  Right Hand AROM: WFL  LUE Strength  LUE Strength Comment: Not formally assessed d/t agitation, appears WFL throughout session  RUE Strength  RUE Strength Comment: Not formally assessed d/t agitation, appears WFL throughout session                   Plan   Plan  Times per week: 3-4x/wk  Current Treatment Recommendations: Safety Education & Training,Balance Training,Patient/Caregiver Education & Training,Self-Care / ADL,Cognitive/Perceptual Training,Functional Mobility Training,Equipment Evaluation, Education, & procurement,Endurance Training,Cognitive Reorientation,Home Management Training    AM-PAC Score        AM-[facility] Inpatient Daily Activity Raw Score: 19 (01/08/22 1449)  AM-PAC Inpatient ADL T-Scale Score : 40.22 (01/08/22 1449)  ADL Inpatient CMS 0-100% Score: 42.8 (01/08/22 1449)  ADL Inpatient CMS G-Code Modifier : CK (01/08/22 1449)    Goals  Short term goals  Time Frame for Short term goals: By discharge, pt will:  Short term goal 1: Demo functional sit<>stand transfers with SUP and 0 VCs for safety  Short term goal 2: Demo functional mobility with SUP and 0 VCs for safety  Short term goal 3: Demo 10 min of dynamic standing balance while engaging in ADLs/functional tasks with SBA  Short term goal 4: Demo UB ADLs with Mod IND  Short term goal 5: Demo LB ADLs with SBA and use of DME  Short term goal 6: Follow 90% of 2 step commands throughout all functional tasks with 0 VCs  Short term goal 7: Maintain attention to ADL/functional task for 5 minutes w/ 0 VCs for redirection/attention       Therapy Time   Individual Concurrent Group Co-treatment   Time In 0911         Time Out 0929         Minutes 18         Timed Code Treatment Minutes: 8 Minutes       Osvaldo Hurst OTR/L

## 2022-01-08 NOTE — CONSULTS
like forgetting where he left something, but these are only very mild and he has never been confused like this before    He also developed postop ileus and has been on reglan and pepcid for that    Past Medical History:     Past Medical History:   Diagnosis Date    Abnormal cardiac cath 09/20/2013    LMCA; Mild irregularities 10-20%. LAD: Abnormal.  Mild to moderate irregularities throughout the LAD and branches. LCx:  abnormal.  80-90% stenosis in a moderate sized OM1. RCA: Abnormal.  40-50% mid RCA stenosis.  Actinic keratosis     Arthritis     neck, fingers    CAD (coronary artery disease)     Calculus of kidney     Cervical stenosis of spine     Diabetes mellitus (HCC)     Dr. Derik Moncada, CNP, [place]    Full dentures     GERD (gastroesophageal reflux disease)     H/O echocardiogram 04/29/2016    Stress echo. Normal resting LV systolic function,normal systolic restponse to exercise. No evidence of reversible  ischemia on this maximal,symptom limited,treadmill exerxiess stress echocardiography study    History of cardiac cath 02/09/2017    LMCA: Lesion on LMCA: Distal subsection . 20% stenosis. LAD: Lesion on 1st Diag: Mid subsection . 75% stenosis. RCA: Lesion on Mid RCA: Mid subsection . 100% stenosis. Comments: The distal RCA fills by left to right collateralization. Dominance: Mixed. LV function assessed as: Normal. EF 65%.  History of echocardiogram 06/11/2018    EF 60%. Mildly increased LV wall thickness. Evidence of mild diastolic dysfunction seen.  History of echocardiogram 01/18/2019    EF of 65%. LV wall thickness is mildly increased. Aortic valve leaflets are mildly thickened.      History of heart attack     History of tobacco abuse     Quit 10/2021, 1/4 ppd for 47 years    Hyperlipidemia     Hypertension     Dr. Derik Moncada, CNP    Pulmonary nodule 12/2021    Right middle lobe    Raynaud's phenomenon     S/P angioplasty with stent 09/20/2013    PTCA-NEDRA of  the OM 1    Wears Taking? Authorizing Provider   oxyCODONE (ROXICODONE) 5 MG immediate release tablet Take 1 tablet by mouth every 4 hours as needed for Pain for up to 7 days. 1/7/22 1/14/22 Yes ALONDRA Winters - CNP   SERTRALINE HCL PO Take 50 mg by mouth nightly 12/1/21  Yes Historical Provider, MD   tamsulosin (FLOMAX) 0.4 MG capsule Take 0.4 mg by mouth daily    Historical Provider, MD   tiZANidine (ZANAFLEX) 4 MG capsule Take 4 mg by mouth 3 times daily as needed    Historical Provider, MD   ferrous sulfate (IRON 325) 325 (65 Fe) MG tablet Take 325 mg by mouth daily     Historical Provider, MD   isosorbide mononitrate (IMDUR) 30 MG extended release tablet 1/2 tablet in the morning    Historical Provider, MD   lisinopril (PRINIVIL;ZESTRIL) 20 MG tablet Take 20 mg by mouth daily     Historical Provider, MD   nitroGLYCERIN (NITRODUR) 0.4 MG/HR 1 dose under tongue as needed for chest pain.  max of 3 in one day    Historical Provider, MD   pantoprazole (PROTONIX) 40 MG injection as needed     Historical Provider, MD   amLODIPine (NORVASC) 5 MG tablet Take 1 tablet by mouth daily 7/15/21   Mayte Kern MD   clopidogrel (PLAVIX) 75 MG tablet Take 1 tablet by mouth daily  Patient taking differently: Take 75 mg by mouth daily Ordered by heart doctor, Chan Schaffer 7/15/21   Mayte Kern MD   atenolol (TENORMIN) 25 MG tablet Take 1 tablet by mouth daily 6/7/21   Mayte Kern MD   Insulin Glargine, 2 Unit Dial, 300 UNIT/ML SOPN Insulin Glargine Insulin Glargine U-300 Conc Active 10 UNIT Subcutaneous Every morning May 7th, 2020 11:47am 05-  [facility] ([id number]) 5/7/20   Historical Provider, MD   atorvastatin (LIPITOR) 20 MG tablet Take 1 tablet by mouth daily 12/15/20   Mayte Kern MD   sitaGLIPtan-metformin (JANUMET)  MG per tablet Take 1 tablet by mouth 2 times daily (with meals)    Historical Provider, MD   glimepiride (AMARYL) 2 MG tablet Take 1 tablet by mouth 2 times daily 5/22/15 Shanthi Park MD   aspirin 81 MG tablet Take 81 mg by mouth daily Ordered by heart doctor, Dr. Eitan Cristina [place]    Historical Provider, MD        Allergies:     Niacin and related, Minocycline hcl, Other, and Oxycodone hcl    Social History:     Tobacco:    reports that he quit smoking about 3 months ago. His smoking use included cigarettes. He has a 13.50 pack-year smoking history. He has never used smokeless tobacco.  Alcohol:      reports no history of alcohol use. Drug Use:  reports no history of drug use. Family History:     Family History   Problem Relation Age of Onset    Heart Disease Mother     Diabetes Mother     High Blood Pressure Mother     Diabetes Father     Alzheimer's Disease Father     Heart Disease Sister         CABG    Heart Disease Brother     Heart Disease Brother     Heart Disease Brother     Cancer Brother        Review of Systems:     unobtainable      Physical Exam:     BP (!) 142/70   Pulse 82   Temp 97.4 °F (36.3 °C) (Axillary)   Resp 20   Wt 157 lb (71.2 kg)   SpO2 96%   BMI 21.29 kg/m²   Temp (24hrs), Av.7 °F (36.5 °C), Min:97.4 °F (36.3 °C), Max:98.1 °F (36.7 °C)    Recent Labs     22  1742 22  0754 22  1137   POCGLU 116* 107 158* 189*       Intake/Output Summary (Last 24 hours) at 2022 1145  Last data filed at 2022 0600  Gross per 24 hour   Intake --   Output 555 ml   Net -555 ml       General Appearance:  alert, well appearing, and in no acute distress  Mental status: oriented to person,confused, easily agitated but redirectable  Head:  normocephalic, atraumatic.   Eye: no icterus, redness, pupils equal and reactive, extraocular eye movements intact, conjunctiva clear  Ear: normal external ear, no discharge, hearing intact  Nose:  no drainage noted  Mouth: mucous membranes moist  Neck: supple, no carotid bruits, thyroid not palpable  Lungs: Bilateral equal air entry, clear to ausculation, no wheezing, rales or rhonchi, normal effort  Cardiovascular: normal rate, regular rhythm, no murmur, gallop, rub. Abdomen: Soft, nontender, nondistended, normal bowel sounds, no hepatomegaly or splenomegaly  Neurologic: confused, good  strength denis, will not otherwise cooperate with exam but moves all extremities. Was able to have him move eyes in all directions but could not test additional cn nor peripheral vision nor test for dysmetria  Skin: No gross lesions, rashes, bruising or bleeding on exposed skin area  Extremities:  peripheral pulses palpable, no pedal edema or calf pain with palpation  Psych: normal affect but drowsy from haldol and precedex    Investigations:      Laboratory Testing:  Recent Results (from the past 24 hour(s))   POC Glucose Fingerstick    Collection Time: 01/07/22  2:10 PM   Result Value Ref Range    POC Glucose 129 (H) 75 - 110 mg/dL   POC Glucose Fingerstick    Collection Time: 01/07/22  5:42 PM   Result Value Ref Range    POC Glucose 116 (H) 75 - 110 mg/dL   URINALYSIS    Collection Time: 01/07/22  7:10 PM   Result Value Ref Range    Color, UA Yellow Yellow    Turbidity UA Clear Clear    Glucose, Ur NEGATIVE NEGATIVE    Bilirubin Urine NEGATIVE NEGATIVE    Ketones, Urine NEGATIVE NEGATIVE    Specific Gravity, UA 1.015 1.005 - 1.030    Urine Hgb NEGATIVE NEGATIVE    pH, UA 6.5 5.0 - 8.0    Protein, UA NEGATIVE NEGATIVE    Urobilinogen, Urine Normal Normal    Nitrite, Urine NEGATIVE NEGATIVE    Leukocyte Esterase, Urine NEGATIVE NEGATIVE    Urinalysis Comments       Microscopic exam not performed based on chemical results unless requested in original order.    POC Glucose Fingerstick    Collection Time: 01/07/22  8:22 PM   Result Value Ref Range    POC Glucose 107 75 - 110 mg/dL   POC Glucose Fingerstick    Collection Time: 01/08/22  7:54 AM   Result Value Ref Range    POC Glucose 158 (H) 75 - 110 mg/dL   POC Glucose Fingerstick    Collection Time: 01/08/22 11:37 AM   Result Value Ref Range    POC Glucose 189 (H) 75 - 110 mg/dL       Imaging/Diagonstics:  CT ABDOMEN PELVIS WO CONTRAST Additional Contrast? None    Result Date: 1/7/2022  1. Apparent localized ileus involving a few loops of ileum in the central abdomen. 2. Small right-sided pneumothorax. 3.  Cholelithiasis without evidence of acute cholecystitis. 4.  Small amount of non dependent air within the urinary bladder. Correlate with any evidence of recent instrumentation. RECOMMENDATIONS: Unavailable     XR CHEST PORTABLE    Result Date: 1/8/2022  Right apical pneumothorax measuring 1.6 cm. Subtle bibasilar infiltrates. Stable right-sided chest tube. XR CHEST PORTABLE    Result Date: 1/7/2022  Persistent small right apical pneumothorax with 2 right chest tubes in place. Resolving bilateral lower lung subsegmental atelectasis     XR CHEST PORTABLE    Result Date: 1/6/2022  No significant change in small right apical pneumothorax with chest tubes in place. XR CHEST PORTABLE    Result Date: 1/5/2022  No significant interval change. Right chest tubes in place with persistent small right apical pneumothorax. XR CHEST PORTABLE    Result Date: 1/4/2022  Interval right-sided thoracotomy with right-sided chest tubes in-situ. Small right apical pneumothorax. Right hilar surgical clips related to recent surgery. .       Assessment :      Hospital Problems           Last Modified POA    * (Principal) Primary adenocarcinoma of right lung ([address] 1/8/2022 Yes    Delirium 1/8/2022 No    Coronary artery disease involving native coronary artery of native heart without angina pectoris 1/8/2022 Yes    Primary hypertension 1/8/2022 Yes    Type 2 diabetes mellitus without complication, without long-term current use of insulin ([address] 1/8/2022 Yes    Hyperlipidemia 1/8/2022 Yes    Hyponatremia 1/8/2022 No    Postoperative ileus ([address] 1/8/2022 No          Plan:     1. suspect delirium is multifactorial but probably primarily icu psychosis/sleep deprivation, complicated by dropping na and possibly some meds he is on as well  2. Recheck labs today; may need to fluid restrict him if na dropping  3. Stop meds that can alter awareness: pepcid, reglan, neurontin; try and avoid haldol  4. Will keep him on seroquel at night for now  5. Hold zoloft as it can lower na  6. Wean precedex as able  7. Goal would be to keep him awake during day and sleep at night  8. Need to rule out structural causes too, especially in light of newly diagnosed lung ca: get ct head, he would not cooperate with mri brain-which he will need at later date  5. Monitor/control glucose  10. D/w surgery resident  6. D/w wife at bedside  12.  D/w rn    Consultations:   IP CONSULT TO SPIRITUAL SERVICES  IP CONSULT TO PULMONOLOGY  IP CONSULT TO ONCOLOGY  IP CONSULT TO GENERAL SURGERY  IP CONSULT TO INTERNAL MEDICINE      Juan Pablo Mulligan DO  1/8/2022  11:45 AM    Copy sent to Dr. Christia Riedel

## 2022-01-08 NOTE — PROGRESS NOTES
Physical Therapy    Facility/Department: [facility] 1  Initial Assessment    NAME: Milan Russell  : 1950  MRN: [id number]    No chief complaint on file. -s/p R thoracotomy and lobectomy     Date of Service: 2022    Discharge Recommendations:    Further therapy recommended at discharge. PT Equipment Recommendations  Equipment Needed: No    Assessment   Body structures, Functions, Activity limitations: Decreased functional mobility ; Decreased strength;Decreased endurance;Decreased balance  Assessment: amb 300' CGA no AD, redirection throughout. Pt would benefit from continued acute PT to address deficits. Prognosis: Good  Decision Making: Medium Complexity  PT Education: Plan of Care;PT Role;General Safety; Injury Prevention  Patient Education: frequent redirection, need for safety with lines  REQUIRES PT FOLLOW UP: Yes  Activity Tolerance  Activity Tolerance: Patient Tolerated treatment well;Patient limited by fatigue;Patient limited by cognitive status  Activity Tolerance: difficult to redirect       Patient Diagnosis(es): The encounter diagnosis was S/P thoracotomy. has a past medical history of Abnormal cardiac cath, Actinic keratosis, Arthritis, CAD (coronary artery disease), Calculus of kidney, Cervical stenosis of spine, Diabetes mellitus ([facility] [address] Full dentures, GERD (gastroesophageal reflux disease), H/O echocardiogram, History of cardiac cath, History of echocardiogram, History of echocardiogram, History of heart attack, History of tobacco abuse, Hyperlipidemia, Hypertension, Pulmonary nodule, Raynaud's phenomenon, S/P angioplasty with stent, and Wears hearing aid in both ears. has a past surgical history that includes Cardiac catheterization (); Hemorrhoid surgery; Neck surgery (); other surgical history (10/2014); other surgical history (2014); Cataract removal (2015); Upper gastrointestinal endoscopy (2005); Cataract removal (2015);  Colonoscopy (01/11/2006); Colonoscopy (05/23/2016); Cardiac catheterization (02/09/2017); Cardiac catheterization (02/09/2017); Cardiac catheterization (01/14/2019); Upper gastrointestinal endoscopy (N/A, 02/05/2019); Upper gastrointestinal endoscopy (N/A, 10/22/2019); Lung biopsy (Right, 07/28/2021); CT NEEDLE BIOPSY LUNG PERCUTANEOUS (7/28/2021); hernia repair (Left); and Lung removal, total (Right, 1/4/2022). Restrictions  Restrictions/Precautions  Restrictions/Precautions: General Precautions,Fall Risk,Up as Tolerated  Required Braces or Orthoses?: No  Position Activity Restriction  Other position/activity restrictions: amb pt. s/p R thoracotomy and lobectomy 1/4  Vision/Hearing        Subjective  General  Chart Reviewed: Yes  Patient assessed for rehabilitation services?: Yes  Response To Previous Treatment: Not applicable  Family / Caregiver Present: No  Follows Commands: Within Functional Limits  General Comment  Comments: OT co-eval  Subjective  Subjective: RN and pt agreeable to PT.  Pt alert in bed upon arrival.  Pain Screening  Patient Currently in Pain:  (pt didn't c/o of any pain, irritable and difficult to redirect throughout.)  Pain Assessment  Response to Pain Intervention: Patient Satisfied  Vital Signs  Patient Currently in Pain:  (pt didn't c/o of any pain, irritable and difficult to redirect throughout.)  Pre Treatment Pain Screening  Intervention List: Patient able to continue with treatment    Orientation  Orientation  Overall Orientation Status: Impaired  Orientation Level:  (\"distribution\" when asked where we are)  Social/Functional History  Social/Functional History  Lives With: Spouse  Type of Home: House  Home Layout: Two level,Performs ADL's on one level  Home Access: Stairs to enter without rails  Entrance Stairs - Number of Steps: 5  Receives Help From: Family  ADL Assistance: Independent  Homemaking Assistance: Independent  Ambulation Assistance: Independent  Transfer Assistance: Independent  Active : Yes  Mode of Transportation: Car  Additional Comments: social per chart, pt irritable max edu to redirect plus addional staf, uable to obtain further PLOF  Cognition    max redirection, pt perseverating on getting to parking lot,     Objective          AROM RLE (degrees)  RLE AROM: WFL  AROM LLE (degrees)  LLE AROM : WFL  AROM RUE (degrees)  RUE AROM : WFL  AROM LUE (degrees)  LUE AROM : WFL  Strength RLE  Strength RLE: WFL  Strength LLE  Strength LLE: WFL  Strength RUE  Strength RUE: WFL  Strength LUE  Strength LUE: WFL  Strength Other  Other: RICCI formally d/t agitated, no buckling/ LOB with ambulation        Bed mobility  Comment: standing at bedside upon arrival with RN  Transfers  Sit to Stand: Contact guard assistance  Stand to sit: Contact guard assistance  Comment: inattention to safety, max cues to maintain proximity to lines/ wait for safety. Ambulation  Ambulation?: Yes  Ambulation 1  Surface: level tile  Device: No Device  Assistance: Contact guard assistance  Quality of Gait: good joanne, no LOB, reciprocal, max redirection  Distance: 300'  Stairs/Curb  Stairs?: No     Balance  Posture: Good  Sitting - Static: Good  Sitting - Dynamic: Good  Standing - Static: Good;-  Standing - Dynamic: Fair;+  Comments: no AD used  Exercises  Comments: max redirection, pt frequently stating he's going to the parking lot, max cues to renter room. Pt in recliner, would not stay, states \"don't touch me\" or \"get away\" when given cues to return to bed. 5 ppl in room cueing and redirection to get back into bed at end of session.      Plan   Plan  Times per week: 3-5x/wk  Current Treatment Recommendations: Strengthening,Balance Training,Endurance Training,Functional Mobility Training,Transfer Training,Gait Training,Stair training,Home Exercise Program,Safety Education & Training,Patient/Caregiver Education & Training,Equipment Evaluation, Education, & procurement  Safety Devices  Type of devices: Call light within reach,Nurse notified,Gait belt,Patient at risk for falls,All fall risk precautions in place,Left in bed  Restraints  Initially in place: Yes  Restraints: B soft wrist UE, pt had attempted to push and punch/ hit co-evaling OT while nearly losing balance, RN contacting physician for orders.     AM-PAC Score  AM-PAC Inpatient Mobility Raw Score : 20 (01/08/22 1405)  AM-PAC Inpatient T-Scale Score : 47.67 (01/08/22 1405)  Mobility Inpatient CMS 0-100% Score: 35.83 (01/08/22 1405)  Mobility Inpatient CMS G-Code Modifier : CJ (01/08/22 1405)          Goals  Short term goals  Time Frame for Short term goals: 10 visits  Short term goal 1: Pt will be Omari bed mobility  Short term goal 2: Pt will be Omari transfers  Short term goal 3: Pt will be Omari amb 300'  Short term goal 4: Pt will navigate 5 steps Omari       Therapy Time   Individual Concurrent Group Co-treatment   Time In 0911         Time Out 0927         Minutes 16         Timed Code Treatment Minutes: 8 Minutes       Diego Leach PT

## 2022-01-08 NOTE — FLOWSHEET NOTE
Dr Tamir Golden at bedside, updated that patient is not cooperative and refused enema last night. Will reattempt when family is present if patient is more cooperative at that time.

## 2022-01-08 NOTE — FLOWSHEET NOTE
Patient confused, oriented to name only, attempting to get out of bed, pull lines and tubes, not cooperative, attempting to hit staff, security at bedside. Terry Stone, [facility], notified, new order received for haldol, med given. Patient redirected to back bed, appears cooperative after haldol given. Continue to monitor, sitter continues at bedside for patient safety. [facility] surgery resident, Dr Ty Cuba, notified that patient not cooperative with care, unable to give patient enema at this time.

## 2022-01-08 NOTE — FLOWSHEET NOTE
Writer notified Dr Ty Cuba, on call physician for general surgery, that Dr Liliam Deluca ordered enema but patient not cooperative, refusing enema. Will attempt when family is present if patient more cooperative. No new orders at this time.

## 2022-01-08 NOTE — PROGRESS NOTES
Today's Date: 1/8/2022  Patient Name: Malena Sosa  Date of admission: 1/4/2022  7:49 AM  Patient's age: 70 y.o., 1950  Admission Dx: Pulmonary nodule [R91.1]    Reason for Consult: cancer findings VATS  Requesting Physician: Jen Mendieta MD    CHIEF COMPLAINT:  No chief complaint on file. INTERVAL HISTORY:    Patient seen and examined  Episodes of confusion delirium overnight and this morning and currently under the strain  At this time he is not confused      HISTORY OF PRESENT ILLNESS IN BRIEF:    Malena Sosa is a 70 y.o. male who is admitted to the hospital on 1/4/2022  for VATS and wedge resection  Patient has history of tobacco smoking and patient has been following with pulmonology for her right middle lobe lung nodule and his recent scan on 11/2/2021 showed increase in the size of lung nodule. Patient has had biopsy on 7/28/2021 which showed benign parenchyma with chronic inflammation however this was very active on the PET scan. Patient underwent VATS, wedge resection of the right middle lobe lung mass on 1/4/2022. Surgical pathology is awaited. Oncology consult for further recommendations. Past Medical History:   has a past medical history of Abnormal cardiac cath, Actinic keratosis, Arthritis, CAD (coronary artery disease), Calculus of kidney, Cervical stenosis of spine, Diabetes mellitus ([address] Full dentures, GERD (gastroesophageal reflux disease), H/O echocardiogram, History of cardiac cath, History of echocardiogram, History of echocardiogram, History of heart attack, History of tobacco abuse, Hyperlipidemia, Hypertension, Pulmonary nodule, Raynaud's phenomenon, S/P angioplasty with stent, and Wears hearing aid in both ears. Past Surgical History:   has a past surgical history that includes Cardiac catheterization (2007); Hemorrhoid surgery; Neck surgery (1997); other surgical history (10/2014); other surgical history (12/11/2014);  Cataract removal (04/06/2015); Upper gastrointestinal endoscopy (12/28/2005); Cataract removal (06/08/2015); Colonoscopy (01/11/2006); Colonoscopy (05/23/2016); Cardiac catheterization (02/09/2017); Cardiac catheterization (02/09/2017); Cardiac catheterization (01/14/2019); Upper gastrointestinal endoscopy (N/A, 02/05/2019); Upper gastrointestinal endoscopy (N/A, 10/22/2019); Lung biopsy (Right, 07/28/2021); CT NEEDLE BIOPSY LUNG PERCUTANEOUS (7/28/2021); hernia repair (Left); and Lung removal, total (Right, 1/4/2022). Medications:    Prior to Admission medications    Medication Sig Start Date End Date Taking? Authorizing Provider   oxyCODONE (ROXICODONE) 5 MG immediate release tablet Take 1 tablet by mouth every 4 hours as needed for Pain for up to 7 days. 1/7/22 1/14/22 Yes ALONDRA Winters - CNP   SERTRALINE HCL PO Take 50 mg by mouth nightly 12/1/21  Yes Historical Provider, MD   tamsulosin (FLOMAX) 0.4 MG capsule Take 0.4 mg by mouth daily    Historical Provider, MD   tiZANidine (ZANAFLEX) 4 MG capsule Take 4 mg by mouth 3 times daily as needed    Historical Provider, MD   ferrous sulfate (IRON 325) 325 (65 Fe) MG tablet Take 325 mg by mouth daily     Historical Provider, MD   isosorbide mononitrate (IMDUR) 30 MG extended release tablet 1/2 tablet in the morning    Historical Provider, MD   lisinopril (PRINIVIL;ZESTRIL) 20 MG tablet Take 20 mg by mouth daily     Historical Provider, MD   nitroGLYCERIN (NITRODUR) 0.4 MG/HR 1 dose under tongue as needed for chest pain.  max of 3 in one day    Historical Provider, MD   pantoprazole (PROTONIX) 40 MG injection as needed     Historical Provider, MD   amLODIPine (NORVASC) 5 MG tablet Take 1 tablet by mouth daily 7/15/21   Tirso Romero MD   clopidogrel (PLAVIX) 75 MG tablet Take 1 tablet by mouth daily  Patient taking differently: Take 75 mg by mouth daily Ordered by heart doctor, Chan العلي 7/15/21   Tirso Romero MD   atenolol (TENORMIN) 25 MG tablet Take 1 tablet by mouth daily 6/7/21   Edmond Ramos MD   Insulin Glargine, 2 Unit Dial, 300 UNIT/ML SOPN Insulin Glargine Insulin Glargine U-300 Conc Active 10 UNIT Subcutaneous Every morning May 7th, 2020 11:47am 05-  [facility] ([id number]) 5/7/20   Historical Provider, MD   atorvastatin (LIPITOR) 20 MG tablet Take 1 tablet by mouth daily 12/15/20   Edmond Ramos MD   sitaGLIPtan-metformin (JANUMET)  MG per tablet Take 1 tablet by mouth 2 times daily (with meals)    Historical Provider, MD   glimepiride (AMARYL) 2 MG tablet Take 1 tablet by mouth 2 times daily 5/22/15   Isa Morrison MD   aspirin 81 MG tablet Take 81 mg by mouth daily Ordered by heart doctor, Dr. Kasey Cogan, [place]    Historical Provider, MD     Current Facility-Administered Medications   Medication Dose Route Frequency Provider Last Rate Last Admin    haloperidol lactate (HALDOL) injection 5 mg  5 mg IntraMUSCular Q6H PRN USHA Haynes   5 mg at 01/08/22 1108    melatonin tablet 5 mg  5 mg Oral Nightly Marga Banuelos APRN - CNP        [Held by provider] sertraline (ZOLOFT) tablet 50 mg  50 mg Oral Nightly Marga Banuelos APRN - CNP        QUEtiapine (SEROQUEL) tablet 25 mg  25 mg Oral Nightly Mirella Levine APRN - NP        pantoprazole (PROTONIX) injection 40 mg  40 mg IntraVENous Daily USHA Haynes   40 mg at 01/08/22 0913    And    sodium chloride (PF) 0.9 % injection 10 mL  10 mL IntraVENous Daily UHSA Haynes   10 mL at 01/08/22 0913    dexmedetomidine (PRECEDEX) 400 mcg in sodium chloride 0.9 % 100 mL infusion  0.2-1.4 mcg/kg/hr IntraVENous Continuous PRN USHA Haynes 24.9 mL/hr at 01/08/22 1424 1.4 mcg/kg/hr at 01/08/22 1424    0.9 % sodium chloride infusion   IntraVENous Continuous Sherie Blandon MD 75 mL/hr at 01/08/22 0026 New Bag at 01/08/22 0026    glycerin moisturizing mouth spray (OASIS) 35 % 2 spray  2 spray Mouth/Throat PRN ALONDRA Patel CNP   2 spray at 01/07/22 1100    benzocaine-menthol (CEPACOL SORE THROAT) lozenge 1 lozenge  1 lozenge Oral Q2H PRN Stephanie Moreland APRN - NP   1 lozenge at 01/06/22 0909    ondansetron (ZOFRAN) injection 4 mg  4 mg IntraVENous Q6H PRN Stephanie Moreland APRN - NP   4 mg at 01/07/22 1549    [Held by provider] gabapentin (NEURONTIN) capsule 100 mg  100 mg Oral TID ALONDRA Hunter CNP   100 mg at 01/07/22 1412    lisinopril (PRINIVIL;ZESTRIL) tablet 20 mg  20 mg Oral Daily Stephanie Moreland APRN - NP   20 mg at 01/07/22 1053    isosorbide mononitrate (IMDUR) extended release tablet 30 mg  30 mg Oral Daily Stephanie Moreland APRN - NP   30 mg at 01/06/22 0913    ferrous sulfate (FE TABS 325) EC tablet 325 mg  325 mg Oral Daily Stephanie Morleand APRN - NP   325 mg at 01/07/22 1046    atorvastatin (LIPITOR) tablet 20 mg  20 mg Oral Daily Stephanie Moreland, APRN - NP   20 mg at 01/07/22 1044    sodium chloride flush 0.9 % injection 10 mL  10 mL IntraVENous 2 times per day Stephanie Moreland APRN - NP   10 mL at 01/08/22 0938    sodium chloride flush 0.9 % injection 10 mL  10 mL IntraVENous PRN Stephanie Moreland APRN - NP        acetaminophen (TYLENOL) tablet 1,000 mg  1,000 mg Oral Q4H PRN Stephanie Moreland, APRN - NP   1,000 mg at 01/06/22 2154    oxyCODONE (ROXICODONE) immediate release tablet 5 mg  5 mg Oral Q4H PRN Stephanie Moreland, APRN - NP        Or   Denny Cortes oxyCODONE (ROXICODONE) immediate release tablet 10 mg  10 mg Oral Q4H PRN Stephanie Moreland, APRN - NP   10 mg at 01/05/22 0506    morphine (PF) injection 2 mg  2 mg IntraVENous Q3H PRN Stephanie Moreland, APRN - NP   2 mg at 01/07/22 1549    polyethylene glycol (GLYCOLAX) packet 17 g  17 g Oral Daily ALONDRA Heller - NP   17 g at 01/06/22 0914    magnesium hydroxide (MILK OF MAGNESIA) 400 MG/5ML suspension 30 mL  30 mL Oral Daily PRN Stephanie Moreland APRN - NP        bisacodyl (DULCOLAX) suppository 10 mg  10 mg Rectal Daily PRN Stephanie Moreland APRN - NP        glucose (GLUTOSE) 40 % oral gel 15 g  15 g Oral PRN Michael Ehrich, APRN - NP        dextrose 50 % IV solution  12.5 g IntraVENous PRN Michael Ehrich, APRN - NP        glucagon (rDNA) injection 1 mg  1 mg IntraMUSCular PRN Michael Ehrich, APRN - NP        dextrose 5 % solution  100 mL/hr IntraVENous PRN Michael Ehrich, APRN - NP        glucose (GLUTOSE) 40 % oral gel 15 g  15 g Oral PRN Michael Ehrich, APRN - NP        dextrose 50 % IV solution  12.5 g IntraVENous PRN Michael Ehrich, APRN - NP        glucagon (rDNA) injection 1 mg  1 mg IntraMUSCular PRN Michael Ehrich, APRN - NP        dextrose 5 % solution  100 mL/hr IntraVENous PRN Michael Ehrich, APRN - NP        bupivacaine 0.5% (MARCAINE) elastomeric infusion 400 mL  400 mL Infiltration Continuous Fransico Sotelo MD   400 mL at 01/04/22 1647    lidocaine 4 % external patch 1 patch  1 patch TransDERmal Daily Michael Mary, APRN - NP   1 patch at 01/08/22 0919    alogliptin (NESINA) tablet 12.5 mg  12.5 mg Oral BID  Michael Ehrich, APRN - NP   12.5 mg at 01/07/22 1047    And    metFORMIN (GLUCOPHAGE) tablet 1,000 mg  1,000 mg Oral BID  Michael Ehrich, APRN - NP   1,000 mg at 01/07/22 1046    glipiZIDE (GLUCOTROL) tablet 5 mg  5 mg Oral BID  Mcihael Ehrich, APRN - NP   5 mg at 01/07/22 1045    insulin lispro (HUMALOG) injection vial 0-12 Units  0-12 Units SubCUTAneous TID  Michael Ehrich, APRN - NP   2 Units at 01/08/22 1428    insulin lispro (HUMALOG) injection vial 0-6 Units  0-6 Units SubCUTAneous Nightly Michael Ehrmerissa, APRN - NP   1 Units at 01/06/22 2124       Allergies:  Niacin and related, Minocycline hcl, Other, and Oxycodone hcl    Social History:   reports that he quit smoking about 3 months ago. His smoking use included cigarettes. He has a 13.50 pack-year smoking history. He has never used smokeless tobacco. He reports that he does not drink alcohol and does not use drugs.      Family History: family history includes Alzheimer's Disease in his father; Cancer in his brother; Diabetes in his father and mother; Heart Disease in his brother, brother, brother, mother, and sister; High Blood Pressure in his mother. REVIEW OF SYSTEMS:    Constitutional: No fever or chills. No night sweats, no weight loss   Eyes: No eye discharge, double vision, or eye pain   HEENT: negative for sore mouth, sore throat, hoarseness and voice change   Respiratory: negative for cough , sputum, dyspnea, wheezing, hemoptysis, chest pain   Cardiovascular: negative for chest pain, dyspnea, palpitations, orthopnea, PND   Gastrointestinal: negative for nausea, vomiting, diarrhea, constipation, abdominal pain, Dysphagia, hematemesis and hematochezia   Genitourinary: negative for frequency, dysuria, nocturia, urinary incontinence, and hematuria   Integument: negative for rash, skin lesions, bruises.    Hematologic/Lymphatic: negative for easy bruising, bleeding, lymphadenopathy, or petechiae   Endocrine: negative for heat or cold intolerance,weight changes, change in bowel habits and hair loss   Musculoskeletal: negative for myalgias, arthralgias, pain, joint swelling,and bone pain   Neurological: negative for headaches, dizziness, seizures, weakness, numbness    PHYSICAL EXAM:      BP (!) 142/70   Pulse 82   Temp 97.4 °F (36.3 °C) (Axillary)   Resp 20   Wt 157 lb (71.2 kg)   SpO2 96%   BMI 21.29 kg/m²    Temp (24hrs), Av.7 °F (36.5 °C), Min:97.4 °F (36.3 °C), Max:98.1 °F (36.7 °C)    General appearance - well appearing, no in pain or distress   Mental status - alert and cooperative   Eyes - pupils equal and reactive, extraocular eye movements intact   Ears - bilateral TM's and external ear canals normal   Mouth - mucous membranes moist, pharynx normal without lesions   Neck - supple, no significant adenopathy   Lymphatics - no palpable lymphadenopathy, no hepatosplenomegaly   Chest - clear to auscultation, no wheezes, rales or rhonchi, symmetric air entry   Heart - normal rate, regular rhythm, normal S1, S2, no murmurs  Abdomen - soft, nontender, nondistended, no masses or organomegaly   Neurological - alert, oriented, normal speech, no focal findings or movement disorder noted   Musculoskeletal - no joint tenderness, deformity or swelling   Extremities - peripheral pulses normal, no pedal edema, no clubbing or cyanosis   Skin - normal coloration and turgor, no rashes, no suspicious skin lesions noted ,    DATA:    Labs:   CBC:   Recent Labs     01/06/22  0433 01/07/22  0242   WBC 7.2 7.4   HGB 9.7* 9.6*   HCT 29.8* 29.2*    165     BMP:   Recent Labs     01/07/22  0242 01/08/22  1252   * 133*   K 4.0 4.1   CO2 19* 20   BUN 23 18   CREATININE 1.06 0.73   LABGLOM >60 >60   GLUCOSE 102* 198*     PT/INR: No results for input(s): PROTIME, INR in the last 72 hours. Surgical Pathology Report  SURGICAL PATHOLOGY REPORT  Collected: 01/04/22 1423   Lab status: Final   Resulting lab: [facility]   Value: -- Diagnosis --     1.  Right lung, middle lobe wedge biopsy:     -  Adenocarcinoma. -  Tumor is 1.8 cm. -  Carcinoma invades into but not through the visceral pleura. 2.  Lymph node #9, biopsy:     -  Negative for metastatic carcinoma. 3.  Lymph node #7, biopsy:     -  Negative for metastatic carcinoma. 4.  Right middle lobe lung, lobectomy:     -  Margins are free of involvement by carcinoma. -  Peribronchial lymph node, negative for metastatic carcinoma.      -  Nonneoplastic lung shows emphysematous change.       César Mcdaniels M.D.   **Electronically Signed Out**         rdd/1/6/2022         Clinical Information   Pre-op Diagnosis:  RIGHT MIDDLE LOBE NODULE   Operative Findings:  MIDDLE LOBE OF RIGHT LUNG; LYMPH NODE #9 OF LUNG;   LYMPH NODE #7; RIGHT MIDDLE LOBE   Operation Performed:  MIDDLE WEDGE RESECTION, POSSIBLE MIDDLE   LOBECTOMY VIA THORACOTOMY     Source of Specimen   1: MIDDLE LOBE OF RIGHT LUNG (A)   2: LYMPH NODE #9 OF LUNG (B)   3: LYMPH NODE #7   4: RIGHT MIDDLE LOBE     Gross Description   1.  \"ANA DAVIS, MIDDLE LOBE OF RIGHT LUNG\" Received fresh is a   6.4 x 4.2 x 2.8 cm lung wedge with staple line.  The pleura is   pink-tan with an area of slightly thickening (inked black). Sectioning reveals a 1.8 x 1.2 x 0.8 cm subpleural tan-white mass   lesion that is 0.8 cm from the staple line margin.  The remaining   parenchyma is pink-tan with no other lesions.  1TP, 1DQ, 1FS, Cassette   summary:  \"A\" frozen section lesion, \"B\" remainder of lesion with   pleura inked black, \"C\" staple line margin en face. 2.  \"ANA DAVIS, LYMPH NODE #9 OF LUNG\" Received fresh are two   fragments, 0.3 cm each.  1TP, 1FS, 1cs. 3.  \"ANA DAVIS, LYMPH NODE #7\" Received fresh are two   anthracotic fragments, 0.6 and 0.7 cm in greatest dimension.  1TP,   1FS, 1cs.     4.  \"ANA DAVIS, RIGHT MIDDLE LOBE\" Received fresh is a 50 gram,   8.5 x 6.8 x 3.5 cm lobe of lung with multiple staple lines.  The   pleura is wrinkled purple-gray.  Sectioning reveals spongy dark red   parenchyma with no masses.  No markedly enlarged nodes are identified   at the hilum.  Cassette summary:  \"A\" bronchial margin frozen section   cassette resubmitted as received, \"B\" hilar soft tissue and vascular   margin, \"C\" uninvolved lung (along staple line).  tm     Intraoperative Diagnosis   1.  FSDX:  Non-small cell carcinoma, favor adenocarcinoma.  (14:00,   RDD)   2.  FSDX:  Fibrofatty tissue.  (14:11, RDD)   3.  FSDX:  Lymph node, negative for carcinoma.  (14:19, RDD)   4.  FSDX:  Bronchial margin, negative for carcinoma.  (16:17, RDD)     Microscopic Description   1-4.  Microscopic examination performed.      PROCEDURE: Wedge biopsy, followed by lobectomy       SPECIMEN LATERALITY: Right   TUMOR FOCALITY: Single tumor       TUMOR SITE: Right middle lobe   TUMOR SIZE --     1.8 x 1.2 x 0.8 cm         HISTOLOGIC TYPE: Adenocarcinoma. Sections show a large and anaplastic non-small cell malignancy with   cohesion and a few instances of glandular differentiation.  Neoplastic   cells are large with copious eosinophilic cytoplasm and large   vesicular nuclei.  Tumor is characterized further utilizing control   appropriate immunostains.  Cells of carcinoma are negative for p40 and   they are positive for TTF-1 and CK7.  Morphology and immunophenotype   are that of an adenocarcinoma, lung primary. Note: The case is reviewed by a second Pathologist for quality   assurance with consensus (DS).       HISTOLOGIC GRADE: Grade 2 (of 4)           VISCERAL PLEURA INVASION:Reticulin stain with appropriately reactive   control is utilized to evaluate pleura.  Tumor extends beyond the   elastic layer into the pleura but not to the pleural surface (PL1)     DIRECT INVASION OF ADJACENT STRUCTURES: No     TREATMENT EFFECT: None apparent. LYMPHOVASCULAR INVASION: Absent                         MARGINS --   -BRONCHIAL: Free of tumor       -VASCULAR: Free of tumor       -PARENCHYMAL: Free of tumor       -OTHER ATTACHED TISSUE MARGIN (IF APPLICABLE): N/A       REGIONAL LYMPH NODES: 3   LYMPH NODES(S) FROM PRIOR PROCEDURES: No       NUMBER AND STATION(S) EXAMINED:   See diagnoses   NUMBER AND STATION(S) WITH METASTASIS: 0         DISTANT METASTASIS--   DISTANT SITE(S) INVOLVED, IF APPLICABLE: N/A              IMAGING DATA:  XR CHEST PORTABLE   Final Result   Right apical pneumothorax measuring 1.6 cm. Subtle bibasilar infiltrates. Stable right-sided chest tube. CT ABDOMEN PELVIS WO CONTRAST Additional Contrast? None   Final Result   1. Apparent localized ileus involving a few loops of ileum in the central   abdomen. 2. Small right-sided pneumothorax. 3.  Cholelithiasis without evidence of acute cholecystitis. 4.  Small amount of non dependent air within the urinary bladder.   Correlate   with any evidence of recent instrumentation. RECOMMENDATIONS:   Unavailable         XR CHEST PORTABLE   Final Result   Persistent small right apical pneumothorax with 2 right chest tubes in place. Resolving bilateral lower lung subsegmental atelectasis         XR CHEST PORTABLE   Final Result   No significant change in small right apical pneumothorax with chest tubes in   place. XR CHEST PORTABLE   Final Result   No significant interval change. Right chest tubes in place with persistent   small right apical pneumothorax. XR CHEST PORTABLE   Final Result   Interval right-sided thoracotomy with right-sided chest tubes in-situ. Small   right apical pneumothorax. Right hilar surgical clips related to recent   surgery. .         XR CHEST PORTABLE    (Results Pending)   CT HEAD WO CONTRAST    (Results Pending)     CT chest 11/2/2021:  Impression   The previously biopsied, somewhat bilobed nodule involving right middle lobe   is slightly increased in size since the prior study now measuring 1.8 x 1.0   cm, once measuring 1.8 x 0.6 cm.  There does appear to be some spiculation. No new pulmonary nodule is seen.           Primary Problem  Primary adenocarcinoma of right lung [facility])    Active Hospital Problems    Diagnosis Date Noted    Delirium [R41.0] 01/08/2022    Hyponatremia [E87.1] 01/08/2022    Postoperative ileus ([address] [K91.89, K56.7] 01/08/2022    Primary adenocarcinoma of right lung ([address] [C34.91] 12/15/2021    Coronary artery disease involving native coronary artery of native heart without angina pectoris [I25.10] 09/10/2013    Primary hypertension [I10] 09/10/2013    Hyperlipidemia [E78.5] 09/10/2013    Type 2 diabetes mellitus without complication, without long-term current use of insulin ([address] [E11.9] 09/10/2013       IMPRESSION:   1. Right middle lobe lung adenocarcinoma, status post wedge resection on 1/4/2022.  Surgical pathology showing [id number] stage IB, with high risk feature of visceral pleura involvement  2. History of tobacco abuse  3. Delirium/multifactorial could be ICU induced psychosis/sleep deprivation     RECOMMENDATIONS:  1. I reviewed [employer], imaging studies, prior records, possible diagnosis and treatment recommendations with patient  2. discussed with family pathology results  3. I reviewed the surgical pathology results with patient and family. 4. I reviewed the NCCN guidelines and goals of care with patient and family  5. As he has visceral pleural involvement which is a high risk feature, adjuvant systemic chemotherapy be recommended  6. Continue postoperative care as per thoracic surgery  7. We will follow  8. Follow-up with medical oncology at [address] after discharge  9. Reiterate with the wife and daughter the indication of chemotherapy due to the high risk features of the cancer, side effects and appointment for oncology follow-up in the chart  10. Psychosis management per primary team    Discussed with patient and Nurse and the family    Thank you for asking us to see this patient. Ginette Queen  Hematologist/Medical Oncologist        This note is created with the assistance of a speech recognition program.  While intending to generate a document that actually reflects the content of the visit, the document can still have some errors including those of syntax and sound a like substitutions which may escape proof reading. It such instances, actual meaning can be extrapolated by contextual diversion.

## 2022-01-08 NOTE — FLOWSHEET NOTE
Patient agitated, confused, oriented to name only, intermittently states that he had lung surgery. attempts to get out of bed, pulls lines, tubes, equipment. attempts to remove equipment, continues verbally abusive and verbally aggressive to staff, refuses to return to bed, unable to redirect or reorient. Patient c/o continued back pain at surgical site, continues to refuse pain medication. Patient spoke with daughter Dwain Archibald by phone, requests her to pick him up. Increased precedex. Returned to bed without incident by the direction of security at bedside. Bilateral soft wrist restraints applied. Aysha Riggs. PAC notified. Sitter continues at bedside for patient safety.     [id number] updated patient's daughter Dwain Archibald that patient was placed in bilateral soft wrist restraints to maintain safety, lines and equipment, she verbalized understanding, all questions answered at this time

## 2022-01-08 NOTE — FLOWSHEET NOTE
Patient refuses care, continues confused, pulling at lines and equipment, increased precedex.  Sitter continues at bedside for patient safety

## 2022-01-09 ENCOUNTER — APPOINTMENT (OUTPATIENT)
Dept: GENERAL RADIOLOGY | Age: 72
DRG: 164 | End: 2022-01-09
Attending: THORACIC SURGERY (CARDIOTHORACIC VASCULAR SURGERY)
Payer: MEDICARE

## 2022-01-09 ENCOUNTER — SOCIAL WORK (OUTPATIENT)
Dept: EMERGENCY DEPT | Age: 72
End: 2022-01-09

## 2022-01-09 LAB
ABSOLUTE EOS #: 0.26 K/UL (ref 0–0.44)
ABSOLUTE EOS #: 0.29 K/UL (ref 0–0.44)
ABSOLUTE IMMATURE GRANULOCYTE: 0.12 K/UL (ref 0–0.3)
ABSOLUTE IMMATURE GRANULOCYTE: 0.12 K/UL (ref 0–0.3)
ABSOLUTE LYMPH #: 1.16 K/UL (ref 1.1–3.7)
ABSOLUTE LYMPH #: 1.45 K/UL (ref 1.1–3.7)
ABSOLUTE MONO #: 1.12 K/UL (ref 0.1–1.2)
ABSOLUTE MONO #: 1.13 K/UL (ref 0.1–1.2)
ALBUMIN SERPL-MCNC: 3 G/DL (ref 3.5–5.2)
ALBUMIN/GLOBULIN RATIO: 1.3 (ref 1–2.5)
ALP BLD-CCNC: 53 U/L (ref 40–129)
ALT SERPL-CCNC: 25 U/L (ref 5–41)
AMMONIA: 29 UMOL/L (ref 16–60)
ANION GAP SERPL CALCULATED.3IONS-SCNC: 12 MMOL/L (ref 9–17)
ANION GAP SERPL CALCULATED.3IONS-SCNC: 15 MMOL/L (ref 9–17)
AST SERPL-CCNC: 26 U/L
BASOPHILS # BLD: 1 % (ref 0–2)
BASOPHILS # BLD: 1 % (ref 0–2)
BASOPHILS ABSOLUTE: 0.05 K/UL (ref 0–0.2)
BASOPHILS ABSOLUTE: 0.05 K/UL (ref 0–0.2)
BILIRUB SERPL-MCNC: 0.82 MG/DL (ref 0.3–1.2)
BUN BLDV-MCNC: 20 MG/DL (ref 8–23)
BUN BLDV-MCNC: 23 MG/DL (ref 8–23)
BUN/CREAT BLD: ABNORMAL (ref 9–20)
BUN/CREAT BLD: ABNORMAL (ref 9–20)
CALCIUM SERPL-MCNC: 8.4 MG/DL (ref 8.6–10.4)
CALCIUM SERPL-MCNC: 8.5 MG/DL (ref 8.6–10.4)
CHLORIDE BLD-SCNC: 102 MMOL/L (ref 98–107)
CHLORIDE BLD-SCNC: 103 MMOL/L (ref 98–107)
CO2: 18 MMOL/L (ref 20–31)
CO2: 18 MMOL/L (ref 20–31)
CREAT SERPL-MCNC: 0.64 MG/DL (ref 0.7–1.2)
CREAT SERPL-MCNC: 0.78 MG/DL (ref 0.7–1.2)
CULTURE: NORMAL
DIFFERENTIAL TYPE: ABNORMAL
DIFFERENTIAL TYPE: ABNORMAL
DIRECT EXAM: NORMAL
DIRECT EXAM: NORMAL
EOSINOPHILS RELATIVE PERCENT: 3 % (ref 1–4)
EOSINOPHILS RELATIVE PERCENT: 3 % (ref 1–4)
FOLATE: 8.8 NG/ML
GFR AFRICAN AMERICAN: >60 ML/MIN
GFR AFRICAN AMERICAN: >60 ML/MIN
GFR NON-AFRICAN AMERICAN: >60 ML/MIN
GFR NON-AFRICAN AMERICAN: >60 ML/MIN
GFR SERPL CREATININE-BSD FRML MDRD: ABNORMAL ML/MIN/{1.73_M2}
GLUCOSE BLD-MCNC: 136 MG/DL (ref 75–110)
GLUCOSE BLD-MCNC: 146 MG/DL (ref 70–99)
GLUCOSE BLD-MCNC: 147 MG/DL (ref 75–110)
GLUCOSE BLD-MCNC: 167 MG/DL (ref 75–110)
GLUCOSE BLD-MCNC: 171 MG/DL (ref 75–110)
GLUCOSE BLD-MCNC: 174 MG/DL (ref 70–99)
HCT VFR BLD CALC: 30.5 % (ref 40.7–50.3)
HCT VFR BLD CALC: 32.2 % (ref 40.7–50.3)
HEMOGLOBIN: 10.3 G/DL (ref 13–17)
HEMOGLOBIN: 10.7 G/DL (ref 13–17)
IMMATURE GRANULOCYTES: 1 %
IMMATURE GRANULOCYTES: 1 %
LYMPHOCYTES # BLD: 11 % (ref 24–43)
LYMPHOCYTES # BLD: 14 % (ref 24–43)
Lab: NORMAL
MCH RBC QN AUTO: 28.8 PG (ref 25.2–33.5)
MCH RBC QN AUTO: 29.1 PG (ref 25.2–33.5)
MCHC RBC AUTO-ENTMCNC: 33.2 G/DL (ref 28.4–34.8)
MCHC RBC AUTO-ENTMCNC: 33.8 G/DL (ref 28.4–34.8)
MCV RBC AUTO: 86.2 FL (ref 82.6–102.9)
MCV RBC AUTO: 86.8 FL (ref 82.6–102.9)
MONOCYTES # BLD: 11 % (ref 3–12)
MONOCYTES # BLD: 11 % (ref 3–12)
NRBC AUTOMATED: 0 PER 100 WBC
NRBC AUTOMATED: 0 PER 100 WBC
PDW BLD-RTO: 12.8 % (ref 11.8–14.4)
PDW BLD-RTO: 12.8 % (ref 11.8–14.4)
PLATELET # BLD: 207 K/UL (ref 138–453)
PLATELET # BLD: 208 K/UL (ref 138–453)
PLATELET ESTIMATE: ABNORMAL
PLATELET ESTIMATE: ABNORMAL
PMV BLD AUTO: 10.9 FL (ref 8.1–13.5)
PMV BLD AUTO: 11 FL (ref 8.1–13.5)
POTASSIUM SERPL-SCNC: 3.9 MMOL/L (ref 3.7–5.3)
POTASSIUM SERPL-SCNC: 4 MMOL/L (ref 3.7–5.3)
RBC # BLD: 3.54 M/UL (ref 4.21–5.77)
RBC # BLD: 3.71 M/UL (ref 4.21–5.77)
RBC # BLD: ABNORMAL 10*6/UL
RBC # BLD: ABNORMAL 10*6/UL
SEG NEUTROPHILS: 70 % (ref 36–65)
SEG NEUTROPHILS: 73 % (ref 36–65)
SEGMENTED NEUTROPHILS ABSOLUTE COUNT: 7.45 K/UL (ref 1.5–8.1)
SEGMENTED NEUTROPHILS ABSOLUTE COUNT: 7.8 K/UL (ref 1.5–8.1)
SODIUM BLD-SCNC: 132 MMOL/L (ref 135–144)
SODIUM BLD-SCNC: 136 MMOL/L (ref 135–144)
SPECIMEN DESCRIPTION: NORMAL
TOTAL PROTEIN: 5.4 G/DL (ref 6.4–8.3)
TSH SERPL DL<=0.05 MIU/L-ACNC: 0.8 MIU/L (ref 0.3–5)
VITAMIN B-12: 810 PG/ML (ref 232–1245)
WBC # BLD: 10.5 K/UL (ref 3.5–11.3)
WBC # BLD: 10.5 K/UL (ref 3.5–11.3)
WBC # BLD: ABNORMAL 10*3/UL
WBC # BLD: ABNORMAL 10*3/UL

## 2022-01-09 PROCEDURE — 2580000003 HC RX 258: Performed by: THORACIC SURGERY (CARDIOTHORACIC VASCULAR SURGERY)

## 2022-01-09 PROCEDURE — 2580000003 HC RX 258: Performed by: PHYSICIAN ASSISTANT

## 2022-01-09 PROCEDURE — 2500000003 HC RX 250 WO HCPCS: Performed by: PHYSICIAN ASSISTANT

## 2022-01-09 PROCEDURE — 82746 ASSAY OF FOLIC ACID SERUM: CPT

## 2022-01-09 PROCEDURE — 6370000000 HC RX 637 (ALT 250 FOR IP): Performed by: NURSE PRACTITIONER

## 2022-01-09 PROCEDURE — 82140 ASSAY OF AMMONIA: CPT

## 2022-01-09 PROCEDURE — 80053 COMPREHEN METABOLIC PANEL: CPT

## 2022-01-09 PROCEDURE — C9113 INJ PANTOPRAZOLE SODIUM, VIA: HCPCS | Performed by: PHYSICIAN ASSISTANT

## 2022-01-09 PROCEDURE — 82607 VITAMIN B-12: CPT

## 2022-01-09 PROCEDURE — 80048 BASIC METABOLIC PNL TOTAL CA: CPT

## 2022-01-09 PROCEDURE — 71045 X-RAY EXAM CHEST 1 VIEW: CPT

## 2022-01-09 PROCEDURE — 85025 COMPLETE CBC W/AUTO DIFF WBC: CPT

## 2022-01-09 PROCEDURE — 82947 ASSAY GLUCOSE BLOOD QUANT: CPT

## 2022-01-09 PROCEDURE — 2100000001 HC CVICU R&B

## 2022-01-09 PROCEDURE — 2580000003 HC RX 258: Performed by: NURSE PRACTITIONER

## 2022-01-09 PROCEDURE — 6370000000 HC RX 637 (ALT 250 FOR IP): Performed by: STUDENT IN AN ORGANIZED HEALTH CARE EDUCATION/TRAINING PROGRAM

## 2022-01-09 PROCEDURE — 99232 SBSQ HOSP IP/OBS MODERATE 35: CPT | Performed by: INTERNAL MEDICINE

## 2022-01-09 PROCEDURE — 36415 COLL VENOUS BLD VENIPUNCTURE: CPT

## 2022-01-09 PROCEDURE — 6360000002 HC RX W HCPCS: Performed by: PHYSICIAN ASSISTANT

## 2022-01-09 PROCEDURE — 84443 ASSAY THYROID STIM HORMONE: CPT

## 2022-01-09 RX ORDER — DOCUSATE SODIUM 100 MG/1
100 CAPSULE, LIQUID FILLED ORAL 2 TIMES DAILY
Status: DISCONTINUED | OUTPATIENT
Start: 2022-01-09 | End: 2022-01-15 | Stop reason: HOSPADM

## 2022-01-09 RX ORDER — BISACODYL 10 MG
10 SUPPOSITORY, RECTAL RECTAL DAILY
Status: DISCONTINUED | OUTPATIENT
Start: 2022-01-09 | End: 2022-01-15 | Stop reason: HOSPADM

## 2022-01-09 RX ORDER — SENNA PLUS 8.6 MG/1
1 TABLET ORAL NIGHTLY
Status: DISCONTINUED | OUTPATIENT
Start: 2022-01-09 | End: 2022-01-09

## 2022-01-09 RX ORDER — BISACODYL 10 MG
10 SUPPOSITORY, RECTAL RECTAL DAILY PRN
Status: DISCONTINUED | OUTPATIENT
Start: 2022-01-09 | End: 2022-01-15 | Stop reason: HOSPADM

## 2022-01-09 RX ADMIN — SODIUM CHLORIDE: 9 INJECTION, SOLUTION INTRAVENOUS at 00:25

## 2022-01-09 RX ADMIN — DEXMEDETOMIDINE HYDROCHLORIDE 1.4 MCG/KG/HR: 4 INJECTION, SOLUTION INTRAVENOUS at 06:45

## 2022-01-09 RX ADMIN — QUETIAPINE FUMARATE 25 MG: 25 TABLET ORAL at 19:39

## 2022-01-09 RX ADMIN — SODIUM CHLORIDE, PRESERVATIVE FREE 10 ML: 5 INJECTION INTRAVENOUS at 09:48

## 2022-01-09 RX ADMIN — ATORVASTATIN CALCIUM 20 MG: 20 TABLET, FILM COATED ORAL at 09:48

## 2022-01-09 RX ADMIN — PANTOPRAZOLE SODIUM 40 MG: 40 INJECTION, POWDER, FOR SOLUTION INTRAVENOUS at 13:03

## 2022-01-09 RX ADMIN — DEXMEDETOMIDINE HYDROCHLORIDE 1.4 MCG/KG/HR: 4 INJECTION, SOLUTION INTRAVENOUS at 00:25

## 2022-01-09 RX ADMIN — DOCUSATE SODIUM 100 MG: 100 CAPSULE ORAL at 19:39

## 2022-01-09 RX ADMIN — LISINOPRIL 20 MG: 20 TABLET ORAL at 09:47

## 2022-01-09 RX ADMIN — Medication 5 MG: at 19:39

## 2022-01-09 RX ADMIN — INSULIN LISPRO 1 UNITS: 100 INJECTION, SOLUTION INTRAVENOUS; SUBCUTANEOUS at 20:54

## 2022-01-09 RX ADMIN — DEXMEDETOMIDINE HYDROCHLORIDE 0.7 MCG/KG/HR: 4 INJECTION, SOLUTION INTRAVENOUS at 19:38

## 2022-01-09 RX ADMIN — INSULIN LISPRO 2 UNITS: 100 INJECTION, SOLUTION INTRAVENOUS; SUBCUTANEOUS at 09:02

## 2022-01-09 RX ADMIN — DEXMEDETOMIDINE HYDROCHLORIDE 0.9 MCG/KG/HR: 4 INJECTION, SOLUTION INTRAVENOUS at 13:15

## 2022-01-09 RX ADMIN — ISOSORBIDE MONONITRATE 30 MG: 30 TABLET ORAL at 09:48

## 2022-01-09 RX ADMIN — FERROUS SULFATE TAB EC 325 MG (65 MG FE EQUIVALENT) 325 MG: 325 (65 FE) TABLET DELAYED RESPONSE at 09:47

## 2022-01-09 RX ADMIN — SODIUM CHLORIDE, PRESERVATIVE FREE 10 ML: 5 INJECTION INTRAVENOUS at 09:47

## 2022-01-09 RX ADMIN — POLYETHYLENE GLYCOL 3350 17 G: 17 POWDER, FOR SOLUTION ORAL at 09:47

## 2022-01-09 RX ADMIN — DEXMEDETOMIDINE HYDROCHLORIDE 1.4 MCG/KG/HR: 4 INJECTION, SOLUTION INTRAVENOUS at 03:30

## 2022-01-09 RX ADMIN — INSULIN LISPRO 2 UNITS: 100 INJECTION, SOLUTION INTRAVENOUS; SUBCUTANEOUS at 14:09

## 2022-01-09 RX ADMIN — DEXMEDETOMIDINE HYDROCHLORIDE 1.2 MCG/KG/HR: 4 INJECTION, SOLUTION INTRAVENOUS at 09:46

## 2022-01-09 ASSESSMENT — PAIN SCALES - GENERAL
PAINLEVEL_OUTOF10: 0
PAINLEVEL_OUTOF10: 0

## 2022-01-09 NOTE — PROGRESS NOTES
[facility]  Office: [address], DO, Belle Smith, DO, Ata Brantley, DO, Mesha Mulligan, DO, Celeste Rodriguez MD, Lucas Lassiter MD, Cameron Epperson MD, Shantelle Andrade MD, Howard Hester MD, Carol Jack MD, Jin Ronquillo MD, Haskell Leventhal, DO, Cedric Diallo, DO, Cheryle Bumpers, MD,  Kandy Hernández, DO, Clarisa Whitney MD, Kain Mackey MD, Itz Gaspar MD, Ashu Zelaya MD, Nic Coronado MD, Kalvin Spurling, MD, Saint Goodwill, MD, Lisy Balderas, [facility], Cinda Ruiz, CNP, Blanca Pandya, CNS, Franca Vaughn, CNP, Jessica Pham, CNP, Jennifer Sainz, CNP, Fiona Bowers, CNP, Maia Liu, CNP, Lance Reza PA-C, Ruthy Washington, [facility], Perez Martin, [facility], Ursula Hinton, CNP, Rickie Vasquez, CNP, Evelyne Berger, CNP, Char Moon, CNP, Cherelle Dewitt, CNP, Angie Schmidt, [address] [place] [address]    Progress Note    1/9/2022    3:48 PM    Name:   Malena Sosa  MRN:     [id number]     [place]:      [de-identified]   Room:   [address] [place] [address] Day:  5  Admit Date:  1/4/2022  7:49 AM    PCP:   Lolly Walker  Code Status:  Full Code    Subjective:     C/C: lung nodule    Interval History Status: improved. Patient seen and examined this morning. He has been much more calm overnight. Was aggressive with staff as he was yesterday. He reports that his mouth is currently dry. Quite somnolent this morning. Chest tube is in place. Precedex is being weaned. Vital signs are stable. Brief History: This 70 yom presented for elective pulmonary nodule resection on 1/4. Pathology shows adenocarcinoma. Postop he has had worsening problems with confusion, apparently has not been sleeping well-even at home last month per wife. He is now requiring restraints and is on precedex drip and received haldol this am for severe agitation.   Currently he is now drowsy     Per his wife, he is normally mentally sharp and has only minimal memory issues, like forgetting where he left something, but these are only very mild and he has never been confused like this before     He also developed postop ileus and has been on reglan and pepcid for that    Review of Systems:     Constitutional: Reports being sleepy today; negative for chills, fevers, sweats  HEENT: Reports dry mouth  Respiratory:  negative for cough, dyspnea on exertion, shortness of breath, wheezing  Cardiovascular:  negative for chest pain, chest pressure/discomfort, lower extremity edema, palpitations  Gastrointestinal:  negative for abdominal pain, constipation, diarrhea, nausea, vomiting  Neurological:  negative for dizziness, headache    Medications: Allergies: Allergies   Allergen Reactions    Niacin And Related      Turns red, no trouble breathing    Minocycline Hcl Rash    Other Nausea And Vomiting     Patient states on all pain medications he gets nausea and vomiting.  Doctor ordered a medicine (nausea) to take before pain medication    Oxycodone Hcl      vomits       Current Meds:   Scheduled Meds:    bisacodyl  10 mg Rectal Daily    melatonin  5 mg Oral Nightly    [Held by provider] sertraline  50 mg Oral Nightly    QUEtiapine  25 mg Oral Nightly    pantoprazole  40 mg IntraVENous Daily    And    sodium chloride (PF)  10 mL IntraVENous Daily    [Held by provider] gabapentin  100 mg Oral TID    lisinopril  20 mg Oral Daily    isosorbide mononitrate  30 mg Oral Daily    ferrous sulfate  325 mg Oral Daily    atorvastatin  20 mg Oral Daily    sodium chloride flush  10 mL IntraVENous 2 times per day    polyethylene glycol  17 g Oral Daily    lidocaine  1 patch TransDERmal Daily    alogliptin  12.5 mg Oral BID WC    And    metFORMIN  1,000 mg Oral BID WC    glipiZIDE  5 mg Oral BID AC    insulin lispro  0-12 Units SubCUTAneous TID WC    insulin lispro  0-6 Units SubCUTAneous Nightly     Continuous Infusions:    dexmedetomidine 0.8 mcg/kg/hr (22 1538)    sodium chloride 75 mL/hr at 22 0025    dextrose      dextrose      bupivacaine 0.5%       PRN Meds: bisacodyl, haloperidol lactate, dexmedetomidine, glycerin moisturizing mouth spray, benzocaine-menthol, ondansetron, sodium chloride flush, acetaminophen, oxyCODONE **OR** oxyCODONE, morphine, magnesium hydroxide, glucose, dextrose, glucagon (rDNA), dextrose, glucose, dextrose, glucagon (rDNA), dextrose    Data:     Past Medical History:   has a past medical history of Abnormal cardiac cath, Actinic keratosis, Arthritis, CAD (coronary artery disease), Calculus of kidney, Cervical stenosis of spine, Diabetes mellitus ([address] Full dentures, GERD (gastroesophageal reflux disease), H/O echocardiogram, History of cardiac cath, History of echocardiogram, History of echocardiogram, History of heart attack, History of tobacco abuse, Hyperlipidemia, Hypertension, Pulmonary nodule, Raynaud's phenomenon, S/P angioplasty with stent, and Wears hearing aid in both ears. Social History:   reports that he quit smoking about 3 months ago. His smoking use included cigarettes. He has a 13.50 pack-year smoking history. He has never used smokeless tobacco. He reports that he does not drink alcohol and does not use drugs.      Family History:   Family History   Problem Relation Age of Onset    Heart Disease Mother     Diabetes Mother     High Blood Pressure Mother     Diabetes Father     Alzheimer's Disease Father     Heart Disease Sister         CABG    Heart Disease Brother     Heart Disease Brother     Heart Disease Brother     Cancer Brother        Vitals:  BP (!) 95/58   Pulse 62   Temp 97.6 °F (36.4 °C) (Oral)   Resp 18   Wt 157 lb (71.2 kg)   SpO2 100%   BMI 21.29 kg/m²   Temp (24hrs), Av.9 °F (36.6 °C), Min:97 °F (36.1 °C), Max:98.6 °F (37 °C)    Recent Labs     22  1137 22  1614 22  1125   POCGLU 189* 135* 170* 147*       I/O (24Hr): Intake/Output Summary (Last 24 hours) at 1/9/2022 1548  Last data filed at 1/9/2022 0553  Gross per 24 hour   Intake 2907 ml   Output 1620 ml   Net 1287 ml       Labs:  Hematology:  Recent Labs     01/07/22  0242 01/09/22  0644 01/09/22  1309   WBC 7.4 10.5 10.5   RBC 3.34* 3.71* 3.54*   HGB 9.6* 10.7* 10.3*   HCT 29.2* 32.2* 30.5*   MCV 87.4 86.8 86.2   MCH 28.7 28.8 29.1   MCHC 32.9 33.2 33.8   RDW 13.1 12.8 12.8    207 208   MPV 11.3 11.0 10.9     Chemistry:  Recent Labs     01/08/22  1252 01/09/22  0644 01/09/22  1309   * 136 132*   K 4.1 4.0 3.9    103 102   CO2 20 18* 18*   GLUCOSE 198* 174* 146*   BUN 18 20 23   CREATININE 0.73 0.64* 0.78   ANIONGAP 13 15 12   LABGLOM >60 >60 >60   GFRAA >60 >60 >60   CALCIUM 8.1* 8.4* 8.5*     Recent Labs     01/07/22 2022 01/08/22  0754 01/08/22  1137 01/08/22  1614 01/08/22 2005 01/09/22  0942 01/09/22  1125 01/09/22  1309   PROT  --   --   --   --   --   --   --  5.4*   LABALBU  --   --   --   --   --   --   --  3.0*   TSH  --   --   --   --   --  0.80  --   --    AST  --   --   --   --   --   --   --  26   ALT  --   --   --   --   --   --   --  25   ALKPHOS  --   --   --   --   --   --   --  53   BILITOT  --   --   --   --   --   --   --  0.82   AMMONIA  --   --   --   --   --  29  --   --    POCGLU 107 158* 189* 135* 170*  --  147*  --      ABG:  Lab Results   Component Value Date    POCPH 7.308 01/04/2022    POCPCO2 46.1 01/04/2022    POCPO2 76.2 01/04/2022    POCHCO3 23.2 01/04/2022    NBEA 3 01/04/2022    PBEA NOT REPORTED 01/04/2022    KCY9QNT NOT REPORTED 01/04/2022    MDMC7PMZ 94 01/04/2022    FIO2 4.0 01/04/2022     Lab Results   Component Value Date/Time    SPECIAL NOT REPORTED 01/04/2022 01:59 PM     Lab Results   Component Value Date/Time    CULTURE NO GROWTH 5 DAYS 01/04/2022 01:59 PM       Radiology:  CT ABDOMEN PELVIS WO CONTRAST Additional Contrast? None    Result Date: 1/7/2022  1.   Apparent localized ileus involving a few loops of ileum in the central abdomen. 2. Small right-sided pneumothorax. 3.  Cholelithiasis without evidence of acute cholecystitis. 4.  Small amount of non dependent air within the urinary bladder. Correlate with any evidence of recent instrumentation. RECOMMENDATIONS: Unavailable     XR CHEST PORTABLE    Result Date: 1/9/2022  Right-sided pneumothorax with stable chest tube. Subcutaneous emphysema along the right lateral chest wall and in the right supraclavicular region. XR CHEST PORTABLE    Result Date: 1/8/2022  Right apical pneumothorax measuring 1.6 cm. Subtle bibasilar infiltrates. Stable right-sided chest tube. XR CHEST PORTABLE    Result Date: 1/7/2022  Persistent small right apical pneumothorax with 2 right chest tubes in place. Resolving bilateral lower lung subsegmental atelectasis     XR CHEST PORTABLE    Result Date: 1/6/2022  No significant change in small right apical pneumothorax with chest tubes in place. XR CHEST PORTABLE    Result Date: 1/5/2022  No significant interval change. Right chest tubes in place with persistent small right apical pneumothorax. XR CHEST PORTABLE    Result Date: 1/4/2022  Interval right-sided thoracotomy with right-sided chest tubes in-situ. Small right apical pneumothorax. Right hilar surgical clips related to recent surgery. .       Physical Examination:        General appearance:  alert, cooperative and no distress, elderly  gentleman lying supine in bed. Mental Status:  oriented to person, place and time and normal affect  Lungs:  clear to auscultation bilaterally, normal effort, right-sided chest tube present, no wheezing heard  Heart:  regular rate and rhythm, no murmur  Abdomen:  soft, nontender, nondistended, normal bowel sounds, no masses, hepatomegaly, splenomegaly  Extremities:  no edema, redness, tenderness in the calves, bilateral soft wrist restraints in the upper extremities noted.   Skin:  no gross lesions, rashes, induration    Assessment:        Hospital Problems           Last Modified POA    * (Principal) Primary adenocarcinoma of right lung ([facility] 1/8/2022 Yes    Coronary artery disease involving native coronary artery of native heart without angina pectoris 1/8/2022 Yes    Primary hypertension 1/8/2022 Yes    Type 2 diabetes mellitus without complication, without long-term current use of insulin ([facility] 1/8/2022 Yes    Hyperlipidemia 1/8/2022 Yes    Delirium 1/8/2022 No    Hyponatremia 1/8/2022 No    Postoperative ileus ([address] 1/8/2022 No          Plan:        1. Your continued CT surgery care  2. Chest tubes at your discretion  3. Ileus appears to be resolved after enema was given  4. Mentation appears to be improving. Wean Precedex. Remove restraints once able. 5. Place bottom dentures and give patient food when you believe he is appropriate to eat. His mouth is quite dry. Neurologically, he is intact and I have a very low suspicion for possible CVA. 6. Check vitamin B12, folate. TSH within normal limits. As is his ammonia  7. Agree with Seroquel nightly; avoid Haldol/Geodon/Zyprexa. Avoid benzos. 8. Altered mental status is likely secondary to ICU delirium. 9. Insulin sliding scale for coverage. Restart home meds at time of discharge  10. Continue lisinopril, Imdur, statin therapy. 11. Increase activity  12.  We will continue to follow    [address] Cardoza DO  1/9/2022  3:48 PM

## 2022-01-09 NOTE — PROGRESS NOTES
General Surgery Progress Note       PATIENT NAME: Robb Estrada   TODAY'S DATE: 1/9/2022, 5:05 AM    SUBJECTIVE:    Pt seen and examined. No acute events overnight. Seen by oncology yesterday, recommending chemo. Enema yesterday not given, patient still without BM. NPO w/ sips w/ meds. VSS. Afebrile. No new labs. OBJECTIVE:   Vitals:  BP (!) 141/70   Pulse 66   Temp 98.6 °F (37 °C) (Oral)   Resp 19   Wt 157 lb (71.2 kg)   SpO2 100%   BMI 21.29 kg/m²      INTAKE/OUTPUT:      Intake/Output Summary (Last 24 hours) at 1/9/2022 0505  Last data filed at 1/9/2022 0331  Gross per 24 hour   Intake 2907 ml   Output 1985 ml   Net 922 ml     General: Alert to self, NAD  Lungs: Symmetrical chest rise bilaterally  Heart: RRR  Abdomen: Soft, ND, Non tender, non peritoneal, no rebound   Extremity: moves all extremities x4,    Data Review:  CBC:   Recent Labs     01/07/22  0242   WBC 7.4   HGB 9.6*        BMP:    Recent Labs     01/07/22  0242 01/08/22  1252   * 133*   K 4.0 4.1   CL 96* 100   CO2 19* 20   BUN 23 18   CREATININE 1.06 0.73   GLUCOSE 102* 198*     Hepatic: No results for input(s): AST, ALT, ALB, ALKPHOS, BILITOT, BILIDIR in the last 72 hours. Coagulation: No results for input(s): APTT, PROT, INR in the last 72 hours. ASSESSMENT     Patient Active Problem List   Diagnosis    Angina pectoris ([address]    Coronary artery disease involving native coronary artery of native heart without angina pectoris    Primary hypertension    Type 2 diabetes mellitus without complication, without long-term current use of insulin (HCC)    Hyperlipidemia    Shortness of breath, post procedure improving probably Effient side effect.      S/P angioplasty with stent, obtuse marginal    Type II or unspecified type diabetes mellitus without mention of complication, not stated as uncontrolled    Calculus of kidney    Actinic keratosis    Senile nuclear sclerosis    Dyspepsia    Epigastric pain    Substernal chest pain    Abnormal CT scan, stomach    Abnormal gastrointestinal PET scan    Strain of lumbar region    Pneumothorax after biopsy    Pneumothorax, post biopsy, right    Primary adenocarcinoma of right lung ([address]    Delirium    Hyponatremia    Postoperative ileus [facility])       PLAN  69 yo M s/p 1/4/21 R lobectomy with post operative ileus/ extensive stool burden noted on CT A/P 1/7/21     Plan:  1. Continue medical mgmt and supportive care per primary  2. Recommend aggressive bowel regimen. PLEASE GIVE ENEMA TODAY  3. If has emesis place NGT and set to LIWS  4. ok for sips with meds until return of bowel function. Once patient with BM and no n/v can advance to CLD  5. Continue to monitor abd exam and bowel function, goal K+ 4.0, Mg 2.0   6. Encourage IS use and ambulation/ activity       Electronically signed by Lauryn Blake DO  on 1/9/2022 at 5:05 AM     Agree with above. Ileus/constipation, no bowel obstructive process. Continue bowel regimen for constipation.    Dr. Kimberlee Carlson

## 2022-01-09 NOTE — PLAN OF CARE
Problem: Infection:  Goal: Will remain free from infection  Description: Will remain free from infection  Outcome: Ongoing     Problem: Safety:  Goal: Free from accidental physical injury  Description: Free from accidental physical injury  Outcome: Ongoing  Goal: Free from intentional harm  Description: Free from intentional harm  Outcome: Ongoing     Problem: Daily Care:  Goal: Daily care needs are met  Description: Daily care needs are met  Outcome: Ongoing     Problem: Pain:  Goal: Patient's pain/discomfort is manageable  Description: Patient's pain/discomfort is manageable  Outcome: Ongoing  Goal: Pain level will decrease  Description: Pain level will decrease  Outcome: Ongoing  Goal: Control of acute pain  Description: Control of acute pain  Outcome: Ongoing  Goal: Control of chronic pain  Description: Control of chronic pain  Outcome: Ongoing     Problem: Skin Integrity:  Goal: Skin integrity will stabilize  Description: Skin integrity will stabilize  Outcome: Ongoing     Problem: Discharge Planning:  Goal: Patients continuum of care needs are met  Description: Patients continuum of care needs are met  Outcome: Ongoing     Problem: Falls - Risk of:  Goal: Will remain free from falls  Description: Will remain free from falls  Outcome: Ongoing  Goal: Absence of physical injury  Description: Absence of physical injury  Outcome: Ongoing     Problem: Non-Violent Restraints  Goal: Removal from restraints as soon as assessed to be safe  1/8/2022 2250 by Ilana Arriaza RN  Outcome: Ongoing  1/8/2022 1610 by Adam Pat RN  Outcome: Ongoing  Goal: No harm/injury to patient while restraints in use  1/8/2022 2250 by Ilana Arriaza RN  Outcome: Ongoing  1/8/2022 1610 by Adam Pat RN  Outcome: Ongoing  Goal: Patient's dignity will be maintained  1/8/2022 2250 by Ilana Arriaza RN  Outcome: Ongoing  1/8/2022 1610 by Adam Pat RN  Outcome: Ongoing

## 2022-01-09 NOTE — PROGRESS NOTES
Today's Date: 1/9/2022  Patient Name: Malena Sosa  Date of admission: 1/4/2022  7:49 AM  Patient's age: 70 y.o., 1950  Admission Dx: Pulmonary nodule [R91.1]    Reason for Consult: cancer findings VATS  Requesting Physician: Jen Mendieta MD    CHIEF COMPLAINT:  No chief complaint on file. INTERVAL HISTORY:/Subjective  Patient seen and examined  Confusion improved   Still n.p.o. for the postsurgical ileus  No bowel movement yet      HISTORY OF PRESENT ILLNESS IN BRIEF:    Malena Sosa is a 70 y.o. male who is admitted to the hospital on 1/4/2022  for VATS and wedge resection  Patient has history of tobacco smoking and patient has been following with pulmonology for her right middle lobe lung nodule and his recent scan on 11/2/2021 showed increase in the size of lung nodule. Patient has had biopsy on 7/28/2021 which showed benign parenchyma with chronic inflammation however this was very active on the PET scan. Patient underwent VATS, wedge resection of the right middle lobe lung mass on 1/4/2022. Surgical pathology is awaited. Oncology consult for further recommendations. Past Medical History:   has a past medical history of Abnormal cardiac cath, Actinic keratosis, Arthritis, CAD (coronary artery disease), Calculus of kidney, Cervical stenosis of spine, Diabetes mellitus ([address] Full dentures, GERD (gastroesophageal reflux disease), H/O echocardiogram, History of cardiac cath, History of echocardiogram, History of echocardiogram, History of heart attack, History of tobacco abuse, Hyperlipidemia, Hypertension, Pulmonary nodule, Raynaud's phenomenon, S/P angioplasty with stent, and Wears hearing aid in both ears. Past Surgical History:   has a past surgical history that includes Cardiac catheterization (2007); Hemorrhoid surgery; Neck surgery (1997); other surgical history (10/2014); other surgical history (12/11/2014); Cataract removal (04/06/2015);  Upper gastrointestinal endoscopy (12/28/2005); Cataract removal (06/08/2015); Colonoscopy (01/11/2006); Colonoscopy (05/23/2016); Cardiac catheterization (02/09/2017); Cardiac catheterization (02/09/2017); Cardiac catheterization (01/14/2019); Upper gastrointestinal endoscopy (N/A, 02/05/2019); Upper gastrointestinal endoscopy (N/A, 10/22/2019); Lung biopsy (Right, 07/28/2021); CT NEEDLE BIOPSY LUNG PERCUTANEOUS (7/28/2021); hernia repair (Left); and Lung removal, total (Right, 1/4/2022). Medications:    Prior to Admission medications    Medication Sig Start Date End Date Taking? Authorizing Provider   oxyCODONE (ROXICODONE) 5 MG immediate release tablet Take 1 tablet by mouth every 4 hours as needed for Pain for up to 7 days. 1/7/22 1/14/22 Yes Marga Bnauelos APRN - CNP   SERTRALINE HCL PO Take 50 mg by mouth nightly 12/1/21  Yes Historical Provider, MD   tamsulosin (FLOMAX) 0.4 MG capsule Take 0.4 mg by mouth daily    Historical Provider, MD   tiZANidine (ZANAFLEX) 4 MG capsule Take 4 mg by mouth 3 times daily as needed    Historical Provider, MD   ferrous sulfate (IRON 325) 325 (65 Fe) MG tablet Take 325 mg by mouth daily     Historical Provider, MD   isosorbide mononitrate (IMDUR) 30 MG extended release tablet 1/2 tablet in the morning    Historical Provider, MD   lisinopril (PRINIVIL;ZESTRIL) 20 MG tablet Take 20 mg by mouth daily     Historical Provider, MD   nitroGLYCERIN (NITRODUR) 0.4 MG/HR 1 dose under tongue as needed for chest pain.  max of 3 in one day    Historical Provider, MD   pantoprazole (PROTONIX) 40 MG injection as needed     Historical Provider, MD   amLODIPine (NORVASC) 5 MG tablet Take 1 tablet by mouth daily 7/15/21   Ghulam Ornelas MD   clopidogrel (PLAVIX) 75 MG tablet Take 1 tablet by mouth daily  Patient taking differently: Take 75 mg by mouth daily Ordered by heart doctor, Chan Barfield 7/15/21   Ghulam Orneals MD   atenolol (TENORMIN) 25 MG tablet Take 1 tablet by mouth daily 6/7/21   Costa Ramirez Alpesh Khan MD   Insulin Glargine, 2 Unit Dial, 300 UNIT/ML SOPN Insulin Glargine Insulin Glargine U-300 Conc Active 10 UNIT Subcutaneous Every morning May 7th, 2020 11:47am 05-  [facility] ([id number]) 5/7/20   Historical Provider, MD   atorvastatin (LIPITOR) 20 MG tablet Take 1 tablet by mouth daily 12/15/20   Paul King MD   sitaGLIPtan-metformin (JANUMET)  MG per tablet Take 1 tablet by mouth 2 times daily (with meals)    Historical Provider, MD   glimepiride (AMARYL) 2 MG tablet Take 1 tablet by mouth 2 times daily 5/22/15   Amira Amaya MD   aspirin 81 MG tablet Take 81 mg by mouth daily Ordered by heart doctor, Dr. Shabnam Blandon [place]    Historical Provider, MD     Current Facility-Administered Medications   Medication Dose Route Frequency Provider Last Rate Last Admin    bisacodyl (DULCOLAX) suppository 10 mg  10 mg Rectal Daily PRN Eusebia Hensley DO        bisacodyl (DULCOLAX) suppository 10 mg  10 mg Rectal Daily Belvia Perks, DO        haloperidol lactate (HALDOL) injection 5 mg  5 mg IntraMUSCular Q6H PRN USHA Farooq   5 mg at 01/08/22 1108    melatonin tablet 5 mg  5 mg Oral Nightly ALONDRA Winters - CNP   5 mg at 01/08/22 2013    [Held by provider] sertraline (ZOLOFT) tablet 50 mg  50 mg Oral Nightly Marga Banuelos APRN - CNP        QUEtiapine (SEROQUEL) tablet 25 mg  25 mg Oral Nightly ALONDRA Zelaya - NP   25 mg at 01/08/22 2013    pantoprazole (PROTONIX) injection 40 mg  40 mg IntraVENous Daily USHA Farooq   40 mg at 01/08/22 0913    And    sodium chloride (PF) 0.9 % injection 10 mL  10 mL IntraVENous Daily USHA Farooq   10 mL at 01/09/22 0947    dexmedetomidine (PRECEDEX) 400 mcg in sodium chloride 0.9 % 100 mL infusion  0.2-1.4 mcg/kg/hr IntraVENous Continuous PRN USHA Farooq 17.8 mL/hr at 01/09/22 1204 1 mcg/kg/hr at 01/09/22 1204    0.9 % sodium chloride infusion   IntraVENous Continuous Judson Sheppard MD 75 mL/hr at 01/09/22 0025 New Bag at 01/09/22 0025    glycerin moisturizing mouth spray (OASIS) 35 % 2 spray  2 spray Mouth/Throat PRN ALONDRA Trammell CNP   2 spray at 01/07/22 1100    benzocaine-menthol (CEPACOL SORE THROAT) lozenge 1 lozenge  1 lozenge Oral Q2H PRN ALONDRA Johnson NP   1 lozenge at 01/06/22 0909    ondansetron (ZOFRAN) injection 4 mg  4 mg IntraVENous Q6H PRN ALONDRA Johnson NP   4 mg at 01/07/22 1549    [Held by provider] gabapentin (NEURONTIN) capsule 100 mg  100 mg Oral TID ALONDRA Trimble CNP   100 mg at 01/07/22 1412    lisinopril (PRINIVIL;ZESTRIL) tablet 20 mg  20 mg Oral Daily ALONDRA Johnson NP   20 mg at 01/09/22 0947    isosorbide mononitrate (IMDUR) extended release tablet 30 mg  30 mg Oral Daily ALONDRA Johnson NP   30 mg at 01/09/22 0948    ferrous sulfate (FE TABS 325) EC tablet 325 mg  325 mg Oral Daily ALONDRA Johnson NP   325 mg at 01/09/22 0947    atorvastatin (LIPITOR) tablet 20 mg  20 mg Oral Daily ALONDRA Johnson NP   20 mg at 01/09/22 0948    sodium chloride flush 0.9 % injection 10 mL  10 mL IntraVENous 2 times per day ALONDRA Johnson NP   10 mL at 01/09/22 0948    sodium chloride flush 0.9 % injection 10 mL  10 mL IntraVENous PRN ALONDRA Johnson NP        acetaminophen (TYLENOL) tablet 1,000 mg  1,000 mg Oral Q4H PRN ALONDRA Johnson NP   1,000 mg at 01/06/22 2154    oxyCODONE (ROXICODONE) immediate release tablet 5 mg  5 mg Oral Q4H PRN ALONDRA Johnson NP        Or   [place] oxyCODONE (ROXICODONE) immediate release tablet 10 mg  10 mg Oral Q4H PRN ALONDRA Johnson NP   10 mg at 01/05/22 0506    morphine (PF) injection 2 mg  2 mg IntraVENous Q3H PRN ALONDRA Johnson NP   2 mg at 01/07/22 1549    polyethylene glycol (GLYCOLAX) packet 17 g  17 g Oral Daily ALONDRA Johnson NP   17 g at 01/09/22 0947    magnesium hydroxide (MILK OF MAGNESIA) 400 MG/5ML suspension 30 mL  30 mL Oral Daily PRN Luna Hammed, APRN - NP        glucose (GLUTOSE) 40 % oral gel 15 g  15 g Oral PRN Luna Hammed, APRN - NP        dextrose 50 % IV solution  12.5 g IntraVENous PRN Luna Hammed, APRN - NP        glucagon (rDNA) injection 1 mg  1 mg IntraMUSCular PRN Luna Hammed, APRN - NP        dextrose 5 % solution  100 mL/hr IntraVENous PRN Luna Hammed, APRN - NP        glucose (GLUTOSE) 40 % oral gel 15 g  15 g Oral PRN Luna Hammed, APRN - NP        dextrose 50 % IV solution  12.5 g IntraVENous PRN Luna Hammed, APRN - NP        glucagon (rDNA) injection 1 mg  1 mg IntraMUSCular PRN Luna Hammed, APRN - NP        dextrose 5 % solution  100 mL/hr IntraVENous PRN Luna Hammed, APRN - NP        bupivacaine 0.5% (MARCAINE) elastomeric infusion 400 mL  400 mL Infiltration Continuous Sai Khan MD   400 mL at 01/04/22 1647    lidocaine 4 % external patch 1 patch  1 patch TransDERmal Daily Luna Hammed, APRN - NP   1 patch at 01/09/22 0944    alogliptin (NESINA) tablet 12.5 mg  12.5 mg Oral BID WC Luna Hammed, APRN - NP   12.5 mg at 01/07/22 1047    And    metFORMIN (GLUCOPHAGE) tablet 1,000 mg  1,000 mg Oral BID WC Luna Hammed, APRN - NP   1,000 mg at 01/07/22 1046    glipiZIDE (GLUCOTROL) tablet 5 mg  5 mg Oral BID AC Luna Hammed, APRN - NP   5 mg at 01/07/22 1045    insulin lispro (HUMALOG) injection vial 0-12 Units  0-12 Units SubCUTAneous TID WC Luna Hammed, APRN - NP   2 Units at 01/09/22 0902    insulin lispro (HUMALOG) injection vial 0-6 Units  0-6 Units SubCUTAneous Nightly Luna Hammed, APRN - NP   1 Units at 01/08/22 2015       Allergies:  Niacin and related, Minocycline hcl, Other, and Oxycodone hcl    Social History:   reports that he quit smoking about 3 months ago. His smoking use included cigarettes. He has a 13.50 pack-year smoking history.  He has never used smokeless tobacco. He reports that he does not drink alcohol and does not use drugs. Family History: family history includes Alzheimer's Disease in his father; Cancer in his brother; Diabetes in his father and mother; Heart Disease in his brother, brother, brother, mother, and sister; High Blood Pressure in his mother. REVIEW OF SYSTEMS:    Constitutional: No fever or chills. No night sweats, no weight loss   Eyes: No eye discharge, double vision, or eye pain   HEENT: negative for sore mouth, sore throat, hoarseness and voice change   Respiratory: negative for cough , sputum, dyspnea, wheezing, hemoptysis, chest pain   Cardiovascular: negative for chest pain, dyspnea, palpitations, orthopnea, PND   Gastrointestinal: negative for nausea, vomiting, diarrhea, constipation, abdominal pain, Dysphagia, hematemesis and hematochezia   Genitourinary: negative for frequency, dysuria, nocturia, urinary incontinence, and hematuria   Integument: negative for rash, skin lesions, bruises.    Hematologic/Lymphatic: negative for easy bruising, bleeding, lymphadenopathy, or petechiae   Endocrine: negative for heat or cold intolerance,weight changes, change in bowel habits and hair loss   Musculoskeletal: negative for myalgias, arthralgias, pain, joint swelling,and bone pain   Neurological: negative for headaches, dizziness, seizures, weakness, numbness    PHYSICAL EXAM:      BP (!) 130/50   Pulse 64   Temp 97 °F (36.1 °C) (Axillary)   Resp 18   Wt 157 lb (71.2 kg)   SpO2 100%   BMI 21.29 kg/m²    Temp (24hrs), Av.8 °F (36.6 °C), Min:97 °F (36.1 °C), Max:98.6 °F (37 °C)    General appearance - well appearing, no in pain or distress   Mental status - alert and cooperative   Eyes - pupils equal and reactive, extraocular eye movements intact   Ears - bilateral TM's and external ear canals normal   Mouth - mucous membranes moist, pharynx normal without lesions   Neck - supple, no significant adenopathy   Lymphatics - no palpable lymphadenopathy, no hepatosplenomegaly   Chest - clear to auscultation, no wheezes, rales or rhonchi, symmetric air entry   Heart - normal rate, regular rhythm, normal S1, S2, no murmurs  Abdomen - soft, nontender, nondistended, no masses or organomegaly   Neurological - alert, oriented, normal speech, no focal findings or movement disorder noted   Musculoskeletal - no joint tenderness, deformity or swelling   Extremities - peripheral pulses normal, no pedal edema, no clubbing or cyanosis   Skin - normal coloration and turgor, no rashes, no suspicious skin lesions noted ,    DATA:    Labs:   CBC:   Recent Labs     01/07/22  0242 01/09/22  0644   WBC 7.4 10.5   HGB 9.6* 10.7*   HCT 29.2* 32.2*    207     BMP:   Recent Labs     01/08/22  1252 01/09/22  0644   * 136   K 4.1 4.0   CO2 20 18*   BUN 18 20   CREATININE 0.73 0.64*   LABGLOM >60 >60   GLUCOSE 198* 174*     PT/INR: No results for input(s): PROTIME, INR in the last 72 hours. Surgical Pathology Report  SURGICAL PATHOLOGY REPORT  Collected: 01/04/22 1423   Lab status: Final   Resulting lab: [facility]   Value: -- Diagnosis --     1.  Right lung, middle lobe wedge biopsy:     -  Adenocarcinoma. -  Tumor is 1.8 cm. -  Carcinoma invades into but not through the visceral pleura. 2.  Lymph node #9, biopsy:     -  Negative for metastatic carcinoma. 3.  Lymph node #7, biopsy:     -  Negative for metastatic carcinoma. 4.  Right middle lobe lung, lobectomy:     -  Margins are free of involvement by carcinoma. -  Peribronchial lymph node, negative for metastatic carcinoma.      -  Nonneoplastic lung shows emphysematous change.       Lisset Gutierrez M.D.   **Electronically Signed Out**         rdd/1/6/2022         Clinical Information   Pre-op Diagnosis:  RIGHT MIDDLE LOBE NODULE   Operative Findings:  MIDDLE LOBE OF RIGHT LUNG; LYMPH NODE #9 OF LUNG;   LYMPH NODE #7; RIGHT MIDDLE LOBE   Operation Performed:  MIDDLE WEDGE RESECTION, POSSIBLE MIDDLE   LOBECTOMY VIA THORACOTOMY     Source of Specimen   1: MIDDLE LOBE OF RIGHT LUNG (A)   2: LYMPH NODE #9 OF LUNG (B)   3: LYMPH NODE #7   4: RIGHT MIDDLE LOBE     Gross Description   1.  \"ANA DAVIS, MIDDLE LOBE OF RIGHT LUNG\" Received fresh is a   6.4 x 4.2 x 2.8 cm lung wedge with staple line.  The pleura is   pink-tan with an area of slightly thickening (inked black). Sectioning reveals a 1.8 x 1.2 x 0.8 cm subpleural tan-white mass   lesion that is 0.8 cm from the staple line margin.  The remaining   parenchyma is pink-tan with no other lesions.  1TP, 1DQ, 1FS, Cassette   summary:  \"A\" frozen section lesion, \"B\" remainder of lesion with   pleura inked black, \"C\" staple line margin en face. 2.  \"ANA DAVIS, LYMPH NODE #9 OF LUNG\" Received fresh are two   fragments, 0.3 cm each.  1TP, 1FS, 1cs. 3.  \"ANA DAVIS, LYMPH NODE #7\" Received fresh are two   anthracotic fragments, 0.6 and 0.7 cm in greatest dimension.  1TP,   1FS, 1cs.     4.  \"ANA DAVIS, RIGHT MIDDLE LOBE\" Received fresh is a 50 gram,   8.5 x 6.8 x 3.5 cm lobe of lung with multiple staple lines.  The   pleura is wrinkled purple-gray.  Sectioning reveals spongy dark red   parenchyma with no masses.  No markedly enlarged nodes are identified   at the hilum.  Cassette summary:  \"A\" bronchial margin frozen section   cassette resubmitted as received, \"B\" hilar soft tissue and vascular   margin, \"C\" uninvolved lung (along staple line).  tm     Intraoperative Diagnosis   1.  FSDX:  Non-small cell carcinoma, favor adenocarcinoma.  (14:00,   RDD)   2.  FSDX:  Fibrofatty tissue.  (14:11, RDD)   3.  FSDX:  Lymph node, negative for carcinoma.  (14:19, RDD)   4.  FSDX:  Bronchial margin, negative for carcinoma.  (16:17, RDD)     Microscopic Description   1-4.  Microscopic examination performed.      PROCEDURE: Wedge biopsy, followed by lobectomy       SPECIMEN LATERALITY: Right   TUMOR FOCALITY: Single tumor       TUMOR SITE: Right middle lobe   TUMOR SIZE --     1.8 x 1.2 x 0.8 cm           HISTOLOGIC TYPE: Adenocarcinoma. Sections show a large and anaplastic non-small cell malignancy with   cohesion and a few instances of glandular differentiation.  Neoplastic   cells are large with copious eosinophilic cytoplasm and large   vesicular nuclei.  Tumor is characterized further utilizing control   appropriate immunostains.  Cells of carcinoma are negative for p40 and   they are positive for TTF-1 and CK7.  Morphology and immunophenotype   are that of an adenocarcinoma, lung primary. Note: The case is reviewed by a second Pathologist for quality   assurance with consensus (DS).       HISTOLOGIC GRADE: Grade 2 (of 4)           VISCERAL PLEURA INVASION:Reticulin stain with appropriately reactive   control is utilized to evaluate pleura.  Tumor extends beyond the   elastic layer into the pleura but not to the pleural surface (PL1)     DIRECT INVASION OF ADJACENT STRUCTURES: No     TREATMENT EFFECT: None apparent. LYMPHOVASCULAR INVASION: Absent                         MARGINS --   -BRONCHIAL: Free of tumor       -VASCULAR: Free of tumor       -PARENCHYMAL: Free of tumor       -OTHER ATTACHED TISSUE MARGIN (IF APPLICABLE): N/A       REGIONAL LYMPH NODES: 3   LYMPH NODES(S) FROM PRIOR PROCEDURES: No       NUMBER AND STATION(S) EXAMINED:   See diagnoses   NUMBER AND STATION(S) WITH METASTASIS: 0         DISTANT METASTASIS--   DISTANT SITE(S) INVOLVED, IF APPLICABLE: N/A              IMAGING DATA:  XR CHEST PORTABLE   Final Result   Right-sided pneumothorax with stable chest tube. Subcutaneous emphysema along the right lateral chest wall and in the right   supraclavicular region. XR CHEST PORTABLE   Final Result   Right apical pneumothorax measuring 1.6 cm. Subtle bibasilar infiltrates. Stable right-sided chest tube.          CT ABDOMEN PELVIS WO CONTRAST Additional Contrast? None Final Result   1. Apparent localized ileus involving a few loops of ileum in the central   abdomen. 2. Small right-sided pneumothorax. 3.  Cholelithiasis without evidence of acute cholecystitis. 4.  Small amount of non dependent air within the urinary bladder. Correlate   with any evidence of recent instrumentation. RECOMMENDATIONS:   Unavailable         XR CHEST PORTABLE   Final Result   Persistent small right apical pneumothorax with 2 right chest tubes in place. Resolving bilateral lower lung subsegmental atelectasis         XR CHEST PORTABLE   Final Result   No significant change in small right apical pneumothorax with chest tubes in   place. XR CHEST PORTABLE   Final Result   No significant interval change. Right chest tubes in place with persistent   small right apical pneumothorax. XR CHEST PORTABLE   Final Result   Interval right-sided thoracotomy with right-sided chest tubes in-situ. Small   right apical pneumothorax. Right hilar surgical clips related to recent   surgery. .         XR CHEST PORTABLE    (Results Pending)     CT chest 11/2/2021:  Impression   The previously biopsied, somewhat bilobed nodule involving right middle lobe   is slightly increased in size since the prior study now measuring 1.8 x 1.0   cm, once measuring 1.8 x 0.6 cm.  There does appear to be some spiculation.    No new pulmonary nodule is seen.           Primary Problem  Primary adenocarcinoma of right lung [facility])    Active Hospital Problems    Diagnosis Date Noted    Delirium [R41.0] 01/08/2022    Hyponatremia [E87.1] 01/08/2022    Postoperative ileus ([address] [K91.89, K56.7] 01/08/2022    Primary adenocarcinoma of right lung ([facility] [C34.91] 12/15/2021    Coronary artery disease involving native coronary artery of native heart without angina pectoris [I25.10] 09/10/2013    Primary hypertension [I10] 09/10/2013    Hyperlipidemia [E78.5] 09/10/2013    Type 2 diabetes mellitus without complication, without long-term current use of insulin ([facility] [address] [E11.9] 09/10/2013       IMPRESSION:   1. Right middle lobe lung adenocarcinoma, status post wedge resection on 1/4/2022. Surgical pathology showing [id number] stage IB, with high risk feature of visceral pleura involvement  2. History of tobacco abuse  3. Delirium/multifactorial could be ICU induced psychosis/sleep deprivation > improved  4. Post surgery ileus> improving    RECOMMENDATIONS:  1. I reviewed [facility], imaging studies, prior records, possible diagnosis and treatment recommendations with patient  2. discussed with family pathology results  3. I reviewed the surgical pathology results with patient and family. 4. I reviewed the NCCN guidelines and goals of care with patient and family  5. As he has visceral pleural involvement which is a high risk feature, adjuvant systemic chemotherapy be recommended  6. Continue postoperative care as per thoracic surgery  7. We will follow  8. Follow-up with medical oncology at [address] after discharge  9. Reiterate with the wife and daughter the indication of chemotherapy due to the high risk features of the cancer, side effects and appointment for oncology follow-up in the chart  10. Psychosis management per primary team  11. Postsurgical ileus n.p.o. intake per surgery  12. A.m. labs CBC and CMP    Discussed with patient Nurse and the family    Thank you for asking us to see this patient. Nadeem Sheldon  Hematologist/Medical Oncologist        This note is created with the assistance of a speech recognition program.  While intending to generate a document that actually reflects the content of the visit, the document can still have some errors including those of syntax and sound a like substitutions which may escape proof reading. It such instances, actual meaning can be extrapolated by contextual diversion.

## 2022-01-09 NOTE — PROGRESS NOTES
Patient refusing to have telemetry on, continues to remove it, doesn't want to keep BP cuff on, kicking guard at times. RN reorients to surrounding but patient Is delirious at times.

## 2022-01-09 NOTE — PROGRESS NOTES
Dr Tiana Nieto informed of BM s/p enema. No complaints on N/V. Asking about advancing diet - awaiting for any additional orders.

## 2022-01-10 ENCOUNTER — TELEPHONE (OUTPATIENT)
Dept: ONCOLOGY | Age: 72
End: 2022-01-10

## 2022-01-10 ENCOUNTER — TELEPHONE (OUTPATIENT)
Dept: CASE MANAGEMENT | Age: 72
End: 2022-01-10

## 2022-01-10 ENCOUNTER — APPOINTMENT (OUTPATIENT)
Dept: GENERAL RADIOLOGY | Age: 72
DRG: 164 | End: 2022-01-10
Attending: THORACIC SURGERY (CARDIOTHORACIC VASCULAR SURGERY)
Payer: MEDICARE

## 2022-01-10 LAB
ABSOLUTE EOS #: 0.18 K/UL (ref 0–0.44)
ABSOLUTE IMMATURE GRANULOCYTE: 0.22 K/UL (ref 0–0.3)
ABSOLUTE LYMPH #: 1.19 K/UL (ref 1.1–3.7)
ABSOLUTE MONO #: 0.97 K/UL (ref 0.1–1.2)
ANION GAP SERPL CALCULATED.3IONS-SCNC: 13 MMOL/L (ref 9–17)
BASOPHILS # BLD: 0 % (ref 0–2)
BASOPHILS ABSOLUTE: 0.03 K/UL (ref 0–0.2)
BUN BLDV-MCNC: 33 MG/DL (ref 8–23)
BUN/CREAT BLD: ABNORMAL (ref 9–20)
CALCIUM SERPL-MCNC: 7.9 MG/DL (ref 8.6–10.4)
CHLORIDE BLD-SCNC: 104 MMOL/L (ref 98–107)
CO2: 16 MMOL/L (ref 20–31)
CREAT SERPL-MCNC: 0.89 MG/DL (ref 0.7–1.2)
DIFFERENTIAL TYPE: ABNORMAL
EOSINOPHILS RELATIVE PERCENT: 2 % (ref 1–4)
GFR AFRICAN AMERICAN: >60 ML/MIN
GFR NON-AFRICAN AMERICAN: >60 ML/MIN
GFR SERPL CREATININE-BSD FRML MDRD: ABNORMAL ML/MIN/{1.73_M2}
GFR SERPL CREATININE-BSD FRML MDRD: ABNORMAL ML/MIN/{1.73_M2}
GLUCOSE BLD-MCNC: 129 MG/DL (ref 75–110)
GLUCOSE BLD-MCNC: 153 MG/DL (ref 75–110)
GLUCOSE BLD-MCNC: 154 MG/DL (ref 70–99)
GLUCOSE BLD-MCNC: 158 MG/DL (ref 75–110)
GLUCOSE BLD-MCNC: 93 MG/DL (ref 75–110)
HCT VFR BLD CALC: 28.9 % (ref 40.7–50.3)
HEMOGLOBIN: 9.8 G/DL (ref 13–17)
IMMATURE GRANULOCYTES: 2 %
LYMPHOCYTES # BLD: 10 % (ref 24–43)
MCH RBC QN AUTO: 30 PG (ref 25.2–33.5)
MCHC RBC AUTO-ENTMCNC: 33.9 G/DL (ref 28.4–34.8)
MCV RBC AUTO: 88.4 FL (ref 82.6–102.9)
MONOCYTES # BLD: 8 % (ref 3–12)
NRBC AUTOMATED: 0 PER 100 WBC
PDW BLD-RTO: 13.1 % (ref 11.8–14.4)
PLATELET # BLD: 299 K/UL (ref 138–453)
PLATELET ESTIMATE: ABNORMAL
PMV BLD AUTO: 11.2 FL (ref 8.1–13.5)
POTASSIUM SERPL-SCNC: 3.8 MMOL/L (ref 3.7–5.3)
RBC # BLD: 3.27 M/UL (ref 4.21–5.77)
RBC # BLD: ABNORMAL 10*6/UL
SEG NEUTROPHILS: 78 % (ref 36–65)
SEGMENTED NEUTROPHILS ABSOLUTE COUNT: 9.49 K/UL (ref 1.5–8.1)
SODIUM BLD-SCNC: 133 MMOL/L (ref 135–144)
WBC # BLD: 12.1 K/UL (ref 3.5–11.3)
WBC # BLD: ABNORMAL 10*3/UL

## 2022-01-10 PROCEDURE — 82947 ASSAY GLUCOSE BLOOD QUANT: CPT

## 2022-01-10 PROCEDURE — 6370000000 HC RX 637 (ALT 250 FOR IP): Performed by: NURSE PRACTITIONER

## 2022-01-10 PROCEDURE — 99232 SBSQ HOSP IP/OBS MODERATE 35: CPT | Performed by: INTERNAL MEDICINE

## 2022-01-10 PROCEDURE — 2500000003 HC RX 250 WO HCPCS: Performed by: PHYSICIAN ASSISTANT

## 2022-01-10 PROCEDURE — 97116 GAIT TRAINING THERAPY: CPT

## 2022-01-10 PROCEDURE — 80048 BASIC METABOLIC PNL TOTAL CA: CPT

## 2022-01-10 PROCEDURE — 36415 COLL VENOUS BLD VENIPUNCTURE: CPT

## 2022-01-10 PROCEDURE — 6360000002 HC RX W HCPCS: Performed by: PHYSICIAN ASSISTANT

## 2022-01-10 PROCEDURE — 2100000001 HC CVICU R&B

## 2022-01-10 PROCEDURE — APPNB30 APP NON BILLABLE TIME 0-30 MINS: Performed by: NURSE PRACTITIONER

## 2022-01-10 PROCEDURE — 6360000002 HC RX W HCPCS: Performed by: NURSE PRACTITIONER

## 2022-01-10 PROCEDURE — 2580000003 HC RX 258: Performed by: NURSE PRACTITIONER

## 2022-01-10 PROCEDURE — 99233 SBSQ HOSP IP/OBS HIGH 50: CPT | Performed by: INTERNAL MEDICINE

## 2022-01-10 PROCEDURE — 85025 COMPLETE CBC W/AUTO DIFF WBC: CPT

## 2022-01-10 PROCEDURE — 71045 X-RAY EXAM CHEST 1 VIEW: CPT

## 2022-01-10 PROCEDURE — 6370000000 HC RX 637 (ALT 250 FOR IP): Performed by: STUDENT IN AN ORGANIZED HEALTH CARE EDUCATION/TRAINING PROGRAM

## 2022-01-10 PROCEDURE — C9113 INJ PANTOPRAZOLE SODIUM, VIA: HCPCS | Performed by: PHYSICIAN ASSISTANT

## 2022-01-10 RX ORDER — ALPRAZOLAM 0.5 MG/1
0.5 TABLET ORAL ONCE
Status: COMPLETED | OUTPATIENT
Start: 2022-01-10 | End: 2022-01-10

## 2022-01-10 RX ADMIN — ALOGLIPTIN 12.5 MG: 12.5 TABLET, FILM COATED ORAL at 07:51

## 2022-01-10 RX ADMIN — PANTOPRAZOLE SODIUM 40 MG: 40 INJECTION, POWDER, FOR SOLUTION INTRAVENOUS at 07:52

## 2022-01-10 RX ADMIN — ALPRAZOLAM 0.5 MG: 0.5 TABLET ORAL at 15:26

## 2022-01-10 RX ADMIN — QUETIAPINE FUMARATE 25 MG: 25 TABLET ORAL at 20:06

## 2022-01-10 RX ADMIN — ONDANSETRON 4 MG: 2 INJECTION INTRAMUSCULAR; INTRAVENOUS at 08:14

## 2022-01-10 RX ADMIN — GLIPIZIDE 5 MG: 5 TABLET ORAL at 07:51

## 2022-01-10 RX ADMIN — MORPHINE SULFATE 2 MG: 2 INJECTION, SOLUTION INTRAMUSCULAR; INTRAVENOUS at 04:59

## 2022-01-10 RX ADMIN — DOCUSATE SODIUM 100 MG: 100 CAPSULE ORAL at 07:51

## 2022-01-10 RX ADMIN — ATORVASTATIN CALCIUM 20 MG: 20 TABLET, FILM COATED ORAL at 09:11

## 2022-01-10 RX ADMIN — OXYCODONE HYDROCHLORIDE 10 MG: 5 TABLET ORAL at 15:14

## 2022-01-10 RX ADMIN — SODIUM CHLORIDE, PRESERVATIVE FREE 10 ML: 5 INJECTION INTRAVENOUS at 20:07

## 2022-01-10 RX ADMIN — Medication 5 MG: at 20:06

## 2022-01-10 RX ADMIN — ACETAMINOPHEN 1000 MG: 500 TABLET ORAL at 15:13

## 2022-01-10 RX ADMIN — MORPHINE SULFATE 2 MG: 2 INJECTION, SOLUTION INTRAMUSCULAR; INTRAVENOUS at 13:38

## 2022-01-10 RX ADMIN — MORPHINE SULFATE 2 MG: 2 INJECTION, SOLUTION INTRAMUSCULAR; INTRAVENOUS at 07:42

## 2022-01-10 RX ADMIN — DEXMEDETOMIDINE HYDROCHLORIDE 0.6 MCG/KG/HR: 4 INJECTION, SOLUTION INTRAVENOUS at 03:03

## 2022-01-10 RX ADMIN — METFORMIN HYDROCHLORIDE 1000 MG: 500 TABLET ORAL at 07:46

## 2022-01-10 RX ADMIN — ISOSORBIDE MONONITRATE 30 MG: 30 TABLET ORAL at 07:52

## 2022-01-10 RX ADMIN — OXYCODONE HYDROCHLORIDE 10 MG: 5 TABLET ORAL at 10:21

## 2022-01-10 RX ADMIN — FERROUS SULFATE TAB EC 325 MG (65 MG FE EQUIVALENT) 325 MG: 325 (65 FE) TABLET DELAYED RESPONSE at 07:52

## 2022-01-10 RX ADMIN — INSULIN LISPRO 1 UNITS: 100 INJECTION, SOLUTION INTRAVENOUS; SUBCUTANEOUS at 20:09

## 2022-01-10 RX ADMIN — SODIUM CHLORIDE, PRESERVATIVE FREE 10 ML: 5 INJECTION INTRAVENOUS at 07:42

## 2022-01-10 RX ADMIN — INSULIN LISPRO 2 UNITS: 100 INJECTION, SOLUTION INTRAVENOUS; SUBCUTANEOUS at 07:50

## 2022-01-10 ASSESSMENT — PAIN SCALES - GENERAL
PAINLEVEL_OUTOF10: 0
PAINLEVEL_OUTOF10: 9
PAINLEVEL_OUTOF10: 8
PAINLEVEL_OUTOF10: 8
PAINLEVEL_OUTOF10: 9
PAINLEVEL_OUTOF10: 0
PAINLEVEL_OUTOF10: 4
PAINLEVEL_OUTOF10: 4
PAINLEVEL_OUTOF10: 7
PAINLEVEL_OUTOF10: 3
PAINLEVEL_OUTOF10: 8
PAINLEVEL_OUTOF10: 8
PAINLEVEL_OUTOF10: 4

## 2022-01-10 ASSESSMENT — PAIN DESCRIPTION - PAIN TYPE: TYPE: ACUTE PAIN

## 2022-01-10 ASSESSMENT — PAIN DESCRIPTION - DESCRIPTORS: DESCRIPTORS: ACHING;SORE

## 2022-01-10 ASSESSMENT — PAIN DESCRIPTION - FREQUENCY: FREQUENCY: CONTINUOUS

## 2022-01-10 ASSESSMENT — PAIN DESCRIPTION - LOCATION: LOCATION: FACE;NECK

## 2022-01-10 NOTE — TELEPHONE ENCOUNTER
Name: Clay Cuevas  : 1950  MRN: [id number]    Oncology Navigation Follow-Up Note  Navigator reaching out to [place] and left VM , Pt. Needing F/U appt for systemic treatment POC.   Electronically signed by Matt Tanner RN on 1/10/2022 at 2:18 PM

## 2022-01-10 NOTE — PROGRESS NOTES
General Surgery Progress Note       PATIENT NAME: Lance Bauman   TODAY'S DATE: 1/10/2022, 7:36 AM    SUBJECTIVE:    Pt seen and examined. No acute events overnight. Recieved enema yesterday, having BMs. Tolerating CLD.  VSS. Afebrile. Alert and oriented x3 this AM    OBJECTIVE:   Vitals:  /64   Pulse 75   Temp 98.1 °F (36.7 °C) (Oral)   Resp 20   Wt 157 lb (71.2 kg)   SpO2 100%   BMI 21.29 kg/m²      INTAKE/OUTPUT:      Intake/Output Summary (Last 24 hours) at 1/10/2022 0736  Last data filed at 1/10/2022 0341  Gross per 24 hour   Intake --   Output 600 ml   Net -600 ml     General: Alert to self, NAD  Lungs: Symmetrical chest rise bilaterally  Heart: RRR  Abdomen: Soft, ND, Non tender, non peritoneal, no rebound   Extremity: moves all extremities x4,    Data Review:  CBC:   Recent Labs     01/09/22  0644 01/09/22  1309 01/10/22  0542   WBC 10.5 10.5 12.1*   HGB 10.7* 10.3* 9.8*    208 299     BMP:    Recent Labs     01/09/22  0644 01/09/22  1309 01/10/22  0542    132* 133*   K 4.0 3.9 3.8    102 104   CO2 18* 18* 16*   BUN 20 23 33*   CREATININE 0.64* 0.78 0.89   GLUCOSE 174* 146* 154*     Hepatic:   Recent Labs     01/09/22  1309   AST 26   ALT 25   ALKPHOS 53   BILITOT 0.82     Coagulation:   Recent Labs     01/09/22  1309   PROT 5.4*       ASSESSMENT     Patient Active Problem List   Diagnosis    Angina pectoris ([facility] [address]    Coronary artery disease involving native coronary artery of native heart without angina pectoris    Primary hypertension    Type 2 diabetes mellitus without complication, without long-term current use of insulin (HCC)    Hyperlipidemia    Shortness of breath, post procedure improving probably Effient side effect.      S/P angioplasty with stent, obtuse marginal    Type II or unspecified type diabetes mellitus without mention of complication, not stated as uncontrolled    Calculus of kidney    Actinic keratosis    Senile nuclear sclerosis    Dyspepsia Epigastric pain    Substernal chest pain    Abnormal CT scan, stomach    Abnormal gastrointestinal PET scan    Strain of lumbar region    Pneumothorax after biopsy    Pneumothorax, post biopsy, right    Primary adenocarcinoma of right lung (HCC)    Acute delirium    Hyponatremia    Postoperative ileus [facility])       PLAN  69 yo M s/p 1/4/21 R lobectomy with post operative ileus/ extensive stool burden noted on CT A/P 1/7/21     Plan:  Continue medical mgmt and supportive care per primary  Recommend continue aggressive bowel regimen. Tolerating CLD. OK to advance to FLD. Continue to advance diet as tolerated. Continue to monitor abd exam and bowel function, goal K+ 4.0, Mg 2.0   Encourage IS use and ambulation/ activity   General surgery to sign off, please contact with further questions or concerns. Electronically signed by Carole Mello DO  on 1/10/2022 at 7:36 AM     Attending Physician Statement  I have discussed the case with Dr Gautam Little, including pertinent history and exam findings with the resident. I have seen and examined the patient and the key elements of the encounter have been performed by me. I agree with the assessment, plan and orders as documented by the resident.       Electronically signed by Astrid Borden DO  on 1/10/2022 at 12:32 PM

## 2022-01-10 NOTE — PROGRESS NOTES
[facility]  Daily Progress Note    Surgeon: Dr North Garrison   S/P:  Right VATS with RML lobectomy  POD#: 6  EF: 60%      Subjective:  Mr. Kevin Oro feels well today with no acute complaints. Sitter remains at bedside for patient safety. Pain is controlled on current medication regimen. OOBTC and ambulating. Last BMs yesterday, general surgery following for ileus management. Denies chest pain or shortness of breath. Plan of care reviewed and questions answered. Physical Exam  Vital Signs: BP (!) 112/53   Pulse 74   Temp 97.7 °F (36.5 °C) (Oral)   Resp 17   Wt 157 lb (71.2 kg)   SpO2 100%   BMI 21.29 kg/m²  O2 Flow Rate (L/min): 4 L/min   Admit Weight: Weight: 157 lb 10.1 oz (71.5 kg)   WEIGHTWeight: 157 lb (71.2 kg)     General: in no acute distress, alert and disoriented. Up in chair  Heart: Normal S1 and S2.  Regular rhythm. No murmurs, gallops, or rubs. Lungs: clear to auscultation bilaterally and diminished breath sounds bibasilar  Abdomen: soft, non tender, non distended, BSx4  Extremities: Trace edema  Wounds: clean and dry, healing appropriately. .     Scheduled Meds:    bisacodyl  10 mg Rectal Daily    docusate sodium  100 mg Oral BID    melatonin  5 mg Oral Nightly    [Held by provider] sertraline  50 mg Oral Nightly    QUEtiapine  25 mg Oral Nightly    pantoprazole  40 mg IntraVENous Daily    And    sodium chloride (PF)  10 mL IntraVENous Daily    [Held by provider] gabapentin  100 mg Oral TID    lisinopril  20 mg Oral Daily    isosorbide mononitrate  30 mg Oral Daily    ferrous sulfate  325 mg Oral Daily    atorvastatin  20 mg Oral Daily    sodium chloride flush  10 mL IntraVENous 2 times per day    polyethylene glycol  17 g Oral Daily    lidocaine  1 patch TransDERmal Daily    alogliptin  12.5 mg Oral BID WC    And    metFORMIN  1,000 mg Oral BID WC    glipiZIDE  5 mg Oral BID AC    insulin lispro  0-12 Units SubCUTAneous TID WC    insulin lispro 0-6 Units SubCUTAneous Nightly     Continuous Infusions:    dexmedetomidine 0.5 mcg/kg/hr (01/10/22 0802)    sodium chloride 75 mL/hr at 01/09/22 0025    dextrose      dextrose      bupivacaine 0.5%         Data:  CBC:   Recent Labs     01/09/22  0644 01/09/22  1309 01/10/22  0542   WBC 10.5 10.5 12.1*   HGB 10.7* 10.3* 9.8*   HCT 32.2* 30.5* 28.9*   MCV 86.8 86.2 88.4    208 299     BMP:   Recent Labs     01/09/22  0644 01/09/22  1309 01/10/22  0542    132* 133*   K 4.0 3.9 3.8    102 104   CO2 18* 18* 16*   BUN 20 23 33*   CREATININE 0.64* 0.78 0.89     PT/INR: No results for input(s): PROTIME, INR in the last 72 hours. APTT: No results for input(s): APTT in the last 72 hours. Chest X-Ray:   ONE XRAY VIEW OF THE CHEST       1/10/2022 6:29 am       COMPARISON:   Chest x-ray dated 01/09/2022.       HISTORY:   ORDERING SYSTEM PROVIDED HISTORY: eval pneumo   TECHNOLOGIST PROVIDED HISTORY:   eval pneumo   Reason for Exam: port upright       FINDINGS:   HEART/MEDIASTINUM: The cardiomediastinal silhouette is unchanged.       PLEURA/LUNGS: Unchanged right pneumothorax.  Right lower lung scarring versus   atelectasis is grossly unchanged.       BONES/SOFT TISSUE: The visualized osseous structures are unchanged.  Increase   in the subcutaneous emphysema in the right chest wall and neck.           Impression   No significant interval change in the appearance of the chest.       Increased subcutaneous emphysema in the right chest wall and right neck             I/O:  I/O last 3 completed shifts: In: 1 [P.O.:720;  I.V.:1008]  Out: 7601 [Urine:1575; Chest Tube:245]      Assessment:  · Pulmonary nodule s/p Right VATS with RML lobectomy and lymph node dissection   · POD 6  · Ileus  · Altered mental status  · Mucopurulent chronic bronchitis  · Multivessel CAD s/p stenting  · Angina pectoris  · Diabetes mellitus type 2 with long term use of insulin  · Hypertension  · Hyperlipidemia      Plan:  Remains inpatient on telemetry,   Monitor vitals closely including continuous pulse oximetry   Oxygen as needed via nasal cannula to maintain SpO2 > 92%   Chest x-ray daily   Encourage incentive spirometry    Monitor CBC, BMP, INR and Mag daily   Precedex gtt - wean as tolerated   Home medications as ordered   Roxicodone for pain control   SCDs while stationary    Protonix for GI prophylaxis   Replace electrolytes as needed per sliding scale and recheck per policy   PT/OT evalutation for discharge recommendation and ambulation 3x daily   Case Management consult for discharge planning        The above recommendations including medications and orders were discussed and agreed upon with Dr. Kristen Gates, the attending on service for the cardiothoracic surgery group today. Electronically signed by ALONDRA Copeland CNP on 1/10/2022 at 9:06 AM    On this date 1/10/2022 I have spent 22 minutes reviewing previous notes, test results and face to face with the patient discussing the diagnosis and importance of compliance with the treatment plan as well as documenting on the day of the visit. At least 50% of the time documented was spent with the patient to provide counseling and/or coordination of care. This note was created with the assistance of a speech-recognition program.  Although the intention is to generate a document that actually reflects the content of the visit, no guarantees can be provided that every mistake has been identified and corrected by editing. Note was updated later by me after  physical examination and  completion of the assessment.

## 2022-01-10 NOTE — TELEPHONE ENCOUNTER
Lidia Marquez had called and stated that Andrea Alexander needed a F/U appt at the [place] office. Dr. Bennie Rojas had seen him inpatient and thought the patient would like an appt locally. Tried to reach out too the patient L/M for him to call the [facility] and I could set him up with an appt.  Waiting on call back

## 2022-01-10 NOTE — PROGRESS NOTES
[facility]  Office: [address], DO, Charli Casanova, DO, Samanta Boalissa, DO, Gunnar Cordonjose miguel Blood, DO, Meggan Strauss MD, Aguilar Lawson MD, Ronald Haas MD, Socrates Bartholomew MD, Gayathri Pham MD, Hadley Abreu MD, Rosangela Xiao MD, Mihai Carrion, DO, aJnie Thomas, DO, Andreina Chu MD,  Shane Ramirez, DO, Philemon Simmonds, MD, Creed Ahumada, MD, Marlow Cowden, MD, Kaylin Roberts MD, Matthew Pina MD, Gurdeep Gan MD, Wally Mcdermott MD, Irma Alford, [facility], [facility], [facility], Estella Tatum, CNP, Izaiah Rees, CNS, Garret Moody, CNP, Abad Reyes, CNP, Tung Landry, CNP, Nando Mike, CNP, Yazan Castellanos, CNP, Mikki Buck PA-C, Venus Justice, [facility], [facility], Jenni Fung, CNP, Baldwin Essex, CNP, Rian Alicia, CNP, Alexei Mcneal, CNP, Ivette Velasco CNP, Gaynelle Pallas, [address] [place] [address]    Progress Note    1/10/2022    2:12 PM    Name:   Clay Cuevas  MRN:     [id number]     Dalila Lisle:      [de-identified]   Room:   [address] [place] [address] Day:  6  Admit Date:  1/4/2022  7:49 AM    PCP:   [facility] Walker  Code Status:  Full Code    Subjective:     C/C: lung nodule    Interval History Status: improved. Patient seen and examined this morning. Family at bedside. No longer in wrist restraints. Sitter at bedside. Much more awake and alert today. He is calm. He is complaining of swelling in his throat/chest. Has subcutaneous air that is uncomfortable to him. Chest tube has been removed. Not requiring supplemental oxygen. Brief History: This 70 yom presented for elective pulmonary nodule resection on 1/4. Pathology shows adenocarcinoma. Postop he has had worsening problems with confusion, apparently has not been sleeping well-even at home last month per wife. He is now requiring restraints and is on precedex drip and received haldol this am for severe agitation.   Currently he is now drowsy     Per his wife, he is normally mentally sharp and has only minimal memory issues, like forgetting where he left something, but these are only very mild and he has never been confused like this before     He also developed postop ileus and has been on reglan and pepcid for that. Ileus resolved. Chest tube removed. [facility] vs [facility]. Review of Systems:     Constitutional: Reports being sleepy today; negative for chills, fevers, sweats  HEENT: Reports swelling of his chest/throat  Respiratory:  negative for cough, dyspnea on exertion, shortness of breath, wheezing  Cardiovascular:  negative for chest pain, chest pressure/discomfort, lower extremity edema, palpitations  Gastrointestinal:  negative for abdominal pain, constipation, diarrhea, nausea, vomiting  Neurological:  negative for dizziness, headache    Medications: Allergies: Allergies   Allergen Reactions    Niacin And Related      Turns red, no trouble breathing    Minocycline Hcl Rash    Other Nausea And Vomiting     Patient states on all pain medications he gets nausea and vomiting.  Doctor ordered a medicine (nausea) to take before pain medication    Oxycodone Hcl      vomits       Current Meds:   Scheduled Meds:    bisacodyl  10 mg Rectal Daily    docusate sodium  100 mg Oral BID    melatonin  5 mg Oral Nightly    [Held by provider] sertraline  50 mg Oral Nightly    QUEtiapine  25 mg Oral Nightly    pantoprazole  40 mg IntraVENous Daily    And    sodium chloride (PF)  10 mL IntraVENous Daily    [Held by provider] gabapentin  100 mg Oral TID    lisinopril  20 mg Oral Daily    isosorbide mononitrate  30 mg Oral Daily    ferrous sulfate  325 mg Oral Daily    atorvastatin  20 mg Oral Daily    sodium chloride flush  10 mL IntraVENous 2 times per day    polyethylene glycol  17 g Oral Daily    lidocaine  1 patch TransDERmal Daily    alogliptin  12.5 mg Oral BID WC    And    metFORMIN  1,000 mg Oral BID WC    glipiZIDE  5 mg Oral BID AC    insulin lispro  0-12 Units SubCUTAneous TID     insulin lispro  0-6 Units SubCUTAneous Nightly     Continuous Infusions:    dexmedetomidine Stopped (01/10/22 0949)    dextrose      dextrose       PRN Meds: bisacodyl, haloperidol lactate, dexmedetomidine, glycerin moisturizing mouth spray, benzocaine-menthol, ondansetron, sodium chloride flush, acetaminophen, oxyCODONE **OR** oxyCODONE, morphine, magnesium hydroxide, glucose, dextrose, glucagon (rDNA), dextrose, glucose, dextrose, glucagon (rDNA), dextrose    Data:     Past Medical History:   has a past medical history of Abnormal cardiac cath, Actinic keratosis, Arthritis, CAD (coronary artery disease), Calculus of kidney, Cervical stenosis of spine, Diabetes mellitus ([address] Full dentures, GERD (gastroesophageal reflux disease), H/O echocardiogram, History of cardiac cath, History of echocardiogram, History of echocardiogram, History of heart attack, History of tobacco abuse, Hyperlipidemia, Hypertension, Pulmonary nodule, Raynaud's phenomenon, S/P angioplasty with stent, and Wears hearing aid in both ears. Social History:   reports that he quit smoking about 3 months ago. His smoking use included cigarettes. He has a 13.50 pack-year smoking history. He has never used smokeless tobacco. He reports that he does not drink alcohol and does not use drugs.      Family History:   Family History   Problem Relation Age of Onset    Heart Disease Mother     Diabetes Mother     High Blood Pressure Mother     Diabetes Father     Alzheimer's Disease Father     Heart Disease Sister         CABG    Heart Disease Brother     Heart Disease Brother     Heart Disease Brother     Cancer Brother        Vitals:  BP (!) 108/45   Pulse 81   Temp 98.2 °F (36.8 °C) (Oral)   Resp 17   Wt 157 lb (71.2 kg)   SpO2 99%   BMI 21.29 kg/m²   Temp (24hrs), Av.8 °F (36.6 °C), Min:97.6 °F (36.4 °C), Max:98.2 °F (36.8 °C)    Recent Labs     22  1708 01/09/22  2033 01/10/22  3718 01/10/22  1208   POCGLU 136* 171* 153* 93       I/O (24Hr):     Intake/Output Summary (Last 24 hours) at 1/10/2022 1412  Last data filed at 1/10/2022 1123  Gross per 24 hour   Intake 896 ml   Output 500 ml   Net 396 ml       Labs:  Hematology:  Recent Labs     01/09/22  0644 01/09/22  1309 01/10/22  0542   WBC 10.5 10.5 12.1*   RBC 3.71* 3.54* 3.27*   HGB 10.7* 10.3* 9.8*   HCT 32.2* 30.5* 28.9*   MCV 86.8 86.2 88.4   MCH 28.8 29.1 30.0   MCHC 33.2 33.8 33.9   RDW 12.8 12.8 13.1    208 299   MPV 11.0 10.9 11.2     Chemistry:  Recent Labs     01/09/22  0644 01/09/22  1309 01/10/22  0542    132* 133*   K 4.0 3.9 3.8    102 104   CO2 18* 18* 16*   GLUCOSE 174* 146* 154*   BUN 20 23 33*   CREATININE 0.64* 0.78 0.89   ANIONGAP 15 12 13   LABGLOM >60 >60 >60   GFRAA >60 >60 >60   CALCIUM 8.4* 8.5* 7.9*     Recent Labs     01/09/22  0721 01/09/22  0942 01/09/22  1125 01/09/22  1309 01/09/22  1708 01/09/22 2033 01/10/22  0748 01/10/22  1208   PROT  --   --   --  5.4*  --   --   --   --    LABALBU  --   --   --  3.0*  --   --   --   --    TSH  --  0.80  --   --   --   --   --   --    AST  --   --   --  26  --   --   --   --    ALT  --   --   --  25  --   --   --   --    ALKPHOS  --   --   --  53  --   --   --   --    BILITOT  --   --   --  0.82  --   --   --   --    AMMONIA  --  29  --   --   --   --   --   --    POCGLU 167*  --  147*  --  136* 171* 153* 93     ABG:  Lab Results   Component Value Date    POCPH 7.308 01/04/2022    POCPCO2 46.1 01/04/2022    POCPO2 76.2 01/04/2022    POCHCO3 23.2 01/04/2022    NBEA 3 01/04/2022    PBEA NOT REPORTED 01/04/2022    OEN3FUZ NOT REPORTED 01/04/2022    RHZF6BLB 94 01/04/2022    FIO2 4.0 01/04/2022     Lab Results   Component Value Date/Time    SPECIAL NOT REPORTED 01/04/2022 01:59 PM     Lab Results   Component Value Date/Time    CULTURE NO GROWTH 5 DAYS 01/04/2022 01:59 PM       Radiology:  CT ABDOMEN PELVIS WO CONTRAST Additional Contrast? None    Result Date: 1/7/2022  1. Apparent localized ileus involving a few loops of ileum in the central abdomen. 2. Small right-sided pneumothorax. 3.  Cholelithiasis without evidence of acute cholecystitis. 4.  Small amount of non dependent air within the urinary bladder. Correlate with any evidence of recent instrumentation. RECOMMENDATIONS: Unavailable     XR CHEST PORTABLE    Result Date: 1/9/2022  Right-sided pneumothorax with stable chest tube. Subcutaneous emphysema along the right lateral chest wall and in the right supraclavicular region. XR CHEST PORTABLE    Result Date: 1/8/2022  Right apical pneumothorax measuring 1.6 cm. Subtle bibasilar infiltrates. Stable right-sided chest tube. XR CHEST PORTABLE    Result Date: 1/7/2022  Persistent small right apical pneumothorax with 2 right chest tubes in place. Resolving bilateral lower lung subsegmental atelectasis     XR CHEST PORTABLE    Result Date: 1/6/2022  No significant change in small right apical pneumothorax with chest tubes in place. XR CHEST PORTABLE    Result Date: 1/5/2022  No significant interval change. Right chest tubes in place with persistent small right apical pneumothorax. XR CHEST PORTABLE    Result Date: 1/4/2022  Interval right-sided thoracotomy with right-sided chest tubes in-situ. Small right apical pneumothorax. Right hilar surgical clips related to recent surgery. .       Physical Examination:        General appearance:  alert, cooperative and no distress, elderly  gentleman lying supine in bed. Mental Status:  oriented to person, place and time and normal affect  Lungs:  clear to auscultation bilaterally, normal effort, right-sided chest tube present, no wheezing heard; diffuse subcutaneous emphysema noted to the right anterior and posterior chest wall with extension into the anterior portion of the neck.   Heart:  regular rate and rhythm, no murmur  Abdomen:  soft, nontender, nondistended, normal bowel sounds, no masses, hepatomegaly, splenomegaly  Extremities:  no edema, redness, tenderness in the calves  Skin:  no gross lesions, rashes, induration    Assessment:        Hospital Problems           Last Modified POA    * (Principal) Primary adenocarcinoma of right lung ([facility] [address] 1/9/2022 Yes    Acute delirium 1/9/2022 Yes    Coronary artery disease involving native coronary artery of native heart without angina pectoris 1/8/2022 Yes    Primary hypertension 1/8/2022 Yes    Type 2 diabetes mellitus without complication, without long-term current use of insulin ([facility] [address] 1/8/2022 Yes    Hyperlipidemia 1/8/2022 Yes    Hyponatremia 1/8/2022 No    Postoperative ileus ([facility] [address] 1/8/2022 No          Plan:        1. Your continued CT surgery care  2. Chest tube removed  3. Significant subcutaneous air noted  4. Ileus resolved, surgery signed off   5. Mentation has improved. Continue seoquel nightly while in hospital. D/c at discharge. 6. Vitamin B12, folate, TSH ammonia WNL  7. Avoid Haldol/Geodon/Zyprexa. Avoid benzos. 8. Insulin sliding scale for coverage. Restart home meds at time of discharge  9. Continue lisinopril, Imdur, statin therapy. 10. Increase activity, push for home health care as opposed to SNF. 11. Internal medicine to sign off, please call with any questions or if his clinical status changes.     [address],   1/10/2022  2:12 PM

## 2022-01-10 NOTE — PLAN OF CARE
Problem: Infection:  Goal: Will remain free from infection  Description: Will remain free from infection  Outcome: Ongoing     Problem: Safety:  Goal: Free from accidental physical injury  Description: Free from accidental physical injury  Outcome: Ongoing  Goal: Free from intentional harm  Description: Free from intentional harm  Outcome: Ongoing     Problem: Daily Care:  Goal: Daily care needs are met  Description: Daily care needs are met  Outcome: Ongoing     Problem: Pain:  Goal: Patient's pain/discomfort is manageable  Description: Patient's pain/discomfort is manageable  Outcome: Ongoing  Goal: Pain level will decrease  Description: Pain level will decrease  Outcome: Ongoing  Goal: Control of acute pain  Description: Control of acute pain  Outcome: Ongoing  Goal: Control of chronic pain  Description: Control of chronic pain  Outcome: Ongoing     Problem: Skin Integrity:  Goal: Skin integrity will stabilize  Description: Skin integrity will stabilize  Outcome: Ongoing     Problem: Discharge Planning:  Goal: Patients continuum of care needs are met  Description: Patients continuum of care needs are met  Outcome: Ongoing     Problem: Falls - Risk of:  Goal: Will remain free from falls  Description: Will remain free from falls  Outcome: Met This Shift  Goal: Absence of physical injury  Description: Absence of physical injury  Outcome: Met This Shift     Problem: Non-Violent Restraints  Goal: Removal from restraints as soon as assessed to be safe  Outcome: Ongoing  Goal: No harm/injury to patient while restraints in use  Outcome: Ongoing  Goal: Patient's dignity will be maintained  Outcome: Ongoing     Problem: Skin Integrity:  Goal: Will show no infection signs and symptoms  Description: Will show no infection signs and symptoms  Outcome: Ongoing  Goal: Absence of new skin breakdown  Description: Absence of new skin breakdown  Outcome: Ongoing

## 2022-01-10 NOTE — PLAN OF CARE
Problem: Infection:  Goal: Will remain free from infection  Description: Will remain free from infection  1/10/2022 1457 by Pablo Ganser, RN  Outcome: Ongoing  1/10/2022 0518 by Jordan French RN  Outcome: Ongoing     Problem: Safety:  Goal: Free from accidental physical injury  Description: Free from accidental physical injury  1/10/2022 1457 by Pablo Ganser, RN  Outcome: Ongoing  1/10/2022 0518 by Jordan French RN  Outcome: Ongoing  Goal: Free from intentional harm  Description: Free from intentional harm  1/10/2022 1457 by Pablo Ganser, RN  Outcome: Ongoing  1/10/2022 0518 by Jordan French RN  Outcome: Ongoing     Problem: Daily Care:  Goal: Daily care needs are met  Description: Daily care needs are met  1/10/2022 1457 by Pablo Ganser, RN  Outcome: Ongoing  1/10/2022 0518 by Jordan French RN  Outcome: Ongoing     Problem: Pain:  Goal: Patient's pain/discomfort is manageable  Description: Patient's pain/discomfort is manageable  1/10/2022 1457 by Pablo Ganser, RN  Outcome: Ongoing  1/10/2022 0518 by Jordan French RN  Outcome: Ongoing  Goal: Pain level will decrease  Description: Pain level will decrease  1/10/2022 1457 by Pablo Ganser, RN  Outcome: Ongoing  1/10/2022 0518 by Jordan French RN  Outcome: Ongoing  Goal: Control of acute pain  Description: Control of acute pain  1/10/2022 1457 by Pablo Ganser, RN  Outcome: Ongoing  1/10/2022 0518 by Jordan French RN  Outcome: Ongoing  Goal: Control of chronic pain  Description: Control of chronic pain  1/10/2022 1457 by Pablo Ganser, RN  Outcome: Ongoing  1/10/2022 0518 by Jordan French RN  Outcome: Ongoing     Problem: Skin Integrity:  Goal: Skin integrity will stabilize  Description: Skin integrity will stabilize  1/10/2022 1457 by Pablo Ganser, RN  Outcome: Ongoing  1/10/2022 0518 by Jordan French RN  Outcome: Ongoing     Problem: Discharge Planning:  Goal: Patients continuum of care needs are met  Description: Patients continuum of care needs are met  1/10/2022 1457 by Claudio Wing RN  Outcome: Ongoing  1/10/2022 0518 by Sindy Rosen RN  Outcome: Ongoing     Problem: Falls - Risk of:  Goal: Will remain free from falls  Description: Will remain free from falls  1/10/2022 1457 by Claudio Wing RN  Outcome: Ongoing  1/10/2022 0518 by Sindy Rsoen RN  Outcome: Met This Shift  Goal: Absence of physical injury  Description: Absence of physical injury  1/10/2022 1457 by Claudio Wing RN  Outcome: Ongoing  1/10/2022 0518 by Sindy Rosen RN  Outcome: Met This Shift     Problem: Non-Violent Restraints  Goal: Removal from restraints as soon as assessed to be safe  1/10/2022 1457 by Claudio Wing RN  Outcome: Ongoing  1/10/2022 0518 by Sindy Rosen RN  Outcome: Ongoing  Goal: No harm/injury to patient while restraints in use  1/10/2022 1457 by Claudio Wing RN  Outcome: Ongoing  1/10/2022 0518 by Sindy Rosen RN  Outcome: Ongoing  Goal: Patient's dignity will be maintained  1/10/2022 1457 by Claudio Wing RN  Outcome: Ongoing  1/10/2022 0518 by Sindy Rosen RN  Outcome: Ongoing     Problem: Skin Integrity:  Goal: Will show no infection signs and symptoms  Description: Will show no infection signs and symptoms  1/10/2022 1457 by Claudio Wing RN  Outcome: Ongoing  1/10/2022 0518 by Sindy Rosen RN  Outcome: Ongoing  Goal: Absence of new skin breakdown  Description: Absence of new skin breakdown  1/10/2022 1457 by Claudio Wing RN  Outcome: Ongoing  1/10/2022 0518 by Sindy Rosen RN  Outcome: Ongoing

## 2022-01-10 NOTE — PROGRESS NOTES
Physical Therapy  Facility/Department: [facility]  Daily Treatment Note  NAME: Maynor Seo  : 1950  MRN: [id number]    Date of Service: 1/10/2022    Discharge Recommendations:  Patient would benefit from continued therapy after discharge   PT Equipment Recommendations  Equipment Needed: No    Assessment   Body structures, Functions, Activity limitations: Decreased functional mobility ; Decreased strength;Decreased endurance;Decreased balance  Assessment: Pt amb 200' CGA no AD with multiple rest breaks, and amb 20ft x2 RW CGA. Pt would benefit from continued acute PT to address deficits and improve functional independence. Prognosis: Good  PT Education: Plan of Care;PT Role;General Safety; Injury Prevention  REQUIRES PT FOLLOW UP: Yes  Activity Tolerance  Activity Tolerance: Patient Tolerated treatment well;Patient limited by fatigue     Patient Diagnosis(es): The encounter diagnosis was S/P thoracotomy. has a past medical history of Abnormal cardiac cath, Actinic keratosis, Arthritis, CAD (coronary artery disease), Calculus of kidney, Cervical stenosis of spine, Diabetes mellitus ([facility] [address] Full dentures, GERD (gastroesophageal reflux disease), H/O echocardiogram, History of cardiac cath, History of echocardiogram, History of echocardiogram, History of heart attack, History of tobacco abuse, Hyperlipidemia, Hypertension, Pulmonary nodule, Raynaud's phenomenon, S/P angioplasty with stent, and Wears hearing aid in both ears. has a past surgical history that includes Cardiac catheterization (); Hemorrhoid surgery; Neck surgery (); other surgical history (10/2014); other surgical history (2014); Cataract removal (2015); Upper gastrointestinal endoscopy (2005); Cataract removal (2015); Colonoscopy (2006); Colonoscopy (2016); Cardiac catheterization (2017); Cardiac catheterization (2017); Cardiac catheterization (2019);  Upper gastrointestinal endoscopy (N/A, 02/05/2019); Upper gastrointestinal endoscopy (N/A, 10/22/2019); Lung biopsy (Right, 07/28/2021); CT NEEDLE BIOPSY LUNG PERCUTANEOUS (7/28/2021); hernia repair (Left); and Lung removal, total (Right, 1/4/2022). Restrictions  Restrictions/Precautions  Restrictions/Precautions: General Precautions,Fall Risk,Up as Tolerated  Required Braces or Orthoses?: No  Position Activity Restriction  Other position/activity restrictions: amb pt. s/p R thoracotomy and lobectomy 1/4  Subjective   General  Chart Reviewed: Yes  Response To Previous Treatment: Patient with no complaints from previous session. Family / Caregiver Present: Yes  Subjective  Subjective: RN and pt agreeable to PT. Pt alert in bed upon arrival and pleasant throughout. Pain Screening  Patient Currently in Pain: Yes  Pain Assessment  Pain Assessment: 0-10  Pain Level: 7  Pain Type: Acute pain  Pain Location: Face;Neck  Pain Descriptors: Aching; Sore  Pain Frequency: Continuous  Vital Signs  Patient Currently in Pain: Yes       Orientation  Orientation  Overall Orientation Status: Within Functional Limits  Orientation Level: Oriented X4  Cognition   Cognition  Overall Cognitive Status: WFL  Objective   Bed mobility  Rolling to Left: Supervision  Rolling to Right: Supervision  Supine to Sit: Stand by assistance  Sit to Supine: Stand by assistance  Scooting: Stand by assistance  Comment: Pt supine in bed upon arrival and returned to supine at end of session SBA. Transfers  Sit to Stand: Contact guard assistance  Stand to sit: Contact guard assistance  Comment: Pt in a hurry throughout transfers, VC's for safety, speed, and hand placement. Ambulation  Ambulation?: Yes  More Ambulation?: Yes  Ambulation 1  Surface: level tile  Device: No Device  Assistance: Contact guard assistance  Quality of Gait: Slow joanne, no LOB, decreased step length.   Gait Deviations: Slow Joanne;Decreased step length  Distance: 200ft  Comments: Pt amb into hallway and back to bed with two standing rest breaks CGA. Ambulation 2  Surface - 2: level tile  Device 2: Rolling Walker  Assistance 2: Contact guard assistance  Quality of Gait 2: Decreased step length, slow joanne, flexed trunk. Gait Deviations: Slow Joanne;Decreased step length  Distance: 20ft x2  Comments: Pt amb from bed to bathroom and back with RW CGA. Pt fatigued from amb in hallway, requested to use RW. Stairs/Curb  Stairs?: No     Balance  Posture: Good  Sitting - Static: Good  Sitting - Dynamic: Good  Standing - Static: Good  Standing - Dynamic: Fair;+  Comments: Standing balance assessed in standing with no AD. Exercises  Comments:   Seated LE exercise program: Long Arc Quads, hip abduction/adduction, heel/toe raises, and marches.  Reps: 10x  Other exercises  Other exercises?: No      AM-PAC Score  AM-PAC Inpatient Mobility Raw Score : 21 (01/10/22 1537)  AM-PAC Inpatient T-Scale Score : 50.25 (01/10/22 1537)  Mobility Inpatient CMS 0-100% Score: 28.97 (01/10/22 1537)  Mobility Inpatient CMS G-Code Modifier : Michael Moon (01/10/22 1537)          Goals  Short term goals  Time Frame for Short term goals: 10 visits  Short term goal 1: Pt will be Omari bed mobility  Short term goal 2: Pt will be Omari transfers  Short term goal 3: Pt will be Omari amb 300'  Short term goal 4: Pt will navigate 5 steps Omari    Plan    Plan  Times per week: 3-5x/wk  Current Treatment Recommendations: Strengthening,Balance Training,Endurance Training,Functional Mobility Training,Transfer Training,Gait Training,Stair training,Home Exercise Program,Safety Education & Training,Patient/Caregiver Education & Training,Equipment Evaluation, Education, & procurement  Safety Devices  Type of devices: Call light within reach,Nurse notified,Gait belt,Patient at risk for falls,All fall risk precautions in place,Left in bed  Restraints  Initially in place: No  Restraints: B soft wrist UE, pt had attempted to push and punch/ hit co-evaling OT while nearly losing balance, RN contacting physician for orders. Therapy Time   Individual Concurrent Group Co-treatment   Time In 9347         Time Out 1534         Minutes 18         Timed Code Treatment Minutes: [place]   Treatment performed by Student PTA under the supervision of co-signing PTA who agrees with all treatment and documentation.    Prince Abdul, PTA

## 2022-01-11 ENCOUNTER — APPOINTMENT (OUTPATIENT)
Dept: GENERAL RADIOLOGY | Age: 72
DRG: 164 | End: 2022-01-11
Attending: THORACIC SURGERY (CARDIOTHORACIC VASCULAR SURGERY)
Payer: MEDICARE

## 2022-01-11 ENCOUNTER — APPOINTMENT (OUTPATIENT)
Dept: INTERVENTIONAL RADIOLOGY/VASCULAR | Age: 72
DRG: 164 | End: 2022-01-11
Attending: THORACIC SURGERY (CARDIOTHORACIC VASCULAR SURGERY)
Payer: MEDICARE

## 2022-01-11 ENCOUNTER — APPOINTMENT (OUTPATIENT)
Dept: CT IMAGING | Age: 72
DRG: 164 | End: 2022-01-11
Attending: THORACIC SURGERY (CARDIOTHORACIC VASCULAR SURGERY)
Payer: MEDICARE

## 2022-01-11 LAB
ABSOLUTE EOS #: 0.18 K/UL (ref 0–0.44)
ABSOLUTE IMMATURE GRANULOCYTE: 0.44 K/UL (ref 0–0.3)
ABSOLUTE LYMPH #: 1.58 K/UL (ref 1.1–3.7)
ABSOLUTE MONO #: 0.79 K/UL (ref 0.1–1.2)
ANION GAP SERPL CALCULATED.3IONS-SCNC: 18 MMOL/L (ref 9–17)
BASOPHILS # BLD: 0 % (ref 0–2)
BASOPHILS ABSOLUTE: 0.03 K/UL (ref 0–0.2)
BUN BLDV-MCNC: 35 MG/DL (ref 8–23)
BUN/CREAT BLD: ABNORMAL (ref 9–20)
CALCIUM SERPL-MCNC: 8.5 MG/DL (ref 8.6–10.4)
CHLORIDE BLD-SCNC: 100 MMOL/L (ref 98–107)
CO2: 18 MMOL/L (ref 20–31)
CREAT SERPL-MCNC: 1.12 MG/DL (ref 0.7–1.2)
DIFFERENTIAL TYPE: ABNORMAL
EOSINOPHILS RELATIVE PERCENT: 1 % (ref 1–4)
GFR AFRICAN AMERICAN: >60 ML/MIN
GFR NON-AFRICAN AMERICAN: >60 ML/MIN
GFR SERPL CREATININE-BSD FRML MDRD: ABNORMAL ML/MIN/{1.73_M2}
GFR SERPL CREATININE-BSD FRML MDRD: ABNORMAL ML/MIN/{1.73_M2}
GLUCOSE BLD-MCNC: 132 MG/DL (ref 75–110)
GLUCOSE BLD-MCNC: 142 MG/DL (ref 75–110)
GLUCOSE BLD-MCNC: 157 MG/DL (ref 75–110)
GLUCOSE BLD-MCNC: 172 MG/DL (ref 75–110)
GLUCOSE BLD-MCNC: 190 MG/DL (ref 70–99)
HCT VFR BLD CALC: 30 % (ref 40.7–50.3)
HEMOGLOBIN: 9.7 G/DL (ref 13–17)
IMMATURE GRANULOCYTES: 3 %
LYMPHOCYTES # BLD: 11 % (ref 24–43)
MCH RBC QN AUTO: 29.2 PG (ref 25.2–33.5)
MCHC RBC AUTO-ENTMCNC: 32.3 G/DL (ref 28.4–34.8)
MCV RBC AUTO: 90.4 FL (ref 82.6–102.9)
MONOCYTES # BLD: 6 % (ref 3–12)
NRBC AUTOMATED: 0 PER 100 WBC
PDW BLD-RTO: 13.3 % (ref 11.8–14.4)
PLATELET # BLD: 288 K/UL (ref 138–453)
PLATELET ESTIMATE: ABNORMAL
PMV BLD AUTO: 10.9 FL (ref 8.1–13.5)
POTASSIUM SERPL-SCNC: 3.9 MMOL/L (ref 3.7–5.3)
RBC # BLD: 3.32 M/UL (ref 4.21–5.77)
RBC # BLD: ABNORMAL 10*6/UL
SEG NEUTROPHILS: 79 % (ref 36–65)
SEGMENTED NEUTROPHILS ABSOLUTE COUNT: 11.04 K/UL (ref 1.5–8.1)
SODIUM BLD-SCNC: 136 MMOL/L (ref 135–144)
WBC # BLD: 14.1 K/UL (ref 3.5–11.3)
WBC # BLD: ABNORMAL 10*3/UL

## 2022-01-11 PROCEDURE — 6370000000 HC RX 637 (ALT 250 FOR IP): Performed by: NURSE PRACTITIONER

## 2022-01-11 PROCEDURE — 76937 US GUIDE VASCULAR ACCESS: CPT

## 2022-01-11 PROCEDURE — 71045 X-RAY EXAM CHEST 1 VIEW: CPT

## 2022-01-11 PROCEDURE — 2100000001 HC CVICU R&B

## 2022-01-11 PROCEDURE — 6370000000 HC RX 637 (ALT 250 FOR IP): Performed by: STUDENT IN AN ORGANIZED HEALTH CARE EDUCATION/TRAINING PROGRAM

## 2022-01-11 PROCEDURE — 99232 SBSQ HOSP IP/OBS MODERATE 35: CPT | Performed by: INTERNAL MEDICINE

## 2022-01-11 PROCEDURE — 85025 COMPLETE CBC W/AUTO DIFF WBC: CPT

## 2022-01-11 PROCEDURE — 82947 ASSAY GLUCOSE BLOOD QUANT: CPT

## 2022-01-11 PROCEDURE — 32557 INSERT CATH PLEURA W/ IMAGE: CPT

## 2022-01-11 PROCEDURE — 2709999900 HC NON-CHARGEABLE SUPPLY

## 2022-01-11 PROCEDURE — 2580000003 HC RX 258: Performed by: NURSE PRACTITIONER

## 2022-01-11 PROCEDURE — 0W9930Z DRAINAGE OF RIGHT PLEURAL CAVITY WITH DRAINAGE DEVICE, PERCUTANEOUS APPROACH: ICD-10-PCS | Performed by: RADIOLOGY

## 2022-01-11 PROCEDURE — C1729 CATH, DRAINAGE: HCPCS

## 2022-01-11 PROCEDURE — C9113 INJ PANTOPRAZOLE SODIUM, VIA: HCPCS | Performed by: PHYSICIAN ASSISTANT

## 2022-01-11 PROCEDURE — 6370000000 HC RX 637 (ALT 250 FOR IP): Performed by: PHYSICIAN ASSISTANT

## 2022-01-11 PROCEDURE — 51701 INSERT BLADDER CATHETER: CPT

## 2022-01-11 PROCEDURE — 6360000002 HC RX W HCPCS: Performed by: PHYSICIAN ASSISTANT

## 2022-01-11 PROCEDURE — 6360000002 HC RX W HCPCS: Performed by: RADIOLOGY

## 2022-01-11 PROCEDURE — 80048 BASIC METABOLIC PNL TOTAL CA: CPT

## 2022-01-11 PROCEDURE — 71250 CT THORAX DX C-: CPT

## 2022-01-11 PROCEDURE — APPNB30 APP NON BILLABLE TIME 0-30 MINS: Performed by: NURSE PRACTITIONER

## 2022-01-11 PROCEDURE — 6360000002 HC RX W HCPCS: Performed by: NURSE PRACTITIONER

## 2022-01-11 PROCEDURE — 51798 US URINE CAPACITY MEASURE: CPT

## 2022-01-11 PROCEDURE — 36415 COLL VENOUS BLD VENIPUNCTURE: CPT

## 2022-01-11 PROCEDURE — 2580000003 HC RX 258: Performed by: PHYSICIAN ASSISTANT

## 2022-01-11 PROCEDURE — C1769 GUIDE WIRE: HCPCS

## 2022-01-11 PROCEDURE — 6370000000 HC RX 637 (ALT 250 FOR IP): Performed by: EMERGENCY MEDICINE

## 2022-01-11 RX ORDER — TAMSULOSIN HYDROCHLORIDE 0.4 MG/1
0.4 CAPSULE ORAL DAILY
Status: DISCONTINUED | OUTPATIENT
Start: 2022-01-11 | End: 2022-01-15 | Stop reason: HOSPADM

## 2022-01-11 RX ORDER — FENTANYL CITRATE 50 UG/ML
INJECTION, SOLUTION INTRAMUSCULAR; INTRAVENOUS
Status: COMPLETED | OUTPATIENT
Start: 2022-01-11 | End: 2022-01-11

## 2022-01-11 RX ADMIN — OXYCODONE HYDROCHLORIDE 10 MG: 5 TABLET ORAL at 01:23

## 2022-01-11 RX ADMIN — ALOGLIPTIN 12.5 MG: 12.5 TABLET, FILM COATED ORAL at 08:59

## 2022-01-11 RX ADMIN — SODIUM CHLORIDE, PRESERVATIVE FREE 10 ML: 5 INJECTION INTRAVENOUS at 21:58

## 2022-01-11 RX ADMIN — INSULIN LISPRO 2 UNITS: 100 INJECTION, SOLUTION INTRAVENOUS; SUBCUTANEOUS at 09:00

## 2022-01-11 RX ADMIN — DOCUSATE SODIUM 100 MG: 100 CAPSULE ORAL at 08:59

## 2022-01-11 RX ADMIN — ISOSORBIDE MONONITRATE 30 MG: 30 TABLET ORAL at 08:59

## 2022-01-11 RX ADMIN — METFORMIN HYDROCHLORIDE 1000 MG: 500 TABLET ORAL at 08:59

## 2022-01-11 RX ADMIN — INSULIN LISPRO 2 UNITS: 100 INJECTION, SOLUTION INTRAVENOUS; SUBCUTANEOUS at 17:41

## 2022-01-11 RX ADMIN — POLYETHYLENE GLYCOL 3350 17 G: 17 POWDER, FOR SOLUTION ORAL at 08:59

## 2022-01-11 RX ADMIN — FENTANYL CITRATE 50 MCG: 50 INJECTION, SOLUTION INTRAMUSCULAR; INTRAVENOUS at 13:48

## 2022-01-11 RX ADMIN — OXYCODONE HYDROCHLORIDE 10 MG: 5 TABLET ORAL at 05:26

## 2022-01-11 RX ADMIN — MORPHINE SULFATE 2 MG: 2 INJECTION, SOLUTION INTRAMUSCULAR; INTRAVENOUS at 03:17

## 2022-01-11 RX ADMIN — Medication 5 MG: at 20:37

## 2022-01-11 RX ADMIN — FERROUS SULFATE TAB EC 325 MG (65 MG FE EQUIVALENT) 325 MG: 325 (65 FE) TABLET DELAYED RESPONSE at 08:59

## 2022-01-11 RX ADMIN — METFORMIN HYDROCHLORIDE 1000 MG: 500 TABLET ORAL at 17:37

## 2022-01-11 RX ADMIN — BISACODYL 10 MG: 10 SUPPOSITORY RECTAL at 08:59

## 2022-01-11 RX ADMIN — OXYCODONE 5 MG: 5 TABLET ORAL at 12:26

## 2022-01-11 RX ADMIN — OXYCODONE HYDROCHLORIDE 10 MG: 5 TABLET ORAL at 20:37

## 2022-01-11 RX ADMIN — ATORVASTATIN CALCIUM 20 MG: 20 TABLET, FILM COATED ORAL at 08:59

## 2022-01-11 RX ADMIN — QUETIAPINE FUMARATE 25 MG: 25 TABLET ORAL at 20:37

## 2022-01-11 RX ADMIN — SODIUM CHLORIDE, PRESERVATIVE FREE 10 ML: 5 INJECTION INTRAVENOUS at 08:59

## 2022-01-11 RX ADMIN — SODIUM CHLORIDE, PRESERVATIVE FREE 10 ML: 5 INJECTION INTRAVENOUS at 09:07

## 2022-01-11 RX ADMIN — ACETAMINOPHEN 1000 MG: 500 TABLET ORAL at 22:05

## 2022-01-11 RX ADMIN — PANTOPRAZOLE SODIUM 40 MG: 40 INJECTION, POWDER, FOR SOLUTION INTRAVENOUS at 08:59

## 2022-01-11 RX ADMIN — GLIPIZIDE 5 MG: 5 TABLET ORAL at 17:37

## 2022-01-11 RX ADMIN — ALOGLIPTIN 12.5 MG: 12.5 TABLET, FILM COATED ORAL at 17:37

## 2022-01-11 RX ADMIN — LISINOPRIL 20 MG: 20 TABLET ORAL at 08:59

## 2022-01-11 RX ADMIN — TAMSULOSIN HYDROCHLORIDE 0.4 MG: 0.4 CAPSULE ORAL at 15:04

## 2022-01-11 RX ADMIN — GLIPIZIDE 5 MG: 5 TABLET ORAL at 06:15

## 2022-01-11 ASSESSMENT — PAIN SCALES - GENERAL
PAINLEVEL_OUTOF10: 0
PAINLEVEL_OUTOF10: 10
PAINLEVEL_OUTOF10: 0
PAINLEVEL_OUTOF10: 5
PAINLEVEL_OUTOF10: 0
PAINLEVEL_OUTOF10: 7
PAINLEVEL_OUTOF10: 6
PAINLEVEL_OUTOF10: 0
PAINLEVEL_OUTOF10: 10
PAINLEVEL_OUTOF10: 4
PAINLEVEL_OUTOF10: 8
PAINLEVEL_OUTOF10: 6

## 2022-01-11 NOTE — PROGRESS NOTES
Today's Date: 1/11/2022  Patient Name: Robb Estrada  Date of admission: 1/4/2022  7:49 AM  Patient's age: 70 y.o., 1950  Admission Dx: Pulmonary nodule [R91.1]    Reason for Consult: cancer findings VATS  Requesting Physician: Yanique Crane MD    CHIEF COMPLAINT:  No chief complaint on file. INTERVAL HISTORY:/Subjective  Patient seen and examined  Confusion improved , sitter at bedside. Got chest tube placed by IR for Rt Pneumothorax. Had BM x 3 yesterday. HISTORY OF PRESENT ILLNESS IN BRIEF:    Robb Estrada is a 70 y.o. male who is admitted to the hospital on 1/4/2022  for VATS and wedge resection  Patient has history of tobacco smoking and patient has been following with pulmonology for her right middle lobe lung nodule and his recent scan on 11/2/2021 showed increase in the size of lung nodule. Patient has had biopsy on 7/28/2021 which showed benign parenchyma with chronic inflammation however this was very active on the PET scan. Patient underwent VATS, wedge resection of the right middle lobe lung mass on 1/4/2022. Surgical pathology is awaited. Oncology consult for further recommendations. Past Medical History:   has a past medical history of Abnormal cardiac cath, Actinic keratosis, Arthritis, CAD (coronary artery disease), Calculus of kidney, Cervical stenosis of spine, Diabetes mellitus ([address] Full dentures, GERD (gastroesophageal reflux disease), H/O echocardiogram, History of cardiac cath, History of echocardiogram, History of echocardiogram, History of heart attack, History of tobacco abuse, Hyperlipidemia, Hypertension, Pulmonary nodule, Raynaud's phenomenon, S/P angioplasty with stent, and Wears hearing aid in both ears. Past Surgical History:   has a past surgical history that includes Cardiac catheterization (2007); Hemorrhoid surgery; Neck surgery (1997); other surgical history (10/2014); other surgical history (12/11/2014);  Cataract removal (04/06/2015); Upper gastrointestinal endoscopy (12/28/2005); Cataract removal (06/08/2015); Colonoscopy (01/11/2006); Colonoscopy (05/23/2016); Cardiac catheterization (02/09/2017); Cardiac catheterization (02/09/2017); Cardiac catheterization (01/14/2019); Upper gastrointestinal endoscopy (N/A, 02/05/2019); Upper gastrointestinal endoscopy (N/A, 10/22/2019); Lung biopsy (Right, 07/28/2021); CT NEEDLE BIOPSY LUNG PERCUTANEOUS (7/28/2021); hernia repair (Left); Lung removal, total (Right, 1/4/2022); and IR CHEST TUBE INSERTION (1/11/2022). Medications:    Prior to Admission medications    Medication Sig Start Date End Date Taking? Authorizing Provider   oxyCODONE (ROXICODONE) 5 MG immediate release tablet Take 1 tablet by mouth every 4 hours as needed for Pain for up to 7 days. 1/7/22 1/14/22 Yes ALONDRA Winters - CNP   SERTRALINE HCL PO Take 50 mg by mouth nightly 12/1/21  Yes Historical Provider, MD   isosorbide mononitrate (IMDUR) 30 MG extended release tablet 1/2 tablet in the morning   Yes Historical Provider, MD   glimepiride (AMARYL) 2 MG tablet Take 1 tablet by mouth 2 times daily 5/22/15  Yes Gurmeet Oscar MD   aspirin 81 MG tablet Take 81 mg by mouth daily Ordered by heart doctor, Chan Jett   Yes Historical Provider, MD   tamsulosin (FLOMAX) 0.4 MG capsule Take 0.4 mg by mouth daily    Historical Provider, MD   tiZANidine (ZANAFLEX) 4 MG capsule Take 4 mg by mouth 3 times daily as needed    Historical Provider, MD   ferrous sulfate (IRON 325) 325 (65 Fe) MG tablet Take 325 mg by mouth daily     Historical Provider, MD   lisinopril (PRINIVIL;ZESTRIL) 20 MG tablet Take 20 mg by mouth daily     Historical Provider, MD   nitroGLYCERIN (NITRODUR) 0.4 MG/HR 1 dose under tongue as needed for chest pain.  max of 3 in one day    Historical Provider, MD   pantoprazole (PROTONIX) 40 MG injection as needed     Historical Provider, MD   amLODIPine (NORVASC) 5 MG tablet Take 1 tablet by mouth daily 7/15/21 Sangeeta Moya MD   clopidogrel (PLAVIX) 75 MG tablet Take 1 tablet by mouth daily  Patient taking differently: Take 75 mg by mouth daily Ordered by heart doctor, Chan Johnson 7/15/21   Sangeeta Moya MD   atenolol (TENORMIN) 25 MG tablet Take 1 tablet by mouth daily 6/7/21   Sangeeta Moya MD   Insulin Glargine, 2 Unit Dial, 300 UNIT/ML SOPN Insulin Glargine Insulin Glargine U-300 Conc Active 10 UNIT Subcutaneous Every morning May 7th, 2020 11:47am 05-  [facility] ([id number]) 5/7/20   Historical Provider, MD   atorvastatin (LIPITOR) 20 MG tablet Take 1 tablet by mouth daily 12/15/20   Sangeeta Moya MD   sitaGLIPtan-metformin (JANUMET)  MG per tablet Take 1 tablet by mouth 2 times daily (with meals)    Historical Provider, MD     Current Facility-Administered Medications   Medication Dose Route Frequency Provider Last Rate Last Admin    benzocaine-menthol (CEPACOL SORE THROAT) lozenge 1 lozenge  1 lozenge Oral Q2H PRN Indian Pat, APRN - NP        tamsulosin [facility]) capsule 0.4 mg  0.4 mg Oral Daily USHA Green   0.4 mg at 01/11/22 1504    bisacodyl (DULCOLAX) suppository 10 mg  10 mg Rectal Daily PRN Rollo Roughen, DO        bisacodyl (DULCOLAX) suppository 10 mg  10 mg Rectal Daily Rollo Roughen, DO   10 mg at 01/11/22 0859    docusate sodium (COLACE) capsule 100 mg  100 mg Oral BID Shannan [place], DO   100 mg at 01/11/22 0859    haloperidol lactate (HALDOL) injection 5 mg  5 mg IntraMUSCular Q6H PRN USHA Green   5 mg at 01/08/22 1108    melatonin tablet 5 mg  5 mg Oral Nightly ALONDRA Winters - CNP   5 mg at 01/10/22 2006    [Held by provider] sertraline (ZOLOFT) tablet 50 mg  50 mg Oral Nightly Marga Banuelos APRN - CNP        QUEtiapine (SEROQUEL) tablet 25 mg  25 mg Oral Nightly Indian Pat, APRN - NP   25 mg at 01/10/22 2006    pantoprazole (PROTONIX) injection 40 mg  40 mg IntraVENous Daily USHA Green 40 mg at 01/11/22 0859    And    sodium chloride (PF) 0.9 % injection 10 mL  10 mL IntraVENous Daily USHA Cabrera   10 mL at 01/11/22 0859    dexmedetomidine (PRECEDEX) 400 mcg in sodium chloride 0.9 % 100 mL infusion  0.2-1.4 mcg/kg/hr IntraVENous Continuous PRN USHA Cabrera   Stopped at 01/10/22 8471    glycerin moisturizing mouth spray (OASIS) 35 % 2 spray  2 spray Mouth/Throat PRN Tobie Landau, APRN - CNP   2 spray at 01/07/22 1100    ondansetron (ZOFRAN) injection 4 mg  4 mg IntraVENous Q6H PRN Ronny Michele APRN - NP   4 mg at 01/10/22 0814    [Held by provider] gabapentin (NEURONTIN) capsule 100 mg  100 mg Oral TID Lexus Kumar APRN - CNP   100 mg at 01/07/22 1412    lisinopril (PRINIVIL;ZESTRIL) tablet 20 mg  20 mg Oral Daily Ronny Michele APRN - NP   20 mg at 01/11/22 0859    isosorbide mononitrate (IMDUR) extended release tablet 30 mg  30 mg Oral Daily Ronny Michele, APRN - NP   30 mg at 01/11/22 0859    ferrous sulfate (FE TABS 325) EC tablet 325 mg  325 mg Oral Daily Ronny Michele, APRN - NP   325 mg at 01/11/22 0859    atorvastatin (LIPITOR) tablet 20 mg  20 mg Oral Daily Ronny Michele, APRN - NP   20 mg at 01/11/22 0859    sodium chloride flush 0.9 % injection 10 mL  10 mL IntraVENous 2 times per day Ronny Michele APRN - NP   10 mL at 01/11/22 0907    sodium chloride flush 0.9 % injection 10 mL  10 mL IntraVENous PRN Ronny Michele APRN - NP        acetaminophen (TYLENOL) tablet 1,000 mg  1,000 mg Oral Q4H PRN Ronny Michele, APRN - NP   1,000 mg at 01/10/22 1513    oxyCODONE (ROXICODONE) immediate release tablet 5 mg  5 mg Oral Q4H PRN Ronny Michele, APRN - NP   5 mg at 01/11/22 1226    Or    oxyCODONE (ROXICODONE) immediate release tablet 10 mg  10 mg Oral Q4H PRN Ronny Michele APRN - NP   10 mg at 01/11/22 0526    morphine (PF) injection 2 mg  2 mg IntraVENous Q3H PRN Ronny Michele APRN - NP   2 mg at 01/11/22 0317    smokeless tobacco. He reports that he does not drink alcohol and does not use drugs. Family History: family history includes Alzheimer's Disease in his father; Cancer in his brother; Diabetes in his father and mother; Heart Disease in his brother, brother, brother, mother, and sister; High Blood Pressure in his mother. REVIEW OF SYSTEMS:    Constitutional: No fever or chills. No night sweats, no weight loss   Eyes: No eye discharge, double vision, or eye pain   HEENT: negative for sore mouth, sore throat, hoarseness and voice change   Respiratory: negative for cough , sputum, dyspnea, wheezing, hemoptysis, chest pain   Cardiovascular: negative for chest pain, dyspnea, palpitations, orthopnea, PND   Gastrointestinal: negative for nausea, vomiting, diarrhea, constipation, abdominal pain, Dysphagia, hematemesis and hematochezia   Genitourinary: negative for frequency, dysuria, nocturia, urinary incontinence, and hematuria   Integument: negative for rash, skin lesions, bruises.    Hematologic/Lymphatic: negative for easy bruising, bleeding, lymphadenopathy, or petechiae   Endocrine: negative for heat or cold intolerance,weight changes, change in bowel habits and hair loss   Musculoskeletal: negative for myalgias, arthralgias, pain, joint swelling,and bone pain   Neurological: negative for headaches, dizziness, seizures, weakness, numbness    PHYSICAL EXAM:      BP (!) 123/45   Pulse 98   Temp 98.2 °F (36.8 °C) (Oral)   Resp 18   Ht 6' (1.829 m)   Wt 159 lb 9.8 oz (72.4 kg)   SpO2 95%   BMI 21.65 kg/m²    Temp (24hrs), Av.9 °F (36.6 °C), Min:97.5 °F (36.4 °C), Max:98.3 °F (36.8 °C)    General appearance - well appearing, no in pain or distress   Mental status - alert and cooperative   Eyes -rt eye lid swollen   Ears - bilateral TM's and external ear canals normal   Mouth - mucous membranes moist, pharynx normal without lesions   Neck - supple, no significant adenopathy   Lymphatics - no palpable lymphadenopathy, no hepatosplenomegaly   Chest - has chest tube in place. Heart - normal rate, regular rhythm, normal S1, S2, no murmurs  Abdomen - soft, nontender, nondistended, no masses or organomegaly   Neurological - alert, oriented, normal speech, no focal findings or movement disorder noted   Musculoskeletal - no joint tenderness, deformity or swelling   Extremities - peripheral pulses normal, no pedal edema, no clubbing or cyanosis   Skin - normal coloration and turgor, no rashes, no suspicious skin lesions noted ,    DATA:    Labs:   CBC:   Recent Labs     01/10/22  0542 01/11/22  0628   WBC 12.1* 14.1*   HGB 9.8* 9.7*   HCT 28.9* 30.0*    288     BMP:   Recent Labs     01/10/22  0542 01/11/22  0628   * 136   K 3.8 3.9   CO2 16* 18*   BUN 33* 35*   CREATININE 0.89 1.12   LABGLOM >60 >60   GLUCOSE 154* 190*     PT/INR: No results for input(s): PROTIME, INR in the last 72 hours. Surgical Pathology Report  SURGICAL PATHOLOGY REPORT  Collected: 01/04/22 1423   Lab status: Final   Resulting lab: [facility]   Value: -- Diagnosis --     1.  Right lung, middle lobe wedge biopsy:     -  Adenocarcinoma. -  Tumor is 1.8 cm. -  Carcinoma invades into but not through the visceral pleura. 2.  Lymph node #9, biopsy:     -  Negative for metastatic carcinoma. 3.  Lymph node #7, biopsy:     -  Negative for metastatic carcinoma. 4.  Right middle lobe lung, lobectomy:     -  Margins are free of involvement by carcinoma. -  Peribronchial lymph node, negative for metastatic carcinoma.      -  Nonneoplastic lung shows emphysematous change.       Vivian Jimenez M.D.   **Electronically Signed Out**         rdd/1/6/2022         Clinical Information   Pre-op Diagnosis:  RIGHT MIDDLE LOBE NODULE   Operative Findings:  MIDDLE LOBE OF RIGHT LUNG; LYMPH NODE #9 OF LUNG;   LYMPH NODE #7; RIGHT MIDDLE LOBE   Operation Performed:  MIDDLE WEDGE RESECTION, POSSIBLE MIDDLE   LOBECTOMY VIA THORACOTOMY     Source of Specimen   1: MIDDLE LOBE OF RIGHT LUNG (A)   2: LYMPH NODE #9 OF LUNG (B)   3: LYMPH NODE #7   4: RIGHT MIDDLE LOBE     Gross Description   1.  \"ANA DAVIS, MIDDLE LOBE OF RIGHT LUNG\" Received fresh is a   6.4 x 4.2 x 2.8 cm lung wedge with staple line.  The pleura is   pink-tan with an area of slightly thickening (inked black). Sectioning reveals a 1.8 x 1.2 x 0.8 cm subpleural tan-white mass   lesion that is 0.8 cm from the staple line margin.  The remaining   parenchyma is pink-tan with no other lesions.  1TP, 1DQ, 1FS, Cassette   summary:  \"A\" frozen section lesion, \"B\" remainder of lesion with   pleura inked black, \"C\" staple line margin en face. 2.  \"ANA DAVIS, LYMPH NODE #9 OF LUNG\" Received fresh are two   fragments, 0.3 cm each.  1TP, 1FS, 1cs. 3.  \"ANA DAVIS, LYMPH NODE #7\" Received fresh are two   anthracotic fragments, 0.6 and 0.7 cm in greatest dimension.  1TP,   1FS, 1cs.     4.  \"ANA DAVIS, RIGHT MIDDLE LOBE\" Received fresh is a 50 gram,   8.5 x 6.8 x 3.5 cm lobe of lung with multiple staple lines.  The   pleura is wrinkled purple-gray.  Sectioning reveals spongy dark red   parenchyma with no masses.  No markedly enlarged nodes are identified   at the hilum.  Cassette summary:  \"A\" bronchial margin frozen section   cassette resubmitted as received, \"B\" hilar soft tissue and vascular   margin, \"C\" uninvolved lung (along staple line).  tm     Intraoperative Diagnosis   1.  FSDX:  Non-small cell carcinoma, favor adenocarcinoma.  (14:00,   RDD)   2.  FSDX:  Fibrofatty tissue.  (14:11, RDD)   3.  FSDX:  Lymph node, negative for carcinoma.  (14:19, RDD)   4.  FSDX:  Bronchial margin, negative for carcinoma.  (16:17, RDD)     Microscopic Description   1-4.  Microscopic examination performed.      PROCEDURE: Wedge biopsy, followed by lobectomy       SPECIMEN LATERALITY: Right   TUMOR FOCALITY: Single tumor       TUMOR SITE: Right middle lobe   TUMOR SIZE --     1.8 x 1.2 x 0.8 cm           HISTOLOGIC TYPE: Adenocarcinoma. Sections show a large and anaplastic non-small cell malignancy with   cohesion and a few instances of glandular differentiation.  Neoplastic   cells are large with copious eosinophilic cytoplasm and large   vesicular nuclei.  Tumor is characterized further utilizing control   appropriate immunostains.  Cells of carcinoma are negative for p40 and   they are positive for TTF-1 and CK7.  Morphology and immunophenotype   are that of an adenocarcinoma, lung primary. Note: The case is reviewed by a second Pathologist for quality   assurance with consensus (DS).       HISTOLOGIC GRADE: Grade 2 (of 4)           VISCERAL PLEURA INVASION:Reticulin stain with appropriately reactive   control is utilized to evaluate pleura.  Tumor extends beyond the   elastic layer into the pleura but not to the pleural surface (PL1)     DIRECT INVASION OF ADJACENT STRUCTURES: No     TREATMENT EFFECT: None apparent. LYMPHOVASCULAR INVASION: Absent                         MARGINS --   -BRONCHIAL: Free of tumor       -VASCULAR: Free of tumor       -PARENCHYMAL: Free of tumor       -OTHER ATTACHED TISSUE MARGIN (IF APPLICABLE): N/A       REGIONAL LYMPH NODES: 3   LYMPH NODES(S) FROM PRIOR PROCEDURES: No       NUMBER AND STATION(S) EXAMINED:   See diagnoses   NUMBER AND STATION(S) WITH METASTASIS: 0         DISTANT METASTASIS--   DISTANT SITE(S) INVOLVED, IF APPLICABLE: N/A              IMAGING DATA:  IR CHEST TUBE INSERTION   Final Result   Successful fluoroscopic guided placement of a right 10 Sinhala chest tube. CT CHEST WO CONTRAST   Final Result   Small right pneumothorax, extensive pneumomediastinum and extensive   subcutaneous emphysema again demonstrated. This may be due to a chest wall   defect suggested in the posterolateral right 5th intercostal space.          XR CHEST PORTABLE   Preliminary Result   Considerable interval worsening now bilateral and more tubes in-situ. Small   right apical pneumothorax. Right hilar surgical clips related to recent   surgery. .           CT chest 11/2/2021:  Impression   The previously biopsied, somewhat bilobed nodule involving right middle lobe   is slightly increased in size since the prior study now measuring 1.8 x 1.0   cm, once measuring 1.8 x 0.6 cm.  There does appear to be some spiculation. No new pulmonary nodule is seen.           Primary Problem  Primary adenocarcinoma of right lung [facility])    Active Hospital Problems    Diagnosis Date Noted    Acute delirium [R41.0] 01/08/2022    Hyponatremia [E87.1] 01/08/2022    Postoperative ileus [address] [address] [K91.89, K56.7] 01/08/2022    Primary adenocarcinoma of right lung ([address] [C34.91] 12/15/2021    Coronary artery disease involving native coronary artery of native heart without angina pectoris [I25.10] 09/10/2013    Primary hypertension [I10] 09/10/2013    Hyperlipidemia [E78.5] 09/10/2013    Type 2 diabetes mellitus without complication, without long-term current use of insulin ([address] [E11.9] 09/10/2013       IMPRESSION:   1. Right middle lobe lung adenocarcinoma, status post wedge resection on 1/4/2022. Surgical pathology showing [id number] stage IB, with high risk feature of visceral pleura involvement  2. History of tobacco abuse  3. Delirium/multifactorial could be ICU induced psychosis/sleep deprivation > improved  4. Post surgery ileus> improving    RECOMMENDATIONS:    1. As he has visceral pleural involvement which is a high risk feature, adjuvant systemic chemotherapy be recommended  2. Continue chest tube care as per thoracic surgery  3. We will follow  4. Follow-up with medical oncology at [facility] after discharge  5. Reiterate with the wife and daughter the indication of chemotherapy due to the high risk features of the cancer, side effects and appointment for oncology follow-up in the chart  6. Psychosis management per primary team  7.  Postsurgical ileus n.p.o. intake per surgery  8. A.m. labs CBC and CMP    Discussed with patient Nurse and the family    Thank you for asking us to see this patient. Nicole Washington MD  Internal Medicine Resident, PGY-2  [facility], [place]         1/11/2022, 4:34 PM          This note is created with the assistance of a speech recognition program.  While intending to generate a document that actually reflects the content of the visit, the document can still have some errors including those of syntax and sound a like substitutions which may escape proof reading. It such instances, actual meaning can be extrapolated by contextual diversion.

## 2022-01-11 NOTE — PROGRESS NOTES
[facility]  Daily Progress Note    Surgeon: Dr North Garrison   S/P:  Right VATS with RML lobectomy  POD#: 7  EF: 60%      Subjective:  Mr. Kevin Oro feels well today with no acute complaints. Sitter remains at bedside for patient safety. Pain is controlled on current medication regimen. OOBTC and ambulating. Last BMs yesterday, general surgery following for ileus management. Denies chest pain or shortness of breath. Plan of care reviewed and questions answered. Physical Exam  Vital Signs: BP (!) 118/49   Pulse 113   Temp 98.3 °F (36.8 °C) (Oral)   Resp 19   Wt 159 lb 9.8 oz (72.4 kg)   SpO2 97%   BMI 21.65 kg/m²  O2 Flow Rate (L/min): 4 L/min   Admit Weight: Weight: 157 lb 10.1 oz (71.5 kg)   WEIGHTWeight: 159 lb 9.8 oz (72.4 kg)     General: in no acute distress, alert and disoriented. Up in chair  Heart: Normal S1 and S2.  Regular rhythm. No murmurs, gallops, or rubs. Lungs: clear to auscultation bilaterally and diminished breath sounds bibasilar  Abdomen: soft, non tender, non distended, BSx4  Extremities: Trace edema  Wounds: clean and dry, healing appropriately. .     Scheduled Meds:    bisacodyl  10 mg Rectal Daily    docusate sodium  100 mg Oral BID    melatonin  5 mg Oral Nightly    [Held by provider] sertraline  50 mg Oral Nightly    QUEtiapine  25 mg Oral Nightly    pantoprazole  40 mg IntraVENous Daily    And    sodium chloride (PF)  10 mL IntraVENous Daily    [Held by provider] gabapentin  100 mg Oral TID    lisinopril  20 mg Oral Daily    isosorbide mononitrate  30 mg Oral Daily    ferrous sulfate  325 mg Oral Daily    atorvastatin  20 mg Oral Daily    sodium chloride flush  10 mL IntraVENous 2 times per day    polyethylene glycol  17 g Oral Daily    lidocaine  1 patch TransDERmal Daily    alogliptin  12.5 mg Oral BID WC    And    metFORMIN  1,000 mg Oral BID WC    glipiZIDE  5 mg Oral BID AC    insulin lispro  0-12 Units SubCUTAneous TID WC    insulin lispro  0-6 Units SubCUTAneous Nightly     Continuous Infusions:    dexmedetomidine Stopped (01/10/22 0949)    dextrose      dextrose         Data:  CBC:   Recent Labs     01/09/22  1309 01/10/22  0542 01/11/22  0628   WBC 10.5 12.1* 14.1*   HGB 10.3* 9.8* 9.7*   HCT 30.5* 28.9* 30.0*   MCV 86.2 88.4 90.4    299 288     BMP:   Recent Labs     01/09/22  1309 01/10/22  0542 01/11/22  0628   * 133* 136   K 3.9 3.8 3.9    104 100   CO2 18* 16* 18*   BUN 23 33* 35*   CREATININE 0.78 0.89 1.12     PT/INR: No results for input(s): PROTIME, INR in the last 72 hours. APTT: No results for input(s): APTT in the last 72 hours. Chest X-Ray: Ordered, pending    I/O:  I/O last 3 completed shifts: In: 0351 [P.O.:1200; I.V.:296]  Out: 200 [Urine:200]      Assessment:  · Pulmonary nodule s/p Right VATS with RML lobectomy and lymph node dissection   · POD 7  · Ileus  · Altered mental status  · Mucopurulent chronic bronchitis  · Multivessel CAD s/p stenting  · Angina pectoris  · Diabetes mellitus type 2 with long term use of insulin  · Hypertension  · Hyperlipidemia      Plan:    Remains inpatient on telemetry,   Monitor vitals closely including continuous pulse oximetry   Oxygen as needed via nasal cannula to maintain SpO2 > 92%   Chest x-ray daily   Encourage incentive spirometry    Monitor CBC, BMP, INR and Mag daily   Home medications as ordered   Roxicodone for pain control   SCDs while stationary    Protonix for GI prophylaxis   Replace electrolytes as needed per sliding scale and recheck per policy   PT/OT evalutation for discharge recommendation and ambulation 3x daily   Case Management consult for discharge planning        The above recommendations including medications and orders were discussed and agreed upon with Dr. Margart Snellen, the attending on service for the cardiothoracic surgery group today.      Electronically signed by ALONDRA Sanford CNP on 1/11/2022 at 7:29 AM    On this date 1/11/2022 I have spent 22 minutes reviewing previous notes, test results and face to face with the patient discussing the diagnosis and importance of compliance with the treatment plan as well as documenting on the day of the visit. At least 50% of the time documented was spent with the patient to provide counseling and/or coordination of care. This note was created with the assistance of a speech-recognition program.  Although the intention is to generate a document that actually reflects the content of the visit, no guarantees can be provided that every mistake has been identified and corrected by editing. Agree   Daily CXR  Plan for discharge to [facility]    Note was updated later by me after  physical examination and  completion of the assessment.

## 2022-01-11 NOTE — PROGRESS NOTES
Comprehensive Nutrition Assessment    Type and Reason for Visit:  RD Nutrition Re-Screen/LOS    Nutrition Recommendations/Plan:   - Continue current diet, Easy to Chew, CCHO (60g)  - Will send Ensure Clear, once daily  - Will send Magic Cup, BID  - Monitor for tolerance/intake    Nutrition Assessment:  71 yo M admitted w/ adenocarcinoma of right lung, now s/p RVATS w/ RML lobectomy. PMH: DM2, HTN, HLD, CAD. Pt intake was improving p/t this morning. Pt unable to eat more that a few bites at breakfast. Per RN, only pudding soft foods and thin liquids able to be eaten this morning, monitoring. Family in room at visit, pt does not like Ensure, does not like pudding. Willing to try Ensure Clear and Magic Cup, will send. Malnutrition Assessment:  Malnutrition Status:  Insufficient data      Estimated Daily Nutrient Needs:  Energy (kcal):  1800 to 2100 kcal/day (25-30 kcal/kg); Weight Used for Energy Requirements:  Current     Protein (g):  90 to 100 g/day (1.2-1.4 g/kg); Weight Used for Protein Requirements:  Current        Fluid (ml/day):  Per MD; Method Used for Fluid Requirements:  1 ml/kcal      Nutrition Related Findings:  Labs/meds reviewed. LBM 1/10. No edema noted. Wounds:  Surgical Incision       Current Nutrition Therapies:    ADULT DIET; Easy to Chew; 4 carb choices (60 gm/meal)  ADULT ORAL NUTRITION SUPPLEMENT; Lunch, Dinner, Breakfast; Frozen Oral Supplement  ADULT ORAL NUTRITION SUPPLEMENT; Breakfast; Clear Liquid Oral Supplement    Anthropometric Measures:  · Height: 6' (182.9 cm)  · Current Body Weight: 159 lb 9.8 oz (72.4 kg) (1/11, bedscale)   · Admission Body Weight: 157 lb 10.1 oz (71.5 kg) (1/5, standing)    · Ideal Body Weight: 178 lbs; % Ideal Body Weight 89.7 %   · BMI: 21.6  · BMI Categories: Normal Weight (BMI 18.5-24. 9)       Nutrition Diagnosis:   · Inadequate oral intake related to  (current medical condition) as evidenced by intake 0-25%,intake 51-75%    Nutrition Interventions: Food and/or Nutrient Delivery:  Continue Current Diet,Start Oral Nutrition Supplement  Nutrition Education/Counseling:  No recommendation at this time   Coordination of Nutrition Care:  Continue to monitor while inpatient    Goals:  Obtain >75% of nutrition needs via PO intake       Nutrition Monitoring and Evaluation:   Behavioral-Environmental Outcomes:  None Identified   Food/Nutrient Intake Outcomes:  Food and Nutrient Intake,Supplement Intake  Physical Signs/Symptoms Outcomes:  Biochemical Data,Skin,Weight     Discharge Planning:     Too soon to determine     Electronically signed by Leroy Lombardi MS, RD, LD on 1/11/22 at 12:42 PM EST    Contact: [id number]

## 2022-01-11 NOTE — CARE COORDINATION
Met with patient's wife Tracie Mariee and daughter at bedside. Discussed transitional planning, patient had chest tube reinserted in IR today. Tracie Mariee relates goal is home, I have provided her with home care and SNF lists if needed.   Will see how patient does with PT/OT tomorrow

## 2022-01-11 NOTE — PLAN OF CARE
Patient is alert and oriented. Bed lowered and locked chair locked. Call light in reach. No falls or injuries noted at this time.  Will continue to monitor

## 2022-01-11 NOTE — PLAN OF CARE
CT chest reviewed.  Worsening subq air with moderate pneumo will ask IR to place largest possible tube available to them in the anterior apical region of right chest.    USHA Villatoro

## 2022-01-11 NOTE — BRIEF OP NOTE
Brief Postoperative Note for Chest Tube    Clay Cuevas  YOB: 1950  [id number]    Pre-operative Diagnosis: right pneumothorax      Post-operative Diagnosis: Same    Procedure: Chest Tube Placement     Anesthesia: 1% Lidocaine     Surgeons/Assistants: Zunilda Chiang    Complications: none    EBL: Minimal    Specimens: were not obtained    Successful placement of 10 fr right chest tube performed. Catheter was sutured in place and occlusive dressing were applied.       Electronically signed by USHA Corrales on 1/11/2022 at 2:25 PM

## 2022-01-11 NOTE — PROGRESS NOTES
Physical Therapy        Physical Therapy Cancel Note      DATE: 2022    NAME: Dilshad Lakhani  MRN: [id number]   : 1950      Patient not seen this date for Physical Therapy due to: Other: Pt off floor down in IR for chest tube placement.       Electronically signed by Carlos Ramirez PTA on 2022 at 2:40 PM

## 2022-01-12 ENCOUNTER — APPOINTMENT (OUTPATIENT)
Dept: GENERAL RADIOLOGY | Age: 72
DRG: 164 | End: 2022-01-12
Attending: THORACIC SURGERY (CARDIOTHORACIC VASCULAR SURGERY)
Payer: MEDICARE

## 2022-01-12 LAB
ABSOLUTE EOS #: 0.22 K/UL (ref 0–0.44)
ABSOLUTE IMMATURE GRANULOCYTE: 0.16 K/UL (ref 0–0.3)
ABSOLUTE LYMPH #: 1.32 K/UL (ref 1.1–3.7)
ABSOLUTE MONO #: 0.71 K/UL (ref 0.1–1.2)
ANION GAP SERPL CALCULATED.3IONS-SCNC: 11 MMOL/L (ref 9–17)
BASOPHILS # BLD: 0 % (ref 0–2)
BASOPHILS ABSOLUTE: 0.03 K/UL (ref 0–0.2)
BUN BLDV-MCNC: 30 MG/DL (ref 8–23)
BUN/CREAT BLD: ABNORMAL (ref 9–20)
CALCIUM SERPL-MCNC: 8.4 MG/DL (ref 8.6–10.4)
CHLORIDE BLD-SCNC: 103 MMOL/L (ref 98–107)
CO2: 21 MMOL/L (ref 20–31)
CREAT SERPL-MCNC: 0.86 MG/DL (ref 0.7–1.2)
DIFFERENTIAL TYPE: ABNORMAL
EOSINOPHILS RELATIVE PERCENT: 2 % (ref 1–4)
GFR AFRICAN AMERICAN: >60 ML/MIN
GFR NON-AFRICAN AMERICAN: >60 ML/MIN
GFR SERPL CREATININE-BSD FRML MDRD: ABNORMAL ML/MIN/{1.73_M2}
GFR SERPL CREATININE-BSD FRML MDRD: ABNORMAL ML/MIN/{1.73_M2}
GLUCOSE BLD-MCNC: 146 MG/DL (ref 75–110)
GLUCOSE BLD-MCNC: 163 MG/DL (ref 70–99)
GLUCOSE BLD-MCNC: 278 MG/DL (ref 75–110)
GLUCOSE BLD-MCNC: 57 MG/DL (ref 75–110)
HCT VFR BLD CALC: 26.5 % (ref 40.7–50.3)
HEMOGLOBIN: 8.7 G/DL (ref 13–17)
IMMATURE GRANULOCYTES: 2 %
LYMPHOCYTES # BLD: 12 % (ref 24–43)
MAGNESIUM: 1.7 MG/DL (ref 1.6–2.6)
MCH RBC QN AUTO: 29.2 PG (ref 25.2–33.5)
MCHC RBC AUTO-ENTMCNC: 32.8 G/DL (ref 28.4–34.8)
MCV RBC AUTO: 88.9 FL (ref 82.6–102.9)
MONOCYTES # BLD: 7 % (ref 3–12)
NRBC AUTOMATED: 0 PER 100 WBC
PDW BLD-RTO: 13.3 % (ref 11.8–14.4)
PLATELET # BLD: 252 K/UL (ref 138–453)
PLATELET ESTIMATE: ABNORMAL
PMV BLD AUTO: 10.7 FL (ref 8.1–13.5)
POTASSIUM SERPL-SCNC: 3.8 MMOL/L (ref 3.7–5.3)
RBC # BLD: 2.98 M/UL (ref 4.21–5.77)
RBC # BLD: ABNORMAL 10*6/UL
SEG NEUTROPHILS: 77 % (ref 36–65)
SEGMENTED NEUTROPHILS ABSOLUTE COUNT: 8.49 K/UL (ref 1.5–8.1)
SODIUM BLD-SCNC: 135 MMOL/L (ref 135–144)
WBC # BLD: 10.9 K/UL (ref 3.5–11.3)
WBC # BLD: ABNORMAL 10*3/UL

## 2022-01-12 PROCEDURE — C9113 INJ PANTOPRAZOLE SODIUM, VIA: HCPCS | Performed by: PHYSICIAN ASSISTANT

## 2022-01-12 PROCEDURE — 2580000003 HC RX 258: Performed by: NURSE PRACTITIONER

## 2022-01-12 PROCEDURE — 97116 GAIT TRAINING THERAPY: CPT

## 2022-01-12 PROCEDURE — 87088 URINE BACTERIA CULTURE: CPT

## 2022-01-12 PROCEDURE — 71045 X-RAY EXAM CHEST 1 VIEW: CPT

## 2022-01-12 PROCEDURE — 2140000001 HC CVICU INTERMEDIATE R&B

## 2022-01-12 PROCEDURE — 87186 SC STD MICRODIL/AGAR DIL: CPT

## 2022-01-12 PROCEDURE — 6370000000 HC RX 637 (ALT 250 FOR IP): Performed by: NURSE PRACTITIONER

## 2022-01-12 PROCEDURE — 51701 INSERT BLADDER CATHETER: CPT

## 2022-01-12 PROCEDURE — 82947 ASSAY GLUCOSE BLOOD QUANT: CPT

## 2022-01-12 PROCEDURE — 85025 COMPLETE CBC W/AUTO DIFF WBC: CPT

## 2022-01-12 PROCEDURE — 36415 COLL VENOUS BLD VENIPUNCTURE: CPT

## 2022-01-12 PROCEDURE — 99232 SBSQ HOSP IP/OBS MODERATE 35: CPT | Performed by: INTERNAL MEDICINE

## 2022-01-12 PROCEDURE — 6360000002 HC RX W HCPCS: Performed by: PHYSICIAN ASSISTANT

## 2022-01-12 PROCEDURE — 2580000003 HC RX 258: Performed by: PHYSICIAN ASSISTANT

## 2022-01-12 PROCEDURE — APPNB30 APP NON BILLABLE TIME 0-30 MINS: Performed by: NURSE PRACTITIONER

## 2022-01-12 PROCEDURE — 51798 US URINE CAPACITY MEASURE: CPT

## 2022-01-12 PROCEDURE — 6360000002 HC RX W HCPCS: Performed by: NURSE PRACTITIONER

## 2022-01-12 PROCEDURE — 80048 BASIC METABOLIC PNL TOTAL CA: CPT

## 2022-01-12 PROCEDURE — 6370000000 HC RX 637 (ALT 250 FOR IP): Performed by: PHYSICIAN ASSISTANT

## 2022-01-12 PROCEDURE — 83735 ASSAY OF MAGNESIUM: CPT

## 2022-01-12 PROCEDURE — 87086 URINE CULTURE/COLONY COUNT: CPT

## 2022-01-12 PROCEDURE — 6370000000 HC RX 637 (ALT 250 FOR IP): Performed by: STUDENT IN AN ORGANIZED HEALTH CARE EDUCATION/TRAINING PROGRAM

## 2022-01-12 PROCEDURE — 99024 POSTOP FOLLOW-UP VISIT: CPT | Performed by: NURSE PRACTITIONER

## 2022-01-12 RX ORDER — MAGNESIUM SULFATE 1 G/100ML
1000 INJECTION INTRAVENOUS
Status: COMPLETED | OUTPATIENT
Start: 2022-01-12 | End: 2022-01-12

## 2022-01-12 RX ADMIN — Medication 5 MG: at 20:03

## 2022-01-12 RX ADMIN — SODIUM CHLORIDE, PRESERVATIVE FREE 10 ML: 5 INJECTION INTRAVENOUS at 20:04

## 2022-01-12 RX ADMIN — SODIUM CHLORIDE, PRESERVATIVE FREE 10 ML: 5 INJECTION INTRAVENOUS at 08:02

## 2022-01-12 RX ADMIN — MAGNESIUM SULFATE HEPTAHYDRATE 1000 MG: 1 INJECTION, SOLUTION INTRAVENOUS at 08:37

## 2022-01-12 RX ADMIN — OXYCODONE 5 MG: 5 TABLET ORAL at 06:20

## 2022-01-12 RX ADMIN — GABAPENTIN 100 MG: 100 CAPSULE ORAL at 20:03

## 2022-01-12 RX ADMIN — METFORMIN HYDROCHLORIDE 1000 MG: 500 TABLET ORAL at 07:49

## 2022-01-12 RX ADMIN — TAMSULOSIN HYDROCHLORIDE 0.4 MG: 0.4 CAPSULE ORAL at 07:47

## 2022-01-12 RX ADMIN — QUETIAPINE FUMARATE 25 MG: 25 TABLET ORAL at 20:03

## 2022-01-12 RX ADMIN — DOCUSATE SODIUM 100 MG: 100 CAPSULE ORAL at 20:03

## 2022-01-12 RX ADMIN — INSULIN LISPRO 4 UNITS: 100 INJECTION, SOLUTION INTRAVENOUS; SUBCUTANEOUS at 12:27

## 2022-01-12 RX ADMIN — INSULIN LISPRO 3 UNITS: 100 INJECTION, SOLUTION INTRAVENOUS; SUBCUTANEOUS at 20:20

## 2022-01-12 RX ADMIN — MAGNESIUM SULFATE HEPTAHYDRATE 1000 MG: 1 INJECTION, SOLUTION INTRAVENOUS at 08:40

## 2022-01-12 RX ADMIN — SODIUM CHLORIDE, PRESERVATIVE FREE 10 ML: 5 INJECTION INTRAVENOUS at 07:47

## 2022-01-12 RX ADMIN — ACETAMINOPHEN 1000 MG: 500 TABLET ORAL at 07:44

## 2022-01-12 RX ADMIN — DOCUSATE SODIUM 100 MG: 100 CAPSULE ORAL at 07:49

## 2022-01-12 RX ADMIN — GABAPENTIN 100 MG: 100 CAPSULE ORAL at 08:52

## 2022-01-12 RX ADMIN — GABAPENTIN 100 MG: 100 CAPSULE ORAL at 13:52

## 2022-01-12 RX ADMIN — PHENOL 1 SPRAY: 1.5 LIQUID ORAL at 20:14

## 2022-01-12 RX ADMIN — SERTRALINE 50 MG: 50 TABLET, FILM COATED ORAL at 20:03

## 2022-01-12 RX ADMIN — METOPROLOL TARTRATE 25 MG: 25 TABLET ORAL at 20:03

## 2022-01-12 RX ADMIN — ISOSORBIDE MONONITRATE 30 MG: 30 TABLET ORAL at 07:50

## 2022-01-12 RX ADMIN — ATORVASTATIN CALCIUM 20 MG: 20 TABLET, FILM COATED ORAL at 07:47

## 2022-01-12 RX ADMIN — METOPROLOL TARTRATE 25 MG: 25 TABLET ORAL at 09:28

## 2022-01-12 RX ADMIN — INSULIN LISPRO 2 UNITS: 100 INJECTION, SOLUTION INTRAVENOUS; SUBCUTANEOUS at 07:51

## 2022-01-12 RX ADMIN — FERROUS SULFATE TAB EC 325 MG (65 MG FE EQUIVALENT) 325 MG: 325 (65 FE) TABLET DELAYED RESPONSE at 07:47

## 2022-01-12 RX ADMIN — GLIPIZIDE 5 MG: 5 TABLET ORAL at 06:20

## 2022-01-12 RX ADMIN — LISINOPRIL 20 MG: 20 TABLET ORAL at 07:49

## 2022-01-12 RX ADMIN — ALOGLIPTIN 12.5 MG: 12.5 TABLET, FILM COATED ORAL at 07:50

## 2022-01-12 RX ADMIN — PANTOPRAZOLE SODIUM 40 MG: 40 INJECTION, POWDER, FOR SOLUTION INTRAVENOUS at 07:47

## 2022-01-12 RX ADMIN — POLYETHYLENE GLYCOL 3350 17 G: 17 POWDER, FOR SOLUTION ORAL at 07:55

## 2022-01-12 ASSESSMENT — PAIN DESCRIPTION - LOCATION: LOCATION: NECK

## 2022-01-12 ASSESSMENT — PAIN DESCRIPTION - DESCRIPTORS: DESCRIPTORS: ACHING;SORE

## 2022-01-12 ASSESSMENT — PAIN SCALES - GENERAL
PAINLEVEL_OUTOF10: 0
PAINLEVEL_OUTOF10: 5
PAINLEVEL_OUTOF10: 6
PAINLEVEL_OUTOF10: 8
PAINLEVEL_OUTOF10: 0
PAINLEVEL_OUTOF10: 3
PAINLEVEL_OUTOF10: 6

## 2022-01-12 ASSESSMENT — PAIN DESCRIPTION - FREQUENCY: FREQUENCY: CONTINUOUS

## 2022-01-12 ASSESSMENT — PAIN DESCRIPTION - PAIN TYPE: TYPE: ACUTE PAIN

## 2022-01-12 NOTE — PLAN OF CARE
Problem: Infection:  Goal: Will remain free from infection  Description: Will remain free from infection  Outcome: Ongoing     Problem: Safety:  Goal: Free from accidental physical injury  Description: Free from accidental physical injury  1/12/2022 0035 by Jill Aparicio RN  Outcome: Ongoing  1/11/2022 1143 by Phuong Raza RN  Outcome: Ongoing  Goal: Free from intentional harm  Description: Free from intentional harm  1/12/2022 0035 by Jill Aparicio RN  Outcome: Ongoing  1/11/2022 1143 by Phuong Raza RN  Outcome: Ongoing     Problem: Daily Care:  Goal: Daily care needs are met  Description: Daily care needs are met  Outcome: Ongoing     Problem: Pain:  Goal: Patient's pain/discomfort is manageable  Description: Patient's pain/discomfort is manageable  Outcome: Ongoing  Goal: Pain level will decrease  Description: Pain level will decrease  Outcome: Ongoing  Goal: Control of acute pain  Description: Control of acute pain  Outcome: Ongoing  Goal: Control of chronic pain  Description: Control of chronic pain  Outcome: Ongoing     Problem: Skin Integrity:  Goal: Skin integrity will stabilize  Description: Skin integrity will stabilize  Outcome: Ongoing     Problem: Discharge Planning:  Goal: Patients continuum of care needs are met  Description: Patients continuum of care needs are met  Outcome: Ongoing     Problem: Falls - Risk of:  Goal: Will remain free from falls  Description: Will remain free from falls  Outcome: Ongoing  Goal: Absence of physical injury  Description: Absence of physical injury  Outcome: Ongoing     Problem: Skin Integrity:  Goal: Will show no infection signs and symptoms  Description: Will show no infection signs and symptoms  Outcome: Ongoing  Goal: Absence of new skin breakdown  Description: Absence of new skin breakdown  Outcome: Ongoing

## 2022-01-12 NOTE — PROGRESS NOTES
gastrointestinal endoscopy (N/A, 10/22/2019); Lung biopsy (Right, 07/28/2021); CT NEEDLE BIOPSY LUNG PERCUTANEOUS (7/28/2021); hernia repair (Left); Lung removal, total (Right, 1/4/2022); and IR CHEST TUBE INSERTION (1/11/2022). Restrictions  Restrictions/Precautions  Restrictions/Precautions: General Precautions,Fall Risk,Up as Tolerated  Required Braces or Orthoses?: No  Position Activity Restriction  Other position/activity restrictions: chest tube, amb pt. s/p R thoracotomy and lobectomy 1/4  Subjective   General  Chart Reviewed: Yes  Response To Previous Treatment: Patient with no complaints from previous session. Family / Caregiver Present: Yes  Subjective  Subjective: RN and pt agreeable to PT. Pt alert in bed upon arrival and pleasant throughout. Pain Screening  Patient Currently in Pain: Yes  Pain Assessment  Pain Assessment: 0-10  Pain Level: 5  Pain Type: Acute pain  Pain Location: Neck  Pain Descriptors: Aching; Sore  Pain Frequency: Continuous  Vital Signs  Patient Currently in Pain: Yes       Orientation  Orientation  Overall Orientation Status: Within Functional Limits  Cognition   Cognition  Overall Cognitive Status: WFL  Objective   Bed mobility  Rolling to Left: Supervision  Rolling to Right: Supervision  Supine to Sit: Stand by assistance  Sit to Supine: Stand by assistance  Scooting: Stand by assistance  Comment: Pt supine in bed upon arrival and returned to supine at end of session SBA. Transfers  Sit to Stand: Contact guard assistance  Stand to sit: Contact guard assistance  Bed to Chair: Unable to assess (Pt supine in bed upon entry and exit.)  Comment: Pt in a hurry throughout transfers, VC's for safety, speed, and hand placement. Ambulation  Ambulation?: Yes  More Ambulation?: No  Ambulation 1  Surface: level tile  Device: No Device  Assistance: Contact guard assistance  Quality of Gait: Slow joanne, decreased step length, near scissoring gait.  Pt had two minor LOB which he self-corrected quickly. Gait Deviations: Slow Shanell;Decreased step length  Distance: 300ft  Comments: Pt amb one lap around unit and back to bed with no rest breaks CGA. Stairs/Curb  Stairs?: No     Balance  Posture: Good  Sitting - Static: Good  Sitting - Dynamic: Good  Standing - Static: Good  Standing - Dynamic: Fair     Exercises  Seated LE exercise program: Long Arc Quads, hip abduction/adduction, heel/toe raises, and marches. Reps: 10x     AM-PAC Score  AM-PAC Inpatient Mobility Raw Score : 21 (01/12/22 1532)  AM-PAC Inpatient T-Scale Score : 50.25 (01/12/22 1532)  Mobility Inpatient CMS 0-100% Score: 28.97 (01/12/22 1532)  Mobility Inpatient CMS G-Code Modifier : CJ (01/12/22 1532)          Goals  Short term goals  Time Frame for Short term goals: 10 visits  Short term goal 1: Pt will be Omari bed mobility  Short term goal 2: Pt will be Omari transfers  Short term goal 3: Pt will be Omari amb 300'  Short term goal 4: Pt will navigate 5 steps Omari    Plan    Plan  Times per week: 3-5x/wk  Current Treatment Recommendations: Strengthening,Balance Training,Endurance Training,Functional Mobility Training,Transfer Training,Gait Training,Stair training,Home Exercise Program,Safety Education & Training,Patient/Caregiver Education & Training,Equipment Evaluation, Education, & procurement  Safety Devices  Type of devices: Call light within reach,Nurse notified,Gait belt,Patient at risk for falls,All fall risk precautions in place,Left in bed  Restraints  Initially in place: No    Therapy Time   Individual Concurrent Group Co-treatment   Time In 1509         Time Out 1528         Minutes 19         Timed Code Treatment Minutes: [address]    Treatment performed by Student PTA under the supervision of co-signing PTA who agrees with all treatment and documentation.    Jada Jacobs PTA

## 2022-01-12 NOTE — PLAN OF CARE
Problem: Infection:  Goal: Will remain free from infection  Description: Will remain free from infection  1/12/2022 0845 by Elizabeth Olguin RN  Outcome: Ongoing  1/12/2022 0035 by Tisha Hoffman RN  Outcome: Ongoing     Problem: Safety:  Goal: Free from accidental physical injury  Description: Free from accidental physical injury  1/12/2022 0845 by Elizabeth Olguin RN  Outcome: Ongoing  1/12/2022 0035 by Tisha Hoffman RN  Outcome: Ongoing  Goal: Free from intentional harm  Description: Free from intentional harm  1/12/2022 0845 by Elizabeth Olguin RN  Outcome: Ongoing  1/12/2022 0035 by Tisha Hoffman RN  Outcome: Ongoing     Problem: Daily Care:  Goal: Daily care needs are met  Description: Daily care needs are met  1/12/2022 0845 by Elizabeth Olguin RN  Outcome: Ongoing  1/12/2022 0035 by Tisha Hoffman RN  Outcome: Ongoing     Problem: Pain:  Goal: Patient's pain/discomfort is manageable  Description: Patient's pain/discomfort is manageable  1/12/2022 0845 by Elizabeth Olguin RN  Outcome: Ongoing  1/12/2022 0035 by Tisha Hoffman RN  Outcome: Ongoing  Goal: Pain level will decrease  Description: Pain level will decrease  1/12/2022 0845 by Elizabeth Olguin RN  Outcome: Ongoing  1/12/2022 0035 by Tisha Hoffman RN  Outcome: Ongoing  Goal: Control of acute pain  Description: Control of acute pain  1/12/2022 0845 by Elizabeth Olguin RN  Outcome: Ongoing  1/12/2022 0035 by Tisha Hoffman RN  Outcome: Ongoing  Goal: Control of chronic pain  Description: Control of chronic pain  1/12/2022 0845 by Elizabeth Olguin RN  Outcome: Ongoing  1/12/2022 0035 by Tisha Hoffman RN  Outcome: Ongoing     Problem: Skin Integrity:  Goal: Skin integrity will stabilize  Description: Skin integrity will stabilize  1/12/2022 0845 by Elizabeth Olguin RN  Outcome: Ongoing  1/12/2022 0035 by Tisha Hoffman RN  Outcome: Ongoing     Problem: Discharge Planning:  Goal: Patients continuum of care needs are met  Description: Patients continuum of care needs are met  1/12/2022 0845 by Maritza Lorenzo RN  Outcome: Ongoing  1/12/2022 0035 by Fredick Mcardle, RN  Outcome: Ongoing     Problem: Falls - Risk of:  Goal: Will remain free from falls  Description: Will remain free from falls  1/12/2022 0845 by Maritza Lorenzo RN  Outcome: Ongoing  1/12/2022 0035 by Fredick Mcardle, RN  Outcome: Ongoing  Goal: Absence of physical injury  Description: Absence of physical injury  1/12/2022 0845 by Maritza Lorenzo RN  Outcome: Ongoing  1/12/2022 0035 by Fredick Mcardle, RN  Outcome: Ongoing     Problem: Skin Integrity:  Goal: Will show no infection signs and symptoms  Description: Will show no infection signs and symptoms  1/12/2022 0845 by Maritza Lorenzo RN  Outcome: Ongoing  1/12/2022 0035 by Fredick Mcardle, RN  Outcome: Ongoing  Goal: Absence of new skin breakdown  Description: Absence of new skin breakdown  1/12/2022 0845 by Maritza Lorenzo RN  Outcome: Ongoing  1/12/2022 0035 by Fredick Mcardle, RN  Outcome: Ongoing

## 2022-01-12 NOTE — CARE COORDINATION
Met with patient and wife to discuss transitional planning. Patient ambulated around unit with Charu RN earlier, plan is home with wife.   They are interested in home care, would like referral to [facility], referral faxed    [phone] [facility] can [facility] patient

## 2022-01-12 NOTE — DISCHARGE INSTR - COC
Continuity of Care Form    Patient Name: Marilou Velazquez   :  1950  MRN:  [id number]    [facility] date:  2022  Discharge date:  01/15/2022    Code Status Order: Full Code   Advance Directives:      Admitting Physician:  Xavier Sheppard MD  PCP: Enedina Maya    Discharging Nurse: [facility] Unit/Room#: 4263/0755-11  Discharging Unit Phone Number: [phone]    Emergency Contact:   Extended Emergency Contact Information  Primary Emergency Contact: Vania Acosta  Address: [address] [place] [address] Phone: [phone]  Work Phone: [phone]  Mobile Phone: [phone]  Relation: Spouse  Hearing or visual needs: None  Other needs: None  Preferred language: English   needed? No  Secondary Emergency Contact: Leticia Lanza   [address] Phone: [phone]  Mobile Phone: [phone]  Relation: Child  Hearing or visual needs: None  Other needs: None  Preferred language: English   needed? No    Past Surgical History:  Past Surgical History:   Procedure Laterality Date    CARDIAC CATHETERIZATION      patient states had 2 small blockages    CARDIAC CATHETERIZATION  2017    LMCA: Lesion on LMCA: Distal subsection 20% stenosis. LAD: Lesion on 1st Diag: Mid subsection 75% stenosis. RCA: Lesion on Mid RCA: Mid subsection 100% stenosis. CARDIAC CATHETERIZATION  2017    Attempted PCI to chronic total occlusion of RCA was not successful. Unable to cross with multiple wires due to heavy calcification and toruosity.     CARDIAC CATHETERIZATION  2019    CATARACT REMOVAL  2015    Right eye - Dr. Julita Stewart  2015    Dr. Basilia Rodriguez - left eye    COLONOSCOPY  2006    Dr. Atiya Liz - internal hemorrhoids, no polps    COLONOSCOPY  2016    -polyp    CT NEEDLE BIOPSY LUNG PERCUTANEOUS  2021    CT NEEDLE BIOPSY LUNG PERCUTANEOUS 2021 [facility] CT SCAN HEMORRHOID SURGERY      HERNIA REPAIR Left     inguinal    IR CHEST TUBE INSERTION  1/11/2022    IR CHEST TUBE INSERTION 1/11/2022 Roscoe Falcon MD [facility]    LUNG BIOPSY Right 07/28/2021    Dr Javy Gallegos, TOTAL Right 1/4/2022    MIDDLE WEDGE RESECTION, POSSIBLE MIDDLE LOBECTOMY VIA THORACOTOMY performed by Danielle Tuttle MD at [facility]    c4-5 laminectomy and fusion    OTHER SURGICAL HISTORY  10/2014    Right foot - 7 from heal and 1 from great toe at [address]  12/11/2014    pain injection - cervical    UPPER GASTROINTESTINAL ENDOSCOPY  12/28/2005    Dr. Cheikh Hoffman - mild esophagitis and gastritis    UPPER GASTROINTESTINAL ENDOSCOPY N/A 02/05/2019    -bx(neg H-Pylori)retained gastric content, possible submucosal mass of gastric cardia    UPPER GASTROINTESTINAL ENDOSCOPY N/A 10/22/2019    EGD BIOPSY performed by Lilliam Albrecht MD at [address]       Immunization History:   Immunization History   Administered Date(s) Administered    COVID-19, Moderna, Booster, PF, 50mcg/0.25ml 12/08/2021    COVID-19, [place], Primary or Immunocompromised, PF, 100mcg/0.5mL 03/09/2021, 04/06/2021    Influenza Virus Vaccine 10/26/2016    Influenza, High Dose (Fluzone 65 yrs and older) 12/20/2017, 12/06/2018, 11/10/2019    Influenza, High-dose, Quadv, 65 yrs +, IM (Fluzone) 12/04/2017, 11/11/2019, 10/08/2020       Active Problems:  Patient Active Problem List   Diagnosis Code    Angina pectoris ([facility] [address] I20.9    Coronary artery disease involving native coronary artery of native heart without angina pectoris I25.10    Primary hypertension I10    Type 2 diabetes mellitus without complication, without long-term current use of insulin (HCC) E11.9    Hyperlipidemia E78.5    Shortness of breath, post procedure improving probably Effient side effect.   R06.02    S/P angioplasty with stent, obtuse marginal Z95.820    Type II or unspecified type diabetes mellitus without mention of complication, not stated as uncontrolled E11.9    Calculus of kidney N20.0    Actinic keratosis L57.0    Senile nuclear sclerosis H25.10    Dyspepsia R10.13    Epigastric pain R10.13    Substernal chest pain R07.2    Abnormal CT scan, stomach R93.3    Abnormal gastrointestinal PET scan R94.8    Strain of lumbar region S39.012A    Pneumothorax after biopsy J95.811    Pneumothorax, post biopsy, right J95.811    Primary adenocarcinoma of right lung (HCC) C34.91    Acute delirium R41.0    Hyponatremia E87.1    Postoperative ileus (HCC) K91.89, K56.7       Isolation/Infection:   Isolation            No Isolation          Patient Infection Status       Infection Onset Added Last Indicated Last Indicated By Review Planned Expiration Resolved Resolved By    None active    Resolved    C-diff Rule Out 01/07/22 01/07/22 01/07/22 C DIFF TOXIN/ANTIGEN (Ordered)   01/12/22 Jose Chilel RN            Nurse Assessment:  Last Vital Signs: BP (!) 99/48   Pulse 88   Temp 97.9 °F (36.6 °C) (Oral)   Resp 18   Ht 6' (1.829 m)   Wt 152 lb 8.9 oz (69.2 kg)   SpO2 99%   BMI 20.69 kg/m²     Last documented pain score (0-10 scale): Pain Level: 5  Last Weight:   Wt Readings from Last 1 Encounters:   01/12/22 152 lb 8.9 oz (69.2 kg)     Mental Status:  alert, coherent, logical, thought processes intact, and able to concentrate and follow conversation    IV Access:  - None    Nursing Mobility/ADLs:  Walking   Independent  Transfer  Independent  Bathing  Independent  Dressing  Independent  Toileting  Independent  Feeding  Independent  Med Admin  Independent  Med Delivery   crushed and prefers mixed with applesause    Wound Care Documentation and Therapy:        Elimination:  Continence:    Bowel: Yes  Bladder: Yes  Urinary Catheter: None   Colostomy/Ileostomy/Ileal Conduit: No       Date of Last BM 01/12/2022    Intake/Output Summary (Last 24 hours) at 1/12/2022 1542  Last data filed at 1/12/2022 1516  Gross per 24 hour   Intake 1005 ml   Output 1475 ml   Net -470 ml     I/O last 3 completed shifts: In: 5 [P.O.:540]  Out: [address] [Urine:1305]    Safety Concerns:     None    Impairments/Disabilities:      None    Nutrition Therapy:  Current Nutrition Therapy:   - Oral Diet:  Dysphagia 3 advanced    Routes of Feeding: Oral  Liquids: Thin Liquids  Daily Fluid Restriction: no  Last Modified Barium Swallow with Video (Video Swallowing Test): not done    Treatments at the Time of Hospital Discharge:   Respiratory Treatments: none  Oxygen Therapy:  is not on home oxygen therapy.   Ventilator:    - No ventilator support    Rehab Therapies: Physical Therapy and Occupational Therapy  Weight Bearing Status/Restrictions: No weight bearing restirctions  Other Medical Equipment (for information only, NOT a DME order):  none  Other Treatments:  chest tube to mini atrium change with visit    Patient's personal belongings (please select all that are sent with patient):  None    RN SIGNATURE:  Electronically signed by Libby Singer RN on 1/13/22 at 9:53 AM EST    CASE MANAGEMENT/SOCIAL WORK SECTION    Inpatient Status Date: 01/04/2022    Readmission Risk Assessment Score:  Readmission Risk              Risk of Unplanned Readmission:  33           Discharging to Facility/ Agency   Name: [facility]  Address:  [place] [address]         Phone: [phone]       Fax: [phone]        Phone:  Fax:    Dialysis Facility (if applicable)   Name:  Address:  Dialysis Schedule:  Phone:  Fax:    / signature: Electronically signed by Bharat Noe RN on 1/12/22 at 3:43 PM EST Electronically signed by Yana Lebdetter RN on 1/15/2022 at 10:19 AM     PHYSICIAN SECTION    Prognosis: Good    Condition at Discharge: Stable    Rehab Potential (if transferring to Rehab): Good    Recommended Labs or Other Treatments After Discharge:   Skilled nursing cardio/pulmonary assessment EVERY shift/visit with vital signs and SaO2 call abnormal results to Surgeon's office  Notify Surgeon's office for temp >101.5 F  Daily weight and record. Call for weight gain of 3 lbs in 3 days or 5-7lbs in 7 days    Monitor surgical incisions for signs of infection (redness, warmth, pain, purulent drainage, odor) and call with abnormal findings. Leave incisions open to air unless drainage is prohibitive. Call with any signs of separation or dehiscence. Shower daily with warm water and mild soap. Pat dry with clean towel, do not rub. Heart Hugger and Surgical bra on during daytime  Large breasted patients need to wear surgical or soft sports bra at all times to reduce tension on sternal incision  Shankar hose on during day time, rinse and dry overnight to prevent infection of leg incisions    Strict Sternal Precautions: No lifting/pushing/pulling over 5lbs x 4 weeks, may increase 5-10 lbs per week after that as tolerated. Physical therapy BID, plus ambulation 3x per day in addition. Up in chair for all meals  Reinforce Cardiac and Diabetic Diet, medication compliance and activity instructions    Arrange for transportation to all appointments  Patient needs to see Surgeon and Cardiologist within 2 weeks of discharge. Chest tube care: Log output daily, change dressing with visit    Physician Certification: I certify the above information and transfer of Marilou Velazquez  is necessary for the continuing treatment of the diagnosis listed and that he requires Home Care for greater 30 days.      Update Admission H&P: No change in H&P    PHYSICIAN SIGNATURE:  Electronically signed by Jacquelyn Ochoa MD on 1/15/22 at 10:24 AM EST

## 2022-01-12 NOTE — PROGRESS NOTES
gastrointestinal endoscopy (12/28/2005); Cataract removal (06/08/2015); Colonoscopy (01/11/2006); Colonoscopy (05/23/2016); Cardiac catheterization (02/09/2017); Cardiac catheterization (02/09/2017); Cardiac catheterization (01/14/2019); Upper gastrointestinal endoscopy (N/A, 02/05/2019); Upper gastrointestinal endoscopy (N/A, 10/22/2019); Lung biopsy (Right, 07/28/2021); CT NEEDLE BIOPSY LUNG PERCUTANEOUS (7/28/2021); hernia repair (Left); Lung removal, total (Right, 1/4/2022); and IR CHEST TUBE INSERTION (1/11/2022). Medications:    Prior to Admission medications    Medication Sig Start Date End Date Taking? Authorizing Provider   oxyCODONE (ROXICODONE) 5 MG immediate release tablet Take 1 tablet by mouth every 4 hours as needed for Pain for up to 7 days. 1/7/22 1/14/22 Yes ALONDRA Winters - CNP   SERTRALINE HCL PO Take 50 mg by mouth nightly 12/1/21  Yes Historical Provider, MD   isosorbide mononitrate (IMDUR) 30 MG extended release tablet 1/2 tablet in the morning   Yes Historical Provider, MD   glimepiride (AMARYL) 2 MG tablet Take 1 tablet by mouth 2 times daily 5/22/15  Yes Kavitha Andrade MD   aspirin 81 MG tablet Take 81 mg by mouth daily Ordered by heart doctor, Chan Griffin   Yes Historical Provider, MD   tamsulosin (FLOMAX) 0.4 MG capsule Take 0.4 mg by mouth daily    Historical Provider, MD   tiZANidine (ZANAFLEX) 4 MG capsule Take 4 mg by mouth 3 times daily as needed    Historical Provider, MD   ferrous sulfate (IRON 325) 325 (65 Fe) MG tablet Take 325 mg by mouth daily     Historical Provider, MD   lisinopril (PRINIVIL;ZESTRIL) 20 MG tablet Take 20 mg by mouth daily     Historical Provider, MD   nitroGLYCERIN (NITRODUR) 0.4 MG/HR 1 dose under tongue as needed for chest pain.  max of 3 in one day    Historical Provider, MD   pantoprazole (PROTONIX) 40 MG injection as needed     Historical Provider, MD   amLODIPine (NORVASC) 5 MG tablet Take 1 tablet by mouth daily 7/15/21   Kirke Keto Stevenson Cooks, MD   clopidogrel (PLAVIX) 75 MG tablet Take 1 tablet by mouth daily  Patient taking differently: Take 75 mg by mouth daily Ordered by heart doctor, Chan Lawson 7/15/21   Zohreh Choudhury MD   atenolol (TENORMIN) 25 MG tablet Take 1 tablet by mouth daily 6/7/21   Zohreh Choudhury MD   Insulin Glargine, 2 Unit Dial, 300 UNIT/ML SOPN Insulin Glargine Insulin Glargine U-300 Conc Active 10 UNIT Subcutaneous Every morning May 7th, 2020 11:47am 05-  [facility] ([id number]) 5/7/20   Historical Provider, MD   atorvastatin (LIPITOR) 20 MG tablet Take 1 tablet by mouth daily 12/15/20   Zohreh Choudhury MD   sitaGLIPtan-metformin (JANUMET)  MG per tablet Take 1 tablet by mouth 2 times daily (with meals)    Historical Provider, MD     Current Facility-Administered Medications   Medication Dose Route Frequency Provider Last Rate Last Admin    metoprolol tartrate (LOPRESSOR) tablet 25 mg  25 mg Oral BID ALONDRA Winters CNP   25 mg at 01/12/22 1740    benzocaine-menthol (CEPACOL SORE THROAT) lozenge 1 lozenge  1 lozenge Oral Q2H PRN ALONDRA Hull - KRAIG        tamsulosin [facility]) capsule 0.4 mg  0.4 mg Oral Daily Yeung Cough, PA   0.4 mg at 01/12/22 0747    bisacodyl (DULCOLAX) suppository 10 mg  10 mg Rectal Daily PRN Christian Severin, DO        bisacodyl (DULCOLAX) suppository 10 mg  10 mg Rectal Daily Christian Severin, DO   10 mg at 01/11/22 0859    docusate sodium (COLACE) capsule 100 mg  100 mg Oral BID Shannansa Luis DO   100 mg at 01/12/22 0749    haloperidol lactate (HALDOL) injection 5 mg  5 mg IntraMUSCular Q6H PRN Yeung Cough, PA   5 mg at 01/08/22 1108    melatonin tablet 5 mg  5 mg Oral Nightly ALONDRA Winters CNP   5 mg at 01/11/22 2037    sertraline (ZOLOFT) tablet 50 mg  50 mg Oral Nightly ALONDRA Winters - CNP        QUEtiapine (SEROQUEL) tablet 25 mg  25 mg Oral Nightly ALONDRA Hull NP   25 mg at 01/11/22 2037    pantoprazole (PROTONIX) injection 40 mg  40 mg IntraVENous Daily Willma Cargo, PA   40 mg at 01/12/22 0747    And    sodium chloride (PF) 0.9 % injection 10 mL  10 mL IntraVENous Daily Willma Cargo, PA   10 mL at 01/12/22 0802    dexmedetomidine (PRECEDEX) 400 mcg in sodium chloride 0.9 % 100 mL infusion  0.2-1.4 mcg/kg/hr IntraVENous Continuous PRN Willma Cargo, PA   Stopped at 01/10/22 1427    glycerin moisturizing mouth spray (OASIS) 35 % 2 spray  2 spray Mouth/Throat PRN Crissie Safer, APRN - CNP   2 spray at 01/07/22 1100    ondansetron (ZOFRAN) injection 4 mg  4 mg IntraVENous Q6H PRN Cordell Kearney, APRN - NP   4 mg at 01/10/22 1928    gabapentin (NEURONTIN) capsule 100 mg  100 mg Oral TID Heron Arzate APRN - CNP   100 mg at 01/12/22 1352    lisinopril (PRINIVIL;ZESTRIL) tablet 20 mg  20 mg Oral Daily Alexandria Berth, APRN - NP   20 mg at 01/12/22 0749    isosorbide mononitrate (IMDUR) extended release tablet 30 mg  30 mg Oral Daily Alexandria Berth, APRN - NP   30 mg at 01/12/22 0750    ferrous sulfate (FE TABS 325) EC tablet 325 mg  325 mg Oral Daily Cordell Berth, APRN - NP   325 mg at 01/12/22 0747    atorvastatin (LIPITOR) tablet 20 mg  20 mg Oral Daily Cordell Berth, APRN - NP   20 mg at 01/12/22 0747    sodium chloride flush 0.9 % injection 10 mL  10 mL IntraVENous 2 times per day Alexandria Berth, APRN - NP   10 mL at 01/12/22 0747    sodium chloride flush 0.9 % injection 10 mL  10 mL IntraVENous PRN Cordell Kearney, APRN - NP        acetaminophen (TYLENOL) tablet 1,000 mg  1,000 mg Oral Q4H PRN Cordell Berth, APRN - NP   1,000 mg at 01/12/22 0744    oxyCODONE (ROXICODONE) immediate release tablet 5 mg  5 mg Oral Q4H PRN Cordell Kearney, APRN - NP   5 mg at 01/12/22 1903    Or    oxyCODONE (ROXICODONE) immediate release tablet 10 mg  10 mg Oral Q4H PRN ALONDRA Diaz NP   10 mg at 01/11/22 2037    morphine (PF) injection 2 mg  2 mg IntraVENous Q3H PRN ALONDRA Richardson - NP   2 mg at 01/11/22 0317    polyethylene glycol (GLYCOLAX) packet 17 g  17 g Oral Daily Praneeth Chavez APRN - NP   17 g at 01/12/22 0755    magnesium hydroxide (MILK OF MAGNESIA) 400 MG/5ML suspension 30 mL  30 mL Oral Daily PRN Praneeth Chavez APRN - NP        glucose (GLUTOSE) 40 % oral gel 15 g  15 g Oral PRN Praneeth Chavez APRN - NP        dextrose 50 % IV solution  12.5 g IntraVENous PRN Praneeth Chavez, APRN - NP        glucagon (rDNA) injection 1 mg  1 mg IntraMUSCular PRN Praneeth Chavez APRN - NP        dextrose 5 % solution  100 mL/hr IntraVENous PRN Praneeth Chavez APRN - NP        glucose (GLUTOSE) 40 % oral gel 15 g  15 g Oral PRN Praneeth Chavez, APRN - NP        dextrose 50 % IV solution  12.5 g IntraVENous PRN Praneeth Chavez APRN - NP        glucagon (rDNA) injection 1 mg  1 mg IntraMUSCular PRN ALONDRA Richardson - NP        dextrose 5 % solution  100 mL/hr IntraVENous PRN Praneeth Chavez APRN - NP        lidocaine 4 % external patch 1 patch  1 patch TransDERmal Daily ALONDRA Richardson - NP   1 patch at 01/12/22 0750    alogliptin (NESINA) tablet 12.5 mg  12.5 mg Oral BID  Praneeth Chavez APRN - NP   12.5 mg at 01/12/22 0750    And    metFORMIN (GLUCOPHAGE) tablet 1,000 mg  1,000 mg Oral BID  Praneeth Chavez APRN - NP   1,000 mg at 01/12/22 0749    glipiZIDE (GLUCOTROL) tablet 5 mg  5 mg Oral BID  Praneeth Chavez APRN - NP   5 mg at 01/12/22 3205    insulin lispro (HUMALOG) injection vial 0-12 Units  0-12 Units SubCUTAneous TID  ALONDRA Richardson - NP   4 Units at 01/12/22 1227    insulin lispro (HUMALOG) injection vial 0-6 Units  0-6 Units SubCUTAneous Nightly ALONDRA Richardson - NP   1 Units at 01/10/22 2009       Allergies:  Niacin and related, Minocycline hcl, Other, and Oxycodone hcl    Social History:   reports that he quit smoking about 3 months ago.  His smoking use included cigarettes. He has a 13.50 pack-year smoking history. He has never used smokeless tobacco. He reports that he does not drink alcohol and does not use drugs. Family History: family history includes Alzheimer's Disease in his father; Cancer in his brother; Diabetes in his father and mother; Heart Disease in his brother, brother, brother, mother, and sister; High Blood Pressure in his mother. REVIEW OF SYSTEMS:    Constitutional: No fever or chills. No night sweats, no weight loss   Eyes: No eye discharge, double vision, or eye pain   HEENT: negative for sore mouth, sore throat, hoarseness and voice change   Respiratory: negative for cough , sputum, dyspnea, wheezing, hemoptysis, chest pain   Cardiovascular: negative for chest pain, dyspnea, palpitations, orthopnea, PND   Gastrointestinal: negative for nausea, vomiting, diarrhea, constipation, abdominal pain, Dysphagia, hematemesis and hematochezia   Genitourinary: negative for frequency, dysuria, nocturia, urinary incontinence, and hematuria   Integument: negative for rash, skin lesions, bruises.    Hematologic/Lymphatic: negative for easy bruising, bleeding, lymphadenopathy, or petechiae   Endocrine: negative for heat or cold intolerance,weight changes, change in bowel habits and hair loss   Musculoskeletal: negative for myalgias, arthralgias, pain, joint swelling,and bone pain   Neurological: negative for headaches, dizziness, seizures, weakness, numbness    PHYSICAL EXAM:      BP (!) 123/50   Pulse 72   Temp 97.9 °F (36.6 °C) (Oral)   Resp 18   Ht 6' (1.829 m)   Wt 152 lb 8.9 oz (69.2 kg)   SpO2 92%   BMI 20.69 kg/m²    Temp (24hrs), Av.1 °F (36.7 °C), Min:97.7 °F (36.5 °C), Max:98.3 °F (36.8 °C)    General appearance - well appearing, no in pain or distress   Mental status - alert and cooperative   Eyes -rt eye lid swollen   Ears - bilateral TM's and external ear canals normal   Mouth - mucous membranes moist, pharynx normal without lesions   Neck - supple, no significant adenopathy   Lymphatics - no palpable lymphadenopathy, no hepatosplenomegaly   Chest - has chest tube in place. Heart - normal rate, regular rhythm, normal S1, S2, no murmurs  Abdomen - soft, nontender, nondistended, no masses or organomegaly   Neurological - alert, oriented, normal speech, no focal findings or movement disorder noted   Musculoskeletal - no joint tenderness, deformity or swelling   Extremities - peripheral pulses normal, no pedal edema, no clubbing or cyanosis   Skin - normal coloration and turgor, no rashes, no suspicious skin lesions noted ,    DATA:    Labs:   CBC:   Recent Labs     01/11/22 0628 01/12/22 0514   WBC 14.1* 10.9   HGB 9.7* 8.7*   HCT 30.0* 26.5*    252     BMP:   Recent Labs     01/11/22 0628 01/12/22 0514    135   K 3.9 3.8   CO2 18* 21   BUN 35* 30*   CREATININE 1.12 0.86   LABGLOM >60 >60   GLUCOSE 190* 163*     PT/INR: No results for input(s): PROTIME, INR in the last 72 hours. Surgical Pathology Report  SURGICAL PATHOLOGY REPORT  Collected: 01/04/22 1423   Lab status: Final   Resulting lab: [facility]   Value: -- Diagnosis --     1.  Right lung, middle lobe wedge biopsy:     -  Adenocarcinoma. -  Tumor is 1.8 cm. -  Carcinoma invades into but not through the visceral pleura. 2.  Lymph node #9, biopsy:     -  Negative for metastatic carcinoma. 3.  Lymph node #7, biopsy:     -  Negative for metastatic carcinoma. 4.  Right middle lobe lung, lobectomy:     -  Margins are free of involvement by carcinoma. -  Peribronchial lymph node, negative for metastatic carcinoma.      -  Nonneoplastic lung shows emphysematous change.       Mellissa Siu M.D.   **Electronically Signed Out**         rdd/1/6/2022         Clinical Information   Pre-op Diagnosis:  RIGHT MIDDLE LOBE NODULE   Operative Findings:  MIDDLE LOBE OF RIGHT LUNG; LYMPH NODE #9 OF LUNG;   LYMPH NODE #7; RIGHT MIDDLE LOBE   Operation Performed:  MIDDLE WEDGE RESECTION, POSSIBLE MIDDLE   LOBECTOMY VIA THORACOTOMY     Source of Specimen   1: MIDDLE LOBE OF RIGHT LUNG (A)   2: LYMPH NODE #9 OF LUNG (B)   3: LYMPH NODE #7   4: RIGHT MIDDLE LOBE     Gross Description   1.  \"ANA DAVIS, MIDDLE LOBE OF RIGHT LUNG\" Received fresh is a   6.4 x 4.2 x 2.8 cm lung wedge with staple line.  The pleura is   pink-tan with an area of slightly thickening (inked black). Sectioning reveals a 1.8 x 1.2 x 0.8 cm subpleural tan-white mass   lesion that is 0.8 cm from the staple line margin.  The remaining   parenchyma is pink-tan with no other lesions.  1TP, 1DQ, 1FS, Cassette   summary:  \"A\" frozen section lesion, \"B\" remainder of lesion with   pleura inked black, \"C\" staple line margin en face. 2.  \"ANA DAVIS, LYMPH NODE #9 OF LUNG\" Received fresh are two   fragments, 0.3 cm each.  1TP, 1FS, 1cs. 3.  \"ANA DAVIS, LYMPH NODE #7\" Received fresh are two   anthracotic fragments, 0.6 and 0.7 cm in greatest dimension.  1TP,   1FS, 1cs.     4.  \"ANA DAVIS, RIGHT MIDDLE LOBE\" Received fresh is a 50 gram,   8.5 x 6.8 x 3.5 cm lobe of lung with multiple staple lines.  The   pleura is wrinkled purple-gray.  Sectioning reveals spongy dark red   parenchyma with no masses.  No markedly enlarged nodes are identified   at the hilum.  Cassette summary:  \"A\" bronchial margin frozen section   cassette resubmitted as received, \"B\" hilar soft tissue and vascular   margin, \"C\" uninvolved lung (along staple line).  tm     Intraoperative Diagnosis   1.  FSDX:  Non-small cell carcinoma, favor adenocarcinoma.  (14:00,   RDD)   2.  FSDX:  Fibrofatty tissue.  (14:11, RDD)   3.  FSDX:  Lymph node, negative for carcinoma.  (14:19, RDD)   4.  FSDX:  Bronchial margin, negative for carcinoma.  (16:17, RDD)     Microscopic Description   1-4.  Microscopic examination performed.      PROCEDURE: Wedge biopsy, followed by lobectomy       SPECIMEN LATERALITY: Right   TUMOR FOCALITY: Single tumor       TUMOR SITE: Right middle lobe   TUMOR SIZE --     1.8 x 1.2 x 0.8 cm           HISTOLOGIC TYPE: Adenocarcinoma. Sections show a large and anaplastic non-small cell malignancy with   cohesion and a few instances of glandular differentiation.  Neoplastic   cells are large with copious eosinophilic cytoplasm and large   vesicular nuclei.  Tumor is characterized further utilizing control   appropriate immunostains.  Cells of carcinoma are negative for p40 and   they are positive for TTF-1 and CK7.  Morphology and immunophenotype   are that of an adenocarcinoma, lung primary. Note: The case is reviewed by a second Pathologist for quality   assurance with consensus (DS).       HISTOLOGIC GRADE: Grade 2 (of 4)           VISCERAL PLEURA INVASION:Reticulin stain with appropriately reactive   control is utilized to evaluate pleura.  Tumor extends beyond the   elastic layer into the pleura but not to the pleural surface (PL1)     DIRECT INVASION OF ADJACENT STRUCTURES: No     TREATMENT EFFECT: None apparent. LYMPHOVASCULAR INVASION: Absent                         MARGINS --   -BRONCHIAL: Free of tumor       -VASCULAR: Free of tumor       -PARENCHYMAL: Free of tumor       -OTHER ATTACHED TISSUE MARGIN (IF APPLICABLE): N/A       REGIONAL LYMPH NODES: 3   LYMPH NODES(S) FROM PRIOR PROCEDURES: No       NUMBER AND STATION(S) EXAMINED:   See diagnoses   NUMBER AND STATION(S) WITH METASTASIS: 0         DISTANT METASTASIS--   DISTANT SITE(S) INVOLVED, IF APPLICABLE: N/A              IMAGING DATA:  XR CHEST PORTABLE   Final Result   Small right pneumothorax, decreased in size as compared to prior status post   chest tube placement. Bibasilar atelectasis or airspace disease, improved on the right as compared   to prior. Persistent pneumomediastinum and extensive soft tissue emphysema.          IR CHEST TUBE INSERTION   Final Result   Successful fluoroscopic guided placement of a right 10 [place] chest tube.         CT CHEST WO CONTRAST   Final Result   Small right pneumothorax, extensive pneumomediastinum and extensive   subcutaneous emphysema again demonstrated. This may be due to a chest wall   defect suggested in the posterolateral right 5th intercostal space. XR CHEST PORTABLE   Final Result   Considerable interval worsening now bilateral and more extensive subcutaneous   emphysema throughout, and slightly larger right PTX. Slightly increased   pneumomediastinum. Greater bibasilar opacities, again probably atelectatic. The findings were sent to the Radiology Results [id number] at 9:36   a.m. on 1/11/2022to be communicated to a licensed caregiver. XR CHEST PORTABLE   Final Result   No significant interval change in the appearance of the chest.      Increased subcutaneous emphysema in the right chest wall and right neck         XR CHEST PORTABLE   Final Result   1. Removal of the right-sided chest tube with no significant change in size   of the right pneumothorax. 2. Otherwise, no significant change in the appearance of the chest.         XR CHEST PORTABLE   Final Result   Right-sided pneumothorax with stable chest tube. Subcutaneous emphysema along the right lateral chest wall and in the right   supraclavicular region. XR CHEST PORTABLE   Final Result   Right apical pneumothorax measuring 1.6 cm. Subtle bibasilar infiltrates. Stable right-sided chest tube. CT ABDOMEN PELVIS WO CONTRAST Additional Contrast? None   Final Result   1. Apparent localized ileus involving a few loops of ileum in the central   abdomen. 2. Small right-sided pneumothorax. 3.  Cholelithiasis without evidence of acute cholecystitis. 4.  Small amount of non dependent air within the urinary bladder. Correlate   with any evidence of recent instrumentation.       RECOMMENDATIONS:   Unavailable         XR CHEST PORTABLE   Final Result   Persistent small right apical pneumothorax with 2 right chest tubes in place. Resolving bilateral lower lung subsegmental atelectasis         XR CHEST PORTABLE   Final Result   No significant change in small right apical pneumothorax with chest tubes in   place. XR CHEST PORTABLE   Final Result   No significant interval change. Right chest tubes in place with persistent   small right apical pneumothorax. XR CHEST PORTABLE   Final Result   Interval right-sided thoracotomy with right-sided chest tubes in-situ. Small   right apical pneumothorax. Right hilar surgical clips related to recent   surgery. .         XR CHEST PORTABLE    (Results Pending)     CT chest 11/2/2021:  Impression   The previously biopsied, somewhat bilobed nodule involving right middle lobe   is slightly increased in size since the prior study now measuring 1.8 x 1.0   cm, once measuring 1.8 x 0.6 cm.  There does appear to be some spiculation. No new pulmonary nodule is seen.           Primary Problem  Primary adenocarcinoma of right lung [facility])    Active Hospital Problems    Diagnosis Date Noted    Acute delirium [R41.0] 01/08/2022    Hyponatremia [E87.1] 01/08/2022    Postoperative ileus ([address] [K91.89, K56.7] 01/08/2022    Primary adenocarcinoma of right lung ([address] [C34.91] 12/15/2021    Coronary artery disease involving native coronary artery of native heart without angina pectoris [I25.10] 09/10/2013    Primary hypertension [I10] 09/10/2013    Hyperlipidemia [E78.5] 09/10/2013    Type 2 diabetes mellitus without complication, without long-term current use of insulin ([address] [E11.9] 09/10/2013       IMPRESSION:   1. Right middle lobe lung adenocarcinoma, status post wedge resection on 1/4/2022. Surgical pathology showing [id number] stage IB, with high risk feature of visceral pleura involvement  2. History of tobacco abuse  3. Delirium/multifactorial could be ICU induced psychosis/sleep deprivation > improved  4.  Post surgery ileus>

## 2022-01-13 ENCOUNTER — APPOINTMENT (OUTPATIENT)
Dept: GENERAL RADIOLOGY | Age: 72
DRG: 164 | End: 2022-01-13
Attending: THORACIC SURGERY (CARDIOTHORACIC VASCULAR SURGERY)
Payer: MEDICARE

## 2022-01-13 LAB
ABSOLUTE EOS #: 0.12 K/UL (ref 0–0.44)
ABSOLUTE IMMATURE GRANULOCYTE: 0.15 K/UL (ref 0–0.3)
ABSOLUTE LYMPH #: 1.67 K/UL (ref 1.1–3.7)
ABSOLUTE MONO #: 0.59 K/UL (ref 0.1–1.2)
ANION GAP SERPL CALCULATED.3IONS-SCNC: 12 MMOL/L (ref 9–17)
BASOPHILS # BLD: 0 % (ref 0–2)
BASOPHILS ABSOLUTE: <0.03 K/UL (ref 0–0.2)
BUN BLDV-MCNC: 19 MG/DL (ref 8–23)
BUN/CREAT BLD: ABNORMAL (ref 9–20)
CALCIUM SERPL-MCNC: 7.9 MG/DL (ref 8.6–10.4)
CHLORIDE BLD-SCNC: 101 MMOL/L (ref 98–107)
CO2: 22 MMOL/L (ref 20–31)
CREAT SERPL-MCNC: 0.67 MG/DL (ref 0.7–1.2)
DIFFERENTIAL TYPE: ABNORMAL
EOSINOPHILS RELATIVE PERCENT: 1 % (ref 1–4)
GFR AFRICAN AMERICAN: >60 ML/MIN
GFR NON-AFRICAN AMERICAN: >60 ML/MIN
GFR SERPL CREATININE-BSD FRML MDRD: ABNORMAL ML/MIN/{1.73_M2}
GFR SERPL CREATININE-BSD FRML MDRD: ABNORMAL ML/MIN/{1.73_M2}
GLUCOSE BLD-MCNC: 105 MG/DL (ref 70–99)
GLUCOSE BLD-MCNC: 112 MG/DL (ref 75–110)
GLUCOSE BLD-MCNC: 165 MG/DL (ref 75–110)
GLUCOSE BLD-MCNC: 202 MG/DL (ref 75–110)
GLUCOSE BLD-MCNC: 249 MG/DL (ref 75–110)
GLUCOSE BLD-MCNC: 99 MG/DL (ref 75–110)
HCT VFR BLD CALC: 27.3 % (ref 40.7–50.3)
HEMOGLOBIN: 9 G/DL (ref 13–17)
IMMATURE GRANULOCYTES: 2 %
LYMPHOCYTES # BLD: 20 % (ref 24–43)
MCH RBC QN AUTO: 29 PG (ref 25.2–33.5)
MCHC RBC AUTO-ENTMCNC: 33 G/DL (ref 28.4–34.8)
MCV RBC AUTO: 88.1 FL (ref 82.6–102.9)
MONOCYTES # BLD: 7 % (ref 3–12)
NRBC AUTOMATED: 0 PER 100 WBC
PDW BLD-RTO: 13.4 % (ref 11.8–14.4)
PLATELET # BLD: 265 K/UL (ref 138–453)
PLATELET ESTIMATE: ABNORMAL
PMV BLD AUTO: 10.8 FL (ref 8.1–13.5)
POTASSIUM SERPL-SCNC: 3.5 MMOL/L (ref 3.7–5.3)
RBC # BLD: 3.1 M/UL (ref 4.21–5.77)
RBC # BLD: ABNORMAL 10*6/UL
SEG NEUTROPHILS: 70 % (ref 36–65)
SEGMENTED NEUTROPHILS ABSOLUTE COUNT: 5.85 K/UL (ref 1.5–8.1)
SODIUM BLD-SCNC: 135 MMOL/L (ref 135–144)
WBC # BLD: 8.4 K/UL (ref 3.5–11.3)
WBC # BLD: ABNORMAL 10*3/UL

## 2022-01-13 PROCEDURE — 71045 X-RAY EXAM CHEST 1 VIEW: CPT

## 2022-01-13 PROCEDURE — 92610 EVALUATE SWALLOWING FUNCTION: CPT

## 2022-01-13 PROCEDURE — 6370000000 HC RX 637 (ALT 250 FOR IP): Performed by: PHYSICIAN ASSISTANT

## 2022-01-13 PROCEDURE — 6370000000 HC RX 637 (ALT 250 FOR IP): Performed by: STUDENT IN AN ORGANIZED HEALTH CARE EDUCATION/TRAINING PROGRAM

## 2022-01-13 PROCEDURE — 6370000000 HC RX 637 (ALT 250 FOR IP): Performed by: NURSE PRACTITIONER

## 2022-01-13 PROCEDURE — 97116 GAIT TRAINING THERAPY: CPT

## 2022-01-13 PROCEDURE — 2140000001 HC CVICU INTERMEDIATE R&B

## 2022-01-13 PROCEDURE — 80048 BASIC METABOLIC PNL TOTAL CA: CPT

## 2022-01-13 PROCEDURE — 2580000003 HC RX 258: Performed by: NURSE PRACTITIONER

## 2022-01-13 PROCEDURE — 85025 COMPLETE CBC W/AUTO DIFF WBC: CPT

## 2022-01-13 PROCEDURE — 36415 COLL VENOUS BLD VENIPUNCTURE: CPT

## 2022-01-13 PROCEDURE — 97110 THERAPEUTIC EXERCISES: CPT

## 2022-01-13 PROCEDURE — 99024 POSTOP FOLLOW-UP VISIT: CPT | Performed by: NURSE PRACTITIONER

## 2022-01-13 PROCEDURE — 82947 ASSAY GLUCOSE BLOOD QUANT: CPT

## 2022-01-13 PROCEDURE — 51798 US URINE CAPACITY MEASURE: CPT

## 2022-01-13 PROCEDURE — APPNB30 APP NON BILLABLE TIME 0-30 MINS: Performed by: NURSE PRACTITIONER

## 2022-01-13 RX ORDER — PANTOPRAZOLE SODIUM 40 MG/1
40 TABLET, DELAYED RELEASE ORAL
Status: DISCONTINUED | OUTPATIENT
Start: 2022-01-13 | End: 2022-01-15 | Stop reason: HOSPADM

## 2022-01-13 RX ORDER — POTASSIUM CHLORIDE 7.45 MG/ML
10 INJECTION INTRAVENOUS PRN
Status: DISCONTINUED | OUTPATIENT
Start: 2022-01-13 | End: 2022-01-15 | Stop reason: HOSPADM

## 2022-01-13 RX ORDER — POTASSIUM CHLORIDE 20 MEQ/1
40 TABLET, EXTENDED RELEASE ORAL PRN
Status: DISCONTINUED | OUTPATIENT
Start: 2022-01-13 | End: 2022-01-15 | Stop reason: HOSPADM

## 2022-01-13 RX ADMIN — DOCUSATE SODIUM 100 MG: 100 CAPSULE ORAL at 07:50

## 2022-01-13 RX ADMIN — OXYCODONE 5 MG: 5 TABLET ORAL at 01:46

## 2022-01-13 RX ADMIN — SODIUM CHLORIDE, PRESERVATIVE FREE 10 ML: 5 INJECTION INTRAVENOUS at 21:00

## 2022-01-13 RX ADMIN — GABAPENTIN 100 MG: 100 CAPSULE ORAL at 07:50

## 2022-01-13 RX ADMIN — ACETAMINOPHEN 1000 MG: 500 TABLET ORAL at 07:49

## 2022-01-13 RX ADMIN — POTASSIUM CHLORIDE 40 MEQ: 1500 TABLET, EXTENDED RELEASE ORAL at 07:49

## 2022-01-13 RX ADMIN — ISOSORBIDE MONONITRATE 30 MG: 30 TABLET ORAL at 07:50

## 2022-01-13 RX ADMIN — GLIPIZIDE 5 MG: 5 TABLET ORAL at 17:06

## 2022-01-13 RX ADMIN — DOCUSATE SODIUM 100 MG: 100 CAPSULE ORAL at 19:59

## 2022-01-13 RX ADMIN — FERROUS SULFATE TAB EC 325 MG (65 MG FE EQUIVALENT) 325 MG: 325 (65 FE) TABLET DELAYED RESPONSE at 07:51

## 2022-01-13 RX ADMIN — TAMSULOSIN HYDROCHLORIDE 0.4 MG: 0.4 CAPSULE ORAL at 07:51

## 2022-01-13 RX ADMIN — POLYETHYLENE GLYCOL 3350 17 G: 17 POWDER, FOR SOLUTION ORAL at 07:51

## 2022-01-13 RX ADMIN — LISINOPRIL 20 MG: 20 TABLET ORAL at 07:50

## 2022-01-13 RX ADMIN — SERTRALINE 50 MG: 50 TABLET, FILM COATED ORAL at 19:59

## 2022-01-13 RX ADMIN — METOPROLOL TARTRATE 25 MG: 25 TABLET ORAL at 19:58

## 2022-01-13 RX ADMIN — METOPROLOL TARTRATE 25 MG: 25 TABLET ORAL at 07:50

## 2022-01-13 RX ADMIN — QUETIAPINE FUMARATE 25 MG: 25 TABLET ORAL at 19:59

## 2022-01-13 RX ADMIN — METFORMIN HYDROCHLORIDE 1000 MG: 500 TABLET ORAL at 11:30

## 2022-01-13 RX ADMIN — Medication 5 MG: at 19:58

## 2022-01-13 RX ADMIN — INSULIN LISPRO 4 UNITS: 100 INJECTION, SOLUTION INTRAVENOUS; SUBCUTANEOUS at 11:34

## 2022-01-13 RX ADMIN — METFORMIN HYDROCHLORIDE 1000 MG: 500 TABLET ORAL at 17:06

## 2022-01-13 RX ADMIN — ATORVASTATIN CALCIUM 20 MG: 20 TABLET, FILM COATED ORAL at 07:50

## 2022-01-13 RX ADMIN — GABAPENTIN 100 MG: 100 CAPSULE ORAL at 14:30

## 2022-01-13 RX ADMIN — ALOGLIPTIN 12.5 MG: 12.5 TABLET, FILM COATED ORAL at 17:06

## 2022-01-13 RX ADMIN — GABAPENTIN 100 MG: 100 CAPSULE ORAL at 19:59

## 2022-01-13 RX ADMIN — ALOGLIPTIN 12.5 MG: 12.5 TABLET, FILM COATED ORAL at 11:30

## 2022-01-13 ASSESSMENT — PAIN SCALES - GENERAL
PAINLEVEL_OUTOF10: 3
PAINLEVEL_OUTOF10: 2
PAINLEVEL_OUTOF10: 0
PAINLEVEL_OUTOF10: 5
PAINLEVEL_OUTOF10: 4
PAINLEVEL_OUTOF10: 0
PAINLEVEL_OUTOF10: 5
PAINLEVEL_OUTOF10: 0

## 2022-01-13 ASSESSMENT — PAIN DESCRIPTION - LOCATION: LOCATION: NECK

## 2022-01-13 ASSESSMENT — PAIN DESCRIPTION - FREQUENCY: FREQUENCY: CONTINUOUS

## 2022-01-13 ASSESSMENT — PAIN DESCRIPTION - PAIN TYPE: TYPE: ACUTE PAIN

## 2022-01-13 ASSESSMENT — PAIN DESCRIPTION - DESCRIPTORS: DESCRIPTORS: ACHING;SORE

## 2022-01-13 NOTE — PLAN OF CARE
Problem: Infection:  Goal: Will remain free from infection  Description: Will remain free from infection  1/13/2022 0720 by Dang Mesa RN  Outcome: Ongoing  1/13/2022 0416 by Mayra Lu RN  Outcome: Ongoing     Problem: Safety:  Goal: Free from accidental physical injury  Description: Free from accidental physical injury  1/13/2022 0720 by Dang Mesa RN  Outcome: Ongoing  1/13/2022 0416 by Mayra Lu RN  Outcome: Ongoing  Goal: Free from intentional harm  Description: Free from intentional harm  1/13/2022 0720 by Dang Mesa RN  Outcome: Ongoing  1/13/2022 0416 by Mayra Lu RN  Outcome: Ongoing     Problem: Daily Care:  Goal: Daily care needs are met  Description: Daily care needs are met  1/13/2022 0720 by Dang Mesa RN  Outcome: Ongoing  1/13/2022 0416 by Mayra Lu RN  Outcome: Ongoing     Problem: Pain:  Goal: Patient's pain/discomfort is manageable  Description: Patient's pain/discomfort is manageable  1/13/2022 0720 by Dang Mesa RN  Outcome: Ongoing  1/13/2022 0416 by Mayra Lu RN  Outcome: Ongoing  Goal: Pain level will decrease  Description: Pain level will decrease  1/13/2022 0720 by Dang Mesa RN  Outcome: Ongoing  1/13/2022 0416 by Mayra Lu RN  Outcome: Ongoing  Goal: Control of acute pain  Description: Control of acute pain  1/13/2022 0720 by Dang Mesa RN  Outcome: Ongoing  1/13/2022 0416 by Mayra Lu RN  Outcome: Ongoing  Goal: Control of chronic pain  Description: Control of chronic pain  1/13/2022 0720 by Dang Mesa RN  Outcome: Ongoing  1/13/2022 0416 by Mayra Lu RN  Outcome: Ongoing     Problem: Skin Integrity:  Goal: Skin integrity will stabilize  Description: Skin integrity will stabilize  1/13/2022 0720 by Dang Mesa RN  Outcome: Ongoing  1/13/2022 0416 by Mayra Lu RN  Outcome: Ongoing     Problem: Discharge Planning:  Goal: Patients continuum of care needs are met  Description: Patients continuum of care needs are met  1/13/2022 0720 by Maritza Lorenzo RN  Outcome: Ongoing  1/13/2022 0416 by Alexis Shukla RN  Outcome: Ongoing     Problem: Falls - Risk of:  Goal: Will remain free from falls  Description: Will remain free from falls  1/13/2022 0720 by Maritza Lorenzo RN  Outcome: Ongoing  1/13/2022 0416 by Alexis Shukla RN  Outcome: Ongoing  Goal: Absence of physical injury  Description: Absence of physical injury  1/13/2022 0720 by Maritza Lorenzo RN  Outcome: Ongoing  1/13/2022 0416 by Alexis Shukla RN  Outcome: Ongoing     Problem: Skin Integrity:  Goal: Will show no infection signs and symptoms  Description: Will show no infection signs and symptoms  1/13/2022 0720 by Maritza Lorenzo RN  Outcome: Ongoing  1/13/2022 0416 by Alexis Shukla RN  Outcome: Ongoing  Goal: Absence of new skin breakdown  Description: Absence of new skin breakdown  1/13/2022 0720 by Maritza Lorenzo RN  Outcome: Ongoing  1/13/2022 0416 by Alexis Shukla RN  Outcome: Ongoing

## 2022-01-13 NOTE — PROGRESS NOTES
Patient free from adverse reactions after 30 minute observation period.   Speech Language Pathology  Facility/Department: [facility] CAR 1   CLINICAL BEDSIDE SWALLOW EVALUATION    NAME: Mirella Villalta  : 1950  MRN: [id number]    ADMISSION DATE: 2022  ADMITTING DIAGNOSIS: has Angina pectoris ([address] Coronary artery disease involving native coronary artery of native heart without angina pectoris; Primary hypertension; Type 2 diabetes mellitus without complication, without long-term current use of insulin ([address] Hyperlipidemia; Shortness of breath, post procedure improving probably Effient side effect. ; S/P angioplasty with stent, obtuse marginal; Type II or unspecified type diabetes mellitus without mention of complication, not stated as uncontrolled; Calculus of kidney; Actinic keratosis; Senile nuclear sclerosis; Dyspepsia; Epigastric pain; Substernal chest pain; Abnormal CT scan, stomach; Abnormal gastrointestinal PET scan; Strain of lumbar region; Pneumothorax after biopsy; Pneumothorax, post biopsy, right; Primary adenocarcinoma of right lung ([address] Acute delirium; Hyponatremia; Postoperative ileus (HCC); S/P thoracotomy; and Lung cancer, main bronchus, right ([address] on their problem list.    Date of Eval: 2022  Evaluating Therapist: RUIAH Dash    Current Diet level:  Current Diet :  (easy to chew)  Current Liquid Diet : Thin      Primary Complaint Mr. Drema Simmonds is a 70 y.o. [address] [facility] male who presents with a known pulmonary nodule of the right middle lobe. Pertinent medical history includes CAD with previous stenting, diabetes, hypertension and hyperlipidemia and history of smoking. Patient quit smoking 2 months ago. He has been smoking since he was 15years old with a 2 ppd habit. CT lung screening in  revealed the nodule had increased in size and subsequent PET scan was positive for metabolic activity, including possible lymph node involvement. Patient underwent a needle biopsy that was negative.   Patient denies chest pain, shortness of breath, fever/chills, unplanned weight loss and night sweats. He has been referred for consideration of surgical resection. Pain:  Pain Assessment  Pain Assessment: 0-10  Pain Level: 3  Patient's Stated Pain Goal: No pain  Pain Type: Acute pain  Pain Location: Neck  Pain Orientation: Right  Pain Descriptors: Aching,Sore  Pain Frequency: Continuous  Pain Onset: On-going  Clinical Progression: Not changed  Functional Pain Assessment: Activities are not prevented  Non-Pharmaceutical Pain Intervention(s): Rest,Repositioned  Response to Pain Intervention: Asleep with RR greater than 10  RASS Score: Restless    Reason for Referral  Regine Alvarado was referred for a bedside swallow evaluation to assess the efficiency of his swallow function, identify signs and symptoms of aspiration and make recommendations regarding safe dietary consistencies, effective compensatory strategies, and safe eating environment. Impression  Patient presents with probable safe swallow for Easy to Chew diet with thin liquids, select moist foods/add extra sauce/gravy as needed as evidenced by no overt s/s of aspiration noted with consistencies tested, clear vocal quality throughout. Pt does report inconsistent globus sensation/discomfort during swallowing, improved with double swallow. Also reports decreased globus sensation with nectar thick liquid. Pt eats >6 trials soft solid peaches- report they taste good and does not experience discomfort. Discussed if globus sensation does not improve, recommend Modified Barium Swallow Study to further assess. Pt and wife hope to discharge home today, discussed option to complete today inpatient or as an outpatient, they report they will follow up with physician next week for OP MBSS referral if needed. Education provided regarding safe swallow strategies including small sips and bites, only feed when alert and awake and upright at 90 degrees for all PO intake.   Recommend close monitoring for overt/clinical s/s of aspiration and D/C PO intake and complete Modified Barium Swallow Study should they occur. Results and recommendations reported to RN. Dysphagia Diagnosis: Suspected needs further assessment;Swallow function appears grossly intact  Dysphagia Outcome Severity Scale: Level 6: Within functional limits/Modified independence     Treatment Plan  Requires SLP Intervention: Yes  Duration/Frequency of Treatment: 2-3x/week  D/C Recommendations: Further therapy recommended at discharge- OP swallow study if needed/swallowing discomfort continues. Recommended Diet and Intervention  Diet Solids Recommendation: Easy to Chew (extra sauce/gravy)  Liquid Consistency Recommendation: Thin (modify to nectar if more comfortable)  Recommended Form of Meds: PO     Therapeutic Interventions: Diet tolerance monitoring;Patient/Family education    Compensatory Swallowing Strategies  Compensatory Swallowing Strategies: Alternate solids and liquids; No straws;Small bites/sips;Swallow 2 times per bite/sip;Eat/Feed slowly;Upright as possible for all oral intake    Treatment/Goals  Dysphagia Goals: The patient will tolerate recommended diet without observed clinical signs of aspiration    General  Chart Reviewed: Yes  Behavior/Cognition: Alert; Cooperative  Follows Directions: Complex  Dentition: Dentures top;Dentures bottom  Patient Positioning: Upright in bed  Baseline Vocal Quality: Normal  Consistencies Administered: Dysphagia Soft and Bite-Sized (Dysphagia III); Dysphagia Pureed (Dysphagia I); Nectar - straw;Nectar - teaspoon; Thin - straw           Vision/Hearing  Vision  Vision: Impaired  Vision Exceptions: Wears glasses at all times  Hearing  Hearing: Within functional limits    Oral Motor Deficits  Oral/Motor  Oral Motor:  Within functional limits    Oral Phase Dysfunction  Oral Phase  Oral Phase: WNL     Indicators of Pharyngeal Phase Dysfunction   Pharyngeal Phase  Pharyngeal Phase: WNL  Pharyngeal Phase Pharyngeal: Pt reports discomfort/globus sensation following thin, puree, and soft solid inconsistently, improves with double swallow. No overt s/s of aspiration, clear vocal quality following all trials.     Prognosis  Prognosis  Prognosis for safe diet advancement: good  Individuals consulted  Consulted and agree with results and recommendations: Patient;RN;Family member  Family member consulted: wife    Education  Patient Education: yes  Patient Education Response: Verbalizes understanding             Therapy Time  SLP Individual Minutes  Time In: 6669  Time Out: 9400  Minutes: Marybeth [address] [place] [address]  1/13/2022 11:51 AM

## 2022-01-13 NOTE — PROGRESS NOTES
Physical Therapy  Facility/Department: [facility] CAR 1  Daily Treatment Note  NAME: Maynor Seo  : 1950  MRN: [id number]    Date of Service: 2022    Discharge Recommendations:  Patient would benefit from continued therapy after discharge   PT Equipment Recommendations  Equipment Needed: Yes  Mobility Devices: Canes  Cane: Straight Cane    Assessment   Body structures, Functions, Activity limitations: Decreased functional mobility ; Decreased strength;Decreased endurance;Decreased balance  Assessment: Amb 300' CGA no AD or rest breaks. Dizziness reported during standing exercises. Pt may benefit from [address] during amb in future. Pt would benefit from continued acute PT to address deficits and improve functional independence. Prognosis: Good  PT Education: Plan of Care;PT Role;General Safety; Injury Prevention  REQUIRES PT FOLLOW UP: Yes  Activity Tolerance  Activity Tolerance: Patient Tolerated treatment well;Patient limited by fatigue  Activity Tolerance: Pt reported dizziness during standing exercises. Patient Diagnosis(es): The encounter diagnosis was S/P thoracotomy. has a past medical history of Abnormal cardiac cath, Actinic keratosis, Arthritis, CAD (coronary artery disease), Calculus of kidney, Cervical stenosis of spine, Diabetes mellitus ([facility] Full dentures, GERD (gastroesophageal reflux disease), H/O echocardiogram, History of cardiac cath, History of echocardiogram, History of echocardiogram, History of heart attack, History of tobacco abuse, Hyperlipidemia, Hypertension, Pulmonary nodule, Raynaud's phenomenon, S/P angioplasty with stent, and Wears hearing aid in both ears. has a past surgical history that includes Cardiac catheterization (); Hemorrhoid surgery; Neck surgery (); other surgical history (10/2014); other surgical history (2014); Cataract removal (2015); Upper gastrointestinal endoscopy (2005); Cataract removal (2015);  Colonoscopy (2006); Colonoscopy (05/23/2016); Cardiac catheterization (02/09/2017); Cardiac catheterization (02/09/2017); Cardiac catheterization (01/14/2019); Upper gastrointestinal endoscopy (N/A, 02/05/2019); Upper gastrointestinal endoscopy (N/A, 10/22/2019); Lung biopsy (Right, 07/28/2021); CT NEEDLE BIOPSY LUNG PERCUTANEOUS (7/28/2021); hernia repair (Left); Lung removal, total (Right, 1/4/2022); and IR CHEST TUBE INSERTION (1/11/2022). Restrictions  Restrictions/Precautions  Restrictions/Precautions: General Precautions,Fall Risk,Up as Tolerated  Required Braces or Orthoses?: No  Position Activity Restriction  Other position/activity restrictions: chest tube, amb pt. s/p R thoracotomy and lobectomy 1/4  Subjective   General  Chart Reviewed: Yes  Response To Previous Treatment: Patient with no complaints from previous session. Family / Caregiver Present: Yes  Subjective  Subjective: RN and pt agreeable to PT. Pt asleep in bed upon arrival and pleasant throughout. Pain Screening  Patient Currently in Pain: Yes  Pain Assessment  Pain Assessment: 0-10  Pain Level: 3  Pain Type: Acute pain  Pain Location: Neck  Pain Descriptors: Aching; Sore  Pain Frequency: Continuous  Vital Signs  Patient Currently in Pain: Yes       Orientation  Orientation  Overall Orientation Status: Within Functional Limits  Cognition   Cognition  Overall Cognitive Status: WFL  Objective   Bed mobility  Rolling to Left: Supervision  Rolling to Right: Unable to assess  Supine to Sit: Stand by assistance  Sit to Supine: Unable to assess  Scooting: Stand by assistance  Comment: Pt supine in bed upon arrival and retired to chair at end of session. Transfers  Sit to Stand: Contact guard assistance  Stand to sit: Contact guard assistance  Bed to Chair: Contact guard assistance  Comment: Pt in a hurry throughout transfers, VC's for safety, speed, and hand placement.   Ambulation  Ambulation?: Yes  More Ambulation?: No  Ambulation 1  Surface: level tile  Device: No Device  Assistance: Contact guard assistance  Quality of Gait: Slow joanne, decreased step length, near scissoring gait. Gait Deviations: Slow Joanne;Decreased step length  Distance: 300ft  Comments: Pt amb one lap around unit and back to chair with no rest breaks CGA. Pt reached for wall railings a few times throughout amb. Will use SPC during next session. Stairs/Curb  Stairs?: No     Balance  Posture: Good  Sitting - Static: Good  Sitting - Dynamic: Good  Standing - Static: Good  Standing - Dynamic: Fair  Comments: Standing balance assessed in standing with no AD. Exercises  Seated LE exercise program: Long Arc Quads, hip abduction/adduction, heel/toe raises, and marches. Reps: 10x  Standing exercise program: Hip flexion, marches. Reps: 10x  Comments: RW used for balance during standing exercises. Standing exercises ceased d/t pt reporting dizziness and requesting to stop. AM-PAC Score  AM-PAC Inpatient Mobility Raw Score : 21 (01/13/22 1009)  AM-PAC Inpatient T-Scale Score : 50.25 (01/13/22 1009)  Mobility Inpatient CMS 0-100% Score: 28.97 (01/13/22 1009)  Mobility Inpatient CMS G-Code Modifier : CJ (01/13/22 1009)          Goals  Short term goals  Time Frame for Short term goals: 10 visits  Short term goal 1: Pt will be Omari bed mobility  Short term goal 2: Pt will be Omari transfers  Short term goal 3: Pt will be Omari amb 300'  Short term goal 4: Pt will navigate 5 steps Omari    Plan    Plan  Times per week: 3-5x/wk  Current Treatment Recommendations: Strengthening,Balance Training,Endurance Training,Functional Mobility Training,Transfer Training,Gait Training,Stair training,Home Exercise Program,Safety Education & Training,Patient/Caregiver Education & Training,Equipment Evaluation, Education, & procurement  Safety Devices  Type of devices: Call light within reach,Nurse notified,Patient at risk for falls,All fall risk precautions in place,Left in bed  Restraints  Initially in place: No  Restraints: .

## 2022-01-14 ENCOUNTER — APPOINTMENT (OUTPATIENT)
Dept: GENERAL RADIOLOGY | Age: 72
DRG: 164 | End: 2022-01-14
Attending: THORACIC SURGERY (CARDIOTHORACIC VASCULAR SURGERY)
Payer: MEDICARE

## 2022-01-14 LAB
ABSOLUTE EOS #: 0.05 K/UL (ref 0–0.44)
ABSOLUTE IMMATURE GRANULOCYTE: 0.15 K/UL (ref 0–0.3)
ABSOLUTE LYMPH #: 0.9 K/UL (ref 1.1–3.7)
ABSOLUTE MONO #: 0.68 K/UL (ref 0.1–1.2)
ANION GAP SERPL CALCULATED.3IONS-SCNC: 10 MMOL/L (ref 9–17)
BASOPHILS # BLD: 0 % (ref 0–2)
BASOPHILS ABSOLUTE: <0.03 K/UL (ref 0–0.2)
BUN BLDV-MCNC: 15 MG/DL (ref 8–23)
BUN/CREAT BLD: ABNORMAL (ref 9–20)
CALCIUM SERPL-MCNC: 8.2 MG/DL (ref 8.6–10.4)
CHLORIDE BLD-SCNC: 100 MMOL/L (ref 98–107)
CO2: 22 MMOL/L (ref 20–31)
CREAT SERPL-MCNC: 0.82 MG/DL (ref 0.7–1.2)
CULTURE: ABNORMAL
DIFFERENTIAL TYPE: ABNORMAL
EOSINOPHILS RELATIVE PERCENT: 0 % (ref 1–4)
GFR AFRICAN AMERICAN: >60 ML/MIN
GFR NON-AFRICAN AMERICAN: >60 ML/MIN
GFR SERPL CREATININE-BSD FRML MDRD: ABNORMAL ML/MIN/{1.73_M2}
GFR SERPL CREATININE-BSD FRML MDRD: ABNORMAL ML/MIN/{1.73_M2}
GLUCOSE BLD-MCNC: 111 MG/DL (ref 75–110)
GLUCOSE BLD-MCNC: 116 MG/DL (ref 75–110)
GLUCOSE BLD-MCNC: 129 MG/DL (ref 70–99)
GLUCOSE BLD-MCNC: 224 MG/DL (ref 75–110)
GLUCOSE BLD-MCNC: 57 MG/DL (ref 75–110)
HCT VFR BLD CALC: 27.5 % (ref 40.7–50.3)
HCT VFR BLD CALC: 27.8 % (ref 40.7–50.3)
HEMOGLOBIN: 9.2 G/DL (ref 13–17)
HEMOGLOBIN: 9.3 G/DL (ref 13–17)
IMMATURE GRANULOCYTES: 1 %
LACTIC ACID, SEPSIS WHOLE BLOOD: 1.5 MMOL/L (ref 0.5–1.9)
LACTIC ACID, SEPSIS: NORMAL MMOL/L (ref 0.5–1.9)
LYMPHOCYTES # BLD: 6 % (ref 24–43)
Lab: ABNORMAL
MCH RBC QN AUTO: 29.1 PG (ref 25.2–33.5)
MCH RBC QN AUTO: 29.3 PG (ref 25.2–33.5)
MCHC RBC AUTO-ENTMCNC: 33.1 G/DL (ref 28.4–34.8)
MCHC RBC AUTO-ENTMCNC: 33.8 G/DL (ref 28.4–34.8)
MCV RBC AUTO: 86.8 FL (ref 82.6–102.9)
MCV RBC AUTO: 88 FL (ref 82.6–102.9)
MONOCYTES # BLD: 5 % (ref 3–12)
NRBC AUTOMATED: 0 PER 100 WBC
NRBC AUTOMATED: 0 PER 100 WBC
PDW BLD-RTO: 13.4 % (ref 11.8–14.4)
PDW BLD-RTO: 13.5 % (ref 11.8–14.4)
PLATELET # BLD: 269 K/UL (ref 138–453)
PLATELET # BLD: 292 K/UL (ref 138–453)
PLATELET ESTIMATE: ABNORMAL
PMV BLD AUTO: 10.6 FL (ref 8.1–13.5)
PMV BLD AUTO: 11.1 FL (ref 8.1–13.5)
POTASSIUM SERPL-SCNC: 4.2 MMOL/L (ref 3.7–5.3)
RBC # BLD: 3.16 M/UL (ref 4.21–5.77)
RBC # BLD: 3.17 M/UL (ref 4.21–5.77)
RBC # BLD: ABNORMAL 10*6/UL
SARS-COV-2, RAPID: NOT DETECTED
SEG NEUTROPHILS: 88 % (ref 36–65)
SEGMENTED NEUTROPHILS ABSOLUTE COUNT: 12.58 K/UL (ref 1.5–8.1)
SODIUM BLD-SCNC: 132 MMOL/L (ref 135–144)
SPECIMEN DESCRIPTION: ABNORMAL
SPECIMEN DESCRIPTION: NORMAL
WBC # BLD: 14.4 K/UL (ref 3.5–11.3)
WBC # BLD: 19 K/UL (ref 3.5–11.3)
WBC # BLD: ABNORMAL 10*3/UL

## 2022-01-14 PROCEDURE — 80048 BASIC METABOLIC PNL TOTAL CA: CPT

## 2022-01-14 PROCEDURE — 6360000002 HC RX W HCPCS: Performed by: NURSE PRACTITIONER

## 2022-01-14 PROCEDURE — 6370000000 HC RX 637 (ALT 250 FOR IP): Performed by: PHYSICIAN ASSISTANT

## 2022-01-14 PROCEDURE — 87040 BLOOD CULTURE FOR BACTERIA: CPT

## 2022-01-14 PROCEDURE — 6370000000 HC RX 637 (ALT 250 FOR IP): Performed by: NURSE PRACTITIONER

## 2022-01-14 PROCEDURE — 87635 SARS-COV-2 COVID-19 AMP PRB: CPT

## 2022-01-14 PROCEDURE — 71045 X-RAY EXAM CHEST 1 VIEW: CPT

## 2022-01-14 PROCEDURE — 2580000003 HC RX 258: Performed by: NURSE PRACTITIONER

## 2022-01-14 PROCEDURE — 82947 ASSAY GLUCOSE BLOOD QUANT: CPT

## 2022-01-14 PROCEDURE — 36415 COLL VENOUS BLD VENIPUNCTURE: CPT

## 2022-01-14 PROCEDURE — 85025 COMPLETE CBC W/AUTO DIFF WBC: CPT

## 2022-01-14 PROCEDURE — 93005 ELECTROCARDIOGRAM TRACING: CPT | Performed by: NURSE PRACTITIONER

## 2022-01-14 PROCEDURE — 85027 COMPLETE CBC AUTOMATED: CPT

## 2022-01-14 PROCEDURE — APPNB30 APP NON BILLABLE TIME 0-30 MINS: Performed by: NURSE PRACTITIONER

## 2022-01-14 PROCEDURE — 99232 SBSQ HOSP IP/OBS MODERATE 35: CPT | Performed by: INTERNAL MEDICINE

## 2022-01-14 PROCEDURE — 99024 POSTOP FOLLOW-UP VISIT: CPT | Performed by: NURSE PRACTITIONER

## 2022-01-14 PROCEDURE — 83605 ASSAY OF LACTIC ACID: CPT

## 2022-01-14 PROCEDURE — 2140000001 HC CVICU INTERMEDIATE R&B

## 2022-01-14 RX ORDER — LEVOFLOXACIN 5 MG/ML
500 INJECTION, SOLUTION INTRAVENOUS EVERY 24 HOURS
Status: DISCONTINUED | OUTPATIENT
Start: 2022-01-14 | End: 2022-01-15 | Stop reason: HOSPADM

## 2022-01-14 RX ORDER — CLOPIDOGREL BISULFATE 75 MG/1
75 TABLET ORAL DAILY
Status: DISCONTINUED | OUTPATIENT
Start: 2022-01-14 | End: 2022-01-15 | Stop reason: HOSPADM

## 2022-01-14 RX ORDER — 0.9 % SODIUM CHLORIDE 0.9 %
250 INTRAVENOUS SOLUTION INTRAVENOUS ONCE
Status: COMPLETED | OUTPATIENT
Start: 2022-01-14 | End: 2022-01-14

## 2022-01-14 RX ADMIN — ENOXAPARIN SODIUM 40 MG: 100 INJECTION SUBCUTANEOUS at 12:38

## 2022-01-14 RX ADMIN — GABAPENTIN 100 MG: 100 CAPSULE ORAL at 16:10

## 2022-01-14 RX ADMIN — ACETAMINOPHEN 1000 MG: 500 TABLET ORAL at 11:08

## 2022-01-14 RX ADMIN — QUETIAPINE FUMARATE 25 MG: 25 TABLET ORAL at 21:27

## 2022-01-14 RX ADMIN — LEVOFLOXACIN 500 MG: 5 INJECTION, SOLUTION INTRAVENOUS at 12:34

## 2022-01-14 RX ADMIN — GLIPIZIDE 5 MG: 5 TABLET ORAL at 16:10

## 2022-01-14 RX ADMIN — SERTRALINE 50 MG: 50 TABLET, FILM COATED ORAL at 21:26

## 2022-01-14 RX ADMIN — METOPROLOL TARTRATE 25 MG: 25 TABLET ORAL at 21:26

## 2022-01-14 RX ADMIN — FERROUS SULFATE TAB EC 325 MG (65 MG FE EQUIVALENT) 325 MG: 325 (65 FE) TABLET DELAYED RESPONSE at 08:48

## 2022-01-14 RX ADMIN — ATORVASTATIN CALCIUM 20 MG: 20 TABLET, FILM COATED ORAL at 08:48

## 2022-01-14 RX ADMIN — GLIPIZIDE 5 MG: 5 TABLET ORAL at 08:49

## 2022-01-14 RX ADMIN — ISOSORBIDE MONONITRATE 30 MG: 30 TABLET ORAL at 08:49

## 2022-01-14 RX ADMIN — TAMSULOSIN HYDROCHLORIDE 0.4 MG: 0.4 CAPSULE ORAL at 08:47

## 2022-01-14 RX ADMIN — INSULIN LISPRO 4 UNITS: 100 INJECTION, SOLUTION INTRAVENOUS; SUBCUTANEOUS at 12:45

## 2022-01-14 RX ADMIN — LISINOPRIL 20 MG: 20 TABLET ORAL at 08:50

## 2022-01-14 RX ADMIN — SODIUM CHLORIDE, PRESERVATIVE FREE 10 ML: 5 INJECTION INTRAVENOUS at 21:28

## 2022-01-14 RX ADMIN — CLOPIDOGREL 75 MG: 75 TABLET, FILM COATED ORAL at 12:38

## 2022-01-14 RX ADMIN — ACETAMINOPHEN 1000 MG: 500 TABLET ORAL at 22:13

## 2022-01-14 RX ADMIN — METOPROLOL TARTRATE 25 MG: 25 TABLET ORAL at 08:47

## 2022-01-14 RX ADMIN — PANTOPRAZOLE SODIUM 40 MG: 40 TABLET, DELAYED RELEASE ORAL at 08:47

## 2022-01-14 RX ADMIN — Medication 5 MG: at 21:27

## 2022-01-14 RX ADMIN — SODIUM CHLORIDE 250 ML: 9 INJECTION, SOLUTION INTRAVENOUS at 12:34

## 2022-01-14 RX ADMIN — GABAPENTIN 100 MG: 100 CAPSULE ORAL at 21:26

## 2022-01-14 RX ADMIN — GABAPENTIN 100 MG: 100 CAPSULE ORAL at 08:49

## 2022-01-14 ASSESSMENT — PAIN SCALES - GENERAL
PAINLEVEL_OUTOF10: 0

## 2022-01-14 NOTE — PROGRESS NOTES
Pt continues to have a temp. Treated with tylenol. Message sent to primary team regarding temp, WBC, and his report of 8 episodes of diarrhea last night. Minimal intake because he states that he is not hungry.   Await response

## 2022-01-14 NOTE — PROGRESS NOTES
Wife Alem Eve at bedside. Updated on temp and heart rate concerns. Await primary team input. Pt not eating much with 5 bites at breakfast. Most diabetic meds held and will monitor. Continues with crepitus from rt ear area down to abd and across stomach to left side.  Chest tube draining small amount of serous fluid

## 2022-01-14 NOTE — PROGRESS NOTES
Physical Therapy        Physical Therapy Cancel Note      DATE: 2022    NAME: Dilshad Lakhani  MRN: [id number]   : 1950      Patient not seen this date for Physical Therapy due to: Other: Pt stated he had a rough morning and wasnt feeling well, pt requesting to rest this afternoon.       Electronically signed by Carlos Ramirez PTA on 2022 at 3:34 PM

## 2022-01-14 NOTE — PROGRESS NOTES
[facility] [id number] [facility]  Daily Progress Note    Surgeon: Dr Lurdes Miller   S/P:  Right VATS with RML lobectomy  POD#: 10  EF: 60%      Subjective:  Mr. Delisa Saucedo feels well today with no acute complaints. Pain is controlled on current medication regimen. Pigtail chest tube remains in place to mini atrium. Planning to discharge home today with home health care and return to clinic with repeat chest x-ray. OOBTC and ambulating. Denies chest pain or shortness of breath. Plan of care reviewed and questions answered. Physical Exam  Vital Signs: BP (!) 156/67   Pulse 105   Temp 98.7 °F (37.1 °C) (Oral)   Resp 26   Ht 6' (1.829 m)   Wt 152 lb 8.9 oz (69.2 kg)   SpO2 95%   BMI 20.69 kg/m²  O2 Flow Rate (L/min): 3 L/min   Admit Weight: Weight: 157 lb 10.1 oz (71.5 kg)   WEIGHTWeight: 152 lb 8.9 oz (69.2 kg)     General: in no acute distress, alert and disoriented. Up in chair  Heart: Normal S1 and S2.  Regular rhythm. No murmurs, gallops, or rubs. Lungs: clear to auscultation bilaterally and diminished breath sounds bibasilar  Abdomen: soft, non tender, non distended, BSx4  Extremities: Trace edema  Wounds: clean and dry, healing appropriately. .     Scheduled Meds:    pantoprazole  40 mg Oral QAM AC    metoprolol tartrate  25 mg Oral BID    tamsulosin  0.4 mg Oral Daily    bisacodyl  10 mg Rectal Daily    docusate sodium  100 mg Oral BID    melatonin  5 mg Oral Nightly    sertraline  50 mg Oral Nightly    QUEtiapine  25 mg Oral Nightly    gabapentin  100 mg Oral TID    lisinopril  20 mg Oral Daily    isosorbide mononitrate  30 mg Oral Daily    ferrous sulfate  325 mg Oral Daily    atorvastatin  20 mg Oral Daily    sodium chloride flush  10 mL IntraVENous 2 times per day    polyethylene glycol  17 g Oral Daily    lidocaine  1 patch TransDERmal Daily    alogliptin  12.5 mg Oral BID WC    And    metFORMIN  1,000 mg Oral BID WC    glipiZIDE  5 mg Oral BID AC    insulin lispro 0-12 Units SubCUTAneous TID     insulin lispro  0-6 Units SubCUTAneous Nightly     Continuous Infusions:    dexmedetomidine Stopped (01/10/22 0949)    dextrose      dextrose         Data:  CBC:   Recent Labs     01/12/22  0514 01/13/22  0301 01/14/22  0358   WBC 10.9 8.4 14.4*   HGB 8.7* 9.0* 9.3*   HCT 26.5* 27.3* 27.5*   MCV 88.9 88.1 86.8    265 269     BMP:   Recent Labs     01/12/22  0514 01/13/22  0301 01/14/22  0358    135 132*   K 3.8 3.5* 4.2    101 100   CO2 21 22 22   BUN 30* 19 15   CREATININE 0.86 0.67* 0.82     PT/INR: No results for input(s): PROTIME, INR in the last 72 hours. APTT: No results for input(s): APTT in the last 72 hours. Chest X-Ray: Imaging reviewed, report pending. I/O:  I/O last 3 completed shifts: In: 240 [P.O.:240]  Out: 1082 [Urine:1077; Chest Tube:5]      Assessment:  · Pulmonary nodule s/p Right VATS with RML lobectomy and lymph node dissection   · POD 10  · Ileus  · Altered mental status  · Mucopurulent chronic bronchitis   · Multivessel CAD s/p stenting  · Angina pectoris  · Diabetes mellitus type 2 with long term use of insulin  · Hypertension  · Hyperlipidemia      Plan:    Remains inpatient on telemetry,   Monitor vitals closely including continuous pulse oximetry   Oxygen as needed via nasal cannula to maintain SpO2 > 92%   Chest x-ray daily   Encourage incentive spirometry    Monitor CBC, BMP, INR and Mag daily   Home medications as ordered   Roxicodone for pain control   SCDs while stationary    Protonix for GI prophylaxis   Replace electrolytes as needed per sliding scale and recheck per policy   PT/OT evalutation for discharge recommendation and ambulation 3x daily   Case Management consult for discharge planning        The above recommendations including medications and orders were discussed and agreed upon with Dr. Nai Lee, the attending on service for the cardiothoracic surgery group today.      Electronically signed by ALONDRA Snow - CNP on 1/14/2022 at 8:56 AM    On this date 1/14/2022 I have spent 22 minutes reviewing previous notes, test results and face to face with the patient discussing the diagnosis and importance of compliance with the treatment plan as well as documenting on the day of the visit. At least 50% of the time documented was spent with the patient to provide counseling and/or coordination of care. This note was created with the assistance of a speech-recognition program.  Although the intention is to generate a document that actually reflects the content of the visit, no guarantees can be provided that every mistake has been identified and corrected by editing. Note was updated later by me after  physical examination and  completion of the assessment.

## 2022-01-14 NOTE — PROGRESS NOTES
[id number] [facility]  Daily Progress Note    Surgeon: Dr Tray Perez   S/P:  Right VATS with RML lobectomy  POD#: 8  EF: 60%      Subjective:  Mr. Miriam Holbrook feels well today with no acute complaints. Pain is controlled on current medication regimen. Chest tube in place. OOBTC and ambulating. Denies chest pain or shortness of breath. Plan of care reviewed and questions answered. Physical Exam  Vital Signs: BP (!) 156/67   Pulse 105   Temp 98.7 °F (37.1 °C) (Oral)   Resp 26   Ht 6' (1.829 m)   Wt 152 lb 8.9 oz (69.2 kg)   SpO2 95%   BMI 20.69 kg/m²  O2 Flow Rate (L/min): 3 L/min   Admit Weight: Weight: 157 lb 10.1 oz (71.5 kg)   WEIGHTWeight: 152 lb 8.9 oz (69.2 kg)     General: in no acute distress, alert and disoriented. Up in chair  Heart: Normal S1 and S2.  Regular rhythm. No murmurs, gallops, or rubs. Lungs: clear to auscultation bilaterally and diminished breath sounds bibasilar  Abdomen: soft, non tender, non distended, BSx4  Extremities: Trace edema  Wounds: clean and dry, healing appropriately. .     Scheduled Meds:    pantoprazole  40 mg Oral QAM AC    metoprolol tartrate  25 mg Oral BID    tamsulosin  0.4 mg Oral Daily    bisacodyl  10 mg Rectal Daily    docusate sodium  100 mg Oral BID    melatonin  5 mg Oral Nightly    sertraline  50 mg Oral Nightly    QUEtiapine  25 mg Oral Nightly    gabapentin  100 mg Oral TID    lisinopril  20 mg Oral Daily    isosorbide mononitrate  30 mg Oral Daily    ferrous sulfate  325 mg Oral Daily    atorvastatin  20 mg Oral Daily    sodium chloride flush  10 mL IntraVENous 2 times per day    polyethylene glycol  17 g Oral Daily    lidocaine  1 patch TransDERmal Daily    alogliptin  12.5 mg Oral BID WC    And    metFORMIN  1,000 mg Oral BID WC    glipiZIDE  5 mg Oral BID AC    insulin lispro  0-12 Units SubCUTAneous TID WC    insulin lispro  0-6 Units SubCUTAneous Nightly     Continuous Infusions:    dexmedetomidine Stopped (01/10/22 0949)    dextrose      dextrose         Data:  CBC:   Recent Labs     01/12/22  0514 01/13/22  0301 01/14/22  0358   WBC 10.9 8.4 14.4*   HGB 8.7* 9.0* 9.3*   HCT 26.5* 27.3* 27.5*   MCV 88.9 88.1 86.8    265 269     BMP:   Recent Labs     01/12/22  0514 01/13/22  0301 01/14/22  0358    135 132*   K 3.8 3.5* 4.2    101 100   CO2 21 22 22   BUN 30* 19 15   CREATININE 0.86 0.67* 0.82     PT/INR: No results for input(s): PROTIME, INR in the last 72 hours. APTT: No results for input(s): APTT in the last 72 hours. Chest X-Ray: Ordered, pending    I/O:  I/O last 3 completed shifts: In: 240 [P.O.:240]  Out: 1082 [Urine:1077; Chest Tube:5]      Assessment:  · Pulmonary nodule s/p Right VATS with RML lobectomy and lymph node dissection   · POD 8  · Ileus  · Altered mental status  · Mucopurulent chronic bronchitis  · Multivessel CAD s/p stenting  · Angina pectoris  · Diabetes mellitus type 2 with long term use of insulin  · Hypertension  · Hyperlipidemia      Plan:    Remains inpatient on telemetry,   Monitor vitals closely including continuous pulse oximetry   Oxygen as needed via nasal cannula to maintain SpO2 > 92%   Chest x-ray daily   Encourage incentive spirometry    Monitor CBC, BMP, INR and Mag daily   Chest tube remains in place, continue to monitor   Home medications as ordered   Roxicodone for pain control   SCDs while stationary    Protonix for GI prophylaxis   Replace electrolytes as needed per sliding scale and recheck per policy   PT/OT evalutation for discharge recommendation and ambulation 3x daily   Case Management consult for discharge planning        The above recommendations including medications and orders were discussed and agreed upon with Dr. Samuel Keane, the attending on service for the cardiothoracic surgery group today.      Electronically signed by ALONDRA Lawson CNP on 1/14/2022 at 9:03 AM    On this date 1/14/2022 I have spent 22 minutes reviewing previous notes, test results and face to face with the patient discussing the diagnosis and importance of compliance with the treatment plan as well as documenting on the day of the visit. At least 50% of the time documented was spent with the patient to provide counseling and/or coordination of care. This note was created with the assistance of a speech-recognition program.  Although the intention is to generate a document that actually reflects the content of the visit, no guarantees can be provided that every mistake has been identified and corrected by editing. Agree   Daily CXR  Plan for discharge to [facility]    Note was updated later by me after  physical examination and  completion of the assessment.

## 2022-01-14 NOTE — PROGRESS NOTES
Comprehensive Nutrition Assessment    Type and Reason for Visit:  Reassess    Nutrition Recommendations/Plan: Continue current diet. Will continue to provide Ensure Clear Liquid and frozen Magic Cup oral supplements. Encourage/monitor PO intakes as tolerated. If pt continues to have a poor appetite, consider starting an appetite stimulant. Will monitor labs, weights, and plan of care. Nutrition Assessment:  Pt tired and not feeling well today. RN reports pt had bites of peaches and omelet for breakfast today - did not want any of magic cup or Ensure clear liquid supplements. Pt reports not having much of an appetite. Noted pt with multiple episodes of diarrhea overnight. Discussed with RN. S/p bedside swallow study on 1/13 recommends an easy to chew diet (with extra sauce, gravies) and thin liquids. Labs reviewed: Na 132 mmol/L, Glucose 116-224 mg/dL. Meds reviewed. Malnutrition Assessment:  Malnutrition Status:  Insufficient data    Context:  Chronic Illness     Findings of the 6 clinical characteristics of malnutrition:  Energy Intake:  Mild decrease in energy intake   Weight Loss:  1 - Mild weight loss - 7% weight loss x 1 month per chart review. Body Fat Loss:  Unable to assess   Muscle Mass Loss:  Unable to assess  Fluid Accumulation:  No significant fluid accumulation   Strength:  Not Performed    Estimated Daily Nutrient Needs:  Energy (kcal):  1800 to 2100 kcal/day (25-30 kcal/kg); Weight Used for Energy Requirements:  Current     Protein (g):  90 to 100 g/day (1.2-1.4 g/kg); Weight Used for Protein Requirements:  Current        Fluid (ml/day):  Per MD; Method Used for Fluid Requirements:  1 ml/kcal      Nutrition Related Findings:  Labs/Meds reviewed. Multiple BM's overnight (1/12-1/13).       Wounds:  Surgical Incision       Current Nutrition Therapies:    ADULT DIET; Easy to Chew; 4 carb choices (60 gm/meal)  ADULT ORAL NUTRITION SUPPLEMENT; Lunch, Dinner, Breakfast; Frozen Oral Supplement  ADULT ORAL NUTRITION SUPPLEMENT; Breakfast; Clear Liquid Oral Supplement    Anthropometric Measures:  · Height: 6' (182.9 cm)  · Current Body Weight: 152 lb 8.9 oz (69.2 kg)   · Admission Body Weight: 157 lb 10.1 oz (71.5 kg) (1/5, standing)    · Ideal Body Weight: 178 lbs; % Ideal Body Weight 85.7 %   · BMI: 20.7  · BMI Categories: Normal Weight (BMI 18.5-24. 9)       Nutrition Diagnosis:   · Inadequate oral intake related to  (decreased appetite) as evidenced by intake 0-25%,intake 26-50% (need for ONS)    Nutrition Interventions:   Food and/or Nutrient Delivery:  Continue Current Diet,Continue Oral Nutrition Supplement  Nutrition Education/Counseling:  No recommendation at this time   Coordination of Nutrition Care:  Continue to monitor while inpatient    Goals:  Obtain >75% of nutrition needs via PO intake       Nutrition Monitoring and Evaluation:   Behavioral-Environmental Outcomes:  None Identified   Food/Nutrient Intake Outcomes:  Food and Nutrient Intake,Supplement Intake  Physical Signs/Symptoms Outcomes:  Biochemical Data,GI Status,Nutrition Focused Physical Findings,Skin,Weight     Discharge Planning:     Too soon to determine     Electronically signed by Lynda Chinchilla RD, LD on 1/14/22 at 12:28 PM EST    Contact: [phone]

## 2022-01-14 NOTE — PROGRESS NOTES
Today's Date: 1/14/2022  Patient Name: Viktoria Pinto  Date of admission: 1/4/2022  7:49 AM  Patient's age: 70 y.o., 1950  Admission Dx: Pulmonary nodule [R91.1]    Reason for Consult: cancer findings VATS  Requesting Physician: Aris Wells MD    CHIEF COMPLAINT:  No chief complaint on file. INTERVAL HISTORY:/Subjective  Patient seen and examined  Pt febrile overnight, T Max 102. Labs reviewed, Hb 9.2. Pig tail chest tube in place to mini atrium. Primary working on discharge. RN reported poor oral intake,and had multiple episodes of loose stools overnight, diabetic meds held    HISTORY OF PRESENT ILLNESS IN BRIEF:    Viktoria Pinto is a 70 y.o. male who is admitted to the hospital on 1/4/2022  for VATS and wedge resection  Patient has history of tobacco smoking and patient has been following with pulmonology for her right middle lobe lung nodule and his recent scan on 11/2/2021 showed increase in the size of lung nodule. Patient has had biopsy on 7/28/2021 which showed benign parenchyma with chronic inflammation however this was very active on the PET scan. Patient underwent VATS, wedge resection of the right middle lobe lung mass on 1/4/2022. Surgical pathology is awaited. Oncology consult for further recommendations. Past Medical History:   has a past medical history of Abnormal cardiac cath, Actinic keratosis, Arthritis, CAD (coronary artery disease), Calculus of kidney, Cervical stenosis of spine, Diabetes mellitus ([address] Full dentures, GERD (gastroesophageal reflux disease), H/O echocardiogram, History of cardiac cath, History of echocardiogram, History of echocardiogram, History of heart attack, History of tobacco abuse, Hyperlipidemia, Hypertension, Pulmonary nodule, Raynaud's phenomenon, S/P angioplasty with stent, and Wears hearing aid in both ears. Past Surgical History:   has a past surgical history that includes Cardiac catheterization (2007);  Hemorrhoid surgery; Neck surgery (1997); other surgical history (10/2014); other surgical history (12/11/2014); Cataract removal (04/06/2015); Upper gastrointestinal endoscopy (12/28/2005); Cataract removal (06/08/2015); Colonoscopy (01/11/2006); Colonoscopy (05/23/2016); Cardiac catheterization (02/09/2017); Cardiac catheterization (02/09/2017); Cardiac catheterization (01/14/2019); Upper gastrointestinal endoscopy (N/A, 02/05/2019); Upper gastrointestinal endoscopy (N/A, 10/22/2019); Lung biopsy (Right, 07/28/2021); CT NEEDLE BIOPSY LUNG PERCUTANEOUS (7/28/2021); hernia repair (Left); Lung removal, total (Right, 1/4/2022); and IR CHEST TUBE INSERTION (1/11/2022). Medications:    Prior to Admission medications    Medication Sig Start Date End Date Taking? Authorizing Provider   oxyCODONE (ROXICODONE) 5 MG immediate release tablet Take 1 tablet by mouth every 4 hours as needed for Pain for up to 7 days. 1/7/22 1/14/22 Yes Marga Banuelos APRN - CNP   SERTRALINE HCL PO Take 50 mg by mouth nightly 12/1/21  Yes Historical Provider, MD   isosorbide mononitrate (IMDUR) 30 MG extended release tablet 1/2 tablet in the morning   Yes Historical Provider, MD   glimepiride (AMARYL) 2 MG tablet Take 1 tablet by mouth 2 times daily 5/22/15  Yes Екатерина Berry MD   aspirin 81 MG tablet Take 81 mg by mouth daily Ordered by heart doctor, Dr. Roderic Gitelman, [place]   Yes Historical Provider, MD   tamsulosin (FLOMAX) 0.4 MG capsule Take 0.4 mg by mouth daily    Historical Provider, MD   tiZANidine (ZANAFLEX) 4 MG capsule Take 4 mg by mouth 3 times daily as needed    Historical Provider, MD   ferrous sulfate (IRON 325) 325 (65 Fe) MG tablet Take 325 mg by mouth daily     Historical Provider, MD   lisinopril (PRINIVIL;ZESTRIL) 20 MG tablet Take 20 mg by mouth daily     Historical Provider, MD   nitroGLYCERIN (NITRODUR) 0.4 MG/HR 1 dose under tongue as needed for chest pain.  max of 3 in one day    Historical Provider, MD   pantoprazole (PROTONIX) 40 MG injection as needed     Historical Provider, MD   amLODIPine (NORVASC) 5 MG tablet Take 1 tablet by mouth daily 7/15/21   Gabino Plaza MD   clopidogrel (PLAVIX) 75 MG tablet Take 1 tablet by mouth daily  Patient taking differently: Take 75 mg by mouth daily Ordered by heart doctor, Chan Leyva 7/15/21   Gabino Palza MD   atenolol (TENORMIN) 25 MG tablet Take 1 tablet by mouth daily 6/7/21   Gabino Plaza MD   Insulin Glargine, 2 Unit Dial, 300 UNIT/ML SOPN Insulin Glargine Insulin Glargine U-300 Conc Active 10 UNIT Subcutaneous Every morning May 7th, 2020 11:47am 05-  [facility] ([id number]) 5/7/20   Historical Provider, MD   atorvastatin (LIPITOR) 20 MG tablet Take 1 tablet by mouth daily 12/15/20   Gabino Plaza MD   sitaGLIPtan-metformin (JANUMET)  MG per tablet Take 1 tablet by mouth 2 times daily (with meals)    Historical Provider, MD     Current Facility-Administered Medications   Medication Dose Route Frequency Provider Last Rate Last Admin    levoFLOXacin (LEVAQUIN) 500 MG/100ML infusion 500 mg  500 mg IntraVENous Q24H Marga Banuelos APRN - CNP   Stopped at 01/14/22 1340    clopidogrel (PLAVIX) tablet 75 mg  75 mg Oral Daily Marga Banuelos APRN - CNP   75 mg at 01/14/22 1238    enoxaparin (LOVENOX) injection 40 mg  40 mg SubCUTAneous Daily Marga Banuelos APRN - CNP   40 mg at 01/14/22 1238    potassium chloride (KLOR-CON M) extended release tablet 40 mEq  40 mEq Oral PRN Almetta Rueter, APRN - CNP   40 mEq at 01/13/22 0749    Or    potassium bicarb-citric acid (EFFER-K) effervescent tablet 40 mEq  40 mEq Oral PRN Marga Banuelos APRN - CNP        Or    potassium chloride 10 mEq/100 mL IVPB (Peripheral Line)  10 mEq IntraVENous PRN Marga Banuelos, APRN - CNP        pantoprazole (PROTONIX) tablet 40 mg  40 mg Oral QAM AC Marga Banuelos, APRN - CNP   40 mg at 01/14/22 0847    metoprolol tartrate (LOPRESSOR) tablet 25 mg  25 mg Oral BID Natalie Banuelos APRN - CNP   25 mg at 01/14/22 0847    phenol 1.4 % mouth spray 1 spray  1 spray Mouth/Throat Q2H PRN Marga Banuelos APRN - CNP   1 spray at 01/12/22 2014    benzocaine-menthol (CEPACOL SORE THROAT) lozenge 1 lozenge  1 lozenge Oral Q2H PRN Mirella Levine APRN - NP        tamsulosin [facility]) capsule 0.4 mg  0.4 mg Oral Daily USHA Haynes   0.4 mg at 01/14/22 0847    bisacodyl (DULCOLAX) suppository 10 mg  10 mg Rectal Daily PRN Lamar Leventhal, DO        bisacodyl (DULCOLAX) suppository 10 mg  10 mg Rectal Daily Lamar Leventhal, DO   10 mg at 01/11/22 0859    docusate sodium (COLACE) capsule 100 mg  100 mg Oral BID Shannan Luis, DO   100 mg at 01/13/22 1959    haloperidol lactate (HALDOL) injection 5 mg  5 mg IntraMUSCular Q6H PRN USHA Haynes   5 mg at 01/08/22 1108    melatonin tablet 5 mg  5 mg Oral Nightly Marga Banuelos APRN - CNP   5 mg at 01/13/22 1958    sertraline (ZOLOFT) tablet 50 mg  50 mg Oral Nightly Marga Banuelos APRN - CNP   50 mg at 01/13/22 1959    QUEtiapine (SEROQUEL) tablet 25 mg  25 mg Oral Nightly Mirella Levine APRN - NP   25 mg at 01/13/22 1959    glycerin moisturizing mouth spray (OASIS) 35 % 2 spray  2 spray Mouth/Throat PRN Jogila Musca, APRN - CNP   2 spray at 01/07/22 1100    ondansetron (ZOFRAN) injection 4 mg  4 mg IntraVENous Q6H PRN Mirella Levine APRN - NP   4 mg at 01/10/22 6485    gabapentin (NEURONTIN) capsule 100 mg  100 mg Oral TID Natalie Banuelos APRN - CNP   100 mg at 01/14/22 1610    lisinopril (PRINIVIL;ZESTRIL) tablet 20 mg  20 mg Oral Daily Mirella Levine, APRN - NP   20 mg at 01/14/22 0850    isosorbide mononitrate (IMDUR) extended release tablet 30 mg  30 mg Oral Daily Mirella Levine, APRN - NP   30 mg at 01/14/22 0849    ferrous sulfate (FE TABS 325) EC tablet 325 mg  325 mg Oral Daily Mirella Levine, APRN - NP   325 mg at 01/14/22 0848    atorvastatin (LIPITOR) tablet 20 mg  20 mg - NP   12.5 mg at 01/13/22 1706    And    metFORMIN (GLUCOPHAGE) tablet 1,000 mg  1,000 mg Oral BID  Kana Rodriguezs, APRN - NP   1,000 mg at 01/13/22 1706    glipiZIDE (GLUCOTROL) tablet 5 mg  5 mg Oral BID AC Kanarubi Rodriguezs, APRN - NP   5 mg at 01/14/22 1610    insulin lispro (HUMALOG) injection vial 0-12 Units  0-12 Units SubCUTAneous TID  Kana Rodriguezs, APRN - NP   4 Units at 01/14/22 1245    insulin lispro (HUMALOG) injection vial 0-6 Units  0-6 Units SubCUTAneous Nightly Kana Rodriguezs, APRN - NP   3 Units at 01/12/22 2020       Allergies:  Niacin and related, Minocycline hcl, Other, and Oxycodone hcl    Social History:   reports that he quit smoking about 3 months ago. His smoking use included cigarettes. He has a 13.50 pack-year smoking history. He has never used smokeless tobacco. He reports that he does not drink alcohol and does not use drugs. Family History: family history includes Alzheimer's Disease in his father; Cancer in his brother; Diabetes in his father and mother; Heart Disease in his brother, brother, brother, mother, and sister; High Blood Pressure in his mother. REVIEW OF SYSTEMS:    Constitutional: No fever or chills. No night sweats, no weight loss   Eyes: No eye discharge, double vision, or eye pain   HEENT: negative for sore mouth, sore throat, hoarseness and voice change   Respiratory: negative for cough , sputum, dyspnea, wheezing, hemoptysis, chest pain   Cardiovascular: negative for chest pain, dyspnea, palpitations, orthopnea, PND   Gastrointestinal: negative for nausea, vomiting, diarrhea, constipation, abdominal pain, Dysphagia, hematemesis and hematochezia   Genitourinary: negative for frequency, dysuria, nocturia, urinary incontinence, and hematuria   Integument: negative for rash, skin lesions, bruises.    Hematologic/Lymphatic: negative for easy bruising, bleeding, lymphadenopathy, or petechiae   Endocrine: negative for heat or cold intolerance,weight changes, change in bowel habits and hair loss   Musculoskeletal: negative for myalgias, arthralgias, pain, joint swelling,and bone pain   Neurological: negative for headaches, dizziness, seizures, weakness, numbness    PHYSICAL EXAM:      BP (!) 121/54   Pulse 77   Temp 98.7 °F (37.1 °C) (Oral)   Resp 24   Ht 6' (1.829 m)   Wt 152 lb 8.9 oz (69.2 kg)   SpO2 94%   BMI 20.69 kg/m²    Temp (24hrs), Av °F (37.8 °C), Min:98.7 °F (37.1 °C), Max:102 °F (38.9 °C)    General appearance - well appearing, no in pain or distress   Mental status - alert and cooperative   Eyes -rt eye lid swollen   Ears - bilateral TM's and external ear canals normal   Mouth - mucous membranes moist, pharynx normal without lesions   Neck - supple, no significant adenopathy   Lymphatics - no palpable lymphadenopathy, no hepatosplenomegaly   Chest - has chest tube in place. Heart - normal rate, regular rhythm, normal S1, S2, no murmurs  Abdomen - soft, nontender, nondistended, no masses or organomegaly   Neurological - alert, oriented, normal speech, no focal findings or movement disorder noted   Musculoskeletal - no joint tenderness, deformity or swelling   Extremities - peripheral pulses normal, no pedal edema, no clubbing or cyanosis   Skin - normal coloration and turgor, no rashes, no suspicious skin lesions noted ,    DATA:    Labs:   CBC:   Recent Labs     22  0358 22  1617   WBC 14.4* 19.0*   HGB 9.3* 9.2*   HCT 27.5* 27.8*    292     BMP:   Recent Labs     22  0301 22  0358    132*   K 3.5* 4.2   CO2    BUN 19 15   CREATININE 0.67* 0.82   LABGLOM >60 >60   GLUCOSE 105* 129*     PT/INR: No results for input(s): PROTIME, INR in the last 72 hours. Surgical Pathology Report  SURGICAL PATHOLOGY REPORT  Collected: 22 1420   Lab status: Final   Resulting lab: [email or web address]   Value: -- Diagnosis --     1.  Right lung, middle lobe wedge biopsy:     -  Adenocarcinoma.      -  Tumor is 1.8 cm. -  Carcinoma invades into but not through the visceral pleura. 2.  Lymph node #9, biopsy:     -  Negative for metastatic carcinoma. 3.  Lymph node #7, biopsy:     -  Negative for metastatic carcinoma. 4.  Right middle lobe lung, lobectomy:     -  Margins are free of involvement by carcinoma. -  Peribronchial lymph node, negative for metastatic carcinoma. -  Nonneoplastic lung shows emphysematous change.       Dunia Booker M.D.   **Electronically Signed Out**         rdd/1/6/2022         Clinical Information   Pre-op Diagnosis:  RIGHT MIDDLE LOBE NODULE   Operative Findings:  MIDDLE LOBE OF RIGHT LUNG; LYMPH NODE #9 OF LUNG;   LYMPH NODE #7; RIGHT MIDDLE LOBE   Operation Performed:  MIDDLE WEDGE RESECTION, POSSIBLE MIDDLE   LOBECTOMY VIA THORACOTOMY     Source of Specimen   1: MIDDLE LOBE OF RIGHT LUNG (A)   2: LYMPH NODE #9 OF LUNG (B)   3: LYMPH NODE #7   4: RIGHT MIDDLE LOBE     Gross Description   1.  \"ANA DAVIS, MIDDLE LOBE OF RIGHT LUNG\" Received fresh is a   6.4 x 4.2 x 2.8 cm lung wedge with staple line.  The pleura is   pink-tan with an area of slightly thickening (inked black). Sectioning reveals a 1.8 x 1.2 x 0.8 cm subpleural tan-white mass   lesion that is 0.8 cm from the staple line margin.  The remaining   parenchyma is pink-tan with no other lesions.  1TP, 1DQ, 1FS, Cassette   summary:  \"A\" frozen section lesion, \"B\" remainder of lesion with   pleura inked black, \"C\" staple line margin en face. 2.  \"ANA SUSAN, LYMPH NODE #9 OF LUNG\" Received fresh are two   fragments, 0.3 cm each.  1TP, 1FS, 1cs.    3.  \"ANA SUSAN, LYMPH NODE #7\" Received fresh are two   anthracotic fragments, 0.6 and 0.7 cm in greatest dimension.  1TP,   1FS, 1cs.     4.  \"ANA DAVIS, RIGHT MIDDLE LOBE\" Received fresh is a 50 gram,   8.5 x 6.8 x 3.5 cm lobe of lung with multiple staple lines.  The   pleura is wrinkled purple-gray.  Sectioning reveals spongy dark red parenchyma with no masses.  No markedly enlarged nodes are identified   at the hilum.  Cassette summary:  \"A\" bronchial margin frozen section   cassette resubmitted as received, \"B\" hilar soft tissue and vascular   margin, \"C\" uninvolved lung (along staple line).  tm     Intraoperative Diagnosis   1.  FSDX:  Non-small cell carcinoma, favor adenocarcinoma.  (14:00,   RDD)   2.  FSDX:  Fibrofatty tissue.  (14:11, RDD)   3.  FSDX:  Lymph node, negative for carcinoma.  (14:19, RDD)   4.  FSDX:  Bronchial margin, negative for carcinoma.  (16:17, RDD)     Microscopic Description   1-4.  Microscopic examination performed. PROCEDURE: Wedge biopsy, followed by lobectomy       SPECIMEN LATERALITY: Right   TUMOR FOCALITY: Single tumor       TUMOR SITE: Right middle lobe   TUMOR SIZE --     1.8 x 1.2 x 0.8 cm           HISTOLOGIC TYPE: Adenocarcinoma. Sections show a large and anaplastic non-small cell malignancy with   cohesion and a few instances of glandular differentiation.  Neoplastic   cells are large with copious eosinophilic cytoplasm and large   vesicular nuclei.  Tumor is characterized further utilizing control   appropriate immunostains.  Cells of carcinoma are negative for p40 and   they are positive for TTF-1 and CK7.  Morphology and immunophenotype   are that of an adenocarcinoma, lung primary. Note: The case is reviewed by a second Pathologist for quality   assurance with consensus (DS).       HISTOLOGIC GRADE: Grade 2 (of 4)           VISCERAL PLEURA INVASION:Reticulin stain with appropriately reactive   control is utilized to evaluate pleura.  Tumor extends beyond the   elastic layer into the pleura but not to the pleural surface (PL1)     DIRECT INVASION OF ADJACENT STRUCTURES: No     TREATMENT EFFECT: None apparent.      LYMPHOVASCULAR INVASION: Absent                         MARGINS --   -BRONCHIAL: Free of tumor       -VASCULAR: Free of tumor       -PARENCHYMAL: Free of tumor       -OTHER ATTACHED TISSUE MARGIN (IF APPLICABLE): N/A       REGIONAL LYMPH NODES: 3   LYMPH NODES(S) FROM PRIOR PROCEDURES: No       NUMBER AND STATION(S) EXAMINED:   See diagnoses   NUMBER AND STATION(S) WITH METASTASIS: 0         DISTANT METASTASIS--   DISTANT SITE(S) INVOLVED, IF APPLICABLE: N/A              IMAGING DATA:  XR CHEST PORTABLE   Final Result   1. Right chest tube remains in place with no visible pneumothorax. 2.  Extensive subcutaneous emphysema and pneumomediastinum persist.         XR CHEST PORTABLE   Final Result   1. Left chest tube in place. Trace residual apical pneumothorax. 2.  Extensive subcutaneous emphysema and pneumomediastinum again demonstrated. XR CHEST PORTABLE   Final Result   Small right pneumothorax, decreased in size as compared to prior status post   chest tube placement. Bibasilar atelectasis or airspace disease, improved on the right as compared   to prior. Persistent pneumomediastinum and extensive soft tissue emphysema. IR CHEST TUBE INSERTION   Final Result   Successful fluoroscopic guided placement of a right 10 [place] chest tube. CT CHEST WO CONTRAST   Final Result   Small right pneumothorax, extensive pneumomediastinum and extensive   subcutaneous emphysema again demonstrated. This may be due to a chest wall   defect suggested in the posterolateral right 5th intercostal space. XR CHEST PORTABLE   Final Result   Considerable interval worsening now bilateral and more extensive subcutaneous   emphysema throughout, and slightly larger right PTX. Slightly increased   pneumomediastinum. Greater bibasilar opacities, again probably atelectatic. The findings were sent to the Radiology Results [address] at 9:36   a.m. on 1/11/2022to be communicated to a licensed caregiver.          XR CHEST PORTABLE   Final Result   No significant interval change in the appearance of the chest.      Increased subcutaneous emphysema in the right chest wall and right neck         XR CHEST PORTABLE   Final Result   1. Removal of the right-sided chest tube with no significant change in size   of the right pneumothorax. 2. Otherwise, no significant change in the appearance of the chest.         XR CHEST PORTABLE   Final Result   Right-sided pneumothorax with stable chest tube. Subcutaneous emphysema along the right lateral chest wall and in the right   supraclavicular region. XR CHEST PORTABLE   Final Result   Right apical pneumothorax measuring 1.6 cm. Subtle bibasilar infiltrates. Stable right-sided chest tube. CT ABDOMEN PELVIS WO CONTRAST Additional Contrast? None   Final Result   1. Apparent localized ileus involving a few loops of ileum in the central   abdomen. 2. Small right-sided pneumothorax. 3.  Cholelithiasis without evidence of acute cholecystitis. 4.  Small amount of non dependent air within the urinary bladder. Correlate   with any evidence of recent instrumentation. RECOMMENDATIONS:   Unavailable         XR CHEST PORTABLE   Final Result   Persistent small right apical pneumothorax with 2 right chest tubes in place. Resolving bilateral lower lung subsegmental atelectasis         XR CHEST PORTABLE   Final Result   No significant change in small right apical pneumothorax with chest tubes in   place. XR CHEST PORTABLE   Final Result   No significant interval change. Right chest tubes in place with persistent   small right apical pneumothorax. XR CHEST PORTABLE   Final Result   Interval right-sided thoracotomy with right-sided chest tubes in-situ. Small   right apical pneumothorax. Right hilar surgical clips related to recent   surgery. .         XR CHEST PORTABLE    (Results Pending)     CT chest 11/2/2021:  Impression   The previously biopsied, somewhat bilobed nodule involving right middle lobe   is slightly increased in size since the prior study now measuring 1.8 x 1.0   cm, once measuring 1.8 x 0.6 cm.  There does appear to be some spiculation. No new pulmonary nodule is seen.           Primary Problem  Primary adenocarcinoma of right lung [facility])    Active Hospital Problems    Diagnosis Date Noted    S/P thoracotomy [Z98.890]     Lung cancer, main bronchus, right ([address] [C34.01]     Acute delirium [R41.0] 01/08/2022    Hyponatremia [E87.1] 01/08/2022    Postoperative ileus ([address] [K91.89, K56.7] 01/08/2022    Primary adenocarcinoma of right lung ([address] [C34.91] 12/15/2021    Coronary artery disease involving native coronary artery of native heart without angina pectoris [I25.10] 09/10/2013    Primary hypertension [I10] 09/10/2013    Hyperlipidemia [E78.5] 09/10/2013    Type 2 diabetes mellitus without complication, without long-term current use of insulin ([address] [E11.9] 09/10/2013       IMPRESSION:   1. Right middle lobe lung adenocarcinoma, status post wedge resection on 1/4/2022. Surgical pathology showing [id number] stage IB, with high risk feature of visceral pleura involvement  2. History of tobacco abuse  3. Delirium/multifactorial could be ICU induced psychosis/sleep deprivation > improved  4. Post surgery ileus> improving    RECOMMENDATIONS:    1. As he has visceral pleural involvement which is a high risk feature, adjuvant systemic chemotherapy be recommended  2. Continue chest tube care as per thoracic surgery  3. Follow-up with medical oncology at [facility] after discharge  4. Reiterate with the wife and daughter the indication of chemotherapy due to the high risk features of the cancer, side effects and appointment for oncology follow-up in the chart  5. Psychosis management per primary team  6. Postsurgical ileus n.p.o. intake per surgery    Discussed with patient Nurse and the family    Thank you for asking us to see this patient.     Linda Acosta MD  Internal Medicine Resident, PGY-2  [address]         1/14/2022, 6:06 PM          This note is created with the assistance of a speech recognition program.  While intending to generate a document that actually reflects the content of the visit, the document can still have some errors including those of syntax and sound a like substitutions which may escape proof reading. It such instances, actual meaning can be extrapolated by contextual diversion.

## 2022-01-14 NOTE — PROGRESS NOTES
[facility]  Daily Progress Note    Surgeon: Dr Margart Snellen   S/P:  Right VATS with RML lobectomy  POD#: 9  EF: 60%      Subjective:  Mr. Drema Simmonds feels well today with no acute complaints. Pain is controlled on current medication regimen. OOBTC and ambulating. Last BMs yesterday, general surgery following for ileus management. Denies chest pain or shortness of breath. Plan of care reviewed and questions answered. Physical Exam  Vital Signs: BP (!) 156/67   Pulse 105   Temp 98.7 °F (37.1 °C) (Oral)   Resp 26   Ht 6' (1.829 m)   Wt 152 lb 8.9 oz (69.2 kg)   SpO2 95%   BMI 20.69 kg/m²  O2 Flow Rate (L/min): 3 L/min   Admit Weight: Weight: 157 lb 10.1 oz (71.5 kg)   WEIGHTWeight: 152 lb 8.9 oz (69.2 kg)     General: in no acute distress, alert and disoriented. Up in chair  Heart: Normal S1 and S2.  Regular rhythm. No murmurs, gallops, or rubs. Lungs: clear to auscultation bilaterally and diminished breath sounds bibasilar  Abdomen: soft, non tender, non distended, BSx4  Extremities: Trace edema  Wounds: clean and dry, healing appropriately. .     Scheduled Meds:    pantoprazole  40 mg Oral QAM AC    metoprolol tartrate  25 mg Oral BID    tamsulosin  0.4 mg Oral Daily    bisacodyl  10 mg Rectal Daily    docusate sodium  100 mg Oral BID    melatonin  5 mg Oral Nightly    sertraline  50 mg Oral Nightly    QUEtiapine  25 mg Oral Nightly    gabapentin  100 mg Oral TID    lisinopril  20 mg Oral Daily    isosorbide mononitrate  30 mg Oral Daily    ferrous sulfate  325 mg Oral Daily    atorvastatin  20 mg Oral Daily    sodium chloride flush  10 mL IntraVENous 2 times per day    polyethylene glycol  17 g Oral Daily    lidocaine  1 patch TransDERmal Daily    alogliptin  12.5 mg Oral BID WC    And    metFORMIN  1,000 mg Oral BID WC    glipiZIDE  5 mg Oral BID AC    insulin lispro  0-12 Units SubCUTAneous TID WC    insulin lispro  0-6 Units SubCUTAneous Nightly Continuous Infusions:    dexmedetomidine Stopped (01/10/22 0949)    dextrose      dextrose         Data:  CBC:   Recent Labs     01/12/22  0514 01/13/22  0301 01/14/22  0358   WBC 10.9 8.4 14.4*   HGB 8.7* 9.0* 9.3*   HCT 26.5* 27.3* 27.5*   MCV 88.9 88.1 86.8    265 269     BMP:   Recent Labs     01/12/22  0514 01/13/22  0301 01/14/22  0358    135 132*   K 3.8 3.5* 4.2    101 100   CO2 21 22 22   BUN 30* 19 15   CREATININE 0.86 0.67* 0.82     PT/INR: No results for input(s): PROTIME, INR in the last 72 hours. APTT: No results for input(s): APTT in the last 72 hours. Chest X-Ray: Ordered, pending    I/O:  I/O last 3 completed shifts: In: 240 [P.O.:240]  Out: 1082 [Urine:1077; Chest Tube:5]      Assessment:  · Pulmonary nodule s/p Right VATS with RML lobectomy and lymph node dissection   · POD 9  · Ileus  · Altered mental status  · Mucopurulent chronic bronchitis  · Multivessel CAD s/p stenting  · Angina pectoris  · Diabetes mellitus type 2 with long term use of insulin  · Hypertension  · Hyperlipidemia      Plan:    Remains inpatient on telemetry,   Monitor vitals closely including continuous pulse oximetry   Oxygen as needed via nasal cannula to maintain SpO2 > 92%   Chest x-ray daily   Encourage incentive spirometry    Monitor CBC, BMP, INR and Mag daily   Chest tube in place to wall suction   Home medications as ordered   Roxicodone for pain control   SCDs while stationary    Protonix for GI prophylaxis   Replace electrolytes as needed per sliding scale and recheck per policy   PT/OT evalutation for discharge recommendation and ambulation 3x daily   Case Management consult for discharge planning        The above recommendations including medications and orders were discussed and agreed upon with Dr. Marialuisa Dunn, the attending on service for the cardiothoracic surgery group today.      Electronically signed by ALONDRA Woodward CNP on 1/14/2022 at 9:05 AM    On this date 01/13/2022 I have spent 22 minutes reviewing previous notes, test results and face to face with the patient discussing the diagnosis and importance of compliance with the treatment plan as well as documenting on the day of the visit. At least 50% of the time documented was spent with the patient to provide counseling and/or coordination of care. This note was created with the assistance of a speech-recognition program.  Although the intention is to generate a document that actually reflects the content of the visit, no guarantees can be provided that every mistake has been identified and corrected by editing. Note was updated later by me after  physical examination and  completion of the assessment.

## 2022-01-14 NOTE — PROGRESS NOTES
Date of Service: 06/11/2021    Dear Dr. Rodriguez:    I had the pleasure of seeing Gisele in followup in the [facility] for chronic idiopathic urticaria.  She is maintained on Allegra as needed.  She had 1 major flare-up where she required to increase the antihistamine dose, but otherwise one a day of antihistamines control the symptoms.  She is using Fexofenadine daily March through October as those are the peak months for her urticaria flare-up.  She discontinued the Iron supplement and that also has helped in reducing the symptoms.  She has noticed NSAID to be a trigger factor.  Heat also is a trigger factor.  She notices more hives around her periods.  They are not lasting more than 24 hours.  There are no symptoms of vasculitis.  DREW and TSH done are within normal limits. She has autoimmune thyroiditis and is on supplements.    She is using Fluticasone year round for allergic rhinitis symptoms.  There is no epistaxis as a side effect.    Albuterol is used around exercise for exercise-induced symptoms.  She does not have a cat.  She has a dog.  She is significantly allergic to cats.      PHYSICAL EXAMINATION:  HEENT:  Minimal swelling of nasal mucosa.  No sinus tenderness.  Throat clear.  CHEST:  Clear to auscultation.  CARDIAC:  Normal sinus rhythm.  ABDOMEN:  Soft, no masses.  SKIN:  No rashes or hives.    IMPRESSION:  1.  Chronic idiopathic urticaria.  2.  Perennial allergic rhinitis.  3.  Mild intermittent asthma.    PLAN:  Discussed about the chronicity of the idiopathic urticaria and symptomatic treatment.  She may continue the present management.  Increase antihistamine dose during flare-up.  Continue the Fluticasone nasal spray and reviewed the right technique of use of it.  Refilled Albuterol to use prior to exercise.    I would like to see her in followup 6-9 months from now or sooner if symptoms are not under control.    Thank you for involving me in her care.      Dictated By: Becky Figueroa,  Pt c/o not feeling well today. Noted temp of 100.4 orally with heart rate increasing to 130's with walking to bathroom. Appears flush and ill looking. Primary team made aware of above. MD  Signing Provider: Becky Figueroa MD    RG/ps ([id number])  DD: 06/11/2021 09:29:48 TD: 06/11/2021 09:52:30    Copy Sent To:

## 2022-01-15 ENCOUNTER — APPOINTMENT (OUTPATIENT)
Dept: GENERAL RADIOLOGY | Age: 72
DRG: 164 | End: 2022-01-15
Attending: THORACIC SURGERY (CARDIOTHORACIC VASCULAR SURGERY)
Payer: MEDICARE

## 2022-01-15 VITALS
OXYGEN SATURATION: 95 % | TEMPERATURE: 98.9 F | WEIGHT: 152.56 LBS | DIASTOLIC BLOOD PRESSURE: 69 MMHG | HEIGHT: 72 IN | HEART RATE: 84 BPM | RESPIRATION RATE: 18 BRPM | SYSTOLIC BLOOD PRESSURE: 150 MMHG | BODY MASS INDEX: 20.66 KG/M2

## 2022-01-15 LAB
ANION GAP SERPL CALCULATED.3IONS-SCNC: 10 MMOL/L (ref 9–17)
BUN BLDV-MCNC: 13 MG/DL (ref 8–23)
BUN/CREAT BLD: ABNORMAL (ref 9–20)
CALCIUM SERPL-MCNC: 8.2 MG/DL (ref 8.6–10.4)
CHLORIDE BLD-SCNC: 102 MMOL/L (ref 98–107)
CO2: 21 MMOL/L (ref 20–31)
CREAT SERPL-MCNC: 0.93 MG/DL (ref 0.7–1.2)
EKG ATRIAL RATE: 81 BPM
EKG P AXIS: 47 DEGREES
EKG P-R INTERVAL: 172 MS
EKG Q-T INTERVAL: 364 MS
EKG QRS DURATION: 76 MS
EKG QTC CALCULATION (BAZETT): 422 MS
EKG R AXIS: 53 DEGREES
EKG T AXIS: 59 DEGREES
EKG VENTRICULAR RATE: 81 BPM
GFR AFRICAN AMERICAN: >60 ML/MIN
GFR NON-AFRICAN AMERICAN: >60 ML/MIN
GFR SERPL CREATININE-BSD FRML MDRD: ABNORMAL ML/MIN/{1.73_M2}
GFR SERPL CREATININE-BSD FRML MDRD: ABNORMAL ML/MIN/{1.73_M2}
GLUCOSE BLD-MCNC: 117 MG/DL (ref 75–110)
GLUCOSE BLD-MCNC: 124 MG/DL (ref 75–110)
GLUCOSE BLD-MCNC: 61 MG/DL (ref 70–99)
HCT VFR BLD CALC: 25.5 % (ref 40.7–50.3)
HEMOGLOBIN: 8.6 G/DL (ref 13–17)
MAGNESIUM: 1.6 MG/DL (ref 1.6–2.6)
MCH RBC QN AUTO: 29.2 PG (ref 25.2–33.5)
MCHC RBC AUTO-ENTMCNC: 33.7 G/DL (ref 28.4–34.8)
MCV RBC AUTO: 86.4 FL (ref 82.6–102.9)
NRBC AUTOMATED: 0 PER 100 WBC
PDW BLD-RTO: 13.4 % (ref 11.8–14.4)
PLATELET # BLD: 291 K/UL (ref 138–453)
PMV BLD AUTO: 11 FL (ref 8.1–13.5)
POTASSIUM SERPL-SCNC: 3.6 MMOL/L (ref 3.7–5.3)
RBC # BLD: 2.95 M/UL (ref 4.21–5.77)
SODIUM BLD-SCNC: 133 MMOL/L (ref 135–144)
WBC # BLD: 17.2 K/UL (ref 3.5–11.3)

## 2022-01-15 PROCEDURE — 6370000000 HC RX 637 (ALT 250 FOR IP): Performed by: PHYSICIAN ASSISTANT

## 2022-01-15 PROCEDURE — 36415 COLL VENOUS BLD VENIPUNCTURE: CPT

## 2022-01-15 PROCEDURE — 6370000000 HC RX 637 (ALT 250 FOR IP): Performed by: NURSE PRACTITIONER

## 2022-01-15 PROCEDURE — 82947 ASSAY GLUCOSE BLOOD QUANT: CPT

## 2022-01-15 PROCEDURE — 83735 ASSAY OF MAGNESIUM: CPT

## 2022-01-15 PROCEDURE — 71045 X-RAY EXAM CHEST 1 VIEW: CPT

## 2022-01-15 PROCEDURE — 99238 HOSP IP/OBS DSCHRG MGMT 30/<: CPT | Performed by: NURSE PRACTITIONER

## 2022-01-15 PROCEDURE — 85027 COMPLETE CBC AUTOMATED: CPT

## 2022-01-15 PROCEDURE — 80048 BASIC METABOLIC PNL TOTAL CA: CPT

## 2022-01-15 PROCEDURE — 6360000002 HC RX W HCPCS: Performed by: NURSE PRACTITIONER

## 2022-01-15 PROCEDURE — 99024 POSTOP FOLLOW-UP VISIT: CPT | Performed by: NURSE PRACTITIONER

## 2022-01-15 RX ORDER — CHOLECALCIFEROL (VITAMIN D3) 125 MCG
5 CAPSULE ORAL NIGHTLY PRN
Qty: 30 TABLET | Refills: 0 | Status: SHIPPED | OUTPATIENT
Start: 2022-01-15 | End: 2022-04-07

## 2022-01-15 RX ORDER — GABAPENTIN 100 MG/1
100 CAPSULE ORAL 3 TIMES DAILY
Qty: 42 CAPSULE | Refills: 0 | Status: SHIPPED | OUTPATIENT
Start: 2022-01-15 | End: 2022-09-28

## 2022-01-15 RX ORDER — MAGNESIUM SULFATE IN WATER 40 MG/ML
2000 INJECTION, SOLUTION INTRAVENOUS ONCE
Status: COMPLETED | OUTPATIENT
Start: 2022-01-15 | End: 2022-01-15

## 2022-01-15 RX ORDER — OXYCODONE HYDROCHLORIDE 5 MG/1
5 TABLET ORAL EVERY 4 HOURS PRN
Qty: 42 TABLET | Refills: 0 | Status: SHIPPED | OUTPATIENT
Start: 2022-01-15 | End: 2022-01-22

## 2022-01-15 RX ORDER — LEVOFLOXACIN 750 MG/1
750 TABLET ORAL DAILY
Qty: 10 TABLET | Refills: 0 | Status: SHIPPED | OUTPATIENT
Start: 2022-01-15 | End: 2022-01-25

## 2022-01-15 RX ADMIN — POTASSIUM BICARBONATE 30 MEQ: 782 TABLET, EFFERVESCENT ORAL at 07:01

## 2022-01-15 RX ADMIN — TAMSULOSIN HYDROCHLORIDE 0.4 MG: 0.4 CAPSULE ORAL at 08:40

## 2022-01-15 RX ADMIN — ENOXAPARIN SODIUM 40 MG: 100 INJECTION SUBCUTANEOUS at 08:40

## 2022-01-15 RX ADMIN — FERROUS SULFATE TAB EC 325 MG (65 MG FE EQUIVALENT) 325 MG: 325 (65 FE) TABLET DELAYED RESPONSE at 08:39

## 2022-01-15 RX ADMIN — LEVOFLOXACIN 500 MG: 5 INJECTION, SOLUTION INTRAVENOUS at 11:21

## 2022-01-15 RX ADMIN — METOPROLOL TARTRATE 25 MG: 25 TABLET ORAL at 08:39

## 2022-01-15 RX ADMIN — GABAPENTIN 100 MG: 100 CAPSULE ORAL at 08:39

## 2022-01-15 RX ADMIN — CLOPIDOGREL 75 MG: 75 TABLET, FILM COATED ORAL at 08:41

## 2022-01-15 RX ADMIN — MAGNESIUM SULFATE 2000 MG: 2 INJECTION INTRAVENOUS at 11:21

## 2022-01-15 RX ADMIN — LISINOPRIL 20 MG: 20 TABLET ORAL at 08:39

## 2022-01-15 RX ADMIN — ATORVASTATIN CALCIUM 20 MG: 20 TABLET, FILM COATED ORAL at 08:39

## 2022-01-15 RX ADMIN — ALOGLIPTIN 12.5 MG: 12.5 TABLET, FILM COATED ORAL at 08:40

## 2022-01-15 RX ADMIN — PANTOPRAZOLE SODIUM 40 MG: 40 TABLET, DELAYED RELEASE ORAL at 08:41

## 2022-01-15 RX ADMIN — ISOSORBIDE MONONITRATE 30 MG: 30 TABLET ORAL at 08:39

## 2022-01-15 RX ADMIN — METFORMIN HYDROCHLORIDE 1000 MG: 500 TABLET ORAL at 08:39

## 2022-01-15 ASSESSMENT — ENCOUNTER SYMPTOMS
BACK PAIN: 0
DIARRHEA: 1
ABDOMINAL PAIN: 0
COLOR CHANGE: 0
SHORTNESS OF BREATH: 1

## 2022-01-15 ASSESSMENT — PAIN SCALES - GENERAL: PAINLEVEL_OUTOF10: 0

## 2022-01-15 NOTE — CARE COORDINATION
Discharge [facility] Case Management Department  Written by: aSra Kurtz RN    Patient Name: Zabala Fani  Attending Provider: No att. providers found  Admit Date: 2022  7:49 AM  MRN: [id number]  Account: [de-identified]                     : 1950  Discharge Date: 1/15/2022      Disposition: home with Dusty Smyth RN

## 2022-01-15 NOTE — CARE COORDINATION
01/15/22 1750   Services After Discharge   [facility] At[facility]After Discharge [address] After Discharge   [address] [facility]

## 2022-01-15 NOTE — PROGRESS NOTES
CLINICAL PHARMACY NOTE: MEDS TO BEDS    Total # of Prescriptions Filled: 4   The following medications were delivered to the patient:  · Gabapentin 100mg caps  · Melatonin 5mg tbas  · Levofloxacin 750mg tabs  · Oxycodone 5mg tabs    Additional Documentation:  RN aware of C2 delivery. She seemed surprised that the oxycodone had been filled but said to give it to patient since he had already paid for it. C2 escribed. Delivered to patient alone in room ~2:35pm. Paid $20.41 with cloanjelica.  HR

## 2022-01-15 NOTE — PLAN OF CARE
Problem: Infection:  Goal: Will remain free from infection  Description: Will remain free from infection  Outcome: Completed     Problem: Safety:  Goal: Free from accidental physical injury  Description: Free from accidental physical injury  Outcome: Completed  Goal: Free from intentional harm  Description: Free from intentional harm  Outcome: Completed     Problem: Daily Care:  Goal: Daily care needs are met  Description: Daily care needs are met  Outcome: Completed     Problem: Pain:  Goal: Patient's pain/discomfort is manageable  Description: Patient's pain/discomfort is manageable  Outcome: Completed  Goal: Pain level will decrease  Description: Pain level will decrease  Outcome: Completed  Goal: Control of acute pain  Description: Control of acute pain  Outcome: Completed  Goal: Control of chronic pain  Description: Control of chronic pain  Outcome: Completed     Problem: Skin Integrity:  Goal: Skin integrity will stabilize  Description: Skin integrity will stabilize  Outcome: Completed     Problem: Discharge Planning:  Goal: Patients continuum of care needs are met  Description: Patients continuum of care needs are met  Outcome: Completed     Problem: Falls - Risk of:  Goal: Will remain free from falls  Description: Will remain free from falls  Outcome: Completed  Goal: Absence of physical injury  Description: Absence of physical injury  Outcome: Completed     Problem: Skin Integrity:  Goal: Will show no infection signs and symptoms  Description: Will show no infection signs and symptoms  Outcome: Completed  Goal: Absence of new skin breakdown  Description: Absence of new skin breakdown  Outcome: Completed

## 2022-01-15 NOTE — PROGRESS NOTES
BS noted to be 61mg/dl per BMP results. Patient asymptomatic. Orange juice and crackers given. BS rechecked - 117 mg/dl .

## 2022-01-15 NOTE — PROGRESS NOTES
Today's Date: 1/15/2022  Patient Name: Lazarus Paul  Date of admission: 1/4/2022  7:49 AM  Patient's age: 70 y.o., 1950  Admission Dx: Pulmonary nodule [R91.1]    Reason for Consult: cancer findings VATS  Requesting Physician: Adrianne Jamil MD    CHIEF COMPLAINT:  No chief complaint on file. INTERVAL HISTORY:/Subjective  Patient seen and examined  Labs reviewed, Hb 8.6. Primary working on discharge. Plan is to be discharged home with home care today. HISTORY OF PRESENT ILLNESS IN BRIEF:    Lazarus Paul is a 70 y.o. male who is admitted to the hospital on 1/4/2022  for VATS and wedge resection  Patient has history of tobacco smoking and patient has been following with pulmonology for her right middle lobe lung nodule and his recent scan on 11/2/2021 showed increase in the size of lung nodule. Patient has had biopsy on 7/28/2021 which showed benign parenchyma with chronic inflammation however this was very active on the PET scan. Patient underwent VATS, wedge resection of the right middle lobe lung mass on 1/4/2022. Surgical pathology is awaited. Oncology consult for further recommendations. Past Medical History:   has a past medical history of Abnormal cardiac cath, Actinic keratosis, Arthritis, CAD (coronary artery disease), Calculus of kidney, Cervical stenosis of spine, Diabetes mellitus ([address] Full dentures, GERD (gastroesophageal reflux disease), H/O echocardiogram, History of cardiac cath, History of echocardiogram, History of echocardiogram, History of heart attack, History of tobacco abuse, Hyperlipidemia, Hypertension, Pulmonary nodule, Raynaud's phenomenon, S/P angioplasty with stent, and Wears hearing aid in both ears. Past Surgical History:   has a past surgical history that includes Cardiac catheterization (2007); Hemorrhoid surgery; Neck surgery (1997); other surgical history (10/2014); other surgical history (12/11/2014); Cataract removal (04/06/2015);  Upper gastrointestinal endoscopy (12/28/2005); Cataract removal (06/08/2015); Colonoscopy (01/11/2006); Colonoscopy (05/23/2016); Cardiac catheterization (02/09/2017); Cardiac catheterization (02/09/2017); Cardiac catheterization (01/14/2019); Upper gastrointestinal endoscopy (N/A, 02/05/2019); Upper gastrointestinal endoscopy (N/A, 10/22/2019); Lung biopsy (Right, 07/28/2021); CT NEEDLE BIOPSY LUNG PERCUTANEOUS (7/28/2021); hernia repair (Left); Lung removal, total (Right, 1/4/2022); and IR CHEST TUBE INSERTION (1/11/2022). Medications:    Prior to Admission medications    Medication Sig Start Date End Date Taking? Authorizing Provider   oxyCODONE (ROXICODONE) 5 MG immediate release tablet Take 1 tablet by mouth every 4 hours as needed for Pain for up to 7 days. 1/15/22 1/22/22 Yes ALONDRA Winters CNP   gabapentin (NEURONTIN) 100 MG capsule Take 1 capsule by mouth 3 times daily for 14 days.  1/15/22 1/29/22 Yes ALONDRA Winters CNP   melatonin 5 MG TABS tablet Take 1 tablet by mouth nightly as needed (sleep) 1/15/22  Yes ALONDRA Winters CNP   levoFLOXacin (LEVAQUIN) 750 MG tablet Take 1 tablet by mouth daily for 10 days 1/15/22 1/25/22 Yes ALONDRA Winters CNP   SERTRALINE HCL PO Take 50 mg by mouth nightly 12/1/21  Yes Historical Provider, MD   isosorbide mononitrate (IMDUR) 30 MG extended release tablet 1/2 tablet in the morning   Yes Historical Provider, MD   glimepiride (AMARYL) 2 MG tablet Take 1 tablet by mouth 2 times daily 5/22/15  Yes Shakira Estrada MD   aspirin 81 MG tablet Take 81 mg by mouth daily Ordered by heart doctor, Chan Cano   Yes Historical Provider, MD   tamsulosin (FLOMAX) 0.4 MG capsule Take 0.4 mg by mouth daily    Historical Provider, MD   tiZANidine (ZANAFLEX) 4 MG capsule Take 4 mg by mouth 3 times daily as needed    Historical Provider, MD   ferrous sulfate (IRON 325) 325 (65 Fe) MG tablet Take 325 mg by mouth daily     Historical Provider, MD   lisinopril (PRINIVIL;ZESTRIL) 20 MG tablet Take 20 mg by mouth daily     Historical Provider, MD   nitroGLYCERIN (NITRODUR) 0.4 MG/HR 1 dose under tongue as needed for chest pain.  max of 3 in one day    Historical Provider, MD   pantoprazole (PROTONIX) 40 MG injection as needed     Historical Provider, MD   amLODIPine (NORVASC) 5 MG tablet Take 1 tablet by mouth daily 7/15/21   Pamela Sanchez MD   clopidogrel (PLAVIX) 75 MG tablet Take 1 tablet by mouth daily  Patient taking differently: Take 75 mg by mouth daily Ordered by heart doctor, Chan Mcgarry 7/15/21   Pamela Sanchez MD   atenolol (TENORMIN) 25 MG tablet Take 1 tablet by mouth daily 6/7/21   Pamela Sanchez MD   Insulin Glargine, 2 Unit Dial, 300 UNIT/ML SOPN Insulin Glargine Insulin Glargine U-300 Conc Active 10 UNIT Subcutaneous Every morning May 7th, 2020 11:47am 05-  [facility] ([id number]) 5/7/20   Historical Provider, MD   atorvastatin (LIPITOR) 20 MG tablet Take 1 tablet by mouth daily 12/15/20   Pamela Sanchez MD   sitaGLIPtan-metformin (JANUMET)  MG per tablet Take 1 tablet by mouth 2 times daily (with meals)    Historical Provider, MD     Current Facility-Administered Medications   Medication Dose Route Frequency Provider Last Rate Last Admin    levoFLOXacin (LEVAQUIN) 500 MG/100ML infusion 500 mg  500 mg IntraVENous Q24H ALONDRA Winters -  mL/hr at 01/15/22 1121 500 mg at 01/15/22 1121    clopidogrel (PLAVIX) tablet 75 mg  75 mg Oral Daily Marga Banuelos APRN - CNP   75 mg at 01/15/22 0841    enoxaparin (LOVENOX) injection 40 mg  40 mg SubCUTAneous Daily ALONDRA Winters - CNP   40 mg at 01/15/22 0840    potassium chloride (KLOR-CON M) extended release tablet 40 mEq  40 mEq Oral PRN Daun Ask, APRN - CNP   40 mEq at 01/13/22 0749    Or    potassium bicarb-citric acid (EFFER-K) effervescent tablet 40 mEq  40 mEq Oral PRN ALONDRA Winters CNP   30 mEq at 01/15/22 0701    Or    potassium chloride 10 mEq/100 mL IVPB (Peripheral Line)  10 mEq IntraVENous PRN Marga Banuelos APRN - CNP        pantoprazole (PROTONIX) tablet 40 mg  40 mg Oral QAM AC Marga Banuelos, APRN - CNP   40 mg at 01/15/22 0841    metoprolol tartrate (LOPRESSOR) tablet 25 mg  25 mg Oral BID ALONDRA Winters - CNP   25 mg at 01/15/22 0839    phenol 1.4 % mouth spray 1 spray  1 spray Mouth/Throat Q2H PRN ALONDRA Winters - CNP   1 spray at 01/12/22 2014    benzocaine-menthol (CEPACOL SORE THROAT) lozenge 1 lozenge  1 lozenge Oral Q2H PRN ALONDRA Dudley - KRAIG        tamsulosin (FLOMAX) capsule 0.4 mg  0.4 mg Oral Daily USHA Ashton   0.4 mg at 01/15/22 0840    bisacodyl (DULCOLAX) suppository 10 mg  10 mg Rectal Daily PRN Duyen Walter DO        bisacodyl (DULCOLAX) suppository 10 mg  10 mg Rectal Daily Duyen Walter DO   10 mg at 01/11/22 0859    docusate sodium (COLACE) capsule 100 mg  100 mg Oral BID Shannansa Smith, DO   100 mg at 01/13/22 1959    haloperidol lactate (HALDOL) injection 5 mg  5 mg IntraMUSCular Q6H PRN USHA Ashton   5 mg at 01/08/22 1108    melatonin tablet 5 mg  5 mg Oral Nightly ALONDRA Winters - CNP   5 mg at 01/14/22 2127    sertraline (ZOLOFT) tablet 50 mg  50 mg Oral Nightly ALONDRA Winters - CNP   50 mg at 01/14/22 2126    QUEtiapine (SEROQUEL) tablet 25 mg  25 mg Oral Nightly ALONDRA Dudley - NP   25 mg at 01/14/22 2127    glycerin moisturizing mouth spray (OASIS) 35 % 2 spray  2 spray Mouth/Throat PRN ALONDRA Gates CNP   2 spray at 01/07/22 1100    ondansetron (ZOFRAN) injection 4 mg  4 mg IntraVENous Q6H PRN ALONDRA Dudley NP   4 mg at 01/10/22 0814    gabapentin (NEURONTIN) capsule 100 mg  100 mg Oral TID ALONDRA Saenz CNP   100 mg at 01/15/22 0839    lisinopril (PRINIVIL;ZESTRIL) tablet 20 mg  20 mg Oral Daily ALONDRA Dudley NP   20 mg at 01/15/22 6446  isosorbide mononitrate (IMDUR) extended release tablet 30 mg  30 mg Oral Daily Danilo Sleight, APRN - NP   30 mg at 01/15/22 0839    ferrous sulfate (FE TABS 325) EC tablet 325 mg  325 mg Oral Daily Danilo Sleight, APRN - NP   325 mg at 01/15/22 0839    atorvastatin (LIPITOR) tablet 20 mg  20 mg Oral Daily Danilo Sleight, APRN - NP   20 mg at 01/15/22 0839    sodium chloride flush 0.9 % injection 10 mL  10 mL IntraVENous 2 times per day Danilo Sleight, APRN - NP   10 mL at 01/14/22 2128    sodium chloride flush 0.9 % injection 10 mL  10 mL IntraVENous PRN Danilo Sleight, APRN - NP        acetaminophen (TYLENOL) tablet 1,000 mg  1,000 mg Oral Q4H PRN [place] Sleight, APRN - NP   1,000 mg at 01/14/22 2213    oxyCODONE (ROXICODONE) immediate release tablet 5 mg  5 mg Oral Q4H PRN [place] Sleight, APRN - NP   5 mg at 01/13/22 0146    Or    oxyCODONE (ROXICODONE) immediate release tablet 10 mg  10 mg Oral Q4H PRN [place] Sleight, APRN - NP   10 mg at 01/11/22 2037    morphine (PF) injection 2 mg  2 mg IntraVENous Q3H PRN [place] Sleight, APRN - NP   2 mg at 01/11/22 0317    polyethylene glycol (GLYCOLAX) packet 17 g  17 g Oral Daily [place] Sleight, APRN - NP   17 g at 01/13/22 0751    magnesium hydroxide (MILK OF MAGNESIA) 400 MG/5ML suspension 30 mL  30 mL Oral Daily PRN Danilo Sleight, APRN - NP        glucose (GLUTOSE) 40 % oral gel 15 g  15 g Oral PRN [place] Sleight, APRN - NP        dextrose 50 % IV solution  12.5 g IntraVENous PRN Danilo Sleight, APRN - NP        glucagon (rDNA) injection 1 mg  1 mg IntraMUSCular PRN [place] Sleight, APRN - NP        dextrose 5 % solution  100 mL/hr IntraVENous PRN [place] Sleight, APRN - NP        glucose (GLUTOSE) 40 % oral gel 15 g  15 g Oral PRN Danilo Sleight, APRN - NP        dextrose 50 % IV solution  12.5 g IntraVENous PRN ALONDRA Reyez - NP        glucagon (rDNA) injection 1 mg  1 mg IntraMUSCular PRN Alvera Pellant Miguel Hollow, APRN - NP        dextrose 5 % solution  100 mL/hr IntraVENous PRN Jennifer Louise, APRN - NP        lidocaine 4 % external patch 1 patch  1 patch TransDERmal Daily Jennifer Louise APRN - NP   1 patch at 01/15/22 0840    alogliptin (NESINA) tablet 12.5 mg  12.5 mg Oral BID  Jennifer Louise, APRN - NP   12.5 mg at 01/15/22 0840    And    metFORMIN (GLUCOPHAGE) tablet 1,000 mg  1,000 mg Oral BID  Jennifer Louise, APRN - NP   1,000 mg at 01/15/22 3908    glipiZIDE (GLUCOTROL) tablet 5 mg  5 mg Oral BID  Jennifer Louise, APRN - NP   5 mg at 01/14/22 1610    insulin lispro (HUMALOG) injection vial 0-12 Units  0-12 Units SubCUTAneous TID  Jennifer Louise, APRN - NP   4 Units at 01/14/22 1245    insulin lispro (HUMALOG) injection vial 0-6 Units  0-6 Units SubCUTAneous Nightly Jennifer Louise, APRN - NP   3 Units at 01/12/22 2020       Allergies:  Niacin and related, Minocycline hcl, Other, and Oxycodone hcl    Social History:   reports that he quit smoking about 3 months ago. His smoking use included cigarettes. He has a 13.50 pack-year smoking history. He has never used smokeless tobacco. He reports that he does not drink alcohol and does not use drugs. Family History: family history includes Alzheimer's Disease in his father; Cancer in his brother; Diabetes in his father and mother; Heart Disease in his brother, brother, brother, mother, and sister; High Blood Pressure in his mother. REVIEW OF SYSTEMS:    Constitutional: No fever or chills.  No night sweats, no weight loss   Eyes: No eye discharge, double vision, or eye pain   HEENT: negative for sore mouth, sore throat, hoarseness and voice change   Respiratory: negative for cough , sputum, dyspnea, wheezing, hemoptysis, chest pain   Cardiovascular: negative for chest pain, dyspnea, palpitations, orthopnea, PND   Gastrointestinal: negative for nausea, vomiting, diarrhea, constipation, abdominal pain, Dysphagia, hematemesis and hematochezia   Genitourinary: negative for frequency, dysuria, nocturia, urinary incontinence, and hematuria   Integument: negative for rash, skin lesions, bruises. Hematologic/Lymphatic: negative for easy bruising, bleeding, lymphadenopathy, or petechiae   Endocrine: negative for heat or cold intolerance,weight changes, change in bowel habits and hair loss   Musculoskeletal: negative for myalgias, arthralgias, pain, joint swelling,and bone pain   Neurological: negative for headaches, dizziness, seizures, weakness, numbness    PHYSICAL EXAM:      BP (!) 150/69   Pulse 84   Temp 98.9 °F (37.2 °C) (Oral)   Resp 18   Ht 6' (1.829 m)   Wt 152 lb 8.9 oz (69.2 kg)   SpO2 95%   BMI 20.69 kg/m²    Temp (24hrs), Av °F (37.2 °C), Min:98.3 °F (36.8 °C), Max:100.6 °F (38.1 °C)    General appearance - well appearing, no in pain or distress   Mental status - alert and cooperative   Eyes -rt eye lid swollen   Ears - bilateral TM's and external ear canals normal   Mouth - mucous membranes moist, pharynx normal without lesions   Neck - supple, no significant adenopathy   Lymphatics - no palpable lymphadenopathy, no hepatosplenomegaly   Chest - has chest tube in place.   Heart - normal rate, regular rhythm, normal S1, S2, no murmurs  Abdomen - soft, nontender, nondistended, no masses or organomegaly   Neurological - alert, oriented, normal speech, no focal findings or movement disorder noted   Musculoskeletal - no joint tenderness, deformity or swelling   Extremities - peripheral pulses normal, no pedal edema, no clubbing or cyanosis   Skin - normal coloration and turgor, no rashes, no suspicious skin lesions noted ,    DATA:    Labs:   CBC:   Recent Labs     22  1617 01/15/22  0422   WBC 19.0* 17.2*   HGB 9.2* 8.6*   HCT 27.8* 25.5*    291     BMP:   Recent Labs     22  0358 01/15/22  0422   * 133*   K 4.2 3.6*   CO2 22 21   BUN 15 13   CREATININE 0.82 0.93   LABGLOM >60 >60   GLUCOSE 129* 61* PT/INR: No results for input(s): PROTIME, INR in the last 72 hours. Surgical Pathology Report  SURGICAL PATHOLOGY REPORT  Collected: 01/04/22 1423   Lab status: Final   Resulting lab: [employer]   Value: -- Diagnosis --     1.  Right lung, middle lobe wedge biopsy:     -  Adenocarcinoma. -  Tumor is 1.8 cm. -  Carcinoma invades into but not through the visceral pleura. 2.  Lymph node #9, biopsy:     -  Negative for metastatic carcinoma. 3.  Lymph node #7, biopsy:     -  Negative for metastatic carcinoma. 4.  Right middle lobe lung, lobectomy:     -  Margins are free of involvement by carcinoma. -  Peribronchial lymph node, negative for metastatic carcinoma. -  Nonneoplastic lung shows emphysematous change.       Maura Villatoro M.D.   **Electronically Signed Out**         rdd/1/6/2022         Clinical Information   Pre-op Diagnosis:  RIGHT MIDDLE LOBE NODULE   Operative Findings:  MIDDLE LOBE OF RIGHT LUNG; LYMPH NODE #9 OF LUNG;   LYMPH NODE #7; RIGHT MIDDLE LOBE   Operation Performed:  MIDDLE WEDGE RESECTION, POSSIBLE MIDDLE   LOBECTOMY VIA THORACOTOMY     Source of Specimen   1: MIDDLE LOBE OF RIGHT LUNG (A)   2: LYMPH NODE #9 OF LUNG (B)   3: LYMPH NODE #7   4: RIGHT MIDDLE LOBE     Gross Description   1.  \"ANA SUSAN, MIDDLE LOBE OF RIGHT LUNG\" Received fresh is a   6.4 x 4.2 x 2.8 cm lung wedge with staple line.  The pleura is   pink-tan with an area of slightly thickening (inked black). Sectioning reveals a 1.8 x 1.2 x 0.8 cm subpleural tan-white mass   lesion that is 0.8 cm from the staple line margin.  The remaining   parenchyma is pink-tan with no other lesions.  1TP, 1DQ, 1FS, Cassette   summary:  \"A\" frozen section lesion, \"B\" remainder of lesion with   pleura inked black, \"C\" staple line margin en face. 2.  \"ANA SUSAN, LYMPH NODE #9 OF LUNG\" Received fresh are two   fragments, 0.3 cm each.  1TP, 1FS, 1cs.    3.  \"ANA SUSAN, LYMPH NODE #7\" Received fresh are two   anthracotic fragments, 0.6 and 0.7 cm in greatest dimension.  1TP,   1FS, 1cs.     4.  \"ANA DAVIS, RIGHT MIDDLE LOBE\" Received fresh is a 50 gram,   8.5 x 6.8 x 3.5 cm lobe of lung with multiple staple lines.  The   pleura is wrinkled purple-gray.  Sectioning reveals spongy dark red   parenchyma with no masses.  No markedly enlarged nodes are identified   at the hilum.  Cassette summary:  \"A\" bronchial margin frozen section   cassette resubmitted as received, \"B\" hilar soft tissue and vascular   margin, \"C\" uninvolved lung (along staple line).  tm     Intraoperative Diagnosis   1.  FSDX:  Non-small cell carcinoma, favor adenocarcinoma.  (14:00,   RDD)   2.  FSDX:  Fibrofatty tissue.  (14:11, RDD)   3.  FSDX:  Lymph node, negative for carcinoma.  (14:19, RDD)   4.  FSDX:  Bronchial margin, negative for carcinoma.  (16:17, RDD)     Microscopic Description   1-4.  Microscopic examination performed. PROCEDURE: Wedge biopsy, followed by lobectomy       SPECIMEN LATERALITY: Right   TUMOR FOCALITY: Single tumor       TUMOR SITE: Right middle lobe   TUMOR SIZE --     1.8 x 1.2 x 0.8 cm           HISTOLOGIC TYPE: Adenocarcinoma. Sections show a large and anaplastic non-small cell malignancy with   cohesion and a few instances of glandular differentiation.  Neoplastic   cells are large with copious eosinophilic cytoplasm and large   vesicular nuclei.  Tumor is characterized further utilizing control   appropriate immunostains.  Cells of carcinoma are negative for p40 and   they are positive for TTF-1 and CK7.  Morphology and immunophenotype   are that of an adenocarcinoma, lung primary.    Note: The case is reviewed by a second Pathologist for quality   assurance with consensus (DS).       HISTOLOGIC GRADE: Grade 2 (of 4)           VISCERAL PLEURA INVASION:Reticulin stain with appropriately reactive   control is utilized to evaluate pleura.  Tumor extends beyond the   elastic layer into the pleura but not to the pleural surface (PL1)     DIRECT INVASION OF ADJACENT STRUCTURES: No     TREATMENT EFFECT: None apparent. LYMPHOVASCULAR INVASION: Absent                         MARGINS --   -BRONCHIAL: Free of tumor       -VASCULAR: Free of tumor       -PARENCHYMAL: Free of tumor       -OTHER ATTACHED TISSUE MARGIN (IF APPLICABLE): N/A       REGIONAL LYMPH NODES: 3   LYMPH NODES(S) FROM PRIOR PROCEDURES: No       NUMBER AND STATION(S) EXAMINED:   See diagnoses   NUMBER AND STATION(S) WITH METASTASIS: 0         DISTANT METASTASIS--   DISTANT SITE(S) INVOLVED, IF APPLICABLE: N/A              IMAGING DATA:  XR CHEST PORTABLE   Final Result   Slight interval decrease in subcutaneous emphysema. Persistent small   extrapleural air on the right in pneumomediastinum. XR CHEST PORTABLE   Final Result   1. Right chest tube remains in place with no visible pneumothorax. 2.  Extensive subcutaneous emphysema and pneumomediastinum persist.         XR CHEST PORTABLE   Final Result   1. Left chest tube in place. Trace residual apical pneumothorax. 2.  Extensive subcutaneous emphysema and pneumomediastinum again demonstrated. XR CHEST PORTABLE   Final Result   Small right pneumothorax, decreased in size as compared to prior status post   chest tube placement. Bibasilar atelectasis or airspace disease, improved on the right as compared   to prior. Persistent pneumomediastinum and extensive soft tissue emphysema. IR CHEST TUBE INSERTION   Final Result   Successful fluoroscopic guided placement of a right 10 Welsh chest tube. CT CHEST WO CONTRAST   Final Result   Small right pneumothorax, extensive pneumomediastinum and extensive   subcutaneous emphysema again demonstrated. This may be due to a chest wall   defect suggested in the posterolateral right 5th intercostal space.          XR CHEST PORTABLE   Final Result   Considerable interval worsening now bilateral and more extensive subcutaneous   emphysema throughout, and slightly larger right PTX. Slightly increased   pneumomediastinum. Greater bibasilar opacities, again probably atelectatic. The findings were sent to the Radiology Results [id number] at 9:36   a.m. on 1/11/2022to be communicated to a licensed caregiver. XR CHEST PORTABLE   Final Result   No significant interval change in the appearance of the chest.      Increased subcutaneous emphysema in the right chest wall and right neck         XR CHEST PORTABLE   Final Result   1. Removal of the right-sided chest tube with no significant change in size   of the right pneumothorax. 2. Otherwise, no significant change in the appearance of the chest.         XR CHEST PORTABLE   Final Result   Right-sided pneumothorax with stable chest tube. Subcutaneous emphysema along the right lateral chest wall and in the right   supraclavicular region. XR CHEST PORTABLE   Final Result   Right apical pneumothorax measuring 1.6 cm. Subtle bibasilar infiltrates. Stable right-sided chest tube. CT ABDOMEN PELVIS WO CONTRAST Additional Contrast? None   Final Result   1. Apparent localized ileus involving a few loops of ileum in the central   abdomen. 2. Small right-sided pneumothorax. 3.  Cholelithiasis without evidence of acute cholecystitis. 4.  Small amount of non dependent air within the urinary bladder. Correlate   with any evidence of recent instrumentation. RECOMMENDATIONS:   Unavailable         XR CHEST PORTABLE   Final Result   Persistent small right apical pneumothorax with 2 right chest tubes in place. Resolving bilateral lower lung subsegmental atelectasis         XR CHEST PORTABLE   Final Result   No significant change in small right apical pneumothorax with chest tubes in   place. XR CHEST PORTABLE   Final Result   No significant interval change.   Right chest tubes in place with persistent   small right apical pneumothorax. XR CHEST PORTABLE   Final Result   Interval right-sided thoracotomy with right-sided chest tubes in-situ. Small   right apical pneumothorax. Right hilar surgical clips related to recent   surgery. .         XR CHEST PORTABLE    (Results Pending)   XR CHEST STANDARD (2 VW)    (Results Pending)     CT chest 11/2/2021:  Impression   The previously biopsied, somewhat bilobed nodule involving right middle lobe   is slightly increased in size since the prior study now measuring 1.8 x 1.0   cm, once measuring 1.8 x 0.6 cm.  There does appear to be some spiculation. No new pulmonary nodule is seen.           Primary Problem  Primary adenocarcinoma of right lung [facility])    Active Hospital Problems    Diagnosis Date Noted    S/P thoracotomy [Z98.890]     Lung cancer, main bronchus, right ([address] [C34.01]     Acute delirium [R41.0] 01/08/2022    Hyponatremia [E87.1] 01/08/2022    Postoperative ileus ([address] [K91.89, K56.7] 01/08/2022    Primary adenocarcinoma of right lung ([address] [C34.91] 12/15/2021    Coronary artery disease involving native coronary artery of native heart without angina pectoris [I25.10] 09/10/2013    Primary hypertension [I10] 09/10/2013    Hyperlipidemia [E78.5] 09/10/2013    Type 2 diabetes mellitus without complication, without long-term current use of insulin ([address] [E11.9] 09/10/2013       IMPRESSION:   1. Right middle lobe lung adenocarcinoma, status post wedge resection on 1/4/2022. Surgical pathology showing X8sHbP4 stage IB, with high risk feature of visceral pleura involvement  2. History of tobacco abuse  3. Delirium/multifactorial could be ICU induced psychosis/sleep deprivation > improved  4. Post surgery ileus> improving    RECOMMENDATIONS:    1. As he has visceral pleural involvement which is a high risk feature, adjuvant systemic chemotherapy be recommended    2.  Follow-up with medical oncology at [facility] after discharge        Discussed with patient Nurse and the family    Thank you for asking us to see this patient. Vernell Nicole MD  Internal Medicine Resident, PGY-2  [facility]         1/15/2022, 1:35 PM          This note is created with the assistance of a speech recognition program.  While intending to generate a document that actually reflects the content of the visit, the document can still have some errors including those of syntax and sound a like substitutions which may escape proof reading. It such instances, actual meaning can be extrapolated by contextual diversion.

## 2022-01-15 NOTE — DISCHARGE SUMMARY
[facility] Cardiothoracic Surgery  Discharge Summary    Patient's Name/Date of Birth: Blaire Myers / [birth year] (79 y.o.)    Admission Date: 1/4/2022  7:49 AM  Discharge Date: 1/15/2022  3:10 PM  Discharge Physician: Dr. Thomas Obregon  Discharge Unit: [address] [facility]  Discharge condition: Stable  Disposition: Home with [address] [facility]      Reason For Admission: Pulmonary nodule    HPI: Blaire Myers is a 70 y.o.  male with a known pulmonary nodule of the right middle lobe. Pertinent medical history includes CAD with previous stenting, diabetes, hypertension and hyperlipidemia and history of smoking. Patient quit smoking 2 months ago. He has been smoking since he was 15years old with a 2 ppd habit. CT lung screening in June revealed the nodule had increased in size and subsequent PET scan was positive for metabolic activity, including possible lymph node involvement. Patient underwent a needle biopsy that was negative. Patient denies chest pain, shortness of breath, fever/chills, unplanned weight loss and night sweats. He has been admitted for planned surgical resection. Procedures completed in hospital: Right VATS. Wedge resection. Conversion to right thoracotomy with right middle lobectomy. Platelet gel. Right chest tube placement per interventional radiology. Brief Review of Hospital Course:   Patient was admitted on 1/4/2022 and underwent right VATS/thoracotomy with right middle lobectomy with Dr Tammy Rivas. Surgical intervention went well, with out complication. He progressed through the post operative period slowly. Chest tubes were removed on post-op day 5, patient tolerated procedure well. However on post-op day 7, he required pigtail chest tube placement per Interventional Radiology due to development of a pneumothorax and subcutaneous emphysema. Pneumothorax resolved after pigtail placed to suction and subcutaneous emphysema began to diminish.  Patient continued to progress with appropriate consultations made as needed. He will be discharged home today with chest tube in place to mini atrium and follow up in clinic this Thursday with repeat imaging. Review of Systems   Constitutional: Positive for activity change and fatigue. Negative for appetite change, chills and fever. HENT: Negative for congestion. Eyes: Negative for visual disturbance. Respiratory: Positive for shortness of breath. Improving   Cardiovascular: Negative for chest pain. Gastrointestinal: Positive for diarrhea. Negative for abdominal pain. Genitourinary: Negative for dysuria. Musculoskeletal: Negative for back pain. Skin: Negative for color change. Neurological: Positive for weakness. Negative for dizziness. Psychiatric/Behavioral: Negative for suicidal ideas. The patient is nervous/anxious. Physical Exam:  Vitals:    01/15/22 0840   BP: (!) 150/69   Pulse: 84   Resp: 18   Temp: 98.9 °F (37.2 °C)   SpO2: 95%     Weight: Weight: 152 lb 8.9 oz (69.2 kg)    Weight: 157 lb 10.1 oz (71.5 kg)    I/O last 3 completed shifts: In: 392.9 [I.V.:10; IV Piggyback:382.9]  Out: 5 [Chest Tube:5]    General: alert and oriented to person, place and time, well-developed and well-nourished, in no acute distress. Up in chair, No apparent distress. Heart:Normal S1 and S2.  Regular rhythm. No murmurs, gallops, or rubs. Lungs: clear to auscultation bilaterally and diminished breath sounds bibasilar. Improving crepitus. Pigtail chest tube in place to mini atrium  Abdomen: soft, non tender, non distended, BSx4  Extremities: Generalized edema  Wounds: clean and dry, healing appropriately. Past Medical History:   Diagnosis Date    Abnormal cardiac cath 09/20/2013    LMCA; Mild irregularities 10-20%. LAD: Abnormal.  Mild to moderate irregularities throughout the LAD and branches. LCx:  abnormal.  80-90% stenosis in a moderate sized OM1. RCA: Abnormal.  40-50% mid RCA stenosis.     Actinic keratosis     Arthritis     neck, fingers    CAD (coronary artery disease)     Calculus of kidney     Cervical stenosis of spine     Diabetes mellitus (HCC)     Dr. Orestes Benitez, CNP, Tensed    Full dentures     GERD (gastroesophageal reflux disease)     H/O echocardiogram 04/29/2016    Stress echo. Normal resting LV systolic function,normal systolic restponse to exercise. No evidence of reversible  ischemia on this maximal,symptom limited,treadmill exerxiess stress echocardiography study    History of cardiac cath 02/09/2017    LMCA: Lesion on LMCA: Distal subsection . 20% stenosis. LAD: Lesion on 1st Diag: Mid subsection . 75% stenosis. RCA: Lesion on Mid RCA: Mid subsection . 100% stenosis. Comments: The distal RCA fills by left to right collateralization. Dominance: Mixed. LV function assessed as: Normal. EF 65%.  History of echocardiogram 06/11/2018    EF 60%. Mildly increased LV wall thickness. Evidence of mild diastolic dysfunction seen.  History of echocardiogram 01/18/2019    EF of 65%. LV wall thickness is mildly increased. Aortic valve leaflets are mildly thickened.  History of heart attack     History of tobacco abuse     Quit 10/2021, 1/4 ppd for 47 years    Hyperlipidemia     Hypertension     Dr. Orestes Benitez, CNP    Pulmonary nodule 12/2021    Right middle lobe    Raynaud's phenomenon     S/P angioplasty with stent 09/20/2013    PTCA-NEDRA of  the OM 1    Wears hearing aid in both ears      Past Surgical History:   Procedure Laterality Date   [address]  2007    patient states had 2 small blockages    CARDIAC CATHETERIZATION  02/09/2017    LMCA: Lesion on LMCA: Distal subsection 20% stenosis. LAD: Lesion on 1st Diag: Mid subsection 75% stenosis. RCA: Lesion on Mid RCA: Mid subsection 100% stenosis.  CARDIAC CATHETERIZATION  02/09/2017    Attempted PCI to chronic total occlusion of RCA was not successful.  Unable to cross with multiple wires due to heavy calcification and toruosity.  CARDIAC CATHETERIZATION  01/14/2019    CATARACT REMOVAL  04/06/2015    Right eye - Dr. Angela Valencia  06/08/2015    Dr. Dwayne Jean-Baptiste - left eye    COLONOSCOPY  01/11/2006    Dr. Marilee Avila - internal hemorrhoids, no polps    COLONOSCOPY  05/23/2016    -polyp    CT NEEDLE BIOPSY LUNG PERCUTANEOUS  7/28/2021    CT NEEDLE BIOPSY LUNG PERCUTANEOUS 7/28/2021 [facility] CT SCAN    HEMORRHOID SURGERY      HERNIA REPAIR Left     inguinal    IR CHEST TUBE INSERTION  1/11/2022    IR CHEST TUBE INSERTION 1/11/2022 Griffin Lara MD [facility] SPECIAL PROCEDURES    LUNG BIOPSY Right 07/28/2021    Dr Sharp Shows, TOTAL Right 1/4/2022    MIDDLE WEDGE RESECTION, POSSIBLE MIDDLE LOBECTOMY VIA THORACOTOMY performed by Bryan Belle MD at [place] [address]    c4-5 laminectomy and fusion    OTHER SURGICAL HISTORY  10/2014    Right foot - 7 from heal and 1 from great toe at [address]  12/11/2014    pain injection - cervical    UPPER GASTROINTESTINAL ENDOSCOPY  12/28/2005    Dr. Marilee Avila - mild esophagitis and gastritis    UPPER GASTROINTESTINAL ENDOSCOPY N/A 02/05/2019    -bx(neg H-Pylori)retained gastric content, possible submucosal mass of gastric cardia    UPPER GASTROINTESTINAL ENDOSCOPY N/A 10/22/2019    EGD BIOPSY performed by Justin Buitrago MD at [address] [place] [address]   Allergen Reactions    Niacin And Related      Turns red, no trouble breathing    Minocycline Hcl Rash    Other Nausea And Vomiting     Patient states on all pain medications he gets nausea and vomiting.  Doctor ordered a medicine (nausea) to take before pain medication    Oxycodone Hcl      vomits     Family History   Problem Relation Age of Onset    Heart Disease Mother     Diabetes Mother     High Blood Pressure Mother     Diabetes Father     Alzheimer's Disease Father     Heart Disease Sister         CABG    Heart Disease Brother     Heart Disease Brother     Heart Disease Brother     Cancer Brother      Social History     Socioeconomic History    Marital status:      Spouse name: Not on file    Number of children: Not on file    Years of education: Not on file    Highest education level: Not on file   Occupational History    Not on file   Tobacco Use    Smoking status: Former Smoker     Packs/day: 0.25     Years: 54.00     Pack years: 13.50     Types: Cigarettes     Quit date: 10/1/2021     Years since quittin.2    Smokeless tobacco: Never Used   Vaping Use    Vaping Use: Never used   Substance and Sexual Activity    Alcohol use: No    Drug use: No    Sexual activity: Not on file   Other Topics Concern    Not on file   Social History Narrative    Not on file     Social Determinants of Health     Financial Resource Strain:     Difficulty of Paying Living Expenses: Not on file   Food Insecurity:     Worried About [employer] in the Last Year: Not on file    Luis E of Food in the Last Year: Not on file   Transportation Needs:     Lack of Transportation (Medical): Not on file    Lack of Transportation (Non-Medical):  Not on file   Physical Activity:     Days of Exercise per Week: Not on file    Minutes of Exercise per Session: Not on file   Stress:     Feeling of Stress : Not on file   Social Connections:     Frequency of Communication with Friends and Family: Not on file    Frequency of Social Gatherings with Friends and Family: Not on file    Attends Zoroastrianism Services: Not on file    Active Member of Clubs or Organizations: Not on file    Attends Club or Organization Meetings: Not on file    Marital Status: Not on file   Intimate Partner Violence:     Fear of Current or Ex-Partner: Not on file    Emotionally Abused: Not on file    Physically Abused: Not on file    Sexually Abused: Not on file   Housing Stability:     Unable to Pay for Housing in the Last Year: Not on file    Number of Places Lived in the Last Year: Not on file    Unstable Housing in the Last Year: Not on file          Medication List      START taking these medications    gabapentin 100 MG capsule  Commonly known as: NEURONTIN  Take 1 capsule by mouth 3 times daily for 14 days. levoFLOXacin 750 MG tablet  Commonly known as: Levaquin  Take 1 tablet by mouth daily for 10 days     melatonin 5 MG Tabs tablet  Take 1 tablet by mouth nightly as needed (sleep)     oxyCODONE 5 MG immediate release tablet  Commonly known as: ROXICODONE  Take 1 tablet by mouth every 4 hours as needed for Pain for up to 7 days.         CHANGE how you take these medications    clopidogrel 75 MG tablet  Commonly known as: PLAVIX  Take 1 tablet by mouth daily  What changed: additional instructions        CONTINUE taking these medications    amLODIPine 5 MG tablet  Commonly known as: NORVASC  Take 1 tablet by mouth daily     aspirin 81 MG tablet     atenolol 25 MG tablet  Commonly known as: TENORMIN  Take 1 tablet by mouth daily     atorvastatin 20 MG tablet  Commonly known as: LIPITOR  Take 1 tablet by mouth daily     ferrous sulfate 325 (65 Fe) MG tablet  Commonly known as: IRON 325     glimepiride 2 MG tablet  Commonly known as: AMARYL  Take 1 tablet by mouth 2 times daily     Insulin Glargine (2 Unit Dial) 300 UNIT/ML Sopn     isosorbide mononitrate 30 MG extended release tablet  Commonly known as: IMDUR     Janumet  MG per tablet  Generic drug: sitaGLIPtan-metFORMIN     lisinopril 20 MG tablet  Commonly known as: PRINIVIL;ZESTRIL     nitroGLYCERIN 0.4 MG/HR  Commonly known as: NITRODUR     pantoprazole 40 MG injection  Commonly known as: PROTONIX     SERTRALINE HCL PO     tamsulosin 0.4 MG capsule  Commonly known as: FLOMAX     Zanaflex 4 MG capsule  Generic drug: tiZANidine        STOP taking these medications    enoxaparin 120 MG/0.8ML injection  Commonly known as: Lovenox           Where to Get Your Medications      These medications were sent to [facility] [address] [facility], [address] [place] [address]  [address], [place] [address]    Phone: [phone]   · gabapentin 100 MG capsule  · levoFLOXacin 750 MG tablet  · melatonin 5 MG Tabs tablet  · oxyCODONE 5 MG immediate release tablet           Data:    CBC:   No results for input(s): WBC, HGB, HCT, MCV, PLT in the last 72 hours. BMP:   No results for input(s): NA, K, CL, CO2, PHOS, BUN, CREATININE, CA, MG in the last 72 hours. Accucheck Glucoses:   No results for input(s): POCGLU in the last 72 hours. Cardiac Enzymes: No results for input(s): CKTOTAL, CKMB, CKMBINDEX, TROPONINI in the last 72 hours. PTT/PT/INR:   No results for input(s): PROTIME, INR in the last 72 hours. No results for input(s): APTT in the last 72 hours.   Liver Profile:  Lab Results   Component Value Date    AST 26 01/09/2022    ALT 25 01/09/2022    BILIDIR <0.08 05/27/2021    BILITOT 0.82 01/09/2022    ALKPHOS 53 01/09/2022     Lab Results   Component Value Date    CHOL 89 01/18/2019    HDL 29 01/18/2019    TRIG 127 01/18/2019     TSH:   Lab Results   Component Value Date    TSH 0.80 01/09/2022     UA:   Lab Results   Component Value Date    COLORU Yellow 01/07/2022    PHUR 6.5 01/07/2022    SPECGRAV 1.015 01/07/2022    LEUKOCYTESUR NEGATIVE 01/07/2022    UROBILINOGEN Normal 01/07/2022    BILIRUBINUR NEGATIVE 01/07/2022    GLUCOSEU NEGATIVE 01/07/2022       Problem List Items Addressed This Visit     S/P thoracotomy - Primary    Relevant Medications    oxyCODONE (ROXICODONE) 5 MG immediate release tablet    Other Relevant Orders    XR CHEST STANDARD (2 VW)    Basic Metabolic Panel    Magnesium          Imaging Studies:  Cardiac Cath: N/A    CXR:  ONE XRAY VIEW OF THE CHEST       1/15/2022 6:15 am       COMPARISON:   14 January 2022       HISTORY:   ORDERING SYSTEM PROVIDED HISTORY: pneumothorax s/p chest tube placement   TECHNOLOGIST PROVIDED HISTORY:   pneumothorax s/p chest tube placement       FINDINGS:   AP portable view of the chest time stamped at 523 hours demonstrates   overlying cardiac monitoring electrodes and a small bore pigtail terminus   tube in site 2 ending in the right apex.  No change in cardiac size, and scar   or atelectasis at the right base.  Small amount of pneumomediastinum and   right extrapleural air is noted.  Extensive subcutaneous emphysema is noted   slightly reduced over prior study.  Compression plate and screws are noted at   the base of the neck.           Impression   Slight interval decrease in subcutaneous emphysema.  Persistent small   extrapleural air on the right in pneumomediastinum.           CT:    CT OF THE CHEST WITHOUT CONTRAST 1/11/2022 10:01 am       TECHNIQUE:   CT of the chest was performed without the administration of intravenous   contrast. Multiplanar reformatted images are provided for review.  Dose   modulation, iterative reconstruction, and/or weight based adjustment of the   mA/kV was utilized to reduce the radiation dose to as low as reasonably   achievable.       COMPARISON:   Chest radiograph today, 01/10/2022, 01/09/2022.  Chest CT 11/02/2021.       HISTORY:   ORDERING SYSTEM PROVIDED HISTORY: eval specifically the right thorax for   cause of SQ air   TECHNOLOGIST PROVIDED HISTORY:   eval specifically the right thorax for cause of SQ air   Reason for Exam: eval specifically the right thorax for cause of SQ air       FINDINGS:   Mediastinum: Extensive pneumomediastinum again demonstrated.  The heart and   great vessels are normal in size.  Calcified atheromatous plaque and coronary   calcifications are noted.  No pericardial effusion.  No enlarged or   suspicious-appearing lymph nodes are identified.       Lungs/pleura: Small right pneumothorax is noted, as described on recent chest   radiographs.  No evidence for pneumothorax on the left.  Mosaic lung   attenuation and findings of subsegmental atelectasis in the lung bases.   Subpleural emphysematous changes in the superior right right lung.  Status   post partial right pneumonectomy.       Upper Abdomen: No acute findings.  No pneumoperitoneum identified.       Soft Tissues/Bones: Extensive subcutaneous emphysema bilaterally in the chest   and visualized abdominal wall.  This extends cephalad in the neck and   shoulders off the field of view.  Air traverses the posterolateral right 5th   intercostal space on axial image 57.           Impression   Small right pneumothorax, extensive pneumomediastinum and extensive   subcutaneous emphysema again demonstrated.  This may be due to a chest wall   defect suggested in the posterolateral right 5th intercostal space. Echo:   Global left ventricular systolic function appears preserved with an  estimated ejection fraction of >60%. The left ventricular cavity size is within normal limits and the left  ventricular wall thickness is within normal limits. No definite specific wall motion abnormalities were identified. No significant valvular abnormalities. Mild diastolic dysfunction.     Compared to the previous study of 9/20/2019, no significant change was seen.     Signature  ----------------------------------------------------------------------------   Electronically signed by Alysia Thomas(Sonographer) on 12/15/2021 09:50   AM  ----------------------------------------------------------------------------     ----------------------------------------------------------------------------   Electronically signed by Viola Stahl(Interpreting physician) on 12/15/2021   03:14 PM  ----------------------------------------------------------------------------  FINDINGS  Left Atrium  Left atrium is normal in size. Left Ventricle  Global left ventricular systolic function appears preserved with an  estimated ejection fraction of >60%.   The left ventricular cavity size is within normal limits and the left  ventricular wall thickness is within normal limits. No definite specific wall motion abnormalities were identified. Right Atrium  Right atrium is normal in size. Right Ventricle  Normal right ventricular size and function. Mitral Valve  Normal mitral valve structure with trivial mitral regurgitation. Aortic Valve  Aortic leaflets show calcification without restriction of motion. Tricuspid Valve  Normal tricuspid valve structure with trivial tricuspid regurgitation. Pulmonic Valve  The pulmonic valve is normal in structure with trivial regurgitation. Pericardial Effusion  An anterior echo free space is seen suggestive of a small effusion or fat  pad.     Miscellaneous  Normal aortic root diameter. Mild diastolic dysfunction. Discharge Plan:  Follow up with CT Surgery  in 1 week. Call office at [phone] for any problems. Follow up with PCP and pulmonology in 1-2 weeks.         ALONDRA Snow - CNP, CNP  Phone: [phone]

## 2022-01-15 NOTE — DISCHARGE INSTR - OTHER ORDERS
gabapentin  Pronunciation:  GA ba PEN tin  Brand:  Gralise, Horizant, Neurontin  What is the most important information I should know about gabapentin? Gabapentin can cause life-threatening breathing problems, especially if you already have a breathing disorder or if you use other medicines that can make you drowsy or slow your breathing. Seek emergency medical attention if you have very slow breathing. Some people have thoughts about suicide or behavior changes while taking gabapentin. Stay alert to changes in your mood or symptoms. Report any new or worsening symptoms to your doctor. Do not stop using gabapentin suddenly, even if you feel fine. What is gabapentin? Gabapentin is used together with other medicines to treat partial seizures in adults and children at least 1years old. Gabapentin is also used to treat nerve pain caused by herpes virus or shingles (herpes zoster) in adults. Use only the brand and form of gabapentin your doctor has prescribed. Check your medicine each time you get a refill to make sure you receive the correct form. Gralise is used only to treat nerve pain. Horizant is used to treat nerve pain and restless legs syndrome (RLS). Neurontin is used to treat nerve pain and seizures. Gabapentin may also be used for purposes not listed in this medication guide. What should I discuss with my healthcare provider before taking gabapentin? You should not use gabapentin if you are allergic to it. Tell your doctor if you have ever had:  breathing problems or lung disease, such as chronic obstructive pulmonary disease (COPD);  kidney disease (or if you are on dialysis);  diabetes;  depression, a mood disorder, or suicidal thoughts or actions;  a drug addiction;  a seizure (unless you take gabapentin to treat seizures);  liver disease;  heart disease; or  (for patients with RLS) if you are a day sleeper or work a night shift.   Some people have thoughts about suicide while taking this medicine. Children taking gabapentin may have behavior changes. Stay alert to changes in your mood or symptoms. Report any new or worsening symptoms to your doctor. It is not known whether this medicine will harm an unborn baby. Tell your doctor if you are pregnant or plan to become pregnant. Seizure control is very important during pregnancy, and having a seizure could harm both mother and baby. Do not start or stop taking gabapentin for seizures without your doctor's advice, and tell your doctor right away if you become pregnant. If you are pregnant, your name may be listed on a pregnancy registry to track the effects of gabapentin on the baby. It may not be safe to breastfeed while using this medicine. Ask your doctor about any risk. How should I take gabapentin? Follow all directions on your prescription label. Do not take this medicine in larger or smaller amounts or for longer than recommended. If your doctor changes your brand, strength, or type of gabapentin, your dosage needs may change. Ask your pharmacist if you have any questions about the new kind of gabapentin you receive at the pharmacy. Both Gralise and Horizant should be taken with food. Neurontin can be taken with or without food. If you break a Neurontin tablet and take only half of it, take the other half at your next dose. Any tablet that has been broken should be used as soon as possible or within a few days. Swallow the capsule  or tablet  whole and do not crush, chew, break, or open it. Measure liquid medicine carefully. Use the dosing syringe provided, or use a medicine dose-measuring device (not a kitchen spoon). Do not stop using gabapentin suddenly, even if you feel fine. Stopping suddenly may cause increased seizures. Follow your doctor's instructions about tapering your dose. In case of emergency, wear or carry medical identification to let others know you have seizures.   This medicine can cause unusual results with certain medical tests. Tell any doctor who treats you that you are using gabapentin. Store gabapentin tablets and capsules at room temperature away from light and moisture. Store the liquid medicine in the refrigerator. Do not freeze. What happens if I miss a dose? Take the medicine as soon as you can, but skip the missed dose if it is almost time for your next dose. Do not take two doses at one time. If you take Horizant:  Skip the missed dose and use your next dose at the regular time. Do not use two doses of Horizant at one time. What happens if I overdose? Seek emergency medical attention or call the Poison Help line at 1-925.698.5919. What should I avoid while taking gabapentin? Avoid driving or hazardous activity until you know how this medicine will affect you. Your reactions could be impaired. Dizziness or drowsiness can cause falls, accidents, or severe injuries. Avoid taking an antacid within 2 hours before you take gabapentin. Antacids can make it harder for your body to absorb gabapentin. Avoid drinking alcohol while taking gabapentin. What are the possible side effects of gabapentin? Get emergency medical help if you have signs of an allergic reaction: hives; difficult breathing; swelling of your face, lips, tongue, or throat. Seek medical treatment if you have a serious drug reaction that can affect many parts of your body. Symptoms may include: skin rash, fever, swollen glands, muscle aches, severe weakness, unusual bruising, upper stomach pain, or yellowing of your skin or eyes. Report any new or worsening symptoms to your doctor, such as: mood or behavior changes, anxiety, panic attacks, trouble sleeping, or if you feel impulsive, irritable, agitated, hostile, aggressive, restless, hyperactive (mentally or physically), depressed, or have thoughts about suicide or hurting yourself.   Call your doctor at once if you have:  weak or shallow breathing;  blue-colored skin, lips, fingers, and toes;  confusion, extreme drowsiness or weakness;  problems with balance or muscle movement;  unusual or involuntary eye movements; or  increased seizures. Gabapentin can cause life-threatening breathing problems. A person caring for you should seek emergency medical attention if you have slow breathing with long pauses, blue colored lips, or if you are hard to wake up. Breathing problems may be more likely in older adults or in people with COPD. Some side effects are more likely in children taking gabapentin. Contact your doctor if the child taking this medicine has any of the following side effects:  changes in behavior;  memory problems;  trouble concentrating; or  acting restless, hostile, or aggressive. Common side effects may include:  fever, chills, sore throat, body aches, unusual tiredness;  jerky movements;  headache;  double vision;  swelling of your legs and feet;  tremors;  trouble speaking;  dizziness, drowsiness, tiredness;  problems with balance or eye movements; or  nausea, vomiting. This is not a complete list of side effects and others may occur. Call your doctor for medical advice about side effects. You may report side effects to FDA at 0-170-FDA-0526. What other drugs will affect gabapentin? Using gabapentin with other drugs that make you drowsy or slow your breathing can cause dangerous side effects or death. Ask your doctor before using opioid medication, a sleeping pill, cold or allergy medicine, a muscle relaxer, or medicine for anxiety or seizures. Other drugs may affect gabapentin, including prescription and over-the-counter medicines, vitamins, and herbal products. Tell your doctor about all your current medicines and any medicine you start or stop using. Where can I get more information? Your pharmacist can provide more information about gabapentin.   Remember, keep this and all other medicines out of the reach of children, never share your medicines with others, and use this medication only for the indication prescribed. Every effort has been made to ensure that the information provided by [address] is accurate, up-to-date, and complete, but no guarantee is made to that effect. Drug information contained herein may be time sensitive. [facility] information has been compiled for use by healthcare practitioners and consumers in the [facility] and therefore [facility] does not warrant that uses outside of the [facility] are appropriate, unless specifically indicated otherwise. [facility]'s drug information does not endorse drugs, diagnose patients or recommend therapy. [facility]'s drug information is an informational resource designed to assist licensed healthcare practitioners in caring for their patients and/or to serve consumers viewing this service as a supplement to, and not a substitute for, the expertise, skill, knowledge and judgment of healthcare practitioners. The absence of a warning for a given drug or drug combination in no way should be construed to indicate that the drug or drug combination is safe, effective or appropriate for any given patient. [facility] does not assume any responsibility for any aspect of healthcare administered with the aid of information [facility] provides. The information contained herein is not intended to cover all possible uses, directions, precautions, warnings, drug interactions, allergic reactions, or adverse effects. If you have questions about the drugs you are taking, check with your doctor, nurse or pharmacist.  Copyright 8337-4489 [address]: 17.01. Revision date: 1/26/2021. Care instructions adapted under license by [facility] ([facility]). If you have questions about a medical condition or this instruction, always ask your healthcare professional. [place] [address] any warranty or liability for your use of this information.          oxycodone  Pronunciation:  primitivo GUERRA done  Brand:  Oxaydo, OxyCONTIN, Oxyfast, Ladonna Varela, [facility]  What is the most important information I should know about oxycodone? MISUSE OF OPIOID MEDICINE CAN CAUSE ADDICTION, OVERDOSE, OR DEATH. Keep the medication in a place where others cannot get to it. Taking opioid medicine during pregnancy may cause life-threatening withdrawal symptoms in the . Fatal side effects can occur if you use opioid medicine with alcohol, or with other drugs that cause drowsiness or slow your breathing. What is oxycodone? Oxycodone is an opioid pain medication used to treat moderate to severe pain. The extended-release form of oxycodone is for around-the-clock treatment of pain and should not  be used on an as-needed basis for pain. Oxycodone may also be used for purposes not listed in this medication guide. What should I discuss with my healthcare provider before using oxycodone? You should not use oxycodone if you are allergic to it, or if you have:  severe asthma or breathing problems; or  a blockage in your stomach or intestines. You should not use oxycodone unless you are already using a similar opioid medicine and are tolerant to it. Most brands of oxycodone are not approved for use in people under 18. OxyContin should not be given to a child younger than 6years old. Tell your doctor if you have ever had:  breathing problems, sleep apnea;  a head injury, or seizures;  drug or alcohol addiction, or mental illness;  liver or kidney disease;  urination problems; or  problems with your gallbladder, pancreas, or thyroid. If you use opioid medicine while you are pregnant, your baby could become dependent on the drug. This can cause life-threatening withdrawal symptoms in the baby after it is born. Babies born dependent on opioids may need medical treatment for several weeks. Ask a doctor before using opioid medicine if you are breastfeeding. Tell your doctor if you notice severe drowsiness or slow breathing in the nursing baby.   How should I use oxycodone? Follow the directions on your prescription label and read all medication guides. Never use oxycodone in larger amounts, or for longer than prescribed. Tell your doctor if you feel an increased urge to take more of this medicine. Never share opioid medicine with another person, especially someone with a history of drug abuse or addiction. MISUSE CAN CAUSE ADDICTION, OVERDOSE, OR DEATH. Keep the medication in a place where others cannot get to it. Selling or giving away opioid medicine is against the law. Stop taking all other around-the-clock opioid pain medicines when you start taking extended-release oxycodone. Take oxycodone with food. Swallow the capsule or tablet whole to avoid exposure to a potentially fatal overdose. Do not crush, chew, break, open, or dissolve. If you cannot swallow a capsule whole, open it and sprinkle the medicine into a spoonful of pudding or applesauce. Swallow the mixture right away without chewing. Do not save it for later use. Never crush or break an oxycodone pill to inhale the powder or mix it into a liquid to inject the drug into your vein. This can cause in death. Measure liquid medicine carefully. Use the dosing syringe provided, or use a medicine dose-measuring device (not a kitchen spoon). You should not stop using oxycodone suddenly. Follow your doctor's instructions about tapering your dose. Store at room temperature, away from heat, moisture, and light. Keep track of your medicine. Oxycodone is a drug of abuse and you should be aware if anyone is using your medicine improperly or without a prescription. Do not keep leftover opioid medication. Just one dose can cause death in someone using this medicine accidentally or improperly. Ask your pharmacist where to locate a drug take-back disposal program. If there is no take-back program, flush the unused medicine down the toilet. What happens if I miss a dose?   Since oxycodone is used for pain, you are not likely to miss a dose. Skip any missed dose if it is almost time for your next dose. Do not use two doses at one time. What happens if I overdose? Seek emergency medical attention or call the Poison Help line at 1-450.508.9239. An opioid overdose can be fatal, especially in a child or other person using the medicine without a prescription. Overdose symptoms may include severe drowsiness, pinpoint pupils, slow breathing, or no breathing. Your doctor may recommend you get naloxone (a medicine to reverse an opioid overdose) and keep it with you at all times. A person caring for you can give the naloxone if you stop breathing or don't wake up. Your caregiver must still get emergency medical help and may need to perform CPR (cardiopulmonary resuscitation) on you while waiting for help to arrive. Anyone can buy naloxone from a pharmacy or local health department. Make sure any person caring for you knows where you keep naloxone and how to use it. What should I avoid while using oxycodone? Do not drink alcohol. Dangerous side effects or death could occur. Avoid driving or operating machinery until you know how oxycodone will affect you. Dizziness or severe drowsiness can cause falls or other accidents. Avoid medication errors. Always check the brand and strength of oxycodone you get from the pharmacy. What are the possible side effects of oxycodone? Get emergency medical help if you have signs of an allergic reaction: hives; difficult breathing; swelling of your face, lips, tongue, or throat. Opioid medicine can slow or stop your breathing, and death may occur. A person caring for you should give naloxone and/or seek emergency medical attention if you have slow breathing with long pauses, blue colored lips, or if you are hard to wake up.   Call your doctor at once if you have:  noisy breathing, sighing, shallow breathing, breathing that stops during sleep;  a slow heart rate or weak pulse;  a light-headed feeling, like you might pass out;  confusion, unusual thoughts or behavior;  seizure (convulsions);  low cortisol levels -- nausea, vomiting, loss of appetite, dizziness, worsening tiredness or weakness; or  high levels of serotonin in the body --agitation, hallucinations, fever, sweating, shivering, fast heart rate, muscle stiffness, twitching, loss of coordination, nausea, vomiting, diarrhea. Serious breathing problems may be more likely in older adults and in those who are debilitated or have wasting syndrome or chronic breathing disorders. Common side effects may include:  drowsiness, headache, dizziness, tiredness; or  constipation, stomach pain, nausea, vomiting. This is not a complete list of side effects and others may occur. Call your doctor for medical advice about side effects. You may report side effects to FDA at 8-096-FDA-2109. What other drugs will affect oxycodone? You may have breathing problems or withdrawal symptoms if you start or stop taking certain other medicines. Tell your doctor if you also use an antibiotic, antifungal medication, heart or blood pressure medication, seizure medication, or medicine to treat HIV or hepatitis C. Opioid medication can interact with many other drugs and cause dangerous side effects or death.  Be sure your doctor knows if you also use:  cold or allergy medicines, bronchodilator asthma/COPD medication, or a diuretic (\"water pill\");  medicines for motion sickness, irritable bowel syndrome, or overactive bladder;  other opioids --opioid pain medicine or prescription cough medicine;  a sedative like Valium --diazepam, alprazolam, lorazepam, Xanax, Klonopin, Versed, and others;  drugs that make you sleepy or slow your breathing --a sleeping pill, muscle relaxer, medicine to treat mood disorders or mental illness; or  drugs that affect serotonin levels in your body --a stimulant, or medicine for depression, Parkinson's disease, migraine headaches, serious infections, or nausea and vomiting. This list is not complete and many other drugs may affect oxycodone. This includes prescription and over-the-counter medicines, vitamins, and herbal products. Not all possible drug interactions are listed here. Where can I get more information? Your pharmacist can provide more information about oxycodone. Remember, keep this and all other medicines out of the reach of children, never share your medicines with others, and use this medication only for the indication prescribed. Every effort has been made to ensure that the information provided by [address] is accurate, up-to-date, and complete, but no guarantee is made to that effect. Drug information contained herein may be time sensitive. [facility] information has been compiled for use by healthcare practitioners and consumers in the [place] and therefore [facility] does not warrant that uses outside of the [place] are appropriate, unless specifically indicated otherwise. [facility]'s drug information does not endorse drugs, diagnose patients or recommend therapy. [facility] information is an informational resource designed to assist licensed healthcare practitioners in caring for their patients and/or to serve consumers viewing this service as a supplement to, and not a substitute for, the expertise, skill, knowledge and judgment of healthcare practitioners. The absence of a warning for a given drug or drug combination in no way should be construed to indicate that the drug or drug combination is safe, effective or appropriate for any given patient. [facility] does not assume any responsibility for any aspect of healthcare administered with the aid of information [facility] provides. The information contained herein is not intended to cover all possible uses, directions, precautions, warnings, drug interactions, allergic reactions, or adverse effects.  If you have questions about the drugs you are taking, check with your doctor, nurse or pharmacist.  Copyright 9768-2824 [address]: 14.02. Revision date: 1/28/2021. Care instructions adapted under license by [facility] ([facility]). If you have questions about a medical condition or this instruction, always ask your healthcare professional. [address] any warranty or liability for your use of this information. levofloxacin (oral)  Pronunciation:  EDIS voe FLOX a sin  Brand:  Levaquin  What is the most important information I should know about levofloxacin? Levofloxacin can cause serious side effects, including tendon problems, nerve damage, serious mood or behavior changes, or low blood sugar. Stop using this medicine and call your doctor at once if you have symptoms such as: headache, hunger, irritability, numbness, tingling, burning pain, confusion, agitation, paranoia, problems with memory or concentration, thoughts of suicide, or sudden pain or movement problems in any of your joints. In rare cases, levofloxacin may cause damage to your aorta, which could lead to dangerous bleeding or death. Get emergency medical help if you have severe and constant pain in your chest, stomach, or back. What is levofloxacin? Levofloxacin is a fluoroquinolone (ykzw-h-QIIG-o-lone) antibiotic that fights bacteria in the body. Levofloxacin is used to treat different types of bacterial infections. Levofloxacin is also used to treat people who have been exposed to anthrax or certain types of plague. Fluoroquinolone antibiotics can cause serious or disabling side effects. Levofloxacin should be used only for infections that cannot be treated with a safer antibiotic. Levofloxacin may also be used for purposes not listed in this medication guide. What should I discuss with my healthcare provider before taking levofloxacin?   You should not use this medicine if you are allergic to levofloxacin or other fluoroquinolones (ciprofloxacin, gemifloxacin, moxifloxacin, norfloxacin, ofloxacin, and others). Levofloxacin may cause swelling or tearing of a tendon (the fiber that connects bones to muscles in the body), especially in the Achilles' tendon of the heel. This can happen during treatment or up to several months after you stop taking levofloxacin. Tendon problems may be more likely in certain people (children and older adults, or people who use steroid medicine or have had an organ transplant). Tell your doctor if you have ever had:  tendon problems, bone problems, arthritis or other joint problems (especially in children);  blood circulation problems, aneurysm, narrowing or hardening of the arteries;  heart problems, high blood pressure;  a genetic disease such as Marfan syndrome or Ehler's-Danlos syndrome;  diabetes;  a muscle or nerve disorder, such as myasthenia gravis;  kidney disease;  seizures or epilepsy;  a head injury or brain tumor;  long QT syndrome (in you or a family member); or  low levels of potassium in your blood (hypokalemia). Do not give this medicine to a child without medical advice. It is not known whether this medicine will harm an unborn baby. Tell your doctor if you are pregnant. You should not breast-feed while using this medicine. How should I take levofloxacin? Follow all directions on your prescription label and read all medication guides or instruction sheets. Use the medicine exactly as directed. Take levofloxacin with water, at the same time each day. Drink extra fluids to keep your kidneys working properly while taking this medicine. You may take levofloxacin tablets with or without food. Take levofloxacin oral solution (liquid)  on an empty stomach, at least 1 hour before or 2 hours after a meal.  Measure liquid medicine carefully. Use the dosing syringe provided, or use a medicine dose-measuring device (not a kitchen spoon).   Use this medicine for the full prescribed length of time, even if your symptoms quickly improve. Skipping doses can increase your risk of infection that is resistant to medication. Levofloxacin will not treat a viral infection such as the flu or a common cold. Do not share levofloxacin with another person. This medicine may affect a drug-screening urine test and you may have false results. Tell the laboratory staff that you use levofloxacin. Store at room temperature away from moisture and heat. Keep the bottle tightly closed when not in use. What happens if I miss a dose? Take the medicine as soon as you can, but skip the missed dose if it is almost time for your next dose. Do not take two doses at one time. What happens if I overdose? Seek emergency medical attention or call the Poison Help line at 1-947.330.5829. What should I avoid while taking levofloxacin? Avoid driving or hazardous activity until you know how this medicine will affect you. Your reactions could be impaired. Antibiotic medicines can cause diarrhea, which may be a sign of a new infection. If you have diarrhea that is watery or bloody, call your doctor before using anti-diarrhea medicine. Levofloxacin could make you sunburn more easily. Avoid sunlight or tanning beds. Wear protective clothing and use sunscreen (SPF 30 or higher) when you are outdoors. Tell your doctor if you have severe burning, redness, itching, rash, or swelling after being in the sun. What are the possible side effects of levofloxacin? Get emergency medical help if you have signs of an allergic reaction (hives, difficult breathing, swelling in your face or throat) or a severe skin reaction (fever, sore throat, burning in your eyes, skin pain, red or purple skin rash that spreads and causes blistering and peeling).   Levofloxacin can cause serious side effects, including tendon problems, side effects on your nerves (which may cause permanent nerve damage), serious mood or behavior changes (after just one dose), or low blood sugar (which can lead to coma). Stop taking this medicine and call your doctor at once if you have:   low blood sugar --headache, hunger, sweating, irritability, dizziness, nausea, fast heart rate, or feeling anxious or shaky;  nerve symptoms in your hands, arms, legs, or feet --numbness, weakness, tingling, burning pain;  serious mood or behavior changes --nervousness, confusion, agitation, paranoia, hallucinations, memory problems, trouble concentrating, thoughts of suicide; or  signs of tendon rupture --sudden pain, swelling, bruising, tenderness, stiffness, movement problems, or a snapping or popping sound in any of your joints (rest the joint until you receive medical care or instructions). In rare cases, levofloxacin may cause damage to your aorta, the main blood artery of the body. This could lead to dangerous bleeding or death. Get emergency medical help if you have severe and constant pain in your chest, stomach, or back. Stop taking levofloxacin and call your doctor at once if you have:  severe stomach pain, diarrhea that is watery or bloody;  fast or pounding heartbeats, fluttering in your chest, shortness of breath, and sudden dizziness (like you might pass out);  the first sign of any skin rash, no matter how mild;  muscle weakness, breathing problems;  seizure (convulsions);  increased pressure inside the skull --severe headaches, ringing in your ears, dizziness, nausea, vision problems, pain behind your eyes; or  liver problems --upper stomach pain, loss of appetite, dark urine, mar-colored stools, jaundice (yellowing of the skin or eyes). Common side effects may include:  nausea, constipation, diarrhea;  headache, dizziness; or  trouble sleeping. This is not a complete list of side effects and others may occur. Call your doctor for medical advice about side effects. You may report side effects to FDA at 4-246-FDA-8009. What other drugs will affect levofloxacin?   Some medicines can make levofloxacin much less effective when taken at the same time. If you take any of the following medicines, take your levofloxacin dose 2 hours before or 2 hours after you take the other medicine. antacids that contain magnesium or aluminum (such as Maalox, Mylanta, or Rolaids), or the ulcer medicine sucralfate (Carafate);  didanosine (Videx) powder or chewable tablets; or  vitamin or mineral supplements that contain aluminum, iron, magnesium, or zinc.  Tell your doctor about all your other medicines, especially:  theophylline;  a diuretic or \"water pill\";  heart rhythm medication;  insulin or oral diabetes medicine (check your blood sugar regularly);  medicine to treat depression or mental illness;  steroid medicine (such as prednisone);  a blood thinner --warfarin, Coumadin, Jantoven; or  NSAIDs (nonsteroidal anti-inflammatory drugs) --aspirin, ibuprofen (Advil, Motrin), naproxen (Aleve), celecoxib, diclofenac, indomethacin, meloxicam, and others. This list is not complete. Other drugs may affect levofloxacin, including prescription and over-the-counter medicines, vitamins, and herbal products. Not all possible drug interactions are listed here. Where can I get more information? Your pharmacist can provide more information about levofloxacin. Remember, keep this and all other medicines out of the reach of children, never share your medicines with others, and use this medication only for the indication prescribed. Every effort has been made to ensure that the information provided by [address] is accurate, up-to-date, and complete, but no guarantee is made to that effect. Drug information contained herein may be time sensitive. [facility] information has been compiled for use by healthcare practitioners and consumers in the [place] and therefore [facility] does not warrant that uses outside of the [place] are appropriate, unless specifically indicated otherwise.  [facility]'s drug information does not endorse drugs, diagnose patients or recommend therapy. [facility] drug information is an informational resource designed to assist licensed healthcare practitioners in caring for their patients and/or to serve consumers viewing this service as a supplement to, and not a substitute for, the expertise, skill, knowledge and judgment of healthcare practitioners. The absence of a warning for a given drug or drug combination in no way should be construed to indicate that the drug or drug combination is safe, effective or appropriate for any given patient. [facility] does not assume any responsibility for any aspect of healthcare administered with the aid of information [facility] provides. The information contained herein is not intended to cover all possible uses, directions, precautions, warnings, drug interactions, allergic reactions, or adverse effects. If you have questions about the drugs you are taking, check with your doctor, nurse or pharmacist.  Copyright 3391-8258 [address]: 14.01. Revision date: 1/7/2019. Care instructions adapted under license by [facility] ([facility]). If you have questions about a medical condition or this instruction, always ask your healthcare professional. [place] [address] any warranty or liability for your use of this information. melatonin  Pronunciation:  afshin daron TOE lyn  Brand:  Advanced Sleep Melatonin, Dual Spectrum Melatonin, Melatonin Time Release, Nature's Bounty Dual Spectrum Melatonin  What is the most important information I should know about melatonin? Follow all directions on the product label and package. Tell each of your healthcare providers about all your medical conditions, allergies, and all medicines you use. What is melatonin? Melatonin is a manmade form of a hormone produced in the brain that helps regulate your sleep and wake cycle.   Melatonin has been used in alternative medicine as a likely effective aid in treating insomnia (trouble falling asleep or staying asleep). Melatonin is also likely effective in treating sleep disorders in people who are blind. Melatonin is also possibly effective in treating jet lag, high blood pressure, tumors, low blood platelets (blood cells that help your blood to clot), insomnia caused by withdrawal from drug addiction, or anxiety caused by surgery. A topical form of melatonin applied to the skin is possibly effective in preventing sunburn. Melatonin has also been used to treat infertility, to improve sleep problems caused by shift work, or to enhance athletic performance. However, research has shown that melatonin may not be effective in treating these conditions. Other uses not proven with research have included treating depression, bipolar disorder, dementia, macular degeneration, attention deficit hyperactivity disorder, enlarged prostate, chronic fatigue syndrome, fibromyalgia, restless leg syndrome, stomach ulcers, irritable bowel syndrome, nicotine withdrawal, and many other conditions. It is not certain whether melatonin is effective in treating any medical condition. Medicinal use of this product has not been approved by the FDA. Melatonin should not be used in place of medication prescribed for you by your doctor. Melatonin is often sold as an herbal supplement. There are no regulated manufacturing standards in place for many herbal compounds and some marketed supplements have been found to be contaminated with toxic metals or other drugs. Herbal/health supplements should be purchased from a reliable source to minimize the risk of contamination. Melatonin may also be used for purposes not listed in this product guide. What should I discuss with my health care provider before taking melatonin? You should not use melatonin if you are allergic to it. Before using melatonin, talk to your healthcare provider.  You may not be able to use melatonin if you have certain medical conditions, especially:  diabetes;  depression;  a bleeding or blood clotting disorder such as hemophilia;  high or low blood pressure;  epilepsy or other seizure disorder; or  if you are using any medicine to prevent organ transplant rejection. Ask a doctor before using this product if you are pregnant or breastfeeding. High doses of melatonin may affect ovulation, making it difficult for you to get pregnant. Do not give any herbal/health supplement to a child without medical advice. How should I take melatonin? When considering the use of herbal supplements, seek the advice of your doctor. You may also consider consulting a practitioner who is trained in the use of herbal/health supplements. If you choose to use melatonin, use it as directed on the package or as directed by your doctor, pharmacist, or other healthcare provider. Do not use more of this product than is recommended on the label. Use the lowest dose of melatonin when you first start taking this product. Take melatonin at bedtime, or when you are getting ready for sleep. If you use this product to treat jet lag, take the melatonin at bedtime on the day you arrive at your destination and keep using this product for 2 to 5 days. If you take this product to treat other conditions not related to sleep, follow your healthcare provider's instructions about when and how to take melatonin. Do not crush, chew, or break an extended-release tablet. Swallow it whole. Do not swallow the orally disintegrating tablet whole. Allow it to dissolve in your mouth without chewing. If desired, you may drink liquid to help swallow the dissolved tablet. Measure liquid medicine carefully. Use the dosing syringe provided, or use a medicine dose-measuring device (not a kitchen spoon). Call your doctor if the condition you are treating with melatonin does not improve, or if it gets worse while using this product. Store at room temperature away from moisture and heat.   What happens if I miss a dose? Since melatonin is used when needed, you may not be on a dosing schedule. Skip any missed dose if it's almost time for your next dose. Do not use two doses at one time. What happens if I overdose? Seek emergency medical attention or call the Poison Help line at 1-117.508.6759. What should I avoid while taking melatonin? Melatonin may impair your thinking or reactions. Avoid driving or operating machinery for a least 4 hours after taking melatonin. This product may also affect your sleep-wake cycle for several days if you are traveling through many different time zones. Avoid using melatonin with other herbal/health supplements. Melatonin and many other herbal products can increase your risk of bleeding, seizures, or low blood pressure. Using certain products together can increase these risks. Avoid coffee, tea, cola, energy drinks, or other products that contain caffeine. What are the possible side effects of melatonin? Get emergency medical help if you have signs of an allergic reaction: hives; difficult breathing; swelling of your face, lips, tongue, or throat. Although not all side effects are known, melatonin is thought to be possibly safe when taken for a short period of time (up to 2 years in some people). Common side effects may include:  daytime drowsiness;  depressed mood, feeling irritable;  stomach pain;  headache; or  dizziness. This is not a complete list of side effects and others may occur. Call your doctor for medical advice about side effects. You may report side effects to FDA at 5-814-FEX-3820. What other drugs will affect melatonin? Using melatonin with other drugs that make you drowsy can worsen this effect.  Ask your doctor before using opioid medication, a sleeping pill, a muscle relaxer, medicine for anxiety or seizures, or herbal/health supplements may also cause drowsiness (tryptophan, [place] poppy, chamomile, gotu patito, kava, Jamila's wort, skullcap, valerian, and others). Do not take melatonin without medical advice if you are using any of the following medications:  an antibiotic;  aspirin or acetaminophen (Tylenol);  birth control pills;  insulin or oral diabetes medicine;  narcotic pain medicine;  stomach medicine --lansoprazole (Prevacid), omeprazole (Prilosec), ondansetron (Zofran); ADHD medication- -methylphenidate, Adderall, Ritalin, and others;  heart or blood pressure medicine --mexiletine, propranolol, verapamil;  medicine to treat or prevent blood clots --clopidogrel (Plavix), warfarin (Coumadin, Jantoven);  NSAIDs (nonsteroidal anti-inflammatory drugs) --ibuprofen (Advil, Motrin), naproxen (Aleve), celecoxib, diclofenac, indomethacin, meloxicam, and others; or  steroid medicine --prednisone, and others. This list is not complete. Other drugs may affect melatonin, including prescription and over-the-counter medicines, vitamins, and herbal products. Not all possible drug interactions are listed here. Where can I get more information? Your doctor, pharmacist, or health care provider may have more information about melatonin. Remember, keep this and all other medicines out of the reach of children, never share your medicines with others, and use this medication only for the indication prescribed. Every effort has been made to ensure that the information provided by [address] is accurate, up-to-date, and complete, but no guarantee is made to that effect. Drug information contained herein may be time sensitive. [facility] information has been compiled for use by healthcare practitioners and consumers in the [place] and therefore [facility] does not warrant that uses outside of the [place] are appropriate, unless specifically indicated otherwise. [facility] drug information does not endorse drugs, diagnose patients or recommend therapy.  [facility] drug information is an informational resource designed to assist licensed healthcare practitioners in caring for their patients and/or to serve consumers viewing this service as a supplement to, and not a substitute for, the expertise, skill, knowledge and judgment of healthcare practitioners. The absence of a warning for a given drug or drug combination in no way should be construed to indicate that the drug or drug combination is safe, effective or appropriate for any given patient. [facility] does not assume any responsibility for any aspect of healthcare administered with the aid of information [facility] provides. The information contained herein is not intended to cover all possible uses, directions, precautions, warnings, drug interactions, allergic reactions, or adverse effects. If you have questions about the drugs you are taking, check with your doctor, nurse or pharmacist.  Copyright 7864-0881 [address]: 3.08. Revision date: 3/19/2021. Care instructions adapted under license by [facility] ([facility]). If you have questions about a medical condition or this instruction, always ask your healthcare professional. [place] [address] any warranty or liability for your use of this information.

## 2022-01-15 NOTE — PROGRESS NOTES
Pt feeling better with temp normal. Up to chair and states he feels like eating again. Crepitus continues to rt arm, chest, back and face. Dressing remains in place to portable drainage system.

## 2022-01-15 NOTE — DISCHARGE INSTR - DIET

## 2022-01-15 NOTE — CARE COORDINATION
Transitional planning-talked with patient-plan is to go home with wife and [facility]. called [employer] [facility] and talked with Gadiel Coronado. infromed her of patient's discharge.  They will pull needed information from [facility]

## 2022-01-17 ENCOUNTER — CARE COORDINATION (OUTPATIENT)
Dept: CASE MANAGEMENT | Age: 72
End: 2022-01-17

## 2022-01-17 NOTE — CARE COORDINATION
[facility] Transitions Initial Follow Up Call    Call within 2 business days of discharge: Yes    Patient: Krupa Suarez Patient : 1950    MRN: <[id number]>  Reason for Admission: Primary adenocarcinoma of right lung     Discharge Date: 1/15/22 RARS: Readmission Risk Score: 14.1 ( )      Last Discharge [facility]       Complaint Diagnosis Description Type Department Provider    22  S/P thoracotomy Admission (Discharged) [facility] CAR 1 Judson Sheppard MD           Spoke with: Key Brannon and wife states he is doing okay today had a lot of pain yesterday CTN advised to take medication ATC and not wait til pain is unmanagable. Spoke to patient wife and she verbalized understanding. medications reviewed and taking all as directed. he is eating and dr inking well normal bowel and bladder elimination. [facility] nurses monitoring incisions. No conccerns    Facility: [facility]    Non-face-to-face services provided:  Obtained and reviewed discharge summary and/or continuity of care documents  Education of patient/family/caregiver/guardian to support self-management-medication ATC do not wait until pain is to high  Transitions of Care Initial Call    Was this an external facility discharge? No   Challenges to be reviewed by the provider   Additional needs identified to be addressed with provider: No  none             Method of communication with provider : none      Advance Care Planning:   Does patient have an Advance Directive: reviewed and current, reviewed and needs to be updated, not on file; education provided, patient declined education, decision maker updated and referral to internal ACP facilitator. Was this a readmission? No   Patient stated reason for admission: Primary adenocarcinoma of right lung     Patients top risk factors for readmission: lack of knowledge about disease    Care Transition Nurse (CTN) contacted the patient by telephone to perform post hospital discharge assessment.  Verified name and  with patient as identifiers. Provided introduction to self, and explanation of the CTN role. CTN reviewed discharge instructions, medical action plan and red flags with patient who verbalized understanding. Patient given an opportunity to ask questions and does not have any further questions or concerns at this time. Were discharge instructions available to patient? Yes. Reviewed appropriate site of care based on symptoms and resources available to patient including: PCP and Specialist. The patient agrees to contact the PCP office for questions related to their healthcare. Medication reconciliation was performed with patient, who verbalizes understanding of administration of home medications. Advised obtaining a 90-day supply of all daily and as-needed medications. Covid Risk Education     Educated patient about risk for severe COVID-19 due to risk factors according to CDC guidelines. LPN CC reviewed discharge instructions, medical action plan and red flag symptoms with the patient who verbalized understanding. Discussed COVID vaccination status: Yes. Education provided on COVID-19 vaccination as appropriate. Discussed exposure protocols and quarantine with CDC Guidelines. Patient was given an opportunity to verbalize any questions and concerns and agrees to contact LPN CC or health care provider for questions related to their healthcare. Reviewed and educated patient on any new and changed medications related to discharge diagnosis. Was patient discharged with a pulse oximeter? No Discussed and confirmed pulse oximeter discharge instructions and when to notify provider or seek emergency care. LPN CC provided contact information. No further follow-up call indicated based on severity of symptoms and risk factors.       Care Transitions 24 Hour Call    Do you have any ongoing symptoms?: Yes  Patient-reported symptoms: Chest Pain  Interventions for patient-reported symptoms: Notified Home Care  Do you have a copy of your discharge instructions?: Yes  Do you have all of your prescriptions and are they filled?: Yes  Have you been contacted by a [address]?: No  Have you scheduled your follow up appointment?: Yes  How are you going to get to your appointment?: Car - family or friend to transport  Were you discharged with any Home Care or [address]: Yes  Post Acute Services: [facility] (Comment: [facility])  Do you feel like you have everything you need to keep you well at home?: Yes  Care Transitions Interventions         Follow Up  Future Appointments   Date Time Provider Charbel Monae   1/20/2022  1:00 PM Angela Jenkins MD [facility]   12/15/2022  8:40 AM Pamela Snachez MD [facility] Craig LPN

## 2022-01-18 ENCOUNTER — HOSPITAL ENCOUNTER (OUTPATIENT)
Age: 72
Discharge: HOME | End: 2022-01-20
Payer: MEDICARE

## 2022-01-18 ENCOUNTER — HOSPITAL ENCOUNTER (OUTPATIENT)
Dept: GENERAL RADIOLOGY | Age: 72
Discharge: HOME | End: 2022-01-20
Payer: MEDICARE

## 2022-01-18 ENCOUNTER — HOSPITAL ENCOUNTER (OUTPATIENT)
Age: 72
Discharge: HOME | End: 2022-01-18
Payer: MEDICARE

## 2022-01-18 DIAGNOSIS — Z98.890 S/P THORACOTOMY: ICD-10-CM

## 2022-01-18 LAB
ANION GAP SERPL CALCULATED.3IONS-SCNC: 14 MMOL/L (ref 9–17)
BUN BLDV-MCNC: 14 MG/DL (ref 8–23)
BUN/CREAT BLD: 14 (ref 9–20)
CALCIUM SERPL-MCNC: 9.3 MG/DL (ref 8.6–10.4)
CHLORIDE BLD-SCNC: 97 MMOL/L (ref 98–107)
CO2: 22 MMOL/L (ref 20–31)
CREAT SERPL-MCNC: 1.01 MG/DL (ref 0.7–1.2)
GFR AFRICAN AMERICAN: >60 ML/MIN
GFR NON-AFRICAN AMERICAN: >60 ML/MIN
GFR SERPL CREATININE-BSD FRML MDRD: ABNORMAL ML/MIN/{1.73_M2}
GFR SERPL CREATININE-BSD FRML MDRD: ABNORMAL ML/MIN/{1.73_M2}
GLUCOSE BLD-MCNC: 98 MG/DL (ref 70–99)
MAGNESIUM: 1.5 MG/DL (ref 1.6–2.6)
POTASSIUM SERPL-SCNC: 4.3 MMOL/L (ref 3.7–5.3)
SODIUM BLD-SCNC: 133 MMOL/L (ref 135–144)

## 2022-01-18 PROCEDURE — 83735 ASSAY OF MAGNESIUM: CPT

## 2022-01-18 PROCEDURE — 71046 X-RAY EXAM CHEST 2 VIEWS: CPT

## 2022-01-18 PROCEDURE — 36415 COLL VENOUS BLD VENIPUNCTURE: CPT

## 2022-01-18 PROCEDURE — 80048 BASIC METABOLIC PNL TOTAL CA: CPT

## 2022-01-19 LAB
CULTURE: NORMAL
CULTURE: NORMAL
Lab: NORMAL
Lab: NORMAL
SPECIMEN DESCRIPTION: NORMAL
SPECIMEN DESCRIPTION: NORMAL

## 2022-01-20 ENCOUNTER — HOSPITAL ENCOUNTER (OUTPATIENT)
Age: 72
Discharge: HOME | End: 2022-01-22
Payer: MEDICARE

## 2022-01-20 ENCOUNTER — HOSPITAL ENCOUNTER (OUTPATIENT)
Dept: GENERAL RADIOLOGY | Age: 72
Discharge: HOME | End: 2022-01-22
Payer: MEDICARE

## 2022-01-20 ENCOUNTER — OFFICE VISIT (OUTPATIENT)
Dept: CARDIOTHORACIC SURGERY | Age: 72
End: 2022-01-20

## 2022-01-20 ENCOUNTER — HOSPITAL ENCOUNTER (OUTPATIENT)
Dept: GENERAL RADIOLOGY | Age: 72
End: 2022-01-20
Payer: MEDICARE

## 2022-01-20 VITALS
TEMPERATURE: 98.5 F | WEIGHT: 150.3 LBS | OXYGEN SATURATION: 98 % | HEART RATE: 108 BPM | BODY MASS INDEX: 20.36 KG/M2 | DIASTOLIC BLOOD PRESSURE: 53 MMHG | RESPIRATION RATE: 18 BRPM | SYSTOLIC BLOOD PRESSURE: 89 MMHG | HEIGHT: 72 IN

## 2022-01-20 DIAGNOSIS — Z98.890 S/P THORACOTOMY: Primary | ICD-10-CM

## 2022-01-20 DIAGNOSIS — J95.811 PNEUMOTHORAX, POST BIOPSY, RIGHT: ICD-10-CM

## 2022-01-20 DIAGNOSIS — J95.811 POSTPROCEDURAL PNEUMOTHORAX: ICD-10-CM

## 2022-01-20 PROCEDURE — 99024 POSTOP FOLLOW-UP VISIT: CPT | Performed by: THORACIC SURGERY (CARDIOTHORACIC VASCULAR SURGERY)

## 2022-01-20 PROCEDURE — 71046 X-RAY EXAM CHEST 2 VIEWS: CPT

## 2022-01-20 ASSESSMENT — ENCOUNTER SYMPTOMS
COLOR CHANGE: 0
ABDOMINAL PAIN: 0
COUGH: 0
SHORTNESS OF BREATH: 1

## 2022-01-20 NOTE — PROGRESS NOTES
[facility]  Office Visit      Subjective:  Mr. Naun Londono is a 70 y.o. male s/p right VATS with right middle lobectomy on 1/4/2022 with Dr. Juarez Sewell, at [facility]. He feels well today. Pain is controlled, He is requiring narcotics for pain relief. Admits to improving shortness of breath, continued lack of appetite and surgical site pain which is aggravated by the chest tube and movement. Denies chest pain. Increasing activity as tolerated. Appetite is not returning to normal. Bowel movements are regular. Denies fever, chills, or signs of infection at incision sites. Plan of care reviewed and questions answered. Chest xray reviewed. Chest tube removed by Vivi KERR with occlusive dressing applied. He will go for repeat chest x-ray prior to leaving the facility. He has a follow up appointment with Oncology at [facility] on 1/27/2022. Mr. Naun Londono is recovering at home. ROS  Review of Systems   Constitutional: Positive for activity change, appetite change and fatigue. Negative for chills and fever. HENT: Negative for congestion. Eyes: Negative for visual disturbance. Respiratory: Positive for shortness of breath. Negative for cough. Cardiovascular: Negative for chest pain. Gastrointestinal: Negative for abdominal pain. Genitourinary: Negative for dysuria. Musculoskeletal: Negative for gait problem. Skin: Negative for color change. Neurological: Positive for weakness. Negative for dizziness. Psychiatric/Behavioral: Negative for suicidal ideas. The patient is not nervous/anxious. Physical Exam  Vitals:  Vitals:    01/20/22 1305   BP: (!) 89/53   Pulse:    Resp:    Temp:    SpO2:        Physical Exam  Vitals reviewed. Constitutional:       General: He is not in acute distress. Appearance: Normal appearance. He is normal weight. Comments: Appears fatigued, tires easily   HENT:      Head: Normocephalic.       Nose: Nose normal. Mouth/Throat:      Mouth: Mucous membranes are moist.      Pharynx: Oropharynx is clear. Eyes:      Extraocular Movements: Extraocular movements intact. Pupils: Pupils are equal, round, and reactive to light. Cardiovascular:      Rate and Rhythm: Normal rate and regular rhythm. Pulses: Normal pulses. Heart sounds: Normal heart sounds. Pulmonary:      Effort: Pulmonary effort is normal.      Breath sounds: Examination of the right-upper field reveals decreased breath sounds. Examination of the right-middle field reveals decreased breath sounds. Decreased breath sounds present. Abdominal:      General: Abdomen is flat. Tenderness: There is no abdominal tenderness. Musculoskeletal:         General: Normal range of motion. Cervical back: Normal range of motion. Right lower leg: No edema. Left lower leg: No edema. Skin:     General: Skin is warm and dry. Comments: Crepitus remains, decreased severity   Neurological:      General: No focal deficit present. Mental Status: He is alert and oriented to person, place, and time. Psychiatric:         Mood and Affect: Mood normal.         Behavior: Behavior normal.          Current Medications:  Current Outpatient Medications:     oxyCODONE (ROXICODONE) 5 MG immediate release tablet, Take 1 tablet by mouth every 4 hours as needed for Pain for up to 7 days. , Disp: 42 tablet, Rfl: 0    gabapentin (NEURONTIN) 100 MG capsule, Take 1 capsule by mouth 3 times daily for 14 days. , Disp: 42 capsule, Rfl: 0    melatonin 5 MG TABS tablet, Take 1 tablet by mouth nightly as needed (sleep), Disp: 30 tablet, Rfl: 0    levoFLOXacin (LEVAQUIN) 750 MG tablet, Take 1 tablet by mouth daily for 10 days, Disp: 10 tablet, Rfl: 0    SERTRALINE HCL PO, Take 50 mg by mouth nightly, Disp: , Rfl:     tamsulosin (FLOMAX) 0.4 MG capsule, Take 0.4 mg by mouth daily, Disp: , Rfl:     tiZANidine (ZANAFLEX) 4 MG capsule, Take 4 mg by mouth 3 times daily as needed, Disp: , Rfl:     ferrous sulfate (IRON 325) 325 (65 Fe) MG tablet, Take 325 mg by mouth daily , Disp: , Rfl:     isosorbide mononitrate (IMDUR) 30 MG extended release tablet, 1/2 tablet in the morning, Disp: , Rfl:     lisinopril (PRINIVIL;ZESTRIL) 20 MG tablet, Take 20 mg by mouth daily , Disp: , Rfl:     nitroGLYCERIN (NITRODUR) 0.4 MG/HR, 1 dose under tongue as needed for chest pain. max of 3 in one day, Disp: , Rfl:     pantoprazole (PROTONIX) 40 MG injection, as needed , Disp: , Rfl:     amLODIPine (NORVASC) 5 MG tablet, Take 1 tablet by mouth daily, Disp: 90 tablet, Rfl: 3    clopidogrel (PLAVIX) 75 MG tablet, Take 1 tablet by mouth daily (Patient taking differently: Take 75 mg by mouth daily Ordered by heart doctor, Chan Mcgarry), Disp: 90 tablet, Rfl: 3    atenolol (TENORMIN) 25 MG tablet, Take 1 tablet by mouth daily, Disp: 90 tablet, Rfl: 3    Insulin Glargine, 2 Unit Dial, 300 UNIT/ML SOPN, Insulin Glargine Insulin Glargine U-300 Conc Active 10 UNIT Subcutaneous Every morning May 7th, 2020 11:47am 05-  [facility] ([id number]), Disp: , Rfl:     atorvastatin (LIPITOR) 20 MG tablet, Take 1 tablet by mouth daily, Disp: 90 tablet, Rfl: 3    sitaGLIPtan-metformin (JANUMET)  MG per tablet, Take 1 tablet by mouth 2 times daily (with meals), Disp: , Rfl:     glimepiride (AMARYL) 2 MG tablet, Take 1 tablet by mouth 2 times daily, Disp: 180 tablet, Rfl: 3    aspirin 81 MG tablet, Take 81 mg by mouth daily Ordered by heart doctor, Chan Mcgarry, Disp: , Rfl:     Past Surgical History:   Procedure Laterality Date   [address]  2007    patient states had 2 small blockages    CARDIAC CATHETERIZATION  02/09/2017    LMCA: Lesion on LMCA: Distal subsection 20% stenosis. LAD: Lesion on 1st Diag: Mid subsection 75% stenosis. RCA: Lesion on Mid RCA: Mid subsection 100% stenosis.       CARDIAC CATHETERIZATION  02/09/2017 Attempted PCI to chronic total occlusion of RCA was not successful. Unable to cross with multiple wires due to heavy calcification and toruosity.  CARDIAC CATHETERIZATION  01/14/2019    CATARACT REMOVAL  04/06/2015    Right eye - Dr. Adele Mcintosh  06/08/2015    Dr. Charlet Sandifer - left eye    COLONOSCOPY  01/11/2006    Dr. Tana Gannon - internal hemorrhoids, no polps    COLONOSCOPY  05/23/2016    -polyp    CT NEEDLE BIOPSY LUNG PERCUTANEOUS  7/28/2021    CT NEEDLE BIOPSY LUNG PERCUTANEOUS 7/28/2021 [facility] CT SCAN    HEMORRHOID SURGERY      HERNIA REPAIR Left     inguinal    IR CHEST TUBE INSERTION  1/11/2022    IR CHEST TUBE INSERTION 1/11/2022 Mary Lyle MD [facility]    LUNG BIOPSY Right 07/28/2021    Dr Heather Sinclair, TOTAL Right 1/4/2022    MIDDLE WEDGE RESECTION, POSSIBLE MIDDLE LOBECTOMY VIA THORACOTOMY performed by Silver Jewell MD at [facility]    c4-5 laminectomy and fusion    OTHER SURGICAL HISTORY  10/2014    Right foot - 7 from heal and 1 from great toe at [address] [place] [address]  12/11/2014    pain injection - cervical    UPPER GASTROINTESTINAL ENDOSCOPY  12/28/2005    Dr. Tana Gannon - mild esophagitis and gastritis    UPPER GASTROINTESTINAL ENDOSCOPY N/A 02/05/2019    -bx(neg H-Pylori)retained gastric content, possible submucosal mass of gastric cardia    UPPER GASTROINTESTINAL ENDOSCOPY N/A 10/22/2019    EGD BIOPSY performed by Karen Rush MD at [facility] Hx: reports that he quit smoking about 3 months ago. His smoking use included cigarettes. He has a 13.50 pack-year smoking history. He has never used smokeless tobacco.    Assessment & Plan:   Octavio Laird was seen today for post-op check. Diagnoses and all orders for this visit:    Postprocedural pneumothorax  -     XR CHEST STANDARD (2 VW)         1. Chest tube removed without incident.  Incisions healing well.  2. Continue current medications. 3. Follow up with Oncology (1/27/2022), and PCP as instructed at hospital discharge  4. Follow up with CT surgery group as needed    On this date 1/20/2022 I have spent 38 minutes reviewing previous notes, test results and face to face with the patient discussing the diagnosis and importance of compliance with the treatment plan as well as documenting on the day of the visit.       Marga Banuelos, APRN - CNP,APRN CNP

## 2022-01-21 NOTE — ONCOLOGY
Pt had a 1.8 cm right middle lobe NSCLC that was invading the pleural surface. This is a surgical stage 2a lesion. No lymph node involvement.
(3) walks occasionally

## 2022-01-24 ENCOUNTER — HOSPITAL ENCOUNTER (OUTPATIENT)
Age: 72
Discharge: HOME | End: 2022-01-26
Payer: MEDICARE

## 2022-01-24 ENCOUNTER — HOSPITAL ENCOUNTER (OUTPATIENT)
Dept: GENERAL RADIOLOGY | Age: 72
Discharge: HOME | End: 2022-01-26
Payer: MEDICARE

## 2022-01-24 DIAGNOSIS — J95.811 POSTPROCEDURAL PNEUMOTHORAX: ICD-10-CM

## 2022-01-24 PROCEDURE — 71046 X-RAY EXAM CHEST 2 VIEWS: CPT

## 2022-01-25 ENCOUNTER — TELEPHONE (OUTPATIENT)
Dept: CARDIOTHORACIC SURGERY | Age: 72
End: 2022-01-25

## 2022-01-27 ENCOUNTER — HOSPITAL ENCOUNTER (OUTPATIENT)
Age: 72
Discharge: HOME | End: 2022-01-27
Payer: MEDICARE

## 2022-01-27 ENCOUNTER — OFFICE VISIT (OUTPATIENT)
Dept: ONCOLOGY | Age: 72
End: 2022-01-27
Payer: MEDICARE

## 2022-01-27 ENCOUNTER — HOSPITAL ENCOUNTER (OUTPATIENT)
Dept: GENERAL RADIOLOGY | Age: 72
Discharge: HOME | End: 2022-01-29
Payer: MEDICARE

## 2022-01-27 VITALS
RESPIRATION RATE: 18 BRPM | WEIGHT: 146 LBS | HEIGHT: 72 IN | BODY MASS INDEX: 19.77 KG/M2 | HEART RATE: 99 BPM | TEMPERATURE: 97.6 F | DIASTOLIC BLOOD PRESSURE: 59 MMHG | SYSTOLIC BLOOD PRESSURE: 101 MMHG

## 2022-01-27 DIAGNOSIS — C34.01 LUNG CANCER, MAIN BRONCHUS, RIGHT (HCC): ICD-10-CM

## 2022-01-27 DIAGNOSIS — C34.01 LUNG CANCER, MAIN BRONCHUS, RIGHT (HCC): Primary | ICD-10-CM

## 2022-01-27 PROBLEM — C34.91 PRIMARY ADENOCARCINOMA OF RIGHT LUNG (HCC): Status: RESOLVED | Noted: 2021-12-15 | Resolved: 2022-01-27

## 2022-01-27 PROCEDURE — 4040F PNEUMOC VAC/ADMIN/RCVD: CPT | Performed by: INTERNAL MEDICINE

## 2022-01-27 PROCEDURE — G8484 FLU IMMUNIZE NO ADMIN: HCPCS | Performed by: INTERNAL MEDICINE

## 2022-01-27 PROCEDURE — 1111F DSCHRG MED/CURRENT MED MERGE: CPT | Performed by: INTERNAL MEDICINE

## 2022-01-27 PROCEDURE — G8420 CALC BMI NORM PARAMETERS: HCPCS | Performed by: INTERNAL MEDICINE

## 2022-01-27 PROCEDURE — 3017F COLORECTAL CA SCREEN DOC REV: CPT | Performed by: INTERNAL MEDICINE

## 2022-01-27 PROCEDURE — 99215 OFFICE O/P EST HI 40 MIN: CPT | Performed by: INTERNAL MEDICINE

## 2022-01-27 PROCEDURE — 99211 OFF/OP EST MAY X REQ PHY/QHP: CPT

## 2022-01-27 PROCEDURE — 1036F TOBACCO NON-USER: CPT | Performed by: INTERNAL MEDICINE

## 2022-01-27 PROCEDURE — 71046 X-RAY EXAM CHEST 2 VIEWS: CPT

## 2022-01-27 PROCEDURE — 1123F ACP DISCUSS/DSCN MKR DOCD: CPT | Performed by: INTERNAL MEDICINE

## 2022-01-27 PROCEDURE — G8427 DOCREV CUR MEDS BY ELIG CLIN: HCPCS | Performed by: INTERNAL MEDICINE

## 2022-01-27 RX ORDER — CYANOCOBALAMIN 1000 UG/ML
1000 INJECTION INTRAMUSCULAR; SUBCUTANEOUS ONCE
Status: CANCELLED | OUTPATIENT
Start: 2022-01-28 | End: 2022-02-02

## 2022-01-27 NOTE — PROGRESS NOTES
Patient ID: Alejandro Lewis, [birth year], [id number], 70 y.o. Referred by :  No ref. 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 male history of tobacco smoking and has been following with pulmonary for right middle lobe lung nodule where CT chest did show increase in the size in the scan on November 2, 2021 patient had biopsy on July 28, 2021 which showed benign parenchyma with chronic inflammation however the lesion was active on the PET there was no evidence of metastasis patient admitted to [facility] on January 4, 2022 for VATS and wedge resection which ended up with lobectomy, final pathology compatible with adenocarcinoma 1.8 cm and carcinoma invade into but not through the visceral pleura lymph nodes were negative patient status post right middle lobe lobectomy and margin were free of involvement by carcinoma   Patient developed right apical pneumothorax chest tube placed in the hospital and was discharged after chest tube removed    Interval history  Patient recovering from surgery however for the last 2 days started having more pain on the right side of the chest  Reported decreased appetite and occasional nausea the nausea has persistent for a few months prior to surgery and cancer diagnosis    Past Medical History:   Diagnosis Date    Abnormal cardiac cath 09/20/2013    LMCA; Mild irregularities 10-20%. LAD: Abnormal.  Mild to moderate irregularities throughout the LAD and branches. LCx:  abnormal.  80-90% stenosis in a moderate sized OM1. RCA: Abnormal.  40-50% mid RCA stenosis.     Actinic keratosis     Arthritis     neck, fingers    CAD (coronary artery disease)     Calculus of kidney     Cervical stenosis of spine     Diabetes mellitus (HCC)     Dr. Lion Nair, CNP, [place]    Full dentures     GERD (gastroesophageal reflux disease)     H/O echocardiogram 04/29/2016    Stress echo. Normal resting LV systolic function,normal systolic restponse to exercise. No evidence of reversible  ischemia on this maximal,symptom limited,treadmill exerxiess stress echocardiography study    History of cardiac cath 02/09/2017    LMCA: Lesion on LMCA: Distal subsection . 20% stenosis. LAD: Lesion on 1st Diag: Mid subsection . 75% stenosis. RCA: Lesion on Mid RCA: Mid subsection . 100% stenosis. Comments: The distal RCA fills by left to right collateralization. Dominance: Mixed. LV function assessed as: Normal. EF 65%.  History of echocardiogram 06/11/2018    EF 60%. Mildly increased LV wall thickness. Evidence of mild diastolic dysfunction seen.  History of echocardiogram 01/18/2019    EF of 65%. LV wall thickness is mildly increased. Aortic valve leaflets are mildly thickened.  History of heart attack     History of tobacco abuse     Quit 10/2021, 1/4 ppd for 47 years    Hyperlipidemia     Hypertension     Dr. Lion Nair, CNP    Pulmonary nodule 12/2021    Right middle lobe    Raynaud's phenomenon     S/P angioplasty with stent 09/20/2013    PTCA-NEDRA of  the OM 1    Wears hearing aid in both ears        Past Surgical History:   Procedure Laterality Date   [address]  2007    patient states had 2 small blockages    CARDIAC CATHETERIZATION  02/09/2017    LMCA: Lesion on LMCA: Distal subsection 20% stenosis. LAD: Lesion on 1st Diag: Mid subsection 75% stenosis. RCA: Lesion on Mid RCA: Mid subsection 100% stenosis.  CARDIAC CATHETERIZATION  02/09/2017    Attempted PCI to chronic total occlusion of RCA was not successful. Unable to cross with multiple wires due to heavy calcification and toruosity.     CARDIAC CATHETERIZATION  01/14/2019    CATARACT REMOVAL  04/06/2015    Right eye - Dr. Young Hem  06/08/2015    Dr. Tamiak Gomez - left eye    COLONOSCOPY  01/11/2006    Dr. Rufino Amador - internal hemorrhoids, no polps    COLONOSCOPY  05/23/2016    -polyp    CT NEEDLE BIOPSY LUNG PERCUTANEOUS  7/28/2021    CT NEEDLE BIOPSY LUNG PERCUTANEOUS 7/28/2021 [facility] CT SCAN    HEMORRHOID SURGERY      HERNIA REPAIR Left     inguinal    IR CHEST TUBE INSERTION  1/11/2022    IR CHEST TUBE INSERTION 1/11/2022 Sameera Jones MD [facility]ED[facility]    LUNG BIOPSY Right 07/28/2021    Dr Juan Luis Hardin, TOTAL Right 1/4/2022    MIDDLE WEDGE RESECTION, POSSIBLE MIDDLE LOBECTOMY VIA THORACOTOMY performed by Fozia Lou MD at [facility]    c4-5 laminectomy and fusion    OTHER SURGICAL HISTORY  10/2014    Right foot - 7 from heal and 1 from great toe at [address]  12/11/2014    pain injection - cervical    UPPER GASTROINTESTINAL ENDOSCOPY  12/28/2005    Dr. Gui Hooper - mild esophagitis and gastritis    UPPER GASTROINTESTINAL ENDOSCOPY N/A 02/05/2019    -bx(neg H-Pylori)retained gastric content, possible submucosal mass of gastric cardia    UPPER GASTROINTESTINAL ENDOSCOPY N/A 10/22/2019    EGD BIOPSY performed by Raj Mendoza MD at [facility]   Allergen Reactions    Niacin And Related      Turns red, no trouble breathing    Minocycline Hcl Rash    Other Nausea And Vomiting     Patient states on all pain medications he gets nausea and vomiting. Doctor ordered a medicine (nausea) to take before pain medication    Oxycodone Hcl      vomits       Current Outpatient Medications   Medication Sig Dispense Refill    gabapentin (NEURONTIN) 100 MG capsule Take 1 capsule by mouth 3 times daily for 14 days.  42 capsule 0    melatonin 5 MG TABS tablet Take 1 tablet by mouth nightly as needed (sleep) 30 tablet 0    SERTRALINE HCL PO Take 50 mg by mouth nightly      tamsulosin (FLOMAX) 0.4 MG capsule Take 0.4 mg by mouth daily      tiZANidine (ZANAFLEX) 4 MG capsule Take 4 mg by mouth 3 times daily as needed      ferrous sulfate (IRON 325) 325 (65 Fe) MG tablet Take 325 mg by mouth daily       isosorbide mononitrate (IMDUR) 30 MG extended release tablet 1/2 tablet in the morning      lisinopril (PRINIVIL;ZESTRIL) 20 MG tablet Take 20 mg by mouth daily       nitroGLYCERIN (NITRODUR) 0.4 MG/HR 1 dose under tongue as needed for chest pain. max of 3 in one day      pantoprazole (PROTONIX) 40 MG injection as needed       amLODIPine (NORVASC) 5 MG tablet Take 1 tablet by mouth daily 90 tablet 3    clopidogrel (PLAVIX) 75 MG tablet Take 1 tablet by mouth daily (Patient taking differently: Take 75 mg by mouth daily Ordered by heart doctor, Chan Pritchett) 90 tablet 3    atenolol (TENORMIN) 25 MG tablet Take 1 tablet by mouth daily 90 tablet 3    Insulin Glargine, 2 Unit Dial, 300 UNIT/ML SOPN Insulin Glargine Insulin Glargine U-300 Conc Active 10 UNIT Subcutaneous Every morning May 7th, 2020 11:47am 2020  [facility] ([id number])      atorvastatin (LIPITOR) 20 MG tablet Take 1 tablet by mouth daily 90 tablet 3    sitaGLIPtan-metformin (JANUMET)  MG per tablet Take 1 tablet by mouth 2 times daily (with meals)      glimepiride (AMARYL) 2 MG tablet Take 1 tablet by mouth 2 times daily 180 tablet 3    aspirin 81 MG tablet Take 81 mg by mouth daily Ordered by heart doctor, Chan Pritchett       No current facility-administered medications for this visit.        Social History     Socioeconomic History    Marital status:      Spouse name: Not on file    Number of children: Not on file    Years of education: Not on file    Highest education level: Not on file   Occupational History    Not on file   Tobacco Use    Smoking status: Former Smoker     Packs/day: 0.25     Years: 54.00     Pack years: 13.50     Types: Cigarettes     Quit date: 10/1/2021     Years since quittin.3    Smokeless tobacco: Never Used   Vaping Use    Vaping Use: Never used   Substance and Sexual Activity    Alcohol use: No    Drug use: No    Sexual activity: Not on file   Other Topics Concern    Not on file   Social History Narrative    Not on file     Social Determinants of Health     Financial Resource Strain:     Difficulty of Paying Living Expenses: Not on file   Food Insecurity:     Worried About Running Out of Food in the Last Year: Not on file    Luis E of Food in the Last Year: Not on file   Transportation Needs:     Lack of Transportation (Medical): Not on file    Lack of Transportation (Non-Medical): Not on file   Physical Activity:     Days of Exercise per Week: Not on file    Minutes of Exercise per Session: Not on file   Stress:     Feeling of Stress : Not on file   Social Connections:     Frequency of Communication with Friends and Family: Not on file    Frequency of Social Gatherings with Friends and Family: Not on file    Attends [employer] Services: Not on file    Active Member of [address] [place] [address] [employer] or Organizations: Not on file    Attends Club or Organization Meetings: Not on file    Marital Status: Not on file   Intimate Partner Violence:     Fear of Current or Ex-Partner: Not on file    Emotionally Abused: Not on file    Physically Abused: Not on file    Sexually Abused: Not on file   Housing Stability:     Unable to Pay for Housing in the Last Year: Not on file    Number of [place] in the Last Year: Not on file    Unstable Housing in the Last Year: Not on file       Family History   Problem Relation Age of Onset    Heart Disease Mother     Diabetes Mother     High Blood Pressure Mother     Diabetes Father     Alzheimer's Disease Father     Heart Disease Sister         CABG    Heart Disease Brother     Heart Disease Brother     Heart Disease Brother     Cancer Brother         REVIEW OF SYSTEM:     Constitutional: No fever or chills.  No night sweats, no weight loss   Eyes: No eye discharge, double vision, or eye pain   HEENT: negative for sore mouth, sore throat, hoarseness and voice change   Respiratory: negative for cough , sputum, dyspnea, wheezing, hemoptysis, chest pain   Cardiovascular: negative for chest pain, dyspnea, palpitations, orthopnea, PND   Gastrointestinal: negative for nausea, vomiting, diarrhea, constipation, abdominal pain, Dysphagia, hematemesis and hematochezia   Genitourinary: negative for frequency, dysuria, nocturia, urinary incontinence, and hematuria   Integument: negative for rash, skin lesions, bruises. Hematologic/Lymphatic: negative for easy bruising, bleeding, lymphadenopathy, petechiae and swelling/edema   Endocrine: negative for heat or cold intolerance, tremor, weight changes, change in bowel habits and hair loss   Musculoskeletal: negative for myalgias, arthralgias, pain, joint swelling,and bone pain   Neurological: negative for headaches, dizziness, seizures, weakness, numbness       OBJECTIVE:         There were no vitals filed for this visit.     PHYSICAL EXAM:   General appearance - well appearing, no in pain or distress   Mental status - alert and cooperative   Eyes - pupils equal and reactive, extraocular eye movements intact   Ears - bilateral TM's and external ear canals normal   Mouth - mucous membranes moist, pharynx normal without lesions   Neck - supple, no significant adenopathy   Lymphatics - no palpable lymphadenopathy, no hepatosplenomegaly   Chest - clear to auscultation, no wheezes, rales or rhonchi, symmetric air entry   Heart - normal rate, regular rhythm, normal S1, S2, no murmurs, rubs, clicks or gallops   Abdomen - soft, nontender, nondistended, no masses or organomegaly   Neurological - alert, oriented, normal speech, no focal findings or movement disorder noted   Musculoskeletal - no joint tenderness, deformity or swelling   Extremities - peripheral pulses normal, no pedal edema, no clubbing or cyanosis   Skin - normal coloration and turgor, no rashes, no suspicious skin lesions noted 1: MIDDLE LOBE OF RIGHT LUNG (A)   2: LYMPH NODE #9 OF LUNG (B)   3: LYMPH NODE #7   4: RIGHT MIDDLE LOBE     Gross Description   1.  \"ANA DAVIS, MIDDLE LOBE OF RIGHT LUNG\" Received fresh is a   6.4 x 4.2 x 2.8 cm lung wedge with staple line.  The pleura is   pink-tan with an area of slightly thickening (inked black). Sectioning reveals a 1.8 x 1.2 x 0.8 cm subpleural tan-white mass   lesion that is 0.8 cm from the staple line margin.  The remaining   parenchyma is pink-tan with no other lesions.  1TP, 1DQ, 1FS, Cassette   summary:  \"A\" frozen section lesion, \"B\" remainder of lesion with   pleura inked black, \"C\" staple line margin en face. 2.  \"ANA DAVIS, LYMPH NODE #9 OF LUNG\" Received fresh are two   fragments, 0.3 cm each.  1TP, 1FS, 1cs. 3.  \"ANA DAVIS, LYMPH NODE #7\" Received fresh are two   anthracotic fragments, 0.6 and 0.7 cm in greatest dimension.  1TP,   1FS, 1cs.     4.  \"ANA DAVIS, RIGHT MIDDLE LOBE\" Received fresh is a 50 gram,   8.5 x 6.8 x 3.5 cm lobe of lung with multiple staple lines.  The   pleura is wrinkled purple-gray.  Sectioning reveals spongy dark red   parenchyma with no masses.  No markedly enlarged nodes are identified   at the hilum.  Cassette summary:  \"A\" bronchial margin frozen section   cassette resubmitted as received, \"B\" hilar soft tissue and vascular   margin, \"C\" uninvolved lung (along staple line).  tm     Intraoperative Diagnosis   1.  FSDX:  Non-small cell carcinoma, favor adenocarcinoma.  (14:00,   RDD)   2.  FSDX:  Fibrofatty tissue.  (14:11, RDD)   3.  FSDX:  Lymph node, negative for carcinoma.  (14:19, RDD)   4.  FSDX:  Bronchial margin, negative for carcinoma.  (16:17, RDD)     Microscopic Description   1-4.  Microscopic examination performed.      PROCEDURE: Wedge biopsy, followed by lobectomy       SPECIMEN LATERALITY: Right   TUMOR FOCALITY: Single tumor       TUMOR SITE: Right middle lobe   TUMOR SIZE --     1.8 x 1.2 x 0.8 cm         HISTOLOGIC TYPE: Adenocarcinoma. Sections show a large and anaplastic non-small cell malignancy with   cohesion and a few instances of glandular differentiation.  Neoplastic   cells are large with copious eosinophilic cytoplasm and large   vesicular nuclei.  Tumor is characterized further utilizing control   appropriate immunostains.  Cells of carcinoma are negative for p40 and   they are positive for TTF-1 and CK7.  Morphology and immunophenotype   are that of an adenocarcinoma, lung primary. Note: The case is reviewed by a second Pathologist for quality   assurance with consensus (DS).       HISTOLOGIC GRADE: Grade 2 (of 4)           VISCERAL PLEURA INVASION:Reticulin stain with appropriately reactive   control is utilized to evaluate pleura.  Tumor extends beyond the   elastic layer into the pleura but not to the pleural surface (PL1)     DIRECT INVASION OF ADJACENT STRUCTURES: No     TREATMENT EFFECT: None apparent.      LYMPHOVASCULAR INVASION: Absent                         MARGINS --   -BRONCHIAL: Free of tumor       -VASCULAR: Free of tumor       -PARENCHYMAL: Free of tumor       -OTHER ATTACHED TISSUE MARGIN (IF APPLICABLE): N/A       REGIONAL LYMPH NODES: 3   LYMPH NODES(S) FROM PRIOR PROCEDURES: No       NUMBER AND STATION(S) EXAMINED:   See diagnoses   NUMBER AND STATION(S) WITH METASTASIS: 0         DISTANT METASTASIS--   DISTANT SITE(S) INVOLVED, IF APPLICABLE: N/A         PATHOLOGIC STAGE CLASSIFICATION:     pT2a  pN0   CAP Lung 4200 in conjunction with AJCC 8 ed.         SURGICAL PATHOLOGY CONSULTATION         Patient Name: Charity Closs: [id number]   Path Number: [id number]     [address] [place], [address]   [phone]   Fax: [phone]          IMAGING DATA:      XR CHEST (2 VW)  Narrative: EXAMINATION:  TWO XRAY VIEWS OF THE CHEST    1/24/2022 8:14 am    COMPARISON:  01/22/2022. HISTORY:  ORDERING SYSTEM PROVIDED HISTORY: Postprocedural pneumothorax  TECHNOLOGIST PROVIDED HISTORY:  Repeat evaluation    FINDINGS:  There are postsurgical change involving the right hilum. The heart size and  pulmonary vasculature within normal limits. No acute infiltrates are seen. No pneumothorax is seen. There has been decrease in the right and left chest  wall subcutaneous emphysema. Impression: 1. No pneumothorax seen. 2. Decrease in the subcutaneous emphysema. CT chest with IV contrast on 11/2/2021    The previously biopsied, somewhat bilobed nodule involving right middle lobe   is slightly increased in size since the prior study now measuring 1.8 x 1.0   cm, once measuring 1.8 x 0.6 cm.  There does appear to be some spiculation. No new pulmonary nodule is seen. PET/CT on June 30, 2021    1. Significant oval increase in size of known anterior superior middle lobe   nodule along the minor fissure.  This now measures 6 x 18 mm compared to 10 x   4 mm on the prior.  This remains a LUNG RADS 4B (very suspicious) nodule. Recommend repeat PET-CT and or tissue sampling given appearance and   significant interval increase in size. 2. No major change in the fat density 2.3 cm nodule in the gastric fundus   adjacent to the GE junction.  Note this was FDG avid on the prior PET-CT. 3. Mild emphysema.    The findings were sent to the Radiology Results [address] at 12:06   pm on 6/10/2021to be communicated to a licensed caregiver.           ASSESSMENT / PLAN:    Diagnoses and all orders for this visit:    Lung cancer, main bronchus, right ([facility] [address]           3 79-year-old man history of smoking diagnosed with pathology stage Ib(T2 aN0 M0) adenocarcinoma of the right middle lobe status post right VATS thoracotomy with right middle lobectomy done on January 4, 2022, patient had a high risk features manifested by involvement of visceral pleura, discussed

## 2022-01-28 ENCOUNTER — TELEPHONE (OUTPATIENT)
Dept: ONCOLOGY | Age: 72
End: 2022-01-28

## 2022-01-28 ENCOUNTER — HOSPITAL ENCOUNTER (OUTPATIENT)
Dept: INFUSION THERAPY | Age: 72
Discharge: HOME | End: 2022-01-28
Payer: MEDICARE

## 2022-01-28 VITALS
TEMPERATURE: 97.1 F | DIASTOLIC BLOOD PRESSURE: 61 MMHG | SYSTOLIC BLOOD PRESSURE: 107 MMHG | RESPIRATION RATE: 18 BRPM | HEART RATE: 98 BPM

## 2022-01-28 DIAGNOSIS — C34.01 LUNG CANCER, MAIN BRONCHUS, RIGHT (HCC): ICD-10-CM

## 2022-01-28 DIAGNOSIS — Z98.890 S/P THORACOTOMY: ICD-10-CM

## 2022-01-28 DIAGNOSIS — Z45.2 ENCOUNTER FOR CARE RELATED TO VASCULAR ACCESS PORT: Primary | ICD-10-CM

## 2022-01-28 DIAGNOSIS — Z45.2 ENCOUNTER FOR CARE RELATED TO VASCULAR ACCESS PORT: ICD-10-CM

## 2022-01-28 PROCEDURE — 96372 THER/PROPH/DIAG INJ SC/IM: CPT

## 2022-01-28 PROCEDURE — 6360000002 HC RX W HCPCS: Performed by: INTERNAL MEDICINE

## 2022-01-28 RX ORDER — SODIUM CHLORIDE 0.9 % (FLUSH) 0.9 %
5-40 SYRINGE (ML) INJECTION PRN
Status: CANCELLED | OUTPATIENT
Start: 2022-02-03

## 2022-01-28 RX ORDER — CYANOCOBALAMIN 1000 UG/ML
1000 INJECTION INTRAMUSCULAR; SUBCUTANEOUS ONCE
Status: COMPLETED | OUTPATIENT
Start: 2022-01-28 | End: 2022-01-28

## 2022-01-28 RX ORDER — LIDOCAINE AND PRILOCAINE 25; 25 MG/G; MG/G
CREAM TOPICAL
Qty: 30 G | Refills: 2 | Status: SHIPPED | OUTPATIENT
Start: 2022-01-28

## 2022-01-28 RX ORDER — HEPARIN SODIUM (PORCINE) LOCK FLUSH IV SOLN 100 UNIT/ML 100 UNIT/ML
500 SOLUTION INTRAVENOUS PRN
Status: CANCELLED | OUTPATIENT
Start: 2022-02-03

## 2022-01-28 RX ORDER — SODIUM CHLORIDE 9 MG/ML
25 INJECTION, SOLUTION INTRAVENOUS PRN
Status: CANCELLED | OUTPATIENT
Start: 2022-02-03

## 2022-01-28 RX ORDER — ONDANSETRON 8 MG/1
8 TABLET, ORALLY DISINTEGRATING ORAL EVERY 8 HOURS PRN
Qty: 30 TABLET | Refills: 2 | Status: SHIPPED | OUTPATIENT
Start: 2022-01-28

## 2022-01-28 RX ORDER — DEXAMETHASONE 4 MG/1
4 TABLET ORAL SEE ADMIN INSTRUCTIONS
Qty: 30 TABLET | Refills: 0 | Status: CANCELLED | OUTPATIENT
Start: 2022-02-02

## 2022-01-28 RX ORDER — FOLIC ACID 1 MG/1
1 TABLET ORAL DAILY
Qty: 30 TABLET | Refills: 2 | Status: CANCELLED | OUTPATIENT
Start: 2022-02-02 | End: 2022-03-04

## 2022-01-28 RX ORDER — FOLIC ACID 1 MG/1
1 TABLET ORAL DAILY
Qty: 30 TABLET | Refills: 2 | Status: SHIPPED | OUTPATIENT
Start: 2022-01-28 | End: 2022-05-19

## 2022-01-28 RX ORDER — DEXAMETHASONE 4 MG/1
4 TABLET ORAL SEE ADMIN INSTRUCTIONS
Qty: 30 TABLET | Refills: 0 | Status: SHIPPED | OUTPATIENT
Start: 2022-01-28 | End: 2022-05-19

## 2022-01-28 RX ADMIN — CYANOCOBALAMIN 1000 MCG: 1000 INJECTION, SOLUTION INTRAMUSCULAR at 12:36

## 2022-01-28 NOTE — TELEPHONE ENCOUNTER
Chemotherapy teaching session conducted with patient, his wife, and daughter. See Chemotherapy Teaching Sheets (scanned into chart). Discussed chemotherapy drugs Alimta and Carboplatin and their side effects. Drug information provided in writing. Explained procedure for contacting our office during and after office hours as described on Contact Information form that was also included in patient's packet. Importance of contacting center and /or physician for changes in condition and/or symptoms was stressed. Discussed home precautions after chemotherapy, neutropenia precautions (low WBC's) and thrombocytopenia precautions (low platelets). Explained taking folic acid daily and continuing until about 3 week after last Alimta infusion. Also explained use of dexamethasone, ondansetron and EMLA cream.  FACT application completed. Questions answered to patient's satisfaction. Patient verbalized understanding of information discussed. All information provided to patient in writing.

## 2022-02-01 NOTE — CONSULTS
----- Message from TED Sofia sent at 2/1/2022  3:16 PM CST -----  Regarding: pre-op urine  Please notify/remind patent to submit urine for UA/UCx for upcoming surgery. Order is in the system    Thanks,  Neha       Malcolm Stinson, Ivania Salamanca, & Brent  Urology Consultation      Patient:  Clemmie Fleischer  MRN: [id number]  YOB: 1950    CHIEF COMPLAINT:  Urinary retention    HISTORY OF PRESENT ILLNESS:   The patient is a 70 y.o. male who presented to hospital for elective pulmonary nodule resection on 1/4.  Pathology shows adenocarcinoma. He did have post operative issues with delirium requiring a sitter and soft restraints. No history of dementia. Patient also developed a post op ileus and constipation. He was given and enema three days ago and did have BMs, but nothing in two days. His ileus is resolved. Urology consulted today due to retention. He has required straight catheterization X3 since yesterday after being unable to continue his urinary stream. Residual volumes were around 700, 500 yesterday, and 300 (this morning). He and wife report recent issues with frequent nocturia disrupting sleep, also urinary urgency and hesitancy at home prior to admission. He has history of kidney stones (30 years ago) that were treated. No other  history. Unsure on PSA levels. Patient has had post op urinary retention in the past requiring marcelino catheter. CT A/P done 5 days ago reveal distended bladder, no hydronephrosis, and prostatomegaly.      Patient's old records, notes and chart reviewed and summarized above. Past Medical History:    Past Medical History:   Diagnosis Date    Abnormal cardiac cath 09/20/2013    LMCA; Mild irregularities 10-20%. LAD: Abnormal.  Mild to moderate irregularities throughout the LAD and branches. LCx:  abnormal.  80-90% stenosis in a moderate sized OM1. RCA: Abnormal.  40-50% mid RCA stenosis.     Actinic keratosis     Arthritis     neck, fingers    CAD (coronary artery disease)     Calculus of kidney     Cervical stenosis of spine     Diabetes mellitus (HCC)     Dr. Christal Marmolejo, CNP, [place]    Full dentures     GERD (gastroesophageal reflux disease)     H/O echocardiogram 04/29/2016    Stress echo. Normal resting LV systolic function,normal systolic restponse to exercise. No evidence of reversible  ischemia on this maximal,symptom limited,treadmill exerxiess stress echocardiography study    History of cardiac cath 02/09/2017    LMCA: Lesion on LMCA: Distal subsection . 20% stenosis. LAD: Lesion on 1st Diag: Mid subsection . 75% stenosis. RCA: Lesion on Mid RCA: Mid subsection . 100% stenosis. Comments: The distal RCA fills by left to right collateralization. Dominance: Mixed. LV function assessed as: Normal. EF 65%.  History of echocardiogram 06/11/2018    EF 60%. Mildly increased LV wall thickness. Evidence of mild diastolic dysfunction seen.  History of echocardiogram 01/18/2019    EF of 65%. LV wall thickness is mildly increased. Aortic valve leaflets are mildly thickened.  History of heart attack     History of tobacco abuse     Quit 10/2021, 1/4 ppd for 47 years    Hyperlipidemia     Hypertension     Dr. Lion Nair, CNP    Pulmonary nodule 12/2021    Right middle lobe    Raynaud's phenomenon     S/P angioplasty with stent 09/20/2013    PTCA-NEDRA of  the OM 1    Wears hearing aid in both ears        Past Surgical History:    Past Surgical History:   Procedure Laterality Date   [address]  2007    patient states had 2 small blockages    CARDIAC CATHETERIZATION  02/09/2017    LMCA: Lesion on LMCA: Distal subsection 20% stenosis. LAD: Lesion on 1st Diag: Mid subsection 75% stenosis. RCA: Lesion on Mid RCA: Mid subsection 100% stenosis.  CARDIAC CATHETERIZATION  02/09/2017    Attempted PCI to chronic total occlusion of RCA was not successful. Unable to cross with multiple wires due to heavy calcification and toruosity.     CARDIAC CATHETERIZATION  01/14/2019    CATARACT REMOVAL  04/06/2015    Right eye - Dr. Young Hem  06/08/2015    Dr. Tamika Gomez - left eye    COLONOSCOPY  01/11/2006    Dr. Rufino Amador - internal hemorrhoids, no polps    COLONOSCOPY  05/23/2016    -polyp    CT NEEDLE BIOPSY LUNG PERCUTANEOUS  7/28/2021    CT NEEDLE BIOPSY LUNG PERCUTANEOUS 7/28/2021 [facility] CT SCAN    HEMORRHOID SURGERY      HERNIA REPAIR Left     inguinal    IR CHEST TUBE INSERTION  1/11/2022    IR CHEST TUBE INSERTION 1/11/2022 Mague Woo MD [facility] SPECIAL PROCEDURES    LUNG BIOPSY Right 07/28/2021    Dr Latonia Chacko, TOTAL Right 1/4/2022    MIDDLE WEDGE RESECTION, POSSIBLE MIDDLE LOBECTOMY VIA THORACOTOMY performed by Sada Patrick MD at [facility]    c4-5 laminectomy and fusion    OTHER SURGICAL HISTORY  10/2014    Right foot - 7 from heal and 1 from great toe at [address] [place] [address] Street  12/11/2014    pain injection - cervical    UPPER GASTROINTESTINAL ENDOSCOPY  12/28/2005    Dr. Shravan Herrera - mild esophagitis and gastritis    UPPER GASTROINTESTINAL ENDOSCOPY N/A 02/05/2019    -bx(neg H-Pylori)retained gastric content, possible submucosal mass of gastric cardia    UPPER GASTROINTESTINAL ENDOSCOPY N/A 10/22/2019    EGD BIOPSY performed by Fortino Crigler, MD at [facility]       Medications:      Current Facility-Administered Medications:     metoprolol tartrate (LOPRESSOR) tablet 25 mg, 25 mg, Oral, BID, ALONDRA Winters - CNP, 25 mg at 01/12/22 3081    benzocaine-menthol (CEPACOL SORE THROAT) lozenge 1 lozenge, 1 lozenge, Oral, Q2H PRN, Ling Morel, APRN - NP    tamsulosin [facility]) capsule 0.4 mg, 0.4 mg, Oral, Daily, USHA Saab, 0.4 mg at 01/12/22 0747    bisacodyl (DULCOLAX) suppository 10 mg, 10 mg, Rectal, Daily PRN, Dewayne Monroe DO    bisacodyl (DULCOLAX) suppository 10 mg, 10 mg, Rectal, Daily, Dewayne Monroe DO, 10 mg at 01/11/22 0859    docusate sodium (COLACE) capsule 100 mg, 100 mg, Oral, BID, Shannan DO Luis, 100 mg at 01/12/22 0749    haloperidol lactate (HALDOL) injection 5 mg, 5 mg, IntraMUSCular, Q6H PRN, Ethelene Pall, PA, 5 mg at 01/08/22 1108    melatonin tablet 5 mg, 5 mg, Oral, Nightly, Marga Barbaun, APRN - CNP, 5 mg at 01/11/22 2037    sertraline (ZOLOFT) tablet 50 mg, 50 mg, Oral, Nightly, Marga Barbaun, APRN - CNP    QUEtiapine (SEROQUEL) tablet 25 mg, 25 mg, Oral, Nightly, Jer Carmen, APRN - NP, 25 mg at 01/11/22 2037    pantoprazole (PROTONIX) injection 40 mg, 40 mg, IntraVENous, Daily, 40 mg at 01/12/22 0747 **AND** sodium chloride (PF) 0.9 % injection 10 mL, 10 mL, IntraVENous, Daily, Ethelene Pall, PA, 10 mL at 01/12/22 0802    dexmedetomidine (PRECEDEX) 400 mcg in sodium chloride 0.9 % 100 mL infusion, 0.2-1.4 mcg/kg/hr, IntraVENous, Continuous PRN, Ethelene Pall, PA, Stopped at 01/10/22 3553    glycerin moisturizing mouth spray (OASIS) 35 % 2 spray, 2 spray, Mouth/Throat, PRN, Shana Ahmadi, APRN - CNP, 2 spray at 01/07/22 1100    ondansetron (ZOFRAN) injection 4 mg, 4 mg, IntraVENous, Q6H PRN, Jer Carmen, APRN - NP, 4 mg at 01/10/22 2803    gabapentin (NEURONTIN) capsule 100 mg, 100 mg, Oral, TID, Marga Banuelos, APRN - CNP, 100 mg at 01/12/22 1352    lisinopril (PRINIVIL;ZESTRIL) tablet 20 mg, 20 mg, Oral, Daily, Jer Carmen, APRN - NP, 20 mg at 01/12/22 0749    isosorbide mononitrate (IMDUR) extended release tablet 30 mg, 30 mg, Oral, Daily, Jer Carmen, APRN - NP, 30 mg at 01/12/22 0750    ferrous sulfate (FE TABS 325) EC tablet 325 mg, 325 mg, Oral, Daily, Jer Carmen, APRN - NP, 325 mg at 01/12/22 0747    atorvastatin (LIPITOR) tablet 20 mg, 20 mg, Oral, Daily, Jer Carmen, APRN - NP, 20 mg at 01/12/22 0747    sodium chloride flush 0.9 % injection 10 mL, 10 mL, IntraVENous, 2 times per day, Jer Carmen, APRN - NP, 10 mL at 01/12/22 0747    sodium chloride flush 0.9 % injection 10 mL, 10 mL, IntraVENous, PRN, Jer Carmen, APRN - NP    acetaminophen (TYLENOL) tablet 1,000 mg, 1,000 mg, Oral, Q4H PRN, Luna Hammed, APRN - NP, 1,000 mg at 01/12/22 0744    oxyCODONE (ROXICODONE) immediate release tablet 5 mg, 5 mg, Oral, Q4H PRN, 5 mg at 01/12/22 0620 **OR** oxyCODONE (ROXICODONE) immediate release tablet 10 mg, 10 mg, Oral, Q4H PRN, Luna Hammed, APRN - NP, 10 mg at 01/11/22 2037    morphine (PF) injection 2 mg, 2 mg, IntraVENous, Q3H PRN, Luna Hammed, APRN - NP, 2 mg at 01/11/22 0317    polyethylene glycol (GLYCOLAX) packet 17 g, 17 g, Oral, Daily, Luna Hammed, APRN - NP, 17 g at 01/12/22 0755    magnesium hydroxide (MILK OF MAGNESIA) 400 MG/5ML suspension 30 mL, 30 mL, Oral, Daily PRN, Luna Hammed, APRN - NP    glucose (GLUTOSE) 40 % oral gel 15 g, 15 g, Oral, PRN, Luna Hammed, APRN - NP    dextrose 50 % IV solution, 12.5 g, IntraVENous, PRN, Luna Hammed, APRN - NP    glucagon (rDNA) injection 1 mg, 1 mg, IntraMUSCular, PRN, Luna Hammed, APRN - NP    dextrose 5 % solution, 100 mL/hr, IntraVENous, PRN, Luna Hammed, APRN - NP    glucose (GLUTOSE) 40 % oral gel 15 g, 15 g, Oral, PRN, Luna Hammed, APRN - NP    dextrose 50 % IV solution, 12.5 g, IntraVENous, PRN, Luna Hammed, APRN - NP    glucagon (rDNA) injection 1 mg, 1 mg, IntraMUSCular, PRN, Luna Hammed, APRN - NP    dextrose 5 % solution, 100 mL/hr, IntraVENous, PRN, Luna Hammed, APRN - NP    lidocaine 4 % external patch 1 patch, 1 patch, TransDERmal, Daily, Luna Hammed, APRN - NP, 1 patch at 01/12/22 0750    alogliptin (NESINA) tablet 12.5 mg, 12.5 mg, Oral, BID WC, 12.5 mg at 01/12/22 0750 **AND** metFORMIN (GLUCOPHAGE) tablet 1,000 mg, 1,000 mg, Oral, BID Cheryl RILEY APRN - NP, 1,000 mg at 01/12/22 0749    glipiZIDE (GLUCOTROL) tablet 5 mg, 5 mg, Oral, BID Cheryl APRN - NP, 5 mg at 01/12/22 0620    insulin lispro (HUMALOG) injection vial 0-12 Units, 0-12 Units, SubCUTAneous, TID Cheryl RILEY APRN - NP, 4 Units at 01/12/22 1227    insulin lispro (HUMALOG) injection vial 0-6 Units, 0-6 Units, SubCUTAneous, Nightly, ALONDRA Arcos NP, 1 Units at 01/10/22 2009    Allergies: Allergies   Allergen Reactions    Niacin And Related      Turns red, no trouble breathing    Minocycline Hcl Rash    Other Nausea And Vomiting     Patient states on all pain medications he gets nausea and vomiting. Doctor ordered a medicine (nausea) to take before pain medication    Oxycodone Hcl      vomits       Social History:   Social History     Socioeconomic History    Marital status:      Spouse name: Not on file    Number of children: Not on file    Years of education: Not on file    Highest education level: Not on file   Occupational History    Not on file   Tobacco Use    Smoking status: Former Smoker     Packs/day: 0.25     Years: 54.00     Pack years: 13.50     Types: Cigarettes     Quit date: 10/1/2021     Years since quittin.2    Smokeless tobacco: Never Used   Vaping Use    Vaping Use: Never used   Substance and Sexual Activity    Alcohol use: No    Drug use: No    Sexual activity: Not on file   Other Topics Concern    Not on file   Social History Narrative    Not on file     Social Determinants of Health     Financial Resource Strain:     Difficulty of Paying Living Expenses: Not on file   Food Insecurity:     Worried About [address] [employer] in the Last Year: Not on file    [address] in the Last Year: Not on file   Transportation Needs:     Lack of Transportation (Medical): Not on file    Lack of Transportation (Non-Medical):  Not on file   Physical Activity:     Days of Exercise per Week: Not on file    Minutes of Exercise per Session: Not on file   Stress:     Feeling of Stress : Not on file   Social Connections:     Frequency of Communication with Friends and Family: Not on file    Frequency of Social Gatherings with Friends and Family: Not on file    Attends Latter day Services: Not on file  Active Member of Clubs or Organizations: Not on file    Attends Club or Organization Meetings: Not on file    Marital Status: Not on file   Intimate Partner Violence:     Fear of Current or Ex-Partner: Not on file    Emotionally Abused: Not on file    Physically Abused: Not on file    Sexually Abused: Not on file   Housing Stability:     Unable to Pay for Housing in the Last Year: Not on file    Number of [place] in the Last Year: Not on file    Unstable Housing in the Last Year: Not on file       Family History:    Family History   Problem Relation Age of Onset    Heart Disease Mother     Diabetes Mother     High Blood Pressure Mother     Diabetes Father     Alzheimer's Disease Father     Heart Disease Sister         CABG    Heart Disease Brother     Heart Disease Brother     Heart Disease Brother     Cancer Brother        REVIEW OF SYSTEMS:  A comprehensive 14 point review of systems was obtained. Constitutional: No fatigue  Eyes: No blurry vision  Ears, nose, mouth, throat, face: No ringing in the ears; no facial droop  Respiratory: No cough or cold  Cardiovascular: No palpitations  Gastrointestinal: No diarrhea or constipation  Genitourinary: See HPI  Integument/Skin: No rashes  Hematologic/Lymphatic: No easy bruising  Musculoskeletal: No muscle pains  Neurologic: No weakness in the extremities  Psychiatric: No depression or suicidal thoughts  Endocrine: No heat or cold intolerances  Allergic/Immunologic: No current seasonal allergies; no skin hives      Physical Exam:    This a 70 y.o. male   Vitals:    01/12/22 1500   BP: (!) 99/48   Pulse: 88   Resp:    Temp:    SpO2: 99%       Constitutional: Patient in no acute distress  Neuro: Alert and oriented to person place and time  Psych: Mood and affect normal  Head: Atraumatic and normocephalic  Neck: Trachea midline  Lungs: Respiratory effort normal  Cardiovascular:  Regular rhythm  Abdomen: Soft, non-tender, non-distended.   R flank dressing over surgical incision. No left CVA tenderness. Ext: 2+ DP pulses bilaterally  Skin: No rashes or bruising present  Bladder: Non-tender and not distended  Lymphatics: No palpable lymphadenopathy    :  Penis normal and circumcised  Urethral meatus normal  Scrotal exam normal  Testicles normal bilaterally  Epididymis normal bilaterally      Labs:  Recent Labs     01/10/22  0542 01/11/22 0628 01/12/22 0514   WBC 12.1* 14.1* 10.9   HGB 9.8* 9.7* 8.7*   HCT 28.9* 30.0* 26.5*   MCV 88.4 90.4 88.9    288 252     Recent Labs     01/10/22  0542 01/11/22 0628 01/12/22  0514   * 136 135   K 3.8 3.9 3.8    100 103   CO2 16* 18* 21   BUN 33* 35* 30*   CREATININE 0.89 1.12 0.86       No results for input(s): COLORU, PHUR, LABCAST, WBCUA, RBCUA, MUCUS, TRICHOMONAS, YEAST, BACTERIA, CLARITYU, SPECGRAV, LEUKOCYTESUR, UROBILINOGEN, BILIRUBINUR, BLOODU in the last 72 hours. Invalid input(s): Marlena Ornelas    Culture Results:  12/17/21 urine culture NG  1/12/2022 ordered urine culture    -----------------------------------------------------------------  Imaging Results:  XR CHEST (2 VW)    Result Date: 12/16/2021  EXAMINATION: TWO XRAY VIEWS OF THE CHEST 12/16/2021 12:25 pm COMPARISON: CT of the chest November 2, 2021. PA and lateral chest July 30, 2021. HISTORY: ORDERING SYSTEM PROVIDED HISTORY: preop TECHNOLOGIST PROVIDED HISTORY: preop Reason for Exam: upright PA/LT FINDINGS: The heart is normal in size. Some calcification is present within the aorta. No evidence of pneumothorax, pleural effusion, infiltrate, or abnormal lung mass. There is old healed deformity of the posterolateral left 8th rib. There is flattening of the hemidiaphragms. Minimal spondylitic changes are present in the vertebral endplates. Senescent changes compatible with the age of the patient. Possible COPD. No evidence of acute cardiopulmonary process.        Assessment and Plan   Impression:  70 yo male with Right Lung Adenocarcinoma     problem list -   -Urinary retention  -BPH with LUTS  Patient Active Problem List   Diagnosis    Angina pectoris ([facility]    Coronary artery disease involving native coronary artery of native heart without angina pectoris    Primary hypertension    Type 2 diabetes mellitus without complication, without long-term current use of insulin (HCC)    Hyperlipidemia    Shortness of breath, post procedure improving probably Effient side effect.  S/P angioplasty with stent, obtuse marginal    Type II or unspecified type diabetes mellitus without mention of complication, not stated as uncontrolled    Calculus of kidney    Actinic keratosis    Senile nuclear sclerosis    Dyspepsia    Epigastric pain    Substernal chest pain    Abnormal CT scan, stomach    Abnormal gastrointestinal PET scan    Strain of lumbar region    Pneumothorax after biopsy    Pneumothorax, post biopsy, right    Primary adenocarcinoma of right lung (HCC)    Acute delirium    Hyponatremia    Postoperative ileus ([facility] [address]       Plan:   -Continue to perform intermittent catheterization   -Patient should be prompted to void every 4 hours, then bladder scanned, and straight cath'd if bladder volume > 400 mL  -For consistent large volumes, increase frequency of bladder scan to every 4 hours  -Follow up on urine culture  -Flomax/tamsulosin 0.4 mg po QD - continue on discharge  -Encourage ambulation as WB status allows  -Avoid narcotics, anticholinergics or other medications contributing to retention  -Bowel regimen for regular Bms  -Call with questions. Will plan to insert indwelling marcelino prior to discharge if patient's retention is not resolved. Page urology for plan closer to discharge. Thank you for involving us in the care of Ghassan Hernández. Should you have any questions, please do not hesitate to contact us at any time.       Mya Faulkner,   Urology Service   3:34 PM

## 2022-02-09 ENCOUNTER — HOSPITAL ENCOUNTER (OUTPATIENT)
Dept: INTERVENTIONAL RADIOLOGY/VASCULAR | Age: 72
Discharge: HOME | End: 2022-02-09
Attending: FAMILY MEDICINE | Admitting: FAMILY MEDICINE
Payer: MEDICARE

## 2022-02-09 VITALS
WEIGHT: 143 LBS | HEIGHT: 72 IN | TEMPERATURE: 97.6 F | OXYGEN SATURATION: 99 % | SYSTOLIC BLOOD PRESSURE: 139 MMHG | DIASTOLIC BLOOD PRESSURE: 62 MMHG | HEART RATE: 77 BPM | RESPIRATION RATE: 16 BRPM | BODY MASS INDEX: 19.37 KG/M2

## 2022-02-09 DIAGNOSIS — C34.01 LUNG CANCER, MAIN BRONCHUS, RIGHT (HCC): ICD-10-CM

## 2022-02-09 LAB
ABSOLUTE EOS #: 0.23 K/UL (ref 0–0.44)
ABSOLUTE IMMATURE GRANULOCYTE: 0.03 K/UL (ref 0–0.3)
ABSOLUTE LYMPH #: 1.92 K/UL (ref 1.1–3.7)
ABSOLUTE MONO #: 0.95 K/UL (ref 0.1–1.2)
ALBUMIN SERPL-MCNC: 3.8 G/DL (ref 3.5–5.2)
ALBUMIN/GLOBULIN RATIO: 1.3 (ref 1–2.5)
ALP BLD-CCNC: 101 U/L (ref 40–129)
ALT SERPL-CCNC: 18 U/L (ref 5–41)
ANION GAP SERPL CALCULATED.3IONS-SCNC: 10 MMOL/L (ref 9–17)
AST SERPL-CCNC: 15 U/L
BASOPHILS # BLD: 1 % (ref 0–2)
BASOPHILS ABSOLUTE: 0.1 K/UL (ref 0–0.2)
BILIRUB SERPL-MCNC: 0.55 MG/DL (ref 0.3–1.2)
BUN BLDV-MCNC: 25 MG/DL (ref 8–23)
BUN/CREAT BLD: 26 (ref 9–20)
CALCIUM SERPL-MCNC: 9.4 MG/DL (ref 8.6–10.4)
CHLORIDE BLD-SCNC: 100 MMOL/L (ref 98–107)
CO2: 25 MMOL/L (ref 20–31)
CREAT SERPL-MCNC: 0.96 MG/DL (ref 0.7–1.2)
DIFFERENTIAL TYPE: ABNORMAL
EOSINOPHILS RELATIVE PERCENT: 3 % (ref 1–4)
GFR AFRICAN AMERICAN: >60 ML/MIN
GFR NON-AFRICAN AMERICAN: >60 ML/MIN
GFR SERPL CREATININE-BSD FRML MDRD: ABNORMAL ML/MIN/{1.73_M2}
GFR SERPL CREATININE-BSD FRML MDRD: ABNORMAL ML/MIN/{1.73_M2}
GLUCOSE BLD-MCNC: 161 MG/DL (ref 70–99)
HCT VFR BLD CALC: 37.2 % (ref 40.7–50.3)
HEMOGLOBIN: 11.8 G/DL (ref 13–17)
IMMATURE GRANULOCYTES: 0 %
INR BLD: 1.2
LYMPHOCYTES # BLD: 24 % (ref 24–43)
MCH RBC QN AUTO: 28.3 PG (ref 25.2–33.5)
MCHC RBC AUTO-ENTMCNC: 31.7 G/DL (ref 28.4–34.8)
MCV RBC AUTO: 89.2 FL (ref 82.6–102.9)
MONOCYTES # BLD: 12 % (ref 3–12)
NRBC AUTOMATED: 0 PER 100 WBC
PARTIAL THROMBOPLASTIN TIME: 26.2 SEC (ref 23.9–33.8)
PDW BLD-RTO: 13.9 % (ref 11.8–14.4)
PLATELET # BLD: 278 K/UL (ref 138–453)
PLATELET ESTIMATE: ABNORMAL
PMV BLD AUTO: 10.6 FL (ref 8.1–13.5)
POTASSIUM SERPL-SCNC: 4.2 MMOL/L (ref 3.7–5.3)
PROTHROMBIN TIME: 14.8 SEC (ref 11.5–14.2)
RBC # BLD: 4.17 M/UL (ref 4.21–5.77)
RBC # BLD: ABNORMAL 10*6/UL
SEG NEUTROPHILS: 60 % (ref 36–65)
SEGMENTED NEUTROPHILS ABSOLUTE COUNT: 4.75 K/UL (ref 1.5–8.1)
SODIUM BLD-SCNC: 135 MMOL/L (ref 135–144)
TOTAL PROTEIN: 6.8 G/DL (ref 6.4–8.3)
WBC # BLD: 8 K/UL (ref 3.5–11.3)
WBC # BLD: ABNORMAL 10*3/UL

## 2022-02-09 PROCEDURE — 77001 FLUOROGUIDE FOR VEIN DEVICE: CPT

## 2022-02-09 PROCEDURE — 6360000002 HC RX W HCPCS

## 2022-02-09 PROCEDURE — 80053 COMPREHEN METABOLIC PANEL: CPT

## 2022-02-09 PROCEDURE — 2500000003 HC RX 250 WO HCPCS: Performed by: RADIOLOGY

## 2022-02-09 PROCEDURE — 6360000002 HC RX W HCPCS: Performed by: RADIOLOGY

## 2022-02-09 PROCEDURE — 2580000003 HC RX 258: Performed by: RADIOLOGY

## 2022-02-09 PROCEDURE — C1788 PORT, INDWELLING, IMP: HCPCS

## 2022-02-09 PROCEDURE — 85730 THROMBOPLASTIN TIME PARTIAL: CPT

## 2022-02-09 PROCEDURE — 85025 COMPLETE CBC W/AUTO DIFF WBC: CPT

## 2022-02-09 PROCEDURE — 36561 INSERT TUNNELED CV CATH: CPT

## 2022-02-09 PROCEDURE — 2500000003 HC RX 250 WO HCPCS

## 2022-02-09 PROCEDURE — 85610 PROTHROMBIN TIME: CPT

## 2022-02-09 RX ORDER — BUPIVACAINE HYDROCHLORIDE 5 MG/ML
INJECTION, SOLUTION EPIDURAL; INTRACAUDAL
Status: COMPLETED | OUTPATIENT
Start: 2022-02-09 | End: 2022-02-09

## 2022-02-09 RX ORDER — MIDAZOLAM HYDROCHLORIDE 1 MG/ML
INJECTION INTRAMUSCULAR; INTRAVENOUS
Status: COMPLETED | OUTPATIENT
Start: 2022-02-09 | End: 2022-02-09

## 2022-02-09 RX ORDER — SODIUM CHLORIDE 9 MG/ML
INJECTION, SOLUTION INTRAVENOUS CONTINUOUS
Status: DISCONTINUED | OUTPATIENT
Start: 2022-02-09 | End: 2022-02-09 | Stop reason: HOSPADM

## 2022-02-09 RX ORDER — PANTOPRAZOLE SODIUM 40 MG/1
40 TABLET, DELAYED RELEASE ORAL 2 TIMES DAILY
COMMUNITY

## 2022-02-09 RX ADMIN — BUPIVACAINE HYDROCHLORIDE 8 ML: 5 INJECTION, SOLUTION EPIDURAL; INTRACAUDAL; PERINEURAL at 09:28

## 2022-02-09 RX ADMIN — MIDAZOLAM 0.5 MG: 1 INJECTION INTRAMUSCULAR; INTRAVENOUS at 09:21

## 2022-02-09 RX ADMIN — SODIUM CHLORIDE: 9 INJECTION, SOLUTION INTRAVENOUS at 09:04

## 2022-02-09 RX ADMIN — BUPIVACAINE HYDROCHLORIDE 3 ML: 5 INJECTION, SOLUTION EPIDURAL; INTRACAUDAL; PERINEURAL at 09:22

## 2022-02-09 RX ADMIN — CEFAZOLIN 2000 MG: 10 INJECTION, POWDER, FOR SOLUTION INTRAVENOUS at 09:05

## 2022-02-09 ASSESSMENT — PAIN DESCRIPTION - DESCRIPTORS: DESCRIPTORS: ACHING

## 2022-02-09 ASSESSMENT — PAIN - FUNCTIONAL ASSESSMENT: PAIN_FUNCTIONAL_ASSESSMENT: 0-10

## 2022-02-09 NOTE — PROGRESS NOTES
Patient returned to post procedure area. Padded tegaderm to left upper chest med-port insertion site remains CDI. Patient denies pain or nausea. Family at bedside.

## 2022-02-09 NOTE — BRIEF OP NOTE
Brief Postoperative Note    Wendy Adan  YOB: 1950  [id number]    Pre-operative Diagnosis: Lung cancer    Post-operative Diagnosis: Same    Procedure: Port placement    Anesthesia: Local and Moderate Sedation    Surgeons/Assistants: Robi Kaminski MD    Estimated Blood Loss: less than 50     Complications: None    Specimens: Was Not Obtained    Findings: Port placed on left chest. Working well. Hep locked. OK to use.     Electronically signed by Shree Eugene MD on 2/9/2022 at 11:07 AM

## 2022-02-10 DIAGNOSIS — C34.01 LUNG CANCER, MAIN BRONCHUS, RIGHT (HCC): Primary | ICD-10-CM

## 2022-02-17 ENCOUNTER — APPOINTMENT (OUTPATIENT)
Dept: INFUSION THERAPY | Age: 72
End: 2022-02-17
Payer: MEDICARE

## 2022-02-24 ENCOUNTER — OFFICE VISIT (OUTPATIENT)
Dept: ONCOLOGY | Age: 72
End: 2022-02-24
Payer: MEDICARE

## 2022-02-24 ENCOUNTER — TELEPHONE (OUTPATIENT)
Dept: ONCOLOGY | Age: 72
End: 2022-02-24

## 2022-02-24 ENCOUNTER — HOSPITAL ENCOUNTER (OUTPATIENT)
Dept: INFUSION THERAPY | Age: 72
Discharge: HOME | End: 2022-02-24
Payer: MEDICARE

## 2022-02-24 VITALS
WEIGHT: 151 LBS | SYSTOLIC BLOOD PRESSURE: 134 MMHG | HEART RATE: 94 BPM | BODY MASS INDEX: 20.45 KG/M2 | HEIGHT: 72 IN | DIASTOLIC BLOOD PRESSURE: 70 MMHG | TEMPERATURE: 96.9 F | RESPIRATION RATE: 18 BRPM

## 2022-02-24 VITALS
WEIGHT: 151 LBS | DIASTOLIC BLOOD PRESSURE: 70 MMHG | BODY MASS INDEX: 20.45 KG/M2 | HEART RATE: 94 BPM | SYSTOLIC BLOOD PRESSURE: 134 MMHG | TEMPERATURE: 96.9 F | HEIGHT: 72 IN | RESPIRATION RATE: 18 BRPM

## 2022-02-24 DIAGNOSIS — Z98.890 S/P THORACOTOMY: ICD-10-CM

## 2022-02-24 DIAGNOSIS — I10 PRIMARY HYPERTENSION: ICD-10-CM

## 2022-02-24 DIAGNOSIS — Z45.2 ENCOUNTER FOR CARE RELATED TO VASCULAR ACCESS PORT: ICD-10-CM

## 2022-02-24 DIAGNOSIS — C34.01 LUNG CANCER, MAIN BRONCHUS, RIGHT (HCC): Primary | ICD-10-CM

## 2022-02-24 DIAGNOSIS — E11.9 TYPE 2 DIABETES MELLITUS WITHOUT COMPLICATION, WITHOUT LONG-TERM CURRENT USE OF INSULIN (HCC): ICD-10-CM

## 2022-02-24 DIAGNOSIS — R11.2 NON-INTRACTABLE VOMITING WITH NAUSEA, UNSPECIFIED VOMITING TYPE: ICD-10-CM

## 2022-02-24 DIAGNOSIS — I25.10 CORONARY ARTERY DISEASE INVOLVING NATIVE CORONARY ARTERY OF NATIVE HEART WITHOUT ANGINA PECTORIS: ICD-10-CM

## 2022-02-24 LAB
ABSOLUTE EOS #: <0.03 K/UL (ref 0–0.44)
ABSOLUTE IMMATURE GRANULOCYTE: 0.04 K/UL (ref 0–0.3)
ABSOLUTE LYMPH #: 1.16 K/UL (ref 1.1–3.7)
ABSOLUTE MONO #: 0.55 K/UL (ref 0.1–1.2)
ALBUMIN SERPL-MCNC: 4.1 G/DL (ref 3.5–5.2)
ALBUMIN/GLOBULIN RATIO: 1.4 (ref 1–2.5)
ALP BLD-CCNC: 94 U/L (ref 40–129)
ALT SERPL-CCNC: 14 U/L (ref 5–41)
ANION GAP SERPL CALCULATED.3IONS-SCNC: 15 MMOL/L (ref 9–17)
AST SERPL-CCNC: 16 U/L
BASOPHILS # BLD: 1 % (ref 0–2)
BASOPHILS ABSOLUTE: 0.05 K/UL (ref 0–0.2)
BILIRUB SERPL-MCNC: 0.33 MG/DL (ref 0.3–1.2)
BUN BLDV-MCNC: 21 MG/DL (ref 8–23)
BUN/CREAT BLD: 24 (ref 9–20)
CALCIUM SERPL-MCNC: 9.6 MG/DL (ref 8.6–10.4)
CHLORIDE BLD-SCNC: 102 MMOL/L (ref 98–107)
CO2: 22 MMOL/L (ref 20–31)
CREAT SERPL-MCNC: 0.87 MG/DL (ref 0.7–1.2)
EOSINOPHILS RELATIVE PERCENT: 0 % (ref 1–4)
GFR AFRICAN AMERICAN: >60 ML/MIN
GFR NON-AFRICAN AMERICAN: >60 ML/MIN
GFR SERPL CREATININE-BSD FRML MDRD: ABNORMAL ML/MIN/{1.73_M2}
GFR SERPL CREATININE-BSD FRML MDRD: ABNORMAL ML/MIN/{1.73_M2}
GLUCOSE BLD-MCNC: 185 MG/DL (ref 70–99)
HCT VFR BLD CALC: 33.2 % (ref 40.7–50.3)
HEMOGLOBIN: 11.4 G/DL (ref 13–17)
IMMATURE GRANULOCYTES: 0 %
LYMPHOCYTES # BLD: 11 % (ref 24–43)
MCH RBC QN AUTO: 29.8 PG (ref 25.2–33.5)
MCHC RBC AUTO-ENTMCNC: 34.3 G/DL (ref 28.4–34.8)
MCV RBC AUTO: 86.9 FL (ref 82.6–102.9)
MONOCYTES # BLD: 5 % (ref 3–12)
NRBC AUTOMATED: 0 PER 100 WBC
PDW BLD-RTO: 14 % (ref 11.8–14.4)
PLATELET # BLD: 243 K/UL (ref 138–453)
PMV BLD AUTO: 10.9 FL (ref 8.1–13.5)
POTASSIUM SERPL-SCNC: 4.3 MMOL/L (ref 3.7–5.3)
RBC # BLD: 3.82 M/UL (ref 4.21–5.77)
SEG NEUTROPHILS: 83 % (ref 36–65)
SEGMENTED NEUTROPHILS ABSOLUTE COUNT: 9.18 K/UL (ref 1.5–8.1)
SODIUM BLD-SCNC: 139 MMOL/L (ref 135–144)
TOTAL PROTEIN: 7.1 G/DL (ref 6.4–8.3)
WBC # BLD: 11 K/UL (ref 3.5–11.3)

## 2022-02-24 PROCEDURE — 3017F COLORECTAL CA SCREEN DOC REV: CPT | Performed by: INTERNAL MEDICINE

## 2022-02-24 PROCEDURE — 96366 THER/PROPH/DIAG IV INF ADDON: CPT

## 2022-02-24 PROCEDURE — 2580000003 HC RX 258: Performed by: INTERNAL MEDICINE

## 2022-02-24 PROCEDURE — 1123F ACP DISCUSS/DSCN MKR DOCD: CPT | Performed by: INTERNAL MEDICINE

## 2022-02-24 PROCEDURE — G8427 DOCREV CUR MEDS BY ELIG CLIN: HCPCS | Performed by: INTERNAL MEDICINE

## 2022-02-24 PROCEDURE — 85025 COMPLETE CBC W/AUTO DIFF WBC: CPT

## 2022-02-24 PROCEDURE — 3046F HEMOGLOBIN A1C LEVEL >9.0%: CPT | Performed by: INTERNAL MEDICINE

## 2022-02-24 PROCEDURE — G8420 CALC BMI NORM PARAMETERS: HCPCS | Performed by: INTERNAL MEDICINE

## 2022-02-24 PROCEDURE — 99215 OFFICE O/P EST HI 40 MIN: CPT | Performed by: INTERNAL MEDICINE

## 2022-02-24 PROCEDURE — 2022F DILAT RTA XM EVC RTNOPTHY: CPT | Performed by: INTERNAL MEDICINE

## 2022-02-24 PROCEDURE — G8484 FLU IMMUNIZE NO ADMIN: HCPCS | Performed by: INTERNAL MEDICINE

## 2022-02-24 PROCEDURE — 96367 TX/PROPH/DG ADDL SEQ IV INF: CPT

## 2022-02-24 PROCEDURE — 6360000002 HC RX W HCPCS: Performed by: INTERNAL MEDICINE

## 2022-02-24 PROCEDURE — 36591 DRAW BLOOD OFF VENOUS DEVICE: CPT

## 2022-02-24 PROCEDURE — 4040F PNEUMOC VAC/ADMIN/RCVD: CPT | Performed by: INTERNAL MEDICINE

## 2022-02-24 PROCEDURE — 80053 COMPREHEN METABOLIC PANEL: CPT

## 2022-02-24 PROCEDURE — 96411 CHEMO IV PUSH ADDL DRUG: CPT

## 2022-02-24 PROCEDURE — 1036F TOBACCO NON-USER: CPT | Performed by: INTERNAL MEDICINE

## 2022-02-24 PROCEDURE — 96413 CHEMO IV INFUSION 1 HR: CPT

## 2022-02-24 PROCEDURE — 96375 TX/PRO/DX INJ NEW DRUG ADDON: CPT

## 2022-02-24 RX ORDER — DEXAMETHASONE SODIUM PHOSPHATE 10 MG/ML
10 INJECTION INTRAMUSCULAR; INTRAVENOUS ONCE
Status: COMPLETED | OUTPATIENT
Start: 2022-02-24 | End: 2022-02-24

## 2022-02-24 RX ORDER — EPINEPHRINE 1 MG/ML
0.3 INJECTION, SOLUTION, CONCENTRATE INTRAVENOUS PRN
Status: CANCELLED | OUTPATIENT
Start: 2022-03-17

## 2022-02-24 RX ORDER — ALBUTEROL SULFATE 90 UG/1
4 AEROSOL, METERED RESPIRATORY (INHALATION) PRN
Status: CANCELLED | OUTPATIENT
Start: 2022-02-24

## 2022-02-24 RX ORDER — SODIUM CHLORIDE 9 MG/ML
20 INJECTION, SOLUTION INTRAVENOUS ONCE
Status: CANCELLED | OUTPATIENT
Start: 2022-03-17 | End: 2022-03-17

## 2022-02-24 RX ORDER — SODIUM CHLORIDE 9 MG/ML
20 INJECTION, SOLUTION INTRAVENOUS ONCE
Status: CANCELLED | OUTPATIENT
Start: 2022-02-24 | End: 2022-02-24

## 2022-02-24 RX ORDER — MEPERIDINE HYDROCHLORIDE 50 MG/ML
12.5 INJECTION INTRAMUSCULAR; INTRAVENOUS; SUBCUTANEOUS PRN
Status: CANCELLED | OUTPATIENT
Start: 2022-02-24

## 2022-02-24 RX ORDER — ONDANSETRON 2 MG/ML
8 INJECTION INTRAMUSCULAR; INTRAVENOUS
Status: CANCELLED | OUTPATIENT
Start: 2022-03-17

## 2022-02-24 RX ORDER — DIPHENHYDRAMINE HYDROCHLORIDE 50 MG/ML
50 INJECTION INTRAMUSCULAR; INTRAVENOUS
Status: CANCELLED | OUTPATIENT
Start: 2022-03-17

## 2022-02-24 RX ORDER — HEPARIN SODIUM (PORCINE) LOCK FLUSH IV SOLN 100 UNIT/ML 100 UNIT/ML
500 SOLUTION INTRAVENOUS PRN
Status: CANCELLED | OUTPATIENT
Start: 2022-02-24

## 2022-02-24 RX ORDER — SODIUM CHLORIDE 0.9 % (FLUSH) 0.9 %
5-40 SYRINGE (ML) INJECTION PRN
Status: CANCELLED | OUTPATIENT
Start: 2022-03-17

## 2022-02-24 RX ORDER — SODIUM CHLORIDE 9 MG/ML
25 INJECTION, SOLUTION INTRAVENOUS PRN
Status: CANCELLED | OUTPATIENT
Start: 2022-02-24

## 2022-02-24 RX ORDER — HEPARIN SODIUM (PORCINE) LOCK FLUSH IV SOLN 100 UNIT/ML 100 UNIT/ML
500 SOLUTION INTRAVENOUS PRN
Status: DISCONTINUED | OUTPATIENT
Start: 2022-02-24 | End: 2022-02-25 | Stop reason: HOSPADM

## 2022-02-24 RX ORDER — EPINEPHRINE 1 MG/ML
0.3 INJECTION, SOLUTION, CONCENTRATE INTRAVENOUS PRN
Status: CANCELLED | OUTPATIENT
Start: 2022-02-24

## 2022-02-24 RX ORDER — SODIUM CHLORIDE 9 MG/ML
INJECTION, SOLUTION INTRAVENOUS CONTINUOUS
Status: CANCELLED | OUTPATIENT
Start: 2022-02-24

## 2022-02-24 RX ORDER — PALONOSETRON 0.05 MG/ML
0.25 INJECTION, SOLUTION INTRAVENOUS ONCE
Status: CANCELLED | OUTPATIENT
Start: 2022-03-17 | End: 2022-03-17

## 2022-02-24 RX ORDER — PALONOSETRON 0.05 MG/ML
0.25 INJECTION, SOLUTION INTRAVENOUS ONCE
Status: COMPLETED | OUTPATIENT
Start: 2022-02-24 | End: 2022-02-24

## 2022-02-24 RX ORDER — ACETAMINOPHEN 325 MG/1
650 TABLET ORAL
Status: CANCELLED | OUTPATIENT
Start: 2022-03-17

## 2022-02-24 RX ORDER — SODIUM CHLORIDE 0.9 % (FLUSH) 0.9 %
5-40 SYRINGE (ML) INJECTION PRN
Status: DISCONTINUED | OUTPATIENT
Start: 2022-02-24 | End: 2022-02-25 | Stop reason: HOSPADM

## 2022-02-24 RX ORDER — SODIUM CHLORIDE 9 MG/ML
20 INJECTION, SOLUTION INTRAVENOUS ONCE
Status: COMPLETED | OUTPATIENT
Start: 2022-02-24 | End: 2022-02-24

## 2022-02-24 RX ORDER — SODIUM CHLORIDE 9 MG/ML
INJECTION, SOLUTION INTRAVENOUS CONTINUOUS
Status: CANCELLED | OUTPATIENT
Start: 2022-03-17

## 2022-02-24 RX ORDER — SODIUM CHLORIDE 0.9 % (FLUSH) 0.9 %
5-40 SYRINGE (ML) INJECTION PRN
Status: CANCELLED | OUTPATIENT
Start: 2022-02-24

## 2022-02-24 RX ORDER — SODIUM CHLORIDE 9 MG/ML
25 INJECTION, SOLUTION INTRAVENOUS PRN
Status: CANCELLED | OUTPATIENT
Start: 2022-03-17

## 2022-02-24 RX ORDER — PALONOSETRON 0.05 MG/ML
0.25 INJECTION, SOLUTION INTRAVENOUS ONCE
Status: CANCELLED | OUTPATIENT
Start: 2022-02-24 | End: 2022-02-24

## 2022-02-24 RX ORDER — ONDANSETRON 2 MG/ML
8 INJECTION INTRAMUSCULAR; INTRAVENOUS
Status: CANCELLED | OUTPATIENT
Start: 2022-02-24

## 2022-02-24 RX ORDER — ACETAMINOPHEN 325 MG/1
650 TABLET ORAL
Status: CANCELLED | OUTPATIENT
Start: 2022-02-24

## 2022-02-24 RX ORDER — SODIUM CHLORIDE 9 MG/ML
5-40 INJECTION INTRAVENOUS PRN
Status: CANCELLED | OUTPATIENT
Start: 2022-03-17

## 2022-02-24 RX ORDER — ALBUTEROL SULFATE 90 UG/1
4 AEROSOL, METERED RESPIRATORY (INHALATION) PRN
Status: CANCELLED | OUTPATIENT
Start: 2022-03-17

## 2022-02-24 RX ORDER — DIPHENHYDRAMINE HYDROCHLORIDE 50 MG/ML
50 INJECTION INTRAMUSCULAR; INTRAVENOUS
Status: CANCELLED | OUTPATIENT
Start: 2022-02-24

## 2022-02-24 RX ORDER — SODIUM CHLORIDE 9 MG/ML
5-40 INJECTION INTRAVENOUS PRN
Status: CANCELLED | OUTPATIENT
Start: 2022-02-24

## 2022-02-24 RX ORDER — HEPARIN SODIUM (PORCINE) LOCK FLUSH IV SOLN 100 UNIT/ML 100 UNIT/ML
500 SOLUTION INTRAVENOUS PRN
Status: CANCELLED | OUTPATIENT
Start: 2022-03-17

## 2022-02-24 RX ORDER — MEPERIDINE HYDROCHLORIDE 50 MG/ML
12.5 INJECTION INTRAMUSCULAR; INTRAVENOUS; SUBCUTANEOUS PRN
Status: CANCELLED | OUTPATIENT
Start: 2022-03-17

## 2022-02-24 RX ADMIN — Medication 10 ML: at 09:35

## 2022-02-24 RX ADMIN — Medication 10 ML: at 11:49

## 2022-02-24 RX ADMIN — DEXAMETHASONE SODIUM PHOSPHATE 10 MG: 10 INJECTION INTRAMUSCULAR; INTRAVENOUS at 10:07

## 2022-02-24 RX ADMIN — Medication 10 ML: at 11:50

## 2022-02-24 RX ADMIN — Medication 10 ML: at 09:34

## 2022-02-24 RX ADMIN — CARBOPLATIN 587.4 MG: 10 INJECTION, SOLUTION INTRAVENOUS at 11:07

## 2022-02-24 RX ADMIN — FOSAPREPITANT 150 MG: 150 INJECTION, POWDER, LYOPHILIZED, FOR SOLUTION INTRAVENOUS at 10:20

## 2022-02-24 RX ADMIN — SODIUM CHLORIDE 20 ML/HR: 9 INJECTION, SOLUTION INTRAVENOUS at 10:04

## 2022-02-24 RX ADMIN — SODIUM CHLORIDE 900 MG: 9 INJECTION, SOLUTION INTRAVENOUS at 10:49

## 2022-02-24 RX ADMIN — HEPARIN 500 UNITS: 100 SYRINGE at 11:50

## 2022-02-24 RX ADMIN — PALONOSETRON 0.25 MG: 0.05 INJECTION, SOLUTION INTRAVENOUS at 10:04

## 2022-02-24 NOTE — PROGRESS NOTES
Patient ID: Janel Temple, [birth year], [id number], 70 y.o. Referred by :  No ref. 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 male history of tobacco smoking and has been following with pulmonary for right middle lobe lung nodule where CT chest did show increase in the size in the scan on November 2, 2021 patient had biopsy on July 28, 2021 which showed benign parenchyma with chronic inflammation however the lesion was active on the PET there was no evidence of metastasis patient admitted to [facility] on January 4, 2022 for VATS and wedge resection which ended up with lobectomy, final pathology compatible with adenocarcinoma 1.8 cm and carcinoma invade into but not through the visceral pleura lymph nodes were negative patient status post right middle lobe lobectomy and margin were free of involvement by carcinoma   Patient developed right apical pneumothorax chest tube placed in the hospital and was discharged after chest tube removed    Interval history  Patient improved   Ambulating  Persistent right-sided chest pain where he had the surgery controlled on Tylenol and as needed oxycodone  Still have nausea from the oxycodone/exaggerating diabetic gastroparesis    Past Medical History:   Diagnosis Date    Abnormal cardiac cath 09/20/2013    LMCA; Mild irregularities 10-20%. LAD: Abnormal.  Mild to moderate irregularities throughout the LAD and branches. LCx:  abnormal.  80-90% stenosis in a moderate sized OM1. RCA: Abnormal.  40-50% mid RCA stenosis.     Actinic keratosis     Arthritis     neck, fingers    CAD (coronary artery disease)     Calculus of kidney     Cervical stenosis of spine     Diabetes mellitus (HCC)     Dr. Xuan Davis, CNP, [place]    Full dentures     GERD (gastroesophageal reflux disease)     H/O echocardiogram 04/29/2016    Stress echo. Normal resting LV systolic function,normal systolic restponse to exercise. No evidence of reversible  ischemia on this maximal,symptom limited,treadmill exerxiess stress echocardiography study    History of cardiac cath 02/09/2017    LMCA: Lesion on LMCA: Distal subsection . 20% stenosis. LAD: Lesion on 1st Diag: Mid subsection . 75% stenosis. RCA: Lesion on Mid RCA: Mid subsection . 100% stenosis. Comments: The distal RCA fills by left to right collateralization. Dominance: Mixed. LV function assessed as: Normal. EF 65%.  History of echocardiogram 06/11/2018    EF 60%. Mildly increased LV wall thickness. Evidence of mild diastolic dysfunction seen.  History of echocardiogram 01/18/2019    EF of 65%. LV wall thickness is mildly increased. Aortic valve leaflets are mildly thickened.  History of heart attack     History of tobacco abuse     Quit 10/2021, 1/4 ppd for 47 years    Hyperlipidemia     Hypertension     Dr. Xuan Davis, CNP    Pulmonary nodule 12/2021    Right middle lobe    Raynaud's phenomenon     S/P angioplasty with stent 09/20/2013    PTCA-NEDRA of  the OM 1    Wears hearing aid in both ears        Past Surgical History:   Procedure Laterality Date   [address] [place] [address]  2007    patient states had 2 small blockages    CARDIAC CATHETERIZATION  02/09/2017    LMCA: Lesion on LMCA: Distal subsection 20% stenosis. LAD: Lesion on 1st Diag: Mid subsection 75% stenosis. RCA: Lesion on Mid RCA: Mid subsection 100% stenosis.  CARDIAC CATHETERIZATION  02/09/2017    Attempted PCI to chronic total occlusion of RCA was not successful. Unable to cross with multiple wires due to heavy calcification and toruosity.    [facility] CARDIAC CATHETERIZATION  01/14/2019    CATARACT REMOVAL  04/06/2015    Right eye - Dr. Houser Close  06/08/2015     [employer] - left eye    COLONOSCOPY  01/11/2006    Dr. David Courtney - internal hemorrhoids, no polps    COLONOSCOPY  05/23/2016    -polyp    CT NEEDLE BIOPSY LUNG PERCUTANEOUS  07/28/2021    CT NEEDLE BIOPSY LUNG PERCUTANEOUS 7/28/2021 [facility] CT SCAN    HEMORRHOID SURGERY      HERNIA REPAIR Left     inguinal    IR CHEST TUBE INSERTION  01/11/2022    IR CHEST TUBE INSERTION 1/11/2022 Nitesh Correa MD [facility] SPECIAL PROCEDURES    IR PORT PLACEMENT EQUAL OR GREATER THAN 5 YEARS  2/9/2022    IR PORT PLACEMENT EQUAL OR GREATER THAN 5 YEARS 2/9/2022 [facility] SPECIAL PROCEDURES    LUNG BIOPSY Right 07/28/2021    Dr Goldstein All, TOTAL Right 01/04/2022    MIDDLE WEDGE RESECTION, POSSIBLE MIDDLE LOBECTOMY VIA THORACOTOMY performed by Lou Alexander MD at [place] [address]    c4-5 laminectomy and fusion    OTHER SURGICAL HISTORY  10/2014    Right foot - 7 from heal and 1 from great toe at [address] [place] [address]  12/11/2014    pain injection - cervical    TUNNELED CENTRAL VENOUS CATHETER W/ SUBCUTANEOUS PORT Left 02/09/2022    Dr [facility]    UPPER GASTROINTESTINAL ENDOSCOPY  12/28/2005    Dr. David Courtney - mild esophagitis and gastritis    UPPER GASTROINTESTINAL ENDOSCOPY N/A 02/05/2019    -bx(neg H-Pylori)retained gastric content, possible submucosal mass of gastric cardia    UPPER GASTROINTESTINAL ENDOSCOPY N/A 10/22/2019    EGD BIOPSY performed by Noé Parikh MD at [facility]   Allergen Reactions    Niacin And Related      Turns red, no trouble breathing    Minocycline Hcl Rash    Other Nausea And Vomiting     Patient states on all pain medications he gets nausea and vomiting.  Doctor ordered a medicine (nausea) to take before pain medication    Oxycodone Hcl      vomits Current Outpatient Medications   Medication Sig Dispense Refill    pantoprazole (PROTONIX) 40 MG tablet Take 40 mg by mouth daily      folic acid (FOLVITE) 1 MG tablet Take 1 tablet by mouth daily for 30 doses Start 7 days before first scheduled dose and continue for 21 days after last dose of PEMEtrexed. 30 tablet 2    dexamethasone (DECADRON) 4 MG tablet Take 1 tablet by mouth See Admin Instructions Take 1 tab twice daily for 3 days starting the day before scheduled PEMEtrexed. 30 tablet 0    ondansetron (ZOFRAN ODT) 8 MG TBDP disintegrating tablet Take 1 tablet by mouth every 8 hours as needed for Nausea 30 tablet 2    lidocaine-prilocaine (EMLA) 2.5-2.5 % cream Apply topically to port site 30- 60 minutes before access as needed. 30 g 2    melatonin 5 MG TABS tablet Take 1 tablet by mouth nightly as needed (sleep) 30 tablet 0    SERTRALINE HCL PO Take 50 mg by mouth nightly      tamsulosin (FLOMAX) 0.4 MG capsule Take 0.4 mg by mouth daily      tiZANidine (ZANAFLEX) 4 MG capsule Take 4 mg by mouth 3 times daily as needed      ferrous sulfate (IRON 325) 325 (65 Fe) MG tablet Take 325 mg by mouth daily       isosorbide mononitrate (IMDUR) 30 MG extended release tablet 1/2 tablet in the morning      lisinopril (PRINIVIL;ZESTRIL) 20 MG tablet Take 20 mg by mouth daily       nitroGLYCERIN (NITRODUR) 0.4 MG/HR 1 dose under tongue as needed for chest pain.  max of 3 in one day      amLODIPine (NORVASC) 5 MG tablet Take 1 tablet by mouth daily 90 tablet 3    clopidogrel (PLAVIX) 75 MG tablet Take 1 tablet by mouth daily (Patient taking differently: Take 75 mg by mouth daily Ordered by heart doctor, Dr. Julia Paredes, [place]) 90 tablet 3    atenolol (TENORMIN) 25 MG tablet Take 1 tablet by mouth daily 90 tablet 3    atorvastatin (LIPITOR) 20 MG tablet Take 1 tablet by mouth daily 90 tablet 3    sitaGLIPtan-metformin (JANUMET)  MG per tablet Take 1 tablet by mouth 2 times daily (with meals)      aspirin 81 MG tablet Take 81 mg by mouth daily Ordered by heart doctor, Chan Salazar      gabapentin (NEURONTIN) 100 MG capsule Take 1 capsule by mouth 3 times daily for 14 days. 42 capsule 0    Insulin Glargine, 2 Unit Dial, 300 UNIT/ML SOPN Insulin Glargine Insulin Glargine U-300 Conc Active 10 UNIT Subcutaneous Every morning May 7th, 2020 11:47am 2020  [facility] ([id number]) (Patient not taking: Reported on 2022)      glimepiride (AMARYL) 2 MG tablet Take 1 tablet by mouth 2 times daily (Patient not taking: Reported on 2022) 180 tablet 3     No current facility-administered medications for this visit. Social History     Socioeconomic History    Marital status:      Spouse name: Not on file    Number of children: Not on file    Years of education: Not on file    Highest education level: Not on file   Occupational History    Not on file   Tobacco Use    Smoking status: Former Smoker     Packs/day: 0.25     Years: 54.00     Pack years: 13.50     Types: Cigarettes     Quit date: 10/1/2021     Years since quittin.4    Smokeless tobacco: Never Used   Vaping Use    Vaping Use: Never used   Substance and Sexual Activity    Alcohol use: No    Drug use: No    Sexual activity: Not on file   Other Topics Concern    Not on file   Social History Narrative    Not on file     Social Determinants of Health     Financial Resource Strain:     Difficulty of Paying Living Expenses: Not on file   Food Insecurity:     Worried About [employer] [employer] in the Last Year: Not on file    Luis E of Food in the Last Year: Not on file   Transportation Needs:     Lack of Transportation (Medical): Not on file    Lack of Transportation (Non-Medical):  Not on file   Physical Activity:     Days of Exercise per Week: Not on file    Minutes of Exercise per Session: Not on file   Stress:     Feeling of Stress : Not on file   Social Connections:     Frequency of Communication with Friends and Family: Not on file    Frequency of Social Gatherings with Friends and Family: Not on file    Attends Congregation Services: Not on file    Active Member of Clubs or Organizations: Not on file    Attends Club or Organization Meetings: Not on file    Marital Status: Not on file   Intimate Partner Violence:     Fear of Current or Ex-Partner: Not on file    Emotionally Abused: Not on file    Physically Abused: Not on file    Sexually Abused: Not on file   Housing Stability:     Unable to Pay for Housing in the Last Year: Not on file    Number of [place] in the Last Year: Not on file    Unstable Housing in the Last Year: Not on file       Family History   Problem Relation Age of Onset    Heart Disease Mother     Diabetes Mother     High Blood Pressure Mother     Diabetes Father     Alzheimer's Disease Father     Heart Disease Sister         CABG    Heart Disease Brother     Heart Disease Brother     Heart Disease Brother     Cancer Brother         REVIEW OF SYSTEM:     Constitutional: No fever or chills. No night sweats, no weight loss   Eyes: No eye discharge, double vision, or eye pain   HEENT: negative for sore mouth, sore throat, hoarseness and voice change   Respiratory: negative for cough , sputum, dyspnea, wheezing, hemoptysis, chest pain   Cardiovascular: negative for chest pain, dyspnea, palpitations, orthopnea, PND   Gastrointestinal: negative for nausea, vomiting, diarrhea, constipation, abdominal pain, Dysphagia, hematemesis and hematochezia   Genitourinary: negative for frequency, dysuria, nocturia, urinary incontinence, and hematuria   Integument: negative for rash, skin lesions, bruises.    Hematologic/Lymphatic: negative for easy bruising, bleeding, lymphadenopathy, petechiae and swelling/edema   Endocrine: negative for heat or cold intolerance, tremor, weight changes, change in bowel habits and hair loss   Musculoskeletal: negative for myalgias, arthralgias, pain, joint swelling,and bone pain   Neurological: negative for headaches, dizziness, seizures, weakness, numbness       OBJECTIVE:         Vitals:    02/24/22 0853   BP: 134/70   Pulse: 94   Resp: 18   Temp: 96.9 °F (36.1 °C)       PHYSICAL EXAM:   General appearance - well appearing, no in pain or distress   Mental status - alert and cooperative   Eyes - pupils equal and reactive, extraocular eye movements intact   Ears - bilateral TM's and external ear canals normal   Mouth - mucous membranes moist, pharynx normal without lesions   Neck - supple, no significant adenopathy   Lymphatics - no palpable lymphadenopathy, no hepatosplenomegaly   Chest - clear to auscultation, no wheezes, rales or rhonchi, symmetric air entry   Heart - normal rate, regular rhythm, normal S1, S2, no murmurs, rubs, clicks or gallops   Abdomen - soft, nontender, nondistended, no masses or organomegaly   Neurological - alert, oriented, normal speech, no focal findings or movement disorder noted   Musculoskeletal - no joint tenderness, deformity or swelling   Extremities - peripheral pulses normal, no pedal edema, no clubbing or cyanosis   Skin - normal coloration and turgor, no rashes, no suspicious skin lesions noted ,      LABORATORY DATA:     Lab Results   Component Value Date    WBC 8.0 02/09/2022    HGB 11.8 (L) 02/09/2022    HCT 37.2 (L) 02/09/2022    MCV 89.2 02/09/2022     02/09/2022    LYMPHOPCT 24 02/09/2022    RBC 4.17 (L) 02/09/2022    MCH 28.3 02/09/2022    MCHC 31.7 02/09/2022    RDW 13.9 02/09/2022    NEUTOPHILPCT 50.9 02/02/2017    MONOPCT 12 02/09/2022    EOSPCT 3.1 02/02/2017    BASOPCT 1 02/09/2022    NEUTROABS 4.75 02/09/2022    LYMPHSABS 1.92 02/09/2022    MONOSABS 0.95 02/09/2022    EOSABS 0.23 02/09/2022    BASOSABS 0.10 02/09/2022         Chemistry        Component Value Date/Time     02/09/2022 0825    K 4.2 02/09/2022 0825     02/09/2022 0825    CO2 25 02/09/2022 0825    BUN 25 (H) 02/09/2022 0825    CREATININE 0.96 02/09/2022 0825        Component Value Date/Time    CALCIUM 9.4 02/09/2022 0825    ALKPHOS 101 02/09/2022 0825    AST 15 02/09/2022 0825    ALT 18 02/09/2022 0825    BILITOT 0.55 02/09/2022 0825            PATHOLOGY DATA:     Surgical Pathology Report -- Diagnosis --     1.  Right lung, middle lobe wedge biopsy:     -  Adenocarcinoma. -  Tumor is 1.8 cm. -  Carcinoma invades into but not through the visceral pleura. 2.  Lymph node #9, biopsy:     -  Negative for metastatic carcinoma. 3.  Lymph node #7, biopsy:     -  Negative for metastatic carcinoma. 4.  Right middle lobe lung, lobectomy:     -  Margins are free of involvement by carcinoma. -  Peribronchial lymph node, negative for metastatic carcinoma. -  Nonneoplastic lung shows emphysematous change.       Misty Paul M.D.   **Electronically Signed Out**         rdd/1/6/2022         Clinical Information   Pre-op Diagnosis:  RIGHT MIDDLE LOBE NODULE   Operative Findings:  MIDDLE LOBE OF RIGHT LUNG; LYMPH NODE #9 OF LUNG;   LYMPH NODE #7; RIGHT MIDDLE LOBE   Operation Performed:  MIDDLE WEDGE RESECTION, POSSIBLE MIDDLE   LOBECTOMY VIA THORACOTOMY     Source of Specimen   1: MIDDLE LOBE OF RIGHT LUNG (A)   2: LYMPH NODE #9 OF LUNG (B)   3: LYMPH NODE #7   4: RIGHT MIDDLE LOBE     Gross Description   1.  \"ANA SUSAN, MIDDLE LOBE OF RIGHT LUNG\" Received fresh is a   6.4 x 4.2 x 2.8 cm lung wedge with staple line.  The pleura is   pink-tan with an area of slightly thickening (inked black). Sectioning reveals a 1.8 x 1.2 x 0.8 cm subpleural tan-white mass   lesion that is 0.8 cm from the staple line margin.  The remaining   parenchyma is pink-tan with no other lesions.  1TP, 1DQ, 1FS, Cassette   summary:  \"A\" frozen section lesion, \"B\" remainder of lesion with   pleura inked black, \"C\" staple line margin en face.    2.  \"ANA SUSAN, LYMPH NODE #9 OF LUNG\" Received fresh are two   fragments, 0.3 cm each.  1TP, 1FS, 1cs. 3.  \"ANA DAVIS, LYMPH NODE #7\" Received fresh are two   anthracotic fragments, 0.6 and 0.7 cm in greatest dimension.  1TP,   1FS, 1cs.     4.  \"ANA DAVIS, RIGHT MIDDLE LOBE\" Received fresh is a 50 gram,   8.5 x 6.8 x 3.5 cm lobe of lung with multiple staple lines.  The   pleura is wrinkled purple-gray.  Sectioning reveals spongy dark red   parenchyma with no masses.  No markedly enlarged nodes are identified   at the hilum.  Cassette summary:  \"A\" bronchial margin frozen section   cassette resubmitted as received, \"B\" hilar soft tissue and vascular   margin, \"C\" uninvolved lung (along staple line).  tm     Intraoperative Diagnosis   1.  FSDX:  Non-small cell carcinoma, favor adenocarcinoma.  (14:00,   RDD)   2.  FSDX:  Fibrofatty tissue.  (14:11, RDD)   3.  FSDX:  Lymph node, negative for carcinoma.  (14:19, RDD)   4.  FSDX:  Bronchial margin, negative for carcinoma.  (16:17, RDD)     Microscopic Description   1-4.  Microscopic examination performed. PROCEDURE: Wedge biopsy, followed by lobectomy       SPECIMEN LATERALITY: Right   TUMOR FOCALITY: Single tumor       TUMOR SITE: Right middle lobe   TUMOR SIZE --     1.8 x 1.2 x 0.8 cm           HISTOLOGIC TYPE: Adenocarcinoma. Sections show a large and anaplastic non-small cell malignancy with   cohesion and a few instances of glandular differentiation.  Neoplastic   cells are large with copious eosinophilic cytoplasm and large   vesicular nuclei.  Tumor is characterized further utilizing control   appropriate immunostains.  Cells of carcinoma are negative for p40 and   they are positive for TTF-1 and CK7.  Morphology and immunophenotype   are that of an adenocarcinoma, lung primary.    Note: The case is reviewed by a second Pathologist for quality   assurance with consensus (DS).       HISTOLOGIC GRADE: Grade 2 (of 4)           VISCERAL PLEURA INVASION:Reticulin stain with appropriately reactive   control is utilized to evaluate pleura.  Tumor extends beyond the   elastic layer into the pleura but not to the pleural surface (PL1)     DIRECT INVASION OF ADJACENT STRUCTURES: No     TREATMENT EFFECT: None apparent. LYMPHOVASCULAR INVASION: Absent                         MARGINS --   -BRONCHIAL: Free of tumor       -VASCULAR: Free of tumor       -PARENCHYMAL: Free of tumor       -OTHER ATTACHED TISSUE MARGIN (IF APPLICABLE): N/A       REGIONAL LYMPH NODES: 3   LYMPH NODES(S) FROM PRIOR PROCEDURES: No       NUMBER AND STATION(S) EXAMINED:   See diagnoses   NUMBER AND STATION(S) WITH METASTASIS: 0         DISTANT METASTASIS--   DISTANT SITE(S) INVOLVED, IF APPLICABLE: N/A         PATHOLOGIC STAGE CLASSIFICATION:     pT2a  pN0   CAP Lung 4200 in conjunction with AJCC 8 ed.         SURGICAL PATHOLOGY CONSULTATION         Patient Name: Ghazala Yusuf: [id number]   Path Number: [id number]     [address], [facility] [address] [place], [address]   [phone]   Fax: [phone]          IMAGING DATA:      IR PORT PLACEMENT > 5 YEARS  Narrative: PROCEDURE:  ULTRASOUND GUIDED VASCULAR ACCESS. FLUOROSCOPY GUIDED PLACEMENT OF A SUBCUTANEOUS PORT-A-CATH. MODERATE CONSCIOUS SEDATION    2/9/2022. HISTORY:  ORDERING SYSTEM PROVIDED HISTORY: Lung cancer, main bronchus, right (HCC)    SEDATION:  0.5 mgversed was titrated intravenously for moderate sedation monitored under  my direction. Total intraservice time of sedation was 20 minutes. The  patient's vital signs were monitored throughout the procedure and recorded in  the patient's medical record by the nurse. FLUOROSCOPY DOSE AND TYPE OR TIME AND EXPOSURES:  5.79 mGy reference air kerma, 0.5 minutes    TECHNIQUE:  Informed consent was obtained after a detailed explanation of the procedure  including risks, benefits, and alternatives. Universal protocol was observed.   The left neck and chest were prepped and draped in sterile fashion using  maximum sterile barrier technique. Local anesthesia was achieved with  lidocaine. A micropuncture needle was used to access the left internal  jugular vein using ultrasound guidance. An ultrasound image demonstrating  patency of the vein with needle tip located within it. An image was obtained  and stored in PACs. Access was gained with a micropuncture set, and secured  with a J-wire. The chest wall was anesthetized with 1% lidocaine. A  transverse incision was made. The port pocket was created. Anchoring  sutures were attached to the port. The catheter was tunneled from the chest  incision to the jugular dermatotomy. The catheter was connected to the port  with a locking hub. The port was flushed, then placed into the pocket, the  catheter pulled taut, cut to length, and placed through the peel-away sheath. The catheter tip terminates at the cavoatrial junction. The anchoring  sutures were then secured. The port was flushed and hep-locked. The  transverse incision on the chest was closed with 2 0 Vicryl and skin glue. The jugular dermatotomy was closed with skin glue. The patient tolerated the  procedure well. FINDINGS:  Fluoroscopic image demonstrates the tip of the port at the cavo-atrial  junction . Impression: Successful ultrasound and fluoroscopy guided Port-A-Cath placement which may  be used immediately. CT chest with IV contrast on 11/2/2021    The previously biopsied, somewhat bilobed nodule involving right middle lobe   is slightly increased in size since the prior study now measuring 1.8 x 1.0   cm, once measuring 1.8 x 0.6 cm.  There does appear to be some spiculation. No new pulmonary nodule is seen. PET/CT on June 30, 2021    1.  Significant oval increase in size of known anterior superior middle lobe   nodule along the minor fissure.  This now measures 6 x 18 mm compared to 10 x   4 mm on the prior.  This remains a LUNG RADS 4B (very suspicious) nodule. Recommend repeat PET-CT and or tissue sampling given appearance and   significant interval increase in size. 2. No major change in the fat density 2.3 cm nodule in the gastric fundus   adjacent to the GE junction.  Note this was FDG avid on the prior PET-CT. 3. Mild emphysema. The findings were sent to the Radiology Results [address] at 12:06   pm on 6/10/2021to be communicated to a licensed caregiver.           ASSESSMENT / PLAN:    There are no diagnoses linked to this encounter. 3 70-year-old man history of smoking diagnosed with pathology stage Ib(T2 aN0 M0) adenocarcinoma of the right middle lobe status post right VATS thoracotomy with right middle lobectomy done on January 4, 2022, due to high risk features manifested by involvement of visceral pleura recommended adjuvant chemotherapy which is Alimta and carboplatin, carboplatin substitute cisplatin due to frailed/nausea related to gastroparesis from diabetes in addition to mild neuropathy, full cycle to start today on February 24, 2022, reiterated side effects of treatment  2. Right-sided chest pain> improved control on oxycodone and Tylenol  3. Intermittent nausea this is prior to surgery related to diabetic gastroparesis > antinausea medicine  4. Decreased appetite due to the nausea> encourage oral intake  5. Continue vitamin supplements folic acid and T03  6. RTC in 3 weeks prior to next chemotherapy          Anastasiya Clark MD  Hematologist/Medical Oncologist      This note is created with the assistance of a speech recognition program.  While intending to generate a document that actually reflects the content of the visit, the document can still have some errors including those of syntax and sound a like substitutions which may escape proof reading. It such instances, actual meaning can be extrapolated by contextual diversion.

## 2022-02-24 NOTE — TELEPHONE ENCOUNTER
Name: Lazarus Paul  : 1950  MRN: [id number]    Oncology Navigation- Initial Note:    Intake-  Contact Type: Medical Oncology    Diagnosis: Thoracic- malignant    Home Disposition: Lives with other who is able to assist    Patient needs and barriers to care: [place] Barriers Identified        Continuum of Care: Diagnosis/Active Treatment    Notes: Initial oncology nurse navigation contact made with patient, wife, and daughter in treatment area. Writer introduced self as oncology nurse navigator and introduced navigation program.      Patient states that he hopes that treatment does not decrease his appettite more that hit has been. Discussed nausea medication and keeping snacks in arms reach. Discussed hydration. Patient states that [place] food taste really good to him right now. Patient instructed to eat what sounds, and taste good. Patient notified that dietician is available if needed. Patient aware of Financial counselor and other financial resources. Patient denies transportation issues at this time. Daughter states that she drives him but works nights. Patient asked if he could drive himself. Patient informed that he could drive himself as long he tolerates treatment well it would be okay for him to drive himself. Patient aware of available social work. Discussed [employer] for transportation. Navigator's contact information given. Patient and family encouraged to call with any question or concerns at this time. Will continue to follow.      Electronically signed by Merlin Conch, RN on 2022 at 10:48 AM

## 2022-02-28 ENCOUNTER — TELEPHONE (OUTPATIENT)
Dept: ONCOLOGY | Age: 72
End: 2022-02-28

## 2022-02-28 NOTE — TELEPHONE ENCOUNTER
Name: Krupa Suarez  : 1950  MRN: [id number]    Oncology Navigation Follow-Up Note    Contact Type:  Telephone    Notes: ONN follow up call made to patient's mobile phone. No answer. Left message informing patient who I was and letting him know that I was checking on him to see how he was doing. Contact information left on VM. Patient encouraged to call back with questions, concerns, or needs. Will continue to follow.       Electronically signed by Tiffany Park RN on 2022 at 2:56 PM

## 2022-02-28 NOTE — TELEPHONE ENCOUNTER
Patient called back stating that he did well for the first couple days and now has fatigue and issues with eating. Patient notes \"I can't make myself eat\". Patient notes that he drinking fine but food is an issue. Patient informed to keep up fluid intake and if appetite doesn't improve to call [facility]. Patient verbalized understanding.       Rod Avery RN

## 2022-03-03 ENCOUNTER — HOSPITAL ENCOUNTER (EMERGENCY)
Age: 72
Discharge: HOME | End: 2022-03-03
Attending: STUDENT IN AN ORGANIZED HEALTH CARE EDUCATION/TRAINING PROGRAM
Payer: MEDICARE

## 2022-03-03 VITALS
DIASTOLIC BLOOD PRESSURE: 76 MMHG | SYSTOLIC BLOOD PRESSURE: 176 MMHG | BODY MASS INDEX: 19.53 KG/M2 | HEART RATE: 66 BPM | OXYGEN SATURATION: 100 % | RESPIRATION RATE: 15 BRPM | WEIGHT: 144 LBS | TEMPERATURE: 96.5 F

## 2022-03-03 DIAGNOSIS — R04.0 EPISTAXIS: Primary | ICD-10-CM

## 2022-03-03 PROCEDURE — 6370000000 HC RX 637 (ALT 250 FOR IP): Performed by: STUDENT IN AN ORGANIZED HEALTH CARE EDUCATION/TRAINING PROGRAM

## 2022-03-03 PROCEDURE — 2500000003 HC RX 250 WO HCPCS: Performed by: STUDENT IN AN ORGANIZED HEALTH CARE EDUCATION/TRAINING PROGRAM

## 2022-03-03 PROCEDURE — 99284 EMERGENCY DEPT VISIT MOD MDM: CPT

## 2022-03-03 PROCEDURE — 30901 CONTROL OF NOSEBLEED: CPT

## 2022-03-03 RX ORDER — OXYMETAZOLINE HYDROCHLORIDE 0.05 G/100ML
2 SPRAY NASAL 2 TIMES DAILY
Status: DISCONTINUED | OUTPATIENT
Start: 2022-03-03 | End: 2022-03-03 | Stop reason: HOSPADM

## 2022-03-03 RX ORDER — PROMETHAZINE HYDROCHLORIDE 25 MG/1
25 TABLET ORAL EVERY 4 HOURS PRN
COMMUNITY

## 2022-03-03 RX ORDER — LIDOCAINE HYDROCHLORIDE 40 MG/ML
4 SOLUTION TOPICAL ONCE
Status: DISCONTINUED | OUTPATIENT
Start: 2022-03-03 | End: 2022-03-03 | Stop reason: HOSPADM

## 2022-03-03 RX ORDER — TRANEXAMIC ACID 100 MG/ML
100 INJECTION, SOLUTION INTRAVENOUS ONCE
Status: COMPLETED | OUTPATIENT
Start: 2022-03-03 | End: 2022-03-03

## 2022-03-03 RX ORDER — SITAGLIPTIN AND METFORMIN HYDROCHLORIDE 1000; 50 MG/1; MG/1
50-1000 TABLET, FILM COATED ORAL 2 TIMES DAILY
COMMUNITY
End: 2022-03-03

## 2022-03-03 RX ORDER — OXYCODONE HYDROCHLORIDE 5 MG/1
5 CAPSULE ORAL EVERY 4 HOURS PRN
COMMUNITY
End: 2022-05-12 | Stop reason: SDUPTHER

## 2022-03-03 RX ADMIN — OXYMETAZOLINE HCL 2 SPRAY: 0.05 SPRAY NASAL at 10:33

## 2022-03-03 RX ADMIN — TRANEXAMIC ACID 100 MG: 1 INJECTION, SOLUTION INTRAVENOUS at 12:02

## 2022-03-03 NOTE — ED NOTES
EMERGENCY DEPARTMENT  -  VISIT NOTE    Date:     3/3/2022  Patient: Liset Trujillo  MRN:    [id number]    Triage:    Chief Complaint   Patient presents with    Epistaxis     intermittent for two hours     ------------------------------ HISTORY OF PRESENT ILLNESS --------------------------    : None needed  HIPAA - Verbal permision granted from patient to discuss case , including protected health information, in front of family / friends in room at the time of the evaluation. The history is provided by the patient and his wife. Liset Trujillo is a 70 y.o. male who presents to the ED for epistaxis of the right naris  Onset this morning upon waking up  Improved by nothing he attempted continuous manual compression of the nose without sufficient control of bleeding  No known exacerbating features. Patient denies any nasal trauma or picking of the nose. He does state that he had a remote surgery of his nasal septum to remove a cancerous lesion. No complications since that procedure. Associated with chronic right lateral flank pain after having his right lower lung removed due to cancerous process. This pain is unchanged from the surgery 2 months ago.     Denies fever, chills, abdominal pain, nausea vomiting or diarrhea    Of note the patient does take antiplatelet agent Plavix due to his heart condition    ----------------------------------- REVIEW OF SYSTEMS -------------------------------------  The following systems were reviewed:  Skin - normal                                      Eyes - normal  Ears/Nose/Throat -epistaxis  Respiratory - normal  Cardiovascular - normal  Gastrointestinal - normal  Genitourinary - normal  Musculoskeletal - normal  Neurologic - normal  Constitutional - normal    All other ROS negative except as noted above  --------------------------------------- PAST HISTORY ------------------------------------------  Past Medical History:  Past Medical History:   Diagnosis Date  Abnormal cardiac cath 09/20/2013    LMCA; Mild irregularities 10-20%. LAD: Abnormal.  Mild to moderate irregularities throughout the LAD and branches. LCx:  abnormal.  80-90% stenosis in a moderate sized OM1. RCA: Abnormal.  40-50% mid RCA stenosis.  Actinic keratosis     Arthritis     neck, fingers    CAD (coronary artery disease)     Calculus of kidney     Cancer ([address]     Cervical stenosis of spine     Diabetes mellitus (HCC)     Dr. Joseph Boogie, CNP, [place]    Full dentures     GERD (gastroesophageal reflux disease)     H/O echocardiogram 04/29/2016    Stress echo. Normal resting LV systolic function,normal systolic restponse to exercise. No evidence of reversible  ischemia on this maximal,symptom limited,treadmill exerxiess stress echocardiography study    History of cardiac cath 02/09/2017    LMCA: Lesion on LMCA: Distal subsection . 20% stenosis. LAD: Lesion on 1st Diag: Mid subsection . 75% stenosis. RCA: Lesion on Mid RCA: Mid subsection . 100% stenosis. Comments: The distal RCA fills by left to right collateralization. Dominance: Mixed. LV function assessed as: Normal. EF 65%.  History of echocardiogram 06/11/2018    EF 60%. Mildly increased LV wall thickness. Evidence of mild diastolic dysfunction seen.  History of echocardiogram 01/18/2019    EF of 65%. LV wall thickness is mildly increased. Aortic valve leaflets are mildly thickened.      History of heart attack     History of tobacco abuse     Quit 10/2021, 1/4 ppd for 47 years    Hyperlipidemia     Hypertension     Dr. Joseph Boogie, CNP    Pulmonary nodule 12/2021    Right middle lobe    Raynaud's phenomenon     S/P angioplasty with stent 09/20/2013    PTCA-NEDRA of  the OM 1    Wears hearing aid in both ears      Past Surgical History:  Past Surgical History:   Procedure Laterality Date   [address]  2007    patient states had 2 small blockages    CARDIAC CATHETERIZATION  02/09/2017    LMCA: Lesion on LMCA: Distal subsection 20% stenosis. LAD: Lesion on 1st Diag: Mid subsection 75% stenosis. RCA: Lesion on Mid RCA: Mid subsection 100% stenosis.  CARDIAC CATHETERIZATION  02/09/2017    Attempted PCI to chronic total occlusion of RCA was not successful. Unable to cross with multiple wires due to heavy calcification and toruosity.     CARDIAC CATHETERIZATION  01/14/2019    CATARACT REMOVAL  04/06/2015    Right eye - Dr. Thom De Leon  06/08/2015    Dr. Lucero Cruz - left eye    COLONOSCOPY  01/11/2006    Dr. Dianne Kidd - internal hemorrhoids, no polps    COLONOSCOPY  05/23/2016    -polyp    CT NEEDLE BIOPSY LUNG PERCUTANEOUS  07/28/2021    CT NEEDLE BIOPSY LUNG PERCUTANEOUS 7/28/2021 [facility] CT SCAN    HEMORRHOID SURGERY      HERNIA REPAIR Left     inguinal    IR CHEST TUBE INSERTION  01/11/2022    IR CHEST TUBE INSERTION 1/11/2022 Rey Hilliard MD [facility] SPECIAL PROCEDURES    IR PORT PLACEMENT EQUAL OR GREATER THAN 5 YEARS  2/9/2022    IR PORT PLACEMENT EQUAL OR GREATER THAN 5 YEARS 2/9/2022 [facility] SPECIAL PROCEDURES    LUNG BIOPSY Right 07/28/2021    Dr Jens Ross, TOTAL Right 01/04/2022    MIDDLE WEDGE RESECTION, POSSIBLE MIDDLE LOBECTOMY VIA THORACOTOMY performed by Viktoria Hoang MD at [facility]    c4-5 laminectomy and fusion    OTHER SURGICAL HISTORY  10/2014    Right foot - 7 from heal and 1 from great toe at [address] [place] [address]  12/11/2014    pain injection - cervical    TUNNELED CENTRAL VENOUS CATHETER W/ SUBCUTANEOUS PORT Left 02/09/2022    Dr Hoenninger/[facility]    UPPER GASTROINTESTINAL ENDOSCOPY  12/28/2005    Dr. Dianne Kidd - mild esophagitis and gastritis    UPPER GASTROINTESTINAL ENDOSCOPY N/A 02/05/2019    -bx(neg H-Pylori)retained gastric content, possible submucosal mass of gastric cardia    UPPER GASTROINTESTINAL ENDOSCOPY N/A 10/22/2019    EGD BIOPSY performed by Tana De Leon Anette Lockhart MD at [address] [place] [address] History:  Social History     Socioeconomic History    Marital status:      Spouse name: Not on file    Number of children: Not on file    Years of education: Not on file    Highest education level: Not on file   Occupational History    Not on file   Tobacco Use    Smoking status: Former Smoker     Packs/day: 0.25     Years: 54.00     Pack years: 13.50     Types: Cigarettes     Quit date: 10/1/2021     Years since quittin.4    Smokeless tobacco: Never Used   Vaping Use    Vaping Use: Never used   Substance and Sexual Activity    Alcohol use: No    Drug use: No    Sexual activity: Not on file   Other Topics Concern    Not on file   Social History Narrative    Not on file     Social Determinants of Health     Financial Resource Strain:     Difficulty of Paying Living Expenses: Not on file   Food Insecurity:     Worried About [address] in the Last Year: Not on file    [address] in the Last Year: Not on file   Transportation Needs:     Lack of Transportation (Medical): Not on file    Lack of Transportation (Non-Medical):  Not on file   Physical Activity:     Days of Exercise per Week: Not on file    Minutes of Exercise per Session: Not on file   Stress:     Feeling of Stress : Not on file   Social Connections:     Frequency of Communication with Friends and Family: Not on file    Frequency of Social Gatherings with Friends and Family: Not on file    Attends [employer] Services: Not on file    Active Member of Clubs or Organizations: Not on file    Attends Club or Organization Meetings: Not on file    Marital Status: Not on file   Intimate Partner Violence:     Fear of Current or Ex-Partner: Not on file    Emotionally Abused: Not on file    Physically Abused: Not on file    Sexually Abused: Not on file   Housing Stability:     Unable to Pay for Housing in the Last Year: Not on file    Number of [place] in the Last Year: Not on file    Unstable Housing in the Last Year: Not on file     Medications: The patient's home medications have been reviewed. Allergies:   Niacin and related, Minocycline hcl, Other, and Oxycodone hcl   ------------------------------------- PHYSICAL EXAM -----------------------------------------  BP (!) 176/76   Pulse 66   Temp 96.5 °F (35.8 °C) (Tympanic)   Resp 15   Wt 144 lb (65.3 kg)   SpO2 100%   BMI 19.53 kg/m²     Constitutional: Alert, awake  HENT: mucous membranes moist  Left naris without blood or lesion noted  Right naris with minimal amount of bleeding and subtle septal nasal mucosa superficial injury masses noted  Posterior pharynx with dried blood without active hemorrhage  Eyes: no discharge, nonicteric  Neck:  neck supple, trachea midline  Lungs: no respiratory distress  Heart: RRR , no peripheral edema  Extremities: normal peripheral perfusion  Neuro: Alert and oriented, normal speech, PACK, ambulates  Skin: warm and dry  Psych: cooperative, appropriate thought content and judgement  ------------------------------------------ ED COURSE ---------------------------------------------    ED Medications:  Medications   oxymetazoline (AFRIN) 0.05 % nasal spray 2 spray (2 sprays Each Nostril Given 3/3/22 1033)            --------------------------------- MEDICAL DECISION MAKING -------------------------------  Vital Signs: Reviewed the patients vital signs. Nursing Notes: Reviewed and utilized the nursing notes. Nursing triage and assessment notes reviewed and incorporated. Nature of the Problem: New process    ED Physician Core Measures: None indicated     Old Medical Records: The patient's available past medical records and past encounters were reviewed. Summary of pertinent elements include:  History of lung cancer on chemotherapy  Takes clopidogrel for heart condition    Additional Medical Decision Making:    Evaluation w/ HPI, physical exam, labs and studies as above. HD stable.  Airway intact. Presents with new onset right-sided epistaxis. Mild in severity. Attempted compression with nasal clamp and reassessment, without successful hemostasis this was followed by Afrin spray and compression again with unsuccessful hemostasis. Subsequently we utilized TXA atomized nasal spray and compression which was successful in gaining complete hemostasis after a prolonged period of observation. The patient was educated on supportive care measures and close follow-up with their previously established ENT physician. On exam it looks like the hemorrhage was coming from a small mucosal injury however considering his known remote history of nasal surgery removing polyp or mass previously he was encouraged to follow-up with his ENT for reevaluation. Patient's vital signs were stable he was without questions and agreed with plan.       ------------------------------ IMPRESSION AND DISPOSITION -----------------------------    IMPRESSION  1.  Epistaxis    Medical Screening Exam: The patient has received a medical screening examination and within reasonable clinical confidence an emergency medical condition was identified and has been stabilized    DISPOSITION  Discharge home in stable condition  -------------------------------------- COUNSELING ------------------------------------------------     Counseling: Spoke with the patient and discussed todays findings, in addition to providing specific details for the plan of care and counseling regarding the diagnosis and prognosis. He was given the opportunity to ask questions. Discussed the return indications and importance of follow-up. Advised to follow-up with ENT and PCP. Advised to return to the ED for changing/worsening symptoms, new symptoms, complaint specific precautions, and precautions listed on the pts discharge paperwork. Educated on the common potential side effects of medication to be prescribed. Jens Resendiz D.O.     Attending Physician  Department of Emergency Medicine       Octavio Gross DO  03/04/22 [id number]

## 2022-03-04 ENCOUNTER — TELEPHONE (OUTPATIENT)
Dept: CARDIOLOGY | Age: 72
End: 2022-03-04

## 2022-03-04 DIAGNOSIS — I25.10 ASHD (ARTERIOSCLEROTIC HEART DISEASE): ICD-10-CM

## 2022-03-04 RX ORDER — CLOPIDOGREL BISULFATE 75 MG/1
75 TABLET ORAL DAILY
Qty: 90 TABLET | Refills: 3 | Status: SHIPPED | OUTPATIENT
Start: 2022-03-04

## 2022-03-04 NOTE — TELEPHONE ENCOUNTER
Mr. Melendrez Bad wife Venecia Kowalski called into the office and stated that Mr. Kevin Oro had a bloody nose yesterday and had to go to the ER due to it not stopping. He did have cancer on his nose and had surgery done on it. He was told to follow up with his cancer doctor and with us to be sure you still want him to continue on the Plavix at this time. This has been his first bloody nose in a year or so however it was really hard to stop bleeding. Please advise. Thanks!

## 2022-03-04 NOTE — ED PROVIDER NOTES
EMERGENCY DEPARTMENT - VISIT NOTE   Date: 3/3/2022   Patient: Davi Magallanes   MRN: [id number]   Triage:        Chief Complaint   Patient presents with    Epistaxis     intermittent for two hours     ------------------------------ HISTORY OF PRESENT ILLNESS --------------------------   : None needed   HIPAA - Verbal permision granted from patient to discuss case , including protected health information, in front of family / friends in room at the time of the evaluation. The history is provided by the patient and his wife. Davi Magallanes is a 70 y.o. male who presents to the ED for epistaxis of the right naris   Onset this morning upon waking up   Improved by nothing he attempted continuous manual compression of the nose without sufficient control of bleeding   No known exacerbating features. Patient denies any nasal trauma or picking of the nose. He does state that he had a remote surgery of his nasal septum to remove a cancerous lesion. No complications since that procedure. Associated with chronic right lateral flank pain after having his right lower lung removed due to cancerous process. This pain is unchanged from the surgery 2 months ago.    Denies fever, chills, abdominal pain, nausea vomiting or diarrhea   Of note the patient does take antiplatelet agent Plavix due to his heart condition   ----------------------------------- REVIEW OF SYSTEMS -------------------------------------   The following systems were reviewed:   Skin - normal   Eyes - normal   Ears/Nose/Throat -epistaxis   Respiratory - normal   Cardiovascular - normal   Gastrointestinal - normal   Genitourinary - normal   Musculoskeletal - normal   Neurologic - normal   Constitutional - normal   All other ROS negative except as noted above   --------------------------------------- PAST HISTORY ------------------------------------------   Past Medical History:   Past Medical History:   Diagnosis Date    Abnormal cardiac cath 09/20/2013    LMCA; Mild irregularities 10-20%. LAD: Abnormal.  Mild to moderate irregularities throughout the LAD and branches. LCx:  abnormal.  80-90% stenosis in a moderate sized OM1. RCA: Abnormal.  40-50% mid RCA stenosis.  Actinic keratosis     Arthritis     neck, fingers    CAD (coronary artery disease)     Calculus of kidney     Cancer ([address]     Cervical stenosis of spine     Diabetes mellitus (HCC)     Dr. Gabby Perez, CNP, [place]    Full dentures     GERD (gastroesophageal reflux disease)     H/O echocardiogram 04/29/2016    Stress echo. Normal resting LV systolic function,normal systolic restponse to exercise. No evidence of reversible  ischemia on this maximal,symptom limited,treadmill exerxiess stress echocardiography study    History of cardiac cath 02/09/2017    LMCA: Lesion on LMCA: Distal subsection . 20% stenosis. LAD: Lesion on 1st Diag: Mid subsection . 75% stenosis. RCA: Lesion on Mid RCA: Mid subsection . 100% stenosis. Comments: The distal RCA fills by left to right collateralization. Dominance: Mixed. LV function assessed as: Normal. EF 65%.  History of echocardiogram 06/11/2018    EF 60%. Mildly increased LV wall thickness. Evidence of mild diastolic dysfunction seen.  History of echocardiogram 01/18/2019    EF of 65%. LV wall thickness is mildly increased. Aortic valve leaflets are mildly thickened.      History of heart attack     History of tobacco abuse     Quit 10/2021, 1/4 ppd for 47 years    Hyperlipidemia     Hypertension     Dr. Gabby Perez, CNP    Pulmonary nodule 12/2021    Right middle lobe    Raynaud's phenomenon     S/P angioplasty with stent 09/20/2013    PTCA-NEDRA of  the OM 1    Wears hearing aid in both ears        Past Surgical History:   Past Surgical History:   Procedure Laterality Date   [address]  2007    patient states had 2 small blockages    CARDIAC CATHETERIZATION  02/09/2017    LMCA: Lesion on LMCA: Distal subsection 20% stenosis. LAD: Lesion on 1st Diag: Mid subsection 75% stenosis. RCA: Lesion on Mid RCA: Mid subsection 100% stenosis.  CARDIAC CATHETERIZATION  02/09/2017    Attempted PCI to chronic total occlusion of RCA was not successful. Unable to cross with multiple wires due to heavy calcification and toruosity.     CARDIAC CATHETERIZATION  01/14/2019    CATARACT REMOVAL  04/06/2015    Right eye - Dr. Bradley Solders  06/08/2015    Dr. Edie Dunham - left eye    COLONOSCOPY  01/11/2006    Dr. Luis F Mcduffie - internal hemorrhoids, no polps    COLONOSCOPY  05/23/2016    -polyp    CT NEEDLE BIOPSY LUNG PERCUTANEOUS  07/28/2021    CT NEEDLE BIOPSY LUNG PERCUTANEOUS 7/28/2021 [facility] CT SCAN    HEMORRHOID SURGERY      HERNIA REPAIR Left     inguinal    IR CHEST TUBE INSERTION  01/11/2022    IR CHEST TUBE INSERTION 1/11/2022 Ruperto Bender MD [facility] SPECIAL PROCEDURES    IR PORT PLACEMENT EQUAL OR GREATER THAN 5 YEARS  2/9/2022    IR PORT PLACEMENT EQUAL OR GREATER THAN 5 YEARS 2/9/2022 [facility] SPECIAL PROCEDURES    LUNG BIOPSY Right 07/28/2021    Dr Patrick Campa, TOTAL Right 01/04/2022    MIDDLE WEDGE RESECTION, POSSIBLE MIDDLE LOBECTOMY VIA THORACOTOMY performed by Arleen Manuel MD at [place] [address]    c4-5 laminectomy and fusion    OTHER SURGICAL HISTORY  10/2014    Right foot - 7 from heal and 1 from great toe at [address] [place] [address]  12/11/2014    pain injection - cervical    TUNNELED CENTRAL VENOUS CATHETER W/ SUBCUTANEOUS PORT Left 02/09/2022    Dr Hoenninger/[facility]    UPPER GASTROINTESTINAL ENDOSCOPY  12/28/2005    Dr. Luis F Mcduffie - mild esophagitis and gastritis    UPPER GASTROINTESTINAL ENDOSCOPY N/A 02/05/2019    -bx(neg H-Pylori)retained gastric content, possible submucosal mass of gastric cardia    UPPER GASTROINTESTINAL ENDOSCOPY N/A 10/22/2019    EGD BI[facility]SY [facility]         Social History:   Social History     Tobacco History     Smoking Status  Former Smoker Quit date  10/1/2021 Smoking Frequency  0.25 packs/day for 54 years (13.5 pk yrs) Smoking Tobacco Type  Cigarettes    Smokeless Tobacco Use  Never Used          Alcohol History     Alcohol Use Status  No          Drug Use     Drug Use Status  No          Sexual Activity     Sexually Active  Not Asked              Medications: The patient's home medications have been reviewed. Allergies:   Niacin and related, Minocycline hcl, Other, and Oxycodone hcl   ------------------------------------- PHYSICAL EXAM -----------------------------------------   BP (!) 176/76  Pulse 66  Temp 96.5 °F (35.8 °C) (Tympanic)  Resp 15  Wt 144 lb (65.3 kg)  SpO2 100%  BMI 19.53 kg/m²   Constitutional: Alert, awake   HENT: mucous membranes moist   Left naris without blood or lesion noted   Right naris with minimal amount of bleeding and subtle septal nasal mucosa superficial injury masses noted   Posterior pharynx with dried blood without active hemorrhage   Eyes: no discharge, nonicteric   Neck: neck supple, trachea midline   Lungs: no respiratory distress   Heart: RRR , no peripheral edema   Extremities: normal peripheral perfusion   Neuro: Alert and oriented, normal speech, PACK, ambulates   Skin: warm and dry   Psych: cooperative, appropriate thought content and judgement   ------------------------------------------ ED COURSE ---------------------------------------------   ED Medications:   Medications   oxymetazoline (AFRIN) 0.05 % nasal spray 2 spray (2 sprays Each Nostril Given 3/3/22 1033)       --------------------------------- MEDICAL DECISION MAKING -------------------------------   Vital Signs: Reviewed the patients vital signs. Nursing Notes: Reviewed and utilized the nursing notes. Nursing triage and assessment notes reviewed and incorporated.    Betzaida Wilhelm of the Problem: New process   ED Physician Core Measures: None indicated   Old Medical Records: The patient's available past medical records and past encounters were reviewed. Summary of pertinent elements include:   History of lung cancer on chemotherapy   Takes clopidogrel for heart condition     Additional Medical Decision Making:   Evaluation w/ HPI, physical exam, labs and studies as above. HD stable. Airway intact. Presents with new onset right-sided epistaxis. Mild in severity. Attempted compression with nasal clamp and reassessment, without successful hemostasis this was followed by Afrin spray and compression again with unsuccessful hemostasis. Subsequently we utilized TXA atomized nasal spray and compression which was successful in gaining complete hemostasis after a prolonged period of observation. The patient was educated on supportive care measures and close follow-up with their previously established ENT physician. On exam it looks like the hemorrhage was coming from a small mucosal injury however considering his known remote history of nasal surgery removing polyp or mass previously he was encouraged to follow-up with his ENT for reevaluation. Patient's vital signs were stable he was without questions and agreed with plan.     ------------------------------ IMPRESSION AND DISPOSITION -----------------------------   IMPRESSION   1. Epistaxis   Medical Screening Exam: The patient has received a medical screening examination and within reasonable clinical confidence an emergency medical condition was identified and has been stabilized   DISPOSITION   Discharge home in stable condition     -------------------------------------- COUNSELING ------------------------------------------------   Counseling: Spoke with the patient and discussed todays findings, in addition to providing specific details for the plan of care and counseling regarding the diagnosis and prognosis. He was given the opportunity to ask questions.    Discussed the return indications and importance of follow-up. Advised to follow-up with ENT and PCP. Advised to return to the ED for changing/worsening symptoms, new symptoms, complaint specific precautions, and precautions listed on the pts discharge paperwork. Educated on the common potential side effects of medication to be prescribed. Charline Mosqueda D.O.    Attending Physician   Department of Emergency Medicine          Charline Mosqueda,   03/04/22 [id number]

## 2022-03-07 NOTE — TELEPHONE ENCOUNTER
Mr. Marilee Huff is still having nose bleeds at this time and I let him know per Dr. Coyne Peoples that it is ok to hold his Plavix for one week and update us on how he is doing. He also has recently started Chemo within the last two weeks and is not sure if this is why to. He will follow up with his cancer doctor to update us as well. Thanks!

## 2022-03-15 DIAGNOSIS — C34.01 LUNG CANCER, MAIN BRONCHUS, RIGHT (HCC): ICD-10-CM

## 2022-03-17 ENCOUNTER — TELEPHONE (OUTPATIENT)
Dept: ONCOLOGY | Age: 72
End: 2022-03-17

## 2022-03-17 ENCOUNTER — HOSPITAL ENCOUNTER (OUTPATIENT)
Dept: INFUSION THERAPY | Age: 72
Discharge: HOME | End: 2022-03-17
Payer: MEDICARE

## 2022-03-17 ENCOUNTER — OFFICE VISIT (OUTPATIENT)
Dept: ONCOLOGY | Age: 72
End: 2022-03-17
Payer: MEDICARE

## 2022-03-17 VITALS
RESPIRATION RATE: 16 BRPM | HEIGHT: 72 IN | TEMPERATURE: 98 F | DIASTOLIC BLOOD PRESSURE: 61 MMHG | BODY MASS INDEX: 20.45 KG/M2 | HEART RATE: 74 BPM | WEIGHT: 151 LBS | SYSTOLIC BLOOD PRESSURE: 129 MMHG

## 2022-03-17 VITALS
TEMPERATURE: 98 F | HEART RATE: 74 BPM | RESPIRATION RATE: 18 BRPM | SYSTOLIC BLOOD PRESSURE: 129 MMHG | HEIGHT: 72 IN | DIASTOLIC BLOOD PRESSURE: 61 MMHG | WEIGHT: 151 LBS | BODY MASS INDEX: 20.45 KG/M2

## 2022-03-17 DIAGNOSIS — C34.01 LUNG CANCER, MAIN BRONCHUS, RIGHT (HCC): ICD-10-CM

## 2022-03-17 DIAGNOSIS — Z45.2 ENCOUNTER FOR CARE RELATED TO VASCULAR ACCESS PORT: Primary | ICD-10-CM

## 2022-03-17 DIAGNOSIS — C34.01 LUNG CANCER, MAIN BRONCHUS, RIGHT (HCC): Primary | ICD-10-CM

## 2022-03-17 DIAGNOSIS — Z98.890 S/P THORACOTOMY: ICD-10-CM

## 2022-03-17 DIAGNOSIS — T45.1X5A ADVERSE EFFECT OF CHEMOTHERAPY, INITIAL ENCOUNTER: ICD-10-CM

## 2022-03-17 DIAGNOSIS — R63.0 ANOREXIA: ICD-10-CM

## 2022-03-17 DIAGNOSIS — R04.0 NOSEBLEED: ICD-10-CM

## 2022-03-17 DIAGNOSIS — R63.4 WEIGHT LOSS: ICD-10-CM

## 2022-03-17 LAB
ABSOLUTE EOS #: 0.03 K/UL (ref 0–0.44)
ABSOLUTE IMMATURE GRANULOCYTE: 0.09 K/UL (ref 0–0.3)
ABSOLUTE LYMPH #: 1.76 K/UL (ref 1.1–3.7)
ABSOLUTE MONO #: 1.24 K/UL (ref 0.1–1.2)
ALBUMIN SERPL-MCNC: 3.8 G/DL (ref 3.5–5.2)
ALBUMIN/GLOBULIN RATIO: 1.4 (ref 1–2.5)
ALP BLD-CCNC: 94 U/L (ref 40–129)
ALT SERPL-CCNC: 16 U/L (ref 5–41)
ANION GAP SERPL CALCULATED.3IONS-SCNC: 13 MMOL/L (ref 9–17)
AST SERPL-CCNC: 15 U/L
BASOPHILS # BLD: 1 % (ref 0–2)
BASOPHILS ABSOLUTE: 0.04 K/UL (ref 0–0.2)
BILIRUB SERPL-MCNC: 0.19 MG/DL (ref 0.3–1.2)
BUN BLDV-MCNC: 16 MG/DL (ref 8–23)
BUN/CREAT BLD: 16 (ref 9–20)
CALCIUM SERPL-MCNC: 8.8 MG/DL (ref 8.6–10.4)
CHLORIDE BLD-SCNC: 98 MMOL/L (ref 98–107)
CO2: 22 MMOL/L (ref 20–31)
CREAT SERPL-MCNC: 1.03 MG/DL (ref 0.7–1.2)
EOSINOPHILS RELATIVE PERCENT: 0 % (ref 1–4)
GFR AFRICAN AMERICAN: >60 ML/MIN
GFR NON-AFRICAN AMERICAN: >60 ML/MIN
GFR SERPL CREATININE-BSD FRML MDRD: ABNORMAL ML/MIN/{1.73_M2}
GFR SERPL CREATININE-BSD FRML MDRD: ABNORMAL ML/MIN/{1.73_M2}
GLUCOSE BLD-MCNC: 255 MG/DL (ref 70–99)
HCT VFR BLD CALC: 30 % (ref 40.7–50.3)
HEMOGLOBIN: 9.6 G/DL (ref 13–17)
IMMATURE GRANULOCYTES: 1 %
LYMPHOCYTES # BLD: 26 % (ref 24–43)
MCH RBC QN AUTO: 28.1 PG (ref 25.2–33.5)
MCHC RBC AUTO-ENTMCNC: 32 G/DL (ref 28.4–34.8)
MCV RBC AUTO: 87.7 FL (ref 82.6–102.9)
MONOCYTES # BLD: 18 % (ref 3–12)
NRBC AUTOMATED: 0 PER 100 WBC
PDW BLD-RTO: 15.1 % (ref 11.8–14.4)
PLATELET # BLD: 310 K/UL (ref 138–453)
PMV BLD AUTO: 9.6 FL (ref 8.1–13.5)
POTASSIUM SERPL-SCNC: 3.8 MMOL/L (ref 3.7–5.3)
RBC # BLD: 3.42 M/UL (ref 4.21–5.77)
SEG NEUTROPHILS: 54 % (ref 36–65)
SEGMENTED NEUTROPHILS ABSOLUTE COUNT: 3.58 K/UL (ref 1.5–8.1)
SODIUM BLD-SCNC: 133 MMOL/L (ref 135–144)
TOTAL PROTEIN: 6.6 G/DL (ref 6.4–8.3)
WBC # BLD: 6.7 K/UL (ref 3.5–11.3)

## 2022-03-17 PROCEDURE — 4040F PNEUMOC VAC/ADMIN/RCVD: CPT | Performed by: INTERNAL MEDICINE

## 2022-03-17 PROCEDURE — G8484 FLU IMMUNIZE NO ADMIN: HCPCS | Performed by: INTERNAL MEDICINE

## 2022-03-17 PROCEDURE — 99215 OFFICE O/P EST HI 40 MIN: CPT | Performed by: INTERNAL MEDICINE

## 2022-03-17 PROCEDURE — 3017F COLORECTAL CA SCREEN DOC REV: CPT | Performed by: INTERNAL MEDICINE

## 2022-03-17 PROCEDURE — 1036F TOBACCO NON-USER: CPT | Performed by: INTERNAL MEDICINE

## 2022-03-17 PROCEDURE — 96411 CHEMO IV PUSH ADDL DRUG: CPT

## 2022-03-17 PROCEDURE — 2580000003 HC RX 258: Performed by: INTERNAL MEDICINE

## 2022-03-17 PROCEDURE — 96365 THER/PROPH/DIAG IV INF INIT: CPT

## 2022-03-17 PROCEDURE — 96413 CHEMO IV INFUSION 1 HR: CPT

## 2022-03-17 PROCEDURE — 85025 COMPLETE CBC W/AUTO DIFF WBC: CPT

## 2022-03-17 PROCEDURE — G8428 CUR MEDS NOT DOCUMENT: HCPCS | Performed by: INTERNAL MEDICINE

## 2022-03-17 PROCEDURE — 36591 DRAW BLOOD OFF VENOUS DEVICE: CPT

## 2022-03-17 PROCEDURE — 96367 TX/PROPH/DG ADDL SEQ IV INF: CPT

## 2022-03-17 PROCEDURE — 1123F ACP DISCUSS/DSCN MKR DOCD: CPT | Performed by: INTERNAL MEDICINE

## 2022-03-17 PROCEDURE — 96375 TX/PRO/DX INJ NEW DRUG ADDON: CPT

## 2022-03-17 PROCEDURE — 6360000002 HC RX W HCPCS: Performed by: INTERNAL MEDICINE

## 2022-03-17 PROCEDURE — G8420 CALC BMI NORM PARAMETERS: HCPCS | Performed by: INTERNAL MEDICINE

## 2022-03-17 PROCEDURE — 80053 COMPREHEN METABOLIC PANEL: CPT

## 2022-03-17 RX ORDER — PALONOSETRON 0.05 MG/ML
0.25 INJECTION, SOLUTION INTRAVENOUS ONCE
Status: COMPLETED | OUTPATIENT
Start: 2022-03-17 | End: 2022-03-17

## 2022-03-17 RX ORDER — DEXAMETHASONE SODIUM PHOSPHATE 10 MG/ML
10 INJECTION INTRAMUSCULAR; INTRAVENOUS ONCE
Status: COMPLETED | OUTPATIENT
Start: 2022-03-17 | End: 2022-03-17

## 2022-03-17 RX ORDER — MAGNESIUM 30 MG
400 TABLET ORAL DAILY
COMMUNITY

## 2022-03-17 RX ORDER — HEPARIN SODIUM (PORCINE) LOCK FLUSH IV SOLN 100 UNIT/ML 100 UNIT/ML
500 SOLUTION INTRAVENOUS PRN
Status: DISCONTINUED | OUTPATIENT
Start: 2022-03-17 | End: 2022-03-18 | Stop reason: HOSPADM

## 2022-03-17 RX ORDER — SODIUM CHLORIDE 0.9 % (FLUSH) 0.9 %
5-40 SYRINGE (ML) INJECTION PRN
Status: DISCONTINUED | OUTPATIENT
Start: 2022-03-17 | End: 2022-03-18 | Stop reason: HOSPADM

## 2022-03-17 RX ORDER — SODIUM CHLORIDE 9 MG/ML
20 INJECTION, SOLUTION INTRAVENOUS ONCE
Status: COMPLETED | OUTPATIENT
Start: 2022-03-17 | End: 2022-03-17

## 2022-03-17 RX ADMIN — SODIUM CHLORIDE, PRESERVATIVE FREE 10 ML: 5 INJECTION INTRAVENOUS at 11:23

## 2022-03-17 RX ADMIN — SODIUM CHLORIDE, PRESERVATIVE FREE 10 ML: 5 INJECTION INTRAVENOUS at 08:11

## 2022-03-17 RX ADMIN — SODIUM CHLORIDE, PRESERVATIVE FREE 10 ML: 5 INJECTION INTRAVENOUS at 08:10

## 2022-03-17 RX ADMIN — SODIUM CHLORIDE, PRESERVATIVE FREE 10 ML: 5 INJECTION INTRAVENOUS at 11:24

## 2022-03-17 RX ADMIN — HEPARIN 500 UNITS: 100 SYRINGE at 11:24

## 2022-03-17 RX ADMIN — CARBOPLATIN 519.6 MG: 10 INJECTION, SOLUTION INTRAVENOUS at 10:40

## 2022-03-17 RX ADMIN — DEXAMETHASONE SODIUM PHOSPHATE 10 MG: 10 INJECTION INTRAMUSCULAR; INTRAVENOUS at 09:16

## 2022-03-17 RX ADMIN — SODIUM CHLORIDE 200 ML/HR: 9 INJECTION, SOLUTION INTRAVENOUS at 09:12

## 2022-03-17 RX ADMIN — FOSAPREPITANT 150 MG: 150 INJECTION, POWDER, LYOPHILIZED, FOR SOLUTION INTRAVENOUS at 09:31

## 2022-03-17 RX ADMIN — SODIUM CHLORIDE 900 MG: 9 INJECTION, SOLUTION INTRAVENOUS at 10:23

## 2022-03-17 RX ADMIN — PALONOSETRON 0.25 MG: 0.05 INJECTION, SOLUTION INTRAVENOUS at 09:14

## 2022-03-17 NOTE — TELEPHONE ENCOUNTER
Name: Alma Stanton  : 1950  MRN: [id number]    Oncology Navigation Follow-Up Note    Contact Type:  Medical Oncology    Notes: Follow up made with patient in treatment. He was accompanied by wife. Patient states that he still continues to feel the same but has gained a couple pounds. He was pleased with this. Patient denies any needs at this. Will continue to follow.     Electronically signed by Marcin Mcwilliams RN on 3/17/2022 at 1:19 PM

## 2022-03-17 NOTE — PROGRESS NOTES
Patient ID: Monica Fish, [birth year], [id number], 70 y.o. Referred by :  No ref. 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 male history of tobacco smoking and has been following with pulmonary for right middle lobe lung nodule where CT chest did show increase in the size in the scan on November 2, 2021 patient had biopsy on July 28, 2021 which showed benign parenchyma with chronic inflammation however the lesion was active on the PET there was no evidence of metastasis patient admitted to [facility] on January 4, 2022 for VATS and wedge resection which ended up with lobectomy, final pathology compatible with adenocarcinoma 1.8 cm and carcinoma invade into but not through the visceral pleura lymph nodes were negative patient status post right middle lobe lobectomy and margin were free of involvement by carcinoma   Patient developed right apical pneumothorax chest tube placed in the hospital and was discharged after chest tube removed    Interval history  Status post 1 cycle of chemotherapy tolerated chemo well  No neuropathy  2 episodes of nosebleed required visit to emergency room  Persistent right-sided chest pain where he had the surgery controlled on Tylenol and as needed oxycodone  Still have nausea from the oxycodone/exaggerating diabetic gastroparesis    Past Medical History:   Diagnosis Date    Abnormal cardiac cath 09/20/2013    LMCA; Mild irregularities 10-20%. LAD: Abnormal.  Mild to moderate irregularities throughout the LAD and branches.   LCx: abnormal.  80-90% stenosis in a moderate sized OM1. RCA: Abnormal.  40-50% mid RCA stenosis.  Actinic keratosis     Arthritis     neck, fingers    CAD (coronary artery disease)     Calculus of kidney     Cancer ([address]     Cervical stenosis of spine     Diabetes mellitus (HCC)     Dr. Nelsy Condon, CNP, [place]    Full dentures     GERD (gastroesophageal reflux disease)     H/O echocardiogram 04/29/2016    Stress echo. Normal resting LV systolic function,normal systolic restponse to exercise. No evidence of reversible  ischemia on this maximal,symptom limited,treadmill exerxiess stress echocardiography study    History of cardiac cath 02/09/2017    LMCA: Lesion on LMCA: Distal subsection . 20% stenosis. LAD: Lesion on 1st Diag: Mid subsection . 75% stenosis. RCA: Lesion on Mid RCA: Mid subsection . 100% stenosis. Comments: The distal RCA fills by left to right collateralization. Dominance: Mixed. LV function assessed as: Normal. EF 65%.  History of echocardiogram 06/11/2018    EF 60%. Mildly increased LV wall thickness. Evidence of mild diastolic dysfunction seen.  History of echocardiogram 01/18/2019    EF of 65%. LV wall thickness is mildly increased. Aortic valve leaflets are mildly thickened.  History of heart attack     History of tobacco abuse     Quit 10/2021, 1/4 ppd for 47 years    Hyperlipidemia     Hypertension     Dr. Nelsy Condon, CNP    Pulmonary nodule 12/2021    Right middle lobe    Raynaud's phenomenon     S/P angioplasty with stent 09/20/2013    PTCA-NEDRA of  the OM 1    Wears hearing aid in both ears        Past Surgical History:   Procedure Laterality Date   [address]  2007    patient states had 2 small blockages    CARDIAC CATHETERIZATION  02/09/2017    LMCA: Lesion on LMCA: Distal subsection 20% stenosis. LAD: Lesion on 1st Diag: Mid subsection 75% stenosis. RCA: Lesion on Mid RCA: Mid subsection 100% stenosis.       CARDIAC CATHETERIZATION  02/09/2017 Attempted PCI to chronic total occlusion of RCA was not successful. Unable to cross with multiple wires due to heavy calcification and toruosity.     CARDIAC CATHETERIZATION  01/14/2019    CATARACT REMOVAL  04/06/2015    Right eye - Dr. Bradley Solders  06/08/2015    Dr. Edie Dunham - left eye    COLONOSCOPY  01/11/2006    Dr. Luis F Mcduffie - internal hemorrhoids, no polps    COLONOSCOPY  05/23/2016    -polyp    CT NEEDLE BIOPSY LUNG PERCUTANEOUS  07/28/2021    CT NEEDLE BIOPSY LUNG PERCUTANEOUS 7/28/2021 [facility] CT SCAN    HEMORRHOID SURGERY      HERNIA REPAIR Left     inguinal    IR CHEST TUBE INSERTION  01/11/2022    IR CHEST TUBE INSERTION 1/11/2022 Ruperto Bender MD [facility] SPECIAL PROCEDURES    IR PORT PLACEMENT EQUAL OR GREATER THAN 5 YEARS  2/9/2022    IR PORT PLACEMENT EQUAL OR GREATER THAN 5 YEARS 2/9/2022 [facility] SPECIAL PROCEDURES    LUNG BIOPSY Right 07/28/2021    Dr Patrick Campa, TOTAL Right 01/04/2022    MIDDLE WEDGE RESECTION, POSSIBLE MIDDLE LOBECTOMY VIA THORACOTOMY performed by Arleen Manuel MD at [facility]    c4-5 laminectomy and fusion    OTHER SURGICAL HISTORY  10/2014    Right foot - 7 from heal and 1 from great toe at [address] [place] [address]  12/11/2014    pain injection - cervical    TUNNELED CENTRAL VENOUS CATHETER W/ SUBCUTANEOUS PORT Left 02/09/2022    Dr Hoenninger[facility]    UPPER GASTROINTESTINAL ENDOSCOPY  12/28/2005    Dr. Luis F Mcduffie - mild esophagitis and gastritis    UPPER GASTROINTESTINAL ENDOSCOPY N/A 02/05/2019    -bx(neg H-Pylori)retained gastric content, possible submucosal mass of gastric cardia    UPPER GASTROINTESTINAL ENDOSCOPY N/A 10/22/2019    EGD BIOPSY performed by Robert Santos MD at [place] [address]   Allergen Reactions    Niacin And Related      Turns red, no trouble breathing    Minocycline Hcl Rash    Other Nausea And Vomiting Patient states on all pain medications he gets nausea and vomiting. Doctor ordered a medicine (nausea) to take before pain medication    Oxycodone Hcl      vomits       Current Outpatient Medications   Medication Sig Dispense Refill    magnesium 30 MG tablet Take 30 mg by mouth daily      clopidogrel (PLAVIX) 75 MG tablet Take 1 tablet by mouth daily 90 tablet 3    zinc 50 MG CAPS Take by mouth      promethazine (PHENERGAN) 25 MG tablet Take 25 mg by mouth every 4 hours as needed for Nausea      oxyCODONE 5 MG capsule Take 5 mg by mouth every 4 hours as needed for Pain.  pantoprazole (PROTONIX) 40 MG tablet Take 40 mg by mouth in the morning and at bedtime       dexamethasone (DECADRON) 4 MG tablet Take 1 tablet by mouth See Admin Instructions Take 1 tab twice daily for 3 days starting the day before scheduled PEMEtrexed. 30 tablet 0    ondansetron (ZOFRAN ODT) 8 MG TBDP disintegrating tablet Take 1 tablet by mouth every 8 hours as needed for Nausea 30 tablet 2    lidocaine-prilocaine (EMLA) 2.5-2.5 % cream Apply topically to port site 30- 60 minutes before access as needed. 30 g 2    melatonin 5 MG TABS tablet Take 1 tablet by mouth nightly as needed (sleep) 30 tablet 0    SERTRALINE HCL PO Take 50 mg by mouth nightly      tamsulosin (FLOMAX) 0.4 MG capsule Take 0.4 mg by mouth daily      tiZANidine (ZANAFLEX) 4 MG capsule Take 4 mg by mouth 3 times daily as needed      ferrous sulfate (IRON 325) 325 (65 Fe) MG tablet Take 325 mg by mouth daily       isosorbide mononitrate (IMDUR) 30 MG extended release tablet 1/2 tablet in the morning      nitroGLYCERIN (NITRODUR) 0.4 MG/HR 1 dose under tongue as needed for chest pain.  max of 3 in one day      amLODIPine (NORVASC) 5 MG tablet Take 1 tablet by mouth daily 90 tablet 3    atenolol (TENORMIN) 25 MG tablet Take 1 tablet by mouth daily 90 tablet 3    atorvastatin (LIPITOR) 20 MG tablet Take 1 tablet by mouth daily 90 tablet 3    sitaGLIPtan-metformin (JANUMET)  MG per tablet Take 1 tablet by mouth 2 times daily (with meals)      glimepiride (AMARYL) 2 MG tablet Take 1 tablet by mouth 2 times daily 180 tablet 3    aspirin 81 MG tablet Take 81 mg by mouth daily Ordered by heart doctor, Chan Ledbetter      folic acid (FOLVITE) 1 MG tablet Take 1 tablet by mouth daily for 30 doses Start 7 days before first scheduled dose and continue for 21 days after last dose of PEMEtrexed. 30 tablet 2    gabapentin (NEURONTIN) 100 MG capsule Take 1 capsule by mouth 3 times daily for 14 days. 42 capsule 0    lisinopril (PRINIVIL;ZESTRIL) 20 MG tablet Take 20 mg by mouth daily  (Patient not taking: Reported on 3/17/2022)      Insulin Glargine, 2 Unit Dial, 300 UNIT/ML SOPN Insulin Glargine Insulin Glargine U-300 Conc Active 10 UNIT Subcutaneous Every morning May 7th, 2020 11:47am 2020  [facility] ([id number]) (Patient not taking: Reported on 3/17/2022)       No current facility-administered medications for this visit.      Facility-Administered Medications Ordered in Other Visits   Medication Dose Route Frequency Provider Last Rate Last Admin    sodium chloride flush 0.9 % injection 5-40 mL  5-40 mL IntraVENous PRN Elke Eagle MD   10 mL at 22 0811    heparin flush 100 UNIT/ML injection 500 Units  500 Units IntraCATHeter PRN Elke Eagle MD           Social History     Socioeconomic History    Marital status:      Spouse name: Not on file    Number of children: Not on file    Years of education: Not on file    Highest education level: Not on file   Occupational History    Not on file   Tobacco Use    Smoking status: Former Smoker     Packs/day: 0.25     Years: 54.00     Pack years: 13.50     Types: Cigarettes     Quit date: 10/1/2021     Years since quittin.4    Smokeless tobacco: Never Used   Vaping Use    Vaping Use: Never used   Substance and Sexual Activity    Alcohol use: No    Drug use: No    Sexual activity: Not on file   Other Topics Concern    Not on file   Social History Narrative    Not on file     Social Determinants of Health     Financial Resource Strain:     Difficulty of Paying Living Expenses: Not on file   Food Insecurity:     Worried About Running Out of Food in the Last Year: Not on file    Luis E of Food in the Last Year: Not on file   Transportation Needs:     Lack of Transportation (Medical): Not on file    Lack of Transportation (Non-Medical): Not on file   Physical Activity:     Days of Exercise per Week: Not on file    Minutes of Exercise per Session: Not on file   Stress:     Feeling of Stress : Not on file   Social Connections:     Frequency of Communication with Friends and Family: Not on file    Frequency of Social Gatherings with Friends and Family: Not on file    Attends Restorationist Services: Not on file    Active Member of [address] [place] [address] [employer] or Organizations: Not on file    Attends Club or Organization Meetings: Not on file    Marital Status: Not on file   Intimate Partner Violence:     Fear of Current or Ex-Partner: Not on file    Emotionally Abused: Not on file    Physically Abused: Not on file    Sexually Abused: Not on file   Housing Stability:     Unable to Pay for Housing in the Last Year: Not on file    Number of [place] in the Last Year: Not on file    Unstable Housing in the Last Year: Not on file       Family History   Problem Relation Age of Onset    Heart Disease Mother     Diabetes Mother     High Blood Pressure Mother     Diabetes Father     Alzheimer's Disease Father     Heart Disease Sister         CABG    Heart Disease Brother     Heart Disease Brother     Heart Disease Brother     Cancer Brother         REVIEW OF SYSTEM:     Constitutional: No fever or chills.  No night sweats, no weight loss   Eyes: No eye discharge, double vision, or eye pain   HEENT: negative for sore mouth, sore throat, hoarseness and voice change Respiratory: negative for cough , sputum, dyspnea, wheezing, hemoptysis, chest pain   Cardiovascular: negative for chest pain, dyspnea, palpitations, orthopnea, PND   Gastrointestinal: negative for nausea, vomiting, diarrhea, constipation, abdominal pain, Dysphagia, hematemesis and hematochezia   Genitourinary: negative for frequency, dysuria, nocturia, urinary incontinence, and hematuria   Integument: negative for rash, skin lesions, bruises.    Hematologic/Lymphatic: negative for easy bruising, bleeding, lymphadenopathy, petechiae and swelling/edema   Endocrine: negative for heat or cold intolerance, tremor, weight changes, change in bowel habits and hair loss   Musculoskeletal: negative for myalgias, arthralgias, pain, joint swelling,and bone pain   Neurological: negative for headaches, dizziness, seizures, weakness, numbness       OBJECTIVE:         Vitals:    03/17/22 0841   BP: 129/61   Pulse: 74   Resp: 18   Temp: 98 °F (36.7 °C)       PHYSICAL EXAM:   General appearance - well appearing, no in pain or distress   Mental status - alert and cooperative   Eyes - pupils equal and reactive, extraocular eye movements intact   Ears - bilateral TM's and external ear canals normal   Mouth - mucous membranes moist, pharynx normal without lesions   Neck - supple, no significant adenopathy   Lymphatics - no palpable lymphadenopathy, no hepatosplenomegaly   Chest - clear to auscultation, no wheezes, rales or rhonchi, symmetric air entry   Heart - normal rate, regular rhythm, normal S1, S2, no murmurs, rubs, clicks or gallops   Abdomen - soft, nontender, nondistended, no masses or organomegaly   Neurological - alert, oriented, normal speech, no focal findings or movement disorder noted   Musculoskeletal - no joint tenderness, deformity or swelling   Extremities - peripheral pulses normal, no pedal edema, no clubbing or cyanosis   Skin - normal coloration and turgor, no rashes, no suspicious skin lesions noted ,      LABORATORY DATA:     Lab Results   Component Value Date    WBC 6.7 03/17/2022    HGB 9.6 (L) 03/17/2022    HCT 30.0 (L) 03/17/2022    MCV 87.7 03/17/2022     03/17/2022    LYMPHOPCT 26 03/17/2022    RBC 3.42 (L) 03/17/2022    MCH 28.1 03/17/2022    MCHC 32.0 03/17/2022    RDW 15.1 (H) 03/17/2022    NEUTOPHILPCT 50.9 02/02/2017    MONOPCT 18 (H) 03/17/2022    EOSPCT 3.1 02/02/2017    BASOPCT 1 03/17/2022    NEUTROABS 3.58 03/17/2022    LYMPHSABS 1.76 03/17/2022    MONOSABS 1.24 (H) 03/17/2022    EOSABS 0.03 03/17/2022    BASOSABS 0.04 03/17/2022         Chemistry        Component Value Date/Time     (L) 03/17/2022 0810    K 3.8 03/17/2022 0810    CL 98 03/17/2022 0810    CO2 22 03/17/2022 0810    BUN 16 03/17/2022 0810    CREATININE 1.03 03/17/2022 0810        Component Value Date/Time    CALCIUM 8.8 03/17/2022 0810    ALKPHOS 94 03/17/2022 0810    AST 15 03/17/2022 0810    ALT 16 03/17/2022 0810    BILITOT 0.19 (L) 03/17/2022 0810            PATHOLOGY DATA:     Surgical Pathology Report -- Diagnosis --     1.  Right lung, middle lobe wedge biopsy:     -  Adenocarcinoma. -  Tumor is 1.8 cm. -  Carcinoma invades into but not through the visceral pleura. 2.  Lymph node #9, biopsy:     -  Negative for metastatic carcinoma. 3.  Lymph node #7, biopsy:     -  Negative for metastatic carcinoma. 4.  Right middle lobe lung, lobectomy:     -  Margins are free of involvement by carcinoma. -  Peribronchial lymph node, negative for metastatic carcinoma.      -  Nonneoplastic lung shows emphysematous change.       Corby Schmidt M.D.   **Electronically Signed Out**         rdd/1/6/2022         Clinical Information   Pre-op Diagnosis:  RIGHT MIDDLE LOBE NODULE   Operative Findings:  MIDDLE LOBE OF RIGHT LUNG; LYMPH NODE #9 OF LUNG;   LYMPH NODE #7; RIGHT MIDDLE LOBE   Operation Performed:  MIDDLE WEDGE RESECTION, POSSIBLE MIDDLE   LOBECTOMY VIA THORACOTOMY     Source of Specimen   1: MIDDLE LOBE OF RIGHT LUNG (A)   2: LYMPH NODE #9 OF LUNG (B)   3: LYMPH NODE #7   4: RIGHT MIDDLE LOBE     Gross Description   1.  \"ANA DAVIS, MIDDLE LOBE OF RIGHT LUNG\" Received fresh is a   6.4 x 4.2 x 2.8 cm lung wedge with staple line.  The pleura is   pink-tan with an area of slightly thickening (inked black). Sectioning reveals a 1.8 x 1.2 x 0.8 cm subpleural tan-white mass   lesion that is 0.8 cm from the staple line margin.  The remaining   parenchyma is pink-tan with no other lesions.  1TP, 1DQ, 1FS, Cassette   summary:  \"A\" frozen section lesion, \"B\" remainder of lesion with   pleura inked black, \"C\" staple line margin en face. 2.  \"ANA DAVIS, LYMPH NODE #9 OF LUNG\" Received fresh are two   fragments, 0.3 cm each.  1TP, 1FS, 1cs. 3.  \"ANA DAVIS, LYMPH NODE #7\" Received fresh are two   anthracotic fragments, 0.6 and 0.7 cm in greatest dimension.  1TP,   1FS, 1cs.     4.  \"ANA DAVIS, RIGHT MIDDLE LOBE\" Received fresh is a 50 gram,   8.5 x 6.8 x 3.5 cm lobe of lung with multiple staple lines.  The   pleura is wrinkled purple-gray.  Sectioning reveals spongy dark red   parenchyma with no masses.  No markedly enlarged nodes are identified   at the hilum.  Cassette summary:  \"A\" bronchial margin frozen section   cassette resubmitted as received, \"B\" hilar soft tissue and vascular   margin, \"C\" uninvolved lung (along staple line).  tm     Intraoperative Diagnosis   1.  FSDX:  Non-small cell carcinoma, favor adenocarcinoma.  (14:00,   RDD)   2.  FSDX:  Fibrofatty tissue.  (14:11, RDD)   3.  FSDX:  Lymph node, negative for carcinoma.  (14:19, RDD)   4.  FSDX:  Bronchial margin, negative for carcinoma.  (16:17, RDD)     Microscopic Description   1-4.  Microscopic examination performed.      PROCEDURE: Wedge biopsy, followed by lobectomy       SPECIMEN LATERALITY: Right   TUMOR FOCALITY: Single tumor       TUMOR SITE: Right middle lobe   TUMOR SIZE --     1.8 x 1.2 x 0.8 cm         HISTOLOGIC TYPE: Adenocarcinoma. Sections show a large and anaplastic non-small cell malignancy with   cohesion and a few instances of glandular differentiation.  Neoplastic   cells are large with copious eosinophilic cytoplasm and large   vesicular nuclei.  Tumor is characterized further utilizing control   appropriate immunostains.  Cells of carcinoma are negative for p40 and   they are positive for TTF-1 and CK7.  Morphology and immunophenotype   are that of an adenocarcinoma, lung primary. Note: The case is reviewed by a second Pathologist for quality   assurance with consensus (DS).       HISTOLOGIC GRADE: Grade 2 (of 4)           VISCERAL PLEURA INVASION:Reticulin stain with appropriately reactive   control is utilized to evaluate pleura.  Tumor extends beyond the   elastic layer into the pleura but not to the pleural surface (PL1)     DIRECT INVASION OF ADJACENT STRUCTURES: No     TREATMENT EFFECT: None apparent. LYMPHOVASCULAR INVASION: Absent                         MARGINS --   -BRONCHIAL: Free of tumor       -VASCULAR: Free of tumor       -PARENCHYMAL: Free of tumor       -OTHER ATTACHED TISSUE MARGIN (IF APPLICABLE): N/A       REGIONAL LYMPH NODES: 3   LYMPH NODES(S) FROM PRIOR PROCEDURES: No       NUMBER AND STATION(S) EXAMINED:   See diagnoses   NUMBER AND STATION(S) WITH METASTASIS: 0         DISTANT METASTASIS--   DISTANT SITE(S) INVOLVED, IF APPLICABLE: N/A         PATHOLOGIC STAGE CLASSIFICATION:     pT2a  pN0   CAP Lung 4200 in conjunction with AJCC 8 ed.         SURGICAL PATHOLOGY CONSULTATION         Patient Name: Debo Biggs: [id number]   Path Number: [id number]     [address] [place] [address]. [place], [address]   [phone]   Fax: [phone]          IMAGING DATA:      IR PORT PLACEMENT > 5 YEARS  Narrative: PROCEDURE:  ULTRASOUND GUIDED VASCULAR ACCESS.   FLUOROSCOPY GUIDED PLACEMENT OF A SUBCUTANEOUS PORT-A-CATH. MODERATE CONSCIOUS SEDATION    2/9/2022. HISTORY:  ORDERING SYSTEM PROVIDED HISTORY: Lung cancer, main bronchus, right (HCC)    SEDATION:  0.5 mgversed was titrated intravenously for moderate sedation monitored under  my direction. Total intraservice time of sedation was 20 minutes. The  patient's vital signs were monitored throughout the procedure and recorded in  the patient's medical record by the nurse. FLUOROSCOPY DOSE AND TYPE OR TIME AND EXPOSURES:  5.79 mGy reference air kerma, 0.5 minutes    TECHNIQUE:  Informed consent was obtained after a detailed explanation of the procedure  including risks, benefits, and alternatives. Universal protocol was observed. The left neck and chest were prepped and draped in sterile fashion using  maximum sterile barrier technique. Local anesthesia was achieved with  lidocaine. A micropuncture needle was used to access the left internal  jugular vein using ultrasound guidance. An ultrasound image demonstrating  patency of the vein with needle tip located within it. An image was obtained  and stored in PACs. Access was gained with a micropuncture set, and secured  with a J-wire. The chest wall was anesthetized with 1% lidocaine. A  transverse incision was made. The port pocket was created. Anchoring  sutures were attached to the port. The catheter was tunneled from the chest  incision to the jugular dermatotomy. The catheter was connected to the port  with a locking hub. The port was flushed, then placed into the pocket, the  catheter pulled taut, cut to length, and placed through the peel-away sheath. The catheter tip terminates at the cavoatrial junction. The anchoring  sutures were then secured. The port was flushed and hep-locked. The  transverse incision on the chest was closed with 2 0 Vicryl and skin glue. The jugular dermatotomy was closed with skin glue.   The patient tolerated the  procedure well.    FINDINGS:  Fluoroscopic image demonstrates the tip of the port at the cavo-atrial  junction . Impression: Successful ultrasound and fluoroscopy guided Port-A-Cath placement which may  be used immediately. CT chest with IV contrast on 11/2/2021    The previously biopsied, somewhat bilobed nodule involving right middle lobe   is slightly increased in size since the prior study now measuring 1.8 x 1.0   cm, once measuring 1.8 x 0.6 cm.  There does appear to be some spiculation. No new pulmonary nodule is seen. PET/CT on June 30, 2021    1. Significant oval increase in size of known anterior superior middle lobe   nodule along the minor fissure.  This now measures 6 x 18 mm compared to 10 x   4 mm on the prior.  This remains a LUNG RADS 4B (very suspicious) nodule. Recommend repeat PET-CT and or tissue sampling given appearance and   significant interval increase in size. 2. No major change in the fat density 2.3 cm nodule in the gastric fundus   adjacent to the GE junction.  Note this was FDG avid on the prior PET-CT. 3. Mild emphysema. The findings were sent to the Radiology Results [address] at 12:06   pm on 6/10/2021to be communicated to a licensed caregiver.           ASSESSMENT / PLAN:    There are no diagnoses linked to this encounter. 3 79-year-old man history of smoking diagnosed with pathology stage Ib(T2 aN0 M0) adenocarcinoma of the right middle lobe status post right VATS thoracotomy with right middle lobectomy done on January 4, 2022, due to high risk features manifested by involvement of visceral pleura recommended adjuvant chemotherapy which is Alimta and carboplatin, carboplatin substitute cisplatin due to frailed/nausea related to gastroparesis from diabetes in addition to mild neuropathy, started on chemotherapy on February 21, 2022 currently status post 2 cycle tolerated treatment well we will proceed with cycle 2 today  2.  Nosebleed> ENT referral(after chemotherapy)  3. Right-sided chest pain> improved control on oxycodone and Tylenol  4. Intermittent nausea this is prior to surgery related to diabetic gastroparesis > antinausea medicine  5. Decreased appetite due to the nausea> encourage oral intake> improved  6. Continue vitamin supplements folic acid and E08  7. RTC in 3 weeks prior to next chemotherapy             I spent a total of 40  minutes on the date of the service which included preparing to see the patient, face-to-face patient care, completing clinical documentation, obtaining and/or reviewing separately obtained history, performing a medically appropriate examination, counseling and educating the patient/family/caregiver and ordering medications, tests, or procedures. [facility]                            This note is created with the assistance of a speech recognition program.  While intending to generate a document that actually reflects the content of the visit, the document can still have some errors including those of syntax and sound a like substitutions which may escape proof reading. It such instances, actual meaning can be extrapolated by contextual diversion.

## 2022-03-21 DIAGNOSIS — I25.10 ASHD (ARTERIOSCLEROTIC HEART DISEASE): ICD-10-CM

## 2022-03-21 RX ORDER — AMLODIPINE BESYLATE 5 MG/1
5 TABLET ORAL DAILY
Qty: 90 TABLET | Refills: 3 | Status: SHIPPED | OUTPATIENT
Start: 2022-03-21 | End: 2022-03-28 | Stop reason: SDUPTHER

## 2022-03-28 DIAGNOSIS — I25.10 ASHD (ARTERIOSCLEROTIC HEART DISEASE): ICD-10-CM

## 2022-03-28 RX ORDER — AMLODIPINE BESYLATE 5 MG/1
5 TABLET ORAL DAILY
Qty: 90 TABLET | Refills: 3 | Status: SHIPPED | OUTPATIENT
Start: 2022-03-28

## 2022-04-05 ENCOUNTER — HOSPITAL ENCOUNTER (OUTPATIENT)
Age: 72
Discharge: HOME | End: 2022-04-07
Payer: MEDICARE

## 2022-04-05 ENCOUNTER — HOSPITAL ENCOUNTER (OUTPATIENT)
Dept: GENERAL RADIOLOGY | Age: 72
Discharge: HOME | End: 2022-04-07
Payer: MEDICARE

## 2022-04-05 DIAGNOSIS — R07.89 CHEST WALL PAIN: ICD-10-CM

## 2022-04-05 PROCEDURE — 71046 X-RAY EXAM CHEST 2 VIEWS: CPT

## 2022-04-06 ENCOUNTER — TELEPHONE (OUTPATIENT)
Dept: VASCULAR SURGERY | Age: 72
End: 2022-04-06

## 2022-04-06 ENCOUNTER — TELEPHONE (OUTPATIENT)
Dept: PULMONOLOGY | Age: 72
End: 2022-04-06

## 2022-04-06 NOTE — TELEPHONE ENCOUNTER
[facility] called in regards to the patient they are not really sure where to send him. They said that he is having some complications and is not sure if it could be the Chemo or if it is due to the wedge resection that the patient had on 1/4 with Dr. Renato Jordan. They stated that the patient has a moderate Pleural effusion on the right side, last CXR was taken yesterday. We are not quite sure where the patient needs to be sent. Do they need to see us again and if so would we need a new referral? Please Advise.     [phone]

## 2022-04-06 NOTE — TELEPHONE ENCOUNTER
Parisa from [facility]. Denise's office called stating patient had chest xray due to complaining of chest pain. CXR shows right sided pleural effusion, moderate size. She is asking if Dr. Janis Jason should follow up with this or Dr. Mary Wood. Patient had recent wedge resection. Advised to reach out to Dr. Mary Wood at this time and that writer would inform Dr Janis Jason as well.

## 2022-04-07 ENCOUNTER — HOSPITAL ENCOUNTER (OUTPATIENT)
Dept: INFUSION THERAPY | Age: 72
Discharge: HOME | End: 2022-04-07
Payer: MEDICARE

## 2022-04-07 ENCOUNTER — OFFICE VISIT (OUTPATIENT)
Dept: ONCOLOGY | Age: 72
End: 2022-04-07
Payer: MEDICARE

## 2022-04-07 VITALS
BODY MASS INDEX: 20.45 KG/M2 | SYSTOLIC BLOOD PRESSURE: 111 MMHG | HEIGHT: 72 IN | DIASTOLIC BLOOD PRESSURE: 61 MMHG | RESPIRATION RATE: 16 BRPM | HEART RATE: 84 BPM | TEMPERATURE: 97 F | WEIGHT: 151 LBS

## 2022-04-07 VITALS
BODY MASS INDEX: 20.45 KG/M2 | HEART RATE: 84 BPM | RESPIRATION RATE: 20 BRPM | HEIGHT: 72 IN | WEIGHT: 151 LBS | TEMPERATURE: 97 F | DIASTOLIC BLOOD PRESSURE: 61 MMHG | SYSTOLIC BLOOD PRESSURE: 111 MMHG

## 2022-04-07 DIAGNOSIS — E11.9 TYPE 2 DIABETES MELLITUS WITHOUT COMPLICATION, WITHOUT LONG-TERM CURRENT USE OF INSULIN (HCC): ICD-10-CM

## 2022-04-07 DIAGNOSIS — J90 PLEURAL EFFUSION ON RIGHT: ICD-10-CM

## 2022-04-07 DIAGNOSIS — R07.9 RIGHT-SIDED CHEST PAIN: ICD-10-CM

## 2022-04-07 DIAGNOSIS — C34.01 LUNG CANCER, MAIN BRONCHUS, RIGHT (HCC): Primary | ICD-10-CM

## 2022-04-07 DIAGNOSIS — Z45.2 ENCOUNTER FOR CARE RELATED TO VASCULAR ACCESS PORT: ICD-10-CM

## 2022-04-07 LAB
ABSOLUTE EOS #: <0.03 K/UL (ref 0–0.44)
ABSOLUTE IMMATURE GRANULOCYTE: 0.17 K/UL (ref 0–0.3)
ABSOLUTE LYMPH #: 1.03 K/UL (ref 1.1–3.7)
ABSOLUTE MONO #: 1.3 K/UL (ref 0.1–1.2)
ALBUMIN SERPL-MCNC: 3.9 G/DL (ref 3.5–5.2)
ALBUMIN/GLOBULIN RATIO: 1.3 (ref 1–2.5)
ALP BLD-CCNC: 105 U/L (ref 40–129)
ALT SERPL-CCNC: 12 U/L (ref 5–41)
ANION GAP SERPL CALCULATED.3IONS-SCNC: 13 MMOL/L (ref 9–17)
AST SERPL-CCNC: 13 U/L
BASOPHILS # BLD: 1 % (ref 0–2)
BASOPHILS ABSOLUTE: 0.04 K/UL (ref 0–0.2)
BILIRUB SERPL-MCNC: 0.24 MG/DL (ref 0.3–1.2)
BUN BLDV-MCNC: 14 MG/DL (ref 8–23)
BUN/CREAT BLD: 14 (ref 9–20)
CALCIUM SERPL-MCNC: 8.9 MG/DL (ref 8.6–10.4)
CHLORIDE BLD-SCNC: 96 MMOL/L (ref 98–107)
CO2: 20 MMOL/L (ref 20–31)
CREAT SERPL-MCNC: 0.99 MG/DL (ref 0.7–1.2)
EOSINOPHILS RELATIVE PERCENT: 0 % (ref 1–4)
GFR AFRICAN AMERICAN: >60 ML/MIN
GFR NON-AFRICAN AMERICAN: >60 ML/MIN
GFR SERPL CREATININE-BSD FRML MDRD: ABNORMAL ML/MIN/{1.73_M2}
GFR SERPL CREATININE-BSD FRML MDRD: ABNORMAL ML/MIN/{1.73_M2}
GLUCOSE BLD-MCNC: 332 MG/DL (ref 70–99)
HCT VFR BLD CALC: 28.5 % (ref 40.7–50.3)
HEMOGLOBIN: 9.2 G/DL (ref 13–17)
IMMATURE GRANULOCYTES: 2 %
LYMPHOCYTES # BLD: 13 % (ref 24–43)
MCH RBC QN AUTO: 28 PG (ref 25.2–33.5)
MCHC RBC AUTO-ENTMCNC: 32.3 G/DL (ref 28.4–34.8)
MCV RBC AUTO: 86.6 FL (ref 82.6–102.9)
MONOCYTES # BLD: 17 % (ref 3–12)
NRBC AUTOMATED: 0 PER 100 WBC
PDW BLD-RTO: 16.3 % (ref 11.8–14.4)
PLATELET # BLD: 269 K/UL (ref 138–453)
PMV BLD AUTO: 9.8 FL (ref 8.1–13.5)
POTASSIUM SERPL-SCNC: 4 MMOL/L (ref 3.7–5.3)
RBC # BLD: 3.29 M/UL (ref 4.21–5.77)
SEG NEUTROPHILS: 67 % (ref 36–65)
SEGMENTED NEUTROPHILS ABSOLUTE COUNT: 5.2 K/UL (ref 1.5–8.1)
SODIUM BLD-SCNC: 129 MMOL/L (ref 135–144)
TOTAL PROTEIN: 6.9 G/DL (ref 6.4–8.3)
WBC # BLD: 7.7 K/UL (ref 3.5–11.3)

## 2022-04-07 PROCEDURE — 85025 COMPLETE CBC W/AUTO DIFF WBC: CPT

## 2022-04-07 PROCEDURE — 3046F HEMOGLOBIN A1C LEVEL >9.0%: CPT | Performed by: INTERNAL MEDICINE

## 2022-04-07 PROCEDURE — 4040F PNEUMOC VAC/ADMIN/RCVD: CPT | Performed by: INTERNAL MEDICINE

## 2022-04-07 PROCEDURE — 1123F ACP DISCUSS/DSCN MKR DOCD: CPT | Performed by: INTERNAL MEDICINE

## 2022-04-07 PROCEDURE — G8420 CALC BMI NORM PARAMETERS: HCPCS | Performed by: INTERNAL MEDICINE

## 2022-04-07 PROCEDURE — 1036F TOBACCO NON-USER: CPT | Performed by: INTERNAL MEDICINE

## 2022-04-07 PROCEDURE — 3017F COLORECTAL CA SCREEN DOC REV: CPT | Performed by: INTERNAL MEDICINE

## 2022-04-07 PROCEDURE — 2580000003 HC RX 258: Performed by: INTERNAL MEDICINE

## 2022-04-07 PROCEDURE — 2022F DILAT RTA XM EVC RTNOPTHY: CPT | Performed by: INTERNAL MEDICINE

## 2022-04-07 PROCEDURE — 36591 DRAW BLOOD OFF VENOUS DEVICE: CPT

## 2022-04-07 PROCEDURE — 6360000002 HC RX W HCPCS: Performed by: INTERNAL MEDICINE

## 2022-04-07 PROCEDURE — 80053 COMPREHEN METABOLIC PANEL: CPT

## 2022-04-07 PROCEDURE — 99215 OFFICE O/P EST HI 40 MIN: CPT | Performed by: INTERNAL MEDICINE

## 2022-04-07 PROCEDURE — G8427 DOCREV CUR MEDS BY ELIG CLIN: HCPCS | Performed by: INTERNAL MEDICINE

## 2022-04-07 RX ORDER — SODIUM CHLORIDE 0.9 % (FLUSH) 0.9 %
5-40 SYRINGE (ML) INJECTION PRN
Status: DISCONTINUED | OUTPATIENT
Start: 2022-04-07 | End: 2022-04-08 | Stop reason: HOSPADM

## 2022-04-07 RX ORDER — HEPARIN SODIUM (PORCINE) LOCK FLUSH IV SOLN 100 UNIT/ML 100 UNIT/ML
500 SOLUTION INTRAVENOUS PRN
Status: DISCONTINUED | OUTPATIENT
Start: 2022-04-07 | End: 2022-04-08 | Stop reason: HOSPADM

## 2022-04-07 RX ORDER — HEPARIN SODIUM (PORCINE) LOCK FLUSH IV SOLN 100 UNIT/ML 100 UNIT/ML
500 SOLUTION INTRAVENOUS PRN
Status: CANCELLED | OUTPATIENT
Start: 2022-04-07

## 2022-04-07 RX ORDER — SODIUM CHLORIDE 0.9 % (FLUSH) 0.9 %
5-40 SYRINGE (ML) INJECTION PRN
Status: CANCELLED | OUTPATIENT
Start: 2022-04-07

## 2022-04-07 RX ORDER — SODIUM CHLORIDE 9 MG/ML
25 INJECTION, SOLUTION INTRAVENOUS PRN
Status: CANCELLED | OUTPATIENT
Start: 2022-04-07

## 2022-04-07 RX ADMIN — SODIUM CHLORIDE, PRESERVATIVE FREE 10 ML: 5 INJECTION INTRAVENOUS at 09:23

## 2022-04-07 RX ADMIN — SODIUM CHLORIDE, PRESERVATIVE FREE 10 ML: 5 INJECTION INTRAVENOUS at 08:05

## 2022-04-07 RX ADMIN — SODIUM CHLORIDE, PRESERVATIVE FREE 10 ML: 5 INJECTION INTRAVENOUS at 09:22

## 2022-04-07 RX ADMIN — SODIUM CHLORIDE, PRESERVATIVE FREE 10 ML: 5 INJECTION INTRAVENOUS at 08:04

## 2022-04-07 RX ADMIN — HEPARIN SODIUM (PORCINE) LOCK FLUSH IV SOLN 100 UNIT/ML 500 UNITS: 100 SOLUTION at 09:23

## 2022-04-07 ASSESSMENT — PAIN DESCRIPTION - PAIN TYPE: TYPE: CHRONIC PAIN

## 2022-04-07 ASSESSMENT — PAIN DESCRIPTION - DESCRIPTORS: DESCRIPTORS: STABBING

## 2022-04-07 ASSESSMENT — PAIN DESCRIPTION - LOCATION: LOCATION: ABDOMEN

## 2022-04-07 ASSESSMENT — PAIN DESCRIPTION - PROGRESSION: CLINICAL_PROGRESSION: NOT CHANGED

## 2022-04-07 ASSESSMENT — PAIN DESCRIPTION - ONSET: ONSET: ON-GOING

## 2022-04-07 ASSESSMENT — PAIN DESCRIPTION - ORIENTATION: ORIENTATION: MID

## 2022-04-07 ASSESSMENT — PAIN DESCRIPTION - FREQUENCY: FREQUENCY: CONTINUOUS

## 2022-04-07 ASSESSMENT — PAIN SCALES - GENERAL: PAINLEVEL_OUTOF10: 5

## 2022-04-07 NOTE — PROGRESS NOTES
Patient ID: Alan Peres, [birth year], [id number], 70 y.o. Referred by :  No ref. 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 male history of tobacco smoking and has been following with pulmonary for right middle lobe lung nodule where CT chest did show increase in the size in the scan on November 2, 2021 patient had biopsy on July 28, 2021 which showed benign parenchyma with chronic inflammation however the lesion was active on the PET there was no evidence of metastasis patient admitted to [facility] on January 4, 2022 for VATS and wedge resection which ended up with lobectomy, final pathology compatible with adenocarcinoma 1.8 cm and carcinoma invade into but not through the visceral pleura lymph nodes were negative patient status post right middle lobe lobectomy and margin were free of involvement by carcinoma   Patient developed right apical pneumothorax chest tube placed in the hospital and was discharged after chest tube removed and patient was started on adjuvant chemotherapy on February 24, 2022    Interval history  Status post 2 cycle of chemotherapy tolerated chemo well  No neuropathy  Worsening right-sided chest pain, chest x-ray done and did show right-sided pleural effusion    Past Medical History:   Diagnosis Date    Abnormal cardiac cath 09/20/2013    LMCA; Mild irregularities 10-20%. LAD: Abnormal.  Mild to moderate irregularities throughout the LAD and branches. LCx:  abnormal.  80-90% stenosis in a moderate sized OM1.   RCA: Abnormal.  40-50% cross with multiple wires due to heavy calcification and toruosity.  CARDIAC CATHETERIZATION  01/14/2019    CATARACT REMOVAL  04/06/2015    Right eye - Dr. Eufemia Rudolph  06/08/2015    Dr. Andreia Adams - left eye    COLONOSCOPY  01/11/2006    Dr. Cherylene Saunas - internal hemorrhoids, no polps    COLONOSCOPY  05/23/2016    -polyp    CT NEEDLE BIOPSY LUNG PERCUTANEOUS  07/28/2021    CT NEEDLE BIOPSY LUNG PERCUTANEOUS 7/28/2021 [facility] CT SCAN    HEMORRHOID SURGERY      HERNIA REPAIR Left     inguinal    IR CHEST TUBE INSERTION  01/11/2022    IR CHEST TUBE INSERTION 1/11/2022 Andre Peace MD [facility] SPECIAL PROCEDURES    IR PORT PLACEMENT EQUAL OR GREATER THAN 5 YEARS  2/9/2022    IR PORT PLACEMENT EQUAL OR GREATER THAN 5 YEARS 2/9/2022 [facility] SPECIAL PROCEDURES    LUNG BIOPSY Right 07/28/2021    Dr Becki Stout, TOTAL Right 01/04/2022    MIDDLE WEDGE RESECTION, POSSIBLE MIDDLE LOBECTOMY VIA THORACOTOMY performed by Saeid Skaggs MD at [facility]    c4-5 laminectomy and fusion    OTHER SURGICAL HISTORY  10/2014    Right foot - 7 from heal and 1 from great toe at [address] [place] [address]  12/11/2014    pain injection - cervical    TUNNELED CENTRAL VENOUS CATHETER W/ SUBCUTANEOUS PORT Left 02/09/2022    Dr [facility]en[facility]    UPPER GASTROINTESTINAL ENDOSCOPY  12/28/2005    Dr. Cherylene Saunas - mild esophagitis and gastritis    UPPER GASTROINTESTINAL ENDOSCOPY N/A 02/05/2019    -bx(neg H-Pylori)retained gastric content, possible submucosal mass of gastric cardia    UPPER GASTROINTESTINAL ENDOSCOPY N/A 10/22/2019    EGD BIOPSY performed by Tracie Le MD at [address] [place] [address]   Allergen Reactions    Niacin And Related      Turns red, no trouble breathing    Minocycline Hcl Rash    Other Nausea And Vomiting     Patient states on all pain medications he gets nausea and vomiting.  Doctor ordered a medicine (nausea) to take before pain medication    Oxycodone Hcl      vomits       Current Outpatient Medications   Medication Sig Dispense Refill    amLODIPine (NORVASC) 5 MG tablet Take 1 tablet by mouth daily 90 tablet 3    magnesium 30 MG tablet Take 400 mg by mouth daily       clopidogrel (PLAVIX) 75 MG tablet Take 1 tablet by mouth daily 90 tablet 3    zinc 50 MG CAPS Take by mouth      promethazine (PHENERGAN) 25 MG tablet Take 25 mg by mouth every 4 hours as needed for Nausea      oxyCODONE 5 MG capsule Take 5 mg by mouth every 4 hours as needed for Pain.  pantoprazole (PROTONIX) 40 MG tablet Take 40 mg by mouth in the morning and at bedtime       folic acid (FOLVITE) 1 MG tablet Take 1 tablet by mouth daily for 30 doses Start 7 days before first scheduled dose and continue for 21 days after last dose of PEMEtrexed. 30 tablet 2    dexamethasone (DECADRON) 4 MG tablet Take 1 tablet by mouth See Admin Instructions Take 1 tab twice daily for 3 days starting the day before scheduled PEMEtrexed. 30 tablet 0    ondansetron (ZOFRAN ODT) 8 MG TBDP disintegrating tablet Take 1 tablet by mouth every 8 hours as needed for Nausea 30 tablet 2    lidocaine-prilocaine (EMLA) 2.5-2.5 % cream Apply topically to port site 30- 60 minutes before access as needed. 30 g 2    gabapentin (NEURONTIN) 100 MG capsule Take 1 capsule by mouth 3 times daily for 14 days. 42 capsule 0    SERTRALINE HCL PO Take 50 mg by mouth nightly      tamsulosin (FLOMAX) 0.4 MG capsule Take 0.4 mg by mouth daily      tiZANidine (ZANAFLEX) 4 MG capsule Take 4 mg by mouth 3 times daily as needed      ferrous sulfate (IRON 325) 325 (65 Fe) MG tablet Take 325 mg by mouth daily       isosorbide mononitrate (IMDUR) 30 MG extended release tablet 1/2 tablet in the morning      nitroGLYCERIN (NITRODUR) 0.4 MG/HR 1 dose under tongue as needed for chest pain.  max of 3 in one day      atenolol (TENORMIN) 25 MG tablet Take 1 tablet by mouth daily 90 tablet 3    Insulin Glargine, 2 Unit Dial, 300 UNIT/ML SOPN Insulin Glargine Insulin Glargine U-300 Conc Active 10 UNIT Subcutaneous Every morning May 7th, 2020 11:47am 2020  [facility] ([id number])      atorvastatin (LIPITOR) 20 MG tablet Take 1 tablet by mouth daily 90 tablet 3    sitaGLIPtan-metformin (JANUMET)  MG per tablet Take 1 tablet by mouth 2 times daily (with meals)      glimepiride (AMARYL) 2 MG tablet Take 1 tablet by mouth 2 times daily 180 tablet 3    aspirin 81 MG tablet Take 81 mg by mouth daily Ordered by heart doctor, Chan Tejada      lisinopril (PRINIVIL;ZESTRIL) 20 MG tablet Take 20 mg by mouth daily  (Patient not taking: Reported on 2022)       No current facility-administered medications for this visit.      Facility-Administered Medications Ordered in Other Visits   Medication Dose Route Frequency Provider Last Rate Last Admin    sodium chloride flush 0.9 % injection 5-40 mL  5-40 mL IntraVENous PRN Samira Lopez MD   10 mL at 22 0805    heparin flush 100 UNIT/ML injection 500 Units  500 Units IntraCATHeter PRN Samira Lopez MD           Social History     Socioeconomic History    Marital status:      Spouse name: Not on file    Number of children: Not on file    Years of education: Not on file    Highest education level: Not on file   Occupational History    Not on file   Tobacco Use    Smoking status: Former Smoker     Packs/day: 0.25     Years: 54.00     Pack years: 13.50     Types: Cigarettes     Quit date: 10/1/2021     Years since quittin.5    Smokeless tobacco: Never Used   Vaping Use    Vaping Use: Never used   Substance and Sexual Activity    Alcohol use: No    Drug use: No    Sexual activity: Not on file   Other Topics Concern    Not on file   Social History Narrative    Not on file     Social Determinants of Health     Financial Resource Strain:     Difficulty of Paying Living Expenses: Not on file   Food Insecurity:     Worried About Running Out of Food in the Last Year: Not on file    Luis E of Food in the Last Year: Not on file   Transportation Needs:     Lack of Transportation (Medical): Not on file    Lack of Transportation (Non-Medical): Not on file   Physical Activity:     Days of Exercise per Week: Not on file    Minutes of Exercise per Session: Not on file   Stress:     Feeling of Stress : Not on file   Social Connections:     Frequency of Communication with Friends and Family: Not on file    Frequency of Social Gatherings with Friends and Family: Not on file    Attends Yazdanism Services: Not on file    Active Member of [address] [place] [address] [employer] or Organizations: Not on file    Attends Club or Organization Meetings: Not on file    Marital Status: Not on file   Intimate Partner Violence:     Fear of Current or Ex-Partner: Not on file    Emotionally Abused: Not on file    Physically Abused: Not on file    Sexually Abused: Not on file   Housing Stability:     Unable to Pay for Housing in the Last Year: Not on file    Number of [place] in the Last Year: Not on file    Unstable Housing in the Last Year: Not on file       Family History   Problem Relation Age of Onset    Heart Disease Mother     Diabetes Mother     High Blood Pressure Mother     Diabetes Father     Alzheimer's Disease Father     Heart Disease Sister         CABG    Heart Disease Brother     Heart Disease Brother     Heart Disease Brother     Cancer Brother         REVIEW OF SYSTEM:     Constitutional: No fever or chills.  No night sweats, no weight loss   Eyes: No eye discharge, double vision, or eye pain   HEENT: negative for sore mouth, sore throat, hoarseness and voice change   Respiratory: negative for cough , sputum, dyspnea, wheezing, hemoptysis, chest pain   Cardiovascular: negative for chest pain, dyspnea, palpitations, orthopnea, PND   Gastrointestinal: negative for nausea, vomiting, diarrhea, constipation, abdominal pain, Dysphagia, hematemesis and hematochezia   Genitourinary: negative for frequency, dysuria, nocturia, urinary incontinence, and hematuria   Integument: negative for rash, skin lesions, bruises.    Hematologic/Lymphatic: negative for easy bruising, bleeding, lymphadenopathy, petechiae and swelling/edema   Endocrine: negative for heat or cold intolerance, tremor, weight changes, change in bowel habits and hair loss   Musculoskeletal: negative for myalgias, arthralgias, pain, joint swelling,and bone pain   Neurological: negative for headaches, dizziness, seizures, weakness, numbness       OBJECTIVE:         Vitals:    04/07/22 0833   BP: 111/61   Pulse: 84   Resp: 20   Temp: 97 °F (36.1 °C)       PHYSICAL EXAM:   General appearance - well appearing, no in pain or distress   Mental status - alert and cooperative   Eyes - pupils equal and reactive, extraocular eye movements intact   Ears - bilateral TM's and external ear canals normal   Mouth - mucous membranes moist, pharynx normal without lesions   Neck - supple, no significant adenopathy   Lymphatics - no palpable lymphadenopathy, no hepatosplenomegaly   Chest - clear to auscultation, no wheezes, rales or rhonchi, symmetric air entry, diminished sounds on the right side  Heart - normal rate, regular rhythm, normal S1, S2, no murmurs, rubs, clicks or gallops   Abdomen - soft, nontender, nondistended, no masses or organomegaly   Neurological - alert, oriented, normal speech, no focal findings or movement disorder noted   Musculoskeletal - no joint tenderness, deformity or swelling   Extremities - peripheral pulses normal, no pedal edema, no clubbing or cyanosis   Skin - normal coloration and turgor, no rashes, no suspicious skin lesions noted ,      LABORATORY DATA:     Lab Results   Component Value Date    WBC 7.7 04/07/2022    HGB 9.2 (L) 04/07/2022    HCT 28.5 (L) 04/07/2022    MCV 86.6 04/07/2022     04/07/2022    LYMPHOPCT 13 (L) 04/07/2022    RBC 3.29 (L) 04/07/2022    MCH 28.0 04/07/2022    MCHC 32.3 04/07/2022    RDW 16.3 (H) 04/07/2022    NEUTOPHILPCT 50.9 02/02/2017    MONOPCT 17 (H) 04/07/2022    EOSPCT 3.1 02/02/2017    BASOPCT 1 04/07/2022    NEUTROABS 5.20 04/07/2022    LYMPHSABS 1.03 (L) 04/07/2022    MONOSABS 1.30 (H) 04/07/2022    EOSABS <0.03 04/07/2022    BASOSABS 0.04 04/07/2022         Chemistry        Component Value Date/Time     (L) 04/07/2022 0804    K 4.0 04/07/2022 0804    CL 96 (L) 04/07/2022 0804    CO2 20 04/07/2022 0804    BUN 14 04/07/2022 0804    CREATININE 0.99 04/07/2022 0804        Component Value Date/Time    CALCIUM 8.9 04/07/2022 0804    ALKPHOS 105 04/07/2022 0804    AST 13 04/07/2022 0804    ALT 12 04/07/2022 0804    BILITOT 0.24 (L) 04/07/2022 0804            PATHOLOGY DATA:     Surgical Pathology Report -- Diagnosis --     1.  Right lung, middle lobe wedge biopsy:     -  Adenocarcinoma. -  Tumor is 1.8 cm. -  Carcinoma invades into but not through the visceral pleura. 2.  Lymph node #9, biopsy:     -  Negative for metastatic carcinoma. 3.  Lymph node #7, biopsy:     -  Negative for metastatic carcinoma. 4.  Right middle lobe lung, lobectomy:     -  Margins are free of involvement by carcinoma. -  Peribronchial lymph node, negative for metastatic carcinoma.      -  Nonneoplastic lung shows emphysematous change.       Yrn Gardner M.D.   **Electronically Signed Out**         rdd/1/6/2022         Clinical Information   Pre-op Diagnosis:  RIGHT MIDDLE LOBE NODULE   Operative Findings:  MIDDLE LOBE OF RIGHT LUNG; LYMPH NODE #9 OF LUNG;   LYMPH NODE #7; RIGHT MIDDLE LOBE   Operation Performed:  MIDDLE WEDGE RESECTION, POSSIBLE MIDDLE   LOBECTOMY VIA THORACOTOMY     Source of Specimen   1: MIDDLE LOBE OF RIGHT LUNG (A)   2: LYMPH NODE #9 OF LUNG (B)   3: LYMPH NODE #7   4: RIGHT MIDDLE LOBE     Gross Description   1.  \"ANA DAVIS, MIDDLE LOBE OF RIGHT LUNG\" Received fresh is a   6.4 x 4.2 x 2.8 cm lung wedge with staple line.  The pleura is   pink-tan with an area of slightly thickening (inked black). Sectioning reveals a 1.8 x 1.2 x 0.8 cm subpleural tan-white mass   lesion that is 0.8 cm from the staple line margin.  The remaining   parenchyma is pink-tan with no other lesions.  1TP, 1DQ, 1FS, Cassette   summary:  \"A\" frozen section lesion, \"B\" remainder of lesion with   pleura inked black, \"C\" staple line margin en face. 2.  \"ANA SUSAN, LYMPH NODE #9 OF LUNG\" Received fresh are two   fragments, 0.3 cm each.  1TP, 1FS, 1cs. 3.  \"ANA SUASN, LYMPH NODE #7\" Received fresh are two   anthracotic fragments, 0.6 and 0.7 cm in greatest dimension.  1TP,   1FS, 1cs.     4.  \"ANA SUSAN, RIGHT MIDDLE LOBE\" Received fresh is a 50 gram,   8.5 x 6.8 x 3.5 cm lobe of lung with multiple staple lines.  The   pleura is wrinkled purple-gray.  Sectioning reveals spongy dark red   parenchyma with no masses.  No markedly enlarged nodes are identified   at the hilum.  Cassette summary:  \"A\" bronchial margin frozen section   cassette resubmitted as received, \"B\" hilar soft tissue and vascular   margin, \"C\" uninvolved lung (along staple line).  tm     Intraoperative Diagnosis   1.  FSDX:  Non-small cell carcinoma, favor adenocarcinoma.  (14:00,   RDD)   2.  FSDX:  Fibrofatty tissue.  (14:11, RDD)   3.  FSDX:  Lymph node, negative for carcinoma.  (14:19, RDD)   4.  FSDX:  Bronchial margin, negative for carcinoma.  (16:17, RDD)     Microscopic Description   1-4.  Microscopic examination performed. PROCEDURE: Wedge biopsy, followed by lobectomy       SPECIMEN LATERALITY: Right   TUMOR FOCALITY: Single tumor       TUMOR SITE: Right middle lobe   TUMOR SIZE --     1.8 x 1.2 x 0.8 cm           HISTOLOGIC TYPE: Adenocarcinoma.    Sections show a large and anaplastic non-small cell malignancy with   cohesion and a few instances of glandular differentiation.  Neoplastic   cells are large with copious eosinophilic cytoplasm and large   vesicular nuclei.  Tumor is characterized further utilizing control   appropriate immunostains.  Cells of carcinoma are negative for p40 and   they are positive for TTF-1 and CK7.  Morphology and immunophenotype   are that of an adenocarcinoma, lung primary. Note: The case is reviewed by a second Pathologist for quality   assurance with consensus (DS).       HISTOLOGIC GRADE: Grade 2 (of 4)           VISCERAL PLEURA INVASION:Reticulin stain with appropriately reactive   control is utilized to evaluate pleura.  Tumor extends beyond the   elastic layer into the pleura but not to the pleural surface (PL1)     DIRECT INVASION OF ADJACENT STRUCTURES: No     TREATMENT EFFECT: None apparent. LYMPHOVASCULAR INVASION: Absent                         MARGINS --   -BRONCHIAL: Free of tumor       -VASCULAR: Free of tumor       -PARENCHYMAL: Free of tumor       -OTHER ATTACHED TISSUE MARGIN (IF APPLICABLE): N/A       REGIONAL LYMPH NODES: 3   LYMPH NODES(S) FROM PRIOR PROCEDURES: No       NUMBER AND STATION(S) EXAMINED:   See diagnoses   NUMBER AND STATION(S) WITH METASTASIS: 0         DISTANT METASTASIS--   DISTANT SITE(S) INVOLVED, IF APPLICABLE: N/A         PATHOLOGIC STAGE CLASSIFICATION:     pT2a  pN0   CAP Lung 4200 in conjunction with AJCC 8 ed.         SURGICAL PATHOLOGY CONSULTATION         Patient Name: Reyna Randle: [id number]   Path Number: [id number]     [address], [address]   [phone]   Fax: [phone]          IMAGING DATA:      XR CHEST (2 VW)  Narrative: EXAMINATION:  TWO XRAY VIEWS OF THE CHEST    4/5/2022 12:09 pm    COMPARISON:  01/27/2022    HISTORY:  ORDERING SYSTEM PROVIDED HISTORY: Chest wall pain    FINDINGS:  Left-sided central venous catheter terminates in the SVC. Heart size normal.  Right pleural effusion.   No pneumothorax. No new airspace disease. Impression: Moderate size right pleural effusion    Otherwise, no acute cardiopulmonary process       CT chest with IV contrast on 11/2/2021    The previously biopsied, somewhat bilobed nodule involving right middle lobe   is slightly increased in size since the prior study now measuring 1.8 x 1.0   cm, once measuring 1.8 x 0.6 cm.  There does appear to be some spiculation. No new pulmonary nodule is seen. PET/CT on June 30, 2021    1. Significant oval increase in size of known anterior superior middle lobe   nodule along the minor fissure.  This now measures 6 x 18 mm compared to 10 x   4 mm on the prior.  This remains a LUNG RADS 4B (very suspicious) nodule. Recommend repeat PET-CT and or tissue sampling given appearance and   significant interval increase in size. 2. No major change in the fat density 2.3 cm nodule in the gastric fundus   adjacent to the GE junction.  Note this was FDG avid on the prior PET-CT. 3. Mild emphysema. The findings were sent to the Radiology Results [address] at 12:06   pm on 6/10/2021to be communicated to a licensed caregiver.           ASSESSMENT / PLAN:    Gasper Aleman was seen today for follow-up, shortness of breath and blurred vision.     Diagnoses and all orders for this visit:    Lung cancer, main bronchus, right [facility])  -     Ambulatory referral to Interventional Radiology    Pleural effusion on right  -     Ambulatory referral to Interventional Radiology    Right-sided chest pain  -     Ambulatory referral to Interventional Radiology    Type 2 diabetes mellitus without complication, without long-term current use of insulin ([facility] [address]           3 70-year-old man history of smoking diagnosed with pathology stage Ib(T2 aN0 M0) adenocarcinoma of the right middle lobe status post right VATS thoracotomy with right middle lobectomy done on January 4, 2022, due to high risk features manifested by involvement of visceral pleura recommended adjuvant chemotherapy which is Alimta and carboplatin, carboplatin substitute cisplatin due to frailed/nausea related to gastroparesis from diabetes in addition to mild neuropathy, started on chemotherapy on February 21, 2022 currently status post 2 cycle tolerated treatment well however with right-sided chest pain and pleural effusion will hold chemo today and refer for thoracentesis, chemotherapy will be rescheduled for next week  2. Right-sided thoracentesis> fluid to be sent for cytology  3. Nosebleed> ENT referral(after chemotherapy)  4. Right-sided chest pain> getting worse pleural type especially on oxycodone and Tylenol> referral for thoracentesis  5. Intermittent nausea this is prior to surgery related to diabetic gastroparesis > antinausea medicine  6. Decreased appetite due to the nausea> encourage oral intake> improved  7. Continue vitamin supplements folic acid and N71  8. RTC in a week after thoracentesis             I spent a total of 40  minutes on the date of the service which included preparing to see the patient, face-to-face patient care, completing clinical documentation, obtaining and/or reviewing separately obtained history, performing a medically appropriate examination, counseling and educating the patient/family/caregiver and ordering medications, tests, or procedures. [facility]                            This note is created with the assistance of a speech recognition program.  While intending to generate a document that actually reflects the content of the visit, the document can still have some errors including those of syntax and sound a like substitutions which may escape proof reading. It such instances, actual meaning can be extrapolated by contextual diversion.

## 2022-04-13 ENCOUNTER — HOSPITAL ENCOUNTER (OUTPATIENT)
Dept: GENERAL RADIOLOGY | Age: 72
Discharge: HOME | End: 2022-04-15
Payer: MEDICARE

## 2022-04-13 ENCOUNTER — HOSPITAL ENCOUNTER (OUTPATIENT)
Dept: ULTRASOUND IMAGING | Age: 72
Discharge: HOME | End: 2022-04-15
Payer: MEDICARE

## 2022-04-13 VITALS
OXYGEN SATURATION: 98 % | SYSTOLIC BLOOD PRESSURE: 132 MMHG | RESPIRATION RATE: 20 BRPM | DIASTOLIC BLOOD PRESSURE: 73 MMHG | HEART RATE: 110 BPM

## 2022-04-13 DIAGNOSIS — J90 PLEURAL EFFUSION ON RIGHT: ICD-10-CM

## 2022-04-13 LAB
CASE NUMBER:: NORMAL
SPECIMEN DESCRIPTION: NORMAL

## 2022-04-13 PROCEDURE — 2709999900 US THORACENTESIS

## 2022-04-13 PROCEDURE — 2500000003 HC RX 250 WO HCPCS: Performed by: RADIOLOGY

## 2022-04-13 PROCEDURE — 88305 TISSUE EXAM BY PATHOLOGIST: CPT

## 2022-04-13 PROCEDURE — 71045 X-RAY EXAM CHEST 1 VIEW: CPT

## 2022-04-13 PROCEDURE — 88112 CYTOPATH CELL ENHANCE TECH: CPT

## 2022-04-13 RX ORDER — LIDOCAINE HYDROCHLORIDE 10 MG/ML
INJECTION, SOLUTION EPIDURAL; INFILTRATION; INTRACAUDAL; PERINEURAL
Status: COMPLETED | OUTPATIENT
Start: 2022-04-13 | End: 2022-04-13

## 2022-04-13 RX ADMIN — LIDOCAINE HYDROCHLORIDE 5 ML: 10 INJECTION, SOLUTION EPIDURAL; INFILTRATION; INTRACAUDAL; PERINEURAL at 10:26

## 2022-04-13 NOTE — OR NURSING
Needle inserted with catheter. Fluid return noted, hooked to vacuum container; drains rust-colored fluid.

## 2022-04-14 ENCOUNTER — HOSPITAL ENCOUNTER (OUTPATIENT)
Dept: INFUSION THERAPY | Age: 72
Discharge: HOME | End: 2022-04-14
Payer: MEDICARE

## 2022-04-14 ENCOUNTER — TELEPHONE (OUTPATIENT)
Dept: ONCOLOGY | Age: 72
End: 2022-04-14

## 2022-04-14 ENCOUNTER — OFFICE VISIT (OUTPATIENT)
Dept: ONCOLOGY | Age: 72
End: 2022-04-14
Payer: MEDICARE

## 2022-04-14 VITALS
RESPIRATION RATE: 18 BRPM | BODY MASS INDEX: 20.48 KG/M2 | WEIGHT: 151 LBS | HEART RATE: 99 BPM | SYSTOLIC BLOOD PRESSURE: 129 MMHG | TEMPERATURE: 97.6 F | DIASTOLIC BLOOD PRESSURE: 70 MMHG

## 2022-04-14 VITALS
DIASTOLIC BLOOD PRESSURE: 70 MMHG | TEMPERATURE: 97.6 F | HEART RATE: 99 BPM | RESPIRATION RATE: 18 BRPM | SYSTOLIC BLOOD PRESSURE: 129 MMHG

## 2022-04-14 DIAGNOSIS — J90 PLEURAL EFFUSION ON RIGHT: Primary | ICD-10-CM

## 2022-04-14 DIAGNOSIS — Z98.890 S/P THORACOTOMY: ICD-10-CM

## 2022-04-14 DIAGNOSIS — Z45.2 ENCOUNTER FOR CARE RELATED TO VASCULAR ACCESS PORT: ICD-10-CM

## 2022-04-14 DIAGNOSIS — C34.01 LUNG CANCER, MAIN BRONCHUS, RIGHT (HCC): ICD-10-CM

## 2022-04-14 DIAGNOSIS — E11.9 TYPE 2 DIABETES MELLITUS WITHOUT COMPLICATION, WITHOUT LONG-TERM CURRENT USE OF INSULIN (HCC): ICD-10-CM

## 2022-04-14 DIAGNOSIS — C34.01 LUNG CANCER, MAIN BRONCHUS, RIGHT (HCC): Primary | ICD-10-CM

## 2022-04-14 LAB
ABSOLUTE EOS #: <0.03 K/UL (ref 0–0.44)
ABSOLUTE IMMATURE GRANULOCYTE: 0.32 K/UL (ref 0–0.3)
ABSOLUTE LYMPH #: 0.94 K/UL (ref 1.1–3.7)
ABSOLUTE MONO #: 1.24 K/UL (ref 0.1–1.2)
ALBUMIN SERPL-MCNC: 4.1 G/DL (ref 3.5–5.2)
ALBUMIN/GLOBULIN RATIO: 1.3 (ref 1–2.5)
ALP BLD-CCNC: 120 U/L (ref 40–129)
ALT SERPL-CCNC: 15 U/L (ref 5–41)
ANION GAP SERPL CALCULATED.3IONS-SCNC: 14 MMOL/L (ref 9–17)
AST SERPL-CCNC: 13 U/L
BASOPHILS # BLD: 0 % (ref 0–2)
BASOPHILS ABSOLUTE: 0.05 K/UL (ref 0–0.2)
BILIRUB SERPL-MCNC: 0.33 MG/DL (ref 0.3–1.2)
BUN BLDV-MCNC: 22 MG/DL (ref 8–23)
BUN/CREAT BLD: 21 (ref 9–20)
CALCIUM SERPL-MCNC: 9.1 MG/DL (ref 8.6–10.4)
CHLORIDE BLD-SCNC: 98 MMOL/L (ref 98–107)
CO2: 20 MMOL/L (ref 20–31)
CREAT SERPL-MCNC: 1.06 MG/DL (ref 0.7–1.2)
EOSINOPHILS RELATIVE PERCENT: 0 % (ref 1–4)
GFR AFRICAN AMERICAN: >60 ML/MIN
GFR NON-AFRICAN AMERICAN: >60 ML/MIN
GFR SERPL CREATININE-BSD FRML MDRD: ABNORMAL ML/MIN/{1.73_M2}
GFR SERPL CREATININE-BSD FRML MDRD: ABNORMAL ML/MIN/{1.73_M2}
GLUCOSE BLD-MCNC: 379 MG/DL (ref 70–99)
HCT VFR BLD CALC: 28.9 % (ref 40.7–50.3)
HEMOGLOBIN: 9.3 G/DL (ref 13–17)
IMMATURE GRANULOCYTES: 2 %
LYMPHOCYTES # BLD: 6 % (ref 24–43)
MCH RBC QN AUTO: 28.2 PG (ref 25.2–33.5)
MCHC RBC AUTO-ENTMCNC: 32.2 G/DL (ref 28.4–34.8)
MCV RBC AUTO: 87.6 FL (ref 82.6–102.9)
MONOCYTES # BLD: 8 % (ref 3–12)
NRBC AUTOMATED: 0 PER 100 WBC
PDW BLD-RTO: 17.2 % (ref 11.8–14.4)
PLATELET # BLD: 197 K/UL (ref 138–453)
PMV BLD AUTO: 10.4 FL (ref 8.1–13.5)
POTASSIUM SERPL-SCNC: 4.4 MMOL/L (ref 3.7–5.3)
RBC # BLD: 3.3 M/UL (ref 4.21–5.77)
SEG NEUTROPHILS: 83 % (ref 36–65)
SEGMENTED NEUTROPHILS ABSOLUTE COUNT: 12.16 K/UL (ref 1.5–8.1)
SODIUM BLD-SCNC: 132 MMOL/L (ref 135–144)
SURGICAL PATHOLOGY REPORT: NORMAL
TOTAL PROTEIN: 7.3 G/DL (ref 6.4–8.3)
WBC # BLD: 14.7 K/UL (ref 3.5–11.3)

## 2022-04-14 PROCEDURE — G8420 CALC BMI NORM PARAMETERS: HCPCS | Performed by: INTERNAL MEDICINE

## 2022-04-14 PROCEDURE — 3046F HEMOGLOBIN A1C LEVEL >9.0%: CPT | Performed by: INTERNAL MEDICINE

## 2022-04-14 PROCEDURE — 4040F PNEUMOC VAC/ADMIN/RCVD: CPT | Performed by: INTERNAL MEDICINE

## 2022-04-14 PROCEDURE — 2580000003 HC RX 258: Performed by: INTERNAL MEDICINE

## 2022-04-14 PROCEDURE — 1123F ACP DISCUSS/DSCN MKR DOCD: CPT | Performed by: INTERNAL MEDICINE

## 2022-04-14 PROCEDURE — 96367 TX/PROPH/DG ADDL SEQ IV INF: CPT

## 2022-04-14 PROCEDURE — 2022F DILAT RTA XM EVC RTNOPTHY: CPT | Performed by: INTERNAL MEDICINE

## 2022-04-14 PROCEDURE — 96375 TX/PRO/DX INJ NEW DRUG ADDON: CPT

## 2022-04-14 PROCEDURE — 3017F COLORECTAL CA SCREEN DOC REV: CPT | Performed by: INTERNAL MEDICINE

## 2022-04-14 PROCEDURE — 6360000002 HC RX W HCPCS: Performed by: INTERNAL MEDICINE

## 2022-04-14 PROCEDURE — 96411 CHEMO IV PUSH ADDL DRUG: CPT

## 2022-04-14 PROCEDURE — 80053 COMPREHEN METABOLIC PANEL: CPT

## 2022-04-14 PROCEDURE — 85025 COMPLETE CBC W/AUTO DIFF WBC: CPT

## 2022-04-14 PROCEDURE — 1036F TOBACCO NON-USER: CPT | Performed by: INTERNAL MEDICINE

## 2022-04-14 PROCEDURE — 99214 OFFICE O/P EST MOD 30 MIN: CPT | Performed by: INTERNAL MEDICINE

## 2022-04-14 PROCEDURE — 36591 DRAW BLOOD OFF VENOUS DEVICE: CPT

## 2022-04-14 PROCEDURE — G8427 DOCREV CUR MEDS BY ELIG CLIN: HCPCS | Performed by: INTERNAL MEDICINE

## 2022-04-14 PROCEDURE — 96413 CHEMO IV INFUSION 1 HR: CPT

## 2022-04-14 RX ORDER — DEXAMETHASONE SODIUM PHOSPHATE 10 MG/ML
10 INJECTION, SOLUTION INTRAMUSCULAR; INTRAVENOUS ONCE
Status: CANCELLED | OUTPATIENT
Start: 2022-04-14 | End: 2022-04-14

## 2022-04-14 RX ORDER — DEXAMETHASONE SODIUM PHOSPHATE 10 MG/ML
10 INJECTION, SOLUTION INTRAMUSCULAR; INTRAVENOUS ONCE
Status: COMPLETED | OUTPATIENT
Start: 2022-04-14 | End: 2022-04-14

## 2022-04-14 RX ORDER — SODIUM CHLORIDE 9 MG/ML
INJECTION, SOLUTION INTRAVENOUS CONTINUOUS
Status: CANCELLED | OUTPATIENT
Start: 2022-04-14

## 2022-04-14 RX ORDER — MEPERIDINE HYDROCHLORIDE 25 MG/ML
12.5 INJECTION INTRAMUSCULAR; INTRAVENOUS; SUBCUTANEOUS PRN
Status: CANCELLED | OUTPATIENT
Start: 2022-04-14

## 2022-04-14 RX ORDER — PALONOSETRON 0.05 MG/ML
0.25 INJECTION, SOLUTION INTRAVENOUS ONCE
Status: CANCELLED | OUTPATIENT
Start: 2022-04-14 | End: 2022-04-14

## 2022-04-14 RX ORDER — SODIUM CHLORIDE 9 MG/ML
25 INJECTION, SOLUTION INTRAVENOUS PRN
Status: CANCELLED | OUTPATIENT
Start: 2022-04-14

## 2022-04-14 RX ORDER — SODIUM CHLORIDE 0.9 % (FLUSH) 0.9 %
5-40 SYRINGE (ML) INJECTION PRN
Status: DISCONTINUED | OUTPATIENT
Start: 2022-04-14 | End: 2022-04-15 | Stop reason: HOSPADM

## 2022-04-14 RX ORDER — HEPARIN SODIUM (PORCINE) LOCK FLUSH IV SOLN 100 UNIT/ML 100 UNIT/ML
500 SOLUTION INTRAVENOUS PRN
Status: DISCONTINUED | OUTPATIENT
Start: 2022-04-14 | End: 2022-04-15 | Stop reason: HOSPADM

## 2022-04-14 RX ORDER — PALONOSETRON 0.05 MG/ML
0.25 INJECTION, SOLUTION INTRAVENOUS ONCE
Status: COMPLETED | OUTPATIENT
Start: 2022-04-14 | End: 2022-04-14

## 2022-04-14 RX ORDER — SODIUM CHLORIDE 9 MG/ML
20 INJECTION, SOLUTION INTRAVENOUS ONCE
Status: COMPLETED | OUTPATIENT
Start: 2022-04-14 | End: 2022-04-14

## 2022-04-14 RX ORDER — ALBUTEROL SULFATE 90 UG/1
4 AEROSOL, METERED RESPIRATORY (INHALATION) PRN
Status: CANCELLED | OUTPATIENT
Start: 2022-04-14

## 2022-04-14 RX ORDER — ACETAMINOPHEN 325 MG/1
650 TABLET ORAL
Status: CANCELLED | OUTPATIENT
Start: 2022-04-14

## 2022-04-14 RX ORDER — SODIUM CHLORIDE 9 MG/ML
5-40 INJECTION INTRAVENOUS PRN
Status: CANCELLED | OUTPATIENT
Start: 2022-04-14

## 2022-04-14 RX ORDER — SODIUM CHLORIDE 0.9 % (FLUSH) 0.9 %
5-40 SYRINGE (ML) INJECTION PRN
Status: CANCELLED | OUTPATIENT
Start: 2022-04-14

## 2022-04-14 RX ORDER — CYANOCOBALAMIN 1000 UG/ML
1000 INJECTION INTRAMUSCULAR; SUBCUTANEOUS ONCE
Status: CANCELLED | OUTPATIENT
Start: 2022-04-14 | End: 2022-04-14

## 2022-04-14 RX ORDER — ONDANSETRON 2 MG/ML
8 INJECTION INTRAMUSCULAR; INTRAVENOUS
Status: CANCELLED | OUTPATIENT
Start: 2022-04-14

## 2022-04-14 RX ORDER — SODIUM CHLORIDE 9 MG/ML
20 INJECTION, SOLUTION INTRAVENOUS ONCE
Status: CANCELLED | OUTPATIENT
Start: 2022-04-14 | End: 2022-04-14

## 2022-04-14 RX ORDER — DIPHENHYDRAMINE HYDROCHLORIDE 50 MG/ML
50 INJECTION INTRAMUSCULAR; INTRAVENOUS
Status: CANCELLED | OUTPATIENT
Start: 2022-04-14

## 2022-04-14 RX ORDER — HEPARIN SODIUM (PORCINE) LOCK FLUSH IV SOLN 100 UNIT/ML 100 UNIT/ML
500 SOLUTION INTRAVENOUS PRN
Status: CANCELLED | OUTPATIENT
Start: 2022-04-14

## 2022-04-14 RX ORDER — EPINEPHRINE 1 MG/ML
0.3 INJECTION, SOLUTION, CONCENTRATE INTRAVENOUS PRN
Status: CANCELLED | OUTPATIENT
Start: 2022-04-14

## 2022-04-14 RX ADMIN — FOSAPREPITANT 150 MG: 150 INJECTION, POWDER, LYOPHILIZED, FOR SOLUTION INTRAVENOUS at 09:03

## 2022-04-14 RX ADMIN — DEXAMETHASONE SODIUM PHOSPHATE 10 MG: 10 INJECTION, SOLUTION INTRAMUSCULAR; INTRAVENOUS at 08:53

## 2022-04-14 RX ADMIN — SODIUM CHLORIDE, PRESERVATIVE FREE 10 ML: 5 INJECTION INTRAVENOUS at 10:32

## 2022-04-14 RX ADMIN — SODIUM CHLORIDE, PRESERVATIVE FREE 10 ML: 5 INJECTION INTRAVENOUS at 10:31

## 2022-04-14 RX ADMIN — SODIUM CHLORIDE 200 ML/HR: 9 INJECTION, SOLUTION INTRAVENOUS at 08:49

## 2022-04-14 RX ADMIN — HEPARIN 500 UNITS: 100 SYRINGE at 10:32

## 2022-04-14 RX ADMIN — CARBOPLATIN 521.4 MG: 10 INJECTION, SOLUTION INTRAVENOUS at 09:51

## 2022-04-14 RX ADMIN — SODIUM CHLORIDE 900 MG: 9 INJECTION, SOLUTION INTRAVENOUS at 09:33

## 2022-04-14 RX ADMIN — PALONOSETRON 0.25 MG: 0.05 INJECTION, SOLUTION INTRAVENOUS at 08:55

## 2022-04-14 NOTE — TELEPHONE ENCOUNTER
Name: Arleen Velasco  : 1950  MRN: [id number]    Oncology Navigation Follow-Up Note    Contact Type:  Medical Oncology  Notes: ONN follow up in treatment area. Patient was accompanied by wife. Renae Dunham states that he feels much better since having the fluid removed yesterday. Renae Dunham notes that he has good support from his children with transportation but notes he is still able to drive. Patient denies other needs from Navigation. Will continue to follow.         Electronically signed by Rancho Albrecht RN on 2022 at 9:57 AM

## 2022-04-14 NOTE — PROGRESS NOTES
Patient ID: Drea Stanley, [id number], [id number], 70 y.o. Referred by :  No ref. 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 male history of tobacco smoking and has been following with pulmonary for right middle lobe lung nodule where CT chest did show increase in the size in the scan on November 2, 2021 patient had biopsy on July 28, 2021 which showed benign parenchyma with chronic inflammation however the lesion was active on the PET there was no evidence of metastasis patient admitted to [facility] on January 4, 2022 for VATS and wedge resection which ended up with lobectomy, final pathology compatible with adenocarcinoma 1.8 cm and carcinoma invade into but not through the visceral pleura lymph nodes were negative patient status post right middle lobe lobectomy and margin were free of involvement by carcinoma   Patient developed right apical pneumothorax chest tube placed in the hospital and was discharged after chest tube removed and patient was started on adjuvant chemotherapy on February 24, 2022  Patient developed pleural effusion on the right side and underwent thoracentesis after 2 cycle of chemotherapy    Interval history  Status post 2 cycle of chemotherapy tolerated chemo well  No neuropathy  Chest pain improved significantly after thoracentesis of 750 mL,    Past Medical History:   Diagnosis Date    Abnormal cardiac cath 09/20/2013    LMCA; Mild irregularities 10-20%.   LAD: Abnormal.  Mild to moderate irregularities throughout the LAD and branches. LCx:  abnormal.  80-90% stenosis in a moderate sized OM1. RCA: Abnormal.  40-50% mid RCA stenosis.  Actinic keratosis     Arthritis     neck, fingers    CAD (coronary artery disease)     Calculus of kidney     Cancer ([address]     Cervical stenosis of spine     Diabetes mellitus (HCC)     Dr. Chanelle Joaquin, CNP, [place]    Full dentures     GERD (gastroesophageal reflux disease)     H/O echocardiogram 04/29/2016    Stress echo. Normal resting LV systolic function,normal systolic restponse to exercise. No evidence of reversible  ischemia on this maximal,symptom limited,treadmill exerxiess stress echocardiography study    History of cardiac cath 02/09/2017    LMCA: Lesion on LMCA: Distal subsection . 20% stenosis. LAD: Lesion on 1st Diag: Mid subsection . 75% stenosis. RCA: Lesion on Mid RCA: Mid subsection . 100% stenosis. Comments: The distal RCA fills by left to right collateralization. Dominance: Mixed. LV function assessed as: Normal. EF 65%.  History of echocardiogram 06/11/2018    EF 60%. Mildly increased LV wall thickness. Evidence of mild diastolic dysfunction seen.  History of echocardiogram 01/18/2019    EF of 65%. LV wall thickness is mildly increased. Aortic valve leaflets are mildly thickened.  History of heart attack     History of tobacco abuse     Quit 10/2021, 1/4 ppd for 47 years    Hyperlipidemia     Hypertension     Dr. Chanelle Joaquin, CNP    Pulmonary nodule 12/2021    Right middle lobe    Raynaud's phenomenon     S/P angioplasty with stent 09/20/2013    PTCA-NEDRA of  the OM 1    Wears hearing aid in both ears        Past Surgical History:   Procedure Laterality Date   [address]  2007    patient states had 2 small blockages    CARDIAC CATHETERIZATION  02/09/2017    LMCA: Lesion on LMCA: Distal subsection 20% stenosis. LAD: Lesion on 1st Diag: Mid subsection 75% stenosis. RCA: Lesion on Mid RCA: Mid subsection 100% stenosis.       CARDIAC CATHETERIZATION 02/09/2017    Attempted PCI to chronic total occlusion of RCA was not successful. Unable to cross with multiple wires due to heavy calcification and toruosity.     CARDIAC CATHETERIZATION  01/14/2019    CATARACT REMOVAL  04/06/2015    Right eye - Dr. Arzola Emily  06/08/2015    Dr. Chiquita Loredo - left eye    COLONOSCOPY  01/11/2006    Dr. Eric Michele - internal hemorrhoids, no polps    COLONOSCOPY  05/23/2016    -polyp    CT NEEDLE BIOPSY LUNG PERCUTANEOUS  07/28/2021    CT NEEDLE BIOPSY LUNG PERCUTANEOUS 7/28/2021 [facility] CT SCAN    HEMORRHOID SURGERY      HERNIA REPAIR Left     inguinal    IR CHEST TUBE INSERTION  01/11/2022    IR CHEST TUBE INSERTION 1/11/2022 Haroon Tom MD [facility] SPECIAL PROCEDURES    IR PORT PLACEMENT EQUAL OR GREATER THAN 5 YEARS  2/9/2022    IR PORT PLACEMENT EQUAL OR GREATER THAN 5 YEARS 2/9/2022 [facility] SPECIAL PROCEDURES    LUNG BIOPSY Right 07/28/2021    Dr Mark Fernándezbert, TOTAL Right 01/04/2022    MIDDLE WEDGE RESECTION, POSSIBLE MIDDLE LOBECTOMY VIA THORACOTOMY performed by Shay Menendez MD at [facility]    c4-5 laminectomy and fusion    OTHER SURGICAL HISTORY  10/2014    Right foot - 7 from heal and 1 from great toe at [address]  12/11/2014    pain injection - cervical    TUNNELED CENTRAL VENOUS CATHETER W/ SUBCUTANEOUS PORT Left 02/09/2022    Dr Hoenninger/[facility]    UPPER GASTROINTESTINAL ENDOSCOPY  12/28/2005    Dr. Eric Michele - mild esophagitis and gastritis    UPPER GASTROINTESTINAL ENDOSCOPY N/A 02/05/2019    -bx(neg H-Pylori)retained gastric content, possible submucosal mass of gastric cardia    UPPER GASTROINTESTINAL ENDOSCOPY N/A 10/22/2019    EGD BIOPSY performed by Kavitha Alvarez MD at [address] [place] [address]   Allergen Reactions    Niacin And Related      Turns red, no trouble breathing    Minocycline Hcl Rash    Other Nausea And Vomiting     Patient states on all pain medications he gets nausea and vomiting. Doctor ordered a medicine (nausea) to take before pain medication    Oxycodone Hcl      vomits       Current Outpatient Medications   Medication Sig Dispense Refill    amLODIPine (NORVASC) 5 MG tablet Take 1 tablet by mouth daily 90 tablet 3    magnesium 30 MG tablet Take 400 mg by mouth daily       clopidogrel (PLAVIX) 75 MG tablet Take 1 tablet by mouth daily 90 tablet 3    zinc 50 MG CAPS Take by mouth      promethazine (PHENERGAN) 25 MG tablet Take 25 mg by mouth every 4 hours as needed for Nausea      oxyCODONE 5 MG capsule Take 5 mg by mouth every 4 hours as needed for Pain.  pantoprazole (PROTONIX) 40 MG tablet Take 40 mg by mouth in the morning and at bedtime       dexamethasone (DECADRON) 4 MG tablet Take 1 tablet by mouth See Admin Instructions Take 1 tab twice daily for 3 days starting the day before scheduled PEMEtrexed. 30 tablet 0    ondansetron (ZOFRAN ODT) 8 MG TBDP disintegrating tablet Take 1 tablet by mouth every 8 hours as needed for Nausea 30 tablet 2    lidocaine-prilocaine (EMLA) 2.5-2.5 % cream Apply topically to port site 30- 60 minutes before access as needed. 30 g 2    SERTRALINE HCL PO Take 50 mg by mouth nightly      tamsulosin (FLOMAX) 0.4 MG capsule Take 0.4 mg by mouth daily      tiZANidine (ZANAFLEX) 4 MG capsule Take 4 mg by mouth 3 times daily as needed      ferrous sulfate (IRON 325) 325 (65 Fe) MG tablet Take 325 mg by mouth daily       isosorbide mononitrate (IMDUR) 30 MG extended release tablet 1/2 tablet in the morning      lisinopril (PRINIVIL;ZESTRIL) 20 MG tablet Take 20 mg by mouth daily       nitroGLYCERIN (NITRODUR) 0.4 MG/HR 1 dose under tongue as needed for chest pain.  max of 3 in one day      atenolol (TENORMIN) 25 MG tablet Take 1 tablet by mouth daily 90 tablet 3    Insulin Glargine, 2 Unit Dial, 300 UNIT/ML SOPN Insulin Glargine Insulin Glargine U-300 Conc Active 10 UNIT Subcutaneous Every morning May 7th, 2020 11:47am 05-  [facility] ([id number])      atorvastatin (LIPITOR) 20 MG tablet Take 1 tablet by mouth daily 90 tablet 3    sitaGLIPtan-metformin (JANUMET)  MG per tablet Take 1 tablet by mouth 2 times daily (with meals)      glimepiride (AMARYL) 2 MG tablet Take 1 tablet by mouth 2 times daily 180 tablet 3    aspirin 81 MG tablet Take 81 mg by mouth daily Ordered by heart doctor, Chan Robledo      folic acid (FOLVITE) 1 MG tablet Take 1 tablet by mouth daily for 30 doses Start 7 days before first scheduled dose and continue for 21 days after last dose of PEMEtrexed. 30 tablet 2    gabapentin (NEURONTIN) 100 MG capsule Take 1 capsule by mouth 3 times daily for 14 days. 42 capsule 0     No current facility-administered medications for this visit.      Facility-Administered Medications Ordered in Other Visits   Medication Dose Route Frequency Provider Last Rate Last Admin    sodium chloride flush 0.9 % injection 5-40 mL  5-40 mL IntraVENous PRN Alana Zhang MD        heparin flush 100 UNIT/ML injection 500 Units  500 Units IntraCATHeter PRN Alana Zhang MD        0.9 % sodium chloride infusion  20 mL/hr IntraVENous Once Alana Zhang MD        fosaprepitant (EMEND) 150 mg in sodium chloride 0.9 % 150 mL IVPB  150 mg IntraVENous Once Alana Zhang MD        palonosetron (ALOXI) injection 0.25 mg  0.25 mg IntraVENous Once Alana Zhang MD        dexamethasone (PF) (DECADRON) injection 10 mg  10 mg IntraVENous Once Alana Zhang MD        PEMEtrexed (ALIMTA) 900 mg in sodium chloride 0.9 % 100 mL chemo infusion  900 mg IntraVENous Once Alana Zhang MD        CARBOplatin (PARAPLATIN) 521.4 mg in sodium chloride 0.9 % 250 mL chemo IVPB  521.4 mg IntraVENous Once Alana Zhang MD           Social History     Socioeconomic History    Marital status:      Spouse name: Not on file    Number of children: Not on file    Years of education: Not on file    Highest education level: Not on file   Occupational History    Not on file   Tobacco Use    Smoking status: Former Smoker     Packs/day: 0.25     Years: 54.00     Pack years: 13.50     Types: Cigarettes     Quit date: 10/1/2021     Years since quittin.5    Smokeless tobacco: Never Used   Vaping Use    Vaping Use: Never used   Substance and Sexual Activity    Alcohol use: No    Drug use: No    Sexual activity: Not on file   Other Topics Concern    Not on file   Social History Narrative    Not on file     Social Determinants of Health     Financial Resource Strain:     Difficulty of Paying Living Expenses: Not on file   Food Insecurity:     Worried About [address] in the Last Year: Not on file    Luis E of Food in the Last Year: Not on file   Transportation Needs:     Lack of Transportation (Medical): Not on file    Lack of Transportation (Non-Medical):  Not on file   Physical Activity:     Days of Exercise per Week: Not on file    Minutes of Exercise per Session: Not on file   Stress:     Feeling of Stress : Not on file   Social Connections:     Frequency of Communication with Friends and Family: Not on file    Frequency of Social Gatherings with Friends and Family: Not on file    Attends [employer] Services: Not on file    Active Member of [address] [place] [address] or Organizations: Not on file    Attends Club or Organization Meetings: Not on file    Marital Status: Not on file   Intimate Partner Violence:     Fear of Current or Ex-Partner: Not on file    Emotionally Abused: Not on file    Physically Abused: Not on file    Sexually Abused: Not on file   Housing Stability:     Unable to Pay for Housing in the Last Year: Not on file    Number of [place] in the Last Year: Not on file    Unstable Housing in the Last Year: Not on file       Family History   Problem Relation Age of Onset    Heart Disease Mother     Diabetes Mother  High Blood Pressure Mother     Diabetes Father     Alzheimer's Disease Father     Heart Disease Sister         CABG    Heart Disease Brother     Heart Disease Brother     Heart Disease Brother     Cancer Brother         REVIEW OF SYSTEM:     Constitutional: No fever or chills. No night sweats, no weight loss   Eyes: No eye discharge, double vision, or eye pain   HEENT: negative for sore mouth, sore throat, hoarseness and voice change   Respiratory: negative for cough , sputum, dyspnea, wheezing, hemoptysis, chest pain   Cardiovascular: negative for chest pain, dyspnea, palpitations, orthopnea, PND   Gastrointestinal: negative for nausea, vomiting, diarrhea, constipation, abdominal pain, Dysphagia, hematemesis and hematochezia   Genitourinary: negative for frequency, dysuria, nocturia, urinary incontinence, and hematuria   Integument: negative for rash, skin lesions, bruises.    Hematologic/Lymphatic: negative for easy bruising, bleeding, lymphadenopathy, petechiae and swelling/edema   Endocrine: negative for heat or cold intolerance, tremor, weight changes, change in bowel habits and hair loss   Musculoskeletal: negative for myalgias, arthralgias, pain, joint swelling,and bone pain   Neurological: negative for headaches, dizziness, seizures, weakness, numbness       OBJECTIVE:         Vitals:    04/14/22 0824   BP: 129/70   Pulse: 99   Resp: 18   Temp: 97.6 °F (36.4 °C)       PHYSICAL EXAM:   General appearance - well appearing, no in pain or distress   Mental status - alert and cooperative   Eyes - pupils equal and reactive, extraocular eye movements intact   Ears - bilateral TM's and external ear canals normal   Mouth - mucous membranes moist, pharynx normal without lesions   Neck - supple, no significant adenopathy   Lymphatics - no palpable lymphadenopathy, no hepatosplenomegaly   Chest - clear to auscultation, no wheezes, rales or rhonchi, symmetric air entry, diminished sounds on the right side  Heart - normal rate, regular rhythm, normal S1, S2, no murmurs, rubs, clicks or gallops   Abdomen - soft, nontender, nondistended, no masses or organomegaly   Neurological - alert, oriented, normal speech, no focal findings or movement disorder noted   Musculoskeletal - no joint tenderness, deformity or swelling   Extremities - peripheral pulses normal, no pedal edema, no clubbing or cyanosis   Skin - normal coloration and turgor, no rashes, no suspicious skin lesions noted ,      LABORATORY DATA:     Lab Results   Component Value Date    WBC 14.7 (H) 04/14/2022    HGB 9.3 (L) 04/14/2022    HCT 28.9 (L) 04/14/2022    MCV 87.6 04/14/2022     04/14/2022    LYMPHOPCT 6 (L) 04/14/2022    RBC 3.30 (L) 04/14/2022    MCH 28.2 04/14/2022    MCHC 32.2 04/14/2022    RDW 17.2 (H) 04/14/2022    NEUTOPHILPCT 50.9 02/02/2017    MONOPCT 8 04/14/2022    EOSPCT 3.1 02/02/2017    BASOPCT 0 04/14/2022    NEUTROABS 12.16 (H) 04/14/2022    LYMPHSABS 0.94 (L) 04/14/2022    MONOSABS 1.24 (H) 04/14/2022    EOSABS <0.03 04/14/2022    BASOSABS 0.05 04/14/2022         Chemistry        Component Value Date/Time     (L) 04/14/2022 0805    K 4.4 04/14/2022 0805    CL 98 04/14/2022 0805    CO2 20 04/14/2022 0805    BUN 22 04/14/2022 0805    CREATININE 1.06 04/14/2022 0805        Component Value Date/Time    CALCIUM 9.1 04/14/2022 0805    ALKPHOS 120 04/14/2022 0805    AST 13 04/14/2022 0805    ALT 15 04/14/2022 0805    BILITOT 0.33 04/14/2022 0805            PATHOLOGY DATA:     Surgical Pathology Report -- Diagnosis --     1.  Right lung, middle lobe wedge biopsy:     -  Adenocarcinoma. -  Tumor is 1.8 cm. -  Carcinoma invades into but not through the visceral pleura. 2.  Lymph node #9, biopsy:     -  Negative for metastatic carcinoma. 3.  Lymph node #7, biopsy:     -  Negative for metastatic carcinoma. 4.  Right middle lobe lung, lobectomy:     -  Margins are free of involvement by carcinoma.      -  Peribronchial lymph node, negative for metastatic carcinoma. -  Nonneoplastic lung shows emphysematous change.       Gabi Mari M.D.   **Electronically Signed Out**         rdd/1/6/2022         Clinical Information   Pre-op Diagnosis:  RIGHT MIDDLE LOBE NODULE   Operative Findings:  MIDDLE LOBE OF RIGHT LUNG; LYMPH NODE #9 OF LUNG;   LYMPH NODE #7; RIGHT MIDDLE LOBE   Operation Performed:  MIDDLE WEDGE RESECTION, POSSIBLE MIDDLE   LOBECTOMY VIA THORACOTOMY     Source of Specimen   1: MIDDLE LOBE OF RIGHT LUNG (A)   2: LYMPH NODE #9 OF LUNG (B)   3: LYMPH NODE #7   4: RIGHT MIDDLE LOBE     Gross Description   1.  \"ANA DAVIS, MIDDLE LOBE OF RIGHT LUNG\" Received fresh is a   6.4 x 4.2 x 2.8 cm lung wedge with staple line.  The pleura is   pink-tan with an area of slightly thickening (inked black). Sectioning reveals a 1.8 x 1.2 x 0.8 cm subpleural tan-white mass   lesion that is 0.8 cm from the staple line margin.  The remaining   parenchyma is pink-tan with no other lesions.  1TP, 1DQ, 1FS, Cassette   summary:  \"A\" frozen section lesion, \"B\" remainder of lesion with   pleura inked black, \"C\" staple line margin en face. 2.  \"ANA SUSAN, LYMPH NODE #9 OF LUNG\" Received fresh are two   fragments, 0.3 cm each.  1TP, 1FS, 1cs.    3.  \"ANA SUSAN, LYMPH NODE #7\" Received fresh are two   anthracotic fragments, 0.6 and 0.7 cm in greatest dimension.  1TP,   1FS, 1cs.     4.  \"ANA SUSAN, RIGHT MIDDLE LOBE\" Received fresh is a 50 gram,   8.5 x 6.8 x 3.5 cm lobe of lung with multiple staple lines.  The   pleura is wrinkled purple-gray.  Sectioning reveals spongy dark red   parenchyma with no masses.  No markedly enlarged nodes are identified   at the hilum.  Cassette summary:  \"A\" bronchial margin frozen section   cassette resubmitted as received, \"B\" hilar soft tissue and vascular   margin, \"C\" uninvolved lung (along staple line).  tm     Intraoperative Diagnosis   1.  FSDX:  Non-small cell carcinoma, favor adenocarcinoma.  (14:00,   RDD)   2.  FSDX:  Fibrofatty tissue.  (14:11, RDD)   3.  FSDX:  Lymph node, negative for carcinoma.  (14:19, RDD)   4.  FSDX:  Bronchial margin, negative for carcinoma.  (16:17, RDD)     Microscopic Description   1-4.  Microscopic examination performed. PROCEDURE: Wedge biopsy, followed by lobectomy       SPECIMEN LATERALITY: Right   TUMOR FOCALITY: Single tumor       TUMOR SITE: Right middle lobe   TUMOR SIZE --     1.8 x 1.2 x 0.8 cm           HISTOLOGIC TYPE: Adenocarcinoma. Sections show a large and anaplastic non-small cell malignancy with   cohesion and a few instances of glandular differentiation.  Neoplastic   cells are large with copious eosinophilic cytoplasm and large   vesicular nuclei.  Tumor is characterized further utilizing control   appropriate immunostains.  Cells of carcinoma are negative for p40 and   they are positive for TTF-1 and CK7.  Morphology and immunophenotype   are that of an adenocarcinoma, lung primary. Note: The case is reviewed by a second Pathologist for quality   assurance with consensus (DS).       HISTOLOGIC GRADE: Grade 2 (of 4)           VISCERAL PLEURA INVASION:Reticulin stain with appropriately reactive   control is utilized to evaluate pleura.  Tumor extends beyond the   elastic layer into the pleura but not to the pleural surface (PL1)     DIRECT INVASION OF ADJACENT STRUCTURES: No     TREATMENT EFFECT: None apparent.      LYMPHOVASCULAR INVASION: Absent                         MARGINS --   -BRONCHIAL: Free of tumor       -VASCULAR: Free of tumor       -PARENCHYMAL: Free of tumor       -OTHER ATTACHED TISSUE MARGIN (IF APPLICABLE): N/A       REGIONAL LYMPH NODES: 3   LYMPH NODES(S) FROM PRIOR PROCEDURES: No       NUMBER AND STATION(S) EXAMINED:   See diagnoses   NUMBER AND STATION(S) WITH METASTASIS: 0         DISTANT METASTASIS--   DISTANT SITE(S) INVOLVED, IF APPLICABLE: N/A         PATHOLOGIC STAGE CLASSIFICATION:     pT2a  pN0   CAP Lung 4200 in conjunction with AJCC 8 ed.         SURGICAL PATHOLOGY CONSULTATION         Patient Name: Sean Block: [id number]   Path Number: [id number]     [address] [place] [address]. [place]wendolyn, [address]   [phone]   Fax: [phone]          IMAGING DATA:      [address] Which side should the procedure be performed? Right  Narrative: PROCEDURE:  ULTRASOUNDGUIDED RIGHT THORACENTESIS WITH TUBE PLACEMENT    4/13/2022    HISTORY:  ORDERING SYSTEM PROVIDED HISTORY: Pleural effusion on right  TECHNOLOGIST PROVIDED HISTORY:  Which side should the procedure be performed?->Right  pleural effusion    TECHNIQUE:  Informed consent was obtained after a detailed explanation of the procedure  including risks, benefits, and alternatives. Universal protocol was  performed. The right chest was prepped and draped in sterile fashion and  local anesthesia was achieved with lidocaine. An 8 [place] needle sheath was  advanced under ultrasound guidance into pleural effusion and thoracentesis  was performed. The tube was then removed. The patient tolerated the  procedure well. FINDINGS:  A total of 750 mL of fluid was removed. A sample was sent for analysis. Impression: 1. Successful ultrasound guided right thoracentesis. XR CHEST (SINGLE VIEW FRONTAL)  Narrative: EXAMINATION:  ONE XRAY VIEW OF THE CHEST    4/13/2022 10:36 am    COMPARISON:  04/05/2022. HISTORY:  ORDERING SYSTEM PROVIDED HISTORY: post thoracentesis  TECHNOLOGIST PROVIDED HISTORY:  post thoracentesis    FINDINGS:  The heart size and pulmonary vasculature stable. There are clips within the  right hilum. There has been interval right thoracentesis with decrease in  the pleural effusion. There is still some blunting of the right costophrenic  angle and scarring in the right lower lobe. The left lung is clear. No  pneumothoraces are seen. There is a left chest wall [employer]. Impression: 1. Status post right thoracentesis with decrease in the pleural effusion and  no pneumothorax. CT chest with IV contrast on 11/2/2021    The previously biopsied, somewhat bilobed nodule involving right middle lobe   is slightly increased in size since the prior study now measuring 1.8 x 1.0   cm, once measuring 1.8 x 0.6 cm.  There does appear to be some spiculation. No new pulmonary nodule is seen. PET/CT on June 30, 2021    1. Significant oval increase in size of known anterior superior middle lobe   nodule along the minor fissure.  This now measures 6 x 18 mm compared to 10 x   4 mm on the prior.  This remains a LUNG RADS 4B (very suspicious) nodule. Recommend repeat PET-CT and or tissue sampling given appearance and   significant interval increase in size. 2. No major change in the fat density 2.3 cm nodule in the gastric fundus   adjacent to the GE junction.  Note this was FDG avid on the prior PET-CT. 3. Mild emphysema. The findings were sent to the Radiology Results [address] at 12:06   pm on 6/10/2021to be communicated to a licensed caregiver.           ASSESSMENT / PLAN:    Viviane Boast was seen today for follow-up.     Diagnoses and all orders for this visit:    Pleural effusion on right    Type 2 diabetes mellitus without complication, without long-term current use of insulin (HCC)    Lung cancer, main bronchus, right ([facility] [address]           3 26-year-old man history of smoking diagnosed with pathology stage Ib(T2 aN0 M0) adenocarcinoma of the right middle lobe status post right VATS thoracotomy with right middle lobectomy done on January 4, 2022, due to high risk features manifested by involvement of visceral pleura recommended adjuvant chemotherapy which is Alimta and carboplatin, carboplatin substitute cisplatin due to frailed/nausea related to gastroparesis from diabetes in addition to mild neuropathy, started on chemotherapy on February 21, 2022 currently status post 2 cycle tolerated treatment but developed right-sided chest pain and pleural effusion and chemo held and underwent thoracentesis and will resume chemotherapy today we will proceed with cycle 3  2. Right-sided pleural effusion status post thoracentesis> fluid sent for cytology still pending  3. Nosebleed> ENT referral(after chemotherapy)  4. Intermittent nausea this is prior to surgery related to diabetic gastroparesis > antinausea medicine  5. Decreased appetite due to the nausea> encourage oral intake> improved  6. Continue vitamin supplements folic acid and W89  7. RTC in 5 weeks             I spent a total of 35  minutes on the date of the service which included preparing to see the patient, face-to-face patient care, completing clinical documentation, obtaining and/or reviewing separately obtained history, performing a medically appropriate examination, counseling and educating the patient/family/caregiver and ordering medications, tests, or procedures. [facility]                            This note is created with the assistance of a speech recognition program.  While intending to generate a document that actually reflects the content of the visit, the document can still have some errors including those of syntax and sound a like substitutions which may escape proof reading. It such instances, actual meaning can be extrapolated by contextual diversion.

## 2022-04-20 ENCOUNTER — HOSPITAL ENCOUNTER (OUTPATIENT)
Age: 72
Discharge: HOME | End: 2022-04-22
Payer: MEDICARE

## 2022-04-20 ENCOUNTER — TELEPHONE (OUTPATIENT)
Dept: ONCOLOGY | Age: 72
End: 2022-04-20

## 2022-04-20 ENCOUNTER — HOSPITAL ENCOUNTER (OUTPATIENT)
Dept: GENERAL RADIOLOGY | Age: 72
Discharge: HOME | End: 2022-04-22
Payer: MEDICARE

## 2022-04-20 DIAGNOSIS — R07.9 RIGHT-SIDED CHEST PAIN: ICD-10-CM

## 2022-04-20 DIAGNOSIS — C34.01 LUNG CANCER, MAIN BRONCHUS, RIGHT (HCC): ICD-10-CM

## 2022-04-20 DIAGNOSIS — J90 PLEURAL EFFUSION ON RIGHT: ICD-10-CM

## 2022-04-20 DIAGNOSIS — J90 PLEURAL EFFUSION ON RIGHT: Primary | ICD-10-CM

## 2022-04-20 PROCEDURE — 71046 X-RAY EXAM CHEST 2 VIEWS: CPT

## 2022-04-20 NOTE — TELEPHONE ENCOUNTER
Patient's wife calls to report patient has developed right sided chest pain similar to that which he had prior to having a thoracentesis. She is asking if he may have accumulated fluid again. Dr. Pretty Tolliver notified and order for CXR obtained. Informed wife of need for imaging. She voiced understanding.

## 2022-04-21 ENCOUNTER — TELEPHONE (OUTPATIENT)
Dept: CARDIOTHORACIC SURGERY | Age: 72
End: 2022-04-21

## 2022-04-21 NOTE — TELEPHONE ENCOUNTER
JUAQUIN Raymundo Custar calls from [employer] in regards to having patient come back and follow up with us in regards to recurrent pleural effusion. He has previously had a R. VATS for a Pulmonary Nodule. Patient did have a recent CXR on 4/20. He did have a thoracentesis last week and they are requesting another one to be done with IR in [place] soon.      [phone]

## 2022-04-21 NOTE — TELEPHONE ENCOUNTER
Informed wife that Dr. Armida Colon has ordered a thoracentesis. He also wants Noemy Craig to see his surgeon for intervention to address recurrent pleural effusion. Vania voiced understanding. Referral faxed to interventional radiology and call placed to the office of Dr. Naheed Meyers.

## 2022-04-22 NOTE — TELEPHONE ENCOUNTER
Ok, ill take a look. So patient may need another Tap? Is this recent CXR after the prior Tap or before?   Thanks Zeeshan Galaviz

## 2022-04-25 ENCOUNTER — HOSPITAL ENCOUNTER (OUTPATIENT)
Dept: ULTRASOUND IMAGING | Age: 72
Discharge: HOME | End: 2022-04-27
Payer: MEDICARE

## 2022-04-25 ENCOUNTER — HOSPITAL ENCOUNTER (OUTPATIENT)
Dept: GENERAL RADIOLOGY | Age: 72
Discharge: HOME | End: 2022-04-27
Payer: MEDICARE

## 2022-04-25 VITALS
HEART RATE: 71 BPM | RESPIRATION RATE: 12 BRPM | DIASTOLIC BLOOD PRESSURE: 40 MMHG | OXYGEN SATURATION: 100 % | SYSTOLIC BLOOD PRESSURE: 103 MMHG

## 2022-04-25 DIAGNOSIS — C34.01 LUNG CANCER, MAIN BRONCHUS, RIGHT (HCC): ICD-10-CM

## 2022-04-25 DIAGNOSIS — J90 PLEURAL EFFUSION ON RIGHT: ICD-10-CM

## 2022-04-25 DIAGNOSIS — R07.9 RIGHT-SIDED CHEST PAIN: ICD-10-CM

## 2022-04-25 PROCEDURE — 2500000003 HC RX 250 WO HCPCS: Performed by: RADIOLOGY

## 2022-04-25 PROCEDURE — 2709999900 US THORACENTESIS

## 2022-04-25 PROCEDURE — 71045 X-RAY EXAM CHEST 1 VIEW: CPT

## 2022-04-25 RX ORDER — LIDOCAINE HYDROCHLORIDE 10 MG/ML
INJECTION, SOLUTION EPIDURAL; INFILTRATION; INTRACAUDAL; PERINEURAL
Status: COMPLETED | OUTPATIENT
Start: 2022-04-25 | End: 2022-04-25

## 2022-04-25 RX ADMIN — LIDOCAINE HYDROCHLORIDE 5 ML: 10 INJECTION, SOLUTION EPIDURAL; INFILTRATION; INTRACAUDAL; PERINEURAL at 08:25

## 2022-04-25 NOTE — TELEPHONE ENCOUNTER
Pt has a thoracentesis scheduled for today. The CXR from 4/20 was after his last tap and it looks like it got worse from the one prior to the tap.

## 2022-04-29 NOTE — TELEPHONE ENCOUNTER
Based on CXR there is small right effusion. Its likely not worth getting thoracentesis based on CXR  If the effusion is concern, Id rec. Ct chest to eval effusion.  Based on CXR recent thoracentesis is a success

## 2022-05-02 ENCOUNTER — HOSPITAL ENCOUNTER (OUTPATIENT)
Age: 72
Discharge: HOME | End: 2022-05-04
Payer: MEDICARE

## 2022-05-02 ENCOUNTER — HOSPITAL ENCOUNTER (OUTPATIENT)
Dept: GENERAL RADIOLOGY | Age: 72
Discharge: HOME | End: 2022-05-04
Payer: MEDICARE

## 2022-05-02 DIAGNOSIS — C34.01 MALIGNANT NEOPLASM OF RIGHT MAIN BRONCHUS (HCC): ICD-10-CM

## 2022-05-02 DIAGNOSIS — J90 RECURRENT RIGHT PLEURAL EFFUSION: ICD-10-CM

## 2022-05-02 DIAGNOSIS — R07.9 RIGHT-SIDED CHEST PAIN: ICD-10-CM

## 2022-05-02 PROCEDURE — 71046 X-RAY EXAM CHEST 2 VIEWS: CPT

## 2022-05-04 RX ORDER — PALONOSETRON 0.05 MG/ML
0.25 INJECTION, SOLUTION INTRAVENOUS ONCE
Status: CANCELLED | OUTPATIENT
Start: 2022-05-05 | End: 2022-05-05

## 2022-05-04 RX ORDER — ONDANSETRON 2 MG/ML
8 INJECTION INTRAMUSCULAR; INTRAVENOUS
Status: CANCELLED | OUTPATIENT
Start: 2022-05-05

## 2022-05-04 RX ORDER — EPINEPHRINE 1 MG/ML
0.3 INJECTION, SOLUTION, CONCENTRATE INTRAVENOUS PRN
Status: CANCELLED | OUTPATIENT
Start: 2022-05-05

## 2022-05-04 RX ORDER — SODIUM CHLORIDE 9 MG/ML
25 INJECTION, SOLUTION INTRAVENOUS PRN
Status: CANCELLED | OUTPATIENT
Start: 2022-05-05

## 2022-05-04 RX ORDER — HEPARIN SODIUM (PORCINE) LOCK FLUSH IV SOLN 100 UNIT/ML 100 UNIT/ML
500 SOLUTION INTRAVENOUS PRN
Status: CANCELLED | OUTPATIENT
Start: 2022-05-05

## 2022-05-04 RX ORDER — SODIUM CHLORIDE 9 MG/ML
INJECTION, SOLUTION INTRAVENOUS CONTINUOUS
Status: CANCELLED | OUTPATIENT
Start: 2022-05-05

## 2022-05-04 RX ORDER — ACETAMINOPHEN 325 MG/1
650 TABLET ORAL
Status: CANCELLED | OUTPATIENT
Start: 2022-05-05

## 2022-05-04 RX ORDER — SODIUM CHLORIDE 9 MG/ML
5-40 INJECTION INTRAVENOUS PRN
Status: CANCELLED | OUTPATIENT
Start: 2022-05-05

## 2022-05-04 RX ORDER — DIPHENHYDRAMINE HYDROCHLORIDE 50 MG/ML
50 INJECTION INTRAMUSCULAR; INTRAVENOUS
Status: CANCELLED | OUTPATIENT
Start: 2022-05-05

## 2022-05-04 RX ORDER — FAMOTIDINE 10 MG/ML
20 INJECTION, SOLUTION INTRAVENOUS
Status: CANCELLED | OUTPATIENT
Start: 2022-05-05

## 2022-05-04 RX ORDER — SODIUM CHLORIDE 9 MG/ML
20 INJECTION, SOLUTION INTRAVENOUS ONCE
Status: CANCELLED | OUTPATIENT
Start: 2022-05-05 | End: 2022-05-05

## 2022-05-04 RX ORDER — SODIUM CHLORIDE 0.9 % (FLUSH) 0.9 %
5-40 SYRINGE (ML) INJECTION PRN
Status: CANCELLED | OUTPATIENT
Start: 2022-05-05

## 2022-05-04 RX ORDER — ALBUTEROL SULFATE 90 UG/1
4 AEROSOL, METERED RESPIRATORY (INHALATION) PRN
Status: CANCELLED | OUTPATIENT
Start: 2022-05-05

## 2022-05-04 RX ORDER — MEPERIDINE HYDROCHLORIDE 50 MG/ML
12.5 INJECTION INTRAMUSCULAR; INTRAVENOUS; SUBCUTANEOUS PRN
Status: CANCELLED | OUTPATIENT
Start: 2022-05-05

## 2022-05-05 ENCOUNTER — HOSPITAL ENCOUNTER (OUTPATIENT)
Dept: INFUSION THERAPY | Age: 72
Discharge: HOME | End: 2022-05-05
Payer: MEDICARE

## 2022-05-05 VITALS
RESPIRATION RATE: 18 BRPM | TEMPERATURE: 97.9 F | BODY MASS INDEX: 20.18 KG/M2 | HEART RATE: 77 BPM | SYSTOLIC BLOOD PRESSURE: 93 MMHG | WEIGHT: 149 LBS | HEIGHT: 72 IN | DIASTOLIC BLOOD PRESSURE: 59 MMHG

## 2022-05-05 DIAGNOSIS — C34.01 LUNG CANCER, MAIN BRONCHUS, RIGHT (HCC): Primary | ICD-10-CM

## 2022-05-05 DIAGNOSIS — Z98.890 S/P THORACOTOMY: ICD-10-CM

## 2022-05-05 DIAGNOSIS — Z45.2 ENCOUNTER FOR CARE RELATED TO VASCULAR ACCESS PORT: ICD-10-CM

## 2022-05-05 LAB
ABSOLUTE EOS #: 0.11 K/UL (ref 0–0.44)
ABSOLUTE IMMATURE GRANULOCYTE: 0.06 K/UL (ref 0–0.3)
ABSOLUTE LYMPH #: 1.23 K/UL (ref 1.1–3.7)
ABSOLUTE MONO #: 0.96 K/UL (ref 0.1–1.2)
ALBUMIN SERPL-MCNC: 3.5 G/DL (ref 3.5–5.2)
ALBUMIN/GLOBULIN RATIO: 1.1 (ref 1–2.5)
ALP BLD-CCNC: 119 U/L (ref 40–129)
ALT SERPL-CCNC: 10 U/L (ref 5–41)
ANION GAP SERPL CALCULATED.3IONS-SCNC: 12 MMOL/L (ref 9–17)
AST SERPL-CCNC: 14 U/L
BASOPHILS # BLD: 1 % (ref 0–2)
BASOPHILS ABSOLUTE: 0.04 K/UL (ref 0–0.2)
BILIRUB SERPL-MCNC: 0.22 MG/DL (ref 0.3–1.2)
BUN BLDV-MCNC: 14 MG/DL (ref 8–23)
BUN/CREAT BLD: 15 (ref 9–20)
CALCIUM SERPL-MCNC: 8.9 MG/DL (ref 8.6–10.4)
CHLORIDE BLD-SCNC: 100 MMOL/L (ref 98–107)
CO2: 23 MMOL/L (ref 20–31)
CREAT SERPL-MCNC: 0.96 MG/DL (ref 0.7–1.2)
EOSINOPHILS RELATIVE PERCENT: 3 % (ref 1–4)
GFR AFRICAN AMERICAN: >60 ML/MIN
GFR NON-AFRICAN AMERICAN: >60 ML/MIN
GFR SERPL CREATININE-BSD FRML MDRD: ABNORMAL ML/MIN/{1.73_M2}
GFR SERPL CREATININE-BSD FRML MDRD: ABNORMAL ML/MIN/{1.73_M2}
GLUCOSE BLD-MCNC: 174 MG/DL (ref 70–99)
HCT VFR BLD CALC: 26.8 % (ref 40.7–50.3)
HEMOGLOBIN: 8.6 G/DL (ref 13–17)
IMMATURE GRANULOCYTES: 1 %
LYMPHOCYTES # BLD: 29 % (ref 24–43)
MCH RBC QN AUTO: 28.7 PG (ref 25.2–33.5)
MCHC RBC AUTO-ENTMCNC: 32.1 G/DL (ref 28.4–34.8)
MCV RBC AUTO: 89.3 FL (ref 82.6–102.9)
MONOCYTES # BLD: 23 % (ref 3–12)
NRBC AUTOMATED: 0 PER 100 WBC
PDW BLD-RTO: 18.4 % (ref 11.8–14.4)
PLATELET # BLD: 206 K/UL (ref 138–453)
PMV BLD AUTO: 9.3 FL (ref 8.1–13.5)
POTASSIUM SERPL-SCNC: 4 MMOL/L (ref 3.7–5.3)
RBC # BLD: 3 M/UL (ref 4.21–5.77)
SEG NEUTROPHILS: 43 % (ref 36–65)
SEGMENTED NEUTROPHILS ABSOLUTE COUNT: 1.84 K/UL (ref 1.5–8.1)
SODIUM BLD-SCNC: 135 MMOL/L (ref 135–144)
TOTAL PROTEIN: 6.7 G/DL (ref 6.4–8.3)
WBC # BLD: 4.2 K/UL (ref 3.5–11.3)

## 2022-05-05 PROCEDURE — 6360000002 HC RX W HCPCS: Performed by: INTERNAL MEDICINE

## 2022-05-05 PROCEDURE — 80053 COMPREHEN METABOLIC PANEL: CPT

## 2022-05-05 PROCEDURE — 96375 TX/PRO/DX INJ NEW DRUG ADDON: CPT

## 2022-05-05 PROCEDURE — 96367 TX/PROPH/DG ADDL SEQ IV INF: CPT

## 2022-05-05 PROCEDURE — 96413 CHEMO IV INFUSION 1 HR: CPT

## 2022-05-05 PROCEDURE — 96409 CHEMO IV PUSH SNGL DRUG: CPT

## 2022-05-05 PROCEDURE — 85025 COMPLETE CBC W/AUTO DIFF WBC: CPT

## 2022-05-05 PROCEDURE — 2580000003 HC RX 258: Performed by: INTERNAL MEDICINE

## 2022-05-05 PROCEDURE — 36591 DRAW BLOOD OFF VENOUS DEVICE: CPT

## 2022-05-05 PROCEDURE — 96411 CHEMO IV PUSH ADDL DRUG: CPT

## 2022-05-05 RX ORDER — HEPARIN SODIUM (PORCINE) LOCK FLUSH IV SOLN 100 UNIT/ML 100 UNIT/ML
500 SOLUTION INTRAVENOUS PRN
Status: DISCONTINUED | OUTPATIENT
Start: 2022-05-05 | End: 2022-05-06 | Stop reason: HOSPADM

## 2022-05-05 RX ORDER — SODIUM CHLORIDE 0.9 % (FLUSH) 0.9 %
5-40 SYRINGE (ML) INJECTION PRN
Status: DISCONTINUED | OUTPATIENT
Start: 2022-05-05 | End: 2022-05-06 | Stop reason: HOSPADM

## 2022-05-05 RX ORDER — SODIUM CHLORIDE 9 MG/ML
20 INJECTION, SOLUTION INTRAVENOUS ONCE
Status: COMPLETED | OUTPATIENT
Start: 2022-05-05 | End: 2022-05-05

## 2022-05-05 RX ORDER — PALONOSETRON 0.05 MG/ML
0.25 INJECTION, SOLUTION INTRAVENOUS ONCE
Status: COMPLETED | OUTPATIENT
Start: 2022-05-05 | End: 2022-05-05

## 2022-05-05 RX ORDER — DEXAMETHASONE SODIUM PHOSPHATE 10 MG/ML
10 INJECTION, SOLUTION INTRAMUSCULAR; INTRAVENOUS ONCE
Status: COMPLETED | OUTPATIENT
Start: 2022-05-05 | End: 2022-05-05

## 2022-05-05 RX ADMIN — SODIUM CHLORIDE, PRESERVATIVE FREE 10 ML: 5 INJECTION INTRAVENOUS at 11:49

## 2022-05-05 RX ADMIN — SODIUM CHLORIDE, PRESERVATIVE FREE 10 ML: 5 INJECTION INTRAVENOUS at 08:15

## 2022-05-05 RX ADMIN — SODIUM CHLORIDE, PRESERVATIVE FREE 10 ML: 5 INJECTION INTRAVENOUS at 08:16

## 2022-05-05 RX ADMIN — CARBOPLATIN 555 MG: 10 INJECTION, SOLUTION INTRAVENOUS at 11:07

## 2022-05-05 RX ADMIN — SODIUM CHLORIDE 20 ML/HR: 9 INJECTION, SOLUTION INTRAVENOUS at 09:47

## 2022-05-05 RX ADMIN — SODIUM CHLORIDE 900 MG: 9 INJECTION, SOLUTION INTRAVENOUS at 10:49

## 2022-05-05 RX ADMIN — FOSAPREPITANT 150 MG: 150 INJECTION, POWDER, LYOPHILIZED, FOR SOLUTION INTRAVENOUS at 09:57

## 2022-05-05 RX ADMIN — DEXAMETHASONE SODIUM PHOSPHATE 10 MG: 10 INJECTION, SOLUTION INTRAMUSCULAR; INTRAVENOUS at 09:49

## 2022-05-05 RX ADMIN — SODIUM CHLORIDE, PRESERVATIVE FREE 10 ML: 5 INJECTION INTRAVENOUS at 08:12

## 2022-05-05 RX ADMIN — SODIUM CHLORIDE, PRESERVATIVE FREE 10 ML: 5 INJECTION INTRAVENOUS at 08:13

## 2022-05-05 RX ADMIN — SODIUM CHLORIDE, PRESERVATIVE FREE 10 ML: 5 INJECTION INTRAVENOUS at 08:14

## 2022-05-05 RX ADMIN — Medication 500 UNITS: at 11:50

## 2022-05-05 RX ADMIN — SODIUM CHLORIDE, PRESERVATIVE FREE 10 ML: 5 INJECTION INTRAVENOUS at 11:48

## 2022-05-05 RX ADMIN — PALONOSETRON 0.25 MG: 0.25 INJECTION, SOLUTION INTRAVENOUS at 09:53

## 2022-05-05 ASSESSMENT — PAIN SCALES - GENERAL: PAINLEVEL_OUTOF10: 2

## 2022-05-05 ASSESSMENT — PAIN DESCRIPTION - LOCATION: LOCATION: ABDOMEN

## 2022-05-05 ASSESSMENT — PAIN DESCRIPTION - PAIN TYPE: TYPE: CHRONIC PAIN

## 2022-05-05 ASSESSMENT — PAIN DESCRIPTION - ORIENTATION: ORIENTATION: RIGHT;LOWER

## 2022-05-05 NOTE — PLAN OF CARE
Problem: Chronic Conditions and Co-morbidities  Goal: Patient's chronic conditions and co-morbidity symptoms are monitored and maintained or improved  Outcome: Adequate for Discharge     Problem: Pain  Goal: Verbalizes/displays adequate comfort level or baseline comfort level  Outcome: Adequate for Discharge     Problem: KNOWLEDGE DEFICIT  Goal: Patient/S.O. demonstrates understanding of disease process, treatment plan, medications, and discharge instructions.   Outcome: Adequate for Discharge     Problem: Infection - Central Venous Catheter-Associated Bloodstream Infection:  Goal: Will show no infection signs and symptoms  Description: Will show no infection signs and symptoms  Outcome: Adequate for Discharge

## 2022-05-05 NOTE — FLOWSHEET NOTE
Pt complaints of severe fatigue, no appetite, dizziness and pt not taking steroids last night discussed with Nandini Reeder RN. Leburn Opoka RN discusses pt complaints with Dr. Ginger Villatoro and he orders to proceed with treatment.

## 2022-05-12 DIAGNOSIS — R07.9 RIGHT-SIDED CHEST PAIN: Primary | ICD-10-CM

## 2022-05-12 DIAGNOSIS — J90 PLEURAL EFFUSION ON RIGHT: ICD-10-CM

## 2022-05-12 DIAGNOSIS — C34.01 LUNG CANCER, MAIN BRONCHUS, RIGHT (HCC): ICD-10-CM

## 2022-05-12 RX ORDER — OXYCODONE HYDROCHLORIDE 5 MG/1
5 CAPSULE ORAL EVERY 4 HOURS PRN
Qty: 42 CAPSULE | Refills: 0 | Status: SHIPPED | OUTPATIENT
Start: 2022-05-12 | End: 2022-05-19

## 2022-05-12 NOTE — TELEPHONE ENCOUNTER
Patient's wife calls to request refill of Oxycodone. It is helping some but he still has chest pain. She also states patient is not eating well because he vomits after eating. Advised to have patient eat small amounts frequently and take nausea medication prior. Stressed importance of hydration and nutrition. Patient has been having episodes of dizziness. His PCP had him stop taking Lisinopril due to low blood pressure. He is still on medications that lower BP. Requested wife to contact PCP for advise regarding those medications. Vania voiced understanding of instructions. She agrees to call back if other concerns arise.

## 2022-05-13 ENCOUNTER — HOSPITAL ENCOUNTER (OUTPATIENT)
Dept: GENERAL RADIOLOGY | Age: 72
Discharge: HOME | End: 2022-05-15
Payer: MEDICARE

## 2022-05-13 ENCOUNTER — HOSPITAL ENCOUNTER (OUTPATIENT)
Age: 72
Discharge: HOME | End: 2022-05-13
Payer: MEDICARE

## 2022-05-13 ENCOUNTER — HOSPITAL ENCOUNTER (OUTPATIENT)
Age: 72
Discharge: HOME | End: 2022-05-15
Payer: MEDICARE

## 2022-05-13 ENCOUNTER — HOSPITAL ENCOUNTER (OUTPATIENT)
Dept: INFUSION THERAPY | Age: 72
Discharge: HOME | End: 2022-05-13
Payer: MEDICARE

## 2022-05-13 VITALS
DIASTOLIC BLOOD PRESSURE: 62 MMHG | TEMPERATURE: 97.1 F | SYSTOLIC BLOOD PRESSURE: 108 MMHG | RESPIRATION RATE: 18 BRPM | HEART RATE: 71 BPM

## 2022-05-13 DIAGNOSIS — C34.01 LUNG CANCER, MAIN BRONCHUS, RIGHT (HCC): ICD-10-CM

## 2022-05-13 DIAGNOSIS — Z45.2 ENCOUNTER FOR CARE RELATED TO VASCULAR ACCESS PORT: Primary | ICD-10-CM

## 2022-05-13 DIAGNOSIS — J90 PLEURAL EFFUSION: ICD-10-CM

## 2022-05-13 LAB
ABSOLUTE EOS #: 0 K/UL (ref 0–0.44)
ABSOLUTE IMMATURE GRANULOCYTE: 0 K/UL (ref 0–0.3)
ABSOLUTE LYMPH #: 1.1 K/UL (ref 1.1–3.7)
ABSOLUTE MONO #: 0.24 K/UL (ref 0.1–1.2)
ALBUMIN SERPL-MCNC: 3.7 G/DL (ref 3.5–5.2)
ALBUMIN/GLOBULIN RATIO: 1.2 (ref 1–2.5)
ALP BLD-CCNC: 99 U/L (ref 40–129)
ALT SERPL-CCNC: 10 U/L (ref 5–41)
ANION GAP SERPL CALCULATED.3IONS-SCNC: 12 MMOL/L (ref 9–17)
AST SERPL-CCNC: 15 U/L
BASOPHILS # BLD: 1 % (ref 0–2)
BASOPHILS ABSOLUTE: 0.02 K/UL (ref 0–0.2)
BILIRUB SERPL-MCNC: 0.52 MG/DL (ref 0.3–1.2)
BUN BLDV-MCNC: 23 MG/DL (ref 8–23)
BUN/CREAT BLD: 25 (ref 9–20)
CALCIUM SERPL-MCNC: 9 MG/DL (ref 8.6–10.4)
CHLORIDE BLD-SCNC: 98 MMOL/L (ref 98–107)
CO2: 23 MMOL/L (ref 20–31)
CREAT SERPL-MCNC: 0.91 MG/DL (ref 0.7–1.2)
EOSINOPHILS RELATIVE PERCENT: 0 % (ref 1–4)
GFR AFRICAN AMERICAN: >60 ML/MIN
GFR NON-AFRICAN AMERICAN: >60 ML/MIN
GFR SERPL CREATININE-BSD FRML MDRD: ABNORMAL ML/MIN/{1.73_M2}
GFR SERPL CREATININE-BSD FRML MDRD: ABNORMAL ML/MIN/{1.73_M2}
GLUCOSE BLD-MCNC: 102 MG/DL (ref 70–99)
HCT VFR BLD CALC: 25.9 % (ref 40.7–50.3)
HEMOGLOBIN: 8.5 G/DL (ref 13–17)
IMMATURE GRANULOCYTES: 0 %
LYMPHOCYTES # BLD: 50 % (ref 24–43)
MCH RBC QN AUTO: 28.2 PG (ref 25.2–33.5)
MCHC RBC AUTO-ENTMCNC: 32.8 G/DL (ref 28.4–34.8)
MCV RBC AUTO: 86 FL (ref 82.6–102.9)
MONOCYTES # BLD: 11 % (ref 3–12)
MORPHOLOGY: NORMAL
NRBC AUTOMATED: 0 PER 100 WBC
PDW BLD-RTO: 17.3 % (ref 11.8–14.4)
PLATELET # BLD: ABNORMAL K/UL (ref 138–453)
PLATELET, FLUORESCENCE: 94 K/UL (ref 138–453)
PLATELET, IMMATURE FRACTION: 2.4 % (ref 1.1–10.3)
POTASSIUM SERPL-SCNC: 4.3 MMOL/L (ref 3.7–5.3)
RBC # BLD: 3.01 M/UL (ref 4.21–5.77)
SEG NEUTROPHILS: 38 % (ref 36–65)
SEGMENTED NEUTROPHILS ABSOLUTE COUNT: 0.84 K/UL (ref 1.5–8.1)
SODIUM BLD-SCNC: 133 MMOL/L (ref 135–144)
TOTAL PROTEIN: 6.9 G/DL (ref 6.4–8.3)
WBC # BLD: 2.2 K/UL (ref 3.5–11.3)

## 2022-05-13 PROCEDURE — 85055 RETICULATED PLATELET ASSAY: CPT

## 2022-05-13 PROCEDURE — 96360 HYDRATION IV INFUSION INIT: CPT

## 2022-05-13 PROCEDURE — 80053 COMPREHEN METABOLIC PANEL: CPT

## 2022-05-13 PROCEDURE — 2580000003 HC RX 258: Performed by: INTERNAL MEDICINE

## 2022-05-13 PROCEDURE — 6360000002 HC RX W HCPCS: Performed by: INTERNAL MEDICINE

## 2022-05-13 PROCEDURE — 71046 X-RAY EXAM CHEST 2 VIEWS: CPT

## 2022-05-13 PROCEDURE — 36415 COLL VENOUS BLD VENIPUNCTURE: CPT

## 2022-05-13 PROCEDURE — 85025 COMPLETE CBC W/AUTO DIFF WBC: CPT

## 2022-05-13 PROCEDURE — 2580000003 HC RX 258: Performed by: NURSE PRACTITIONER

## 2022-05-13 RX ORDER — HEPARIN SODIUM (PORCINE) LOCK FLUSH IV SOLN 100 UNIT/ML 100 UNIT/ML
500 SOLUTION INTRAVENOUS PRN
OUTPATIENT
Start: 2022-05-13

## 2022-05-13 RX ORDER — SODIUM CHLORIDE 0.9 % (FLUSH) 0.9 %
5-40 SYRINGE (ML) INJECTION PRN
Status: DISCONTINUED | OUTPATIENT
Start: 2022-05-13 | End: 2022-05-14 | Stop reason: HOSPADM

## 2022-05-13 RX ORDER — SODIUM CHLORIDE 0.9 % (FLUSH) 0.9 %
5-40 SYRINGE (ML) INJECTION PRN
OUTPATIENT
Start: 2022-05-13

## 2022-05-13 RX ORDER — SODIUM CHLORIDE 9 MG/ML
25 INJECTION, SOLUTION INTRAVENOUS PRN
OUTPATIENT
Start: 2022-05-13

## 2022-05-13 RX ORDER — HEPARIN SODIUM (PORCINE) LOCK FLUSH IV SOLN 100 UNIT/ML 100 UNIT/ML
500 SOLUTION INTRAVENOUS PRN
Status: DISCONTINUED | OUTPATIENT
Start: 2022-05-13 | End: 2022-05-14 | Stop reason: HOSPADM

## 2022-05-13 RX ORDER — SODIUM CHLORIDE 9 MG/ML
INJECTION, SOLUTION INTRAVENOUS ONCE
Status: COMPLETED | OUTPATIENT
Start: 2022-05-13 | End: 2022-05-13

## 2022-05-13 RX ADMIN — Medication 500 UNITS: at 13:55

## 2022-05-13 RX ADMIN — SODIUM CHLORIDE, PRESERVATIVE FREE 10 ML: 5 INJECTION INTRAVENOUS at 12:45

## 2022-05-13 RX ADMIN — SODIUM CHLORIDE, PRESERVATIVE FREE 10 ML: 5 INJECTION INTRAVENOUS at 13:55

## 2022-05-13 RX ADMIN — SODIUM CHLORIDE: 9 INJECTION, SOLUTION INTRAVENOUS at 12:49

## 2022-05-16 ENCOUNTER — TELEPHONE (OUTPATIENT)
Dept: ONCOLOGY | Age: 72
End: 2022-05-16

## 2022-05-16 NOTE — TELEPHONE ENCOUNTER
Patient's wife calls to report Ronald Jarquin is having a difficult time recovering from last chemotherapy. It is hard for him to eat and drink. He is not nauseated or vomiting and she states he is using nausea medication. Advised she try to offer instant breakfast, nutritional drinks, nuts, nut butter, eggs, etc in small amounts at a time but offer frequently. Wife voiced understanding. He is weak and tired. His chest pain is not well controlled with oxycodone. Advised wife to take Ronald Jarquin to the emergency room for evaluation. His physician will not be in the office here until 5/18/2022.

## 2022-05-19 ENCOUNTER — APPOINTMENT (OUTPATIENT)
Dept: CT IMAGING | Age: 72
End: 2022-05-19
Payer: MEDICARE

## 2022-05-19 ENCOUNTER — HOSPITAL ENCOUNTER (EMERGENCY)
Age: 72
Discharge: HOME | End: 2022-05-19
Attending: EMERGENCY MEDICINE
Payer: MEDICARE

## 2022-05-19 ENCOUNTER — APPOINTMENT (OUTPATIENT)
Dept: GENERAL RADIOLOGY | Age: 72
End: 2022-05-19
Payer: MEDICARE

## 2022-05-19 ENCOUNTER — OFFICE VISIT (OUTPATIENT)
Dept: ONCOLOGY | Age: 72
End: 2022-05-19
Payer: MEDICARE

## 2022-05-19 VITALS
RESPIRATION RATE: 20 BRPM | HEART RATE: 72 BPM | DIASTOLIC BLOOD PRESSURE: 62 MMHG | SYSTOLIC BLOOD PRESSURE: 120 MMHG | OXYGEN SATURATION: 99 %

## 2022-05-19 VITALS
BODY MASS INDEX: 19.8 KG/M2 | SYSTOLIC BLOOD PRESSURE: 74 MMHG | WEIGHT: 146 LBS | HEART RATE: 94 BPM | RESPIRATION RATE: 20 BRPM | TEMPERATURE: 97 F | DIASTOLIC BLOOD PRESSURE: 44 MMHG

## 2022-05-19 DIAGNOSIS — E86.0 DEHYDRATION: ICD-10-CM

## 2022-05-19 DIAGNOSIS — Z98.890 S/P THORACOTOMY: ICD-10-CM

## 2022-05-19 DIAGNOSIS — T45.1X5D ADVERSE EFFECT OF CHEMOTHERAPY, SUBSEQUENT ENCOUNTER: ICD-10-CM

## 2022-05-19 DIAGNOSIS — C34.01 LUNG CANCER, MAIN BRONCHUS, RIGHT (HCC): ICD-10-CM

## 2022-05-19 DIAGNOSIS — I95.0 IDIOPATHIC HYPOTENSION: Primary | ICD-10-CM

## 2022-05-19 DIAGNOSIS — R07.9 RIGHT-SIDED CHEST PAIN: ICD-10-CM

## 2022-05-19 LAB
-: ABNORMAL
ABSOLUTE EOS #: 0.05 K/UL (ref 0–0.44)
ABSOLUTE IMMATURE GRANULOCYTE: 0.05 K/UL (ref 0–0.3)
ABSOLUTE LYMPH #: 1.05 K/UL (ref 1.1–3.7)
ABSOLUTE MONO #: 0.16 K/UL (ref 0.1–1.2)
ALBUMIN SERPL-MCNC: 4.4 G/DL (ref 3.5–5.2)
ALBUMIN/GLOBULIN RATIO: 1.5 (ref 1–2.5)
ALP BLD-CCNC: 128 U/L (ref 40–129)
ALT SERPL-CCNC: 13 U/L (ref 5–41)
ANION GAP SERPL CALCULATED.3IONS-SCNC: 14 MMOL/L (ref 9–17)
AST SERPL-CCNC: 16 U/L
BACTERIA: ABNORMAL
BASOPHILS # BLD: 0 % (ref 0–2)
BASOPHILS ABSOLUTE: 0 K/UL (ref 0–0.2)
BILIRUB SERPL-MCNC: 0.23 MG/DL (ref 0.3–1.2)
BILIRUBIN URINE: NEGATIVE
BUN BLDV-MCNC: 13 MG/DL (ref 8–23)
BUN/CREAT BLD: 14 (ref 9–20)
CALCIUM SERPL-MCNC: 8.9 MG/DL (ref 8.6–10.4)
CHLORIDE BLD-SCNC: 101 MMOL/L (ref 98–107)
CO2: 23 MMOL/L (ref 20–31)
COLOR: YELLOW
CREAT SERPL-MCNC: 0.93 MG/DL (ref 0.7–1.2)
D-DIMER QUANTITATIVE: 2.63 MG/L FEU (ref 0–0.59)
EOSINOPHILS RELATIVE PERCENT: 2 % (ref 1–4)
EPITHELIAL CELLS UA: ABNORMAL /HPF (ref 0–5)
GFR AFRICAN AMERICAN: >60 ML/MIN
GFR NON-AFRICAN AMERICAN: >60 ML/MIN
GFR SERPL CREATININE-BSD FRML MDRD: ABNORMAL ML/MIN/{1.73_M2}
GFR SERPL CREATININE-BSD FRML MDRD: ABNORMAL ML/MIN/{1.73_M2}
GLUCOSE BLD-MCNC: 236 MG/DL (ref 70–99)
GLUCOSE URINE: NEGATIVE
HCT VFR BLD CALC: 25.3 % (ref 40.7–50.3)
HEMOGLOBIN: 8.1 G/DL (ref 13–17)
IMMATURE GRANULOCYTES: 2 %
INR BLD: 1
KETONES, URINE: NEGATIVE
LACTIC ACID: 2.6 MMOL/L (ref 0.5–2.2)
LACTIC ACID: 3.2 MMOL/L (ref 0.5–2.2)
LEUKOCYTE ESTERASE, URINE: NEGATIVE
LYMPHOCYTES # BLD: 46 % (ref 24–43)
MCH RBC QN AUTO: 28.7 PG (ref 25.2–33.5)
MCHC RBC AUTO-ENTMCNC: 32 G/DL (ref 28.4–34.8)
MCV RBC AUTO: 89.7 FL (ref 82.6–102.9)
MONOCYTES # BLD: 7 % (ref 3–12)
MORPHOLOGY: ABNORMAL
MORPHOLOGY: ABNORMAL
MUCUS: ABNORMAL
NITRITE, URINE: NEGATIVE
NRBC AUTOMATED: 0 PER 100 WBC
PARTIAL THROMBOPLASTIN TIME: 27.1 SEC (ref 26.8–34.8)
PDW BLD-RTO: 17.3 % (ref 11.8–14.4)
PH UA: 6 (ref 5–9)
PLATELET # BLD: ABNORMAL K/UL (ref 138–453)
PLATELET, FLUORESCENCE: 55 K/UL (ref 138–453)
PLATELET, IMMATURE FRACTION: 5 % (ref 1.1–10.3)
POTASSIUM SERPL-SCNC: 4.9 MMOL/L (ref 3.7–5.3)
PROTEIN UA: NEGATIVE
PROTHROMBIN TIME: 12.8 SEC (ref 11.5–14.2)
RBC # BLD: 2.82 M/UL (ref 4.21–5.77)
RBC UA: ABNORMAL /HPF (ref 0–2)
SEG NEUTROPHILS: 43 % (ref 36–65)
SEGMENTED NEUTROPHILS ABSOLUTE COUNT: 0.99 K/UL (ref 1.5–8.1)
SODIUM BLD-SCNC: 138 MMOL/L (ref 135–144)
SPECIFIC GRAVITY UA: <1.005 (ref 1.01–1.02)
TOTAL PROTEIN: 7.3 G/DL (ref 6.4–8.3)
TROPONIN, HIGH SENSITIVITY: 11 NG/L (ref 0–22)
TURBIDITY: CLEAR
URINE HGB: NEGATIVE
UROBILINOGEN, URINE: NORMAL
WBC # BLD: 2.3 K/UL (ref 3.5–11.3)
WBC UA: ABNORMAL /HPF (ref 0–5)

## 2022-05-19 PROCEDURE — 1036F TOBACCO NON-USER: CPT | Performed by: INTERNAL MEDICINE

## 2022-05-19 PROCEDURE — 87040 BLOOD CULTURE FOR BACTERIA: CPT

## 2022-05-19 PROCEDURE — 3017F COLORECTAL CA SCREEN DOC REV: CPT | Performed by: INTERNAL MEDICINE

## 2022-05-19 PROCEDURE — G8420 CALC BMI NORM PARAMETERS: HCPCS | Performed by: INTERNAL MEDICINE

## 2022-05-19 PROCEDURE — 96361 HYDRATE IV INFUSION ADD-ON: CPT

## 2022-05-19 PROCEDURE — 85025 COMPLETE CBC W/AUTO DIFF WBC: CPT

## 2022-05-19 PROCEDURE — 93005 ELECTROCARDIOGRAM TRACING: CPT | Performed by: EMERGENCY MEDICINE

## 2022-05-19 PROCEDURE — 81001 URINALYSIS AUTO W/SCOPE: CPT

## 2022-05-19 PROCEDURE — 87086 URINE CULTURE/COLONY COUNT: CPT

## 2022-05-19 PROCEDURE — 36415 COLL VENOUS BLD VENIPUNCTURE: CPT

## 2022-05-19 PROCEDURE — 96360 HYDRATION IV INFUSION INIT: CPT

## 2022-05-19 PROCEDURE — 4040F PNEUMOC VAC/ADMIN/RCVD: CPT | Performed by: INTERNAL MEDICINE

## 2022-05-19 PROCEDURE — 71260 CT THORAX DX C+: CPT

## 2022-05-19 PROCEDURE — 84484 ASSAY OF TROPONIN QUANT: CPT

## 2022-05-19 PROCEDURE — 2580000003 HC RX 258: Performed by: EMERGENCY MEDICINE

## 2022-05-19 PROCEDURE — G8428 CUR MEDS NOT DOCUMENT: HCPCS | Performed by: INTERNAL MEDICINE

## 2022-05-19 PROCEDURE — 99215 OFFICE O/P EST HI 40 MIN: CPT | Performed by: INTERNAL MEDICINE

## 2022-05-19 PROCEDURE — 85055 RETICULATED PLATELET ASSAY: CPT

## 2022-05-19 PROCEDURE — 80053 COMPREHEN METABOLIC PANEL: CPT

## 2022-05-19 PROCEDURE — 1123F ACP DISCUSS/DSCN MKR DOCD: CPT | Performed by: INTERNAL MEDICINE

## 2022-05-19 PROCEDURE — 99285 EMERGENCY DEPT VISIT HI MDM: CPT

## 2022-05-19 PROCEDURE — 85610 PROTHROMBIN TIME: CPT

## 2022-05-19 PROCEDURE — 6360000004 HC RX CONTRAST MEDICATION: Performed by: EMERGENCY MEDICINE

## 2022-05-19 PROCEDURE — 85379 FIBRIN DEGRADATION QUANT: CPT

## 2022-05-19 PROCEDURE — 83605 ASSAY OF LACTIC ACID: CPT

## 2022-05-19 PROCEDURE — 85730 THROMBOPLASTIN TIME PARTIAL: CPT

## 2022-05-19 PROCEDURE — 71045 X-RAY EXAM CHEST 1 VIEW: CPT

## 2022-05-19 RX ORDER — 0.9 % SODIUM CHLORIDE 0.9 %
1000 INTRAVENOUS SOLUTION INTRAVENOUS ONCE
Status: COMPLETED | OUTPATIENT
Start: 2022-05-19 | End: 2022-05-19

## 2022-05-19 RX ADMIN — SODIUM CHLORIDE 1000 ML: 9 INJECTION, SOLUTION INTRAVENOUS at 19:08

## 2022-05-19 RX ADMIN — IOPAMIDOL 75 ML: 755 INJECTION, SOLUTION INTRAVENOUS at 19:47

## 2022-05-19 NOTE — ED PROVIDER NOTES
HPI:  5/19/22, Time: 6:30 PM EDT         Julienne Her is a 70 y.o. male presenting to the ED for low blood pressure, beginning it started today. The complaint has been persistent, moderate in severity, and worsened by standing, light exertion. Patient has lung cancer he had a resection of the right lower lobe in January. He is on his fourth chemotherapy treatment. He describes generalized weakness nausea and fatigue no chest pain no shortness of breath no hemoptysis and no pleuritic pain he was at the [facility] today and they got a blood pressure of 70/40 he was sent here for further evaluation and treatment    ROS:   Pertinent positives and negatives are stated within HPI, all other systems reviewed and are negative.  --------------------------------------------- PAST HISTORY ---------------------------------------------  Past Medical History:  has a past medical history of Abnormal cardiac cath, Actinic keratosis, Arthritis, CAD (coronary artery disease), Calculus of kidney, Cancer ([facility] [address] Cervical stenosis of spine, Diabetes mellitus ([facility] [address] Full dentures, GERD (gastroesophageal reflux disease), H/O echocardiogram, History of cardiac cath, History of echocardiogram, History of echocardiogram, History of heart attack, History of tobacco abuse, Hyperlipidemia, Hypertension, Pulmonary nodule, Raynaud's phenomenon, S/P angioplasty with stent, and Wears hearing aid in both ears. Past Surgical History:  has a past surgical history that includes Cardiac catheterization (2007); Hemorrhoid surgery; Neck surgery (1997); other surgical history (10/2014); other surgical history (12/11/2014); Cataract removal (04/06/2015); Upper gastrointestinal endoscopy (12/28/2005); Cataract removal (06/08/2015); Colonoscopy (01/11/2006); Colonoscopy (05/23/2016); Cardiac catheterization (02/09/2017); Cardiac catheterization (02/09/2017); Cardiac catheterization (01/14/2019);  Upper gastrointestinal endoscopy (N/A, 02/05/2019); Upper gastrointestinal endoscopy (N/A, 10/22/2019); Lung biopsy (Right, 07/28/2021); CT NEEDLE BIOPSY LUNG PERCUTANEOUS (07/28/2021); hernia repair (Left); Lung removal, total (Right, 01/04/2022); IR CHEST TUBE INSERTION (01/11/2022); TUNNELED CENTRAL VENOUS CATHETER W/ SUBCUTANEOUS PORT (Left, 02/09/2022); and IR PORT PLACEMENT > 5 YEARS (2/9/2022). Social History:  reports that he quit smoking about 7 months ago. His smoking use included cigarettes. He has a 13.50 pack-year smoking history. He has never used smokeless tobacco. He reports that he does not drink alcohol and does not use drugs. Family History: family history includes Alzheimer's Disease in his father; Cancer in his brother; Diabetes in his father and mother; Heart Disease in his brother, brother, brother, mother, and sister; High Blood Pressure in his mother. The patients home medications have been reviewed. Allergies: Niacin and related, Minocycline hcl, Other, and Oxycodone hcl    ---------------------------------------------------PHYSICAL EXAM--------------------------------------     Constitutional/General: Alert and oriented x3, well appearing, non toxic in NAD  Head: Normocephalic and atraumatic  Eyes: PERRL, EOMI  Mouth: Oropharynx clear, handling secretions, no trismus  Neck: Supple, full ROM, non tender to palpation in the midline, no stridor, no crepitus, no meningeal signs  Pulmonary: Lungs clear to auscultation bilaterally, no wheezes, rales, or rhonchi. Not in respiratory distress  Cardiovascular:  Regular rate. Regular rhythm. No murmurs, gallops, or rubs. 2+ distal pulses  Chest: no chest wall tenderness  Abdomen: Soft. Non tender. Non distended. +BS. No rebound, guarding, or rigidity. No pulsatile masses appreciated. Musculoskeletal: Moves all extremities x 4. Warm and well perfused, no clubbing, cyanosis, or edema. Capillary refill <3 seconds  Skin: warm and dry. No rashes.    Neurologic: GCS 15, CN 2-12 grossly intact, no focal deficits, symmetric strength 5/5 in the upper and lower extremities bilaterally  Psych: Normal Affect    -------------------------------------------------- RESULTS -------------------------------------------------  I have personally reviewed all laboratory and imaging results for this patient. Results are listed below. LABS:  No results found for this visit on 05/19/22. RADIOLOGY:  Interpreted by Radiologist.  XR CHEST PORTABLE    (Results Pending)         ------------------------- NURSING NOTES AND VITALS REVIEWED ---------------------------   The nursing notes within the ED encounter and vital signs as below have been reviewed by myself. /64   Pulse 72   Resp 20   SpO2 100%   Oxygen Saturation Interpretation: Normal    The patients available past medical records and past encounters were reviewed. ------------------------------ ED COURSE/MEDICAL DECISION MAKING----------------------  Medications   0.9 % sodium chloride bolus (has no administration in time range)             Medical Decision Making:    I have ordered orthostatic blood pressure intravenous fluids lab work chest x-ray and EKG at the time of this dictation results were pending and he was signed out to Dr. Arely Cunningham the evening physician      This patient's ED course included: multiple bedside re-evaluations, IV medications, cardiac monitoring, continuous pulse oximetry and a personal history and physicial eaxmination    This patient has remained hemodynamically stable and been closely monitored during their ED course. Counseling: The emergency provider has spoken with the patient and spouse/SO and discussed todays results, in addition to providing specific details for the plan of care and counseling regarding the diagnosis and prognosis.   Questions are answered at this time and they are agreeable with the plan.       --------------------------------- IMPRESSION AND DISPOSITION ---------------------------------    IMPRESSION  1. Idiopathic hypotension        DISPOSITION  Disposition: Case was signed out to Dr. Derek Fierro at 7 PM  Patient condition is fair        NOTE: This report was transcribed using voice recognition software.  Every effort was made to ensure accuracy; however, inadvertent computerized transcription errors may be present       Ruth Elias MD  05/19/22 [id number]

## 2022-05-19 NOTE — PROGRESS NOTES
Patient ID: Verner Schultze, [birth year], [id number], 70 y.o. Referred by :  No ref.  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 male history of tobacco smoking and has been following with pulmonary for right middle lobe lung nodule where CT chest did show increase in the size in the scan on November 2, 2021 patient had biopsy on July 28, 2021 which showed benign parenchyma with chronic inflammation however the lesion was active on the PET there was no evidence of metastasis patient admitted to [facility] on January 4, 2022 for VATS and wedge resection which ended up with lobectomy, final pathology compatible with adenocarcinoma 1.8 cm and carcinoma invade into but not through the visceral pleura lymph nodes were negative patient status post right middle lobe lobectomy and margin were free of involvement by carcinoma   Patient developed right apical pneumothorax chest tube placed in the hospital and was discharged after chest tube removed and patient was started on adjuvant chemotherapy on February 24, 2022  Patient developed pleural effusion on the right side and underwent thoracentesis after 2 cycle of chemotherapy    Interval history  Status post 4 cycle of chemotherapy  Chest pain improved significantly after thoracentesis of 750 mL,  After the last cycle of chemotherapy patient had severely ill and his blood pressure today for systolic in the 49P  Dizzy and he had a fall due to lightheadedness last week    Past Medical History:   Diagnosis Date    Abnormal cardiac cath 09/20/2013    LMCA; Mild irregularities 10-20%. LAD: Abnormal.  Mild to moderate irregularities throughout the LAD and branches. LCx:  abnormal.  80-90% stenosis in a moderate sized OM1. RCA: Abnormal.  40-50% mid RCA stenosis.  Actinic keratosis     Arthritis     neck, fingers    CAD (coronary artery disease)     Calculus of kidney     Cancer ([address]     Cervical stenosis of spine     Diabetes mellitus (HCC)     Dr. Juana Cardoza, CNP, [place]    Full dentures     GERD (gastroesophageal reflux disease)     H/O echocardiogram 04/29/2016    Stress echo. Normal resting LV systolic function,normal systolic restponse to exercise. No evidence of reversible  ischemia on this maximal,symptom limited,treadmill exerxiess stress echocardiography study    History of cardiac cath 02/09/2017    LMCA: Lesion on LMCA: Distal subsection . 20% stenosis. LAD: Lesion on 1st Diag: Mid subsection . 75% stenosis. RCA: Lesion on Mid RCA: Mid subsection . 100% stenosis. Comments: The distal RCA fills by left to right collateralization. Dominance: Mixed. LV function assessed as: Normal. EF 65%.  History of echocardiogram 06/11/2018    EF 60%. Mildly increased LV wall thickness. Evidence of mild diastolic dysfunction seen.  History of echocardiogram 01/18/2019    EF of 65%. LV wall thickness is mildly increased. Aortic valve leaflets are mildly thickened.  History of heart attack     History of tobacco abuse     Quit 10/2021, 1/4 ppd for 47 years    Hyperlipidemia     Hypertension     Dr. Juana Cardoza, CNP    Pulmonary nodule 12/2021    Right middle lobe    Raynaud's phenomenon     S/P angioplasty with stent 09/20/2013    PTCA-NEDRA of  the OM 1    Wears hearing aid in both ears        Past Surgical History:   Procedure Laterality Date   [address]  2007    patient states had 2 small blockages    CARDIAC CATHETERIZATION  02/09/2017    LMCA: Lesion on LMCA: Distal subsection 20% stenosis.  LAD: Lesion on 1st Diag: Mid subsection 75% stenosis. RCA: Lesion on Mid RCA: Mid subsection 100% stenosis.  CARDIAC CATHETERIZATION  02/09/2017    Attempted PCI to chronic total occlusion of RCA was not successful. Unable to cross with multiple wires due to heavy calcification and toruosity.     CARDIAC CATHETERIZATION  01/14/2019    CATARACT REMOVAL  04/06/2015    Right eye - Dr. Francoise Alfaro  06/08/2015    Dr. Vernell Arita - left eye    COLONOSCOPY  01/11/2006    Dr. Ashwin Silva - internal hemorrhoids, no polps    COLONOSCOPY  05/23/2016    -polyp    CT NEEDLE BIOPSY LUNG PERCUTANEOUS  07/28/2021    CT NEEDLE BIOPSY LUNG PERCUTANEOUS 7/28/2021 [facility] CT SCAN    HEMORRHOID SURGERY      HERNIA REPAIR Left     inguinal    IR CHEST TUBE INSERTION  01/11/2022    IR CHEST TUBE INSERTION 1/11/2022 Saniya Wayne MD [facility] SPECIAL PROCEDURES    IR PORT PLACEMENT EQUAL OR GREATER THAN 5 YEARS  2/9/2022    IR PORT PLACEMENT EQUAL OR GREATER THAN 5 YEARS 2/9/2022 [facility] SPECIAL PROCEDURES    LUNG BIOPSY Right 07/28/2021    Dr Lynette Garzon, TOTAL Right 01/04/2022    MIDDLE WEDGE RESECTION, POSSIBLE MIDDLE LOBECTOMY VIA THORACOTOMY performed by Amita Howe MD at [facility]    c4-5 laminectomy and fusion    OTHER SURGICAL HISTORY  10/2014    Right foot - 7 from heal and 1 from great toe at [address] [place] [address]  12/11/2014    pain injection - cervical    TUNNELED CENTRAL VENOUS CATHETER W/ SUBCUTANEOUS PORT Left 02/09/2022    [facility]    UPPER GASTROINTESTINAL ENDOSCOPY  12/28/2005    Dr. Ashwin Silva - mild esophagitis and gastritis    UPPER GASTROINTESTINAL ENDOSCOPY N/A 02/05/2019    -bx(neg H-Pylori)retained gastric content, possible submucosal mass of gastric cardia    UPPER GASTROINTESTINAL ENDOSCOPY N/A 10/22/2019    EGD BIOPSY performed by Amanda Cardoza MD at [address] [place] [address] Allergies   Allergen Reactions    Niacin And Related      Turns red, no trouble breathing    Minocycline Hcl Rash    Other Nausea And Vomiting     Patient states on all pain medications he gets nausea and vomiting. Doctor ordered a medicine (nausea) to take before pain medication    Oxycodone Hcl      vomits       Current Outpatient Medications   Medication Sig Dispense Refill    oxyCODONE 5 MG capsule Take 1 capsule by mouth every 4 hours as needed for Pain for up to 7 days. 42 capsule 0    amLODIPine (NORVASC) 5 MG tablet Take 1 tablet by mouth daily 90 tablet 3    magnesium 30 MG tablet Take 400 mg by mouth daily       clopidogrel (PLAVIX) 75 MG tablet Take 1 tablet by mouth daily 90 tablet 3    zinc 50 MG CAPS Take by mouth      promethazine (PHENERGAN) 25 MG tablet Take 25 mg by mouth every 4 hours as needed for Nausea      pantoprazole (PROTONIX) 40 MG tablet Take 40 mg by mouth in the morning and at bedtime       ondansetron (ZOFRAN ODT) 8 MG TBDP disintegrating tablet Take 1 tablet by mouth every 8 hours as needed for Nausea 30 tablet 2    lidocaine-prilocaine (EMLA) 2.5-2.5 % cream Apply topically to port site 30- 60 minutes before access as needed. 30 g 2    SERTRALINE HCL PO Take 50 mg by mouth nightly      tamsulosin (FLOMAX) 0.4 MG capsule Take 0.4 mg by mouth daily      tiZANidine (ZANAFLEX) 4 MG capsule Take 4 mg by mouth 3 times daily as needed      ferrous sulfate (IRON 325) 325 (65 Fe) MG tablet Take 325 mg by mouth daily       isosorbide mononitrate (IMDUR) 30 MG extended release tablet 1/2 tablet in the morning      nitroGLYCERIN (NITRODUR) 0.4 MG/HR 1 dose under tongue as needed for chest pain.  max of 3 in one day      atenolol (TENORMIN) 25 MG tablet Take 1 tablet by mouth daily 90 tablet 3    atorvastatin (LIPITOR) 20 MG tablet Take 1 tablet by mouth daily 90 tablet 3    sitaGLIPtan-metformin (JANUMET)  MG per tablet Take 1 tablet by mouth 2 times daily (with meals)      glimepiride (AMARYL) 2 MG tablet Take 1 tablet by mouth 2 times daily 180 tablet 3    aspirin 81 MG tablet Take 81 mg by mouth daily Ordered by heart doctor, Chan Oleary      gabapentin (NEURONTIN) 100 MG capsule Take 1 capsule by mouth 3 times daily for 14 days. 42 capsule 0    lisinopril (PRINIVIL;ZESTRIL) 20 MG tablet Take 20 mg by mouth daily  (Patient not taking: Reported on 2022)      Insulin Glargine, 2 Unit Dial, 300 UNIT/ML SOPN Insulin Glargine Insulin Glargine U-300 Conc Active 10 UNIT Subcutaneous Every morning May 7th, 2020 11:47am 2020  [facility] ([id number]) (Patient not taking: Reported on 2022)       No current facility-administered medications for this visit. Social History     Socioeconomic History    Marital status:      Spouse name: Not on file    Number of children: Not on file    Years of education: Not on file    Highest education level: Not on file   Occupational History    Not on file   Tobacco Use    Smoking status: Former Smoker     Packs/day: 0.25     Years: 54.00     Pack years: 13.50     Types: Cigarettes     Quit date: 10/1/2021     Years since quittin.6    Smokeless tobacco: Never Used   Vaping Use    Vaping Use: Never used   Substance and Sexual Activity    Alcohol use: No    Drug use: No    Sexual activity: Not on file   Other Topics Concern    Not on file   Social History Narrative    Not on file     Social Determinants of Health     Financial Resource Strain:     Difficulty of Paying Living Expenses: Not on file   Food Insecurity:     Worried About [address] in the Last Year: Not on file    [address] in the Last Year: Not on file   Transportation Needs:     Lack of Transportation (Medical): Not on file    Lack of Transportation (Non-Medical):  Not on file   Physical Activity:     Days of Exercise per Week: Not on file    Minutes of Exercise per Session: Not on file Stress:     Feeling of Stress : Not on file   Social Connections:     Frequency of Communication with Friends and Family: Not on file    Frequency of Social Gatherings with Friends and Family: Not on file    Attends Orthodoxy Services: Not on file    Active Member of [address] [place] [address] youwho or Organizations: Not on file    Attends Club or Organization Meetings: Not on file    Marital Status: Not on file   Intimate Partner Violence:     Fear of Current or Ex-Partner: Not on file    Emotionally Abused: Not on file    Physically Abused: Not on file    Sexually Abused: Not on file   Housing Stability:     Unable to Pay for Housing in the Last Year: Not on file    Number of [place] in the Last Year: Not on file    Unstable Housing in the Last Year: Not on file       Family History   Problem Relation Age of Onset    Heart Disease Mother     Diabetes Mother     High Blood Pressure Mother     Diabetes Father     Alzheimer's Disease Father     Heart Disease Sister         CABG    Heart Disease Brother     Heart Disease Brother     Heart Disease Brother     Cancer Brother         REVIEW OF SYSTEM:     Constitutional: No fever or chills. No night sweats, no weight loss   Eyes: No eye discharge, double vision, or eye pain   HEENT: negative for sore mouth, sore throat, hoarseness and voice change   Respiratory: negative for cough , sputum, dyspnea, wheezing, hemoptysis, chest pain   Cardiovascular: negative for chest pain, dyspnea, palpitations, orthopnea, PND   Gastrointestinal: negative for nausea, vomiting, diarrhea, constipation, abdominal pain, Dysphagia, hematemesis and hematochezia   Genitourinary: negative for frequency, dysuria, nocturia, urinary incontinence, and hematuria   Integument: negative for rash, skin lesions, bruises.    Hematologic/Lymphatic: negative for easy bruising, bleeding, lymphadenopathy, petechiae and swelling/edema   Endocrine: negative for heat or cold intolerance, tremor, weight changes, change in bowel habits and hair loss   Musculoskeletal: negative for myalgias, arthralgias, pain, joint swelling,and bone pain   Neurological: negative for headaches, dizziness, seizures, weakness, numbness       OBJECTIVE:         Vitals:    05/19/22 1726   BP: (!) 74/44   Pulse: 94   Resp:    Temp:        PHYSICAL EXAM:   General appearance - well appearing, no in pain or distress   Mental status - alert and cooperative   Eyes - pupils equal and reactive, extraocular eye movements intact   Ears - bilateral TM's and external ear canals normal   Mouth - mucous membranes moist, pharynx normal without lesions   Neck - supple, no significant adenopathy   Lymphatics - no palpable lymphadenopathy, no hepatosplenomegaly   Chest - clear to auscultation, no wheezes, rales or rhonchi, symmetric air entry, diminished sounds on the right side  Heart - normal rate, regular rhythm, normal S1, S2, no murmurs, rubs, clicks or gallops   Abdomen - soft, nontender, nondistended, no masses or organomegaly   Neurological - alert, oriented, normal speech, no focal findings or movement disorder noted   Musculoskeletal - no joint tenderness, deformity or swelling   Extremities - peripheral pulses normal, no pedal edema, no clubbing or cyanosis   Skin - normal coloration and turgor, no rashes, no suspicious skin lesions noted ,      LABORATORY DATA:     Lab Results   Component Value Date    WBC 2.2 (L) 05/13/2022    HGB 8.5 (L) 05/13/2022    HCT 25.9 (L) 05/13/2022    MCV 86.0 05/13/2022    PLT See Reflexed IPF Result 05/13/2022    LYMPHOPCT 50 (H) 05/13/2022    RBC 3.01 (L) 05/13/2022    MCH 28.2 05/13/2022    MCHC 32.8 05/13/2022    RDW 17.3 (H) 05/13/2022    NEUTOPHILPCT 50.9 02/02/2017    MONOPCT 11 05/13/2022    EOSPCT 3.1 02/02/2017    BASOPCT 1 05/13/2022    NEUTROABS 0.84 (L) 05/13/2022    LYMPHSABS 1.10 05/13/2022    MONOSABS 0.24 05/13/2022    EOSABS 0.00 05/13/2022    BASOSABS 0.02 05/13/2022         Chemistry Component Value Date/Time     (L) 05/13/2022 1217    K 4.3 05/13/2022 1217    CL 98 05/13/2022 1217    CO2 23 05/13/2022 1217    BUN 23 05/13/2022 1217    CREATININE 0.91 05/13/2022 1217        Component Value Date/Time    CALCIUM 9.0 05/13/2022 1217    ALKPHOS 99 05/13/2022 1217    AST 15 05/13/2022 1217    ALT 10 05/13/2022 1217    BILITOT 0.52 05/13/2022 1217            PATHOLOGY DATA:     Surgical Pathology Report -- Diagnosis --     1.  Right lung, middle lobe wedge biopsy:     -  Adenocarcinoma. -  Tumor is 1.8 cm. -  Carcinoma invades into but not through the visceral pleura. 2.  Lymph node #9, biopsy:     -  Negative for metastatic carcinoma. 3.  Lymph node #7, biopsy:     -  Negative for metastatic carcinoma. 4.  Right middle lobe lung, lobectomy:     -  Margins are free of involvement by carcinoma. -  Peribronchial lymph node, negative for metastatic carcinoma. -  Nonneoplastic lung shows emphysematous change.       Gabi Mari M.D.   **Electronically Signed Out**         rdd/1/6/2022         Clinical Information   Pre-op Diagnosis:  RIGHT MIDDLE LOBE NODULE   Operative Findings:  MIDDLE LOBE OF RIGHT LUNG; LYMPH NODE #9 OF LUNG;   LYMPH NODE #7; RIGHT MIDDLE LOBE   Operation Performed:  MIDDLE WEDGE RESECTION, POSSIBLE MIDDLE   LOBECTOMY VIA THORACOTOMY     Source of Specimen   1: MIDDLE LOBE OF RIGHT LUNG (A)   2: LYMPH NODE #9 OF LUNG (B)   3: LYMPH NODE #7   4: RIGHT MIDDLE LOBE     Gross Description   1.  \"ANA SUSAN, MIDDLE LOBE OF RIGHT LUNG\" Received fresh is a   6.4 x 4.2 x 2.8 cm lung wedge with staple line.  The pleura is   pink-tan with an area of slightly thickening (inked black).    Sectioning reveals a 1.8 x 1.2 x 0.8 cm subpleural tan-white mass   lesion that is 0.8 cm from the staple line margin.  The remaining   parenchyma is pink-tan with no other lesions.  1TP, 1DQ, 1FS, Cassette   summary:  \"A\" frozen section lesion, \"B\" remainder of lesion with   pleura inked black, \"C\" staple line margin en face. 2.  \"ANA DAVIS, LYMPH NODE #9 OF LUNG\" Received fresh are two   fragments, 0.3 cm each.  1TP, 1FS, 1cs. 3.  \"ANA DAVIS, LYMPH NODE #7\" Received fresh are two   anthracotic fragments, 0.6 and 0.7 cm in greatest dimension.  1TP,   1FS, 1cs.     4.  \"ANA DAVIS, RIGHT MIDDLE LOBE\" Received fresh is a 50 gram,   8.5 x 6.8 x 3.5 cm lobe of lung with multiple staple lines.  The   pleura is wrinkled purple-gray.  Sectioning reveals spongy dark red   parenchyma with no masses.  No markedly enlarged nodes are identified   at the hilum.  Cassette summary:  \"A\" bronchial margin frozen section   cassette resubmitted as received, \"B\" hilar soft tissue and vascular   margin, \"C\" uninvolved lung (along staple line).  tm     Intraoperative Diagnosis   1.  FSDX:  Non-small cell carcinoma, favor adenocarcinoma.  (14:00,   RDD)   2.  FSDX:  Fibrofatty tissue.  (14:11, RDD)   3.  FSDX:  Lymph node, negative for carcinoma.  (14:19, RDD)   4.  FSDX:  Bronchial margin, negative for carcinoma.  (16:17, RDD)     Microscopic Description   1-4.  Microscopic examination performed. PROCEDURE: Wedge biopsy, followed by lobectomy       SPECIMEN LATERALITY: Right   TUMOR FOCALITY: Single tumor       TUMOR SITE: Right middle lobe   TUMOR SIZE --     1.8 x 1.2 x 0.8 cm           HISTOLOGIC TYPE: Adenocarcinoma. Sections show a large and anaplastic non-small cell malignancy with   cohesion and a few instances of glandular differentiation.  Neoplastic   cells are large with copious eosinophilic cytoplasm and large   vesicular nuclei.  Tumor is characterized further utilizing control   appropriate immunostains.  Cells of carcinoma are negative for p40 and   they are positive for TTF-1 and CK7.  Morphology and immunophenotype   are that of an adenocarcinoma, lung primary.    Note: The case is reviewed by a second Pathologist for quality   assurance with consensus (DS).       HISTOLOGIC GRADE: Grade 2 (of 4)           VISCERAL PLEURA INVASION:Reticulin stain with appropriately reactive   control is utilized to evaluate pleura.  Tumor extends beyond the   elastic layer into the pleura but not to the pleural surface (PL1)     DIRECT INVASION OF ADJACENT STRUCTURES: No     TREATMENT EFFECT: None apparent. LYMPHOVASCULAR INVASION: Absent                         MARGINS --   -BRONCHIAL: Free of tumor       -VASCULAR: Free of tumor       -PARENCHYMAL: Free of tumor       -OTHER ATTACHED TISSUE MARGIN (IF APPLICABLE): N/A       REGIONAL LYMPH NODES: 3   LYMPH NODES(S) FROM PRIOR PROCEDURES: No       NUMBER AND STATION(S) EXAMINED:   See diagnoses   NUMBER AND STATION(S) WITH METASTASIS: 0         DISTANT METASTASIS--   DISTANT SITE(S) INVOLVED, IF APPLICABLE: N/A         PATHOLOGIC STAGE CLASSIFICATION:     pT2a  pN0   CAP Lung 4200 in conjunction with AJCC 8 ed.         SURGICAL PATHOLOGY CONSULTATION         Patient Name: Vito Esteves Rec: [id number]   Path Number: [id number]     [address], [address]   [phone]   Fax: [phone]          IMAGING DATA:      XR CHEST (2 VW)  Narrative: EXAMINATION:  TWO XRAY VIEWS OF THE CHEST    5/13/2022 2:22 pm    COMPARISON:  05/02/2022    HISTORY:  ORDERING SYSTEM PROVIDED HISTORY: Pleural effusion    FINDINGS:  Left-sided MediPort device unchanged. Postsurgical changes lower cervical  spine. Redemonstration of the right-sided effusion with right perihilar  surgical clips. Bibasilar atelectasis of the compressive unchanged.   No  pneumothorax  Impression: Small right-sided effusion with right compressive atelectasis, similar to the  previous exam       CT chest with IV contrast on 11/2/2021    The previously biopsied, somewhat bilobed nodule involving right middle lobe   is slightly increased in size since the prior study now measuring 1.8 x 1.0   cm, once measuring 1.8 x 0.6 cm.  There does appear to be some spiculation. No new pulmonary nodule is seen. PET/CT on June 30, 2021    1. Significant oval increase in size of known anterior superior middle lobe   nodule along the minor fissure.  This now measures 6 x 18 mm compared to 10 x   4 mm on the prior.  This remains a LUNG RADS 4B (very suspicious) nodule. Recommend repeat PET-CT and or tissue sampling given appearance and   significant interval increase in size. 2. No major change in the fat density 2.3 cm nodule in the gastric fundus   adjacent to the GE junction.  Note this was FDG avid on the prior PET-CT. 3. Mild emphysema. The findings were sent to the Radiology Results [address] at 12:06   pm on 6/10/2021to be communicated to a licensed caregiver.           ASSESSMENT / PLAN:    Kaitlyn Chan was seen today for follow-up, fall, abdominal pain, anorexia and fatigue. Diagnoses and all orders for this visit:    Adverse effect of chemotherapy, subsequent encounter    Lung cancer, main bronchus, right ([address]    Right-sided chest pain    S/P thoracotomy           3 75-year-old man history of smoking diagnosed with pathology stage Ib(T2 aN0 M0) adenocarcinoma of the right middle lobe status post right VATS thoracotomy with right middle lobectomy done on January 4, 2022, due to high risk features manifested by involvement of visceral pleura recommended adjuvant chemotherapy which is Alimta and carboplatin, carboplatin substitute cisplatin due to frailed/nausea related to gastroparesis from diabetes in addition to mild neuropathy, started on chemotherapy on February 21, 2022  2. Patient status post fourth cycle of chemotherapy he did well however the last cycle he had weakness fatigue and lightheadedness  3. Hypotension/orthostatic will refer patient to emergency room for volume repletion may need to stay overnight  4.  Right-sided pleural effusion status post thoracentesis> fluid sent for cytology and was negative  5. Nosebleed> ENT referral(after chemotherapy)  6. Intermittent nausea this is prior to surgery related to diabetic gastroparesis > antinausea medicine  7. Decreased appetite due to the nausea> encourage oral intake> improved  8. Persistent right-sided chest pain since surgery> referral for pain management  9. RTC in 6 weeks    Patient will refer to emergency room for hypotension             I spent a total of 40  minutes on the date of the service which included preparing to see the patient, face-to-face patient care, completing clinical documentation, obtaining and/or reviewing separately obtained history, performing a medically appropriate examination, counseling and educating the patient/family/caregiver and ordering medications, tests, or procedures. [facility]                            This note is created with the assistance of a speech recognition program.  While intending to generate a document that actually reflects the content of the visit, the document can still have some errors including those of syntax and sound a like substitutions which may escape proof reading. It such instances, actual meaning can be extrapolated by contextual diversion.

## 2022-05-19 NOTE — ED NOTES
Spoke with Armida Esparza at [facility], she states he finished chemo 2 weeks ago, has had issues with eating and drinking since and c/o uncontrolled pain     Jon Burgos RN  05/19/22 1815

## 2022-05-20 LAB
EKG ATRIAL RATE: 69 BPM
EKG P AXIS: 8 DEGREES
EKG P-R INTERVAL: 184 MS
EKG Q-T INTERVAL: 390 MS
EKG QRS DURATION: 88 MS
EKG QTC CALCULATION (BAZETT): 417 MS
EKG R AXIS: 41 DEGREES
EKG T AXIS: 43 DEGREES
EKG VENTRICULAR RATE: 69 BPM

## 2022-05-20 PROCEDURE — 93010 ELECTROCARDIOGRAM REPORT: CPT | Performed by: FAMILY MEDICINE

## 2022-05-21 LAB
CULTURE: NO GROWTH
SPECIMEN DESCRIPTION: NORMAL

## 2022-05-23 ENCOUNTER — TELEPHONE (OUTPATIENT)
Dept: ONCOLOGY | Age: 72
End: 2022-05-23

## 2022-05-25 ENCOUNTER — TELEPHONE (OUTPATIENT)
Dept: ONCOLOGY | Age: 72
End: 2022-05-25

## 2022-06-02 ENCOUNTER — OFFICE VISIT (OUTPATIENT)
Dept: CARDIOTHORACIC SURGERY | Age: 72
End: 2022-06-02
Payer: MEDICARE

## 2022-06-02 VITALS
TEMPERATURE: 98.2 F | SYSTOLIC BLOOD PRESSURE: 122 MMHG | HEIGHT: 72 IN | HEART RATE: 119 BPM | WEIGHT: 144 LBS | RESPIRATION RATE: 18 BRPM | BODY MASS INDEX: 19.5 KG/M2 | OXYGEN SATURATION: 99 % | DIASTOLIC BLOOD PRESSURE: 78 MMHG

## 2022-06-02 DIAGNOSIS — J90 PLEURAL EFFUSION: Primary | ICD-10-CM

## 2022-06-02 PROCEDURE — 1124F ACP DISCUSS-NO DSCNMKR DOCD: CPT | Performed by: NURSE PRACTITIONER

## 2022-06-02 PROCEDURE — G8420 CALC BMI NORM PARAMETERS: HCPCS | Performed by: NURSE PRACTITIONER

## 2022-06-02 PROCEDURE — 1036F TOBACCO NON-USER: CPT | Performed by: NURSE PRACTITIONER

## 2022-06-02 PROCEDURE — 3017F COLORECTAL CA SCREEN DOC REV: CPT | Performed by: NURSE PRACTITIONER

## 2022-06-02 PROCEDURE — 99214 OFFICE O/P EST MOD 30 MIN: CPT | Performed by: NURSE PRACTITIONER

## 2022-06-02 PROCEDURE — G8427 DOCREV CUR MEDS BY ELIG CLIN: HCPCS | Performed by: NURSE PRACTITIONER

## 2022-06-02 NOTE — PROGRESS NOTES
[id number] [facility]  Office Visit      Subjective:  Mr. Darin Goldman is a 70 y.o. male had a right-sided thoracotomy for right middle lobe due to cancer. Patient presents back today for chronic pleural effusions. Patient also has pain right upper gastric region denies nausea vomiting diarrhea fever chills. Patient is still able to perform his ADLs. Patient explains that chemoradiation has really caused him to feel weak fatigue with significant weight loss. Physical Exam  Vitals:  Vitals:    06/02/22 1153   BP: 122/78   Pulse: (!) 119   Resp: 18   Temp: 98.2 °F (36.8 °C)   SpO2: 99%       General: Alert and Oriented x3. Ambulatory. No apparent distress. Chest:  No abnormality. Equal and symmetric expansion with respiration. Lungs:  Clear to auscultation. Cardiac:  Regular rate and rhythm without murmurs, rubs or gallops. Abdomen: Positive Staples sign no abdominal distention. No pain in other quadrants with palpation  Extremities:  No edema. Intact pulses in all four extremities. Psychiatric: Mood and affect are appropriate. Reviewed CT chest which shows mild to moderate pleural effusion with some loculation.     Current Medications:    Current Outpatient Medications:     amLODIPine (NORVASC) 5 MG tablet, Take 1 tablet by mouth daily, Disp: 90 tablet, Rfl: 3    magnesium 30 MG tablet, Take 400 mg by mouth daily , Disp: , Rfl:     clopidogrel (PLAVIX) 75 MG tablet, Take 1 tablet by mouth daily, Disp: 90 tablet, Rfl: 3    zinc 50 MG CAPS, Take by mouth, Disp: , Rfl:     promethazine (PHENERGAN) 25 MG tablet, Take 25 mg by mouth every 4 hours as needed for Nausea, Disp: , Rfl:     pantoprazole (PROTONIX) 40 MG tablet, Take 40 mg by mouth in the morning and at bedtime , Disp: , Rfl:     ondansetron (ZOFRAN ODT) 8 MG TBDP disintegrating tablet, Take 1 tablet by mouth every 8 hours as needed for Nausea, Disp: 30 tablet, Rfl: 2    lidocaine-prilocaine (EMLA) 2.5-2.5 % cream, Apply topically to port site 30- 60 minutes before access as needed. , Disp: 30 g, Rfl: 2    SERTRALINE HCL PO, Take 50 mg by mouth nightly, Disp: , Rfl:     tamsulosin (FLOMAX) 0.4 MG capsule, Take 0.4 mg by mouth daily, Disp: , Rfl:     tiZANidine (ZANAFLEX) 4 MG capsule, Take 4 mg by mouth 3 times daily as needed, Disp: , Rfl:     ferrous sulfate (IRON 325) 325 (65 Fe) MG tablet, Take 325 mg by mouth daily , Disp: , Rfl:     isosorbide mononitrate (IMDUR) 30 MG extended release tablet, 1/2 tablet in the morning, Disp: , Rfl:     lisinopril (PRINIVIL;ZESTRIL) 20 MG tablet, Take 20 mg by mouth daily , Disp: , Rfl:     nitroGLYCERIN (NITRODUR) 0.4 MG/HR, 1 dose under tongue as needed for chest pain. max of 3 in one day, Disp: , Rfl:     atenolol (TENORMIN) 25 MG tablet, Take 1 tablet by mouth daily, Disp: 90 tablet, Rfl: 3    Insulin Glargine, 2 Unit Dial, 300 UNIT/ML SOPN, Insulin Glargine Insulin Glargine U-300 Conc Active 10 UNIT Subcutaneous Every morning May 7th, 2020 11:47am 05-  [facility] ([id number]), Disp: , Rfl:     atorvastatin (LIPITOR) 20 MG tablet, Take 1 tablet by mouth daily, Disp: 90 tablet, Rfl: 3    sitaGLIPtan-metformin (JANUMET)  MG per tablet, Take 1 tablet by mouth 2 times daily (with meals), Disp: , Rfl:     glimepiride (AMARYL) 2 MG tablet, Take 1 tablet by mouth 2 times daily, Disp: 180 tablet, Rfl: 3    aspirin 81 MG tablet, Take 81 mg by mouth daily Ordered by heart doctor, Chan Parekh, Disp: , Rfl:     gabapentin (NEURONTIN) 100 MG capsule, Take 1 capsule by mouth 3 times daily for 14 days. , Disp: 42 capsule, Rfl: 0    Past Surgical History:   Procedure Laterality Date    CARDIAC CATHETERIZATION  2007    patient states had 2 small blockages    CARDIAC CATHETERIZATION  02/09/2017    LMCA: Lesion on LMCA: Distal subsection 20% stenosis. LAD: Lesion on 1st Diag: Mid subsection 75% stenosis. RCA: Lesion on Mid RCA: Mid subsection 100% stenosis.  CARDIAC CATHETERIZATION  02/09/2017    Attempted PCI to chronic total occlusion of RCA was not successful. Unable to cross with multiple wires due to heavy calcification and toruosity.  CARDIAC CATHETERIZATION  01/14/2019    CATARACT REMOVAL  04/06/2015    Right eye - Dr. Arzola Emily  06/08/2015    Dr. Chiquita Loredo - left eye    COLONOSCOPY  01/11/2006    Dr. Eric Michele - internal hemorrhoids, no polps    COLONOSCOPY  05/23/2016    -polyp    CT NEEDLE BIOPSY LUNG PERCUTANEOUS  07/28/2021    CT NEEDLE BIOPSY LUNG PERCUTANEOUS 7/28/2021 [facility] CT SCAN    HEMORRHOID SURGERY      HERNIA REPAIR Left     inguinal    IR CHEST TUBE INSERTION  01/11/2022    IR CHEST TUBE INSERTION 1/11/2022 Haroon Tom MD [facility] SPECIAL PROCEDURES    IR PORT PLACEMENT EQUAL OR GREATER THAN 5 YEARS  2/9/2022    IR PORT PLACEMENT EQUAL OR GREATER THAN 5 YEARS 2/9/2022 [facility] SPECIAL PROCEDURES    LUNG BIOPSY Right 07/28/2021    Dr Mark Beavers, TOTAL Right 01/04/2022    MIDDLE WEDGE RESECTION, POSSIBLE MIDDLE LOBECTOMY VIA THORACOTOMY performed by Shay Menendez MD at [facility]    c4-5 laminectomy and fusion    OTHER SURGICAL HISTORY  10/2014    Right foot - 7 from heal and 1 from great toe at [address]  12/11/2014    pain injection - cervical    TUNNELED CENTRAL VENOUS CATHETER W/ SUBCUTANEOUS PORT Left 02/09/2022    Dr Hoen[facility]    UPPER GASTROINTESTINAL ENDOSCOPY  12/28/2005    Dr. Eric Michele - mild esophagitis and gastritis    UPPER GASTROINTESTINAL ENDOSCOPY N/A 02/05/2019    -bx(neg H-Pylori)retained gastric content, possible submucosal mass of gastric cardia    UPPER GASTROINTESTINAL ENDOSCOPY N/A 10/22/2019    EGD BIOPSY performed by Kavitha Alvarez MD at [address] [place] [address] Hx: reports that he quit smoking about 8 months ago. His smoking use included cigarettes.  He has a 13.50 pack-year smoking history. He has never used smokeless tobacco.    Assessment & Plan:   IR thoracentesis   Post drainage CXR  Follow up with PCP regarding positive pressley sign. Continue with PT and exercise. No need to follow back with CT surgery    Patient to follow-up with his primary care provider regarding further work-up with his abdominal pain. Patient has no guarding bedside.   Reviewed past labs which do not show any signs of acute cholecystitis    ALLIE MCELROY, APRN - NP,APRN CNP

## 2022-06-06 ENCOUNTER — HOSPITAL ENCOUNTER (OUTPATIENT)
Dept: GENERAL RADIOLOGY | Age: 72
Discharge: HOME | End: 2022-06-08
Payer: MEDICARE

## 2022-06-06 ENCOUNTER — HOSPITAL ENCOUNTER (OUTPATIENT)
Dept: ULTRASOUND IMAGING | Age: 72
Discharge: HOME | End: 2022-06-08
Payer: MEDICARE

## 2022-06-06 VITALS
SYSTOLIC BLOOD PRESSURE: 139 MMHG | OXYGEN SATURATION: 100 % | DIASTOLIC BLOOD PRESSURE: 62 MMHG | HEART RATE: 100 BPM | RESPIRATION RATE: 12 BRPM

## 2022-06-06 DIAGNOSIS — R52 PAIN: ICD-10-CM

## 2022-06-06 DIAGNOSIS — J90 PLEURAL EFFUSION: ICD-10-CM

## 2022-06-06 PROCEDURE — 2709999900 US THORACENTESIS

## 2022-06-06 PROCEDURE — 2500000003 HC RX 250 WO HCPCS: Performed by: RADIOLOGY

## 2022-06-06 PROCEDURE — 71046 X-RAY EXAM CHEST 2 VIEWS: CPT

## 2022-06-06 RX ORDER — LIDOCAINE HYDROCHLORIDE 10 MG/ML
INJECTION, SOLUTION EPIDURAL; INFILTRATION; INTRACAUDAL; PERINEURAL
Status: COMPLETED | OUTPATIENT
Start: 2022-06-06 | End: 2022-06-06

## 2022-06-06 RX ADMIN — LIDOCAINE HYDROCHLORIDE 5 ML: 10 INJECTION, SOLUTION EPIDURAL; INFILTRATION; INTRACAUDAL; PERINEURAL at 09:26

## 2022-06-10 ENCOUNTER — HOSPITAL ENCOUNTER (OUTPATIENT)
Dept: ULTRASOUND IMAGING | Age: 72
Discharge: HOME | End: 2022-06-12
Payer: MEDICARE

## 2022-06-10 DIAGNOSIS — R10.11 ABDOMINAL PAIN, RIGHT UPPER QUADRANT: ICD-10-CM

## 2022-06-10 PROCEDURE — 76705 ECHO EXAM OF ABDOMEN: CPT

## 2022-06-20 ENCOUNTER — HOSPITAL ENCOUNTER (OUTPATIENT)
Dept: CT IMAGING | Age: 72
Discharge: HOME | End: 2022-06-22
Payer: MEDICARE

## 2022-06-20 ENCOUNTER — HOSPITAL ENCOUNTER (OUTPATIENT)
Age: 72
Discharge: HOME | End: 2022-06-20
Payer: MEDICARE

## 2022-06-20 DIAGNOSIS — R10.11 ABDOMINAL PAIN, RIGHT UPPER QUADRANT: ICD-10-CM

## 2022-06-20 LAB
BUN BLDV-MCNC: 13 MG/DL (ref 8–23)
CREAT SERPL-MCNC: 0.98 MG/DL (ref 0.7–1.2)
GFR AFRICAN AMERICAN: >60 ML/MIN
GFR NON-AFRICAN AMERICAN: >60 ML/MIN
GFR SERPL CREATININE-BSD FRML MDRD: NORMAL ML/MIN/{1.73_M2}
GFR SERPL CREATININE-BSD FRML MDRD: NORMAL ML/MIN/{1.73_M2}

## 2022-06-20 PROCEDURE — 74177 CT ABD & PELVIS W/CONTRAST: CPT

## 2022-06-20 PROCEDURE — 6360000004 HC RX CONTRAST MEDICATION: Performed by: NURSE PRACTITIONER

## 2022-06-20 PROCEDURE — 82565 ASSAY OF CREATININE: CPT

## 2022-06-20 PROCEDURE — 36415 COLL VENOUS BLD VENIPUNCTURE: CPT

## 2022-06-20 PROCEDURE — 84520 ASSAY OF UREA NITROGEN: CPT

## 2022-06-20 RX ADMIN — IOPAMIDOL 18 ML: 755 INJECTION, SOLUTION INTRAVENOUS at 10:50

## 2022-06-20 RX ADMIN — IOPAMIDOL 75 ML: 755 INJECTION, SOLUTION INTRAVENOUS at 10:47

## 2022-06-30 ENCOUNTER — OFFICE VISIT (OUTPATIENT)
Dept: ONCOLOGY | Age: 72
End: 2022-06-30
Payer: MEDICARE

## 2022-06-30 ENCOUNTER — TELEPHONE (OUTPATIENT)
Dept: ONCOLOGY | Age: 72
End: 2022-06-30

## 2022-06-30 VITALS
DIASTOLIC BLOOD PRESSURE: 50 MMHG | HEIGHT: 72 IN | RESPIRATION RATE: 18 BRPM | BODY MASS INDEX: 20.72 KG/M2 | WEIGHT: 153 LBS | HEART RATE: 69 BPM | SYSTOLIC BLOOD PRESSURE: 102 MMHG | TEMPERATURE: 97.3 F

## 2022-06-30 DIAGNOSIS — R07.9 RIGHT-SIDED CHEST PAIN: ICD-10-CM

## 2022-06-30 DIAGNOSIS — J90 PLEURAL EFFUSION ON RIGHT: ICD-10-CM

## 2022-06-30 DIAGNOSIS — C34.01 LUNG CANCER, MAIN BRONCHUS, RIGHT (HCC): Primary | ICD-10-CM

## 2022-06-30 DIAGNOSIS — N28.89 LEFT KIDNEY MASS: ICD-10-CM

## 2022-06-30 PROCEDURE — 3017F COLORECTAL CA SCREEN DOC REV: CPT | Performed by: INTERNAL MEDICINE

## 2022-06-30 PROCEDURE — G8420 CALC BMI NORM PARAMETERS: HCPCS | Performed by: INTERNAL MEDICINE

## 2022-06-30 PROCEDURE — G8427 DOCREV CUR MEDS BY ELIG CLIN: HCPCS | Performed by: INTERNAL MEDICINE

## 2022-06-30 PROCEDURE — 99211 OFF/OP EST MAY X REQ PHY/QHP: CPT

## 2022-06-30 PROCEDURE — 1124F ACP DISCUSS-NO DSCNMKR DOCD: CPT | Performed by: INTERNAL MEDICINE

## 2022-06-30 PROCEDURE — 1036F TOBACCO NON-USER: CPT | Performed by: INTERNAL MEDICINE

## 2022-06-30 PROCEDURE — 99215 OFFICE O/P EST HI 40 MIN: CPT | Performed by: INTERNAL MEDICINE

## 2022-06-30 RX ORDER — MORPHINE SULFATE 15 MG/1
15 TABLET, FILM COATED, EXTENDED RELEASE ORAL 2 TIMES DAILY
Qty: 60 TABLET | Refills: 0 | Status: SHIPPED | OUTPATIENT
Start: 2022-06-30 | End: 2022-07-30

## 2022-06-30 RX ORDER — MORPHINE SULFATE 15 MG/1
15 TABLET ORAL EVERY 6 HOURS PRN
Qty: 120 TABLET | Refills: 0 | Status: SHIPPED | OUTPATIENT
Start: 2022-06-30 | End: 2022-07-30

## 2022-06-30 NOTE — TELEPHONE ENCOUNTER
Name: Kush Rudolph  : 1950  MRN: [id number]    Oncology Navigation Follow-Up Note    Contact Type:  Medical Oncology    Notes: Navigator met with patient and wife in clinic. Patient states that he is gaining weight and he has good days and bad days with his appetite. Patient notes continued taste issues. Pt notes this was an issue prior to chemotherapy. Patient continues to complain of right sided pain. Patient was referred back to Dr. Ry Stacy. Avery Oglesby denies any need from navigation at this time.      Electronically signed by Tawanda Mccain RN on 2022 at 3:58 PM

## 2022-06-30 NOTE — PROGRESS NOTES
Patient ID: Janel Temple, [birth year], [id number], 67 y.o. Referred by :  No ref.  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 male history of tobacco smoking and has been following with pulmonary for right middle lobe lung nodule where CT chest did show increase in the size in the scan on November 2, 2021 patient had biopsy on July 28, 2021 which showed benign parenchyma with chronic inflammation however the lesion was active on the PET there was no evidence of metastasis patient admitted to [facility] on January 4, 2022 for VATS and wedge resection which ended up with lobectomy, final pathology compatible with adenocarcinoma 1.8 cm and carcinoma invade into but not through the visceral pleura lymph nodes were negative patient status post right middle lobe lobectomy and margin were free of involvement by carcinoma   Patient developed right apical pneumothorax chest tube placed in the hospital and was discharged after chest tube removed and patient was started on adjuvant chemotherapy on February 24, 2022  Patient developed pleural effusion on the right side and underwent thoracentesis after 2 cycle of chemotherapy and done with 4 cycles of chemotherapy, if still persistent have right side chest pain  Had loculated pneumothorax status post thoracentesis and recommendation was for no further thoracentesis    Interval history  Patient had increased abdominal right-sided chest pain affecting daily life activity not controlled on a painkiller of  Was evaluated for gallstones with no evidence of cholecystitis of the kidney there was a 1.9 cm nodule recommended further work-up with a CT    Past Medical History:   Diagnosis Date    Abnormal cardiac cath 09/20/2013    LMCA; Mild irregularities 10-20%. LAD: Abnormal.  Mild to moderate irregularities throughout the LAD and branches. LCx:  abnormal.  80-90% stenosis in a moderate sized OM1. RCA: Abnormal.  40-50% mid RCA stenosis.  Actinic keratosis     Arthritis     neck, fingers    CAD (coronary artery disease)     Calculus of kidney     Cancer ([address]     Cervical stenosis of spine     Diabetes mellitus (HCC)     Dr. Gerard Velazquez, CNP, [place]    Full dentures     GERD (gastroesophageal reflux disease)     H/O echocardiogram 04/29/2016    Stress echo. Normal resting LV systolic function,normal systolic restponse to exercise. No evidence of reversible  ischemia on this maximal,symptom limited,treadmill exerxiess stress echocardiography study    History of cardiac cath 02/09/2017    LMCA: Lesion on LMCA: Distal subsection . 20% stenosis. LAD: Lesion on 1st Diag: Mid subsection . 75% stenosis. RCA: Lesion on Mid RCA: Mid subsection . 100% stenosis. Comments: The distal RCA fills by left to right collateralization. Dominance: Mixed. LV function assessed as: Normal. EF 65%.  History of echocardiogram 06/11/2018    EF 60%. Mildly increased LV wall thickness. Evidence of mild diastolic dysfunction seen.  History of echocardiogram 01/18/2019    EF of 65%. LV wall thickness is mildly increased. Aortic valve leaflets are mildly thickened.      History of heart attack     History of tobacco abuse     Quit 10/2021, 1/4 ppd for 47 years    Hyperlipidemia     Hypertension     Dr. Gerard Velazquez, CNP    Pulmonary nodule 12/2021    Right middle lobe    Raynaud's phenomenon     S/P angioplasty with stent 09/20/2013    PTCA-NEDRA of  the OM 1    Wears hearing aid in both ears        Past Surgical History:   Procedure Laterality Date    CARDIAC CATHETERIZATION 2007    patient states had 2 small blockages    CARDIAC CATHETERIZATION  02/09/2017    LMCA: Lesion on LMCA: Distal subsection 20% stenosis. LAD: Lesion on 1st Diag: Mid subsection 75% stenosis. RCA: Lesion on Mid RCA: Mid subsection 100% stenosis.  CARDIAC CATHETERIZATION  02/09/2017    Attempted PCI to chronic total occlusion of RCA was not successful. Unable to cross with multiple wires due to heavy calcification and toruosity.     CARDIAC CATHETERIZATION  01/14/2019    CATARACT REMOVAL  04/06/2015    Right eye - Dr. Dominguez Blew  06/08/2015    Dr. Dom Arce - left eye    COLONOSCOPY  01/11/2006    Dr. Elena Yeager - internal hemorrhoids, no polps    COLONOSCOPY  05/23/2016    -polyp    CT NEEDLE BIOPSY LUNG PERCUTANEOUS  07/28/2021    CT NEEDLE BIOPSY LUNG PERCUTANEOUS 7/28/2021 [facility] CT SCAN    HEMORRHOID SURGERY      HERNIA REPAIR Left     inguinal    IR CHEST TUBE INSERTION  01/11/2022    IR CHEST TUBE INSERTION 1/11/2022 Sravani Strickland MD [facility] SPECIAL PROCEDURES    IR PORT PLACEMENT EQUAL OR GREATER THAN 5 YEARS  2/9/2022    IR PORT PLACEMENT EQUAL OR GREATER THAN 5 YEARS 2/9/2022 [facility] SPECIAL PROCEDURES    LUNG BIOPSY Right 07/28/2021    Dr Thomas/[facility]   [address], TOTAL Right 01/04/2022    MIDDLE WEDGE RESECTION, POSSIBLE MIDDLE LOBECTOMY VIA THORACOTOMY performed by Robbi Marroquin MD at [facility]    c4-5 laminectomy and fusion    OTHER SURGICAL HISTORY  10/2014    Right foot - 7 from heal and 1 from great toe at [address] [place] [address] Street  12/11/2014    pain injection - cervical    TUNNELED CENTRAL VENOUS CATHETER W/ SUBCUTANEOUS PORT Left 02/09/2022    Dr Hoenninger/[facility]    UPPER GASTROINTESTINAL ENDOSCOPY  12/28/2005    Dr. Elena Yeager - mild esophagitis and gastritis    UPPER GASTROINTESTINAL ENDOSCOPY N/A 02/05/2019    -bx(neg H-Pylori)retained gastric content, possible submucosal mass of gastric cardia    UPPER GASTROINTESTINAL ENDOSCOPY N/A 10/22/2019    EGD BIOPSY performed by Ynes Goff MD at [address] [facility]   Allergen Reactions    Niacin And Related      Turns red, no trouble breathing    Minocycline Hcl Rash    Other Nausea And Vomiting     Patient states on all pain medications he gets nausea and vomiting. Doctor ordered a medicine (nausea) to take before pain medication    Oxycodone Hcl      vomits       Current Outpatient Medications   Medication Sig Dispense Refill    amLODIPine (NORVASC) 5 MG tablet Take 1 tablet by mouth daily 90 tablet 3    magnesium 30 MG tablet Take 400 mg by mouth daily       clopidogrel (PLAVIX) 75 MG tablet Take 1 tablet by mouth daily 90 tablet 3    zinc 50 MG CAPS Take by mouth      promethazine (PHENERGAN) 25 MG tablet Take 25 mg by mouth every 4 hours as needed for Nausea      pantoprazole (PROTONIX) 40 MG tablet Take 40 mg by mouth in the morning and at bedtime       ondansetron (ZOFRAN ODT) 8 MG TBDP disintegrating tablet Take 1 tablet by mouth every 8 hours as needed for Nausea 30 tablet 2    lidocaine-prilocaine (EMLA) 2.5-2.5 % cream Apply topically to port site 30- 60 minutes before access as needed. 30 g 2    SERTRALINE HCL PO Take 50 mg by mouth nightly      tamsulosin (FLOMAX) 0.4 MG capsule Take 0.4 mg by mouth daily      tiZANidine (ZANAFLEX) 4 MG capsule Take 4 mg by mouth 3 times daily as needed      ferrous sulfate (IRON 325) 325 (65 Fe) MG tablet Take 325 mg by mouth daily       isosorbide mononitrate (IMDUR) 30 MG extended release tablet 1/2 tablet in the morning      nitroGLYCERIN (NITRODUR) 0.4 MG/HR 1 dose under tongue as needed for chest pain.  max of 3 in one day      atenolol (TENORMIN) 25 MG tablet Take 1 tablet by mouth daily 90 tablet 3    atorvastatin (LIPITOR) 20 MG tablet Take 1 tablet by mouth daily 90 tablet 3    sitaGLIPtan-metformin (JANUMET)  MG per tablet Take 1 tablet by mouth 2 times daily (with meals)      glimepiride (AMARYL) 2 MG tablet Take 1 tablet by mouth 2 times daily 180 tablet 3    aspirin 81 MG tablet Take 81 mg by mouth daily Ordered by heart doctor, Chan Pierce      gabapentin (NEURONTIN) 100 MG capsule Take 1 capsule by mouth 3 times daily for 14 days. 42 capsule 0    lisinopril (PRINIVIL;ZESTRIL) 20 MG tablet Take 20 mg by mouth daily  (Patient not taking: Reported on 2022)      Insulin Glargine, 2 Unit Dial, 300 UNIT/ML SOPN Insulin Glargine Insulin Glargine U-300 Conc Active 10 UNIT Subcutaneous Every morning May 7th, 2020 11:47am 2020  [facility] ([id number]) (Patient not taking: Reported on 2022)       No current facility-administered medications for this visit. Social History     Socioeconomic History    Marital status:      Spouse name: Not on file    Number of children: Not on file    Years of education: Not on file    Highest education level: Not on file   Occupational History    Not on file   Tobacco Use    Smoking status: Former Smoker     Packs/day: 0.25     Years: 54.00     Pack years: 13.50     Types: Cigarettes     Quit date: 10/1/2021     Years since quittin.7    Smokeless tobacco: Never Used   Vaping Use    Vaping Use: Never used   Substance and Sexual Activity    Alcohol use: No    Drug use: No    Sexual activity: Not on file   Other Topics Concern    Not on file   Social History Narrative    Not on file     Social Determinants of Health     Financial Resource Strain:     Difficulty of Paying Living Expenses: Not on file   Food Insecurity:     Worried About [address] in the Last Year: Not on file    Luis E of Food in the Last Year: Not on file   Transportation Needs:     Lack of Transportation (Medical): Not on file    Lack of Transportation (Non-Medical):  Not on file   Physical Activity:     Days of Exercise per Week: Not on file    Minutes of Exercise per Session: Not on file   Stress:     Feeling of Stress : Not on file   Social Connections:     Frequency of Communication with Friends and Family: Not on file    Frequency of Social Gatherings with Friends and Family: Not on file    Attends [employer]ism Services: Not on file    Active Member of [address] [place] [address] or Organizations: Not on file    Attends Club or Organization Meetings: Not on file    Marital Status: Not on file   Intimate Partner Violence:     Fear of Current or Ex-Partner: Not on file    Emotionally Abused: Not on file    Physically Abused: Not on file    Sexually Abused: Not on file   Housing Stability:     Unable to Pay for Housing in the Last Year: Not on file    Number of [place] in the Last Year: Not on file    Unstable Housing in the Last Year: Not on file       Family History   Problem Relation Age of Onset    Heart Disease Mother     Diabetes Mother     High Blood Pressure Mother     Diabetes Father     Alzheimer's Disease Father     Heart Disease Sister         CABG    Heart Disease Brother     Heart Disease Brother     Heart Disease Brother     Cancer Brother         REVIEW OF SYSTEM:     Constitutional: No fever or chills. No night sweats, no weight loss   Eyes: No eye discharge, double vision, or eye pain   HEENT: negative for sore mouth, sore throat, hoarseness and voice change   Respiratory: +cough , no sputum, dyspnea, wheezing, hemoptysis, +chest pain   Cardiovascular: negative for chest pain, dyspnea, palpitations, orthopnea, PND   Gastrointestinal: negative for nausea, vomiting, diarrhea, constipation, abdominal pain, Dysphagia, hematemesis and hematochezia   Genitourinary: negative for frequency, dysuria, nocturia, urinary incontinence, and hematuria   Integument: negative for rash, skin lesions, bruises.    Hematologic/Lymphatic: negative for easy bruising, bleeding, lymphadenopathy, petechiae and swelling/edema   Endocrine: negative for heat or cold intolerance, tremor, weight changes, change in bowel habits and hair loss   Musculoskeletal: negative for myalgias, arthralgias, pain, joint swelling,and bone pain   Neurological: negative for headaches, dizziness, seizures, weakness, numbness       OBJECTIVE:         Vitals:    06/30/22 1431   BP: (!) 102/50   Pulse: 69   Resp: 18   Temp: 97.3 °F (36.3 °C)       PHYSICAL EXAM:   General appearance - well appearing, no in pain or distress   Mental status - alert and cooperative   Eyes - pupils equal and reactive, extraocular eye movements intact   Ears - bilateral TM's and external ear canals normal   Mouth - mucous membranes moist, pharynx normal without lesions   Neck - supple, no significant adenopathy   Lymphatics - no palpable lymphadenopathy, no hepatosplenomegaly   Chest - clear to auscultation, no wheezes, rales or rhonchi, symmetric air entry, diminished sounds on the right side  Heart - normal rate, regular rhythm, normal S1, S2, no murmurs, rubs, clicks or gallops   Abdomen - soft, nontender, nondistended, no masses or organomegaly   Neurological - alert, oriented, normal speech, no focal findings or movement disorder noted   Musculoskeletal - no joint tenderness, deformity or swelling   Extremities - peripheral pulses normal, no pedal edema, no clubbing or cyanosis   Skin - normal coloration and turgor, no rashes, no suspicious skin lesions noted ,      LABORATORY DATA:     Lab Results   Component Value Date    WBC 2.3 (L) 05/19/2022    HGB 8.1 (L) 05/19/2022    HCT 25.3 (L) 05/19/2022    MCV 89.7 05/19/2022    PLT See Reflexed IPF Result 05/19/2022    LYMPHOPCT 46 (H) 05/19/2022    RBC 2.82 (L) 05/19/2022    MCH 28.7 05/19/2022    MCHC 32.0 05/19/2022    RDW 17.3 (H) 05/19/2022    NEUTOPHILPCT 50.9 02/02/2017    MONOPCT 7 05/19/2022    EOSPCT 3.1 02/02/2017    BASOPCT 0 05/19/2022    NEUTROABS 0.99 (L) 05/19/2022    LYMPHSABS 1.05 (L) 05/19/2022    MONOSABS 0.16 05/19/2022    EOSABS 0.05 05/19/2022    BASOSABS 0.00 05/19/2022 Chemistry        Component Value Date/Time     05/19/2022 1820    K 4.9 05/19/2022 1820     05/19/2022 1820    CO2 23 05/19/2022 1820    BUN 13 06/20/2022 0936    CREATININE 0.98 06/20/2022 0936        Component Value Date/Time    CALCIUM 8.9 05/19/2022 1820    ALKPHOS 128 05/19/2022 1820    AST 16 05/19/2022 1820    ALT 13 05/19/2022 1820    BILITOT 0.23 (L) 05/19/2022 1820            PATHOLOGY DATA:     Surgical Pathology Report -- Diagnosis --     1.  Right lung, middle lobe wedge biopsy:     -  Adenocarcinoma. -  Tumor is 1.8 cm. -  Carcinoma invades into but not through the visceral pleura. 2.  Lymph node #9, biopsy:     -  Negative for metastatic carcinoma. 3.  Lymph node #7, biopsy:     -  Negative for metastatic carcinoma. 4.  Right middle lobe lung, lobectomy:     -  Margins are free of involvement by carcinoma. -  Peribronchial lymph node, negative for metastatic carcinoma. -  Nonneoplastic lung shows emphysematous change.       Gabi Mari M.D.   **Electronically Signed Out**         rdd/1/6/2022         Clinical Information   Pre-op Diagnosis:  RIGHT MIDDLE LOBE NODULE   Operative Findings:  MIDDLE LOBE OF RIGHT LUNG; LYMPH NODE #9 OF LUNG;   LYMPH NODE #7; RIGHT MIDDLE LOBE   Operation Performed:  MIDDLE WEDGE RESECTION, POSSIBLE MIDDLE   LOBECTOMY VIA THORACOTOMY     Source of Specimen   1: MIDDLE LOBE OF RIGHT LUNG (A)   2: LYMPH NODE #9 OF LUNG (B)   3: LYMPH NODE #7   4: RIGHT MIDDLE LOBE     Gross Description   1.  \"ANA SUSAN, MIDDLE LOBE OF RIGHT LUNG\" Received fresh is a   6.4 x 4.2 x 2.8 cm lung wedge with staple line.  The pleura is   pink-tan with an area of slightly thickening (inked black).    Sectioning reveals a 1.8 x 1.2 x 0.8 cm subpleural tan-white mass   lesion that is 0.8 cm from the staple line margin.  The remaining   parenchyma is pink-tan with no other lesions.  1TP, 1DQ, 1FS, Cassette   summary:  \"A\" frozen section lesion, \"B\" remainder of lesion with   pleura inked black, \"C\" staple line margin en face. 2.  \"ANA DAVIS, LYMPH NODE #9 OF LUNG\" Received fresh are two   fragments, 0.3 cm each.  1TP, 1FS, 1cs. 3.  \"ANA DAVIS, LYMPH NODE #7\" Received fresh are two   anthracotic fragments, 0.6 and 0.7 cm in greatest dimension.  1TP,   1FS, 1cs.     4.  \"ANA DAVIS, RIGHT MIDDLE LOBE\" Received fresh is a 50 gram,   8.5 x 6.8 x 3.5 cm lobe of lung with multiple staple lines.  The   pleura is wrinkled purple-gray.  Sectioning reveals spongy dark red   parenchyma with no masses.  No markedly enlarged nodes are identified   at the hilum.  Cassette summary:  \"A\" bronchial margin frozen section   cassette resubmitted as received, \"B\" hilar soft tissue and vascular   margin, \"C\" uninvolved lung (along staple line).  tm     Intraoperative Diagnosis   1.  FSDX:  Non-small cell carcinoma, favor adenocarcinoma.  (14:00,   RDD)   2.  FSDX:  Fibrofatty tissue.  (14:11, RDD)   3.  FSDX:  Lymph node, negative for carcinoma.  (14:19, RDD)   4.  FSDX:  Bronchial margin, negative for carcinoma.  (16:17, RDD)     Microscopic Description   1-4.  Microscopic examination performed. PROCEDURE: Wedge biopsy, followed by lobectomy       SPECIMEN LATERALITY: Right   TUMOR FOCALITY: Single tumor       TUMOR SITE: Right middle lobe   TUMOR SIZE --     1.8 x 1.2 x 0.8 cm           HISTOLOGIC TYPE: Adenocarcinoma. Sections show a large and anaplastic non-small cell malignancy with   cohesion and a few instances of glandular differentiation.  Neoplastic   cells are large with copious eosinophilic cytoplasm and large   vesicular nuclei.  Tumor is characterized further utilizing control   appropriate immunostains.  Cells of carcinoma are negative for p40 and   they are positive for TTF-1 and CK7.  Morphology and immunophenotype   are that of an adenocarcinoma, lung primary.    Note: The case is reviewed by a second Pathologist for quality assurance with consensus (DS).       HISTOLOGIC GRADE: Grade 2 (of 4)           VISCERAL PLEURA INVASION:Reticulin stain with appropriately reactive   control is utilized to evaluate pleura.  Tumor extends beyond the   elastic layer into the pleura but not to the pleural surface (PL1)     DIRECT INVASION OF ADJACENT STRUCTURES: No     TREATMENT EFFECT: None apparent. LYMPHOVASCULAR INVASION: Absent                         MARGINS --   -BRONCHIAL: Free of tumor       -VASCULAR: Free of tumor       -PARENCHYMAL: Free of tumor       -OTHER ATTACHED TISSUE MARGIN (IF APPLICABLE): N/A       REGIONAL LYMPH NODES: 3   LYMPH NODES(S) FROM PRIOR PROCEDURES: No       NUMBER AND STATION(S) EXAMINED:   See diagnoses   NUMBER AND STATION(S) WITH METASTASIS: 0         DISTANT METASTASIS--   DISTANT SITE(S) INVOLVED, IF APPLICABLE: N/A         PATHOLOGIC STAGE CLASSIFICATION:     pT2a  pN0   CAP Lung 4200 in conjunction with AJCC 8 ed.         SURGICAL PATHOLOGY CONSULTATION         Patient Name: Frederic Blood: [id number]   Path Number: [id number]     [address], [place] [address]. [place], [address]   [phone]   Fax: [phone]          IMAGING DATA:      CT ABDOMEN PELVIS W IV CONTRAST Additional Contrast? Oral  Narrative: EXAMINATION:  CT OF THE ABDOMEN AND PELVIS WITH CONTRAST 6/20/2022 10:50 am    TECHNIQUE:  CT of the abdomen and pelvis was performed with the administration of  intravenous contrast. Multiplanar reformatted images are provided for review. Automated exposure control, iterative reconstruction, and/or weight based  adjustment of the mA/kV was utilized to reduce the radiation dose to as low  as reasonably achievable.     COMPARISON:  CT chest PE study dated 19 May 2022, CT abdomen/pelvis without contrast dated  7 January 2022, CTA abdomen dated 27 May 2021    HISTORY:  ORDERING SYSTEM PROVIDED HISTORY: Abdominal pain, right upper quadrant  TECHNOLOGIST PROVIDED HISTORY:    STAT Creatinine as needed:->Yes  r10.11    FINDINGS:  Lower Chest: Moderate loculated right-sided pleural effusion appears stable  to the comparison CT chest.  The left lung is clear. Organs: The liver is normal.  A few layering low-density stones are seen  within the gallbladder. The gallbladder shows no evidence of wall thickening  or surrounding inflammatory change. The spleen, pancreas, adrenal glands are  normal.  Stable appearance of bilateral renal simple cysts. Within the left  kidney interpolar region there is a round 1.9 cm cortically based nodule with  Hounsfield unit measurements of 64. This area measured 40 Hounsfield units  on the noncontrast CT abdomen/pelvis from 7 January 2022. Punctate  nonobstructing stone within the right kidney. No hydronephrosis or ureteral  stones. GI/Bowel: The stomach is normal.  Small bowel loops appear normal without  evidence of wall thickening or obstruction. The colon appears normal without  evidence of inflammatory change or wall thickening. A normal appendix is  seen arising from the cecum in the right lower quadrant. The appendix is  seen to pass into the proximal portion of a fat containing right inguinal  hernia. Pelvis: Urinary bladder is normal.  A few coarse calcifications are seen  within a normally sized prostate gland. No free fluid. Peritoneum/Retroperitoneum: There is extensive atherosclerotic calcification  throughout the aorta. No aortic aneurysm. No lymphadenopathy. Bones/Soft Tissues: No suspicious osseous lesions. Impression: 1. No acute abdominal or pelvic findings. 2.  Moderate loculated right-sided pleural effusion. This appears unchanged  from the comparison CT chest from 19 May 2022    3. Cholelithiasis without evidence of acute cholecystitis    4.   Within the left kidney interpolar region there is a 1.9 cm nodule which  is not compatible with a simple cyst.  This is indeterminate and requires  further evaluation with a dedicated renal CT with and without contrast.    RECOMMENDATIONS:  Recommend follow-up evaluation of the left kidney nodule with dedicated renal  CT. Source: White Paper: Management of the incidental Renal Mass on CT: A  White Paper of the [facility] of Radiology incidental findings  committee, Journal of the [facility] of Radiology February 2018       CT chest with IV contrast on 11/2/2021    The previously biopsied, somewhat bilobed nodule involving right middle lobe   is slightly increased in size since the prior study now measuring 1.8 x 1.0   cm, once measuring 1.8 x 0.6 cm.  There does appear to be some spiculation. No new pulmonary nodule is seen. PET/CT on June 30, 2021    1. Significant oval increase in size of known anterior superior middle lobe   nodule along the minor fissure.  This now measures 6 x 18 mm compared to 10 x   4 mm on the prior.  This remains a LUNG RADS 4B (very suspicious) nodule. Recommend repeat PET-CT and or tissue sampling given appearance and   significant interval increase in size. 2. No major change in the fat density 2.3 cm nodule in the gastric fundus   adjacent to the GE junction.  Note this was FDG avid on the prior PET-CT. 3. Mild emphysema. The findings were sent to the Radiology Results [address] at 12:06   pm on 6/10/2021to be communicated to a licensed caregiver.           ASSESSMENT / PLAN:    Renae Dunham was seen today for follow-up and flank pain.     Diagnoses and all orders for this visit:    Lung cancer, main bronchus, right ([address]    Pleural effusion on right    Right-sided chest pain    Left kidney mass           3 80-year-old man history of smoking diagnosed with pathology stage Ib(T2 aN0 M0) adenocarcinoma of the right middle lobe status post right VATS thoracotomy with right middle lobectomy done on January 4, 2022, due to high risk features manifested by involvement of visceral pleura recommended adjuvant chemotherapy which is Alimta and carboplatin, carboplatin substitute cisplatin due to frailed/nausea related to gastroparesis from diabetes in addition to mild neuropathy, started on chemotherapy on February 21, 2022   2. Patient status post fourth cycle of chemotherapy he did well however the last cycle he had weakness fatigue and lightheadedness  3. Hypotension/orthostatic will refer patient to emergency room for volume repletion may need to stay overnight  4. Right-sided pleural effusion status post thoracentesis> fluid sent for cytology and was negative> repeat imaging did show loculated fluid and IR recommended no more thoracentesis and with persistent loculated fluid will refer back to surgery    11. Nosebleed> ENT resolved  6. Intermittent nausea this is prior to surgery related to diabetic gastroparesis > antinausea medicine  7. Decreased appetite due to the nausea> encourage oral intake> improved  8. Persistent right-sided chest pain since surgery> will start MS Contin and MS IR and referral back to cardiovascular surgery   9. Follow-up on left kidney mass already had scheduled for CT kidney protocol                 I spent a total of 40  minutes on the date of the service which included preparing to see the patient, face-to-face patient care, completing clinical documentation, obtaining and/or reviewing separately obtained history, performing a medically appropriate examination, counseling and educating the patient/family/caregiver and ordering medications, tests, or procedures.                                        [facility]                            This note is created with the assistance of a speech recognition program.  While intending to generate a document that actually reflects the content of the visit, the document can still have some errors including those of syntax and sound a like substitutions which may escape proof reading. It such instances, actual meaning can be extrapolated by contextual diversion.

## 2022-07-03 ENCOUNTER — HOSPITAL ENCOUNTER (EMERGENCY)
Age: 72
Discharge: HOME | End: 2022-07-03
Attending: EMERGENCY MEDICINE
Payer: MEDICARE

## 2022-07-03 ENCOUNTER — APPOINTMENT (OUTPATIENT)
Dept: CT IMAGING | Age: 72
End: 2022-07-03
Payer: MEDICARE

## 2022-07-03 VITALS
DIASTOLIC BLOOD PRESSURE: 60 MMHG | TEMPERATURE: 97.6 F | RESPIRATION RATE: 20 BRPM | HEART RATE: 79 BPM | OXYGEN SATURATION: 97 % | SYSTOLIC BLOOD PRESSURE: 111 MMHG

## 2022-07-03 DIAGNOSIS — R33.9 URINARY RETENTION: Primary | ICD-10-CM

## 2022-07-03 DIAGNOSIS — J90 PLEURAL EFFUSION: ICD-10-CM

## 2022-07-03 DIAGNOSIS — D64.9 CHRONIC ANEMIA: ICD-10-CM

## 2022-07-03 LAB
-: ABNORMAL
ABSOLUTE EOS #: 0.25 K/UL (ref 0–0.44)
ABSOLUTE IMMATURE GRANULOCYTE: 0.03 K/UL (ref 0–0.3)
ABSOLUTE LYMPH #: 1.49 K/UL (ref 1.1–3.7)
ABSOLUTE MONO #: 0.79 K/UL (ref 0.1–1.2)
ALBUMIN SERPL-MCNC: 4.1 G/DL (ref 3.5–5.2)
ALBUMIN/GLOBULIN RATIO: 1.2 (ref 1–2.5)
ALP BLD-CCNC: 123 U/L (ref 40–129)
ALT SERPL-CCNC: 9 U/L (ref 5–41)
ANION GAP SERPL CALCULATED.3IONS-SCNC: 13 MMOL/L (ref 9–17)
AST SERPL-CCNC: 15 U/L
BACTERIA: ABNORMAL
BASOPHILS # BLD: 1 % (ref 0–2)
BASOPHILS ABSOLUTE: 0.07 K/UL (ref 0–0.2)
BILIRUB SERPL-MCNC: 0.41 MG/DL (ref 0.3–1.2)
BILIRUBIN URINE: NEGATIVE
BUN BLDV-MCNC: 22 MG/DL (ref 8–23)
BUN/CREAT BLD: 19 (ref 9–20)
CALCIUM SERPL-MCNC: 9.3 MG/DL (ref 8.6–10.4)
CHLORIDE BLD-SCNC: 98 MMOL/L (ref 98–107)
CO2: 25 MMOL/L (ref 20–31)
COLOR: YELLOW
CREAT SERPL-MCNC: 1.14 MG/DL (ref 0.7–1.2)
EKG ATRIAL RATE: 65 BPM
EKG P AXIS: 53 DEGREES
EKG P-R INTERVAL: 166 MS
EKG Q-T INTERVAL: 378 MS
EKG QRS DURATION: 84 MS
EKG QTC CALCULATION (BAZETT): 393 MS
EKG R AXIS: 45 DEGREES
EKG T AXIS: 59 DEGREES
EKG VENTRICULAR RATE: 65 BPM
EOSINOPHILS RELATIVE PERCENT: 4 % (ref 1–4)
EPITHELIAL CELLS UA: ABNORMAL /HPF (ref 0–5)
GFR AFRICAN AMERICAN: >60 ML/MIN
GFR NON-AFRICAN AMERICAN: >60 ML/MIN
GFR SERPL CREATININE-BSD FRML MDRD: ABNORMAL ML/MIN/{1.73_M2}
GFR SERPL CREATININE-BSD FRML MDRD: ABNORMAL ML/MIN/{1.73_M2}
GLUCOSE BLD-MCNC: 133 MG/DL (ref 70–99)
GLUCOSE URINE: NEGATIVE
HCT VFR BLD CALC: 25.6 % (ref 40.7–50.3)
HEMOGLOBIN: 7.9 G/DL (ref 13–17)
IMMATURE GRANULOCYTES: 1 %
KETONES, URINE: NEGATIVE
LEUKOCYTE ESTERASE, URINE: NEGATIVE
LIPASE: 13 U/L (ref 13–60)
LYMPHOCYTES # BLD: 23 % (ref 24–43)
MCH RBC QN AUTO: 28.5 PG (ref 25.2–33.5)
MCHC RBC AUTO-ENTMCNC: 30.9 G/DL (ref 28.4–34.8)
MCV RBC AUTO: 92.4 FL (ref 82.6–102.9)
MONOCYTES # BLD: 12 % (ref 3–12)
NITRITE, URINE: NEGATIVE
NRBC AUTOMATED: 0 PER 100 WBC
PDW BLD-RTO: 16 % (ref 11.8–14.4)
PH UA: 6 (ref 5–9)
PLATELET # BLD: 182 K/UL (ref 138–453)
PMV BLD AUTO: 9.6 FL (ref 8.1–13.5)
POTASSIUM SERPL-SCNC: 4.9 MMOL/L (ref 3.7–5.3)
PROTEIN UA: NEGATIVE
RBC # BLD: 2.77 M/UL (ref 4.21–5.77)
RBC UA: ABNORMAL /HPF (ref 0–2)
SEG NEUTROPHILS: 59 % (ref 36–65)
SEGMENTED NEUTROPHILS ABSOLUTE COUNT: 3.78 K/UL (ref 1.5–8.1)
SODIUM BLD-SCNC: 136 MMOL/L (ref 135–144)
SPECIFIC GRAVITY UA: <1.005 (ref 1.01–1.02)
TOTAL PROTEIN: 7.4 G/DL (ref 6.4–8.3)
TROPONIN, HIGH SENSITIVITY: 15 NG/L (ref 0–22)
TURBIDITY: CLEAR
URINE HGB: NEGATIVE
UROBILINOGEN, URINE: NORMAL
WBC # BLD: 6.4 K/UL (ref 3.5–11.3)
WBC UA: ABNORMAL /HPF (ref 0–5)

## 2022-07-03 PROCEDURE — 84484 ASSAY OF TROPONIN QUANT: CPT

## 2022-07-03 PROCEDURE — 85025 COMPLETE CBC W/AUTO DIFF WBC: CPT

## 2022-07-03 PROCEDURE — 80053 COMPREHEN METABOLIC PANEL: CPT

## 2022-07-03 PROCEDURE — 93010 ELECTROCARDIOGRAM REPORT: CPT | Performed by: FAMILY MEDICINE

## 2022-07-03 PROCEDURE — 6360000004 HC RX CONTRAST MEDICATION: Performed by: EMERGENCY MEDICINE

## 2022-07-03 PROCEDURE — 74177 CT ABD & PELVIS W/CONTRAST: CPT

## 2022-07-03 PROCEDURE — 93005 ELECTROCARDIOGRAM TRACING: CPT | Performed by: EMERGENCY MEDICINE

## 2022-07-03 PROCEDURE — 99285 EMERGENCY DEPT VISIT HI MDM: CPT

## 2022-07-03 PROCEDURE — 81001 URINALYSIS AUTO W/SCOPE: CPT

## 2022-07-03 PROCEDURE — 36415 COLL VENOUS BLD VENIPUNCTURE: CPT

## 2022-07-03 PROCEDURE — 83690 ASSAY OF LIPASE: CPT

## 2022-07-03 RX ADMIN — IOPAMIDOL 75 ML: 755 INJECTION, SOLUTION INTRAVENOUS at 19:02

## 2022-07-03 ASSESSMENT — ENCOUNTER SYMPTOMS
DIARRHEA: 0
VOMITING: 0
ABDOMINAL PAIN: 0
SORE THROAT: 0
NAUSEA: 0
SHORTNESS OF BREATH: 0
CONSTIPATION: 0

## 2022-07-03 ASSESSMENT — PAIN - FUNCTIONAL ASSESSMENT: PAIN_FUNCTIONAL_ASSESSMENT: NONE - DENIES PAIN

## 2022-07-03 NOTE — ED PROVIDER NOTES
[facility] ED  Emergency Department Encounter  Emergency Medicine Physician     Pt Howard Garcia  MRN: [id number]  [place] 1950  Date of evaluation: 7/3/22  PCP:  [address] [place] [address]       Chief Complaint   Patient presents with    Urinary Retention     Pt states only having small voids since yesterday. Per PCP, pt began taking morphine dose 2 days ago, last dose yesterday. History of Lung CA with lobectomy in  [place]       HISTORY OF PRESENT ILLNESS  (Location/Symptom, Timing/Onset, Context/Setting, Quality, Duration, Modifying Factors, Severity.)      Edilberto Rivera is a 67 y.o. male who presents to the emergency department with acute urinary retention for 2 days. Patient has no prior history of urinary retention that he is aware of but he does have a history of right-sided lung cancer status post lobectomy remotely with chronic pain in the area treated with oral morphine. Over the past couple of days he has had difficulty urinating and when he goes to the bathroom only a small amount comes out. He points to a urinal filled with less than 20 cc of fluid and says that that is what he urinated out since he has been in the emergency department. Otherwise he denies fever, chills, HEENT symptoms, chest pain, shortness of breath, abdominal pain other than chronic, current nausea, or numbness or tingling anywhere. However he does incidentally note that he threw up once yesterday after drinking some MiraLAX and suspected that that upset his stomach. Also he incidentally states that he has been having some blurry spots in his vision for the past week.     PAST MEDICAL / SURGICAL / SOCIAL / FAMILY HISTORY      has a past medical history of Abnormal cardiac cath, Actinic keratosis, Arthritis, CAD (coronary artery disease), Calculus of kidney, Cancer ([address] Cervical stenosis of spine, Diabetes mellitus ([address] Full dentures, GERD (gastroesophageal reflux disease), H/O echocardiogram, History of cardiac cath, History of echocardiogram, History of echocardiogram, History of heart attack, History of tobacco abuse, Hyperlipidemia, Hypertension, Pulmonary nodule, Raynaud's phenomenon, S/P angioplasty with stent, and Wears hearing aid in both ears. has a past surgical history that includes Cardiac catheterization (); Hemorrhoid surgery; Neck surgery (); other surgical history (10/2014); other surgical history (2014); Cataract removal (2015); Upper gastrointestinal endoscopy (2005); Cataract removal (2015); Colonoscopy (2006); Colonoscopy (2016); Cardiac catheterization (2017); Cardiac catheterization (2017); Cardiac catheterization (2019); Upper gastrointestinal endoscopy (N/A, 2019); Upper gastrointestinal endoscopy (N/A, 10/22/2019); Lung biopsy (Right, 2021); CT NEEDLE BIOPSY LUNG PERCUTANEOUS (2021); hernia repair (Left); Lung removal, total (Right, 2022); IR CHEST TUBE INSERTION (2022); TUNNELED CENTRAL VENOUS CATHETER W/ SUBCUTANEOUS PORT (Left, 2022); and IR PORT PLACEMENT > 5 YEARS (2022).     Social History     Socioeconomic History    Marital status:      Spouse name: Not on file    Number of children: Not on file    Years of education: Not on file    Highest education level: Not on file   Occupational History    Not on file   Tobacco Use    Smoking status: Former Smoker     Packs/day: 0.25     Years: 54.00     Pack years: 13.50     Types: Cigarettes     Quit date: 10/1/2021     Years since quittin.7    Smokeless tobacco: Never Used   Vaping Use    Vaping Use: Never used   Substance and Sexual Activity    Alcohol use: No    Drug use: No    Sexual activity: Not on file   Other Topics Concern    Not on file   Social History Narrative    Not on file     Social Determinants of Health     Financial Resource Strain:     Difficulty of Paying Living Expenses: Not on file   Food Insecurity:     Worried About Running Out of Food in the Last Year: Not on file    Luis E of Food in the Last Year: Not on file   Transportation Needs:     Lack of Transportation (Medical): Not on file    Lack of Transportation (Non-Medical): Not on file   Physical Activity:     Days of Exercise per Week: Not on file    Minutes of Exercise per Session: Not on file   Stress:     Feeling of Stress : Not on file   Social Connections:     Frequency of Communication with Friends and Family: Not on file    Frequency of Social Gatherings with Friends and Family: Not on file    Attends Jainism Services: Not on file    Active Member of [address] [place] [address] MyPermissions or Organizations: Not on file    Attends Club or Organization Meetings: Not on file    Marital Status: Not on file   Intimate Partner Violence:     Fear of Current or Ex-Partner: Not on file    Emotionally Abused: Not on file    Physically Abused: Not on file    Sexually Abused: Not on file   Housing Stability:     Unable to Pay for Housing in the Last Year: Not on file    Number of [place] in the Last Year: Not on file    Unstable Housing in the Last Year: Not on file       Family History   Problem Relation Age of Onset    Heart Disease Mother     Diabetes Mother     High Blood Pressure Mother     Diabetes Father     Alzheimer's Disease Father     Heart Disease Sister         CABG    Heart Disease Brother     Heart Disease Brother     Heart Disease Brother     Cancer Brother        Allergies:  Niacin and related, Minocycline hcl, Other, and Oxycodone hcl    Home Medications:  Prior to Admission medications    Medication Sig Start Date End Date Taking? Authorizing Provider   morphine (MS CONTIN) 15 MG extended release tablet Take 1 tablet by mouth 2 times daily for 30 days.  Take 1 every 12 hours 6/30/22 7/30/22  Shawnee Gallegos MD   morphine (MSIR) 15 MG tablet Take 1 tablet by mouth every 6 hours as needed for Pain for up to 30 days. 6/30/22 7/30/22  Mahin Alvarado MD   amLODIPine (NORVASC) 5 MG tablet Take 1 tablet by mouth daily 3/28/22   Linda Huerta MD   magnesium 30 MG tablet Take 400 mg by mouth daily     Historical Provider, MD   clopidogrel (PLAVIX) 75 MG tablet Take 1 tablet by mouth daily 3/4/22   Linda Huerta MD   zinc 50 MG CAPS Take by mouth    Historical Provider, MD   promethazine (PHENERGAN) 25 MG tablet Take 25 mg by mouth every 4 hours as needed for Nausea    Historical Provider, MD   pantoprazole (PROTONIX) 40 MG tablet Take 40 mg by mouth in the morning and at bedtime     Historical Provider, MD   ondansetron (ZOFRAN ODT) 8 MG TBDP disintegrating tablet Take 1 tablet by mouth every 8 hours as needed for Nausea 1/28/22   Mahin Alvarado MD   lidocaine-prilocaine (EMLA) 2.5-2.5 % cream Apply topically to port site 30- 60 minutes before access as needed. 1/28/22   Mahin Alvarado MD   folic acid (FOLVITE) 1 MG tablet Take 1 tablet by mouth daily for 30 doses Start 7 days before first scheduled dose and continue for 21 days after last dose of PEMEtrexed. 1/28/22 5/19/22  Mahin Alvarado MD   gabapentin (NEURONTIN) 100 MG capsule Take 1 capsule by mouth 3 times daily for 14 days.  1/15/22 4/7/22  Marga Banuelos, APRN - CNP   SERTRALINE HCL PO Take 50 mg by mouth nightly 12/1/21   Historical Provider, MD   tamsulosin (FLOMAX) 0.4 MG capsule Take 0.4 mg by mouth daily    Historical Provider, MD   tiZANidine (ZANAFLEX) 4 MG capsule Take 4 mg by mouth 3 times daily as needed    Historical Provider, MD   ferrous sulfate (IRON 325) 325 (65 Fe) MG tablet Take 325 mg by mouth daily     Historical Provider, MD   isosorbide mononitrate (IMDUR) 30 MG extended release tablet 1/2 tablet in the morning    Historical Provider, MD   lisinopril (PRINIVIL;ZESTRIL) 20 MG tablet Take 20 mg by mouth daily   Patient not taking: Reported on 6/30/2022    Historical Provider, MD   nitroGLYCERIN (NITRODUR) 0.4 MG/HR 1 dose under tongue as needed for chest pain. max of 3 in one day    Historical Provider, MD   atenolol (TENORMIN) 25 MG tablet Take 1 tablet by mouth daily 6/7/21   Manohar Herrera MD   Insulin Glargine, 2 Unit Dial, 300 UNIT/ML SOPN Insulin Glargine Insulin Glargine U-300 Conc Active 10 UNIT Subcutaneous Every morning May 7th, 2020 11:47am 05-  [facility] ([id number])  Patient not taking: Reported on 6/30/2022 5/7/20   Historical Provider, MD   atorvastatin (LIPITOR) 20 MG tablet Take 1 tablet by mouth daily 12/15/20   Manohar Herrera MD   sitaGLIPtan-metformin (JANUMET)  MG per tablet Take 1 tablet by mouth 2 times daily (with meals)    Historical Provider, MD   glimepiride (AMARYL) 2 MG tablet Take 1 tablet by mouth 2 times daily 5/22/15   Nick Short MD   aspirin 81 MG tablet Take 81 mg by mouth daily Ordered by heart doctor, Dr. Larry Wiley [place]    Historical Provider, MD       REVIEW OF SYSTEMS    (2-9 systems for level 4, 10 or more for level 5)      Review of Systems   Constitutional: Negative for chills and fever. HENT: Negative for ear pain, hearing loss and sore throat. Eyes: Positive for visual disturbance. Respiratory: Negative for shortness of breath. Cardiovascular: Negative for chest pain. Gastrointestinal: Negative for abdominal pain, constipation, diarrhea, nausea and vomiting. Genitourinary: Positive for difficulty urinating. Negative for dysuria. Musculoskeletal: Negative for arthralgias and myalgias. Neurological: Negative for numbness. Psychiatric/Behavioral: Negative for agitation and confusion. PHYSICAL EXAM   (up to 7 for level 4, 8 or more for level 5)      INITIAL VITALS:   /60   Pulse 79   Temp 97.6 °F (36.4 °C) (Oral)   Resp 20   SpO2 97%     Physical Exam  Vitals and nursing note reviewed. Constitutional:       General: He is not in acute distress. Appearance: Normal appearance. He is well-developed.  He is not ill-appearing or diaphoretic. HENT:      Head: Normocephalic and atraumatic. Right Ear: External ear normal.      Left Ear: External ear normal.      Nose: Nose normal.      Mouth/Throat:      Mouth: Mucous membranes are moist.   Eyes:      Extraocular Movements: Extraocular movements intact. Conjunctiva/sclera: Conjunctivae normal.   Neck:      Trachea: No tracheal deviation. Cardiovascular:      Rate and Rhythm: Normal rate and regular rhythm. Heart sounds: Normal heart sounds. No murmur heard. No friction rub. No gallop. Pulmonary:      Effort: Pulmonary effort is normal. No respiratory distress. Breath sounds: Normal breath sounds. No wheezing, rhonchi or rales. Abdominal:      General: Abdomen is flat. There is no distension. Palpations: Abdomen is soft. There is no mass. Tenderness: There is abdominal tenderness (Slight in the suprapubic region, fullness palpated). There is no guarding or rebound. Musculoskeletal:         General: No swelling, deformity or signs of injury. Normal range of motion. Cervical back: Normal range of motion and neck supple. Skin:     General: Skin is warm and dry. Coloration: Skin is not jaundiced. Findings: No bruising or lesion. Neurological:      General: No focal deficit present. Mental Status: He is alert and oriented to person, place, and time. Mental status is at baseline. Motor: No abnormal muscle tone.          DIFFERENTIAL  DIAGNOSIS     PLAN (LABS / IMAGING / EKG):  Orders Placed This Encounter   Procedures    CT ABDOMEN PELVIS W IV CONTRAST Additional Contrast? None    CBC with Auto Differential    Comprehensive Metabolic Panel w/ Reflex to MG    Lipase    Troponin    Urinalysis with Reflex to Culture    Microscopic Urinalysis    Bladder scan    Bladder scan    Straight cath    EKG 12 Lead       MEDICATIONS ORDERED:  Orders Placed This Encounter   Medications    bisacodyl (DULCOLAX) EC tablet 5 mg    iopamidol (ISOVUE-370) 76 % injection 75 mL       DDX: Michael Coffman is a 67 y.o. male who presents to the emergency department with concern for urinary retention.  Differential diagnosis includes UTI, BPH, metastasis, malignancy of prostate    DIAGNOSTIC RESULTS / EMERGENCY DEPARTMENT COURSE / MDM   LAB RESULTS:  Results for orders placed or performed during the hospital encounter of 07/03/22   CBC with Auto Differential   Result Value Ref Range    WBC 6.4 3.5 - 11.3 k/uL    RBC 2.77 (L) 4.21 - 5.77 m/uL    Hemoglobin 7.9 (L) 13.0 - 17.0 g/dL    Hematocrit 25.6 (L) 40.7 - 50.3 %    MCV 92.4 82.6 - 102.9 fL    MCH 28.5 25.2 - 33.5 pg    MCHC 30.9 28.4 - 34.8 g/dL    RDW 16.0 (H) 11.8 - 14.4 %    Platelets 300 847 - 312 k/uL    MPV 9.6 8.1 - 13.5 fL    NRBC Automated 0.0 0.0 per 100 WBC    Seg Neutrophils 59 36 - 65 %    Lymphocytes 23 (L) 24 - 43 %    Monocytes 12 3 - 12 %    Eosinophils % 4 1 - 4 %    Basophils 1 0 - 2 %    Immature Granulocytes 1 (H) 0 %    Segs Absolute 3.78 1.50 - 8.10 k/uL    Absolute Lymph # 1.49 1.10 - 3.70 k/uL    Absolute Mono # 0.79 0.10 - 1.20 k/uL    Absolute Eos # 0.25 0.00 - 0.44 k/uL    Basophils Absolute 0.07 0.00 - 0.20 k/uL    Absolute Immature Granulocyte 0.03 0.00 - 0.30 k/uL   Comprehensive Metabolic Panel w/ Reflex to MG   Result Value Ref Range    Glucose 133 (H) 70 - 99 mg/dL    BUN 22 8 - 23 mg/dL    CREATININE 1.14 0.70 - 1.20 mg/dL    Bun/Cre Ratio 19 9 - 20    Calcium 9.3 8.6 - 10.4 mg/dL    Sodium 136 135 - 144 mmol/L    Potassium 4.9 3.7 - 5.3 mmol/L    Chloride 98 98 - 107 mmol/L    CO2 25 20 - 31 mmol/L    Anion Gap 13 9 - 17 mmol/L    Alkaline Phosphatase 123 40 - 129 U/L    ALT 9 5 - 41 U/L    AST 15 <40 U/L    Total Bilirubin 0.41 0.3 - 1.2 mg/dL    Total Protein 7.4 6.4 - 8.3 g/dL    Albumin 4.1 3.5 - 5.2 g/dL    Albumin/Globulin Ratio 1.2 1.0 - 2.5    GFR Non-African American >60 >60 mL/min    GFR African American >60 >60 mL/min    GFR Comment          GFR Staging         Lipase   Result Value Ref Range    Lipase 13 13 - 60 U/L   Troponin   Result Value Ref Range    Troponin, High Sensitivity 15 0 - 22 ng/L   Urinalysis with Reflex to Culture    Specimen: Urine   Result Value Ref Range    Color, UA Yellow Yellow    Turbidity UA Clear Clear    Glucose, Ur NEGATIVE NEGATIVE    Bilirubin Urine NEGATIVE NEGATIVE    Ketones, Urine NEGATIVE NEGATIVE    Specific Gravity, UA <1.005 (L) 1.010 - 1.020    Urine Hgb NEGATIVE NEGATIVE    pH, UA 6.0 5.0 - 9.0    Protein, UA NEGATIVE NEGATIVE    Urobilinogen, Urine Normal Normal    Nitrite, Urine NEGATIVE NEGATIVE    Leukocyte Esterase, Urine NEGATIVE NEGATIVE   Microscopic Urinalysis   Result Value Ref Range    -          WBC, UA None 0 - 5 /HPF    RBC, UA None 0 - 2 /HPF    Epithelial Cells UA 0 TO 2 0 - 5 /HPF    Bacteria, UA TRACE (A) None   EKG 12 Lead   Result Value Ref Range    Ventricular Rate 65 BPM    Atrial Rate 65 BPM    P-R Interval 166 ms    QRS Duration 84 ms    Q-T Interval 378 ms    QTc Calculation (Bazett) 393 ms    P Axis 53 degrees    R Axis 45 degrees    T Axis 59 degrees       IMPRESSION: Piyush Schwartz is a 67 y.o. male who presents to the emergency department with urinary retention. On examination he is afebrile, vital signs unremarkable and examination demonstrates well-appearing male stated age ambulates with steady gait and has clear heart and lung sounds, no CVA tenderness, with some tenderness to palpation of the suprapubic region and fullness noted by wife raising suspicion for urinary retention. We will obtain bladder scan, straight cath, and send urine as well as obtain basic labs to check for infection. Patient verbalizes understanding and agreement with plan.      RADIOLOGY:  XR CHEST (2 VW)    Result Date: 6/6/2022  EXAMINATION: 2 XRAY VIEWS OF THE CHEST 6/6/2022 9:49 am COMPARISON: 19 May 2022; 25 April 2022 HISTORY: ORDERING SYSTEM PROVIDED HISTORY: Pain TECHNOLOGIST PROVIDED HISTORY: Post thoracocentesis Patient is status post right lower lobectomy for lung cancer. FINDINGS: Postsurgical changes are present on the right with surgical clips in the right infrahilar area. Infusion port enters from left terminating at the cavoatrial junction. Prior right pleural effusion has reduced in size but there is intrapleural air now post thoracentesis which is loculated and located at the right base. No extrapleural air in the apices. Old healed left-sided rib fractures. Small loculated pneumothorax right base post thoracentesis. RECOMMENDATION: Would not recommend further thoracentesis on this patient with loculation. CT ABDOMEN PELVIS W IV CONTRAST Additional Contrast? Oral    Result Date: 6/20/2022  EXAMINATION: CT OF THE ABDOMEN AND PELVIS WITH CONTRAST 6/20/2022 10:50 am TECHNIQUE: CT of the abdomen and pelvis was performed with the administration of intravenous contrast. Multiplanar reformatted images are provided for review. Automated exposure control, iterative reconstruction, and/or weight based adjustment of the mA/kV was utilized to reduce the radiation dose to as low as reasonably achievable. COMPARISON: CT chest PE study dated 19 May 2022, CT abdomen/pelvis without contrast dated 7 January 2022, CTA abdomen dated 27 May 2021 HISTORY: ORDERING SYSTEM PROVIDED HISTORY: Abdominal pain, right upper quadrant TECHNOLOGIST PROVIDED HISTORY: STAT Creatinine as needed:->Yes r10.11 FINDINGS: Lower Chest: Moderate loculated right-sided pleural effusion appears stable to the comparison CT chest.  The left lung is clear. Organs: The liver is normal.  A few layering low-density stones are seen within the gallbladder. The gallbladder shows no evidence of wall thickening or surrounding inflammatory change. The spleen, pancreas, adrenal glands are normal.  Stable appearance of bilateral renal simple cysts.   Within the left kidney interpolar region there is a round 1.9 cm cortically based nodule with Hounsfield unit measurements of 64. This area measured 40 Hounsfield units on the noncontrast CT abdomen/pelvis from 7 January 2022. Punctate nonobstructing stone within the right kidney. No hydronephrosis or ureteral stones. GI/Bowel: The stomach is normal.  Small bowel loops appear normal without evidence of wall thickening or obstruction. The colon appears normal without evidence of inflammatory change or wall thickening. A normal appendix is seen arising from the cecum in the right lower quadrant. The appendix is seen to pass into the proximal portion of a fat containing right inguinal hernia. Pelvis: Urinary bladder is normal.  A few coarse calcifications are seen within a normally sized prostate gland. No free fluid. Peritoneum/Retroperitoneum: There is extensive atherosclerotic calcification throughout the aorta. No aortic aneurysm. No lymphadenopathy. Bones/Soft Tissues: No suspicious osseous lesions. 1.  No acute abdominal or pelvic findings. 2.  Moderate loculated right-sided pleural effusion. This appears unchanged from the comparison CT chest from 19 May 2022 3. Cholelithiasis without evidence of acute cholecystitis 4. Within the left kidney interpolar region there is a 1.9 cm nodule which is not compatible with a simple cyst.  This is indeterminate and requires further evaluation with a dedicated renal CT with and without contrast. RECOMMENDATIONS: Recommend follow-up evaluation of the left kidney nodule with dedicated renal CT. Source: White Paper: Management of the incidental Renal Mass on CT: A White Paper of the [employer] of [employer] incidental findings committee, Journal of the [employer] February 2018     US GALLBLADDER RUQ    Result Date: 6/10/2022  EXAMINATION: RIGHT UPPER QUADRANT ULTRASOUND 6/10/2022 7:59 am COMPARISON: None.  HISTORY: ORDERING SYSTEM PROVIDED HISTORY: Abdominal pain, right upper quadrant FINDINGS: LIVER:  The liver demonstrates normal echogenicity without evidence of intrahepatic biliary ductal dilatation. BILIARY SYSTEM:  Multiple echogenic foci are noted the dependent side gallbladder shadowing consistent with small gallstones. No gallbladder wall thickening or pericholecystic fluid. No sonographic Staples sign. Common bile duct is within normal limits measuring 1.7 mm. Brina Stands RIGHT KIDNEY: The right kidney is grossly unremarkable without evidence of hydronephrosis. PANCREAS:  Visualized portions of the pancreas are unremarkable. OTHER: No evidence of right upper quadrant ascites. Cholelithiasis without ductal dilatation or pericholecystic fluid. US THORACENTESIS Which side should the procedure be performed? Right    Result Date: 6/6/2022  PROCEDURE: ULTRASOUNDGUIDED RIGHT THORACENTESIS 6/6/2022 HISTORY: ORDERING SYSTEM PROVIDED HISTORY: Pleural effusion TECHNOLOGIST PROVIDED HISTORY: Which side should the procedure be performed?->Right Patient status post partial lobectomy right-side. Chemotherapy for lung cancer. Prior thoracentesis for effusions. TECHNIQUE: Informed consent was obtained after a detailed explanation of the procedure including risks, benefits, and alternatives. Universal protocol was performed. The right chest was prepped and draped in sterile fashion and local anesthesia was achieved with lidocaine. An 8 Slovenian needle sheath was advanced under ultrasound guidance into pleural effusion and thoracentesis was performed. The patient tolerated the procedure well. Patient was transferred to another room for performance of a chest x-ray. FINDINGS: A total of 700 cc was removed. Fluid was bloody. Successful ultrasound guided thoracentesis. Small loculated pneumothorax seen on postprocedural x-ray not requiring intervention.        EKG  EKG Interpretation    Interpreted by emergency department physician    Rhythm: normal sinus   Rate: normal  Axis: normal  Ectopy: none  Conduction: normal  ST Segments: no acute change  T Waves: no acute change  Q Waves: none    Clinical Impression: no acute changes and normal EKG    Eduardo Harley MD    All EKG's are interpreted by the Emergency Department Physician who either signs or co-signs this chart in the absence of a cardiologist.    EMERGENCY DEPARTMENT COURSE:  ED Course as of 07/03/22 2148   Sun Jul 03, 2022   1744 Hemoglobin Quant(!): 7.9  8.1 1 month ago [TS]   1842 Significant urinary retention was noted via straight cath which is new for patient. Given history of cancer we will proceed with imaging of the abdomen to rule out metastasis [TS]   1945 The patient was signed out to me by Dr. Shiela Grant at [facility]. Patient originally presented for new onset urinary retention. A straight cath was performed which showed about 400 cc of immediate output. Plan to wait for CT to ensure no mets to the prostate or bladder given his history of malignancy. [AB]   2021 CT was significant for a pleural effusion that is already known to the patient. He already has follow-up for the pleural effusion. There was no significant findings in the pelvis. At reevaluation the patient states he has had minimal output since the straight cath of a few cc at a time. Options were discussed including a Arce catheter with follow-up to urology. Patient declines and states he will be able to urinate at home. He is already on daily Flomax. Patient is discharged with follow-up to urology. He agrees with plan and all questions were answered. Strict return precautions were given. [AB]      ED Course User Index  [AB] Ramiro Sierra DO  [TS] Sandra Rader MD       PROCEDURES:  None    CONSULTS:  None    CRITICAL CARE:  None    FINAL IMPRESSION      1. Urinary retention    2. Pleural effusion    3.  Chronic anemia          DISPOSITION / PLAN     DISPOSITION Decision To Discharge 07/03/2022 08:17:01 PM      PATIENT REFERRED TO:  Jon[address]  [facility] [id number]  [phone]    Call Jen Burciaga MD  [address] [place] [address]  [phone]    Schedule an appointment as soon as possible for a visit         DISCHARGE MEDICATIONS:  Discharge Medication List as of 7/3/2022  8:19 PM          Jocelyne Mckee MD  Emergency Medicine Physician    This patient was evaluated in the Emergency Department for symptoms described in the history of present illness. He/she was evaluated in the context of the global COVID-19 pandemic, which necessitated consideration that the patient might be at risk for infection with the SARS-CoV-2 virus that causes COVID-19. Institutional protocols and algorithms that pertain to the evaluation of patients at risk for COVID-19 are in a state of rapid change based on information released by regulatory bodies including the CDC and federal and state organizations. These policies and algorithms were followed during the patient's care in the ED.     (Please note that portions of this note were completed with a voice recognition program.  Efforts were made to edit the dictations but occasionally words are mis-transcribed.)        Jocelyne Mckee MD  07/03/22 [id number]

## 2022-07-04 NOTE — ED PROVIDER NOTES
[address] [place] [address] ED  EMERGENCY DEPARTMENT ENCOUNTER      Pt Name: Bereket Gomez  MRN: [id number]  [place] 1950  Date of evaluation: 7/3/2022  Provider: Thanh Gilbert [address] [place] [address]       Chief Complaint   Patient presents with    Urinary Retention     Pt states only having small voids since yesterday. Per PCP, pt began taking morphine dose 2 days ago, last dose yesterday. History of Lung CA with lobectomy in  [place]         HISTORY OF PRESENT ILLNESS   (Location/Symptom, Timing/Onset, Context/Setting, Quality, Duration, Modifying Factors, Severity)  Note limiting factors. Bereket Gomez is a 67 y.o. male who presents to the emergency department for urinary retention. Patient was previously seen by another provider. Please see previous note for additional details of history and physical.    HPI    Nursing Notes were reviewed. REVIEW OF SYSTEMS    (2-9 systems for level 4, 10 or more for level 5)     Review of Systems    Except as noted above the remainder of the review of systems was reviewed and negative. PAST MEDICAL HISTORY     Past Medical History:   Diagnosis Date    Abnormal cardiac cath 09/20/2013    LMCA; Mild irregularities 10-20%. LAD: Abnormal.  Mild to moderate irregularities throughout the LAD and branches. LCx:  abnormal.  80-90% stenosis in a moderate sized OM1. RCA: Abnormal.  40-50% mid RCA stenosis.  Actinic keratosis     Arthritis     neck, fingers    CAD (coronary artery disease)     Calculus of kidney     Cancer ([facility] [address]     Cervical stenosis of spine     Diabetes mellitus ([facility])     Dr. Oz Colvin, CNP, [place]    Full dentures     GERD (gastroesophageal reflux disease)     H/O echocardiogram 04/29/2016    Stress echo. Normal resting LV systolic function,normal systolic restponse to exercise.  No evidence of reversible  ischemia on this maximal,symptom limited,treadmill exerxiess stress echocardiography study    History of cardiac cath 02/09/2017 LMCA: Lesion on LMCA: Distal subsection . 20% stenosis. LAD: Lesion on 1st Diag: Mid subsection . 75% stenosis. RCA: Lesion on Mid RCA: Mid subsection . 100% stenosis. Comments: The distal RCA fills by left to right collateralization. Dominance: Mixed. LV function assessed as: Normal. EF 65%.  History of echocardiogram 06/11/2018    EF 60%. Mildly increased LV wall thickness. Evidence of mild diastolic dysfunction seen.  History of echocardiogram 01/18/2019    EF of 65%. LV wall thickness is mildly increased. Aortic valve leaflets are mildly thickened.  History of heart attack     History of tobacco abuse     Quit 10/2021, 1/4 ppd for 47 years    Hyperlipidemia     Hypertension     Dr. Oksana Ricks, CNP    Pulmonary nodule 12/2021    Right middle lobe    Raynaud's phenomenon     S/P angioplasty with stent 09/20/2013    PTCA-NEDRA of  the OM 1    Wears hearing aid in both ears          SURGICAL HISTORY       Past Surgical History:   Procedure Laterality Date   [address]  2007    patient states had 2 small blockages    CARDIAC CATHETERIZATION  02/09/2017    LMCA: Lesion on LMCA: Distal subsection 20% stenosis. LAD: Lesion on 1st Diag: Mid subsection 75% stenosis. RCA: Lesion on Mid RCA: Mid subsection 100% stenosis.  CARDIAC CATHETERIZATION  02/09/2017    Attempted PCI to chronic total occlusion of RCA was not successful. Unable to cross with multiple wires due to heavy calcification and toruosity.     CARDIAC CATHETERIZATION  01/14/2019    CATARACT REMOVAL  04/06/2015    Right eye - Dr. Carlyn Kanner  06/08/2015    Dr. Tawanda Pineda - left eye    COLONOSCOPY  01/11/2006    Dr. Claudia Solorio - internal hemorrhoids, no polps    COLONOSCOPY  05/23/2016    -polyp    CT NEEDLE BIOPSY LUNG PERCUTANEOUS  07/28/2021    CT [facility]    HEMORRHOID SURGERY      HERNIA REPAIR Left     inguinal    IR CHEST TUBE INSERTION  01/11/2022    IR CHEST TUBE INSERTION 1/11/2022 Eliz Shaw MD [facility] SPECIAL PROCEDURES    IR PORT PLACEMENT EQUAL OR GREATER THAN 5 YEARS  2/9/2022    IR PORT PLACEMENT EQUAL OR GREATER THAN 5 YEARS 2/9/2022 [facility] system SPECIAL PROCEDURES    LUNG BIOPSY Right 07/28/2021    Dr Jhoan Velasco, TOTAL Right 01/04/2022    MIDDLE WEDGE RESECTION, POSSIBLE MIDDLE LOBECTOMY VIA THORACOTOMY performed by Hellen Irvin MD at [place] [address]    c4-5 laminectomy and fusion    OTHER SURGICAL HISTORY  10/2014    Right foot - 7 from heal and 1 from great toe at [address] [place] [address]  12/11/2014    pain injection - cervical    TUNNELED CENTRAL VENOUS CATHETER W/ SUBCUTANEOUS PORT Left 02/09/2022    Dr Hoenninger/[facility]    UPPER GASTROINTESTINAL ENDOSCOPY  12/28/2005    Dr. Viviana Chou - mild esophagitis and gastritis    UPPER GASTROINTESTINAL ENDOSCOPY N/A 02/05/2019    -bx(neg H-Pylori)retained gastric content, possible submucosal mass of gastric cardia    UPPER GASTROINTESTINAL ENDOSCOPY N/A 10/22/2019    EGD BIOPSY performed by Serenity Joshua MD at [address] [place] [address]       Current Discharge Medication List      CONTINUE these medications which have NOT CHANGED    Details   morphine (MS CONTIN) 15 MG extended release tablet Take 1 tablet by mouth 2 times daily for 30 days. Take 1 every 12 hours  Qty: 60 tablet, Refills: 0    Comments: Reduce doses taken as pain becomes manageable  Associated Diagnoses: Pleural effusion on right      morphine (MSIR) 15 MG tablet Take 1 tablet by mouth every 6 hours as needed for Pain for up to 30 days.   Qty: 120 tablet, Refills: 0    Comments: Reduce doses taken as pain becomes manageable  Associated Diagnoses: Pleural effusion on right      amLODIPine (NORVASC) 5 MG tablet Take 1 tablet by mouth daily  Qty: 90 tablet, Refills: 3    Associated Diagnoses: ASHD (arteriosclerotic heart disease) magnesium 30 MG tablet Take 400 mg by mouth daily       clopidogrel (PLAVIX) 75 MG tablet Take 1 tablet by mouth daily  Qty: 90 tablet, Refills: 3    Associated Diagnoses: ASHD (arteriosclerotic heart disease)      zinc 50 MG CAPS Take by mouth      promethazine (PHENERGAN) 25 MG tablet Take 25 mg by mouth every 4 hours as needed for Nausea      pantoprazole (PROTONIX) 40 MG tablet Take 40 mg by mouth in the morning and at bedtime       ondansetron (ZOFRAN ODT) 8 MG TBDP disintegrating tablet Take 1 tablet by mouth every 8 hours as needed for Nausea  Qty: 30 tablet, Refills: 2      lidocaine-prilocaine (EMLA) 2.5-2.5 % cream Apply topically to port site 30- 60 minutes before access as needed. Qty: 30 g, Refills: 2      gabapentin (NEURONTIN) 100 MG capsule Take 1 capsule by mouth 3 times daily for 14 days. Qty: 42 capsule, Refills: 0      SERTRALINE HCL PO Take 50 mg by mouth nightly      tamsulosin (FLOMAX) 0.4 MG capsule Take 0.4 mg by mouth daily      tiZANidine (ZANAFLEX) 4 MG capsule Take 4 mg by mouth 3 times daily as needed      ferrous sulfate (IRON 325) 325 (65 Fe) MG tablet Take 325 mg by mouth daily       isosorbide mononitrate (IMDUR) 30 MG extended release tablet 1/2 tablet in the morning      lisinopril (PRINIVIL;ZESTRIL) 20 MG tablet Take 20 mg by mouth daily       nitroGLYCERIN (NITRODUR) 0.4 MG/HR 1 dose under tongue as needed for chest pain.  max of 3 in one day      atenolol (TENORMIN) 25 MG tablet Take 1 tablet by mouth daily  Qty: 90 tablet, Refills: 3    Associated Diagnoses: ASHD (arteriosclerotic heart disease); S/P angioplasty with stent; Essential hypertension      Insulin Glargine, 2 Unit Dial, 300 UNIT/ML SOPN Insulin Glargine Insulin Glargine U-300 Conc Active 10 UNIT Subcutaneous Every morning May 7th, 2020 11:47am 05-  [facility] ([id number])      atorvastatin (LIPITOR) 20 MG tablet Take 1 tablet by mouth daily  Qty: 90 tablet, Refills: 3 sitaGLIPtan-metformin (JANUMET)  MG per tablet Take 1 tablet by mouth 2 times daily (with meals)      glimepiride (AMARYL) 2 MG tablet Take 1 tablet by mouth 2 times daily  Qty: 180 tablet, Refills: 3      aspirin 81 MG tablet Take 81 mg by mouth daily Ordered by heart doctor, Dr. Joanie Weaver, [place]             ALLERGIES     Niacin and related, Minocycline hcl, Other, and Oxycodone hcl    FAMILY HISTORY       Family History   Problem Relation Age of Onset    Heart Disease Mother     Diabetes Mother     High Blood Pressure Mother     Diabetes Father     Alzheimer's Disease Father     Heart Disease Sister         CABG    Heart Disease Brother     Heart Disease Brother     Heart Disease Brother     Cancer Brother           SOCIAL HISTORY       Social History     Socioeconomic History    Marital status:      Spouse name: None    Number of children: None    Years of education: None    Highest education level: None   Occupational History    None   Tobacco Use    Smoking status: Former Smoker     Packs/day: 0.25     Years: 54.00     Pack years: 13.50     Types: Cigarettes     Quit date: 10/1/2021     Years since quittin.7    Smokeless tobacco: Never Used   Vaping Use    Vaping Use: Never used   Substance and Sexual Activity    Alcohol use: No    Drug use: No    Sexual activity: None   Other Topics Concern    None   Social History Narrative    None     Social Determinants of Health     Financial Resource Strain:     Difficulty of Paying Living Expenses: Not on file   Food Insecurity:     Worried About Running Out of Food in the Last Year: Not on file    Luis E of Food in the Last Year: Not on file   Transportation Needs:     Lack of Transportation (Medical): Not on file    Lack of Transportation (Non-Medical):  Not on file   Physical Activity:     Days of Exercise per Week: Not on file    Minutes of Exercise per Session: Not on file   Stress:     Feeling of Stress : Not on file   Social Connections:     Frequency of Communication with Friends and Family: Not on file    Frequency of Social Gatherings with Friends and Family: Not on file    Attends Worship Services: Not on file    Active Member of Clubs or Organizations: Not on file    Attends Club or Organization Meetings: Not on file    Marital Status: Not on file   Intimate Partner Violence:     Fear of Current or Ex-Partner: Not on file    Emotionally Abused: Not on file    Physically Abused: Not on file    Sexually Abused: Not on file   Housing Stability:     Unable to Pay for Housing in the Last Year: Not on file    Number of [place] in the Last Year: Not on file    Unstable Housing in the Last Year: Not on file       SCREENINGS        [place] Coma Scale  Eye Opening: Spontaneous  Best Verbal Response: Oriented  Best Motor Response: Obeys commands  [place] Coma Scale Score: 15               PHYSICAL EXAM    (up to 7 for level 4, 8 or more for level 5)     ED Triage Vitals   BP Temp Temp Source Heart Rate Resp SpO2 Height Weight   07/03/22 1510 07/03/22 1506 07/03/22 1506 07/03/22 1508 07/03/22 1508 07/03/22 1508 -- --   111/60 97.6 °F (36.4 °C) Oral 79 20 97 %         Physical Exam    DIAGNOSTIC RESULTS     EKG: All EKG's are interpreted by the Emergency Department Physician who either signs or Co-signs this chart in the absence of a cardiologist.        RADIOLOGY:   Non-plain film images such as CT, Ultrasound and MRI are read by the radiologist. Plain radiographic images are visualized and preliminarily interpreted by the emergency physician with the below findings:        Interpretation per the Radiologist below, if available at the time of this note:    CT ABDOMEN PELVIS W IV CONTRAST Additional Contrast? None   Final Result   There is a complex loculated pleural fluid collection in the inferior right   hemithorax not significantly changed from the recent study of 06/20/2022 but   new from 05/27/2021.   A malignant pleural fluid collection should be   considered. Follow-up thoracentesis for cytology or pleural biopsy would be   helpful. No evidence of metastatic disease in the abdomen or pelvis. There is a 2.1 cm mass in the medial left kidney unchanged from 05/27/2021. Renal protocol CT or MRI recommended for further characterization. Cholelithiasis with a few tiny gallstones. ED BEDSIDE ULTRASOUND:   Performed by ED Physician - none    LABS:  Labs Reviewed   CBC WITH AUTO DIFFERENTIAL - Abnormal; Notable for the following components:       Result Value    RBC 2.77 (*)     Hemoglobin 7.9 (*)     Hematocrit 25.6 (*)     RDW 16.0 (*)     Lymphocytes 23 (*)     Immature Granulocytes 1 (*)     All other components within normal limits   COMPREHENSIVE METABOLIC PANEL W/ REFLEX TO MG FOR LOW K - Abnormal; Notable for the following components:    Glucose 133 (*)     All other components within normal limits   URINALYSIS WITH REFLEX TO CULTURE - Abnormal; Notable for the following components:    Specific Gravity, UA <1.005 (*)     All other components within normal limits   MICROSCOPIC URINALYSIS - Abnormal; Notable for the following components:    Bacteria, UA TRACE (*)     All other components within normal limits   LIPASE   TROPONIN       All other labs were within normal range or not returned as of this dictation.     EMERGENCY DEPARTMENT COURSE and DIFFERENTIAL DIAGNOSIS/MDM:   Vitals:    Vitals:    07/03/22 1506 07/03/22 1508 07/03/22 1510   BP:   111/60   Pulse:  79    Resp:  20    Temp: 97.6 °F (36.4 °C)     TempSrc: Oral     SpO2:  97%            MDM  Number of Diagnoses or Management Options  Chronic anemia: new and requires workup  Pleural effusion: new and requires workup  Urinary retention: new and requires workup     Amount and/or Complexity of Data Reviewed  Clinical lab tests: reviewed and ordered  Tests in the radiology section of CPT®: reviewed and ordered  Tests in the medicine section of CPT®: reviewed and ordered  Decide to obtain previous medical records or to obtain history from someone other than the patient: yes  Obtain history from someone other than the patient: yes  Review and summarize past medical records: yes  Discuss the patient with other providers: yes  Independent visualization of images, tracings, or specimens: yes    Risk of Complications, Morbidity, and/or Mortality  Presenting problems: low  Diagnostic procedures: low  Management options: low    Critical Care  Total time providing critical care: < 30 minutes    Patient Progress  Patient progress: improved        REASSESSMENT     ED Course as of 07/03/22 2023   Sun Jul 03, 2022   1744 Hemoglobin Quant(!): 7.9  8.1 1 month ago [TS]   1842 Significant urinary retention was noted via straight cath which is new for patient. Given history of cancer we will proceed with imaging of the abdomen to rule out metastasis [TS]   1945 The patient was signed out to me by Dr. Jonatan Peguero at shift change. Patient originally presented for new onset urinary retention. A straight cath was performed which showed about 400 cc of immediate output. Plan to wait for CT to ensure no mets to the prostate or bladder given his history of malignancy. [AB]   2021 CT was significant for a pleural effusion that is already known to the patient. He already has follow-up for the pleural effusion. There was no significant findings in the pelvis. At reevaluation the patient states he has had minimal output since the straight cath of a few cc at a time. Options were discussed including a Arce catheter with follow-up to urology. Patient declines and states he will be able to urinate at home. He is already on daily Flomax. Patient is discharged with follow-up to urology. He agrees with plan and all questions were answered.   Strict return precautions were given. [AB]      ED Course User Index  [AB] Ashu Galeano DO  [TS] Lalito Alves MD CRITICAL CARE TIME   Total Critical Care time was 0 minutes, excluding separately reportable procedures. There was a high probability of clinically significant/life threatening deterioration in the patient's condition which required my urgent intervention. CONSULTS:  None    PROCEDURES:  Unless otherwise noted below, none     Procedures        FINAL IMPRESSION      1. Urinary retention    2. Pleural effusion    3. Chronic anemia          DISPOSITION/PLAN   DISPOSITION Decision To Discharge 07/03/2022 08:17:01 PM      PATIENT REFERRED TO:  Mikel Mott  [address] [place] [address] [address] [place] [address]  [phone]    Call       Rosina Matos MD  [address] [place] [address]  [phone]    Schedule an appointment as soon as possible for a visit         DISCHARGE MEDICATIONS:  Current Discharge Medication List        Controlled Substances Monitoring:     No flowsheet data found.     (Please note that portions of this note were completed with a voice recognition program.  Efforts were made to edit the dictations but occasionally words are mis-transcribed.)    Caleb Kelley DO (electronically signed)  Attending Emergency Physician           Caleb Kelley DO  07/03/22 2023

## 2022-07-05 ENCOUNTER — TELEPHONE (OUTPATIENT)
Dept: UROLOGY | Age: 72
End: 2022-07-05

## 2022-07-05 NOTE — TELEPHONE ENCOUNTER
Patient seen in ER with retention. Patient denied catheter placement. Patient is on Flomax. Wife called and left message on answering machine to schedule an appointment. See when? ?

## 2022-07-06 ENCOUNTER — HOSPITAL ENCOUNTER (OUTPATIENT)
Age: 72
Setting detail: SPECIMEN
Discharge: HOME | End: 2022-07-06
Payer: MEDICARE

## 2022-07-06 ENCOUNTER — OFFICE VISIT (OUTPATIENT)
Dept: UROLOGY | Age: 72
End: 2022-07-06
Payer: MEDICARE

## 2022-07-06 VITALS
TEMPERATURE: 97.3 F | HEART RATE: 92 BPM | HEIGHT: 72 IN | SYSTOLIC BLOOD PRESSURE: 118 MMHG | DIASTOLIC BLOOD PRESSURE: 67 MMHG | BODY MASS INDEX: 20.45 KG/M2 | WEIGHT: 151 LBS

## 2022-07-06 DIAGNOSIS — N13.8 BPH WITH OBSTRUCTION/LOWER URINARY TRACT SYMPTOMS: ICD-10-CM

## 2022-07-06 DIAGNOSIS — N20.0 KIDNEY STONES: ICD-10-CM

## 2022-07-06 DIAGNOSIS — Z12.5 SCREENING PSA (PROSTATE SPECIFIC ANTIGEN): ICD-10-CM

## 2022-07-06 DIAGNOSIS — R33.9 URINARY RETENTION: ICD-10-CM

## 2022-07-06 DIAGNOSIS — N40.1 BPH WITH OBSTRUCTION/LOWER URINARY TRACT SYMPTOMS: ICD-10-CM

## 2022-07-06 DIAGNOSIS — N28.89 RENAL MASS: Primary | ICD-10-CM

## 2022-07-06 PROCEDURE — 51798 US URINE CAPACITY MEASURE: CPT | Performed by: PHYSICIAN ASSISTANT

## 2022-07-06 PROCEDURE — 3017F COLORECTAL CA SCREEN DOC REV: CPT | Performed by: PHYSICIAN ASSISTANT

## 2022-07-06 PROCEDURE — 1036F TOBACCO NON-USER: CPT | Performed by: PHYSICIAN ASSISTANT

## 2022-07-06 PROCEDURE — 1124F ACP DISCUSS-NO DSCNMKR DOCD: CPT | Performed by: PHYSICIAN ASSISTANT

## 2022-07-06 PROCEDURE — 99204 OFFICE O/P NEW MOD 45 MIN: CPT | Performed by: PHYSICIAN ASSISTANT

## 2022-07-06 PROCEDURE — G8420 CALC BMI NORM PARAMETERS: HCPCS | Performed by: PHYSICIAN ASSISTANT

## 2022-07-06 PROCEDURE — PBSHW PR MEASUREMENT,POST-VOID RESIDUAL VOLUME BY US,NON-IMAGING: Performed by: PHYSICIAN ASSISTANT

## 2022-07-06 PROCEDURE — G8427 DOCREV CUR MEDS BY ELIG CLIN: HCPCS | Performed by: PHYSICIAN ASSISTANT

## 2022-07-06 PROCEDURE — 87086 URINE CULTURE/COLONY COUNT: CPT

## 2022-07-06 ASSESSMENT — ENCOUNTER SYMPTOMS
CONSTIPATION: 1
NAUSEA: 0
BACK PAIN: 0
COLOR CHANGE: 0
WHEEZING: 0
VOMITING: 0
EYE REDNESS: 0
SHORTNESS OF BREATH: 0
COUGH: 0
ABDOMINAL PAIN: 0

## 2022-07-06 NOTE — PROGRESS NOTES
HPI:      Patient is a 67 y.o. male in no acute distress. He is alert and oriented to person, place, and time. Patient is here today for follow-up emergency room for urinary retention. Patient does have a history of adenocarcinoma of the right lung. He is followed by oncology. He was recently started on morphine for pain related to his cancer. Patient states that he did develop constipation when he started taking pain medicine. Kidney function was normal.  Patient went to the emergency room for inability to urinate. Patient was straight cathed for 400 mL. Patient was started on Flomax. Patient denied catheter placement. Patient did have a recent CT abdomen pelvis with IV contrast.  This was independently reviewed showing a 2.1 cm mass medial left kidney. There is no hydronephrosis. On the scan from a month prior it was measured to have 64 Hounsfield units. In January the same area was 40 Hounsfield units. Patient is a new patient from the emergency room for urinary retention. Bladderscan performed in office today: Pt voided - 100 mL, PVR - 26 mL. Patient is uncertain whether or not he has had a PSA checked in the past.  He states that he does have history of kidney stones. Past Medical History:   Diagnosis Date    Abnormal cardiac cath 09/20/2013    LMCA; Mild irregularities 10-20%. LAD: Abnormal.  Mild to moderate irregularities throughout the LAD and branches. LCx:  abnormal.  80-90% stenosis in a moderate sized OM1. RCA: Abnormal.  40-50% mid RCA stenosis.  Actinic keratosis     Arthritis     neck, fingers    CAD (coronary artery disease)     Calculus of kidney     Cancer ([facility] [address]     Cervical stenosis of spine     Diabetes mellitus (HCC)     Dr. Rosaura Denny, CNP, [place]    Full dentures     GERD (gastroesophageal reflux disease)     H/O echocardiogram 04/29/2016    Stress echo. Normal resting LV systolic function,normal systolic restponse to exercise.  No evidence of reversible ischemia on this maximal,symptom limited,treadmill exerxiess stress echocardiography study    History of cardiac cath 02/09/2017    LMCA: Lesion on LMCA: Distal subsection . 20% stenosis. LAD: Lesion on 1st Diag: Mid subsection . 75% stenosis. RCA: Lesion on Mid RCA: Mid subsection . 100% stenosis. Comments: The distal RCA fills by left to right collateralization. Dominance: Mixed. LV function assessed as: Normal. EF 65%.  History of echocardiogram 06/11/2018    EF 60%. Mildly increased LV wall thickness. Evidence of mild diastolic dysfunction seen.  History of echocardiogram 01/18/2019    EF of 65%. LV wall thickness is mildly increased. Aortic valve leaflets are mildly thickened.  History of heart attack     History of tobacco abuse     Quit 10/2021, 1/4 ppd for 47 years    Hyperlipidemia     Hypertension     Dr. Elizabeth Powell, CNP    Pulmonary nodule 12/2021    Right middle lobe    Raynaud's phenomenon     S/P angioplasty with stent 09/20/2013    PTCA-NEDRA of  the OM 1    Wears hearing aid in both ears      Past Surgical History:   Procedure Laterality Date   [address]  2007    patient states had 2 small blockages    CARDIAC CATHETERIZATION  02/09/2017    LMCA: Lesion on LMCA: Distal subsection 20% stenosis. LAD: Lesion on 1st Diag: Mid subsection 75% stenosis. RCA: Lesion on Mid RCA: Mid subsection 100% stenosis.  CARDIAC CATHETERIZATION  02/09/2017    Attempted PCI to chronic total occlusion of RCA was not successful. Unable to cross with multiple wires due to heavy calcification and toruosity.     CARDIAC CATHETERIZATION  01/14/2019    CATARACT REMOVAL  04/06/2015    Right eye - Dr. Rhonda English  06/08/2015    Dr. Rg Rush - left eye    COLONOSCOPY  01/11/2006    Dr. Juana Hawkins - internal hemorrhoids, no polps    COLONOSCOPY  05/23/2016    -polyp    CT NEEDLE BIOPSY LUNG PERCUTANEOUS  07/28/2021    CT NEEDLE BIOPSY LUNG PERCUTANEOUS 7/28/2021 [facility] [address] SCAN  HEMORRHOID SURGERY      HERNIA REPAIR Left     inguinal    IR CHEST TUBE INSERTION  01/11/2022    IR CHEST TUBE INSERTION 1/11/2022 Chivo Sawant MD [facility] SPECIAL PROCEDURES    IR PORT PLACEMENT EQUAL OR GREATER THAN 5 YEARS  2/9/2022    IR PORT PLACEMENT EQUAL OR GREATER THAN 5 YEARS 2/9/2022 [facility] SPECIAL PROCEDURES    LUNG BIOPSY Right 07/28/2021    Dr Christiano Cash, TOTAL Right 01/04/2022    MIDDLE WEDGE RESECTION, POSSIBLE MIDDLE LOBECTOMY VIA THORACOTOMY performed by Amy Prdao MD at [place] [address]    c4-5 laminectomy and fusion    OTHER SURGICAL HISTORY  10/2014    Right foot - 7 from heal and 1 from great toe at [address] [place] [address]  12/11/2014    pain injection - cervical    TUNNELED CENTRAL VENOUS CATHETER W/ SUBCUTANEOUS PORT Left 02/09/2022    Dr Hoenninger[facility]    UPPER GASTROINTESTINAL ENDOSCOPY  12/28/2005    Dr. Hernandez Modest - mild esophagitis and gastritis    UPPER GASTROINTESTINAL ENDOSCOPY N/A 02/05/2019    -bx(neg H-Pylori)retained gastric content, possible submucosal mass of gastric cardia    UPPER GASTROINTESTINAL ENDOSCOPY N/A 10/22/2019    EGD BIOPSY performed by Delford Homans, MD at [address] [facility] Encounter Medications as of 7/6/2022   Medication Sig Dispense Refill    amLODIPine (NORVASC) 5 MG tablet Take 1 tablet by mouth daily 90 tablet 3    magnesium 30 MG tablet Take 400 mg by mouth daily       clopidogrel (PLAVIX) 75 MG tablet Take 1 tablet by mouth daily 90 tablet 3    zinc 50 MG CAPS Take by mouth      promethazine (PHENERGAN) 25 MG tablet Take 25 mg by mouth every 4 hours as needed for Nausea      pantoprazole (PROTONIX) 40 MG tablet Take 40 mg by mouth in the morning and at bedtime       ondansetron (ZOFRAN ODT) 8 MG TBDP disintegrating tablet Take 1 tablet by mouth every 8 hours as needed for Nausea 30 tablet 2    tamsulosin (FLOMAX) 0.4 MG Active 10 UNIT Subcutaneous Every morning May 7th, 2020 11:47am 05-  [facility] ([id number]) (Patient not taking: Reported on 7/6/2022)      glimepiride (AMARYL) 2 MG tablet Take 1 tablet by mouth 2 times daily (Patient not taking: Reported on 7/6/2022) 180 tablet 3     No facility-administered encounter medications on file as of 7/6/2022. Current Outpatient Medications on File Prior to Visit   Medication Sig Dispense Refill    amLODIPine (NORVASC) 5 MG tablet Take 1 tablet by mouth daily 90 tablet 3    magnesium 30 MG tablet Take 400 mg by mouth daily       clopidogrel (PLAVIX) 75 MG tablet Take 1 tablet by mouth daily 90 tablet 3    zinc 50 MG CAPS Take by mouth      promethazine (PHENERGAN) 25 MG tablet Take 25 mg by mouth every 4 hours as needed for Nausea      pantoprazole (PROTONIX) 40 MG tablet Take 40 mg by mouth in the morning and at bedtime       ondansetron (ZOFRAN ODT) 8 MG TBDP disintegrating tablet Take 1 tablet by mouth every 8 hours as needed for Nausea 30 tablet 2    tamsulosin (FLOMAX) 0.4 MG capsule Take 0.4 mg by mouth daily      ferrous sulfate (IRON 325) 325 (65 Fe) MG tablet Take 325 mg by mouth daily       isosorbide mononitrate (IMDUR) 30 MG extended release tablet 1/2 tablet in the morning      nitroGLYCERIN (NITRODUR) 0.4 MG/HR 1 dose under tongue as needed for chest pain. max of 3 in one day      atenolol (TENORMIN) 25 MG tablet Take 1 tablet by mouth daily 90 tablet 3    atorvastatin (LIPITOR) 20 MG tablet Take 1 tablet by mouth daily 90 tablet 3    sitaGLIPtan-metformin (JANUMET)  MG per tablet Take 1 tablet by mouth 2 times daily (with meals)      aspirin 81 MG tablet Take 81 mg by mouth daily Ordered by heart doctor, Chan Dinh      morphine (MS CONTIN) 15 MG extended release tablet Take 1 tablet by mouth 2 times daily for 30 days.  Take 1 every 12 hours (Patient not taking: Reported on 7/6/2022) 60 tablet 0    morphine Cigarettes    Quit date: 10/1/2021    Years since quittin.7   Smokeless Tobacco Never Used       Social History     Substance and Sexual Activity   Alcohol Use No       Review of Systems   Constitutional: Negative for appetite change, chills and fever. Eyes: Negative for redness and visual disturbance. Respiratory: Negative for cough, shortness of breath and wheezing. Cardiovascular: Negative for chest pain and leg swelling. Gastrointestinal: Positive for constipation. Negative for abdominal pain, nausea and vomiting. Genitourinary: Negative for decreased urine volume, difficulty urinating, dysuria, enuresis, flank pain, frequency, hematuria, penile discharge, penile pain, scrotal swelling, testicular pain and urgency. Positive for nocturia   Musculoskeletal: Negative for back pain, joint swelling and myalgias. Skin: Negative for color change, rash and wound. Neurological: Negative for dizziness, tremors and numbness. Hematological: Negative for adenopathy. Does not bruise/bleed easily. /67 (Site: Left Upper Arm, Position: Sitting, Cuff Size: Medium Adult)   Pulse 92   Temp 97.3 °F (36.3 °C) (Infrared)   Ht 6' (1.829 m)   Wt 151 lb (68.5 kg)   BMI 20.48 kg/m²       PHYSICAL EXAM:  Constitutional: Patient in no acute distress; Neuro: alert and oriented to person place and time. Psych: Mood and affect normal.  Lungs: Respiratory effort normal  Abdomen: Soft, non-tender, non-distended  Rectal: deferred       Lab Results   Component Value Date    BUN 22 2022     Lab Results   Component Value Date    CREATININE 1.14 2022     Lab Results   Component Value Date    PSA 0.57 2013    PSA 0.43 2012       ASSESSMENT:   Diagnosis Orders   1. Renal mass     2. Urinary retention  Culture, Urine   3. BPH with obstruction/lower urinary tract symptoms  FL MEASUREMENT,POST-VOID RESIDUAL VOLUME BY US,NON-IMAGING    Culture, Urine   4.  Kidney stones  XR ABDOMEN (KUB) (SINGLE AP VIEW)   5. Screening PSA (prostate specific antigen)  PSA Screening         PLAN:  We did discuss with patient that his 2.1 cm renal mass requires follow-up with repeat imaging in approximately 6 months. We did discuss with him that renal masses become concerning once he reached 4 cm. We are uncertain whether or not this renal mass is related to his lung cancer. We did offer to follow him for the renal mass, he declined and would like his oncologist to follow this. Continue Flomax    We do feel as though that his urinary retention was secondary to constipation and narcotic pain medicine    Start MiraLAX daily    If patient resumes narcotic pain medicine he will need to have a better bowel management. Patient does have a history of kidney stones and will get a KUB prior to next visit.     Follow-up in 6 weeks with PVR, PSA and KUB

## 2022-07-06 NOTE — PATIENT INSTRUCTIONS
BLADDER IRRITANTS     There are several changes you can make to your diet to help improve your urinary symptoms. The following have been shown to cause irritation to the bladder and should be AVOIDED if possible:  ~ Coffee (including decaffeinated)   ~ ALL Tea (including green teas and decaffeinated)  ~ Soda/Pop/carbonated beverages/Energy drinks (especially dark dyed frankie, root beers, mountain dew, etc)  ~These are MUCH worse if they have caffeine, but can also be irritative to the bladder even without caffeine  ~ Alcoholic beverages  ~ Spicy foods (peppers contain capsaicin, which is very irritating to the bladder)  ~ Acidic foods (for example: tomato based foods, orange juice, etc)    We encourage increased water intake, unless you have been placed on a fluid restriction by another provider. FOR CONSTIPATION    It is very important to have regular, soft, daily bowel movements, because it WILL improve your urinary symptoms. Take Miralax (or generic equivalent) 17g once daily (one capful). Take every day, DO NOT skip days, as it must be taken daily in order to be effective. DO NOT take just \"as needed\". It is safe to take long term and is recommended for your symptoms. If one dose daily causes loose stools/diarrhea, decrease to 1/2 capful or 1/4 capful. If you cannot tolerate this medication, please notify our office. If one dose daily of Miralax is not sufficient to produce a soft, easy to pass daily stool, you may also add an over-the-counter stool softener capsule. For example: colace (docusate). For Miralax to have maximal effectiveness, be sure to increase your water intake - aim for 80 oz daily unless you are on a fluid-restriction from another provider. SURVEY:    You may be receiving a survey from [employer] regarding your visit today. Please complete the survey to enable us to provide the highest quality of care to you and your family.     If you cannot score us a very good on any question, please call the office to discuss how we could have made your experience a very good one. Thank you.

## 2022-07-07 ENCOUNTER — OFFICE VISIT (OUTPATIENT)
Dept: CARDIOTHORACIC SURGERY | Age: 72
End: 2022-07-07
Payer: MEDICARE

## 2022-07-07 VITALS
DIASTOLIC BLOOD PRESSURE: 71 MMHG | TEMPERATURE: 98.4 F | WEIGHT: 152 LBS | BODY MASS INDEX: 20.59 KG/M2 | RESPIRATION RATE: 18 BRPM | HEIGHT: 72 IN | OXYGEN SATURATION: 99 % | SYSTOLIC BLOOD PRESSURE: 126 MMHG | HEART RATE: 103 BPM

## 2022-07-07 DIAGNOSIS — Z98.890 S/P THORACOTOMY: Primary | ICD-10-CM

## 2022-07-07 LAB
CULTURE: NO GROWTH
SPECIMEN DESCRIPTION: NORMAL

## 2022-07-07 PROCEDURE — 1124F ACP DISCUSS-NO DSCNMKR DOCD: CPT | Performed by: PHYSICIAN ASSISTANT

## 2022-07-07 PROCEDURE — G8427 DOCREV CUR MEDS BY ELIG CLIN: HCPCS | Performed by: PHYSICIAN ASSISTANT

## 2022-07-07 PROCEDURE — 99213 OFFICE O/P EST LOW 20 MIN: CPT | Performed by: PHYSICIAN ASSISTANT

## 2022-07-07 PROCEDURE — G8420 CALC BMI NORM PARAMETERS: HCPCS | Performed by: PHYSICIAN ASSISTANT

## 2022-07-07 PROCEDURE — 3017F COLORECTAL CA SCREEN DOC REV: CPT | Performed by: PHYSICIAN ASSISTANT

## 2022-07-07 PROCEDURE — 1036F TOBACCO NON-USER: CPT | Performed by: PHYSICIAN ASSISTANT

## 2022-07-07 ASSESSMENT — ENCOUNTER SYMPTOMS
EYES NEGATIVE: 1
RESPIRATORY NEGATIVE: 1
GASTROINTESTINAL NEGATIVE: 1

## 2022-07-07 NOTE — PROGRESS NOTES
Subjective:     Rani Rossi returns today status post right thoracotomy in January of this year. Patient complains of constant right upper quadrant pain. Feels like stabbing. Worse with palpation. Nothing seems to make it feel better. Morphine makes him sleep so doesn't feel like it helps. Review of Systems   Constitutional: Negative. HENT: Negative. Eyes: Negative. Respiratory: Negative. Cardiovascular: Negative. Gastrointestinal: Negative. Complains of pain that's constant and worse with palpation RUQ   Endocrine: Negative. Genitourinary: Negative. Neurological: Negative. Objective:     /71 (Site: Right Upper Arm, Position: Sitting, Cuff Size: Large Adult)   Pulse (!) 103   Temp 98.4 °F (36.9 °C) (Temporal)   Resp 18   Ht 6' (1.829 m)   Wt 152 lb (68.9 kg)   SpO2 99%   BMI 20.61 kg/m²   Chest: incision healed no pain around incision or chest tube sites.  No pain with palpation of rib cage sensation to surgical dermatomes intact no numbness or tingling  CV: RR  ABD: tenderness to palpation of RUQ no mass hernia swelling  Lungs: diminished right base otherwise clear  Lower extremities: no edema    Medications:     Current Outpatient Medications on File Prior to Visit   Medication Sig Dispense Refill    amLODIPine (NORVASC) 5 MG tablet Take 1 tablet by mouth daily 90 tablet 3    magnesium 30 MG tablet Take 400 mg by mouth daily       clopidogrel (PLAVIX) 75 MG tablet Take 1 tablet by mouth daily 90 tablet 3    zinc 50 MG CAPS Take by mouth      promethazine (PHENERGAN) 25 MG tablet Take 25 mg by mouth every 4 hours as needed for Nausea      pantoprazole (PROTONIX) 40 MG tablet Take 40 mg by mouth in the morning and at bedtime       ondansetron (ZOFRAN ODT) 8 MG TBDP disintegrating tablet Take 1 tablet by mouth every 8 hours as needed for Nausea 30 tablet 2    lidocaine-prilocaine (EMLA) 2.5-2.5 % cream Apply topically to port site 30- 60 minutes before access as needed. 30 g 2    SERTRALINE HCL PO Take 50 mg by mouth nightly       tamsulosin (FLOMAX) 0.4 MG capsule Take 0.4 mg by mouth daily      tiZANidine (ZANAFLEX) 4 MG capsule Take 4 mg by mouth 3 times daily as needed       ferrous sulfate (IRON 325) 325 (65 Fe) MG tablet Take 325 mg by mouth daily       isosorbide mononitrate (IMDUR) 30 MG extended release tablet 1/2 tablet in the morning      atenolol (TENORMIN) 25 MG tablet Take 1 tablet by mouth daily 90 tablet 3    Insulin Glargine, 2 Unit Dial, 300 UNIT/ML SOPN Insulin Glargine Insulin Glargine U-300 Conc Active 10 UNIT Subcutaneous Every morning May 7th, 2020 11:47am 05-  [facility] ([id number])      atorvastatin (LIPITOR) 20 MG tablet Take 1 tablet by mouth daily 90 tablet 3    sitaGLIPtan-metformin (JANUMET)  MG per tablet Take 1 tablet by mouth 2 times daily (with meals)      glimepiride (AMARYL) 2 MG tablet Take 1 tablet by mouth 2 times daily 180 tablet 3    aspirin 81 MG tablet Take 81 mg by mouth daily Ordered by heart doctor, Chan Lo      morphine (MS CONTIN) 15 MG extended release tablet Take 1 tablet by mouth 2 times daily for 30 days. Take 1 every 12 hours (Patient not taking: Reported on 7/7/2022) 60 tablet 0    morphine (MSIR) 15 MG tablet Take 1 tablet by mouth every 6 hours as needed for Pain for up to 30 days. (Patient not taking: Reported on 7/7/2022) 120 tablet 0    [DISCONTINUED] folic acid (FOLVITE) 1 MG tablet Take 1 tablet by mouth daily for 30 doses Start 7 days before first scheduled dose and continue for 21 days after last dose of PEMEtrexed. 30 tablet 2    gabapentin (NEURONTIN) 100 MG capsule Take 1 capsule by mouth 3 times daily for 14 days. 42 capsule 0    lisinopril (PRINIVIL;ZESTRIL) 20 MG tablet Take 20 mg by mouth daily  (Patient not taking: Reported on 7/7/2022)      nitroGLYCERIN (NITRODUR) 0.4 MG/HR 1 dose under tongue as needed for chest pain.  max of 3 in one day (Patient not taking: Reported on 7/7/2022)       No current facility-administered medications on file prior to visit. Assessment:     RUQ pain most likely not related to right thoracomty  Gallstones  Kidney stones  Chronic pain     Plan:     Recommend pain management for possible nerve block  Contunied gall bladder workup  Lasix  CT scan chest with guided drainage of right pleural effusion if needed  No surgical follow needed.     USHA Cruz

## 2022-07-08 ENCOUNTER — HOSPITAL ENCOUNTER (OUTPATIENT)
Dept: NUCLEAR MEDICINE | Age: 72
Discharge: HOME | End: 2022-07-10
Payer: MEDICARE

## 2022-07-08 ENCOUNTER — TELEPHONE (OUTPATIENT)
Dept: UROLOGY | Age: 72
End: 2022-07-08

## 2022-07-08 DIAGNOSIS — R10.11 ABDOMINAL PAIN, RIGHT UPPER QUADRANT: ICD-10-CM

## 2022-07-08 DIAGNOSIS — Z98.890 S/P THORACOTOMY: Primary | ICD-10-CM

## 2022-07-08 PROCEDURE — 3430000000 HC RX DIAGNOSTIC RADIOPHARMACEUTICAL: Performed by: NURSE PRACTITIONER

## 2022-07-08 PROCEDURE — 78226 HEPATOBILIARY SYSTEM IMAGING: CPT

## 2022-07-08 PROCEDURE — A9537 TC99M MEBROFENIN: HCPCS | Performed by: NURSE PRACTITIONER

## 2022-07-08 RX ADMIN — Medication 5 MILLICURIE: at 08:00

## 2022-07-08 NOTE — TELEPHONE ENCOUNTER
----- Message from [facility] [place][note 2]ALFREDO sent at 7/8/2022  8:39 AM EDT -----  Please call pt - urine culture reviewed and does not show UTI

## 2022-07-11 ENCOUNTER — TELEPHONE (OUTPATIENT)
Dept: ONCOLOGY | Age: 72
End: 2022-07-11

## 2022-07-11 NOTE — TELEPHONE ENCOUNTER
Call received from Rosalie Raines stating that he is not feeling better or worse. He notes that he saw Dr. Macho Casas and was told he had to have a scan prior to having fluid removed. Pt is scheduled for scan on 7/15/22 @7:30am.     Pt had requested to see pain management but can not be seen until Aug 16th. Pt inquired if [facility] pain management does. Contacted [facility] pain management message received that there is no current pain medicine physician. Patient aware. Patient states that he was told he could get his port removed and asked when he could be scheduled for that?     Herbert Babcock RN

## 2022-07-11 NOTE — TELEPHONE ENCOUNTER
Name: Inna Velazco  : 1950  MRN: [id number]    Oncology Navigation Follow-Up Note    Contact Type:  Telephone    Notes: Call made to wife. No answer. Message left on VM encouraging wife to call back with needs questions or concerns.     Electronically signed by Lei Riggins RN on 2022 at 1:22 PM

## 2022-07-15 ENCOUNTER — HOSPITAL ENCOUNTER (OUTPATIENT)
Dept: CT IMAGING | Age: 72
Discharge: HOME | End: 2022-07-17
Payer: MEDICARE

## 2022-07-15 DIAGNOSIS — Z98.890 S/P THORACOTOMY: ICD-10-CM

## 2022-07-15 PROCEDURE — 71250 CT THORAX DX C-: CPT

## 2022-07-21 ENCOUNTER — TELEPHONE (OUTPATIENT)
Dept: ONCOLOGY | Age: 72
End: 2022-07-21

## 2022-07-21 NOTE — TELEPHONE ENCOUNTER
Name: Shahana Erwin  : 1950  MRN: [id number]    Oncology Navigation Follow-Up Note    Contact Type:  Telephone    Notes: Call made to patient for follow up. Aric Lantigua states that he had his CT done but does not have the results and is not sure if he he will have the procedure done. Aric Lantigua states that his wife called Dr. Cheryle Bradford office this afternoon and they are awaiting a call back. Aric Lantigua states that he has been taking his pain medication and has been \"sleeping all the time\". Patient states his appointment with pain management is 22 and does not want to see pain management in [place].     Electronically signed by Duyen Mcdonald RN on 2022 at 2:58 PM

## 2022-07-28 ENCOUNTER — TELEPHONE (OUTPATIENT)
Dept: ONCOLOGY | Age: 72
End: 2022-07-28

## 2022-07-28 ENCOUNTER — OFFICE VISIT (OUTPATIENT)
Dept: ONCOLOGY | Age: 72
End: 2022-07-28
Payer: MEDICARE

## 2022-07-28 VITALS
SYSTOLIC BLOOD PRESSURE: 115 MMHG | DIASTOLIC BLOOD PRESSURE: 65 MMHG | WEIGHT: 148 LBS | RESPIRATION RATE: 18 BRPM | HEIGHT: 72 IN | HEART RATE: 73 BPM | TEMPERATURE: 97.6 F | BODY MASS INDEX: 20.05 KG/M2

## 2022-07-28 DIAGNOSIS — C34.01 LUNG CANCER, MAIN BRONCHUS, RIGHT (HCC): ICD-10-CM

## 2022-07-28 DIAGNOSIS — N28.89 LEFT KIDNEY MASS: Primary | ICD-10-CM

## 2022-07-28 DIAGNOSIS — C34.01 LUNG CANCER, MAIN BRONCHUS, RIGHT (HCC): Primary | ICD-10-CM

## 2022-07-28 DIAGNOSIS — J90 PLEURAL EFFUSION ON RIGHT: ICD-10-CM

## 2022-07-28 DIAGNOSIS — N28.89 RENAL MASS: ICD-10-CM

## 2022-07-28 PROCEDURE — 1036F TOBACCO NON-USER: CPT | Performed by: INTERNAL MEDICINE

## 2022-07-28 PROCEDURE — G8428 CUR MEDS NOT DOCUMENT: HCPCS | Performed by: INTERNAL MEDICINE

## 2022-07-28 PROCEDURE — 3017F COLORECTAL CA SCREEN DOC REV: CPT | Performed by: INTERNAL MEDICINE

## 2022-07-28 PROCEDURE — 99211 OFF/OP EST MAY X REQ PHY/QHP: CPT

## 2022-07-28 PROCEDURE — 99215 OFFICE O/P EST HI 40 MIN: CPT | Performed by: INTERNAL MEDICINE

## 2022-07-28 PROCEDURE — G8420 CALC BMI NORM PARAMETERS: HCPCS | Performed by: INTERNAL MEDICINE

## 2022-07-28 PROCEDURE — 1124F ACP DISCUSS-NO DSCNMKR DOCD: CPT | Performed by: INTERNAL MEDICINE

## 2022-07-28 NOTE — TELEPHONE ENCOUNTER
Name: Teresa Haque  : 1950  MRN: [id number]    Oncology Navigation Follow-Up Note    Contact Type:  Medical Oncology    Notes: Met with patient and wife in clinic. Pt states that he went to see pain management yesterday and had injections in his back. Rock Salinas states he was told that it would take 3 days for full affect. Patient notes that he would be interested in physician therapy if he starts to feel better. Pt instructed to call navigator when he is ready. Understanding verbalized. Pt to have MRI left kidney on 22 and follow up in 3 months.      Electronically signed by Jordan Guzman RN on 2022 at 4:21 PM

## 2022-07-28 NOTE — PROGRESS NOTES
Patient ID: Bakari Alvarado, [birth year], [id number], 67 y.o. Referred by :  No ref.  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 male history of tobacco smoking and has been following with pulmonary for right middle lobe lung nodule where CT chest did show increase in the size in the scan on November 2, 2021 patient had biopsy on July 28, 2021 which showed benign parenchyma with chronic inflammation however the lesion was active on the PET there was no evidence of metastasis patient admitted to [place] on January 4, 2022 for VATS and wedge resection which ended up with lobectomy, final pathology compatible with adenocarcinoma 1.8 cm and carcinoma invade into but not through the visceral pleura lymph nodes were negative patient status post right middle lobe lobectomy and margin were free of involvement by carcinoma   Patient developed right apical pneumothorax chest tube placed in the hospital and was discharged after chest tube removed and patient was started on adjuvant chemotherapy on February 24, 2022  Patient developed pleural effusion on the right side and underwent thoracentesis after 2 cycle of chemotherapy and done with 4 cycles of chemotherapy, if still persistent have right side chest pain  Had loculated pneumothorax status post thoracentesis and recommendation was for no further thoracentesis  evaluated for gallstones with no evidence of cholecystitis of the kidney there was a 1.9 cm nodule recommended further work-up with a CT which was done and recommended MRI of the kidney/kidney protocol scan    Interval history  CT chest was done and did show loculation of the pleural fluid which diminished from before  Was seen by cardiovascular and recommended pain control with nerve block which was done    Past Medical History:   Diagnosis Date    Abnormal cardiac cath 09/20/2013    LMCA; Mild irregularities 10-20%. LAD: Abnormal.  Mild to moderate irregularities throughout the LAD and branches. LCx:  abnormal.  80-90% stenosis in a moderate sized OM1. RCA: Abnormal.  40-50% mid RCA stenosis. Actinic keratosis     Arthritis     neck, fingers    CAD (coronary artery disease)     Calculus of kidney     Cancer (HCC)     Cervical stenosis of spine     Diabetes mellitus ([address]     Dr. Carolynn Delarosa, CNP, [place]    Full dentures     GERD (gastroesophageal reflux disease)     H/O echocardiogram 04/29/2016    Stress echo. Normal resting LV systolic function,normal systolic restponse to exercise. No evidence of reversible  ischemia on this maximal,symptom limited,treadmill exerxiess stress echocardiography study    History of cardiac cath 02/09/2017    LMCA: Lesion on LMCA: Distal subsection . 20% stenosis. LAD: Lesion on 1st Diag: Mid subsection . 75% stenosis. RCA: Lesion on Mid RCA: Mid subsection . 100% stenosis. Comments: The distal RCA fills by left to right collateralization. Dominance: Mixed. LV function assessed as: Normal. EF 65%. History of echocardiogram 06/11/2018    EF 60%. Mildly increased LV wall thickness. Evidence of mild diastolic dysfunction seen. History of echocardiogram 01/18/2019    EF of 65%. LV wall thickness is mildly increased. Aortic valve leaflets are mildly thickened.      History of heart attack     History of tobacco abuse     Quit 10/2021, 1/4 ppd for 54 years    Hyperlipidemia     Hypertension     Dr. Carolynn Delarosa, CNP    Pulmonary nodule 12/2021    Right middle lobe    Raynaud's phenomenon     S/P angioplasty with stent 09/20/2013    PTCA-NEDRA of  the OM 1    Wears hearing aid in both ears        Past Surgical History:   Procedure Laterality Date    CARDIAC CATHETERIZATION  2007    patient states had 2 small blockages    CARDIAC CATHETERIZATION  02/09/2017    LMCA: Lesion on LMCA: Distal subsection 20% stenosis. LAD: Lesion on 1st Diag: Mid subsection 75% stenosis. RCA: Lesion on Mid RCA: Mid subsection 100% stenosis. CARDIAC CATHETERIZATION  02/09/2017    Attempted PCI to chronic total occlusion of RCA was not successful. Unable to cross with multiple wires due to heavy calcification and toruosity.     CARDIAC CATHETERIZATION  01/14/2019    CATARACT REMOVAL  04/06/2015    Right eye - Dr. Agusto Suarez  06/08/2015    Dr. Jelena Rivera - left eye    COLONOSCOPY  01/11/2006    Dr. Ivette Sy - internal hemorrhoids, no polps    COLONOSCOPY  05/23/2016    -polyp    CT NEEDLE BIOPSY LUNG PERCUTANEOUS  07/28/2021    CT NEEDLE BIOPSY LUNG PERCUTANEOUS 7/28/2021 [facility] CT SCAN    HEMORRHOID SURGERY      HERNIA REPAIR Left     inguinal    IR CHEST TUBE INSERTION  01/11/2022    IR CHEST TUBE INSERTION 1/11/2022 Susan Patrick MD [facility] SPECIAL PROCEDURES    IR PORT PLACEMENT EQUAL OR GREATER THAN 5 YEARS  2/9/2022    IR PORT PLACEMENT EQUAL OR GREATER THAN 5 YEARS 2/9/2022 [facility] SPECIAL PROCEDURES    LUNG BIOPSY Right 07/28/2021    Dr Lai Mar, TOTAL Right 01/04/2022    MIDDLE WEDGE RESECTION, POSSIBLE MIDDLE LOBECTOMY VIA THORACOTOMY performed by Tamara Ge MD at [facility]    c4-5 laminectomy and fusion    OTHER SURGICAL HISTORY  10/2014    Right foot - 7 from heal and 1 from great toe at [address]  12/11/2014    pain injection - cervical    TUNNELED CENTRAL VENOUS CATHETER W/ SUBCUTANEOUS PORT Left 02/09/2022    Dr Hoenninger/[facility]    UPPER GASTROINTESTINAL ENDOSCOPY  12/28/2005    Dr. Ivette Sy - mild esophagitis and Take 4 mg by mouth 3 times daily as needed       ferrous sulfate (IRON 325) 325 (65 Fe) MG tablet Take 325 mg by mouth daily       isosorbide mononitrate (IMDUR) 30 MG extended release tablet 1/2 tablet in the morning      lisinopril (PRINIVIL;ZESTRIL) 20 MG tablet Take 20 mg by mouth daily  (Patient not taking: Reported on 2022)      nitroGLYCERIN (NITRODUR) 0.4 MG/HR 1 dose under tongue as needed for chest pain. max of 3 in one day (Patient not taking: Reported on 2022)      atenolol (TENORMIN) 25 MG tablet Take 1 tablet by mouth daily 90 tablet 3    Insulin Glargine, 2 Unit Dial, 300 UNIT/ML SOPN Insulin Glargine Insulin Glargine U-300 Conc Active 10 UNIT Subcutaneous Every morning May 7th, 2020 11:47am 2020  [facility] ([id number])      atorvastatin (LIPITOR) 20 MG tablet Take 1 tablet by mouth daily 90 tablet 3    sitaGLIPtan-metformin (JANUMET)  MG per tablet Take 1 tablet by mouth 2 times daily (with meals)      glimepiride (AMARYL) 2 MG tablet Take 1 tablet by mouth 2 times daily 180 tablet 3    aspirin 81 MG tablet Take 81 mg by mouth daily Ordered by heart doctor, Dr. Moe Martinez Alloy       No current facility-administered medications for this visit.        Social History     Socioeconomic History    Marital status:      Spouse name: Not on file    Number of children: Not on file    Years of education: Not on file    Highest education level: Not on file   Occupational History    Not on file   Tobacco Use    Smoking status: Former     Packs/day: 0.25     Years: 54.00     Pack years: 13.50     Types: Cigarettes     Quit date: 10/1/2021     Years since quittin.8    Smokeless tobacco: Never   Vaping Use    Vaping Use: Never used   Substance and Sexual Activity    Alcohol use: No    Drug use: No    Sexual activity: Not on file   Other Topics Concern    Not on file   Social History Narrative    Not on file     Social Determinants of Health     Financial Resource Strain: Not on file   Food Insecurity: Not on file   Transportation Needs: Not on file   Physical Activity: Not on file   Stress: Not on file   Social Connections: Not on file   Intimate Partner Violence: Not on file   Housing Stability: Not on file       Family History   Problem Relation Age of Onset    Heart Disease Mother     Diabetes Mother     High Blood Pressure Mother     Diabetes Father     Alzheimer's Disease Father     Heart Disease Sister         CABG    Heart Disease Brother     Heart Disease Brother     Heart Disease Brother     Cancer Brother         REVIEW OF SYSTEM:     Constitutional: No fever or chills. No night sweats, no weight loss   Eyes: No eye discharge, double vision, or eye pain   HEENT: negative for sore mouth, sore throat, hoarseness and voice change   Respiratory: +cough , no sputum, dyspnea, wheezing, hemoptysis, +chest pain   Cardiovascular: negative for chest pain, dyspnea, palpitations, orthopnea, PND   Gastrointestinal: negative for nausea, vomiting, diarrhea, constipation, abdominal pain, Dysphagia, hematemesis and hematochezia   Genitourinary: negative for frequency, dysuria, nocturia, urinary incontinence, and hematuria   Integument: negative for rash, skin lesions, bruises.    Hematologic/Lymphatic: negative for easy bruising, bleeding, lymphadenopathy, petechiae and swelling/edema   Endocrine: negative for heat or cold intolerance, tremor, weight changes, change in bowel habits and hair loss   Musculoskeletal: negative for myalgias, arthralgias, pain, joint swelling,and bone pain   Neurological: negative for headaches, dizziness, seizures, weakness, numbness       OBJECTIVE:         Vitals:    07/28/22 1512   Temp: 97.6 °F (36.4 °C)       PHYSICAL EXAM:   General appearance - well appearing, no in pain or distress   Mental status - alert and cooperative   Eyes - pupils equal and reactive, extraocular eye movements intact   Ears - bilateral TM's and external ear canals normal Mouth - mucous membranes moist, pharynx normal without lesions   Neck - supple, no significant adenopathy   Lymphatics - no palpable lymphadenopathy, no hepatosplenomegaly   Chest - clear to auscultation, no wheezes, rales or rhonchi, symmetric air entry, diminished sounds on the right side  Heart - normal rate, regular rhythm, normal S1, S2, no murmurs, rubs, clicks or gallops   Abdomen - soft, nontender, nondistended, no masses or organomegaly   Neurological - alert, oriented, normal speech, no focal findings or movement disorder noted   Musculoskeletal - no joint tenderness, deformity or swelling   Extremities - peripheral pulses normal, no pedal edema, no clubbing or cyanosis   Skin - normal coloration and turgor, no rashes, no suspicious skin lesions noted ,      LABORATORY DATA:     Lab Results   Component Value Date    WBC 6.4 07/03/2022    HGB 7.9 (L) 07/03/2022    HCT 25.6 (L) 07/03/2022    MCV 92.4 07/03/2022     07/03/2022    LYMPHOPCT 23 (L) 07/03/2022    RBC 2.77 (L) 07/03/2022    MCH 28.5 07/03/2022    MCHC 30.9 07/03/2022    RDW 16.0 (H) 07/03/2022    NEUTOPHILPCT 50.9 02/02/2017    MONOPCT 12 07/03/2022    EOSPCT 3.1 02/02/2017    BASOPCT 1 07/03/2022    NEUTROABS 3.78 07/03/2022    LYMPHSABS 1.49 07/03/2022    MONOSABS 0.79 07/03/2022    EOSABS 0.25 07/03/2022    BASOSABS 0.07 07/03/2022         Chemistry        Component Value Date/Time     07/03/2022 1736    K 4.9 07/03/2022 1736    CL 98 07/03/2022 1736    CO2 25 07/03/2022 1736    BUN 22 07/03/2022 1736    CREATININE 1.14 07/03/2022 1736        Component Value Date/Time    CALCIUM 9.3 07/03/2022 1736    ALKPHOS 123 07/03/2022 1736    AST 15 07/03/2022 1736    ALT 9 07/03/2022 1736    BILITOT 0.41 07/03/2022 1736            PATHOLOGY DATA:     Surgical Pathology Report -- Diagnosis --     1. Right lung, middle lobe wedge biopsy:     -  Adenocarcinoma.      -  Tumor is 1.8 cm.     -  Carcinoma invades into but not through the visceral pleura. 2.  Lymph node #9, biopsy:     -  Negative for metastatic carcinoma. 3.  Lymph node #7, biopsy:     -  Negative for metastatic carcinoma. 4.  Right middle lobe lung, lobectomy:     -  Margins are free of involvement by carcinoma. -  Peribronchial lymph node, negative for metastatic carcinoma. -  Nonneoplastic lung shows emphysematous change. Miladys Best M.D.   **Electronically Signed Out**         rdd/1/6/2022         Clinical Information   Pre-op Diagnosis:  RIGHT MIDDLE LOBE NODULE   Operative Findings: MIDDLE LOBE OF RIGHT LUNG; LYMPH NODE #9 OF LUNG;   LYMPH NODE #7; RIGHT MIDDLE LOBE   Operation Performed: MIDDLE WEDGE RESECTION, POSSIBLE MIDDLE   LOBECTOMY VIA THORACOTOMY     Source of Specimen   1: MIDDLE LOBE OF RIGHT LUNG (A)   2: LYMPH NODE #9 OF LUNG (B)   3: LYMPH NODE #7   4: RIGHT MIDDLE LOBE     Gross Description   1. \"ANA DAVIS, MIDDLE LOBE OF RIGHT LUNG\" Received fresh is a   6.4 x 4.2 x 2.8 cm lung wedge with staple line. The pleura is   pink-tan with an area of slightly thickening (inked black). Sectioning reveals a 1.8 x 1.2 x 0.8 cm subpleural tan-white mass   lesion that is 0.8 cm from the staple line margin. The remaining   parenchyma is pink-tan with no other lesions. 1TP, 1DQ, 1FS, Cassette   summary:  \"A\" frozen section lesion, \"B\" remainder of lesion with   pleura inked black, \"C\" staple line margin en face. 2. \"ANA SUSAN, LYMPH NODE #9 OF LUNG\" Received fresh are two   fragments, 0.3 cm each. 1TP, 1FS, 1cs. 3. \"ANA SUSAN, LYMPH NODE #7\" Received fresh are two   anthracotic fragments, 0.6 and 0.7 cm in greatest dimension. 1TP,   1FS, 1cs. 4. \"ANA DAVIS, RIGHT MIDDLE LOBE\" Received fresh is a 50 gram,   8.5 x 6.8 x 3.5 cm lobe of lung with multiple staple lines. The   pleura is wrinkled purple-gray. Sectioning reveals spongy dark red   parenchyma with no masses.   No markedly enlarged nodes are identified   at the hilum. Cassette summary:  \"A\" bronchial margin frozen section   cassette resubmitted as received, \"B\" hilar soft tissue and vascular   margin, \"C\" uninvolved lung (along staple line). tm     Intraoperative Diagnosis   1. FSDX:  Non-small cell carcinoma, favor adenocarcinoma. (14:00,   RDD)   2. FSDX:  Fibrofatty tissue. (14:11, RDD)   3. FSDX:  Lymph node, negative for carcinoma.  (14:19, RDD)   4. FSDX:  Bronchial margin, negative for carcinoma.  (16:17, RDD)     Microscopic Description   1-4. Microscopic examination performed. PROCEDURE: Wedge biopsy, followed by lobectomy       SPECIMEN LATERALITY: Right   TUMOR FOCALITY: Single tumor       TUMOR SITE: Right middle lobe   TUMOR SIZE --     1.8 x 1.2 x 0.8 cm           HISTOLOGIC TYPE: Adenocarcinoma. Sections show a large and anaplastic non-small cell malignancy with   cohesion and a few instances of glandular differentiation. Neoplastic   cells are large with copious eosinophilic cytoplasm and large   vesicular nuclei. Tumor is characterized further utilizing control   appropriate immunostains. Cells of carcinoma are negative for p40 and   they are positive for TTF-1 and CK7. Morphology and immunophenotype   are that of an adenocarcinoma, lung primary. Note: The case is reviewed by a second Pathologist for quality   assurance with consensus (DS). HISTOLOGIC GRADE: Grade 2 (of 4)           VISCERAL PLEURA INVASION:Reticulin stain with appropriately reactive   control is utilized to evaluate pleura. Tumor extends beyond the   elastic layer into the pleura but not to the pleural surface (PL1)     DIRECT INVASION OF ADJACENT STRUCTURES: No     TREATMENT EFFECT: None apparent.      LYMPHOVASCULAR INVASION: Absent                         MARGINS --   -BRONCHIAL: Free of tumor       -VASCULAR: Free of tumor       -PARENCHYMAL: Free of tumor       -OTHER ATTACHED TISSUE MARGIN (IF APPLICABLE): N/A       REGIONAL LYMPH NODES: 3   LYMPH NODES(S) FROM PRIOR PROCEDURES: No       NUMBER AND STATION(S) EXAMINED:   See diagnoses   NUMBER AND STATION(S) WITH METASTASIS: 0         DISTANT METASTASIS--   DISTANT SITE(S) INVOLVED, IF APPLICABLE: N/A         PATHOLOGIC STAGE CLASSIFICATION:     pT2a  pN0   CAP Lung 4200 in conjunction with AJCC 8 ed. SURGICAL PATHOLOGY CONSULTATION         Patient Name: Amaris Both: [id number]   Path Number: [id number]      [facility]. [place], [address]   [phone]   Fax: [phone]          IMAGING DATA:      CT CHEST WO CONTRAST  Narrative: EXAMINATION:  CT OF THE CHEST WITHOUT CONTRAST 7/15/2022 7:26 am    TECHNIQUE:  CT of the chest was performed without the administration of intravenous  contrast. Multiplanar reformatted images are provided for review. Automated  exposure control, iterative reconstruction, and/or weight based adjustment of  the mA/kV was utilized to reduce the radiation dose to as low as reasonably  achievable. COMPARISON:  CT chest dated 05/19/2022. CT abdomen and pelvis dated 07/03/2022. HISTORY:  ORDERING SYSTEM PROVIDED HISTORY: S/P thoracotomy    Right upper quadrant pain. FINDINGS:  Mediastinum: Left chest port is in place with distal tip in the mid to distal  SVC. The heart is normal in size. There is no pericardial effusion. Thoracic aorta and pulmonary arteries are normal in caliber. No mediastinal  lymphadenopathy. Assessment of the carlos is limited without IV contrast.  There are postoperative changes at the right hilum. Lungs/pleura: Moderate amount of heterogeneous loculated pleural fluid at the  right base is unchanged from the previous CT abdomen and pelvis dated  07/03/2022 and slightly decreased when compared to the CT chest dated  05/19/2022. No pleural gas. There is mild biapical pleural scar.   There is  mild centrilobular emphysema. Mild atelectasis/scar at the right base is  unchanged. No new airspace consolidation or pulmonary nodule. No  pneumothorax. Upper Abdomen: Gallstones noted in the gallbladder. Limited visualized upper  abdominal structures are otherwise unremarkable. Soft Tissues/Bones: Chronic fracture deformity at the anterior right 5th rib. No acute or suspicious osseous abnormality. Impression: 1. Postoperative changes at the right hilum with moderately sized  heterogeneous loculated pleural fluid at the right base, not significantly  changed from previous CT abdomen dated 07/03/2022 and slightly decreased when  compared to CT chest dated 05/19/2022.    2.  No acute findings in the chest.    3.  Cholelithiasis. CT chest with IV contrast on 11/2/2021    The previously biopsied, somewhat bilobed nodule involving right middle lobe   is slightly increased in size since the prior study now measuring 1.8 x 1.0   cm, once measuring 1.8 x 0.6 cm. There does appear to be some spiculation. No new pulmonary nodule is seen. PET/CT on June 30, 2021    1. Significant oval increase in size of known anterior superior middle lobe   nodule along the minor fissure. This now measures 6 x 18 mm compared to 10 x   4 mm on the prior. This remains a LUNG RADS 4B (very suspicious) nodule. Recommend repeat PET-CT and or tissue sampling given appearance and   significant interval increase in size. 2. No major change in the fat density 2.3 cm nodule in the gastric fundus   adjacent to the GE junction. Note this was FDG avid on the prior PET-CT. 3. Mild emphysema. The findings were sent to the Radiology Results [address] at 12:06   pm on 6/10/2021to be communicated to a licensed caregiver. ASSESSMENT / PLAN:    Rojas Oneal was seen today for follow-up and anorexia.     Diagnoses and all orders for this visit:    Lung cancer, main bronchus, right ([address]    Pleural effusion on right 44-year-old man history of smoking diagnosed with pathology stage Ib(T2 aN0 M0) adenocarcinoma of the right middle lobe status post right VATS thoracotomy with right middle lobectomy done on January 4, 2022, due to high risk features manifested by involvement of visceral pleura recommended adjuvant chemotherapy which is Alimta and carboplatin, carboplatin substitute cisplatin due to frailed/nausea related to gastroparesis from diabetes in addition to mild neuropathy, started on chemotherapy on February 21, 2022   Patient status post fourth cycle of chemotherapy he did well however the last cycle he had weakness fatigue and lightheadedness  Hypotension/orthostatic will refer patient to emergency room for volume repletion may need to stay overnight  Right-sided pleural effusion status post thoracentesis> fluid sent for cytology and was negative> repeat imaging did show loculated fluid and IR recommended no more thoracentesis and with persistent loculated fluid will refer back to surgery Dr. Joshua Bernard ENT resolved  Intermittent nausea this is prior to surgery related to diabetic gastroparesis > antinausea medicine  Decreased appetite due to the nausea> encourage oral intake> improved  right-sided chest pain since surgery> CT chest loculated pleural fluid at the right base has been followed by cardiovascular surgery and recommended nerve block which was done  Pain management MS Contin and MS IR and referral for possible nerve block  Follow-up on left kidney mass already> MRI of the kidney  Follow-up CT of the chest in 3 months  RTC in 3 months                 I spent a total of 40  minutes on the date of the service which included preparing to see the patient, face-to-face patient care, completing clinical documentation, obtaining and/or reviewing separately obtained history, performing a medically appropriate examination, counseling and educating the patient/family/caregiver and ordering medications, tests, or procedures. [facility]                            This note is created with the assistance of a speech recognition program.  While intending to generate a document that actually reflects the content of the visit, the document can still have some errors including those of syntax and sound a like substitutions which may escape proof reading. It such instances, actual meaning can be extrapolated by contextual diversion.

## 2022-08-01 ENCOUNTER — TELEPHONE (OUTPATIENT)
Dept: VASCULAR SURGERY | Age: 72
End: 2022-08-01

## 2022-08-01 NOTE — TELEPHONE ENCOUNTER
----- Message from Bahman Cadena MA sent at 8/1/2022 11:01 AM EDT -----  PT notified, he verbalized understanding.   ----- Message -----  From: ALONDRA Gil NP  Sent: 8/1/2022  10:58 AM EDT  To: Bahman Cadena MA    This is a primary care complaint. I would have him call PCP  ----- Message -----  From: Bahman Cadena MA  Sent: 7/29/2022  11:34 AM EDT  To: ALONDRA Gil NP, #    PY states he is still having some abdominal pain and he wants to know what to do?  ----- Message -----  From: ALONDRA Gil NP  Sent: 7/29/2022  11:07 AM EDT  To: Bahman Cadena MA    The pl effusion is slightly improving when compared to prior one back in May  Still has effusion which will take time to resolve. If patient still symptomatic he may need thoracentesis for resolution faster. Thanks Barrett Rodrigues  ----- Message -----  From: Bahman Cadena MA  Sent: 7/20/2022  11:13 AM EDT  To: Hellen Irvin MD, [facility] [place], #    PT states he had his CT done last week and would like someone to call with the results.

## 2022-08-03 ENCOUNTER — HOSPITAL ENCOUNTER (OUTPATIENT)
Age: 72
Discharge: HOME | End: 2022-08-03
Payer: MEDICARE

## 2022-08-03 DIAGNOSIS — Z12.5 SCREENING PSA (PROSTATE SPECIFIC ANTIGEN): ICD-10-CM

## 2022-08-03 LAB — PROSTATE SPECIFIC ANTIGEN: 0.97 NG/ML

## 2022-08-03 PROCEDURE — 36415 COLL VENOUS BLD VENIPUNCTURE: CPT

## 2022-08-03 PROCEDURE — G0103 PSA SCREENING: HCPCS

## 2022-08-05 ENCOUNTER — HOSPITAL ENCOUNTER (OUTPATIENT)
Age: 72
Discharge: HOME | End: 2022-08-05
Payer: MEDICARE

## 2022-08-05 ENCOUNTER — HOSPITAL ENCOUNTER (OUTPATIENT)
Dept: MRI IMAGING | Age: 72
Discharge: HOME | End: 2022-08-07
Payer: MEDICARE

## 2022-08-05 DIAGNOSIS — C34.01 LUNG CANCER, MAIN BRONCHUS, RIGHT (HCC): ICD-10-CM

## 2022-08-05 DIAGNOSIS — N28.89 RENAL MASS: ICD-10-CM

## 2022-08-05 DIAGNOSIS — N28.89 LEFT KIDNEY MASS: ICD-10-CM

## 2022-08-05 LAB
BUN BLDV-MCNC: 15 MG/DL (ref 8–23)
CREAT SERPL-MCNC: 0.99 MG/DL (ref 0.7–1.2)
GFR AFRICAN AMERICAN: >60 ML/MIN
GFR NON-AFRICAN AMERICAN: >60 ML/MIN
GFR SERPL CREATININE-BSD FRML MDRD: NORMAL ML/MIN/{1.73_M2}
GFR SERPL CREATININE-BSD FRML MDRD: NORMAL ML/MIN/{1.73_M2}

## 2022-08-05 PROCEDURE — 84520 ASSAY OF UREA NITROGEN: CPT

## 2022-08-05 PROCEDURE — 36415 COLL VENOUS BLD VENIPUNCTURE: CPT

## 2022-08-05 PROCEDURE — 74183 MRI ABD W/O CNTR FLWD CNTR: CPT

## 2022-08-05 PROCEDURE — A9579 GAD-BASE MR CONTRAST NOS,1ML: HCPCS | Performed by: INTERNAL MEDICINE

## 2022-08-05 PROCEDURE — 82565 ASSAY OF CREATININE: CPT

## 2022-08-05 PROCEDURE — 6360000004 HC RX CONTRAST MEDICATION: Performed by: INTERNAL MEDICINE

## 2022-08-05 RX ADMIN — GADOTERIDOL 13 ML: 279.3 INJECTION, SOLUTION INTRAVENOUS at 10:45

## 2022-08-11 ENCOUNTER — OFFICE VISIT (OUTPATIENT)
Dept: UROLOGY | Age: 72
End: 2022-08-11
Payer: MEDICARE

## 2022-08-11 ENCOUNTER — HOSPITAL ENCOUNTER (OUTPATIENT)
Age: 72
Setting detail: SPECIMEN
Discharge: HOME | End: 2022-08-11
Payer: MEDICARE

## 2022-08-11 VITALS
DIASTOLIC BLOOD PRESSURE: 62 MMHG | HEIGHT: 72 IN | WEIGHT: 157 LBS | TEMPERATURE: 97.7 F | SYSTOLIC BLOOD PRESSURE: 104 MMHG | BODY MASS INDEX: 21.26 KG/M2

## 2022-08-11 DIAGNOSIS — R33.9 URINARY RETENTION: ICD-10-CM

## 2022-08-11 DIAGNOSIS — R35.1 NOCTURIA: ICD-10-CM

## 2022-08-11 DIAGNOSIS — N13.8 BPH WITH OBSTRUCTION/LOWER URINARY TRACT SYMPTOMS: ICD-10-CM

## 2022-08-11 DIAGNOSIS — N20.0 RENAL STONE: ICD-10-CM

## 2022-08-11 DIAGNOSIS — N28.89 RENAL MASS: Primary | ICD-10-CM

## 2022-08-11 DIAGNOSIS — N40.1 BPH WITH OBSTRUCTION/LOWER URINARY TRACT SYMPTOMS: ICD-10-CM

## 2022-08-11 LAB
BILIRUBIN URINE: NEGATIVE
COLOR: YELLOW
EPITHELIAL CELLS UA: NORMAL /HPF (ref 0–5)
GLUCOSE URINE: NEGATIVE
KETONES, URINE: NEGATIVE
LEUKOCYTE ESTERASE, URINE: NEGATIVE
NITRITE, URINE: NEGATIVE
PH UA: 6 (ref 5–9)
PROTEIN UA: NEGATIVE
RBC UA: NORMAL /HPF (ref 0–2)
SPECIFIC GRAVITY UA: 1.01 (ref 1.01–1.02)
TURBIDITY: CLEAR
URINE HGB: NEGATIVE
UROBILINOGEN, URINE: NORMAL
WBC UA: NORMAL /HPF (ref 0–5)

## 2022-08-11 PROCEDURE — G8427 DOCREV CUR MEDS BY ELIG CLIN: HCPCS | Performed by: NURSE PRACTITIONER

## 2022-08-11 PROCEDURE — 87086 URINE CULTURE/COLONY COUNT: CPT

## 2022-08-11 PROCEDURE — 51798 US URINE CAPACITY MEASURE: CPT | Performed by: NURSE PRACTITIONER

## 2022-08-11 PROCEDURE — 99211 OFF/OP EST MAY X REQ PHY/QHP: CPT | Performed by: NURSE PRACTITIONER

## 2022-08-11 PROCEDURE — PBSHW PR MEASUREMENT,POST-VOID RESIDUAL VOLUME BY US,NON-IMAGING: Performed by: NURSE PRACTITIONER

## 2022-08-11 PROCEDURE — 3017F COLORECTAL CA SCREEN DOC REV: CPT | Performed by: NURSE PRACTITIONER

## 2022-08-11 PROCEDURE — 99214 OFFICE O/P EST MOD 30 MIN: CPT | Performed by: NURSE PRACTITIONER

## 2022-08-11 PROCEDURE — G8420 CALC BMI NORM PARAMETERS: HCPCS | Performed by: NURSE PRACTITIONER

## 2022-08-11 PROCEDURE — 81001 URINALYSIS AUTO W/SCOPE: CPT

## 2022-08-11 PROCEDURE — 1124F ACP DISCUSS-NO DSCNMKR DOCD: CPT | Performed by: NURSE PRACTITIONER

## 2022-08-11 PROCEDURE — 1036F TOBACCO NON-USER: CPT | Performed by: NURSE PRACTITIONER

## 2022-08-11 RX ORDER — FLASH GLUCOSE SENSOR
KIT MISCELLANEOUS
COMMUNITY
Start: 2022-07-18

## 2022-08-11 ASSESSMENT — ENCOUNTER SYMPTOMS
BACK PAIN: 0
CONSTIPATION: 0
COLOR CHANGE: 0
WHEEZING: 0
SHORTNESS OF BREATH: 0
NAUSEA: 0
EYE REDNESS: 0
ABDOMINAL PAIN: 0
VOMITING: 0
COUGH: 0

## 2022-08-11 NOTE — PROGRESS NOTES
HPI:          Patient is a 67 y.o. male in no acute distress. He is alert and oriented to person, place, and time. History  Lung cancer, started on pain medication    7/2022 ER referral for urinary retention. Straight cathed for 400ml. Retention secondary due to constipation due to pain medication   Flomax started      Miralax started    CT with IV contrast showed a 2.1cm mass in the left kidney - patient wants oncology to follow    8/2022 MRI showed a 1.8cm left renal mass - followed by oncology    Today  Here today to follow-up for urinary retention, constipation and history of stones. He is having daily BMs without miralax. He is taking flomax daily. He urinates every 3 hours during the day. He has nocturia every 1 hour. He denies dysuria, gross hematuria or incontinence. PVR is low. He does consume 2 cups of coffee and 1 bottle of soda per day. He did have an MRI with and without contrast. This film was independently reviewed and shows no  calcifications. There is a 1.8cm left renal mass in the midpole and enhances. This is concerning for malignancy. PSA is low, 0.97. Past Medical History:   Diagnosis Date    Abnormal cardiac cath 09/20/2013    LMCA; Mild irregularities 10-20%. LAD: Abnormal.  Mild to moderate irregularities throughout the LAD and branches. LCx:  abnormal.  80-90% stenosis in a moderate sized OM1. RCA: Abnormal.  40-50% mid RCA stenosis. Actinic keratosis     Arthritis     neck, fingers    CAD (coronary artery disease)     Calculus of kidney     Cancer (HCC)     Cervical stenosis of spine     Diabetes mellitus ([facility] [address]     Dr. Jenny Winter, CNP, [place]    Full dentures     GERD (gastroesophageal reflux disease)     H/O echocardiogram 04/29/2016    Stress echo. Normal resting LV systolic function,normal systolic restponse to exercise.  No evidence of reversible  ischemia on this maximal,symptom limited,treadmill exerxiess stress echocardiography study    History of cardiac Jayla Ayala MD [facility] SPECIAL PROCEDURES    IR PORT PLACEMENT EQUAL OR GREATER THAN 5 YEARS  2/9/2022    IR PORT PLACEMENT EQUAL OR GREATER THAN 5 YEARS 2/9/2022 [facility] SPECIAL PROCEDURES    LUNG BIOPSY Right 07/28/2021    Dr Wilmar Astudillo, TOTAL Right 01/04/2022    MIDDLE WEDGE RESECTION, POSSIBLE MIDDLE LOBECTOMY VIA THORACOTOMY performed by Ankush Justice MD at [address] [facility]    c4-5 laminectomy and fusion    OTHER SURGICAL HISTORY  10/2014    Right foot - 7 from heal and 1 from great toe at [address]  12/11/2014    pain injection - cervical    TUNNELED CENTRAL VENOUS CATHETER W/ SUBCUTANEOUS PORT Left 02/09/2022    Dr Hoenninger/[facility]    UPPER GASTROINTESTINAL ENDOSCOPY  12/28/2005    Dr. Eather Ormond - mild esophagitis and gastritis    UPPER GASTROINTESTINAL ENDOSCOPY N/A 02/05/2019    -bx(neg H-Pylori)retained gastric content, possible submucosal mass of gastric cardia    UPPER GASTROINTESTINAL ENDOSCOPY N/A 10/22/2019    EGD BIOPSY performed by Ramila Bustamante MD at [address] Encounter Medications as of 8/11/2022   Medication Sig Dispense Refill    Continuous Blood Gluc Sensor (FREESTYLE YAIMA 14 DAY SENSOR) MISC as directed      amLODIPine (NORVASC) 5 MG tablet Take 1 tablet by mouth daily 90 tablet 3    magnesium 30 MG tablet Take 400 mg by mouth daily       clopidogrel (PLAVIX) 75 MG tablet Take 1 tablet by mouth daily 90 tablet 3    zinc 50 MG CAPS Take by mouth      promethazine (PHENERGAN) 25 MG tablet Take 25 mg by mouth every 4 hours as needed for Nausea      pantoprazole (PROTONIX) 40 MG tablet Take 40 mg by mouth in the morning and at bedtime       ondansetron (ZOFRAN ODT) 8 MG TBDP disintegrating tablet Take 1 tablet by mouth every 8 hours as needed for Nausea 30 tablet 2    lidocaine-prilocaine (EMLA) 2.5-2.5 % cream Apply topically to port site 30- 60 minutes before access as needed. 30 g 2    SERTRALINE HCL PO Take 50 mg by mouth nightly       tamsulosin (FLOMAX) 0.4 MG capsule Take 0.4 mg by mouth daily      tiZANidine (ZANAFLEX) 4 MG capsule Take 4 mg by mouth 3 times daily as needed       isosorbide mononitrate (IMDUR) 30 MG extended release tablet 1/2 tablet in the morning      atenolol (TENORMIN) 25 MG tablet Take 1 tablet by mouth daily 90 tablet 3    atorvastatin (LIPITOR) 20 MG tablet Take 1 tablet by mouth daily 90 tablet 3    sitaGLIPtan-metFORMIN (JANUMET)  MG per tablet Take 1 tablet by mouth 2 times daily (with meals)      glimepiride (AMARYL) 2 MG tablet Take 1 tablet by mouth 2 times daily 180 tablet 3    aspirin 81 MG tablet Take 81 mg by mouth daily Ordered by heart doctor, Chan Espinal      [DISCONTINUED] folic acid (FOLVITE) 1 MG tablet Take 1 tablet by mouth daily for 30 doses Start 7 days before first scheduled dose and continue for 21 days after last dose of PEMEtrexed. 30 tablet 2    gabapentin (NEURONTIN) 100 MG capsule Take 1 capsule by mouth 3 times daily for 14 days. 42 capsule 0    lisinopril (PRINIVIL;ZESTRIL) 20 MG tablet Take 20 mg by mouth daily  (Patient not taking: No sig reported)      nitroGLYCERIN (NITRODUR) 0.4 MG/HR 1 dose under tongue as needed for chest pain. max of 3 in one day (Patient not taking: No sig reported)      [DISCONTINUED] ferrous sulfate (IRON 325) 325 (65 Fe) MG tablet Take 325 mg by mouth daily       Insulin Glargine, 2 Unit Dial, 300 UNIT/ML SOPN Insulin Glargine Insulin Glargine U-300 Conc Active 10 UNIT Subcutaneous Every morning May 7th, 2020 11:47am 05-  [facility] ([id number]) (Patient not taking: No sig reported)       No facility-administered encounter medications on file as of 8/11/2022.       Current Outpatient Medications on File Prior to Visit   Medication Sig Dispense Refill    Continuous Blood Gluc Sensor (FREESTYLE YAIMA 14 DAY SENSOR) MISC as directed      amLODIPine (NORVASC) 5 MG tablet Take 1 tablet by mouth daily 90 tablet 3    magnesium 30 MG tablet Take 400 mg by mouth daily       clopidogrel (PLAVIX) 75 MG tablet Take 1 tablet by mouth daily 90 tablet 3    zinc 50 MG CAPS Take by mouth      promethazine (PHENERGAN) 25 MG tablet Take 25 mg by mouth every 4 hours as needed for Nausea      pantoprazole (PROTONIX) 40 MG tablet Take 40 mg by mouth in the morning and at bedtime       ondansetron (ZOFRAN ODT) 8 MG TBDP disintegrating tablet Take 1 tablet by mouth every 8 hours as needed for Nausea 30 tablet 2    lidocaine-prilocaine (EMLA) 2.5-2.5 % cream Apply topically to port site 30- 60 minutes before access as needed. 30 g 2    SERTRALINE HCL PO Take 50 mg by mouth nightly       tamsulosin (FLOMAX) 0.4 MG capsule Take 0.4 mg by mouth daily      tiZANidine (ZANAFLEX) 4 MG capsule Take 4 mg by mouth 3 times daily as needed       isosorbide mononitrate (IMDUR) 30 MG extended release tablet 1/2 tablet in the morning      atenolol (TENORMIN) 25 MG tablet Take 1 tablet by mouth daily 90 tablet 3    atorvastatin (LIPITOR) 20 MG tablet Take 1 tablet by mouth daily 90 tablet 3    sitaGLIPtan-metFORMIN (JANUMET)  MG per tablet Take 1 tablet by mouth 2 times daily (with meals)      glimepiride (AMARYL) 2 MG tablet Take 1 tablet by mouth 2 times daily 180 tablet 3    aspirin 81 MG tablet Take 81 mg by mouth daily Ordered by heart doctor, Chan Espinal      [DISCONTINUED] folic acid (FOLVITE) 1 MG tablet Take 1 tablet by mouth daily for 30 doses Start 7 days before first scheduled dose and continue for 21 days after last dose of PEMEtrexed. 30 tablet 2    gabapentin (NEURONTIN) 100 MG capsule Take 1 capsule by mouth 3 times daily for 14 days. 42 capsule 0    lisinopril (PRINIVIL;ZESTRIL) 20 MG tablet Take 20 mg by mouth daily  (Patient not taking: No sig reported)      nitroGLYCERIN (NITRODUR) 0.4 MG/HR 1 dose under tongue as needed for chest pain.  max of 3 in one day (Patient not (Temporal)   Ht 6' (1.829 m)   Wt 157 lb (71.2 kg)   BMI 21.29 kg/m²       PHYSICAL EXAM:  Constitutional: Patient in no acute distress; Neuro: alert and oriented to person place and time. Psych: Mood and affect normal.  Skin: Normal  Lungs: Respiratory effort normal  Cardiovascular:  Normal peripheral pulses  Abdomen: Soft, non-tender, non-distended with no CVA, flank pain  Bladder non-tender and not distended. Lab Results   Component Value Date    BUN 15 08/05/2022     Lab Results   Component Value Date    CREATININE 0.99 08/05/2022     Lab Results   Component Value Date    PSA 0.97 08/03/2022    PSA 0.57 08/02/2013    PSA 0.43 08/17/2012       ASSESSMENT:   Diagnosis Orders   1. Renal mass        2. 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 Urinary retention  DE MEASUREMENT,POST-VOID RESIDUAL VOLUME BY US,NON-IMAGING      5. Nocturia  Urinalysis with Microscopic    Culture, Urine            PLAN:  UACS due to nocturia    Discussed bladder irritants thoroughly. Patient instructed to avoid/minimize intake of food/ drinks such as: coffee, tea, caffeine, alcohol, carbonated beverages, soda pop, spicy/acidic foods. Continue Flomax daily    He has an appointment with oncology next week to review MRI results. We will work with oncology to determine the best course of action for the renal mass. Due to its size it would warrant surveillance, but due to the coinciding lung cancer we may need to consider definitive therapy. I am concerned that right now it is too small to biopsy.

## 2022-08-12 LAB
CULTURE: NORMAL
SPECIMEN DESCRIPTION: NORMAL

## 2022-08-15 ENCOUNTER — TELEPHONE (OUTPATIENT)
Dept: UROLOGY | Age: 72
End: 2022-08-15

## 2022-08-15 NOTE — TELEPHONE ENCOUNTER
----- Message from ALONDRA Marquez - CNP sent at 8/12/2022  1:22 PM EDT -----  Call pt - urine cx reviewed and negative for UTI & for significant microhematuria

## 2022-08-18 ENCOUNTER — OFFICE VISIT (OUTPATIENT)
Dept: ONCOLOGY | Age: 72
End: 2022-08-18
Payer: MEDICARE

## 2022-08-18 VITALS
RESPIRATION RATE: 18 BRPM | DIASTOLIC BLOOD PRESSURE: 56 MMHG | SYSTOLIC BLOOD PRESSURE: 97 MMHG | HEIGHT: 72 IN | WEIGHT: 157 LBS | HEART RATE: 77 BPM | BODY MASS INDEX: 21.26 KG/M2 | TEMPERATURE: 97.3 F

## 2022-08-18 DIAGNOSIS — C34.01 LUNG CANCER, MAIN BRONCHUS, RIGHT (HCC): Primary | ICD-10-CM

## 2022-08-18 DIAGNOSIS — J90 PLEURAL EFFUSION ON RIGHT: ICD-10-CM

## 2022-08-18 DIAGNOSIS — C64.2 RENAL CELL CARCINOMA OF LEFT KIDNEY (HCC): ICD-10-CM

## 2022-08-18 DIAGNOSIS — R07.9 RIGHT-SIDED CHEST PAIN: ICD-10-CM

## 2022-08-18 PROCEDURE — 99215 OFFICE O/P EST HI 40 MIN: CPT | Performed by: INTERNAL MEDICINE

## 2022-08-18 PROCEDURE — G8420 CALC BMI NORM PARAMETERS: HCPCS | Performed by: INTERNAL MEDICINE

## 2022-08-18 PROCEDURE — G8428 CUR MEDS NOT DOCUMENT: HCPCS | Performed by: INTERNAL MEDICINE

## 2022-08-18 PROCEDURE — 1036F TOBACCO NON-USER: CPT | Performed by: INTERNAL MEDICINE

## 2022-08-18 PROCEDURE — 3017F COLORECTAL CA SCREEN DOC REV: CPT | Performed by: INTERNAL MEDICINE

## 2022-08-18 PROCEDURE — 99211 OFF/OP EST MAY X REQ PHY/QHP: CPT | Performed by: INTERNAL MEDICINE

## 2022-08-18 PROCEDURE — 1124F ACP DISCUSS-NO DSCNMKR DOCD: CPT | Performed by: INTERNAL MEDICINE

## 2022-08-18 NOTE — PROGRESS NOTES
recommended further work-up with a CT which was done and recommended MRI of the kidney/kidney protocol scan  CT chest was done and did show loculation of the pleural fluid which diminished from before  Was seen by cardiovascular and recommended pain control with nerve block which was done    Interval history  Patient presents to the clinic for a follow-up visit and to discuss results of his lab work-up and other relevant clinical data. During this visit patient's allergy, social, medical, surgical history and medications were reviewed and updated. MRI of the abdomen did show 1.8 cm posterior medial left adrenal nodule and was seen by  and referred back to medical oncology    Past Medical History:   Diagnosis Date    Abnormal cardiac cath 09/20/2013    LMCA; Mild irregularities 10-20%. LAD: Abnormal.  Mild to moderate irregularities throughout the LAD and branches. LCx:  abnormal.  80-90% stenosis in a moderate sized OM1. RCA: Abnormal.  40-50% mid RCA stenosis. Actinic keratosis     Arthritis     neck, fingers    CAD (coronary artery disease)     Calculus of kidney     Cancer (HCC)     Cervical stenosis of spine     Diabetes mellitus ([facility] [address]     Dr. Dylan Lopez, CNP, [place]    Full dentures     GERD (gastroesophageal reflux disease)     H/O echocardiogram 04/29/2016    Stress echo. Normal resting LV systolic function,normal systolic restponse to exercise. No evidence of reversible  ischemia on this maximal,symptom limited,treadmill exerxiess stress echocardiography study    History of cardiac cath 02/09/2017    LMCA: Lesion on LMCA: Distal subsection . 20% stenosis. LAD: Lesion on 1st Diag: Mid subsection . 75% stenosis. RCA: Lesion on Mid RCA: Mid subsection . 100% stenosis. Comments: The distal RCA fills by left to right collateralization. Dominance: Mixed. LV function assessed as: Normal. EF 65%. History of echocardiogram 06/11/2018    EF 60%. Mildly increased LV wall thickness.  Evidence of mild diastolic dysfunction seen. History of echocardiogram 01/18/2019    EF of 65%. LV wall thickness is mildly increased. Aortic valve leaflets are mildly thickened. History of heart attack     History of tobacco abuse     Quit 10/2021, 1/4 ppd for 54 years    Hyperlipidemia     Hypertension     Dr. Agustin Mccauley, CNP    Pulmonary nodule 12/2021    Right middle lobe    Raynaud's phenomenon     S/P angioplasty with stent 09/20/2013    PTCA-NEDRA of  the OM 1    Wears hearing aid in both ears        Past Surgical History:   Procedure Laterality Date    CARDIAC CATHETERIZATION  2007    patient states had 2 small blockages    CARDIAC CATHETERIZATION  02/09/2017    LMCA: Lesion on LMCA: Distal subsection 20% stenosis. LAD: Lesion on 1st Diag: Mid subsection 75% stenosis. RCA: Lesion on Mid RCA: Mid subsection 100% stenosis. CARDIAC CATHETERIZATION  02/09/2017    Attempted PCI to chronic total occlusion of RCA was not successful. Unable to cross with multiple wires due to heavy calcification and toruosity.     CARDIAC CATHETERIZATION  01/14/2019    CATARACT REMOVAL  04/06/2015    Right eye - Dr. Mike Mims  06/08/2015    Dr. Nancy Castaneda - left eye    COLONOSCOPY  01/11/2006    Dr. Carlo Griffin - internal hemorrhoids, no polps    COLONOSCOPY  05/23/2016    -polyp    CT NEEDLE BIOPSY LUNG PERCUTANEOUS  07/28/2021    CT NEEDLE BIOPSY LUNG PERCUTANEOUS 7/28/2021 [facility] CT SCAN    HEMORRHOID SURGERY      HERNIA REPAIR Left     inguinal    IR CHEST TUBE INSERTION  01/11/2022    IR CHEST TUBE INSERTION 1/11/2022 Marino Russell MD [facility] SPECIAL PROCEDURES    IR PORT PLACEMENT EQUAL OR GREATER THAN 5 YEARS  2/9/2022    IR PORT PLACEMENT EQUAL OR GREATER THAN 5 YEARS 2/9/2022 [facility] SPECIAL PROCEDURES    LUNG BIOPSY Right 07/28/2021    Dr Boogie Yo, TOTAL Right 01/04/2022    MIDDLE WEDGE RESECTION, POSSIBLE MIDDLE LOBECTOMY VIA THORACOTOMY performed by Janeth Moran MD at [facility] NECK SURGERY  1997    c4-5 laminectomy and fusion    OTHER SURGICAL HISTORY  10/2014    Right foot - 7 from heal and 1 from great toe at [address]  12/11/2014    pain injection - cervical    TUNNELED CENTRAL VENOUS CATHETER W/ SUBCUTANEOUS PORT Left 02/09/2022    Dr Hoenninger[facility]    UPPER GASTROINTESTINAL ENDOSCOPY  12/28/2005    Dr. Rolan Schwab - mild esophagitis and gastritis    UPPER GASTROINTESTINAL ENDOSCOPY N/A 02/05/2019    -bx(neg H-Pylori)retained gastric content, possible submucosal mass of gastric cardia    UPPER GASTROINTESTINAL ENDOSCOPY N/A 10/22/2019    EGD BIOPSY performed by Shayne Muir MD at [address]   Allergen Reactions    Niacin And Related      Turns red, no trouble breathing    Minocycline Hcl Rash    Other Nausea And Vomiting     Patient states on all pain medications he gets nausea and vomiting. Doctor ordered a medicine (nausea) to take before pain medication       Current Outpatient Medications   Medication Sig Dispense Refill    Continuous Blood Gluc Sensor (netomatSTYLE YAIMA 14 DAY SENSOR) MISC as directed      amLODIPine (NORVASC) 5 MG tablet Take 1 tablet by mouth daily 90 tablet 3    magnesium 30 MG tablet Take 400 mg by mouth daily       clopidogrel (PLAVIX) 75 MG tablet Take 1 tablet by mouth daily 90 tablet 3    zinc 50 MG CAPS Take by mouth      promethazine (PHENERGAN) 25 MG tablet Take 25 mg by mouth every 4 hours as needed for Nausea      pantoprazole (PROTONIX) 40 MG tablet Take 40 mg by mouth in the morning and at bedtime       ondansetron (ZOFRAN ODT) 8 MG TBDP disintegrating tablet Take 1 tablet by mouth every 8 hours as needed for Nausea 30 tablet 2    lidocaine-prilocaine (EMLA) 2.5-2.5 % cream Apply topically to port site 30- 60 minutes before access as needed.  30 g 2    SERTRALINE HCL PO Take 50 mg by mouth nightly       tamsulosin (FLOMAX) 0.4 MG capsule Take 0.4 mg by mouth daily tiZANidine (ZANAFLEX) 4 MG capsule Take 4 mg by mouth 3 times daily as needed       isosorbide mononitrate (IMDUR) 30 MG extended release tablet 1/2 tablet in the morning      atenolol (TENORMIN) 25 MG tablet Take 1 tablet by mouth daily 90 tablet 3    atorvastatin (LIPITOR) 20 MG tablet Take 1 tablet by mouth daily 90 tablet 3    sitaGLIPtan-metFORMIN (JANUMET)  MG per tablet Take 1 tablet by mouth 2 times daily (with meals)      glimepiride (AMARYL) 2 MG tablet Take 1 tablet by mouth 2 times daily 180 tablet 3    aspirin 81 MG tablet Take 81 mg by mouth daily Ordered by heart doctor, Dr. Danika Olivares, [place]      gabapentin (NEURONTIN) 100 MG capsule Take 1 capsule by mouth 3 times daily for 14 days. 42 capsule 0    lisinopril (PRINIVIL;ZESTRIL) 20 MG tablet Take 20 mg by mouth daily  (Patient not taking: No sig reported)      nitroGLYCERIN (NITRODUR) 0.4 MG/HR 1 dose under tongue as needed for chest pain. max of 3 in one day (Patient not taking: No sig reported)      Insulin Glargine, 2 Unit Dial, 300 UNIT/ML SOPN Insulin Glargine Insulin Glargine U-300 Conc Active 10 UNIT Subcutaneous Every morning May 7th, 2020 11:47am 2020  [facility] ([id number]) (Patient not taking: No sig reported)       No current facility-administered medications for this visit.        Social History     Socioeconomic History    Marital status:      Spouse name: Not on file    Number of children: Not on file    Years of education: Not on file    Highest education level: Not on file   Occupational History    Not on file   Tobacco Use    Smoking status: Former     Packs/day: 0.25     Years: 54.00     Pack years: 13.50     Types: Cigarettes     Quit date: 10/1/2021     Years since quittin.8    Smokeless tobacco: Never   Vaping Use    Vaping Use: Never used   Substance and Sexual Activity    Alcohol use: No    Drug use: No    Sexual activity: Not on file   Other Topics Concern    Not on file   Social History Narrative    Not on file     Social Determinants of Health     Financial Resource Strain: Not on file   Food Insecurity: Not on file   Transportation Needs: Not on file   Physical Activity: Not on file   Stress: Not on file   Social Connections: Not on file   Intimate Partner Violence: Not on file   Housing Stability: Not on file       Family History   Problem Relation Age of Onset    Heart Disease Mother     Diabetes Mother     High Blood Pressure Mother     Diabetes Father     Alzheimer's Disease Father     Heart Disease Sister         CABG    Heart Disease Brother     Heart Disease Brother     Heart Disease Brother     Cancer Brother         REVIEW OF SYSTEM:     Constitutional: No fever or chills. No night sweats, no weight loss   Eyes: No eye discharge, double vision, or eye pain   HEENT: negative for sore mouth, sore throat, hoarseness and voice change   Respiratory: +cough , no sputum, dyspnea, wheezing, hemoptysis, +chest pain   Cardiovascular: negative for chest pain, dyspnea, palpitations, orthopnea, PND   Gastrointestinal: negative for nausea, vomiting, diarrhea, constipation, abdominal pain, Dysphagia, hematemesis and hematochezia   Genitourinary: negative for frequency, dysuria, nocturia, urinary incontinence, and hematuria   Integument: negative for rash, skin lesions, bruises.    Hematologic/Lymphatic: negative for easy bruising, bleeding, lymphadenopathy, petechiae and swelling/edema   Endocrine: negative for heat or cold intolerance, tremor, weight changes, change in bowel habits and hair loss   Musculoskeletal: negative for myalgias, arthralgias, pain, joint swelling,and bone pain   Neurological: negative for headaches, dizziness, seizures, weakness, numbness       OBJECTIVE:         Vitals:    08/18/22 1444   BP: (!) 97/56   Pulse: 77   Resp: 18   Temp: 97.3 °F (36.3 °C)       PHYSICAL EXAM:   General appearance - well appearing, no in pain or distress   Mental status - alert and cooperative   Eyes - pupils equal and reactive, extraocular eye movements intact   Ears - bilateral TM's and external ear canals normal   Mouth - mucous membranes moist, pharynx normal without lesions   Neck - supple, no significant adenopathy   Lymphatics - no palpable lymphadenopathy, no hepatosplenomegaly   Chest - clear to auscultation, no wheezes, rales or rhonchi, symmetric air entry, diminished sounds on the right side  Heart - normal rate, regular rhythm, normal S1, S2, no murmurs, rubs, clicks or gallops   Abdomen - soft, nontender, nondistended, no masses or organomegaly   Neurological - alert, oriented, normal speech, no focal findings or movement disorder noted   Musculoskeletal - no joint tenderness, deformity or swelling   Extremities - peripheral pulses normal, no pedal edema, no clubbing or cyanosis   Skin - normal coloration and turgor, no rashes, no suspicious skin lesions noted ,      LABORATORY DATA:     Lab Results   Component Value Date    WBC 6.4 07/03/2022    HGB 7.9 (L) 07/03/2022    HCT 25.6 (L) 07/03/2022    MCV 92.4 07/03/2022     07/03/2022    LYMPHOPCT 23 (L) 07/03/2022    RBC 2.77 (L) 07/03/2022    MCH 28.5 07/03/2022    MCHC 30.9 07/03/2022    RDW 16.0 (H) 07/03/2022    NEUTOPHILPCT 50.9 02/02/2017    MONOPCT 12 07/03/2022    EOSPCT 3.1 02/02/2017    BASOPCT 1 07/03/2022    NEUTROABS 3.78 07/03/2022    LYMPHSABS 1.49 07/03/2022    MONOSABS 0.79 07/03/2022    EOSABS 0.25 07/03/2022    BASOSABS 0.07 07/03/2022         Chemistry        Component Value Date/Time     07/03/2022 1736    K 4.9 07/03/2022 1736    CL 98 07/03/2022 1736    CO2 25 07/03/2022 1736    BUN 15 08/05/2022 1012    CREATININE 0.99 08/05/2022 1012        Component Value Date/Time    CALCIUM 9.3 07/03/2022 1736    ALKPHOS 123 07/03/2022 1736    AST 15 07/03/2022 1736    ALT 9 07/03/2022 1736    BILITOT 0.41 07/03/2022 1736            PATHOLOGY DATA:     Surgical Pathology Report -- Diagnosis --     1.   Right lung, middle lobe wedge biopsy:     -  Adenocarcinoma. -  Tumor is 1.8 cm.     -  Carcinoma invades into but not through the visceral pleura. 2.  Lymph node #9, biopsy:     -  Negative for metastatic carcinoma. 3.  Lymph node #7, biopsy:     -  Negative for metastatic carcinoma. 4.  Right middle lobe lung, lobectomy:     -  Margins are free of involvement by carcinoma. -  Peribronchial lymph node, negative for metastatic carcinoma. -  Nonneoplastic lung shows emphysematous change. Lorraine Combs M.D.   **Electronically Signed Out**         rdd/1/6/2022         Clinical Information   Pre-op Diagnosis:  RIGHT MIDDLE LOBE NODULE   Operative Findings: MIDDLE LOBE OF RIGHT LUNG; LYMPH NODE #9 OF LUNG;   LYMPH NODE #7; RIGHT MIDDLE LOBE   Operation Performed: MIDDLE WEDGE RESECTION, POSSIBLE MIDDLE   LOBECTOMY VIA THORACOTOMY     Source of Specimen   1: MIDDLE LOBE OF RIGHT LUNG (A)   2: LYMPH NODE #9 OF LUNG (B)   3: LYMPH NODE #7   4: RIGHT MIDDLE LOBE     Gross Description   1. \"ANA DAVIS, MIDDLE LOBE OF RIGHT LUNG\" Received fresh is a   6.4 x 4.2 x 2.8 cm lung wedge with staple line. The pleura is   pink-tan with an area of slightly thickening (inked black). Sectioning reveals a 1.8 x 1.2 x 0.8 cm subpleural tan-white mass   lesion that is 0.8 cm from the staple line margin. The remaining   parenchyma is pink-tan with no other lesions. 1TP, 1DQ, 1FS, Cassette   summary:  \"A\" frozen section lesion, \"B\" remainder of lesion with   pleura inked black, \"C\" staple line margin en face. 2. \"ANA SUSAN, LYMPH NODE #9 OF LUNG\" Received fresh are two   fragments, 0.3 cm each. 1TP, 1FS, 1cs. 3. \"ANA SUSAN, LYMPH NODE #7\" Received fresh are two   anthracotic fragments, 0.6 and 0.7 cm in greatest dimension. 1TP,   1FS, 1cs. 4. \"ANA DAVIS, RIGHT MIDDLE LOBE\" Received fresh is a 50 gram,   8.5 x 6.8 x 3.5 cm lobe of lung with multiple staple lines.   The   pleura is wrinkled purple-gray. Sectioning reveals spongy dark red   parenchyma with no masses. No markedly enlarged nodes are identified   at the hilum. Cassette summary:  \"A\" bronchial margin frozen section   cassette resubmitted as received, \"B\" hilar soft tissue and vascular   margin, \"C\" uninvolved lung (along staple line). tm     Intraoperative Diagnosis   1. FSDX:  Non-small cell carcinoma, favor adenocarcinoma. (14:00,   RDD)   2. FSDX:  Fibrofatty tissue. (14:11, RDD)   3. FSDX:  Lymph node, negative for carcinoma.  (14:19, RDD)   4. FSDX:  Bronchial margin, negative for carcinoma.  (16:17, RDD)     Microscopic Description   1-4. Microscopic examination performed. PROCEDURE: Wedge biopsy, followed by lobectomy       SPECIMEN LATERALITY: Right   TUMOR FOCALITY: Single tumor       TUMOR SITE: Right middle lobe   TUMOR SIZE --     1.8 x 1.2 x 0.8 cm           HISTOLOGIC TYPE: Adenocarcinoma. Sections show a large and anaplastic non-small cell malignancy with   cohesion and a few instances of glandular differentiation. Neoplastic   cells are large with copious eosinophilic cytoplasm and large   vesicular nuclei. Tumor is characterized further utilizing control   appropriate immunostains. Cells of carcinoma are negative for p40 and   they are positive for TTF-1 and CK7. Morphology and immunophenotype   are that of an adenocarcinoma, lung primary. Note: The case is reviewed by a second Pathologist for quality   assurance with consensus (DS). HISTOLOGIC GRADE: Grade 2 (of 4)           VISCERAL PLEURA INVASION:Reticulin stain with appropriately reactive   control is utilized to evaluate pleura. Tumor extends beyond the   elastic layer into the pleura but not to the pleural surface (PL1)     DIRECT INVASION OF ADJACENT STRUCTURES: No     TREATMENT EFFECT: None apparent.      LYMPHOVASCULAR INVASION: Absent                         MARGINS --   -BRONCHIAL: Free of tumor       -VASCULAR: Free of tumor -PARENCHYMAL: Free of tumor       -OTHER ATTACHED TISSUE MARGIN (IF APPLICABLE): N/A       REGIONAL LYMPH NODES: 3   LYMPH NODES(S) FROM PRIOR PROCEDURES: No       NUMBER AND STATION(S) EXAMINED:   See diagnoses   NUMBER AND STATION(S) WITH METASTASIS: 0         DISTANT METASTASIS--   DISTANT SITE(S) INVOLVED, IF APPLICABLE: N/A         PATHOLOGIC STAGE CLASSIFICATION:     pT2a  pN0   CAP Lung 4200 in conjunction with AJCC 8 ed. SURGICAL PATHOLOGY CONSULTATION         Patient Name: Nuno Pier: [id number]   Path Number: [id number]     [id number] [facility]. [place], [address]   [phone]   Fax: [phone]          IMAGING DATA:      MRI ABDOMEN W WO CONTRAST  Narrative: EXAMINATION:  MRI OF THE ABDOMEN WITHOUT AND WITH CONTRAST, 8/5/2022 11:21 am    TECHNIQUE:  Multiplanar multisequence MRI of the abdomen was performed without and with  the administration of intravenous contrast.    COMPARISON:  CT 07/03/2022, 06/20/2022    HISTORY:  ORDERING SYSTEM PROVIDED HISTORY: Renal mass  TECHNOLOGIST PROVIDED HISTORY:  STAT Creatinine as needed:->Yes  Renal protocol placed compare with previous CT of the abdomen    FINDINGS:  There is 1.8 cm round nodule in the posteromedial midpole left kidney. Nodule is T2 hypointense and isointense to renal parenchyma on T1 sequence. There is significant restriction of diffusion with marked hypointensity on  ADC. Nodule enhances slowly best appreciated on postcontrast subtraction  images. Compatible with renal malignancy until proven otherwise. Small bilateral renal cysts up to 12 mm largest in the upper pole left  kidney. No hydronephrosis. Ureters visualized grossly normal.    Liver, spleen, pancreas, adrenal glands show no significant abnormalities. Cholelithiasis. Multiple tiny gallstones noted. No biliary ductal  dilatation.   Normal caliber pancreatic duct.  No pancreas divisum. Visualized GI tract unremarkable. No ascites. Thyroid factor enhancement  vascular structures. Bone marrow signal age appropriate. Impression: There is 1.8 cm posteromedial left renal midpole nodule corresponding to  findings on recent CT scans. Imaging features compatible with renal  malignancy until proven otherwise. Bilateral simple renal cysts up to 12 mm. CT chest with IV contrast on 11/2/2021    The previously biopsied, somewhat bilobed nodule involving right middle lobe   is slightly increased in size since the prior study now measuring 1.8 x 1.0   cm, once measuring 1.8 x 0.6 cm. There does appear to be some spiculation. No new pulmonary nodule is seen. PET/CT on June 30, 2021    1. Significant oval increase in size of known anterior superior middle lobe   nodule along the minor fissure. This now measures 6 x 18 mm compared to 10 x   4 mm on the prior. This remains a LUNG RADS 4B (very suspicious) nodule. Recommend repeat PET-CT and or tissue sampling given appearance and   significant interval increase in size. 2. No major change in the fat density 2.3 cm nodule in the gastric fundus   adjacent to the GE junction. Note this was FDG avid on the prior PET-CT. 3. Mild emphysema. The findings were sent to the Radiology Results [address] at 12:06   pm on 6/10/2021to be communicated to a licensed caregiver. ASSESSMENT / PLAN:    aDnielle Kohler was seen today for follow-up.     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 status post right VATS thoracotomy with right middle lobectomy done on January 4, 2022, due to high risk features manifested by involvement of visceral pleura recommended adjuvant chemotherapy which is Alimta and carboplatin, carboplatin substitute cisplatin due to frailed/nausea related to gastroparesis from diabetes in addition to mild neuropathy, started on chemotherapy on February 21, 2022 and done with 4 cycles of treatment, did well however the last cycle he had weakness fatigue and lightheadedness  Hypotension/orthostatic likely related to diabetic orthostatic  Right-sided pleural effusion status post thoracentesis> fluid sent for cytology and was negative>   Nosebleed> ENT resolved  Intermittent nausea this is prior to surgery related to diabetic gastroparesis > antinausea medicine  Decreased appetite due to the nausea> encourage oral intake> improved  right-sided chest pain since surgery> CT chest loculated pleural fluid at the right base has been followed by cardiovascular surgery and recommended nerve block which was done  Pain management MS Contin and MS IR and referral for possible nerve block  MRI of the kidney did show left renal nodule concern about RCC> this is highly likely RCC less likely metastatic lung cancer will proceed with PET/CT and if level of suspicion high for malignancy will do cryoablation/radiation rather than surveillance  RTC after above             I spent a total of 40  minutes on the date of the service which included preparing to see the patient, face-to-face patient care, completing clinical documentation, obtaining and/or reviewing separately obtained history, performing a medically appropriate examination, counseling and educating the patient/family/caregiver and ordering medications, tests, or procedures.                                        [facility]                            This note is created with the assistance of a speech recognition program.  While intending to generate a document that actually reflects the content of the visit, the document can still have some errors including those of syntax and sound a like substitutions which may escape proof reading. It such instances, actual meaning can be extrapolated by contextual diversion.
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
## 2022-09-06 DIAGNOSIS — I25.10 ASHD (ARTERIOSCLEROTIC HEART DISEASE): ICD-10-CM

## 2022-09-06 DIAGNOSIS — Z95.820 S/P ANGIOPLASTY WITH STENT: ICD-10-CM

## 2022-09-06 DIAGNOSIS — I10 ESSENTIAL HYPERTENSION: ICD-10-CM

## 2022-09-06 RX ORDER — ATENOLOL 25 MG/1
25 TABLET ORAL DAILY
Qty: 90 TABLET | Refills: 3 | Status: SHIPPED | OUTPATIENT
Start: 2022-09-06

## 2022-09-08 ENCOUNTER — OFFICE VISIT (OUTPATIENT)
Dept: ONCOLOGY | Age: 72
End: 2022-09-08
Payer: MEDICARE

## 2022-09-08 VITALS
BODY MASS INDEX: 21.67 KG/M2 | DIASTOLIC BLOOD PRESSURE: 64 MMHG | RESPIRATION RATE: 18 BRPM | HEART RATE: 72 BPM | WEIGHT: 160 LBS | HEIGHT: 72 IN | TEMPERATURE: 97.6 F | SYSTOLIC BLOOD PRESSURE: 119 MMHG

## 2022-09-08 DIAGNOSIS — R07.9 RIGHT-SIDED CHEST PAIN: ICD-10-CM

## 2022-09-08 DIAGNOSIS — C34.01 LUNG CANCER, MAIN BRONCHUS, RIGHT (HCC): Primary | ICD-10-CM

## 2022-09-08 DIAGNOSIS — J90 PLEURAL EFFUSION ON RIGHT: ICD-10-CM

## 2022-09-08 DIAGNOSIS — C64.1 RENAL CELL CARCINOMA OF RIGHT KIDNEY (HCC): ICD-10-CM

## 2022-09-08 PROCEDURE — 3017F COLORECTAL CA SCREEN DOC REV: CPT | Performed by: INTERNAL MEDICINE

## 2022-09-08 PROCEDURE — G8420 CALC BMI NORM PARAMETERS: HCPCS | Performed by: INTERNAL MEDICINE

## 2022-09-08 PROCEDURE — G8428 CUR MEDS NOT DOCUMENT: HCPCS | Performed by: INTERNAL MEDICINE

## 2022-09-08 PROCEDURE — 1124F ACP DISCUSS-NO DSCNMKR DOCD: CPT | Performed by: INTERNAL MEDICINE

## 2022-09-08 PROCEDURE — 99211 OFF/OP EST MAY X REQ PHY/QHP: CPT | Performed by: INTERNAL MEDICINE

## 2022-09-08 PROCEDURE — 1036F TOBACCO NON-USER: CPT | Performed by: INTERNAL MEDICINE

## 2022-09-08 PROCEDURE — 99214 OFFICE O/P EST MOD 30 MIN: CPT | Performed by: INTERNAL MEDICINE

## 2022-09-08 NOTE — PROGRESS NOTES
recommended further work-up with a CT which was done and recommended MRI of the kidney/kidney protocol scan  CT chest was done and did show loculation of the pleural fluid which diminished from before  Was seen by cardiovascular and recommended pain control with nerve block which was done  MRI of the abdomen did show 1.8 cm posterior medial left adrenal nodule and was seen by  and referred back to medical oncology    Interval history  Patient presents to the clinic for a follow-up visit and to discuss results of his lab work-up and other relevant clinical data. During this visit patient's allergy, social, medical, surgical history and medications were reviewed and updated. PET scan scheduled for next week  MRI of the spine also was ordered by pain management to rule out any disc herniation or stenosis    Past Medical History:   Diagnosis Date    Abnormal cardiac cath 09/20/2013    LMCA; Mild irregularities 10-20%. LAD: Abnormal.  Mild to moderate irregularities throughout the LAD and branches. LCx:  abnormal.  80-90% stenosis in a moderate sized OM1. RCA: Abnormal.  40-50% mid RCA stenosis. Actinic keratosis     Arthritis     neck, fingers    CAD (coronary artery disease)     Calculus of kidney     Cancer (HCC)     Cervical stenosis of spine     Diabetes mellitus ([facility] [address]     Dr. Coburn Ing, CNP, [place]    Full dentures     GERD (gastroesophageal reflux disease)     H/O echocardiogram 04/29/2016    Stress echo. Normal resting LV systolic function,normal systolic restponse to exercise. No evidence of reversible  ischemia on this maximal,symptom limited,treadmill exerxiess stress echocardiography study    History of cardiac cath 02/09/2017    LMCA: Lesion on LMCA: Distal subsection . 20% stenosis. LAD: Lesion on 1st Diag: Mid subsection . 75% stenosis. RCA: Lesion on Mid RCA: Mid subsection . 100% stenosis. Comments: The distal RCA fills by left to right collateralization. Dominance: Mixed.  LV function assessed as: Normal. EF 65%. History of echocardiogram 06/11/2018    EF 60%. Mildly increased LV wall thickness. Evidence of mild diastolic dysfunction seen. History of echocardiogram 01/18/2019    EF of 65%. LV wall thickness is mildly increased. Aortic valve leaflets are mildly thickened. History of heart attack     History of tobacco abuse     Quit 10/2021, 1/4 ppd for 54 years    Hyperlipidemia     Hypertension     Dr. Iftikhar Palomo, CNP    Pulmonary nodule 12/2021    Right middle lobe    Raynaud's phenomenon     S/P angioplasty with stent 09/20/2013    PTCA-NEDRA of  the OM 1    Wears hearing aid in both ears        Past Surgical History:   Procedure Laterality Date    CARDIAC CATHETERIZATION  2007    patient states had 2 small blockages    CARDIAC CATHETERIZATION  02/09/2017    LMCA: Lesion on LMCA: Distal subsection 20% stenosis. LAD: Lesion on 1st Diag: Mid subsection 75% stenosis. RCA: Lesion on Mid RCA: Mid subsection 100% stenosis. CARDIAC CATHETERIZATION  02/09/2017    Attempted PCI to chronic total occlusion of RCA was not successful. Unable to cross with multiple wires due to heavy calcification and toruosity.     CARDIAC CATHETERIZATION  01/14/2019    CATARACT REMOVAL  04/06/2015    Right eye - Dr. Clarence Claudio  06/08/2015    Dr. Mandy Sandoval - left eye    COLONOSCOPY  01/11/2006    Dr. Rolan Schwab - internal hemorrhoids, no polps    COLONOSCOPY  05/23/2016    -polyp    CT NEEDLE BIOPSY LUNG PERCUTANEOUS  07/28/2021    CT NEEDLE BIOPSY LUNG PERCUTANEOUS 7/28/2021 [facility] CT SCAN    HEMORRHOID SURGERY      HERNIA REPAIR Left     inguinal    IR CHEST TUBE INSERTION  01/11/2022    IR CHEST TUBE INSERTION 1/11/2022 Eamon Beyer MD [facility] SPECIAL PROCEDURES    IR PORT PLACEMENT EQUAL OR GREATER THAN 5 YEARS  2/9/2022    IR PORT PLACEMENT EQUAL OR GREATER THAN 5 YEARS 2/9/2022 [facility] SPECIAL PROCEDURES    LUNG BIOPSY Right 07/28/2021    Dr Thomas/[address] Right 01/04/2022    MIDDLE WEDGE RESECTION, POSSIBLE MIDDLE LOBECTOMY VIA THORACOTOMY performed by Zackary Langford MD at [address] [facility]    c4-5 laminectomy and fusion    OTHER SURGICAL HISTORY  10/2014    Right foot - 7 from heal and 1 from great toe at [address]  12/11/2014    pain injection - cervical    TUNNELED CENTRAL VENOUS CATHETER W/ SUBCUTANEOUS PORT Left 02/09/2022    Dr Hoenninger/[facility] Chan    UPPER GASTROINTESTINAL ENDOSCOPY  12/28/2005    Dr. Baudilio Beauchamp - mild esophagitis and gastritis    UPPER GASTROINTESTINAL ENDOSCOPY N/A 02/05/2019    -bx(neg H-Pylori)retained gastric content, possible submucosal mass of gastric cardia    UPPER GASTROINTESTINAL ENDOSCOPY N/A 10/22/2019    EGD BIOPSY performed by Brina Newsome MD at [address]   Allergen Reactions    Niacin And Related      Turns red, no trouble breathing    Minocycline Hcl Rash    Other Nausea And Vomiting     Patient states on all pain medications he gets nausea and vomiting.  Doctor ordered a medicine (nausea) to take before pain medication       Current Outpatient Medications   Medication Sig Dispense Refill    atenolol (TENORMIN) 25 MG tablet Take 1 tablet by mouth daily 90 tablet 3    Continuous Blood Gluc Sensor (FREESTYLE YAIMA 14 DAY SENSOR) MISC as directed      amLODIPine (NORVASC) 5 MG tablet Take 1 tablet by mouth daily 90 tablet 3    magnesium 30 MG tablet Take 400 mg by mouth daily       clopidogrel (PLAVIX) 75 MG tablet Take 1 tablet by mouth daily 90 tablet 3    zinc 50 MG CAPS Take by mouth      promethazine (PHENERGAN) 25 MG tablet Take 25 mg by mouth every 4 hours as needed for Nausea      pantoprazole (PROTONIX) 40 MG tablet Take 40 mg by mouth in the morning and at bedtime       ondansetron (ZOFRAN ODT) 8 MG TBDP disintegrating tablet Take 1 tablet by mouth every 8 hours as needed for Nausea 30 tablet 2    lidocaine-prilocaine (EMLA) 2.5-2.5 % cream Apply topically to port site 30- 60 minutes before access as needed. 30 g 2    SERTRALINE HCL PO Take 50 mg by mouth nightly       tamsulosin (FLOMAX) 0.4 MG capsule Take 0.4 mg by mouth daily      tiZANidine (ZANAFLEX) 4 MG capsule Take 4 mg by mouth 3 times daily as needed       isosorbide mononitrate (IMDUR) 30 MG extended release tablet 1/2 tablet in the morning      lisinopril (PRINIVIL;ZESTRIL) 20 MG tablet Take 20 mg by mouth daily      Insulin Glargine, 2 Unit Dial, 300 UNIT/ML SOPN Insulin Glargine Insulin Glargine U-300 Conc Active 10 UNIT Subcutaneous Every morning May 7th, 2020 11:47am 2020  [facility] ([id number])      atorvastatin (LIPITOR) 20 MG tablet Take 1 tablet by mouth daily 90 tablet 3    sitaGLIPtan-metFORMIN (JANUMET)  MG per tablet Take 1 tablet by mouth 2 times daily (with meals)      glimepiride (AMARYL) 2 MG tablet Take 1 tablet by mouth 2 times daily 180 tablet 3    aspirin 81 MG tablet Take 81 mg by mouth daily Ordered by heart doctor, Chan Solo      gabapentin (NEURONTIN) 100 MG capsule Take 1 capsule by mouth 3 times daily for 14 days. (Patient not taking: Reported on 2022) 42 capsule 0    nitroGLYCERIN (NITRODUR) 0.4 MG/HR 1 dose under tongue as needed for chest pain. max of 3 in one day (Patient not taking: No sig reported)       No current facility-administered medications for this visit.        Social History     Socioeconomic History    Marital status:      Spouse name: Not on file    Number of children: Not on file    Years of education: Not on file    Highest education level: Not on file   Occupational History    Not on file   Tobacco Use    Smoking status: Former     Packs/day: 0.25     Years: 54.00     Pack years: 13.50     Types: Cigarettes     Quit date: 10/1/2021     Years since quittin.9    Smokeless tobacco: Never   Vaping Use    Vaping Use: Never used   Substance and Sexual Activity    Alcohol use: No    Drug use: No    Sexual activity: Not on file   Other Topics Concern    Not on file   Social History Narrative    Not on file     Social Determinants of Health     Financial Resource Strain: Not on file   Food Insecurity: Not on file   Transportation Needs: Not on file   Physical Activity: Not on file   Stress: Not on file   Social Connections: Not on file   Intimate Partner Violence: Not on file   Housing Stability: Not on file       Family History   Problem Relation Age of Onset    Heart Disease Mother     Diabetes Mother     High Blood Pressure Mother     Diabetes Father     Alzheimer's Disease Father     Heart Disease Sister         CABG    Heart Disease Brother     Heart Disease Brother     Heart Disease Brother     Cancer Brother         REVIEW OF SYSTEM:     Constitutional: No fever or chills. No night sweats, no weight loss   Eyes: No eye discharge, double vision, or eye pain   HEENT: negative for sore mouth, sore throat, hoarseness and voice change   Respiratory: +cough , no sputum, dyspnea, wheezing, hemoptysis, +chest pain   Cardiovascular: negative for chest pain, dyspnea, palpitations, orthopnea, PND   Gastrointestinal: negative for nausea, vomiting, diarrhea, constipation, abdominal pain, Dysphagia, hematemesis and hematochezia   Genitourinary: negative for frequency, dysuria, nocturia, urinary incontinence, and hematuria   Integument: negative for rash, skin lesions, bruises.    Hematologic/Lymphatic: negative for easy bruising, bleeding, lymphadenopathy, petechiae and swelling/edema   Endocrine: negative for heat or cold intolerance, tremor, weight changes, change in bowel habits and hair loss   Musculoskeletal: negative for myalgias, arthralgias, pain, joint swelling,and bone pain   Neurological: negative for headaches, dizziness, seizures, weakness, numbness       OBJECTIVE:         Vitals:    09/08/22 1624   BP: 119/64   Pulse: 72   Resp: 18   Temp: 97.6 °F (36.4 °C)       PHYSICAL EXAM:   General appearance - well appearing, no in pain or distress   Mental status - alert and cooperative   Eyes - pupils equal and reactive, extraocular eye movements intact   Ears - bilateral TM's and external ear canals normal   Mouth - mucous membranes moist, pharynx normal without lesions   Neck - supple, no significant adenopathy   Lymphatics - no palpable lymphadenopathy, no hepatosplenomegaly   Chest - clear to auscultation, no wheezes, rales or rhonchi, symmetric air entry, diminished sounds on the right side  Heart - normal rate, regular rhythm, normal S1, S2, no murmurs, rubs, clicks or gallops   Abdomen - soft, nontender, nondistended, no masses or organomegaly   Neurological - alert, oriented, normal speech, no focal findings or movement disorder noted   Musculoskeletal - no joint tenderness, deformity or swelling   Extremities - peripheral pulses normal, no pedal edema, no clubbing or cyanosis   Skin - normal coloration and turgor, no rashes, no suspicious skin lesions noted ,      LABORATORY DATA:     Lab Results   Component Value Date    WBC 6.4 07/03/2022    HGB 7.9 (L) 07/03/2022    HCT 25.6 (L) 07/03/2022    MCV 92.4 07/03/2022     07/03/2022    LYMPHOPCT 23 (L) 07/03/2022    RBC 2.77 (L) 07/03/2022    MCH 28.5 07/03/2022    MCHC 30.9 07/03/2022    RDW 16.0 (H) 07/03/2022    NEUTOPHILPCT 50.9 02/02/2017    MONOPCT 12 07/03/2022    EOSPCT 3.1 02/02/2017    BASOPCT 1 07/03/2022    NEUTROABS 3.78 07/03/2022    LYMPHSABS 1.49 07/03/2022    MONOSABS 0.79 07/03/2022    EOSABS 0.25 07/03/2022    BASOSABS 0.07 07/03/2022         Chemistry        Component Value Date/Time     07/03/2022 1736    K 4.9 07/03/2022 1736    CL 98 07/03/2022 1736    CO2 25 07/03/2022 1736    BUN 15 08/05/2022 1012    CREATININE 0.99 08/05/2022 1012        Component Value Date/Time    CALCIUM 9.3 07/03/2022 1736    ALKPHOS 123 07/03/2022 1736    AST 15 07/03/2022 1736    ALT 9 07/03/2022 1736    BILITOT 0.41 07/03/2022 1736 PATHOLOGY DATA:     Surgical Pathology Report -- Diagnosis --     1. Right lung, middle lobe wedge biopsy:     -  Adenocarcinoma. -  Tumor is 1.8 cm.     -  Carcinoma invades into but not through the visceral pleura. 2.  Lymph node #9, biopsy:     -  Negative for metastatic carcinoma. 3.  Lymph node #7, biopsy:     -  Negative for metastatic carcinoma. 4.  Right middle lobe lung, lobectomy:     -  Margins are free of involvement by carcinoma. -  Peribronchial lymph node, negative for metastatic carcinoma. -  Nonneoplastic lung shows emphysematous change. Nicol Jason M.D.   **Electronically Signed Out**         rdd/1/6/2022         Clinical Information   Pre-op Diagnosis:  RIGHT MIDDLE LOBE NODULE   Operative Findings: MIDDLE LOBE OF RIGHT LUNG; LYMPH NODE #9 OF LUNG;   LYMPH NODE #7; RIGHT MIDDLE LOBE   Operation Performed: MIDDLE WEDGE RESECTION, POSSIBLE MIDDLE   LOBECTOMY VIA THORACOTOMY     Source of Specimen   1: MIDDLE LOBE OF RIGHT LUNG (A)   2: LYMPH NODE #9 OF LUNG (B)   3: LYMPH NODE #7   4: RIGHT MIDDLE LOBE     Gross Description   1. \"ANA SUSAN, MIDDLE LOBE OF RIGHT LUNG\" Received fresh is a   6.4 x 4.2 x 2.8 cm lung wedge with staple line. The pleura is   pink-tan with an area of slightly thickening (inked black). Sectioning reveals a 1.8 x 1.2 x 0.8 cm subpleural tan-white mass   lesion that is 0.8 cm from the staple line margin. The remaining   parenchyma is pink-tan with no other lesions. 1TP, 1DQ, 1FS, Cassette   summary:  \"A\" frozen section lesion, \"B\" remainder of lesion with   pleura inked black, \"C\" staple line margin en face. 2. \"ANA SUSAN, LYMPH NODE #9 OF LUNG\" Received fresh are two   fragments, 0.3 cm each. 1TP, 1FS, 1cs. 3. \"ANA SUSAN, LYMPH NODE #7\" Received fresh are two   anthracotic fragments, 0.6 and 0.7 cm in greatest dimension. 1TP,   1FS, 1cs. 4.   \"ANA SUSAN, RIGHT MIDDLE LOBE\" Received fresh is a 50 gram,   8.5 x 6.8 x 3.5 cm lobe of lung with multiple staple lines. The   pleura is wrinkled purple-gray. Sectioning reveals spongy dark red   parenchyma with no masses. No markedly enlarged nodes are identified   at the hilum. Cassette summary:  \"A\" bronchial margin frozen section   cassette resubmitted as received, \"B\" hilar soft tissue and vascular   margin, \"C\" uninvolved lung (along staple line). tm     Intraoperative Diagnosis   1. FSDX:  Non-small cell carcinoma, favor adenocarcinoma. (14:00,   RDD)   2. FSDX:  Fibrofatty tissue. (14:11, RDD)   3. FSDX:  Lymph node, negative for carcinoma.  (14:19, RDD)   4. FSDX:  Bronchial margin, negative for carcinoma.  (16:17, RDD)     Microscopic Description   1-4. Microscopic examination performed. PROCEDURE: Wedge biopsy, followed by lobectomy       SPECIMEN LATERALITY: Right   TUMOR FOCALITY: Single tumor       TUMOR SITE: Right middle lobe   TUMOR SIZE --     1.8 x 1.2 x 0.8 cm           HISTOLOGIC TYPE: Adenocarcinoma. Sections show a large and anaplastic non-small cell malignancy with   cohesion and a few instances of glandular differentiation. Neoplastic   cells are large with copious eosinophilic cytoplasm and large   vesicular nuclei. Tumor is characterized further utilizing control   appropriate immunostains. Cells of carcinoma are negative for p40 and   they are positive for TTF-1 and CK7. Morphology and immunophenotype   are that of an adenocarcinoma, lung primary. Note: The case is reviewed by a second Pathologist for quality   assurance with consensus (DS). HISTOLOGIC GRADE: Grade 2 (of 4)           VISCERAL PLEURA INVASION:Reticulin stain with appropriately reactive   control is utilized to evaluate pleura. Tumor extends beyond the   elastic layer into the pleura but not to the pleural surface (PL1)     DIRECT INVASION OF ADJACENT STRUCTURES: No     TREATMENT EFFECT: None apparent.      LYMPHOVASCULAR INVASION: Absent MARGINS --   -BRONCHIAL: Free of tumor       -VASCULAR: Free of tumor       -PARENCHYMAL: Free of tumor       -OTHER ATTACHED TISSUE MARGIN (IF APPLICABLE): N/A       REGIONAL LYMPH NODES: 3   LYMPH NODES(S) FROM PRIOR PROCEDURES: No       NUMBER AND STATION(S) EXAMINED:   See diagnoses   NUMBER AND STATION(S) WITH METASTASIS: 0         DISTANT METASTASIS--   DISTANT SITE(S) INVOLVED, IF APPLICABLE: N/A         PATHOLOGIC STAGE CLASSIFICATION:     pT2a  pN0   CAP Lung 4200 in conjunction with AJCC 8 ed. SURGICAL PATHOLOGY CONSULTATION         Patient Name: Becky Cardona: [id number]   Path Number: [id number]     [address] [place] [address]. [facility], [address]   [phone]   Fax: [phone]          IMAGING DATA:      MRI ABDOMEN W WO CONTRAST  Narrative: EXAMINATION:  MRI OF THE ABDOMEN WITHOUT AND WITH CONTRAST, 8/5/2022 11:21 am    TECHNIQUE:  Multiplanar multisequence MRI of the abdomen was performed without and with  the administration of intravenous contrast.    COMPARISON:  CT 07/03/2022, 06/20/2022    HISTORY:  ORDERING SYSTEM PROVIDED HISTORY: Renal mass  TECHNOLOGIST PROVIDED HISTORY:  STAT Creatinine as needed:->Yes  Renal protocol placed compare with previous CT of the abdomen    FINDINGS:  There is 1.8 cm round nodule in the posteromedial midpole left kidney. Nodule is T2 hypointense and isointense to renal parenchyma on T1 sequence. There is significant restriction of diffusion with marked hypointensity on  ADC. Nodule enhances slowly best appreciated on postcontrast subtraction  images. Compatible with renal malignancy until proven otherwise. Small bilateral renal cysts up to 12 mm largest in the upper pole left  kidney. No hydronephrosis.   Ureters visualized grossly normal.    Liver, spleen, pancreas, adrenal glands show no significant abnormalities. Cholelithiasis. Multiple tiny gallstones noted. No biliary ductal  dilatation. Normal caliber pancreatic duct. No pancreas divisum. Visualized GI tract unremarkable. No ascites. Thyroid factor enhancement  vascular structures. Bone marrow signal age appropriate. Impression: There is 1.8 cm posteromedial left renal midpole nodule corresponding to  findings on recent CT scans. Imaging features compatible with renal  malignancy until proven otherwise. Bilateral simple renal cysts up to 12 mm. CT chest with IV contrast on 11/2/2021    The previously biopsied, somewhat bilobed nodule involving right middle lobe   is slightly increased in size since the prior study now measuring 1.8 x 1.0   cm, once measuring 1.8 x 0.6 cm. There does appear to be some spiculation. No new pulmonary nodule is seen. PET/CT on June 30, 2021    1. Significant oval increase in size of known anterior superior middle lobe   nodule along the minor fissure. This now measures 6 x 18 mm compared to 10 x   4 mm on the prior. This remains a LUNG RADS 4B (very suspicious) nodule. Recommend repeat PET-CT and or tissue sampling given appearance and   significant interval increase in size. 2. No major change in the fat density 2.3 cm nodule in the gastric fundus   adjacent to the GE junction. Note this was FDG avid on the prior PET-CT. 3. Mild emphysema. The findings were sent to the Radiology Results [id number] at 12:06   pm on 6/10/2021to be communicated to a licensed caregiver. ASSESSMENT / PLAN:    Polly Jennings was seen today for follow-up.     Diagnoses and all orders for this visit:    Lung cancer, main bronchus, right ([address]    Pleural effusion on right    Right-sided chest pain    Renal cell carcinoma of right kidney [facility])           70-year-old man history of smoking diagnosed with pathology stage Ib(T2 aN0 M0) adenocarcinoma of the right middle lobe status post right VATS thoracotomy with right middle lobectomy done on January 4, 2022, due to high risk features manifested by involvement of visceral pleura recommended adjuvant chemotherapy which is Alimta and carboplatin, carboplatin substitute cisplatin due to frailed/nausea related to gastroparesis from diabetes in addition to mild neuropathy, started on chemotherapy on February 21, 2022 and done with 4 cycles of treatment, did well however the last cycle he had weakness fatigue and lightheadedness  Hypotension/orthostatic likely related to diabetic orthostatic  Right-sided pleural effusion status post thoracentesis> fluid sent for cytology and was negative>   Nosebleed> ENT resolved  Intermittent nausea this is prior to surgery related to diabetic gastroparesis > antinausea medicine  Decreased appetite due to the nausea> encourage oral intake> improved  right-sided chest pain since surgery> CT chest loculated pleural fluid at the right base has been followed by cardiovascular surgery and recommended nerve block which was done  Pain management MS Contin and MS IR and referral for possible nerve block> status post steroid injection and MRI of the spine ordered by pain service to be done next week  MRI of the kidney did show left renal nodule concern about RCC> this is highly likely RCC less likely metastatic lung cancer will proceed with PET/CT and if level of suspicion high for malignancy will do cryoablation/radiation rather than surveillance  RTC in 2 weeks                                                   [facility]                            This note is created with the assistance of a speech recognition program.  While intending to generate a document that actually reflects the content of the visit, the document can still have some errors including those of syntax and sound a like substitutions which may escape proof reading.   It such instances, actual meaning can be extrapolated by contextual diversion.

## 2022-09-14 ENCOUNTER — HOSPITAL ENCOUNTER (OUTPATIENT)
Age: 72
Discharge: HOME | End: 2022-09-14
Payer: MEDICARE

## 2022-09-14 ENCOUNTER — HOSPITAL ENCOUNTER (OUTPATIENT)
Dept: PET IMAGING | Age: 72
Discharge: HOME | End: 2022-09-16
Payer: MEDICARE

## 2022-09-14 ENCOUNTER — HOSPITAL ENCOUNTER (OUTPATIENT)
Age: 72
Discharge: HOME | End: 2022-09-16
Payer: MEDICARE

## 2022-09-14 ENCOUNTER — HOSPITAL ENCOUNTER (OUTPATIENT)
Dept: GENERAL RADIOLOGY | Age: 72
Discharge: HOME | End: 2022-09-16
Payer: MEDICARE

## 2022-09-14 DIAGNOSIS — C34.01 LUNG CANCER, MAIN BRONCHUS, RIGHT (HCC): ICD-10-CM

## 2022-09-14 DIAGNOSIS — C64.2 RENAL CELL CARCINOMA OF LEFT KIDNEY (HCC): ICD-10-CM

## 2022-09-14 DIAGNOSIS — N20.0 KIDNEY STONES: ICD-10-CM

## 2022-09-14 LAB
ABSOLUTE EOS #: 0.17 K/UL (ref 0–0.44)
ABSOLUTE IMMATURE GRANULOCYTE: 0.07 K/UL (ref 0–0.3)
ABSOLUTE LYMPH #: 2.15 K/UL (ref 1.1–3.7)
ABSOLUTE MONO #: 0.9 K/UL (ref 0.1–1.2)
ALBUMIN SERPL-MCNC: 4.3 G/DL (ref 3.5–5.2)
ALBUMIN/GLOBULIN RATIO: 1.3 (ref 1–2.5)
ALP BLD-CCNC: 107 U/L (ref 40–129)
ALT SERPL-CCNC: 10 U/L (ref 5–41)
ANION GAP SERPL CALCULATED.3IONS-SCNC: 13 MMOL/L (ref 9–17)
AST SERPL-CCNC: 13 U/L
BASOPHILS # BLD: 1 % (ref 0–2)
BASOPHILS ABSOLUTE: 0.11 K/UL (ref 0–0.2)
BILIRUB SERPL-MCNC: 0.3 MG/DL (ref 0.3–1.2)
BUN BLDV-MCNC: 21 MG/DL (ref 8–23)
BUN/CREAT BLD: 18 (ref 9–20)
CALCIUM SERPL-MCNC: 9.6 MG/DL (ref 8.6–10.4)
CHLORIDE BLD-SCNC: 101 MMOL/L (ref 98–107)
CO2: 23 MMOL/L (ref 20–31)
CREAT SERPL-MCNC: 1.14 MG/DL (ref 0.7–1.2)
EOSINOPHILS RELATIVE PERCENT: 2 % (ref 1–4)
GFR AFRICAN AMERICAN: >60 ML/MIN
GFR NON-AFRICAN AMERICAN: >60 ML/MIN
GFR SERPL CREATININE-BSD FRML MDRD: ABNORMAL ML/MIN/{1.73_M2}
GFR SERPL CREATININE-BSD FRML MDRD: ABNORMAL ML/MIN/{1.73_M2}
GLUCOSE BLD-MCNC: 178 MG/DL (ref 70–99)
HCT VFR BLD CALC: 30.2 % (ref 40.7–50.3)
HEMOGLOBIN: 9.1 G/DL (ref 13–17)
IMMATURE GRANULOCYTES: 1 %
LYMPHOCYTES # BLD: 25 % (ref 24–43)
MCH RBC QN AUTO: 24.9 PG (ref 25.2–33.5)
MCHC RBC AUTO-ENTMCNC: 30.1 G/DL (ref 28.4–34.8)
MCV RBC AUTO: 82.7 FL (ref 82.6–102.9)
MONOCYTES # BLD: 10 % (ref 3–12)
NRBC AUTOMATED: 0 PER 100 WBC
PDW BLD-RTO: 16.7 % (ref 11.8–14.4)
PLATELET # BLD: 268 K/UL (ref 138–453)
PMV BLD AUTO: 10.1 FL (ref 8.1–13.5)
POTASSIUM SERPL-SCNC: 4.4 MMOL/L (ref 3.7–5.3)
RBC # BLD: 3.65 M/UL (ref 4.21–5.77)
SEG NEUTROPHILS: 61 % (ref 36–65)
SEGMENTED NEUTROPHILS ABSOLUTE COUNT: 5.38 K/UL (ref 1.5–8.1)
SODIUM BLD-SCNC: 137 MMOL/L (ref 135–144)
TOTAL PROTEIN: 7.5 G/DL (ref 6.4–8.3)
WBC # BLD: 8.8 K/UL (ref 3.5–11.3)

## 2022-09-14 PROCEDURE — 78815 PET IMAGE W/CT SKULL-THIGH: CPT

## 2022-09-14 PROCEDURE — 74018 RADEX ABDOMEN 1 VIEW: CPT

## 2022-09-14 PROCEDURE — 85025 COMPLETE CBC W/AUTO DIFF WBC: CPT

## 2022-09-14 PROCEDURE — 36415 COLL VENOUS BLD VENIPUNCTURE: CPT

## 2022-09-14 PROCEDURE — 80053 COMPREHEN METABOLIC PANEL: CPT

## 2022-09-14 PROCEDURE — A9552 F18 FDG: HCPCS | Performed by: INTERNAL MEDICINE

## 2022-09-14 PROCEDURE — 3430000000 HC RX DIAGNOSTIC RADIOPHARMACEUTICAL: Performed by: INTERNAL MEDICINE

## 2022-09-14 RX ORDER — FLUDEOXYGLUCOSE F 18 200 MCI/ML
14.9 INJECTION, SOLUTION INTRAVENOUS
Status: COMPLETED | OUTPATIENT
Start: 2022-09-14 | End: 2022-09-14

## 2022-09-14 RX ADMIN — FLUDEOXYGLUCOSE F 18 14.9 MILLICURIE: 200 INJECTION, SOLUTION INTRAVENOUS at 07:30

## 2022-09-15 ENCOUNTER — HOSPITAL ENCOUNTER (OUTPATIENT)
Age: 72
Discharge: HOME | End: 2022-09-15

## 2022-09-15 ENCOUNTER — HOSPITAL ENCOUNTER (OUTPATIENT)
Dept: MRI IMAGING | Age: 72
Discharge: HOME | End: 2022-09-17
Payer: MEDICARE

## 2022-09-15 DIAGNOSIS — M99.52 INTERVERTEBRAL DISC STENOSIS OF NEURAL CANAL OF THORACIC REGION: ICD-10-CM

## 2022-09-15 PROCEDURE — 72157 MRI CHEST SPINE W/O & W/DYE: CPT

## 2022-09-15 PROCEDURE — 6360000004 HC RX CONTRAST MEDICATION: Performed by: ANESTHESIOLOGY

## 2022-09-15 PROCEDURE — A9579 GAD-BASE MR CONTRAST NOS,1ML: HCPCS | Performed by: ANESTHESIOLOGY

## 2022-09-15 RX ADMIN — GADOTERIDOL 13 ML: 279.3 INJECTION, SOLUTION INTRAVENOUS at 08:59

## 2022-09-15 NOTE — RESULT ENCOUNTER NOTE
Appt 11/2022    (Patient does have a known right-sided loculated effusion. Patient is followed by oncology. Patient does have an upcoming appointment with them 9/22/2022. )

## 2022-09-21 ENCOUNTER — TELEPHONE (OUTPATIENT)
Dept: ONCOLOGY | Age: 72
End: 2022-09-21

## 2022-09-22 ENCOUNTER — OFFICE VISIT (OUTPATIENT)
Dept: ONCOLOGY | Age: 72
End: 2022-09-22
Payer: MEDICARE

## 2022-09-22 VITALS
RESPIRATION RATE: 18 BRPM | DIASTOLIC BLOOD PRESSURE: 66 MMHG | SYSTOLIC BLOOD PRESSURE: 140 MMHG | HEART RATE: 73 BPM | TEMPERATURE: 97.2 F | HEIGHT: 72 IN | BODY MASS INDEX: 22.35 KG/M2 | WEIGHT: 165 LBS

## 2022-09-22 DIAGNOSIS — N28.89 RENAL MASS: ICD-10-CM

## 2022-09-22 DIAGNOSIS — C34.01 LUNG CANCER, MAIN BRONCHUS, RIGHT (HCC): ICD-10-CM

## 2022-09-22 DIAGNOSIS — C64.1 RENAL CELL CARCINOMA OF RIGHT KIDNEY (HCC): Primary | ICD-10-CM

## 2022-09-22 PROCEDURE — 1036F TOBACCO NON-USER: CPT | Performed by: INTERNAL MEDICINE

## 2022-09-22 PROCEDURE — G8420 CALC BMI NORM PARAMETERS: HCPCS | Performed by: INTERNAL MEDICINE

## 2022-09-22 PROCEDURE — 3017F COLORECTAL CA SCREEN DOC REV: CPT | Performed by: INTERNAL MEDICINE

## 2022-09-22 PROCEDURE — 99211 OFF/OP EST MAY X REQ PHY/QHP: CPT | Performed by: INTERNAL MEDICINE

## 2022-09-22 PROCEDURE — 99214 OFFICE O/P EST MOD 30 MIN: CPT | Performed by: INTERNAL MEDICINE

## 2022-09-22 PROCEDURE — G8427 DOCREV CUR MEDS BY ELIG CLIN: HCPCS | Performed by: INTERNAL MEDICINE

## 2022-09-22 PROCEDURE — 1124F ACP DISCUSS-NO DSCNMKR DOCD: CPT | Performed by: INTERNAL MEDICINE

## 2022-09-25 NOTE — PROGRESS NOTES
Patient ID: Clive Hernandez, [birth year], [id number], 67 y.o. Referred by :  No ref.  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 male history of tobacco smoking and has been following with pulmonary for right middle lobe lung nodule where CT chest did show increase in the size in the scan on November 2, 2021 patient had biopsy on July 28, 2021 which showed benign parenchyma with chronic inflammation however the lesion was active on the PET there was no evidence of metastasis patient admitted to [place] on January 4, 2022 for VATS and wedge resection which ended up with lobectomy, final pathology compatible with adenocarcinoma 1.8 cm and carcinoma invade into but not through the visceral pleura lymph nodes were negative patient status post right middle lobe lobectomy and margin were free of involvement by carcinoma   Patient developed right apical pneumothorax chest tube placed in the hospital and was discharged after chest tube removed and patient was started on adjuvant chemotherapy on February 24, 2022  Patient developed pleural effusion on the right side and underwent thoracentesis after 2 cycle of chemotherapy and done with 4 cycles of chemotherapy, if still persistent have right side chest pain  Had loculated pneumothorax status post thoracentesis and recommendation was for no further thoracentesis  evaluated for gallstones with no evidence of cholecystitis of the kidney there was a 1.9 cm nodule recommended further work-up with a CT which was done and recommended MRI of the kidney/kidney protocol scan  CT chest was done and did show loculation of the pleural fluid which diminished from before  Was seen by cardiovascular and recommended pain control with nerve block which was done  MRI of the abdomen did show 1.8 cm posterior medial left adrenal nodule and was seen by  and referred back to medical oncology    Interval history  Patient presents to the clinic for a follow-up visit and to discuss results of his lab work-up and other relevant clinical data. During this visit patient's allergy, social, medical, surgical history and medications were reviewed and updated. PET scan did not show any activity on the kidney lesion    Past Medical History:   Diagnosis Date    Abnormal cardiac cath 09/20/2013    LMCA; Mild irregularities 10-20%. LAD: Abnormal.  Mild to moderate irregularities throughout the LAD and branches. LCx:  abnormal.  80-90% stenosis in a moderate sized OM1. RCA: Abnormal.  40-50% mid RCA stenosis. Actinic keratosis     Arthritis     neck, fingers    CAD (coronary artery disease)     Calculus of kidney     Cancer (HCC)     Cervical stenosis of spine     Diabetes mellitus ([facility] [address]     Dr. Coburn Ing, CNP, [place]    Full dentures     GERD (gastroesophageal reflux disease)     H/O echocardiogram 04/29/2016    Stress echo. Normal resting LV systolic function,normal systolic restponse to exercise. No evidence of reversible  ischemia on this maximal,symptom limited,treadmill exerxiess stress echocardiography study    History of cardiac cath 02/09/2017    LMCA: Lesion on LMCA: Distal subsection . 20% stenosis. LAD: Lesion on 1st Diag: Mid subsection . 75% stenosis. RCA: Lesion on Mid RCA: Mid subsection . 100% stenosis. Comments: The distal RCA fills by left to right collateralization. Dominance: Mixed. LV function assessed as: Normal. EF 65%. History of echocardiogram 06/11/2018    EF 60%. Mildly increased LV wall thickness. Evidence of mild diastolic dysfunction seen. History of echocardiogram 01/18/2019    EF of 65%. LV wall thickness is mildly increased. Aortic valve leaflets are mildly thickened. History of heart attack     History of tobacco abuse     Quit 10/2021, 1/4 ppd for 54 years    Hyperlipidemia     Hypertension     Dr. Coburn Ing, CNP    Pulmonary nodule 12/2021    Right middle lobe    Raynaud's phenomenon     S/P angioplasty with stent 09/20/2013    PTCA-NEDRA of  the OM 1    Wears hearing aid in both ears        Past Surgical History:   Procedure Laterality Date    CARDIAC CATHETERIZATION  2007    patient states had 2 small blockages    CARDIAC CATHETERIZATION  02/09/2017    LMCA: Lesion on LMCA: Distal subsection 20% stenosis. LAD: Lesion on 1st Diag: Mid subsection 75% stenosis. RCA: Lesion on Mid RCA: Mid subsection 100% stenosis. CARDIAC CATHETERIZATION  02/09/2017    Attempted PCI to chronic total occlusion of RCA was not successful. Unable to cross with multiple wires due to heavy calcification and toruosity.     CARDIAC CATHETERIZATION  01/14/2019    CATARACT REMOVAL  04/06/2015    Right eye - Dr. Wong Junes  06/08/2015    Dr. Elvira Espinoza - left eye    COLONOSCOPY  01/11/2006    Dr. Baudilio Beauchamp - internal hemorrhoids, no polps    COLONOSCOPY  05/23/2016    -polyp    CT NEEDLE BIOPSY LUNG PERCUTANEOUS  07/28/2021    CT NEEDLE BIOPSY LUNG PERCUTANEOUS 7/28/2021 [facility] CT SCAN    HEMORRHOID SURGERY      HERNIA REPAIR Left     inguinal    IR CHEST TUBE INSERTION  01/11/2022    IR CHEST TUBE INSERTION 1/11/2022 Effie Wong MD [facility] SPECIAL PROCEDURES    IR PORT PLACEMENT EQUAL OR GREATER THAN 5 YEARS  2/9/2022    IR PORT PLACEMENT EQUAL OR GREATER THAN 5 YEARS 2/9/2022 [facility] SPECIAL PROCEDURES    LUNG BIOPSY Right 07/28/2021    Dr Foreman [address] TOTAL Right 01/04/2022    MIDDLE WEDGE RESECTION, POSSIBLE MIDDLE LOBECTOMY VIA THORACOTOMY performed by Janeth Moran MD at [address] [facility]    c4-5 laminectomy and fusion    OTHER SURGICAL HISTORY  10/2014    Right foot - 7 from heal and 1 from great toe at [address]  12/11/2014    pain injection - cervical    TUNNELED CENTRAL VENOUS CATHETER W/ SUBCUTANEOUS PORT Left 02/09/2022    Dr Hoenninger/[facility]    UPPER GASTROINTESTINAL ENDOSCOPY  12/28/2005    Dr. Carlo Griffin - mild esophagitis and gastritis    UPPER GASTROINTESTINAL ENDOSCOPY N/A 02/05/2019    -bx(neg H-Pylori)retained gastric content, possible submucosal mass of gastric cardia    UPPER GASTROINTESTINAL ENDOSCOPY N/A 10/22/2019    EGD BIOPSY performed by Alfonzo Cabot, MD at [address] [facility]   Allergen Reactions    Niacin And Related      Turns red, no trouble breathing    Minocycline Hcl Rash    Other Nausea And Vomiting     Patient states on all pain medications he gets nausea and vomiting.  Doctor ordered a medicine (nausea) to take before pain medication       Current Outpatient Medications   Medication Sig Dispense Refill    atenolol (TENORMIN) 25 MG tablet Take 1 tablet by mouth daily 90 tablet 3    Continuous Blood Gluc Sensor (FREESTYLE YAIMA 14 DAY SENSOR) MISC as directed      amLODIPine (NORVASC) 5 MG tablet Take 1 tablet by mouth daily 90 tablet 3    magnesium 30 MG tablet Take 400 mg by mouth daily       clopidogrel (PLAVIX) 75 MG tablet Take 1 tablet by mouth daily 90 tablet 3    zinc 50 MG CAPS Take by mouth      promethazine (PHENERGAN) 25 MG tablet Take 25 mg by mouth every 4 hours as needed for Nausea      pantoprazole (PROTONIX) 40 MG tablet Take 40 mg by mouth in the morning and at bedtime       ondansetron (ZOFRAN ODT) 8 MG TBDP disintegrating tablet Take 1 tablet by mouth every 8 hours as needed for Nausea 30 tablet 2    lidocaine-prilocaine (EMLA) 2.5-2.5 % cream Apply topically to port site 30- 60 minutes before access as needed. 30 g 2    tamsulosin (FLOMAX) 0.4 MG capsule Take 0.4 mg by mouth daily      tiZANidine (ZANAFLEX) 4 MG capsule Take 4 mg by mouth 3 times daily as needed       Insulin Glargine, 2 Unit Dial, 300 UNIT/ML SOPN Insulin Glargine Insulin Glargine U-300 Conc Active 10 UNIT Subcutaneous Every morning May 7th, 2020 11:47am 2020  [facility] ([id number])      atorvastatin (LIPITOR) 20 MG tablet Take 1 tablet by mouth daily 90 tablet 3    sitaGLIPtan-metFORMIN (JANUMET)  MG per tablet Take 1 tablet by mouth 2 times daily (with meals)      gabapentin (NEURONTIN) 100 MG capsule Take 1 capsule by mouth 3 times daily for 14 days. (Patient not taking: Reported on 2022) 42 capsule 0    SERTRALINE HCL PO Take 50 mg by mouth nightly       isosorbide mononitrate (IMDUR) 30 MG extended release tablet 1/2 tablet in the morning      lisinopril (PRINIVIL;ZESTRIL) 20 MG tablet Take 20 mg by mouth daily (Patient not taking: Reported on 2022)      nitroGLYCERIN (NITRODUR) 0.4 MG/HR 1 dose under tongue as needed for chest pain. max of 3 in one day (Patient not taking: No sig reported)      glimepiride (AMARYL) 2 MG tablet Take 1 tablet by mouth 2 times daily 180 tablet 3    aspirin 81 MG tablet Take 81 mg by mouth daily Ordered by heart doctor, Dr. Genoveva Valadez, [place]       No current facility-administered medications for this visit.        Social History     Socioeconomic History    Marital status:      Spouse name: Not on file    Number of children: Not on file    Years of education: Not on file    Highest education level: Not on file   Occupational History    Not on file   Tobacco Use    Smoking status: Former     Packs/day: 0.25     Years: 54.00     Pack years: 13.50     Types: Cigarettes     Quit date: 10/1/2021     Years since quittin.9    Smokeless tobacco: Never   Vaping Use    Vaping Use: Never used   Substance and Sexual Activity    Alcohol use: No    Drug use: No    Sexual activity: Not on file   Other Topics Concern    Not on file   Social History Narrative    Not on file     Social Determinants of Health     Financial Resource Strain: Not on file   Food Insecurity: Not on file   Transportation Needs: Not on file   Physical Activity: Not on file   Stress: Not on file   Social Connections: Not on file   Intimate Partner Violence: Not on file   Housing Stability: Not on file       Family History   Problem Relation Age of Onset    Heart Disease Mother     Diabetes Mother     High Blood Pressure Mother     Diabetes Father     Alzheimer's Disease Father     Heart Disease Sister         CABG    Heart Disease Brother     Heart Disease Brother     Heart Disease Brother     Cancer Brother         REVIEW OF SYSTEM:     Constitutional: No fever or chills. No night sweats, no weight loss   Eyes: No eye discharge, double vision, or eye pain   HEENT: negative for sore mouth, sore throat, hoarseness and voice change   Respiratory: +cough , no sputum, dyspnea, wheezing, hemoptysis, +chest pain   Cardiovascular: negative for chest pain, dyspnea, palpitations, orthopnea, PND   Gastrointestinal: negative for nausea, vomiting, diarrhea, constipation, abdominal pain, Dysphagia, hematemesis and hematochezia   Genitourinary: negative for frequency, dysuria, nocturia, urinary incontinence, and hematuria   Integument: negative for rash, skin lesions, bruises.    Hematologic/Lymphatic: negative for easy bruising, bleeding, lymphadenopathy, petechiae and swelling/edema   Endocrine: negative for heat or cold intolerance, tremor, weight changes, change in bowel habits and hair loss   Musculoskeletal: negative for myalgias, arthralgias, pain, joint swelling,and bone pain   Neurological: negative for headaches, dizziness, seizures, weakness, numbness       OBJECTIVE:         Vitals:    09/22/22 1752   BP: (!) 140/66   Pulse: 73   Resp: 18   Temp: 97.2 °F (36.2 °C)       PHYSICAL EXAM:   General appearance - well appearing, no in pain or distress   Mental status - alert and cooperative   Eyes - pupils equal and reactive, extraocular eye movements intact   Ears - bilateral TM's and external ear canals normal   Mouth - mucous membranes moist, pharynx normal without lesions   Neck - supple, no significant adenopathy   Lymphatics - no palpable lymphadenopathy, no hepatosplenomegaly   Chest - clear to auscultation, no wheezes, rales or rhonchi, symmetric air entry, diminished sounds on the right side  Heart - normal rate, regular rhythm, normal S1, S2, no murmurs, rubs, clicks or gallops   Abdomen - soft, nontender, nondistended, no masses or organomegaly   Neurological - alert, oriented, normal speech, no focal findings or movement disorder noted   Musculoskeletal - no joint tenderness, deformity or swelling   Extremities - peripheral pulses normal, no pedal edema, no clubbing or cyanosis   Skin - normal coloration and turgor, no rashes, no suspicious skin lesions noted ,      LABORATORY DATA:     Lab Results   Component Value Date    WBC 8.8 09/14/2022    HGB 9.1 (L) 09/14/2022    HCT 30.2 (L) 09/14/2022    MCV 82.7 09/14/2022     09/14/2022    LYMPHOPCT 25 09/14/2022    RBC 3.65 (L) 09/14/2022    MCH 24.9 (L) 09/14/2022    MCHC 30.1 09/14/2022    RDW 16.7 (H) 09/14/2022    NEUTOPHILPCT 50.9 02/02/2017    MONOPCT 10 09/14/2022    EOSPCT 3.1 02/02/2017    BASOPCT 1 09/14/2022    NEUTROABS 5.38 09/14/2022    LYMPHSABS 2.15 09/14/2022    MONOSABS 0.90 09/14/2022    EOSABS 0.17 09/14/2022    BASOSABS 0.11 09/14/2022         Chemistry        Component Value Date/Time     09/14/2022 0716    K 4.4 09/14/2022 0716     09/14/2022 0716    CO2 23 09/14/2022 0716    BUN 21 09/14/2022 0716    CREATININE 1.14 09/14/2022 0716        Component Value Date/Time    CALCIUM 9.6 09/14/2022 0716    ALKPHOS 107 09/14/2022 0716    AST 13 09/14/2022 0716    ALT 10 09/14/2022 0716    BILITOT 0.3 09/14/2022 0716            PATHOLOGY DATA:     Surgical Pathology Report -- Diagnosis --     1. Right lung, middle lobe wedge biopsy:     -  Adenocarcinoma. -  Tumor is 1.8 cm.     -  Carcinoma invades into but not through the visceral pleura. 2.  Lymph node #9, biopsy:     -  Negative for metastatic carcinoma. 3.  Lymph node #7, biopsy:     -  Negative for metastatic carcinoma. 4.  Right middle lobe lung, lobectomy:     -  Margins are free of involvement by carcinoma. -  Peribronchial lymph node, negative for metastatic carcinoma. -  Nonneoplastic lung shows emphysematous change. Fany Gusman M.D.   **Electronically Signed Out**         rdd/1/6/2022         Clinical Information   Pre-op Diagnosis:  RIGHT MIDDLE LOBE NODULE   Operative Findings: MIDDLE LOBE OF RIGHT LUNG; LYMPH NODE #9 OF LUNG;   LYMPH NODE #7; RIGHT MIDDLE LOBE   Operation Performed: MIDDLE WEDGE RESECTION, POSSIBLE MIDDLE   LOBECTOMY VIA THORACOTOMY     Source of Specimen   1: MIDDLE LOBE OF RIGHT LUNG (A)   2: LYMPH NODE #9 OF LUNG (B)   3: LYMPH NODE #7   4: RIGHT MIDDLE LOBE     Gross Description   1. \"ANA SUSAN, MIDDLE LOBE OF RIGHT LUNG\" Received fresh is a   6.4 x 4.2 x 2.8 cm lung wedge with staple line. The pleura is   pink-tan with an area of slightly thickening (inked black). Sectioning reveals a 1.8 x 1.2 x 0.8 cm subpleural tan-white mass   lesion that is 0.8 cm from the staple line margin. The remaining   parenchyma is pink-tan with no other lesions. 1TP, 1DQ, 1FS, Cassette   summary:  \"A\" frozen section lesion, \"B\" remainder of lesion with   pleura inked black, \"C\" staple line margin en face. 2. \"ANA SUSAN, LYMPH NODE #9 OF LUNG\" Received fresh are two   fragments, 0.3 cm each. 1TP, 1FS, 1cs. 3. \"ANA SUSAN, LYMPH NODE #7\" Received fresh are two   anthracotic fragments, 0.6 and 0.7 cm in greatest dimension. 1TP,   1FS, 1cs. 4.   \"ANA SUSAN, RIGHT MIDDLE LOBE\" Received fresh is a 50 gram, MARGINS --   -BRONCHIAL: Free of tumor       -VASCULAR: Free of tumor       -PARENCHYMAL: Free of tumor       -OTHER ATTACHED TISSUE MARGIN (IF APPLICABLE): N/A       REGIONAL LYMPH NODES: 3   LYMPH NODES(S) FROM PRIOR PROCEDURES: No       NUMBER AND STATION(S) EXAMINED:   See diagnoses   NUMBER AND STATION(S) WITH METASTASIS: 0         DISTANT METASTASIS--   DISTANT SITE(S) INVOLVED, IF APPLICABLE: N/A         PATHOLOGIC STAGE CLASSIFICATION:     pT2a  pN0   CAP Lung 4200 in conjunction with AJCC 8 ed. SURGICAL PATHOLOGY CONSULTATION         Patient Name: Juan Jeffery: [id number]   Path Number: [id number]     [facility]. [facility], [address]   [phone]   Fax: [phone]          IMAGING DATA:      PET CT SKULL BASE TO MID THIGH  Addendum: ADDENDUM:   Comparison: MRI of the abdomen of 5 August 2022; CT abdomen and pelvis 3  July   2022; CT abdomen and pelvis 7 January 2022     As stated in the original report there is normal physiologic uptake of FDG  in   the kidneys. There is no abnormal FDG uptake above the surrounding renal   parenchyma in the location of the patient's questionable left renal  lesion. The lesion is not FDG avid and demonstrates no specific for metabolic   activity on today's study. Given combination of the findings including a   rounded lesion that has remained stable since January, findings at this  time   favor a benign process. However, continued monitoring of the lesion with   contrast enhanced CT scanning of the abdomen and pelvis is advised with  next   CT advised in 6 months.      Case discussed with referring oncologist at approximately 1115 hours, 23 September 2022  Narrative: EXAMINATION:  WHOLE BODY PET/CT    9/14/2022    TECHNIQUE:  Following IV injection of 14.9 mCi of F-18 FDG, PET  tumor imaging was  acquired from the base of the skull to the mid thighs. Computed tomography  was used for purposes of attenuation correction and anatomic localization. Fusion imaging was utilized for interpretation. Uptake time 50 min. Glucose level 173 mg/dl. COMPARISON:  30 June 2021    HISTORY:  ORDERING SYSTEM PROVIDED HISTORY: Lung cancer, main bronchus, right [facility])  TECHNOLOGIST PROVIDED HISTORY:  Left renal mass question metastasis from the lung versus RCC    FINDINGS:  HEAD/NECK: Normal physiologic uptake of FDG is noted in the brain parenchyma  and salivary glands. No abnormal hypermetabolic foci are noted within the  cervical lymph node chain or thoracic inlet. CHEST: Normal physiologic uptake of FDG is noted in the great vessels and  left ventricular myocardium. Again noted is unchanged mildly increased FDG  uptake with SUV maximum of 3.4 is noted. The hypermetabolic focus in the  right middle lobe is not redemonstrated. However, the patient has a moderate  right effusion with associated compressive atelectasis. There is mildly  increased FDG uptake along the atelectatic lung, likely due to mild  inflammation. No suspicious FDG uptake is noted within the lung parenchymal  proper. ABDOMEN/PELVIS: Normal physiologic uptake of FDG is noted in the liver,  spleen and urinary collecting systems. Bowel activity is within physiologic  levels. No abnormal metabolic activity in the abdomen, pelvis or groin areas. BONES/SOFT TISSUE: No suspicious osseous or subcutaneous soft tissue uptake  is noted. INCIDENTAL CT FINDINGS: Moderate right pleural effusion. Atheromatous plaque  in the aorta, coronary arteries and iliac arteries. Colonic diverticulosis. Multilevel degenerative changes in the spine. Infusion port buried under the  left upper chest with catheter terminating in the superior vena cava. Impression: 1. Continued small focus of increased metabolic activity in the right hilum  similar to prior study.     2.  Prior hypermetabolic nodule in the right middle lobe is not  redemonstrated. 3.  Moderate right pleural effusion, non-FDG avid. Associated atelectatic  lung beneath the effusion shows evidence of mild uptake likely due to  inflammation/compression. 4.  No suspicious FDG uptake in the abdomen, pelvis, neck or groin areas. CT chest with IV contrast on 11/2/2021    The previously biopsied, somewhat bilobed nodule involving right middle lobe   is slightly increased in size since the prior study now measuring 1.8 x 1.0   cm, once measuring 1.8 x 0.6 cm. There does appear to be some spiculation. No new pulmonary nodule is seen. PET/CT on June 30, 2021    1. Significant oval increase in size of known anterior superior middle lobe   nodule along the minor fissure. This now measures 6 x 18 mm compared to 10 x   4 mm on the prior. This remains a LUNG RADS 4B (very suspicious) nodule. Recommend repeat PET-CT and or tissue sampling given appearance and   significant interval increase in size. 2. No major change in the fat density 2.3 cm nodule in the gastric fundus   adjacent to the GE junction. Note this was FDG avid on the prior PET-CT. 3. Mild emphysema. The findings were sent to the Radiology Results [address] at 12:06   pm on 6/10/2021to be communicated to a licensed caregiver. ASSESSMENT / PLAN:    Erasmo was seen today for follow-up and rib pain. Diagnoses and all orders for this visit:    Renal cell carcinoma of right kidney ([address]  -     US RENAL LIMITED; Future    Lung cancer, main bronchus, right ([address]    Renal mass  -     US RENAL LIMITED;  Future  -     Referral to Interventional Radiology           80-year-old man history of smoking diagnosed with pathology stage Ib(T2 aN0 M0) adenocarcinoma of the right middle lobe status post right VATS thoracotomy with right middle lobectomy done on January 4, 2022, due to high risk features manifested by involvement of visceral pleura recommended adjuvant chemotherapy which is Alimta and carboplatin, carboplatin substitute cisplatin due to frailed/nausea related to gastroparesis from diabetes in addition to mild neuropathy, started on chemotherapy on February 21, 2022 and done with 4 cycles of treatment, did well however the last cycle he had weakness fatigue and lightheadedness  Hypotension/orthostatic likely related to diabetic orthostatic  Right-sided pleural effusion status post thoracentesis> fluid sent for cytology and was negative>   Nosebleed> ENT resolved  Intermittent nausea this is prior to surgery related to diabetic gastroparesis > antinausea medicine  Decreased appetite due to the nausea> encourage oral intake> improved  right-sided chest pain since surgery> CT chest loculated pleural fluid at the right base has been followed by cardiovascular surgery and recommended nerve block which was done  Pain management MS Contin and MS IR and referral for possible nerve block> status post steroid injection and MRI of the spine ordered by pain service to be done next week  MRI of the kidney did show left renal nodule concern about RCC> PET scan was done and there was no activity on the kidney, I reviewed the case with the radiologist Dr. Reji Chavarria and she compare the 2 consecutive scan of the abdomen and there was no growing of the kidney lesion and she recommend 3 months/short-term follow-up scan, this is unlikely related to the lung cancer and there is no adrenal lesion I do agree with surveillance short term follow-up scan in 3 months patient already was seen by urologist and also they recommend surveillance due to the size of the lesion  RTC in 15 weeks                                                    [facility]                            This note is created with the assistance of a speech recognition program.  While intending to generate a document that actually reflects the content of the visit, the document can still have some errors including those of syntax and sound a like substitutions which may escape proof reading. It such instances, actual meaning can be extrapolated by contextual diversion.

## 2022-09-28 ENCOUNTER — HOSPITAL ENCOUNTER (OUTPATIENT)
Age: 72
Setting detail: OBSERVATION
Discharge: HOME | End: 2022-09-29
Attending: EMERGENCY MEDICINE | Admitting: INTERNAL MEDICINE
Payer: MEDICARE

## 2022-09-28 ENCOUNTER — APPOINTMENT (OUTPATIENT)
Dept: CT IMAGING | Age: 72
End: 2022-09-28
Payer: MEDICARE

## 2022-09-28 ENCOUNTER — APPOINTMENT (OUTPATIENT)
Dept: GENERAL RADIOLOGY | Age: 72
End: 2022-09-28
Payer: MEDICARE

## 2022-09-28 DIAGNOSIS — I95.9 HYPOTENSION, UNSPECIFIED HYPOTENSION TYPE: ICD-10-CM

## 2022-09-28 DIAGNOSIS — R55 SYNCOPE, UNSPECIFIED SYNCOPE TYPE: Primary | ICD-10-CM

## 2022-09-28 LAB
ABSOLUTE EOS #: 0.1 K/UL (ref 0–0.44)
ABSOLUTE IMMATURE GRANULOCYTE: 0.07 K/UL (ref 0–0.3)
ABSOLUTE LYMPH #: 1.55 K/UL (ref 1.1–3.7)
ABSOLUTE MONO #: 0.73 K/UL (ref 0.1–1.2)
ANION GAP SERPL CALCULATED.3IONS-SCNC: 11 MMOL/L (ref 9–17)
BASOPHILS # BLD: 1 % (ref 0–2)
BASOPHILS ABSOLUTE: 0.06 K/UL (ref 0–0.2)
BILIRUBIN URINE: NEGATIVE
BUN BLDV-MCNC: 31 MG/DL (ref 8–23)
BUN/CREAT BLD: 22 (ref 9–20)
CALCIUM SERPL-MCNC: 8.8 MG/DL (ref 8.6–10.4)
CHLORIDE BLD-SCNC: 101 MMOL/L (ref 98–107)
CO2: 24 MMOL/L (ref 20–31)
COLOR: YELLOW
CREAT SERPL-MCNC: 1.41 MG/DL (ref 0.7–1.2)
EKG ATRIAL RATE: 54 BPM
EKG P AXIS: 19 DEGREES
EKG P-R INTERVAL: 190 MS
EKG Q-T INTERVAL: 424 MS
EKG QRS DURATION: 98 MS
EKG QTC CALCULATION (BAZETT): 402 MS
EKG R AXIS: 28 DEGREES
EKG T AXIS: 43 DEGREES
EKG VENTRICULAR RATE: 54 BPM
EOSINOPHILS RELATIVE PERCENT: 1 % (ref 1–4)
EPITHELIAL CELLS UA: NORMAL /HPF (ref 0–5)
GFR AFRICAN AMERICAN: 60 ML/MIN
GFR NON-AFRICAN AMERICAN: 49 ML/MIN
GFR SERPL CREATININE-BSD FRML MDRD: ABNORMAL ML/MIN/{1.73_M2}
GFR SERPL CREATININE-BSD FRML MDRD: ABNORMAL ML/MIN/{1.73_M2}
GLUCOSE BLD-MCNC: 170 MG/DL (ref 70–99)
GLUCOSE BLD-MCNC: 187 MG/DL (ref 74–100)
GLUCOSE BLD-MCNC: 205 MG/DL (ref 74–100)
GLUCOSE URINE: NEGATIVE
HCT VFR BLD CALC: 27.6 % (ref 40.7–50.3)
HEMOGLOBIN: 8.6 G/DL (ref 13–17)
IMMATURE GRANULOCYTES: 1 %
KETONES, URINE: NEGATIVE
LEUKOCYTE ESTERASE, URINE: NEGATIVE
LYMPHOCYTES # BLD: 21 % (ref 24–43)
MCH RBC QN AUTO: 25.4 PG (ref 25.2–33.5)
MCHC RBC AUTO-ENTMCNC: 31.2 G/DL (ref 28.4–34.8)
MCV RBC AUTO: 81.4 FL (ref 82.6–102.9)
MONOCYTES # BLD: 10 % (ref 3–12)
NITRITE, URINE: NEGATIVE
NRBC AUTOMATED: 0 PER 100 WBC
PDW BLD-RTO: 17.2 % (ref 11.8–14.4)
PH UA: 6 (ref 5–9)
PLATELET # BLD: 215 K/UL (ref 138–453)
PMV BLD AUTO: 9.9 FL (ref 8.1–13.5)
POTASSIUM SERPL-SCNC: 4.7 MMOL/L (ref 3.7–5.3)
PROTEIN UA: NEGATIVE
RBC # BLD: 3.39 M/UL (ref 4.21–5.77)
RBC UA: NORMAL /HPF (ref 0–2)
SEG NEUTROPHILS: 66 % (ref 36–65)
SEGMENTED NEUTROPHILS ABSOLUTE COUNT: 5.01 K/UL (ref 1.5–8.1)
SODIUM BLD-SCNC: 136 MMOL/L (ref 135–144)
SPECIFIC GRAVITY UA: <1.005 (ref 1.01–1.02)
TROPONIN, HIGH SENSITIVITY: 13 NG/L (ref 0–22)
TURBIDITY: CLEAR
URINE HGB: NEGATIVE
UROBILINOGEN, URINE: NORMAL
WBC # BLD: 7.5 K/UL (ref 3.5–11.3)
WBC UA: NORMAL /HPF (ref 0–5)

## 2022-09-28 PROCEDURE — 82947 ASSAY GLUCOSE BLOOD QUANT: CPT

## 2022-09-28 PROCEDURE — 93005 ELECTROCARDIOGRAM TRACING: CPT | Performed by: EMERGENCY MEDICINE

## 2022-09-28 PROCEDURE — 70450 CT HEAD/BRAIN W/O DYE: CPT

## 2022-09-28 PROCEDURE — 2580000003 HC RX 258: Performed by: EMERGENCY MEDICINE

## 2022-09-28 PROCEDURE — G0378 HOSPITAL OBSERVATION PER HR: HCPCS

## 2022-09-28 PROCEDURE — 96360 HYDRATION IV INFUSION INIT: CPT

## 2022-09-28 PROCEDURE — 81001 URINALYSIS AUTO W/SCOPE: CPT

## 2022-09-28 PROCEDURE — 2580000003 HC RX 258: Performed by: INTERNAL MEDICINE

## 2022-09-28 PROCEDURE — 36415 COLL VENOUS BLD VENIPUNCTURE: CPT

## 2022-09-28 PROCEDURE — 83036 HEMOGLOBIN GLYCOSYLATED A1C: CPT

## 2022-09-28 PROCEDURE — 80048 BASIC METABOLIC PNL TOTAL CA: CPT

## 2022-09-28 PROCEDURE — 85025 COMPLETE CBC W/AUTO DIFF WBC: CPT

## 2022-09-28 PROCEDURE — 6360000002 HC RX W HCPCS: Performed by: INTERNAL MEDICINE

## 2022-09-28 PROCEDURE — 96361 HYDRATE IV INFUSION ADD-ON: CPT

## 2022-09-28 PROCEDURE — 71260 CT THORAX DX C+: CPT | Performed by: EMERGENCY MEDICINE

## 2022-09-28 PROCEDURE — 6360000004 HC RX CONTRAST MEDICATION: Performed by: EMERGENCY MEDICINE

## 2022-09-28 PROCEDURE — 96372 THER/PROPH/DIAG INJ SC/IM: CPT

## 2022-09-28 PROCEDURE — 94761 N-INVAS EAR/PLS OXIMETRY MLT: CPT

## 2022-09-28 PROCEDURE — 99285 EMERGENCY DEPT VISIT HI MDM: CPT

## 2022-09-28 PROCEDURE — 6370000000 HC RX 637 (ALT 250 FOR IP): Performed by: INTERNAL MEDICINE

## 2022-09-28 PROCEDURE — 84484 ASSAY OF TROPONIN QUANT: CPT

## 2022-09-28 PROCEDURE — 93010 ELECTROCARDIOGRAM REPORT: CPT | Performed by: INTERNAL MEDICINE

## 2022-09-28 PROCEDURE — 71045 X-RAY EXAM CHEST 1 VIEW: CPT

## 2022-09-28 RX ORDER — ENOXAPARIN SODIUM 100 MG/ML
40 INJECTION SUBCUTANEOUS DAILY
Status: DISCONTINUED | OUTPATIENT
Start: 2022-09-28 | End: 2022-09-29 | Stop reason: HOSPADM

## 2022-09-28 RX ORDER — ACETAMINOPHEN 650 MG/1
650 SUPPOSITORY RECTAL EVERY 6 HOURS PRN
Status: DISCONTINUED | OUTPATIENT
Start: 2022-09-28 | End: 2022-09-29 | Stop reason: HOSPADM

## 2022-09-28 RX ORDER — DEXTROSE MONOHYDRATE 100 MG/ML
INJECTION, SOLUTION INTRAVENOUS CONTINUOUS PRN
Status: DISCONTINUED | OUTPATIENT
Start: 2022-09-28 | End: 2022-09-29 | Stop reason: HOSPADM

## 2022-09-28 RX ORDER — ISOSORBIDE MONONITRATE 30 MG/1
15 TABLET, EXTENDED RELEASE ORAL DAILY
Status: DISCONTINUED | OUTPATIENT
Start: 2022-09-29 | End: 2022-09-29 | Stop reason: HOSPADM

## 2022-09-28 RX ORDER — MORPHINE SULFATE 15 MG/1
15 TABLET ORAL EVERY 6 HOURS PRN
COMMUNITY

## 2022-09-28 RX ORDER — LANOLIN ALCOHOL/MO/W.PET/CERES
400 CREAM (GRAM) TOPICAL DAILY
Status: DISCONTINUED | OUTPATIENT
Start: 2022-09-29 | End: 2022-09-29 | Stop reason: HOSPADM

## 2022-09-28 RX ORDER — SODIUM CHLORIDE 9 MG/ML
25 INJECTION, SOLUTION INTRAVENOUS PRN
Status: DISCONTINUED | OUTPATIENT
Start: 2022-09-28 | End: 2022-09-29 | Stop reason: HOSPADM

## 2022-09-28 RX ORDER — POLYETHYLENE GLYCOL 3350 17 G/17G
17 POWDER, FOR SOLUTION ORAL DAILY PRN
Status: DISCONTINUED | OUTPATIENT
Start: 2022-09-28 | End: 2022-09-29 | Stop reason: HOSPADM

## 2022-09-28 RX ORDER — ATORVASTATIN CALCIUM 20 MG/1
20 TABLET, FILM COATED ORAL DAILY
Status: DISCONTINUED | OUTPATIENT
Start: 2022-09-28 | End: 2022-09-29 | Stop reason: HOSPADM

## 2022-09-28 RX ORDER — INSULIN GLARGINE 100 [IU]/ML
10 INJECTION, SOLUTION SUBCUTANEOUS DAILY
Status: DISCONTINUED | OUTPATIENT
Start: 2022-09-28 | End: 2022-09-29 | Stop reason: HOSPADM

## 2022-09-28 RX ORDER — MORPHINE SULFATE 15 MG/1
15 TABLET ORAL EVERY 6 HOURS PRN
Status: DISCONTINUED | OUTPATIENT
Start: 2022-09-28 | End: 2022-09-29 | Stop reason: HOSPADM

## 2022-09-28 RX ORDER — NITROGLYCERIN 80 MG/1
1 PATCH TRANSDERMAL DAILY
Status: DISCONTINUED | OUTPATIENT
Start: 2022-09-28 | End: 2022-09-28

## 2022-09-28 RX ORDER — ATENOLOL 25 MG/1
25 TABLET ORAL DAILY
Status: DISCONTINUED | OUTPATIENT
Start: 2022-09-28 | End: 2022-09-29 | Stop reason: HOSPADM

## 2022-09-28 RX ORDER — SODIUM CHLORIDE 0.9 % (FLUSH) 0.9 %
5-40 SYRINGE (ML) INJECTION PRN
Status: DISCONTINUED | OUTPATIENT
Start: 2022-09-28 | End: 2022-09-29 | Stop reason: HOSPADM

## 2022-09-28 RX ORDER — CLOPIDOGREL BISULFATE 75 MG/1
75 TABLET ORAL DAILY
Status: DISCONTINUED | OUTPATIENT
Start: 2022-09-29 | End: 2022-09-29 | Stop reason: HOSPADM

## 2022-09-28 RX ORDER — GLIPIZIDE 5 MG/1
5 TABLET ORAL
Status: DISCONTINUED | OUTPATIENT
Start: 2022-09-28 | End: 2022-09-29 | Stop reason: HOSPADM

## 2022-09-28 RX ORDER — 0.9 % SODIUM CHLORIDE 0.9 %
1000 INTRAVENOUS SOLUTION INTRAVENOUS ONCE
Status: COMPLETED | OUTPATIENT
Start: 2022-09-28 | End: 2022-09-28

## 2022-09-28 RX ORDER — ACETAMINOPHEN 325 MG/1
650 TABLET ORAL EVERY 6 HOURS PRN
Status: DISCONTINUED | OUTPATIENT
Start: 2022-09-28 | End: 2022-09-29 | Stop reason: HOSPADM

## 2022-09-28 RX ORDER — CLOTRIMAZOLE AND BETAMETHASONE DIPROPIONATE 10; .64 MG/G; MG/G
CREAM TOPICAL DAILY
Status: DISCONTINUED | OUTPATIENT
Start: 2022-09-28 | End: 2022-09-29 | Stop reason: HOSPADM

## 2022-09-28 RX ORDER — PROMETHAZINE HYDROCHLORIDE 25 MG/1
25 TABLET ORAL EVERY 4 HOURS PRN
Status: DISCONTINUED | OUTPATIENT
Start: 2022-09-28 | End: 2022-09-29 | Stop reason: HOSPADM

## 2022-09-28 RX ORDER — SODIUM CHLORIDE 0.9 % (FLUSH) 0.9 %
5-40 SYRINGE (ML) INJECTION EVERY 12 HOURS SCHEDULED
Status: DISCONTINUED | OUTPATIENT
Start: 2022-09-28 | End: 2022-09-29 | Stop reason: HOSPADM

## 2022-09-28 RX ORDER — ONDANSETRON 2 MG/ML
4 INJECTION INTRAMUSCULAR; INTRAVENOUS EVERY 6 HOURS PRN
Status: DISCONTINUED | OUTPATIENT
Start: 2022-09-28 | End: 2022-09-29 | Stop reason: HOSPADM

## 2022-09-28 RX ORDER — NITROGLYCERIN 0.4 MG/1
0.4 TABLET SUBLINGUAL EVERY 5 MIN PRN
Status: DISCONTINUED | OUTPATIENT
Start: 2022-09-28 | End: 2022-09-29 | Stop reason: HOSPADM

## 2022-09-28 RX ORDER — ASPIRIN 81 MG/1
81 TABLET ORAL DAILY
Status: DISCONTINUED | OUTPATIENT
Start: 2022-09-29 | End: 2022-09-29 | Stop reason: HOSPADM

## 2022-09-28 RX ORDER — PANTOPRAZOLE SODIUM 40 MG/1
40 TABLET, DELAYED RELEASE ORAL 2 TIMES DAILY
Status: DISCONTINUED | OUTPATIENT
Start: 2022-09-28 | End: 2022-09-29 | Stop reason: HOSPADM

## 2022-09-28 RX ORDER — ZINC SULFATE 50(220)MG
50 CAPSULE ORAL
Status: DISCONTINUED | OUTPATIENT
Start: 2022-09-29 | End: 2022-09-29 | Stop reason: HOSPADM

## 2022-09-28 RX ORDER — INSULIN LISPRO 100 [IU]/ML
0-4 INJECTION, SOLUTION INTRAVENOUS; SUBCUTANEOUS
Status: DISCONTINUED | OUTPATIENT
Start: 2022-09-28 | End: 2022-09-29 | Stop reason: HOSPADM

## 2022-09-28 RX ORDER — ONDANSETRON 4 MG/1
4 TABLET, ORALLY DISINTEGRATING ORAL EVERY 8 HOURS PRN
Status: DISCONTINUED | OUTPATIENT
Start: 2022-09-28 | End: 2022-09-29 | Stop reason: HOSPADM

## 2022-09-28 RX ORDER — INSULIN LISPRO 100 [IU]/ML
0-4 INJECTION, SOLUTION INTRAVENOUS; SUBCUTANEOUS NIGHTLY
Status: DISCONTINUED | OUTPATIENT
Start: 2022-09-28 | End: 2022-09-29 | Stop reason: HOSPADM

## 2022-09-28 RX ORDER — ALOGLIPTIN 12.5 MG/1
12.5 TABLET, FILM COATED ORAL DAILY
Status: DISCONTINUED | OUTPATIENT
Start: 2022-09-28 | End: 2022-09-29 | Stop reason: HOSPADM

## 2022-09-28 RX ORDER — CLOTRIMAZOLE AND BETAMETHASONE DIPROPIONATE 10; .64 MG/G; MG/G
CREAM TOPICAL DAILY
COMMUNITY
Start: 2022-09-14

## 2022-09-28 RX ORDER — TAMSULOSIN HYDROCHLORIDE 0.4 MG/1
0.4 CAPSULE ORAL DAILY
Status: DISCONTINUED | OUTPATIENT
Start: 2022-09-28 | End: 2022-09-29 | Stop reason: HOSPADM

## 2022-09-28 RX ORDER — ONDANSETRON 4 MG/1
8 TABLET, ORALLY DISINTEGRATING ORAL EVERY 8 HOURS PRN
Status: DISCONTINUED | OUTPATIENT
Start: 2022-09-28 | End: 2022-09-29 | Stop reason: HOSPADM

## 2022-09-28 RX ORDER — AMLODIPINE BESYLATE 5 MG/1
5 TABLET ORAL DAILY
Status: DISCONTINUED | OUTPATIENT
Start: 2022-09-28 | End: 2022-09-29 | Stop reason: HOSPADM

## 2022-09-28 RX ORDER — SODIUM CHLORIDE 9 MG/ML
INJECTION, SOLUTION INTRAVENOUS CONTINUOUS
Status: DISCONTINUED | OUTPATIENT
Start: 2022-09-28 | End: 2022-09-29 | Stop reason: HOSPADM

## 2022-09-28 RX ADMIN — ENOXAPARIN SODIUM 40 MG: 100 INJECTION SUBCUTANEOUS at 20:19

## 2022-09-28 RX ADMIN — SODIUM CHLORIDE: 9 INJECTION, SOLUTION INTRAVENOUS at 18:41

## 2022-09-28 RX ADMIN — ATORVASTATIN CALCIUM 20 MG: 20 TABLET, FILM COATED ORAL at 20:19

## 2022-09-28 RX ADMIN — TAMSULOSIN HYDROCHLORIDE 0.4 MG: 0.4 CAPSULE ORAL at 20:19

## 2022-09-28 RX ADMIN — PANTOPRAZOLE SODIUM 40 MG: 40 TABLET, DELAYED RELEASE ORAL at 20:19

## 2022-09-28 RX ADMIN — SODIUM CHLORIDE 1000 ML: 9 INJECTION, SOLUTION INTRAVENOUS at 14:53

## 2022-09-28 RX ADMIN — IOPAMIDOL 75 ML: 755 INJECTION, SOLUTION INTRAVENOUS at 14:22

## 2022-09-28 ASSESSMENT — PAIN - FUNCTIONAL ASSESSMENT: PAIN_FUNCTIONAL_ASSESSMENT: NONE - DENIES PAIN

## 2022-09-28 NOTE — PROGRESS NOTES
Patient in bed, awake. Family in room. Assessment completed. Vitals taken and documented. Patient educated on physician's orders and medication to be given tonight. Patient encouraged to ask questions. Patient denies pain or complaints at this time. Call light and over bed table within reach. Side rails up times two.

## 2022-09-28 NOTE — ED PROVIDER NOTES
[facility] ED  EMERGENCY DEPARTMENT ENCOUNTER      Pt Name: Mitchel Mitchell  MRN: [id number]  [place] 1950  Date of evaluation: 9/28/2022  Provider: MD Holland Cristobal       Chief Complaint   Patient presents with    Loss of Consciousness     Syncopal episode just prior to arrival         HISTORY OF PRESENT ILLNESS   (Location/Symptom, Timing/Onset, Context/Setting, Quality, Duration, Modifying Factors, Severity)  Note limiting factors. Mitchel Mitchell is a 67 y.o. male who presents to the emergency department     66-year-old male sent to the emergency department for evaluation after having a syncopal episode at home. Patient had been outside after having a garage delivered. Recovery inside he felt lightheaded and weak. He was sitting in his chair wife who witnessed and states his eyes rolled back in his head and became unconscious for several seconds. Not fall or injure himself. After the incident he was awake alert and quickly returned to baseline mental functioning per his wife. Patient denies any chest pain shortness of breath or palpitations. Patient had eaten this morning. Taking current medications as prescribed. No other acute concerns      Nursing Notes were reviewed. REVIEW OF SYSTEMS    (2-9 systems for level 4, 10 or more for level 5)     Review of Systems   All other systems reviewed and are negative. Except as noted above the remainder of the review of systems was reviewed and negative. PAST MEDICAL HISTORY     Past Medical History:   Diagnosis Date    Abnormal cardiac cath 09/20/2013    LMCA; Mild irregularities 10-20%. LAD: Abnormal.  Mild to moderate irregularities throughout the LAD and branches. LCx:  abnormal.  80-90% stenosis in a moderate sized OM1. RCA: Abnormal.  40-50% mid RCA stenosis.     Actinic keratosis     Arthritis     neck, fingers    CAD (coronary artery disease)     Calculus of kidney     Cancer (HCC)     Cervical stenosis of spine     Diabetes mellitus ([facility] [address]     Dr. Greg Hill, CNP, [place]    Full dentures     GERD (gastroesophageal reflux disease)     H/O echocardiogram 04/29/2016    Stress echo. Normal resting LV systolic function,normal systolic restponse to exercise. No evidence of reversible  ischemia on this maximal,symptom limited,treadmill exerxiess stress echocardiography study    History of cardiac cath 02/09/2017    LMCA: Lesion on LMCA: Distal subsection . 20% stenosis. LAD: Lesion on 1st Diag: Mid subsection . 75% stenosis. RCA: Lesion on Mid RCA: Mid subsection . 100% stenosis. Comments: The distal RCA fills by left to right collateralization. Dominance: Mixed. LV function assessed as: Normal. EF 65%. History of echocardiogram 06/11/2018    EF 60%. Mildly increased LV wall thickness. Evidence of mild diastolic dysfunction seen. History of echocardiogram 01/18/2019    EF of 65%. LV wall thickness is mildly increased. Aortic valve leaflets are mildly thickened. History of heart attack     History of tobacco abuse     Quit 10/2021, 1/4 ppd for 54 years    Hyperlipidemia     Hypertension     Dr. Greg Hill, CNP    Pulmonary nodule 12/2021    Right middle lobe    Raynaud's phenomenon     S/P angioplasty with stent 09/20/2013    PTCA-NEDRA of  the OM 1    Wears hearing aid in both ears          SURGICAL HISTORY       Past Surgical History:   Procedure Laterality Date    CARDIAC CATHETERIZATION  2007    patient states had 2 small blockages    CARDIAC CATHETERIZATION  02/09/2017    LMCA: Lesion on LMCA: Distal subsection 20% stenosis. LAD: Lesion on 1st Diag: Mid subsection 75% stenosis. RCA: Lesion on Mid RCA: Mid subsection 100% stenosis. CARDIAC CATHETERIZATION  02/09/2017    Attempted PCI to chronic total occlusion of RCA was not successful. Unable to cross with multiple wires due to heavy calcification and toruosity.     CARDIAC CATHETERIZATION  01/14/2019    CATARACT REMOVAL  04/06/2015    Right eye - Dr. Ismael Rojas CATARACT REMOVAL  06/08/2015    Dr. Kelsey Colon - left eye    COLONOSCOPY  01/11/2006    Dr. Francine Quinonez - internal hemorrhoids, no polps    COLONOSCOPY  05/23/2016    -polyp    CT NEEDLE BIOPSY LUNG PERCUTANEOUS  07/28/2021    CT NEEDLE BIOPSY LUNG PERCUTANEOUS 7/28/2021 [facility] CT SCAN    HEMORRHOID SURGERY      HERNIA REPAIR Left     inguinal    IR CHEST TUBE INSERTION  01/11/2022    IR CHEST TUBE INSERTION 1/11/2022 Claudio Melton MD [facility] SPECIAL PROCEDURES    IR PORT PLACEMENT EQUAL OR GREATER THAN 5 YEARS  2/9/2022    IR PORT PLACEMENT EQUAL OR GREATER THAN 5 YEARS 2/9/2022 [facility] SPECIAL PROCEDURES    LUNG BIOPSY Right 07/28/2021    Dr Thomas/[address], TOTAL Right 01/04/2022    MIDDLE WEDGE RESECTION, POSSIBLE MIDDLE LOBECTOMY VIA THORACOTOMY performed by Flaco Delgado MD at [facility]    c4-5 laminectomy and fusion    OTHER SURGICAL HISTORY  10/2014    Right foot - 7 from heal and 1 from great toe at [address]  12/11/2014    pain injection - cervical    TUNNELED CENTRAL VENOUS CATHETER W/ SUBCUTANEOUS PORT Left 02/09/2022    [facility]    UPPER GASTROINTESTINAL ENDOSCOPY  12/28/2005    Dr. Francine Quinonez - mild esophagitis and gastritis    UPPER GASTROINTESTINAL ENDOSCOPY N/A 02/05/2019    -bx(neg H-Pylori)retained gastric content, possible submucosal mass of gastric cardia    UPPER GASTROINTESTINAL ENDOSCOPY N/A 10/22/2019    EGD BIOPSY performed by No Delgado MD at [facility]       Previous Medications    AMLODIPINE (NORVASC) 5 MG TABLET    Take 1 tablet by mouth daily    ASPIRIN 81 MG TABLET    Take 81 mg by mouth daily Ordered by heart doctor, Chan Moses    ATENOLOL (TENORMIN) 25 MG TABLET    Take 1 tablet by mouth daily    ATORVASTATIN (LIPITOR) 20 MG TABLET    Take 1 tablet by mouth daily    CLOPIDOGREL (PLAVIX) 75 MG TABLET    Take 1 tablet by mouth daily    CLOTRIMAZOLE-BETAMETHASONE (LOTRISONE) 1-0.05 % CREAM    Apply topically daily To both feet    CONTINUOUS BLOOD GLUC SENSOR (FREESTYLE YAIMA 14 DAY SENSOR) [facility]    as directed    GLIMEPIRIDE (AMARYL) 2 MG TABLET    Take 1 tablet by mouth 2 times daily    INSULIN GLARGINE, 2 UNIT DIAL, 300 UNIT/ML SOPN    Insulin Glargine Insulin Glargine U-300 Conc Active 10 UNIT Subcutaneous Every morning May 7th, 2020 11:47am 05-  [facility] ([id number])    ISOSORBIDE MONONITRATE (IMDUR) 30 MG EXTENDED RELEASE TABLET    1/2 tablet in the morning    LIDOCAINE-PRILOCAINE (EMLA) 2.5-2.5 % CREAM    Apply topically to port site 30- 60 minutes before access as needed. LISINOPRIL (PRINIVIL;ZESTRIL) 20 MG TABLET    Take 20 mg by mouth daily    MAGNESIUM 30 MG TABLET    Take 400 mg by mouth daily     MORPHINE (MSIR) 15 MG TABLET    Take 15 mg by mouth every 6 hours as needed for Pain. NITROGLYCERIN (NITRODUR) 0.4 MG/HR    1 dose under tongue as needed for chest pain.  max of 3 in one day    ONDANSETRON (ZOFRAN ODT) 8 MG TBDP DISINTEGRATING TABLET    Take 1 tablet by mouth every 8 hours as needed for Nausea    PANTOPRAZOLE (PROTONIX) 40 MG TABLET    Take 40 mg by mouth in the morning and at bedtime    PROMETHAZINE (PHENERGAN) 25 MG TABLET    Take 25 mg by mouth every 4 hours as needed for Nausea    SITAGLIPTAN-METFORMIN (JANUMET)  MG PER TABLET    Take 1 tablet by mouth 2 times daily (with meals)    TAMSULOSIN (FLOMAX) 0.4 MG CAPSULE    Take 0.4 mg by mouth daily    TIZANIDINE (ZANAFLEX) 4 MG CAPSULE    Take 4 mg by mouth 3 times daily as needed     ZINC 50 MG CAPS    Take 50 mg by mouth daily (with breakfast)       ALLERGIES     Niacin and related, Minocycline hcl, and Other    FAMILY HISTORY       Family History   Problem Relation Age of Onset    Heart Disease Mother     Diabetes Mother     High Blood Pressure Mother     Diabetes Father     Alzheimer's Disease Father     Heart Disease Sister         CABG    Heart Disease Brother     Heart Disease Brother     Heart Disease Brother     Cancer Brother           SOCIAL HISTORY       Social History     Socioeconomic History    Marital status:    Tobacco Use    Smoking status: Former     Packs/day: 0.25     Years: 54.00     Pack years: 13.50     Types: Cigarettes     Quit date: 10/1/2021     Years since quittin.9    Smokeless tobacco: Never   Vaping Use    Vaping Use: Never used   Substance and Sexual Activity    Alcohol use: No    Drug use: No       SCREENINGS        [place] Coma Scale  Eye Opening: Spontaneous  Best Verbal Response: Oriented  Best Motor Response: Obeys commands  [place] Coma Scale Score: 15               PHYSICAL EXAM    (up to 7 for level 4, 8 or more for level 5)     ED Triage Vitals [22 1304]   BP Temp Temp Source Heart Rate Resp SpO2 Height Weight   (!) 133/57 96.9 °F (36.1 °C) Tympanic 57 20 -- -- --       Physical Exam  Vitals and nursing note reviewed. Constitutional:       Comments: Systolic blood pressure 90 on arrival.  Afebrile. Elderly frail and chronically ill-appearing   HENT:      Head: Normocephalic and atraumatic. Cardiovascular:      Rate and Rhythm: Normal rate and regular rhythm. Pulmonary:      Effort: Pulmonary effort is normal. No respiratory distress. Breath sounds: Normal breath sounds. Abdominal:      General: There is no distension. Palpations: Abdomen is soft. Tenderness: There is no abdominal tenderness. Skin:     General: Skin is warm and dry. Findings: Rash present. Neurological:      General: No focal deficit present. Mental Status: He is alert and oriented to person, place, and time. DIAGNOSTIC RESULTS     EKG: All EKG's are interpreted by the Emergency Department Physician who either signs or Co-signs this chart in the absence of a cardiologist.    Sinus bradycardia rate 54 bpm.  Conduction. Normal intervals.   Acute ST segment or T wave findings. No acute findings of ischemia or infarction    RADIOLOGY:   Non-plain film images such as CT, Ultrasound and MRI are read by the radiologist. Plain radiographic images are visualized and preliminarily interpreted by the emergency physician with the below findings:        Interpretation per the Radiologist below, if available at the time of this note:    CT CHEST PULMONARY EMBOLISM W CONTRAST   Final Result   No evidence of pulmonary embolism. Postoperative changes of right lung and right hilum with atelectatic changes   of the right lower lobe and stable complex moderate right pleural effusion   with pleural thickening and nodularity. RECOMMENDATIONS:   Unavailable         CT Head W/O Contrast   Final Result   No acute intracranial abnormality. XR CHEST PORTABLE   Final Result   Moderate-sized right pleural effusion. The component of the pneumothorax   seen previous on the right is no longer detected. ED BEDSIDE ULTRASOUND:   Performed by ED Physician - none    LABS:  Labs Reviewed   BASIC METABOLIC PANEL - Abnormal; Notable for the following components:       Result Value    Glucose 170 (*)     BUN 31 (*)     Creatinine 1.41 (*)     Bun/Cre Ratio 22 (*)     GFR Non- 49 (*)     GFR  60 (*)     All other components within normal limits   CBC WITH AUTO DIFFERENTIAL - Abnormal; Notable for the following components:    RBC 3.39 (*)     Hemoglobin 8.6 (*)     Hematocrit 27.6 (*)     MCV 81.4 (*)     RDW 17.2 (*)     Seg Neutrophils 66 (*)     Lymphocytes 21 (*)     Immature Granulocytes 1 (*)     All other components within normal limits   TROPONIN   URINALYSIS WITH REFLEX TO CULTURE       All other labs were within normal range or not returned as of this dictation.     EMERGENCY DEPARTMENT COURSE and DIFFERENTIAL DIAGNOSIS/MDM:   Vitals:    Vitals:    09/28/22 1600 09/28/22 1615 09/28/22 1630 09/28/22 1645   BP: (!) 117/59 (!) 110/51 (!) 134/58 137/72   Pulse: 65 63 62 64   Resp:       Temp:       TempSrc:       SpO2: 99% 100%             MDM  Number of Diagnoses or Management Options  Hypotension, unspecified hypotension type  Syncope, unspecified syncope type  Diagnosis management comments: 72-year-old male syncopal episode. Known history of previous coronary artery disease. Resting without chest pain. Creatinine is 1.41 today. Baseline ranges between 0.98 and 1.14. Troponin negative. Blood pressure improving with IV fluids. Discussed with Dr. Fidel Clark. Will admit patient for continued hydration and continued monitoring. Hospitalist paged. MIPS       REASSESSMENT          CRITICAL CARE TIME   Total Critical Care time was  minutes, excluding separately reportable procedures. There was a high probability of clinically significant/life threatening deterioration in the patient's condition which required my urgent intervention. CONSULTS:  IP CONSULT TO CARDIOLOGY    PROCEDURES:  Unless otherwise noted below, none     Procedures        FINAL IMPRESSION      1. Syncope, unspecified syncope type    2. Hypotension, unspecified hypotension type          DISPOSITION/PLAN   DISPOSITION Admitted 09/28/2022 04:25:12 PM      PATIENT REFERRED TO:  No follow-up provider specified. DISCHARGE MEDICATIONS:  New Prescriptions    No medications on file     Controlled Substances Monitoring:     No flowsheet data found.     (Please note that portions of this note were completed with a voice recognition program.  Efforts were made to edit the dictations but occasionally words are mis-transcribed.)    Kortney Cho MD (electronically signed)  Attending Emergency Physician             Kortney Cho MD  09/28/22 1569

## 2022-09-29 ENCOUNTER — APPOINTMENT (OUTPATIENT)
Dept: NON INVASIVE DIAGNOSTICS | Age: 72
End: 2022-09-29
Payer: MEDICARE

## 2022-09-29 VITALS
OXYGEN SATURATION: 98 % | HEIGHT: 72 IN | BODY MASS INDEX: 21.88 KG/M2 | SYSTOLIC BLOOD PRESSURE: 101 MMHG | HEART RATE: 61 BPM | RESPIRATION RATE: 18 BRPM | TEMPERATURE: 98.6 F | DIASTOLIC BLOOD PRESSURE: 57 MMHG | WEIGHT: 161.5 LBS

## 2022-09-29 LAB
ABSOLUTE EOS #: 0.14 K/UL (ref 0–0.44)
ABSOLUTE IMMATURE GRANULOCYTE: 0.05 K/UL (ref 0–0.3)
ABSOLUTE LYMPH #: 1.89 K/UL (ref 1.1–3.7)
ABSOLUTE MONO #: 0.7 K/UL (ref 0.1–1.2)
ANION GAP SERPL CALCULATED.3IONS-SCNC: 10 MMOL/L (ref 9–17)
BASOPHILS # BLD: 1 % (ref 0–2)
BASOPHILS ABSOLUTE: 0.05 K/UL (ref 0–0.2)
BUN BLDV-MCNC: 22 MG/DL (ref 8–23)
BUN/CREAT BLD: 21 (ref 9–20)
CALCIUM SERPL-MCNC: 8.4 MG/DL (ref 8.6–10.4)
CHLORIDE BLD-SCNC: 108 MMOL/L (ref 98–107)
CO2: 22 MMOL/L (ref 20–31)
CREAT SERPL-MCNC: 1.03 MG/DL (ref 0.7–1.2)
EKG ATRIAL RATE: 66 BPM
EKG P AXIS: 48 DEGREES
EKG P-R INTERVAL: 184 MS
EKG Q-T INTERVAL: 384 MS
EKG QRS DURATION: 90 MS
EKG QTC CALCULATION (BAZETT): 402 MS
EKG R AXIS: 42 DEGREES
EKG T AXIS: 46 DEGREES
EKG VENTRICULAR RATE: 66 BPM
EOSINOPHILS RELATIVE PERCENT: 2 % (ref 1–4)
GFR AFRICAN AMERICAN: >60 ML/MIN
GFR NON-AFRICAN AMERICAN: >60 ML/MIN
GFR SERPL CREATININE-BSD FRML MDRD: ABNORMAL ML/MIN/{1.73_M2}
GFR SERPL CREATININE-BSD FRML MDRD: ABNORMAL ML/MIN/{1.73_M2}
GLUCOSE BLD-MCNC: 135 MG/DL (ref 74–100)
GLUCOSE BLD-MCNC: 68 MG/DL (ref 74–100)
GLUCOSE BLD-MCNC: 79 MG/DL (ref 70–99)
GLUCOSE BLD-MCNC: 82 MG/DL (ref 74–100)
GLUCOSE BLD-MCNC: 87 MG/DL (ref 74–100)
HCT VFR BLD CALC: 25 % (ref 40.7–50.3)
HEMOGLOBIN: 7.9 G/DL (ref 13–17)
IMMATURE GRANULOCYTES: 1 %
LV EF: 60 %
LVEF MODALITY: NORMAL
LYMPHOCYTES # BLD: 27 % (ref 24–43)
MCH RBC QN AUTO: 25.6 PG (ref 25.2–33.5)
MCHC RBC AUTO-ENTMCNC: 31.6 G/DL (ref 28.4–34.8)
MCV RBC AUTO: 81.2 FL (ref 82.6–102.9)
MONOCYTES # BLD: 10 % (ref 3–12)
NRBC AUTOMATED: 0 PER 100 WBC
PDW BLD-RTO: 17.2 % (ref 11.8–14.4)
PLATELET # BLD: 191 K/UL (ref 138–453)
PMV BLD AUTO: 9.9 FL (ref 8.1–13.5)
POTASSIUM SERPL-SCNC: 4.5 MMOL/L (ref 3.7–5.3)
RBC # BLD: 3.08 M/UL (ref 4.21–5.77)
SEG NEUTROPHILS: 59 % (ref 36–65)
SEGMENTED NEUTROPHILS ABSOLUTE COUNT: 4.2 K/UL (ref 1.5–8.1)
SODIUM BLD-SCNC: 140 MMOL/L (ref 135–144)
WBC # BLD: 7 K/UL (ref 3.5–11.3)

## 2022-09-29 PROCEDURE — 97165 OT EVAL LOW COMPLEX 30 MIN: CPT

## 2022-09-29 PROCEDURE — 93306 TTE W/DOPPLER COMPLETE: CPT

## 2022-09-29 PROCEDURE — 82947 ASSAY GLUCOSE BLOOD QUANT: CPT

## 2022-09-29 PROCEDURE — 6370000000 HC RX 637 (ALT 250 FOR IP): Performed by: INTERNAL MEDICINE

## 2022-09-29 PROCEDURE — 93010 ELECTROCARDIOGRAM REPORT: CPT | Performed by: INTERNAL MEDICINE

## 2022-09-29 PROCEDURE — 96361 HYDRATE IV INFUSION ADD-ON: CPT

## 2022-09-29 PROCEDURE — 85025 COMPLETE CBC W/AUTO DIFF WBC: CPT

## 2022-09-29 PROCEDURE — 99214 OFFICE O/P EST MOD 30 MIN: CPT | Performed by: PHYSICIAN ASSISTANT

## 2022-09-29 PROCEDURE — 80048 BASIC METABOLIC PNL TOTAL CA: CPT

## 2022-09-29 PROCEDURE — 36415 COLL VENOUS BLD VENIPUNCTURE: CPT

## 2022-09-29 PROCEDURE — 93005 ELECTROCARDIOGRAM TRACING: CPT | Performed by: INTERNAL MEDICINE

## 2022-09-29 PROCEDURE — 2580000003 HC RX 258: Performed by: INTERNAL MEDICINE

## 2022-09-29 PROCEDURE — 97161 PT EVAL LOW COMPLEX 20 MIN: CPT

## 2022-09-29 PROCEDURE — G0378 HOSPITAL OBSERVATION PER HR: HCPCS

## 2022-09-29 RX ADMIN — ZINC SULFATE 220 MG (50 MG) CAPSULE 50 MG: CAPSULE at 08:11

## 2022-09-29 RX ADMIN — CLOPIDOGREL BISULFATE 75 MG: 75 TABLET ORAL at 08:11

## 2022-09-29 RX ADMIN — ISOSORBIDE MONONITRATE 15 MG: 30 TABLET, EXTENDED RELEASE ORAL at 08:11

## 2022-09-29 RX ADMIN — SODIUM CHLORIDE: 9 INJECTION, SOLUTION INTRAVENOUS at 02:43

## 2022-09-29 RX ADMIN — GLIPIZIDE 5 MG: 5 TABLET ORAL at 06:44

## 2022-09-29 RX ADMIN — SODIUM CHLORIDE, PRESERVATIVE FREE 10 ML: 5 INJECTION INTRAVENOUS at 08:10

## 2022-09-29 RX ADMIN — PANTOPRAZOLE SODIUM 40 MG: 40 TABLET, DELAYED RELEASE ORAL at 08:11

## 2022-09-29 RX ADMIN — ATORVASTATIN CALCIUM 20 MG: 20 TABLET, FILM COATED ORAL at 08:11

## 2022-09-29 RX ADMIN — METFORMIN HYDROCHLORIDE 1000 MG: 500 TABLET ORAL at 08:11

## 2022-09-29 RX ADMIN — ASPIRIN 81 MG: 81 TABLET, COATED ORAL at 08:11

## 2022-09-29 RX ADMIN — AMLODIPINE BESYLATE 5 MG: 5 TABLET ORAL at 08:11

## 2022-09-29 RX ADMIN — CLOTRIMAZOLE AND BETAMETHASONE DIPROPIONATE: 10; .5 CREAM TOPICAL at 08:17

## 2022-09-29 RX ADMIN — ATENOLOL 25 MG: 25 TABLET ORAL at 08:12

## 2022-09-29 RX ADMIN — Medication 400 MG: at 08:11

## 2022-09-29 RX ADMIN — ALOGLIPTIN 12.5 MG: 12.5 TABLET, FILM COATED ORAL at 08:11

## 2022-09-29 NOTE — DISCHARGE SUMMARY
Discharge Summary    Mary Noyola  :  1950  MRN:  [id number]    Admit date:  2022      Discharge date: 2022     Admitting Physician:  Rey Hoffman MD    Discharge Diagnoses:    Principal Problem:    Syncope and collapse  Active Problems:    Primary hypertension    Type 2 diabetes mellitus without complication, without long-term current use of insulin (HCC)    Lung cancer, main bronchus, right ([address]  Resolved Problems:    * No resolved hospital problems. *      Hospital Course:   Mary Noyola is a 67 y.o. male admitted with syncope and collapse. He presented to the ER with a syncopal episode. He stated that he was outside in his garage having things delivered and stated he went inside and felt lightheaded and weak. He stated he was sitting in his chair and his wife witnessed a syncopal episode stating that his eyes rolled back in his head and he became unconscious for several seconds. He did not fall or injure himself. After the incident patient was awake and alert and quickly returned to his baseline and mental functioning status per his wife. The patient himself denied any chest pain or shortness of breath or palpitations. Patient did report that he was eating well without difficulty. He stated he is compliant with medications. He denied recent illness including fever chills. During patient's evaluation vital signs were stable however he was slightly hypotensive but recovered with fluids. .  EKG showed sinus bradycardia 54 no acute ischemia or infarctions were seen. CT chest was negative for pulmonary emboli but did report some postoperative changes of the right lung and right hilum with atelectasis of the right lower lobe and a right moderate pleural effusion. CT head was negative. Hemodynamically he was stable. He does have a hemoglobin of 8.6 which is chronic for him. Patient's creatinine was baseline as well. Troponins were negative.   Dr. Shama Tavarez was consulted from the ER and recommended admission. Patient was admitted cardiology was consulted echocardiogram was completed. Patient was given IV fluids and all testing was reviewed. Patient does have history of lung cancer as well. He is encouraged to include himself on a 3 g sodium diet and drink 1 or 2 Lsports drinks daily as well as wear knee-high OSIEL hose. Patient has had no further syncopal episodes and will be discharged home today. Consultants:   Jesu KERR, Cardiology    Procedures: none    Complications: none    Discharge Condition: fair    Exam:  GEN:    Awake, alert and oriented x3. EYES:   EOMI, pupils equal   NECK: Supple. No lymphadenopathy. No carotid bruit  CVS:     regular rate and rhythm, no audible murmur  PULM:  CTA, no wheezes, rales or rhonchi, no acute respiratory distress  ABD:     Bowels sounds normal.  Abdomen is soft. No distention. Right middle lateral abdomen  tenderness to palpation. EXT:     no edema bilaterally . No calf tenderness. NEURO: Moves all extremities. Motor and sensory are grossly intact  SKIN:    No rashes. No skin lesions.       Significant Diagnostic Studies:   Lab Results   Component Value Date    WBC 7.0 09/29/2022    HGB 7.9 (L) 09/29/2022     09/29/2022       Lab Results   Component Value Date    BUN 22 09/29/2022    CREATININE 1.03 09/29/2022     09/29/2022    K 4.5 09/29/2022    CALCIUM 8.4 (L) 09/29/2022     (H) 09/29/2022    CO2 22 09/29/2022    LABGLOM >60 09/29/2022       Lab Results   Component Value Date    WBCUA 0 TO 2 09/28/2022    RBCUA 0 TO 2 09/28/2022    EPITHUA 0 TO 2 09/28/2022    LEUKOCYTESUR NEGATIVE 09/28/2022    SPECGRAV <1.005 (L) 09/28/2022    GLUCOSEU NEGATIVE 09/28/2022    KETUA NEGATIVE 09/28/2022    PROTEINU NEGATIVE 09/28/2022    HGBUR NEGATIVE 09/28/2022    BACTERIA TRACE (A) 07/03/2022       ECHO Complete 2D W Doppler W Color    Result Date: 9/29/2022  [facility] Transthoracic Echocardiography Report (TTE)  Patient Name Ruth Rodriguez    Date of Study           09/29/2022               Gregorio Degroot   Date of      1950  Gender                  Male  Birth   Age          67 year(s)  Race                       Room Number  6448        Height:                 72 inch, 182.88 cm   Corporate ID [id number]    Weight:                 161 pounds, 73 kg  #   Patient Acct [de-identified]   BSA:        1.94 m^2    BMI:        21.84 kg/m^2  #   MR #         [id number]      Sonographer             Berenice Mckee   Accession #  [id number]  Interpreting Physician  [address]   Fellow                   Referring Nurse                           Practitioner   Interpreting             Referring Physician     Joya Maier,  Fellow                                           BRUCE *  Type of Study   TTE procedure:2D Echocardiogram, M-Mode, Doppler, Color Doppler. Procedure Date Date: 09/29/2022 Start: 09:42 AM Study Location: [facility] Indications:Syncope. History / Tech. Comments: Dx: syncope Hx: MI, HTN, hyperlipidemia, DM Patient Status: Outpatient Height: 72 inches Weight: 161 pounds BSA: 1.94 m^2 BMI: 21.84 kg/m^2 BP: 129/62 mmHg Allergies   - *Unlisted:(niacin, oxycodone). CONCLUSIONS Summary Global left ventricular systolic function appears preserved with an estimated ejection fraction of >60%. The left ventricular cavity size is within normal limits and the left ventricular wall thickness is mildly increased. No significant valvular disease was seen. Evidence of mild diastolic dysfunction is seen. Compared to the previous study of 12/15/21, no significant change was seen. No significant cardiac cause of syncope was identified from this study.  Signature ----------------------------------------------------------------------------  Electronically signed by Berenice Mckee(Sonographer) on 09/29/2022 10:22 AM ---------------------------------------------------------------------------- ----------------------------------------------------------------------------  Electronically signed by Samir HillInterpreting physician) on  2022 01:52 PM ---------------------------------------------------------------------------- FINDINGS Left Atrium Left atrium is normal in size. Left Ventricle Global left ventricular systolic function appears preserved with an estimated ejection fraction of >60%. The left ventricular cavity size is within normal limits and the left ventricular wall thickness is mildly increased. Right Atrium Right atrium is normal in size. Right Ventricle Normal right ventricular size and function. Mitral Valve Normal mitral valve structure and function. Aortic Valve Normal aortic valve structure and function without stenosis or regurgitation. Tricuspid Valve Normal tricuspid valve structure and function. Pulmonic Valve The pulmonic valve is normal in structure. Pericardial Effusion No significant pericardial effusion is seen. Miscellaneous Evidence of mild diastolic dysfunction is seen. Normal aortic root dimension.  M-mode / 2D Measurements & Calculations:   LVIDd:5.18 cm(3.7 - 5.6 cm)      Diastolic PSLBSE:75.64914 ml  LVIDs:3.48 cm(2.2 - 4.0 cm)      Systolic AIATU.38 ml  IVSd:1.07 cm(0.6 - 1.1 cm)       Aortic Root:3.34 cm(2.0 - 3.7 cm)  LVPWd:1.07 cm(0.6 - 1.1 cm)      LA Dimension: 3.48 cm(1.9 - 4.0 cm)  Fractional Shortenin.82 %    LA volume/Index: 53.9 ml /28m^2  Calculated LVEF (%): 62.46 %     AV Cusp Separation: 2.1 cm   Mitral:                                 Aortic   Valve Area (P1/2-Time): 4.16 cm^2       Peak Velocity: 1.30 m/s  Peak E-Wave: 0.93 m/s                   Mean Velocity: 0.85 m/s  Peak A-Wave: 0.69 m/s                   Peak Gradient: 6.71 mmHg  E/A Ratio: 1.34                         Mean Gradient: 3.29 mmHg  Peak Gradient: 3.45 mmHg                                          Acceleration Time: 71.83 msec  P1/2t: 52.88 msec AV VTI: 27.32 cm  Diastology / Tissue Doppler Lateral Wall E' velocity:0.10 m/s Lateral Wall E/E':9.2    CT Head W/O Contrast    Result Date: 9/28/2022  EXAMINATION: CT OF THE HEAD WITHOUT CONTRAST  9/28/2022 11:18 am TECHNIQUE: CT of the head was performed without the administration of intravenous contrast. Automated exposure control, iterative reconstruction, and/or weight based adjustment of the mA/kV was utilized to reduce the radiation dose to as low as reasonably achievable. COMPARISON: None. HISTORY: ORDERING SYSTEM PROVIDED HISTORY: syncope TECHNOLOGIST PROVIDED HISTORY: syncope Decision Support Exception - unselect if not a suspected or confirmed emergency medical condition->Emergency Medical Condition (MA) FINDINGS: BRAIN/VENTRICLES:  No acute loss of the gray-white matter differentiation is identified to suggest acute or subacute infarct. No masses or hemorrhages within the brain parenchyma are found. No evidence of midline shift. There is mild periventricular low-attenuation, compatible with chronic small vessel ischemic disease. The intracranial vasculature, including the dural venous sinuses, is within normal limits. ORBITS: No acute orbital abnormalities are identified. SINUSES: The visualized paranasal sinuses and mastoid air cells are clear. SOFT TISSUES/SKULL: The calvarium is intact. Extracranial soft tissues are unremarkable. No acute intracranial abnormality. XR CHEST PORTABLE    Result Date: 9/28/2022  EXAMINATION: ONE XRAY VIEW OF THE CHEST 9/28/2022 10:13 am COMPARISON: 06/06/2022 HISTORY: ORDERING SYSTEM PROVIDED HISTORY: sycope TECHNOLOGIST PROVIDED HISTORY: sycope FINDINGS: Jorge catheter is unchanged in position. The cardial pericardial silhouette is unremarkable. Hydropneumothorax seen previously at the base has filled with fluid, now reflecting a moderate-sized pleural effusion. Jorge catheter is unchanged in position. Moderate-sized right pleural effusion. The component of the pneumothorax seen previous on the right is no longer detected. CT CHEST PULMONARY EMBOLISM W CONTRAST    Result Date: 9/28/2022  EXAMINATION: CTA OF THE CHEST 9/28/2022 2:19 pm TECHNIQUE: CTA of the chest was performed after the administration of intravenous contrast.  Multiplanar reformatted images are provided for review. MIP images are provided for review. Automated exposure control, iterative reconstruction, and/or weight based adjustment of the mA/kV was utilized to reduce the radiation dose to as low as reasonably achievable. COMPARISON: Chest CT of 07/15/2022 HISTORY: ORDERING SYSTEM PROVIDED HISTORY: syncope TECHNOLOGIST PROVIDED HISTORY: syncope Decision Support Exception - unselect if not a suspected or confirmed emergency medical condition->Emergency Medical Condition (MA) FINDINGS: Pulmonary Arteries: Pulmonary arteries are adequately opacified for evaluation. No evidence of intraluminal filling defect to suggest pulmonary embolism. Main pulmonary artery is normal in caliber. Mediastinum: No evidence of mediastinal lymphadenopathy. Coronary arterial calcification. The heart and pericardium demonstrate no acute abnormality. There is no acute abnormality of the thoracic aorta. Right central line with distal tip within the distal SVC. Lungs/pleura: There are changes related to postoperative changes of right hilum and right lower lobe with atelectatic changes. Unchanged moderate size right pleural effusion with pleural thickening and nodularity. The lungs are otherwise without acute process. No pneumothorax. Upper Abdomen: Limited images of the upper abdomen are unremarkable. Soft Tissues/Bones: No acute bone or soft tissue abnormality. No evidence of pulmonary embolism. Postoperative changes of right lung and right hilum with atelectatic changes of the right lower lobe and stable complex moderate right pleural effusion with pleural thickening and nodularity. RECOMMENDATIONS: Unavailable       Assessment and Plan:  Patient Active Problem List    Diagnosis Date Noted    Syncope and collapse 09/28/2022    Renal mass 09/22/2022    BPH with obstruction/lower urinary tract symptoms 08/11/2022    Pleural effusion on right 04/07/2022    Right-sided chest pain 04/07/2022    Nosebleed 03/17/2022    Chemotherapy adverse reaction 03/17/2022    Encounter for care related to vascular access port 01/28/2022    S/P thoracotomy     Lung cancer, main bronchus, right ([address]     Acute delirium 01/08/2022    Hyponatremia 01/08/2022    Postoperative ileus ([address] 01/08/2022    Pneumothorax after biopsy 07/28/2021    Pneumothorax, post biopsy, right 07/28/2021    Strain of lumbar region 05/27/2021    Abnormal CT scan, stomach 10/05/2019    Abnormal gastrointestinal PET scan 10/05/2019    Substernal chest pain 01/13/2019    Dyspepsia 01/04/2019    Epigastric pain 01/04/2019    Senile nuclear sclerosis 06/08/2015    Type II or unspecified type diabetes mellitus without mention of complication, not stated as uncontrolled     Calculus of kidney     Actinic keratosis     Shortness of breath, post procedure improving probably Effient side effect.   09/24/2013    S/P angioplasty with stent, obtuse marginal 09/24/2013    Angina pectoris ([address] 09/10/2013    Coronary artery disease involving native coronary artery of native heart without angina pectoris 09/10/2013    Primary hypertension 09/10/2013    Type 2 diabetes mellitus without complication, without long-term current use of insulin ([address] 09/10/2013    Hyperlipidemia 09/10/2013        Discharge Medications:         Medication List        START taking these medications      metFORMIN 1000 MG tablet  Commonly known as: GLUCOPHAGE  Take 1 tablet by mouth 2 times daily (with meals)     SITagliptin 50 MG tablet  Commonly known as: Januvia  Take 1 tablet by mouth daily            CONTINUE taking these medications      amLODIPine 5 MG tablet  Commonly known as: NORVASC  Take 1 tablet by mouth daily     aspirin 81 MG tablet     atenolol 25 MG tablet  Commonly known as: TENORMIN  Take 1 tablet by mouth daily     atorvastatin 20 MG tablet  Commonly known as: LIPITOR  Take 1 tablet by mouth daily     clopidogrel 75 MG tablet  Commonly known as: PLAVIX  Take 1 tablet by mouth daily     clotrimazole-betamethasone 1-0.05 % cream  Commonly known as: LOTRISONE     FreeStyle Amina 14 Day Sensor Misc     glimepiride 2 MG tablet  Commonly known as: AMARYL  Take 1 tablet by mouth 2 times daily     Insulin Glargine (2 Unit Dial) 300 UNIT/ML Sopn     isosorbide mononitrate 30 MG extended release tablet  Commonly known as: IMDUR     lidocaine-prilocaine 2.5-2.5 % cream  Commonly known as: EMLA  Apply topically to port site 30- 60 minutes before access as needed. magnesium 30 MG tablet     morphine 15 MG tablet  Commonly known as: MSIR     nitroGLYCERIN 0.4 MG/HR  Commonly known as: NITRODUR     ondansetron 8 MG Tbdp disintegrating tablet  Commonly known as: Zofran ODT  Take 1 tablet by mouth every 8 hours as needed for Nausea     pantoprazole 40 MG tablet  Commonly known as: PROTONIX     promethazine 25 MG tablet  Commonly known as: PHENERGAN     tamsulosin 0.4 MG capsule  Commonly known as: FLOMAX     zinc 50 MG Caps            STOP taking these medications      folic acid 1 MG tablet  Commonly known as: FOLVITE     lisinopril 20 MG tablet  Commonly known as: PRINIVIL;ZESTRIL     sitaGLIPtan-metFORMIN  MG per tablet  Commonly known as: JANUMET     tiZANidine 4 MG capsule  Commonly known as: Yari Black               Where to Get Your Medications        These medications were sent to [facility] [id number] [address] [address]      Phone: [phone]   metFORMIN 1000 MG tablet  SITagliptin 50 MG tablet         Patient Instructions:    Activity: activity as tolerated  Diet: encourage fluids  Wound Care: none needed  Other: None    Disposition:   Discharge to Home    Follow up:  Patient will be followed by Anel Mccormick in 1-2 weeks    CORE MEASURES on Discharge (if applicable)  ACE/ARB in CHF: NA  Statin in MI: NA  ASA in MI: NA  Statin in CVA: NA  Antiplatelet in CVA: NA    Total time spent on discharge services: 40 minutes    Including the following activities:  Evaluation and Management of patient  Discussion with patient and/or surrogate about current care plan  Coordination with Case Management and/or   Coordination of care with Consultants (if applicable)   Coordination of care with Receiving Facility Physician (if applicable)  Completion of DME forms (if applicable)  Preparation of Discharge Summary  Preparation of Medication Reconciliation  Preparation of Discharge Prescriptions    Signed:  ALONDRA Back CNP, ALONDRA, NP-C  9/29/2022, 4:29 PM

## 2022-09-29 NOTE — PROGRESS NOTES
Physical Therapy  Facility/Department: [facility] MED SURG  Physical Therapy Initial Assessment    Name: Kacy Bragg  : 1950  MRN: [id number]  Date of Service: 2022    Discharge Recommendations:  Continue to assess pending progress, Home independently, Outpatient PT   PT Equipment Recommendations  Equipment Needed: No      Patient Diagnosis(es): The primary encounter diagnosis was Syncope, unspecified syncope type. A diagnosis of Hypotension, unspecified hypotension type was also pertinent to this visit. Past Medical History:  has a past medical history of Abnormal cardiac cath, Actinic keratosis, Arthritis, CAD (coronary artery disease), Calculus of kidney, [address] [address] Cervical stenosis of spine, Diabetes mellitus ([facility] [address] Full dentures, GERD (gastroesophageal reflux disease), H/O echocardiogram, History of cardiac cath, History of echocardiogram, History of echocardiogram, History of heart attack, History of tobacco abuse, Hyperlipidemia, Hypertension, Pulmonary nodule, Raynaud's phenomenon, S/P angioplasty with stent, and Wears hearing aid in both ears. Past Surgical History:  has a past surgical history that includes Cardiac catheterization (); Hemorrhoid surgery; Neck surgery (); other surgical history (10/2014); other surgical history (2014); Cataract removal (2015); Upper gastrointestinal endoscopy (2005); Cataract removal (2015); Colonoscopy (2006); Colonoscopy (2016); Cardiac catheterization (2017); Cardiac catheterization (2017); Cardiac catheterization (2019); Upper gastrointestinal endoscopy (N/A, 2019); Upper gastrointestinal endoscopy (N/A, 10/22/2019); Lung biopsy (Right, 2021); CT NEEDLE BIOPSY LUNG PERCUTANEOUS (2021); hernia repair (Left); Lung removal, total (Right, 2022);  IR CHEST TUBE INSERTION (2022); TUNNELED CENTRAL VENOUS CATHETER W/ SUBCUTANEOUS PORT (Left, 2022); and IR PORT PLACEMENT > 5 YEARS (2/9/2022). Assessment   Assessment: Pt is a pleasant 68 yo man who presents with syncope and collapse. Pt states he feels as if he is at baseline level of function at this time. Pt is IND with all mobilities and transfers and is steady with turning. Pt feels safe to return home and does not feel like he needs therapy services at this time. Pt instructed to contact staff with any questions or changes in status.   Therapy Prognosis: Good  Decision Making: Low Complexity  No Skilled PT: Independent with functional mobility   Requires PT Follow-Up: No  Activity Tolerance  Activity Tolerance: Patient tolerated evaluation without incident     Plan   Plan  Plan: Discharge with evaluation only  Safety Devices  Type of Devices: Call light within reach, Left in bed     Restrictions  Restrictions/Precautions  Restrictions/Precautions: General Precautions, Fall Risk  Required Braces or Orthoses?: No     Subjective   General  Chart Reviewed: Yes  Patient assessed for rehabilitation services?: Yes  Family / Caregiver Present: No  Referring Practitioner: Judge Pan  Referral Date : 09/28/22  Diagnosis: Syncope and collapse  Follows Commands: Within Functional Limits  Subjective  Subjective: Pt is pleasant and agreeable to therapy eval this date         Social/Functional History  Social/Functional History  Lives With: Spouse  Type of Home: House  Home Layout: Two level, Able to Live on Main level with bedroom/bathroom  Home Access: Stairs to enter with rails  Entrance Stairs - Number of Steps: 5  Entrance Stairs - Rails: Both  Bathroom Shower/Tub: Tub/Shower unit  Bathroom Toilet: Standard  Home Equipment: Kaci Martínez, rolling, Cane  Has the patient had two or more falls in the past year or any fall with injury in the past year?: No  ADL Assistance: [facility] [facility] [address]: Independent  Homemaking Responsibilities: Yes  Ambulation Assistance: Independent  Transfer Assistance: Independent  Active : Yes  Mode of Transportation: Car  Occupation: Retired  Vision/Hearing  Vision  Vision: [facility]  Hearing: Exceptions to [facility]  Hearing Exceptions: Bilateral hearing aid    Cognition   Orientation  Overall Orientation Status: Within Functional Limits     Objective   Heart Rate: 61  Heart Rate Source: Monitor  BP: (!) 101/57  BP Location: Right upper arm  BP Method: Automatic  Patient Position: Semi fowlers  MAP (Calculated): 71.67  Resp: 18  SpO2: 98 %  O2 Device: None (Room air)     Observation/Palpation  Posture: Good        AROM RLE (degrees)  RLE AROM: WFL  AROM LLE (degrees)  LLE AROM : WFL  Strength RLE  Strength RLE: WFL  Strength LLE  Strength LLE: WFL           Bed mobility  Supine to Sit: Independent  Sit to Supine: Independent  Scooting: Independent  Transfers  Sit to Stand: Independent  Stand to sit: Independent  Bed to Chair: Independent  Ambulation  WB Status: FWB  Ambulation  Surface: level tile  Device: No Device  Assistance: Independent  Distance: 15'     Balance  Posture: Good  Sitting - Static: Good  Sitting - Dynamic: Good  Standing - Static: Good  Standing - Dynamic: Good         AM-PAC Score     AM-PAC Inpatient Mobility without Stair Climbing Raw Score : 20 (09/29/22 1243)  AM-PAC Inpatient without Stair Climbing T-Scale Score : 60.57 (09/29/22 1243)  Mobility Inpatient CMS 0-100% Score: 0 (09/29/22 1243)  Mobility Inpatient without Stair CMS G-Code Modifier : [facility] (09/29/22 1243)     Education  Patient Education  Education Given To: Patient; Family  Education Provided: Role of Therapy  Education Method: Verbal  Barriers to Learning: None  Education Outcome: Verbalized understanding      Therapy Time   Individual Concurrent Group Co-treatment   Time In 1225         Time Out 1235         Minutes 10         Timed Code Treatment Minutes: [address], PT

## 2022-09-29 NOTE — PROGRESS NOTES
Patient in bed, resting with eyes closed. Reassessment completed at this time. Vitals taken and documented. Patient encouraged to ask questions. Patient denies pain or complaints. Call light and bed side table within reach. Side rails up times two.

## 2022-09-29 NOTE — PROGRESS NOTES
home. Pt completed functional mobility x15 feet with no AD (I)'ly and reports being able to complete all ADL tasks (I)'ly. Pt is at baseline level of function, therefore no further OT services are warranted at this time. Prognosis: Good  Decision Making: Low Complexity  No Skilled OT: At baseline function  Activity Tolerance  Activity Tolerance: Patient Tolerated treatment well        Plan   Plan  Plan Comment: Eval only     Restrictions  Restrictions/Precautions  Restrictions/Precautions: General Precautions, Fall Risk  Required Braces or Orthoses?: No    Subjective   General  Chart Reviewed: Yes  Patient assessed for rehabilitation services?: Yes  Family / Caregiver Present: No  Referring Practitioner: Emi Luong MD  Diagnosis: Syncope and collapse  Subjective  Subjective: Pt rec'd lying in bed, easily agreeable to OT evaluation. General Comment  Comments: Pt denies pain this date.      Social/Functional History  Social/Functional History  Lives With: Spouse  Type of Home: House  Home Layout: Two level, Able to Live on Main level with bedroom/bathroom  Home Access: Stairs to enter with rails  Entrance Stairs - Number of Steps: 5  Entrance Stairs - Rails: Both  Bathroom Shower/Tub: Tub/Shower unit  Bathroom Toilet: Standard  Home Equipment: Lesa West, rolling, Cane  Has the patient had two or more falls in the past year or any fall with injury in the past year?: No  ADL Assistance: Independent  Homemaking Assistance: Independent  Homemaking Responsibilities: Yes  Ambulation Assistance: Independent  Transfer Assistance: Independent  Active : Yes  Mode of Transportation: Car  Occupation: Retired     Objective   Heart Rate: 61  [address]: Monitor  BP: (!) 101/57  BP Location: Right upper arm  BP Method: Automatic  Patient Position: Semi fowlers  MAP (Calculated): 71.67  Resp: 18  SpO2: 98 %  O2 Device: None (Room air)  Observation/Palpation  Posture: Good  Safety Devices  Type of Devices: Call light within reach; Left in bed  AROM: Within functional limits  Strength: Within functional limits  ADL  Feeding: Independent  Grooming: Independent  UE Bathing: Independent  LE Bathing: Independent  UE Dressing: Independent  LE Dressing: Independent  Toileting: Independent  Activity Tolerance  Activity Tolerance: Patient tolerated evaluation without incident  Bed mobility  Supine to Sit: Independent  Sit to Supine: Independent  Scooting: Independent  Transfers  Stand Step Transfers: Independent  Sit to stand: Independent  Stand to sit: Independent  Transfer Comments: Pt completed functional mobility x15 feet with no AD (I)'ly.   Vision  Vision: Within Functional Limits  Hearing  Hearing: Exceptions to [facility]  Hearing Exceptions: Bilateral hearing aid  Cognition  Overall Cognitive Status: WFL  Orientation  Overall Orientation Status: Within Functional Limits  Education Given To: Patient  Education Provided: Role of Therapy;Plan of Care  Education Method: Verbal  Barriers to Learning: None  Education Outcome: Verbalized understanding    AM-PAC Score  AM-[facility] Inpatient Daily Activity Raw Score: 24 (09/29/22 1256)  AM-PAC Inpatient ADL T-Scale Score : 57.54 (09/29/22 1256)  ADL Inpatient CMS 0-100% Score: 0 (09/29/22 1256)  ADL Inpatient CMS G-Code Modifier : [address] (09/29/22 1256)    Goals  Short Term Goals  Time Frame for Short term goals: No OT goals identified       Therapy Time   Individual Concurrent Group Co-treatment   Time In 1225         Time Out 1235         Minutes 10         Timed Code Treatment Minutes: 0 Minutes       Matteo Ou, OTD, OTR/L

## 2022-09-29 NOTE — CONSULTS
Cristobal Mckeon am scribing for and in the presence of Mehran Mckinney [place]     Patient: Tanna Carlson  : 1950  Date of Visit: 2022    REASON FOR VISIT / CONSULTATION:   Chief Complaint   Patient presents with    Loss of Consciousness     Syncopal episode just prior to arrival     Dear Dr. Fabienne Kehr had the pleasure of seeing Tanna Carlson today for follow up on his cardiac status. As you know, Mr. Aide Garcia is 71 y.o male with PMH of hypertension, hyperlipidemia, type II diabetes and coronary artery disease with  heart cath done on 2013 at [facility]. Vs at that time he has NEDRA to OM1. The cath also showed mild disease of the other coronaries and normal left ventricular function by ventriculogram.      Earlier in , patient had an episode of prolonged chest pain. Retrosternal, burning in nature without radiation. Pain occurred at rest with partial relief with sublingual nitrates. Repeat cardiac cath on  2017 showed new totally occluded mid RCA. An attempt to open the totally occluded RCA on 2017 has failed. Patient treated medically. Echo done on 2018, global left ventricular systolic function appears preserved with an estimated ejection fraction of 60%. Mild LVH. No definite specific wall motion abnormalities were identified. No significant valvular abnormalities. Evidence of mild diastolic dysfunction is seen. Echo done on 2019 that showed EF of 65%. LV wall thickness is mildly increased. Aortic valve leaflets are mildly thickened. EKG done in office 12/15/2020- sinus Bradycardia 58 bpm.  No acute ischemic changes. Echo done on 2022- EF >60% The left ventricular cavity size is within normal limits and the left ventricular wall thickness is mildly increased. No significant valvular disease was seen. Evidence of mild diastolic dysfunction is seen. Compared to the previous study of 12/15/21, no significant change was seen.  No significant cardiac cause of syncope was identified from this study. Mr. Mitchel Infante was admitted for syncope spell yesterday. He was having a shed delivered and went outside to watch them, when he walked back inside sat in chair sat down and passed out. He did mention he did not feel well. His wife reports his eyes rolled back, head went down, his dentures fell out and drooling. She also mentions he didn't look like he was breathing well. He has shortness of breath that has unchanged. He admits not drinking enough fluids. He has chronic pain in stomach. He has history of constipation. He does not eat great his appetite is not good. He denies any chest pain, pressure or tightness. Denies any blood in urine or stools. Denies any further syncope spells. He was up walking with PT without any problems today. HGB 7.9. Moderate pleural effusion on chest xray. Past Medical History:   Diagnosis Date    Abnormal cardiac cath 09/20/2013    LMCA; Mild irregularities 10-20%. LAD: Abnormal.  Mild to moderate irregularities throughout the LAD and branches. LCx:  abnormal.  80-90% stenosis in a moderate sized OM1. RCA: Abnormal.  40-50% mid RCA stenosis. Actinic keratosis     Arthritis     neck, fingers    CAD (coronary artery disease)     Calculus of kidney     Cancer (HCC)     Cervical stenosis of spine     Diabetes mellitus ([facility] [address]     Dr. Jessica Cunningham, CNP, [place]    Full dentures     GERD (gastroesophageal reflux disease)     H/O echocardiogram 04/29/2016    Stress echo. Normal resting LV systolic function,normal systolic restponse to exercise. No evidence of reversible  ischemia on this maximal,symptom limited,treadmill exerxiess stress echocardiography study    History of cardiac cath 02/09/2017    LMCA: Lesion on LMCA: Distal subsection . 20% stenosis. LAD: Lesion on 1st Diag: Mid subsection . 75% stenosis. RCA: Lesion on Mid RCA: Mid subsection . 100% stenosis. Comments:  The distal RCA fills by left to right collateralization. Dominance: Mixed. LV function assessed as: Normal. EF 65%. History of echocardiogram 06/11/2018    EF 60%. Mildly increased LV wall thickness. Evidence of mild diastolic dysfunction seen. History of echocardiogram 01/18/2019    EF of 65%. LV wall thickness is mildly increased. Aortic valve leaflets are mildly thickened. History of heart attack     History of tobacco abuse     Quit 10/2021, 1/4 ppd for 54 years    Hyperlipidemia     Hypertension     Dr. Kevin Pina, CNP    Pulmonary nodule 12/2021    Right middle lobe    Raynaud's phenomenon     S/P angioplasty with stent 09/20/2013    PTCA-NEDRA of  the OM 1    Wears hearing aid in both ears          Past Surgical History:   Procedure Laterality Date    CARDIAC CATHETERIZATION  2007    patient states had 2 small blockages    CARDIAC CATHETERIZATION  02/09/2017    LMCA: Lesion on LMCA: Distal subsection 20% stenosis. LAD: Lesion on 1st Diag: Mid subsection 75% stenosis. RCA: Lesion on Mid RCA: Mid subsection 100% stenosis. CARDIAC CATHETERIZATION  02/09/2017    Attempted PCI to chronic total occlusion of RCA was not successful. Unable to cross with multiple wires due to heavy calcification and toruosity.     CARDIAC CATHETERIZATION  01/14/2019    CATARACT REMOVAL  04/06/2015    Right eye - Dr. Sapna Infante  06/08/2015    Dr. Rose Sharp - left eye    COLONOSCOPY  01/11/2006    Dr. Kaylin Dixon - internal hemorrhoids, no polps    COLONOSCOPY  05/23/2016    -polyp    CT NEEDLE BIOPSY LUNG PERCUTANEOUS  07/28/2021    CT NEEDLE BIOPSY LUNG PERCUTANEOUS 7/28/2021 [facility] CT SCAN    HEMORRHOID SURGERY      HERNIA REPAIR Left     inguinal    IR CHEST TUBE INSERTION  01/11/2022    IR CHEST TUBE INSERTION 1/11/2022 Katy Souza MD [facility] SPECIAL PROCEDURES    IR PORT PLACEMENT EQUAL OR GREATER THAN 5 YEARS  2/9/2022    IR PORT PLACEMENT EQUAL OR GREATER THAN 5 YEARS 2/9/2022 [facility] SPECIAL PROCEDURES    LUNG BIOPSY Right 07/28/2021 Dr Terrell Nair, TOTAL Right 2022    MIDDLE WEDGE RESECTION, POSSIBLE MIDDLE LOBECTOMY VIA THORACOTOMY performed by Viktor Garcia MD at [facility]    c4-5 laminectomy and fusion    OTHER SURGICAL HISTORY  10/2014    Right foot - 7 from heal and 1 from great toe at [address]  2014    pain injection - cervical    TUNNELED CENTRAL VENOUS CATHETER W/ SUBCUTANEOUS PORT Left 2022    Dr Hoenninger/[facility]    UPPER GASTROINTESTINAL ENDOSCOPY  2005    Dr. Arslan Sandoval - mild esophagitis and gastritis    UPPER GASTROINTESTINAL ENDOSCOPY N/A 2019    -bx(neg H-Pylori)retained gastric content, possible submucosal mass of gastric cardia    UPPER GASTROINTESTINAL ENDOSCOPY N/A 10/22/2019    EGD BIOPSY performed by Karla Villagomez MD at [facility] History   Problem Relation Age of Onset    Heart Disease Mother     Diabetes Mother     High Blood Pressure Mother     Diabetes Father     Alzheimer's Disease Father     Heart Disease Sister         CABG    Heart Disease Brother     Heart Disease Brother     Heart Disease Brother     Cancer Brother          Social History     Tobacco Use    Smoking status: Former     Packs/day: 0.25     Years: 54.00     Pack years: 13.50     Types: Cigarettes     Quit date: 10/1/2021     Years since quittin.9    Smokeless tobacco: Never   Vaping Use    Vaping Use: Never used   Substance Use Topics    Alcohol use: No    Drug use: No     Family History   Problem Relation Age of Onset    Heart Disease Mother     Diabetes Mother     High Blood Pressure Mother     Diabetes Father     Alzheimer's Disease Father     Heart Disease Sister         CABG    Heart Disease Brother     Heart Disease Brother     Heart Disease Brother     Cancer Brother        No current outpatient medications on file.     Allergies   Allergen Reactions    Niacin And Related      Turns red, no trouble breathing    Minocycline Hcl Rash    Other Nausea And Vomiting     Patient states on all pain medications he gets nausea and vomiting. Doctor ordered a medicine (nausea) to take before pain medication       ROS: 14 systems reviewed and negative except for pertinent positives as noted above. PHYSICAL EXAM:   BP (!) 101/57   Pulse 61   Temp 98.6 °F (37 °C) (Temporal)   Resp 18   Ht 6' (1.829 m)   Wt 161 lb 8 oz (73.3 kg)   SpO2 98%   BMI 21.90 kg/m²  Body mass index is 21.9 kg/m². Constitutional: He is oriented to person, place, and time. He appears well-developed and well-nourished. In no acute distress. HEENT: Normocephalic and atraumatic. No JVD present. Carotid bruit is not present. No mass and no thyromegaly present. No lymphadenopathy present. Cardiovascular: Normal rate, regular rhythm, normal heart sounds. Exam reveals no gallop and no friction rubs. No heart murmur heard. Pulmonary/Chest: Effort normal and breath sounds normal. No respiratory distress. He has no wheezes, rhonchi or rales. Abdominal: Soft, non-tender. Bowel sounds and aorta are normal. He exhibits no organomegaly, mass or bruit. Extremities: No edema. No cyanosis and no clubbing. Pulses are 2+ radial and carotid pulses. 2+ dorsalis pedis and posterior tibial pulses bilaterally. Neurological: He is alert and oriented to person, place, and time. No evidence of gross cranial nerve deficit. Coordination appeared normal.   Skin: Skin is warm and dry. There is no rash or diaphoresis. Psychiatric: He has a normal mood and affect. His speech is normal and behavior is normal.       Diagnosis Orders   1. ASHD (arteriosclerotic heart disease)     2. S/P angioplasty with stent, obtuse marginal     3. Essential hypertension     4. Mixed hyperlipidemia     5. Tobacco abuse counseling     6. Atypical chest pain       PLAN:   Syncope/near syncope of unknown etiology: I do think this is secondary to anemia and dehydration. Kidney function was elevated showed dehydration when he came into the ER. I believe his anemia is scondary to his cancer. Pharmacologic Therapy: Not indicated at this time. Nonpharmacologic counseling: Because of his condition, I reminded him to try and keep himself well-hydrated and to take extra time when moving from laying to sitting, sitting to standing and standing to walking. I also explained to him to help improve his symptoms he should include 3 g sodium diet, 1 or 2 L of sports drinks daily, knee-high compressions stockings. Additional Testing List: None  Echo was unremarkable. Atherosclerotic Heart Disease: S/P PCI in in 2003 and 2011. Cardiac cath in 2017 showed totally occluded RCA, an attempt to open the RCA failed. Patient has good left-to-right collaterals. Treated medically since that time and is doing very good. Antiplatelet Agent: Continue Aspirin 81 mg daily and clopidogrel (Plavix) 75 mg daily. I also reminded him to watch for signs of blood in his stool or black tarry stools and stop the medication immediately if this develops as this could be life threatening. Beta Blocker Therapy: Continue  Atenolol  25 mg daily  Anti-anginal medications: Continue amlodipine (Norvasc) 5 mg daily. I also discussed the potential side effects of this medication including lightheadedness and dizziness and told him to call the office if this occurs. Anti-anginal medications: Continue isosorbide mononitrate (Imdur) 15 mg daily I also discussed the potential side effects of this medication including lightheadedness and dizziness and told him to call the office if this occurs. Statin Therapy: Continue atorvastatin (Lipitor) 20 mg nightly. Excellent LDL. Essential Hypertension: Controlled  ACE Inibitor/ARB: Continue lisinopril 10 mg once daily.  I also discussed the potential side effects of this medication including lightheadedness and dizziness and told him to stop the medication of this occurs and call our office if this occurs. Calcium Channel Blocker: Continue amlodipine (Norvasc)      Beta Blocker Therapy: Continue  atenolol  25 mg  daily       Hyperlipidemia: Mixed, LDL done on 1/18/2019 was 35 mg/dL  Statin Therapy: Continue atorvastatin (Lipitor) 20 mg nightly. Tobacco Abuse Counseling: I spent several minutes discussing the dangers of tobacco abuse as well as multiple methods for trying to quit smoking. In the end, Mr. Stella Duenas said that he did not want any assistance at this time but would continue to try and quit reduce and eventually quit smoking in the near future. He did report that he quit about 2 months ago. Atypical Chest Pain: No further episodes. Antiplatelet Agent: Continue aspirin 81 mg daily and clopidogrel (Plavix 75 mg daily. I also reminded him to watch for signs of blood in his stool or black tarry stools and stop the medication immediately if this develops as this could be life threatening. Beta Blocker Therapy: Continue  Atenolol   25 mg daily     Calcium Channel Blocker: Continue amlodipine (Norvasc) 5 mg daily     Other Anti-anginal medications: Continue Isosorbide mononitrate (Imdur) 15 mg daily    Statin Therapy: Continue atorvastatin (Lipitor) 20 mg nightly. We will see him next week in office outpatient. Finally, I recommended that he continue his other medications and follow up with you as previously scheduled. Sincerely,  [facility] [address]  Phone: [phone]; Fax: [phone]    I believe that the risk of significant morbidity and mortality related to the patient's current medical conditions are: intermediate-high. The documentation recorded by the scribe, accurately and completely reflects the services I personally performed and the decisions made by me.  Zelda Son PA-C September 29, 2022

## 2022-09-29 NOTE — PROGRESS NOTES
Pt vitals and assessment completed at this time, see flowsheet. Pt A&O x4, breathing normal. Pt denies any chest pain or lightheadedness. Pt is resting in bed, denies any pain or any other needs at this time, call light within reach, will continue to monitor.

## 2022-09-29 NOTE — PROGRESS NOTES
[facility]  Inpatient/Observation/Outpatient Rehabilitation    Date: 2022  Patient Name: Ita Randall       [x] Inpatient Acute/Observation       []  Outpatient  : 1950       [] Pt no showed for scheduled appointment    [] Pt refused/declined therapy at this time due to:           [x] Pt cancelled due to:  [] No Reason Given   [] Sick/ill   [x] Other:    Pt in with other staff for testing at this time, will evaluate as soon as possible. Therapist/Assistant will attempt to see this patient, at our earliest opportunity.        Daniel Sequeira, OTD, OTR/L Date: 2022

## 2022-09-29 NOTE — PROGRESS NOTES
Merged with [facility]  Inpatient/Observation/Outpatient Rehabilitation    Date: 2022  Patient Name: Micah Obrien       [x] Inpatient Acute/Observation       []  Outpatient  : 1950       [] Pt no showed for scheduled appointment    [] Pt refused/declined therapy at this time due to:           [x] Pt cancelled due to:  [] No Reason Given   [] Sick/ill   [x] Other:    Pt in with other staff for testing at this time, will Evaluate as soon as possible. Therapist/Assistant will attempt to see this patient, at our earliest opportunity.        Adilson Garza, PT Date: 2022

## 2022-09-29 NOTE — H&P
History and Physical    Patient:  Alberto Lopez  MRN: [id number]    Chief Complaint:  Deb Pitch out\"    History Obtained From:  patient, electronic medical record    PCP: Yaneth Walker    History of Present Illness: The patient is a 67 y.o. male who presented to the ER with a syncopal episode. He stated that he was outside in his garage having things delivered and stated he went inside and felt lightheaded and weak. He stated he was sitting in his chair and his wife witnessed a syncopal episode stating that his eyes rolled back in his head and he became unconscious for several seconds. He did not fall or injure himself. After the incident patient was awake and alert and quickly returned to his baseline and mental functioning status per his wife. The patient himself denied any chest pain or shortness of breath or palpitations. Patient did report that he was eating well without difficulty. He stated he is compliant with medications. He denied recent illness including fever chills. During patient's evaluation vital signs were stable however he was slightly hypotensive but recovered with fluids. .  EKG showed sinus bradycardia 54 no acute ischemia or infarctions were seen. CT chest was negative for pulmonary emboli but did report some postoperative changes of the right lung and right hilum with atelectasis of the right lower lobe and a right moderate pleural effusion. CT head was negative. Hemodynamically he was stable. He does have a hemoglobin of 8.6 which is chronic for him. Patient's creatinine was baseline as well. Troponins were negative. Dr. Hope Krause was consulted from the ER and recommended admission. Past Medical History:        Diagnosis Date    Abnormal cardiac cath 09/20/2013    LMCA; Mild irregularities 10-20%. LAD: Abnormal.  Mild to moderate irregularities throughout the LAD and branches. LCx:  abnormal.  80-90% stenosis in a moderate sized OM1. RCA: Abnormal.  40-50% mid RCA stenosis. Actinic keratosis     Arthritis     neck, fingers    CAD (coronary artery disease)     Calculus of kidney     Cancer (HCC)     Cervical stenosis of spine     Diabetes mellitus ([facility] [address]     Dr. Wei Keene, CNP, [place]    Full dentures     GERD (gastroesophageal reflux disease)     H/O echocardiogram 04/29/2016    Stress echo. Normal resting LV systolic function,normal systolic restponse to exercise. No evidence of reversible  ischemia on this maximal,symptom limited,treadmill exerxiess stress echocardiography study    History of cardiac cath 02/09/2017    LMCA: Lesion on LMCA: Distal subsection . 20% stenosis. LAD: Lesion on 1st Diag: Mid subsection . 75% stenosis. RCA: Lesion on Mid RCA: Mid subsection . 100% stenosis. Comments: The distal RCA fills by left to right collateralization. Dominance: Mixed. LV function assessed as: Normal. EF 65%. History of echocardiogram 06/11/2018    EF 60%. Mildly increased LV wall thickness. Evidence of mild diastolic dysfunction seen. History of echocardiogram 01/18/2019    EF of 65%. LV wall thickness is mildly increased. Aortic valve leaflets are mildly thickened. History of heart attack     History of tobacco abuse     Quit 10/2021, 1/4 ppd for 54 years    Hyperlipidemia     Hypertension     Dr. Wei Keene, CNP    Pulmonary nodule 12/2021    Right middle lobe    Raynaud's phenomenon     S/P angioplasty with stent 09/20/2013    PTCA-NEDRA of  the OM 1    Wears hearing aid in both ears        Past Surgical History:        Procedure Laterality Date    CARDIAC CATHETERIZATION  2007    patient states had 2 small blockages    CARDIAC CATHETERIZATION  02/09/2017    LMCA: Lesion on LMCA: Distal subsection 20% stenosis. LAD: Lesion on 1st Diag: Mid subsection 75% stenosis. RCA: Lesion on Mid RCA: Mid subsection 100% stenosis. CARDIAC CATHETERIZATION  02/09/2017    Attempted PCI to chronic total occlusion of RCA was not successful.  Unable to cross with multiple wires due to heavy calcification and toruosity. CARDIAC CATHETERIZATION  01/14/2019    CATARACT REMOVAL  04/06/2015    Right eye - Dr. Kristen Davila  06/08/2015    Dr. Yumiko Younger - left eye    COLONOSCOPY  01/11/2006    Dr. Abdiel Haddad - internal hemorrhoids, no polps    COLONOSCOPY  05/23/2016    -polyp    CT NEEDLE BIOPSY LUNG PERCUTANEOUS  07/28/2021    CT NEEDLE BIOPSY LUNG PERCUTANEOUS 7/28/2021 [facility] CT SCAN    HEMORRHOID SURGERY      HERNIA REPAIR Left     inguinal    IR CHEST TUBE INSERTION  01/11/2022    IR CHEST TUBE INSERTION 1/11/2022 Krista Vargas MD [facility] SPECIAL PROCEDURES    IR PORT PLACEMENT EQUAL OR GREATER THAN 5 YEARS  2/9/2022    IR PORT PLACEMENT EQUAL OR GREATER THAN 5 YEARS 2/9/2022 [facility] SPECIAL PROCEDURES    LUNG BIOPSY Right 07/28/2021    Dr Chuck Watson, TOTAL Right 01/04/2022    MIDDLE WEDGE RESECTION, POSSIBLE MIDDLE LOBECTOMY VIA THORACOTOMY performed by Cristi Toney MD at [address] [facility]    c4-5 laminectomy and fusion    OTHER SURGICAL HISTORY  10/2014    Right foot - 7 from heal and 1 from great toe at [address]  12/11/2014    pain injection - cervical    TUNNELED CENTRAL VENOUS CATHETER W/ SUBCUTANEOUS PORT Left 02/09/2022    Dr Hoenninger/[facility]    UPPER GASTROINTESTINAL ENDOSCOPY  12/28/2005    Dr. Abdiel Haddad - mild esophagitis and gastritis    UPPER GASTROINTESTINAL ENDOSCOPY N/A 02/05/2019    -bx(neg H-Pylori)retained gastric content, possible submucosal mass of gastric cardia    UPPER GASTROINTESTINAL ENDOSCOPY N/A 10/22/2019    EGD BIOPSY performed by Dwight Philippe MD at [address]       Medications Prior to Admission:    Prior to Admission medications    Medication Sig Start Date End Date Taking? Authorizing Provider   morphine (MSIR) 15 MG tablet Take 15 mg by mouth every 6 hours as needed for Pain.    Yes Historical Provider, MD   clotrimazole-betamethasone Sandra Jara) 1-0.05 % cream Apply topically daily To both feet 9/14/22   Historical Provider, MD   atenolol (TENORMIN) 25 MG tablet Take 1 tablet by mouth daily 9/6/22   Maria Teresa Ward MD   Continuous Blood Gluc Sensor (FREESTYLE YAIMA 14 DAY SENSOR) [facility] [place] directed 7/18/22   Historical Provider, MD   amLODIPine (NORVASC) 5 MG tablet Take 1 tablet by mouth daily 3/28/22   Maria Teresa Ward MD   magnesium 30 MG tablet Take 400 mg by mouth daily     Historical Provider, MD   clopidogrel (PLAVIX) 75 MG tablet Take 1 tablet by mouth daily 3/4/22   Maria Teresa Ward MD   zinc 50 MG CAPS Take 50 mg by mouth daily (with breakfast)    Historical Provider, MD   promethazine (PHENERGAN) 25 MG tablet Take 25 mg by mouth every 4 hours as needed for Nausea    Historical Provider, MD   pantoprazole (PROTONIX) 40 MG tablet Take 40 mg by mouth in the morning and at bedtime    Historical Provider, MD   ondansetron (ZOFRAN ODT) 8 MG TBDP disintegrating tablet Take 1 tablet by mouth every 8 hours as needed for Nausea 1/28/22   Servando Little MD   lidocaine-prilocaine (EMLA) 2.5-2.5 % cream Apply topically to port site 30- 60 minutes before access as needed. 1/28/22   Servando Little MD   folic acid (FOLVITE) 1 MG tablet Take 1 tablet by mouth daily for 30 doses Start 7 days before first scheduled dose and continue for 21 days after last dose of PEMEtrexed. 1/28/22 5/19/22  Servando Little MD   tamsulosin (FLOMAX) 0.4 MG capsule Take 0.4 mg by mouth daily    Historical Provider, MD   tiZANidine (ZANAFLEX) 4 MG capsule Take 4 mg by mouth 3 times daily as needed   Patient not taking: No sig reported    Historical Provider, MD   isosorbide mononitrate (IMDUR) 30 MG extended release tablet 1/2 tablet in the morning    Historical Provider, MD   lisinopril (PRINIVIL;ZESTRIL) 20 MG tablet Take 20 mg by mouth daily  Patient not taking: No sig reported    Historical Provider, MD   nitroGLYCERIN (NITRODUR) 0.4 MG/HR 1 dose under tongue as needed for chest pain. max of 3 in one day    Historical Provider, MD   Insulin Glargine, 2 Unit Dial, 300 UNIT/ML SOPN Insulin Glargine Insulin Glargine U-300 Conc Active 10 UNIT Subcutaneous Every morning May 7th, 2020 11:47am 05-  [facility] ([id number]) 5/7/20   Historical Provider, MD   atorvastatin (LIPITOR) 20 MG tablet Take 1 tablet by mouth daily 12/15/20   Surinder Simons MD   sitaGLIPtan-metFORMIN (JANUMET)  MG per tablet Take 1 tablet by mouth 2 times daily (with meals)    Historical Provider, MD   glimepiride (AMARYL) 2 MG tablet Take 1 tablet by mouth 2 times daily 5/22/15   Xavier Trujillo MD   aspirin 81 MG tablet Take 81 mg by mouth daily Ordered by heart doctor, Dr. Elroy Caballero Warren    Historical Provider, MD       Allergies:  Niacin and related, Minocycline hcl, and Other    Social History:   TOBACCO:   reports that he quit smoking about a year ago. His smoking use included cigarettes. He has a 13.50 pack-year smoking history. He has never used smokeless tobacco.  ETOH:   reports no history of alcohol use. Family History:       Problem Relation Age of Onset    Heart Disease Mother     Diabetes Mother     High Blood Pressure Mother     Diabetes Father     Alzheimer's Disease Father     Heart Disease Sister         CABG    Heart Disease Brother     Heart Disease Brother     Heart Disease Brother     Cancer Brother        Allergies:  Niacin and related, Minocycline hcl, and Other    Medications Prior to Admission:    Prior to Admission medications    Medication Sig Start Date End Date Taking? Authorizing Provider   morphine (MSIR) 15 MG tablet Take 15 mg by mouth every 6 hours as needed for Pain.    Yes Historical Provider, MD   clotrimazole-betamethasone (LOTRISONE) 1-0.05 % cream Apply topically daily To both feet 9/14/22   Historical Provider, MD   atenolol (TENORMIN) 25 MG tablet Take 1 tablet by mouth daily 9/6/22   Surinder Simons MD   Continuous Blood Gluc Sensor (FREESTYLE YAIMA 14 DAY SENSOR) [facility] as directed 7/18/22   Historical Provider, MD   amLODIPine (NORVASC) 5 MG tablet Take 1 tablet by mouth daily 3/28/22   Maria Teresa Ward MD   magnesium 30 MG tablet Take 400 mg by mouth daily     Historical Provider, MD   clopidogrel (PLAVIX) 75 MG tablet Take 1 tablet by mouth daily 3/4/22   Maria Teresa Ward MD   zinc 50 MG CAPS Take 50 mg by mouth daily (with breakfast)    Historical Provider, MD   promethazine (PHENERGAN) 25 MG tablet Take 25 mg by mouth every 4 hours as needed for Nausea    Historical Provider, MD   pantoprazole (PROTONIX) 40 MG tablet Take 40 mg by mouth in the morning and at bedtime    Historical Provider, MD   ondansetron (ZOFRAN ODT) 8 MG TBDP disintegrating tablet Take 1 tablet by mouth every 8 hours as needed for Nausea 1/28/22   Servando Little MD   lidocaine-prilocaine (EMLA) 2.5-2.5 % cream Apply topically to port site 30- 60 minutes before access as needed. 1/28/22   Servando Little MD   folic acid (FOLVITE) 1 MG tablet Take 1 tablet by mouth daily for 30 doses Start 7 days before first scheduled dose and continue for 21 days after last dose of PEMEtrexed. 1/28/22 5/19/22  Servando Little MD   tamsulosin (FLOMAX) 0.4 MG capsule Take 0.4 mg by mouth daily    Historical Provider, MD   tiZANidine (ZANAFLEX) 4 MG capsule Take 4 mg by mouth 3 times daily as needed   Patient not taking: No sig reported    Historical Provider, MD   isosorbide mononitrate (IMDUR) 30 MG extended release tablet 1/2 tablet in the morning    Historical Provider, MD   lisinopril (PRINIVIL;ZESTRIL) 20 MG tablet Take 20 mg by mouth daily  Patient not taking: No sig reported    Historical Provider, MD   nitroGLYCERIN (NITRODUR) 0.4 MG/HR 1 dose under tongue as needed for chest pain.  max of 3 in one day    Historical Provider, MD   Insulin Glargine, 2 Unit Dial, 300 UNIT/ML SOPN Insulin Glargine Insulin Glargine U-300 Conc Active 10 UNIT Subcutaneous Every morning May 7th, 2020 11:47am 05- [address] [place] ([id number]) 5/7/20   Historical Provider, MD   atorvastatin (LIPITOR) 20 MG tablet Take 1 tablet by mouth daily 12/15/20   Marnie Collazo MD   sitaGLIPtan-metFORMIN (JANUMET)  MG per tablet Take 1 tablet by mouth 2 times daily (with meals)    Historical Provider, MD   glimepiride (AMARYL) 2 MG tablet Take 1 tablet by mouth 2 times daily 5/22/15   Rey Hoffman MD   aspirin 81 MG tablet Take 81 mg by mouth daily Ordered by heart doctor, Dr. Maik Carrington [place]    Historical Provider, MD       Review of Systems:  Constitutional:negative  for fevers, and negative for chills. Eyes: negative for visual disturbance   ENT: negative for sore throat, negative nasal congestion, and negative for earache  Respiratory: negative for shortness of breath, negative for cough, and negative for wheezing  Cardiovascular: negative for chest pain, negative for palpitations, and negative for syncope  Gastrointestinal: positive for abdominal pain, negative for nausea,negative for vomiting, negative for diarrhea, negative for constipation, and negative for hematochezia or melena  Genitourinary: negative for dysuria, negative for urinary urgency, negative for urinary frequency, and negative for hematuria  Skin: negative for skin rash, and negative for skin lesions  Neurological: negative for unilateral weakness, numbness or tingling. Physical Exam:    Vitals:   Temp: 98.6 °F (37 °C)  BP: (!) 101/57  Resp: 18  Heart Rate: 61  SpO2: 98 %  24HR INTAKE/OUTPUT:    Intake/Output Summary (Last 24 hours) at 9/29/2022 1216  Last data filed at 9/29/2022 1205  Gross per 24 hour   Intake 1966.33 ml   Output 2100 ml   Net -133.67 ml       Weight    Body mass index is 21.9 kg/m². Exam:  GEN:    Awake, alert and oriented x3. EYES:  EOMI, pupils equal   NECK: Supple. No lymphadenopathy.   No carotid bruit  CVS:    regular rate and rhythm, no audible murmur  PULM:  CTA, no wheezes, rales or rhonchi, no acute respiratory distress  ABD:    Bowels sounds normal.  Abdomen is soft. No distention. Right middle lateral abdomen  tenderness to palpation. EXT:   no edema bilaterally . No calf tenderness. NEURO: Moves all extremities. Motor and sensory are grossly intact  SKIN:  No rashes.   No skin lesions.    -----------------------------------------------------------------  Diagnostic Data:     DATA:    CBC:   Lab Results   Component Value Date    WBC 7.0 09/29/2022    RBC 3.08 (L) 09/29/2022    HGB 7.9 (L) 09/29/2022    HCT 25.0 (L) 09/29/2022    MCV 81.2 (L) 09/29/2022     09/29/2022        CMP:   Lab Results   Component Value Date    GLUCOSE 79 09/29/2022    BUN 22 09/29/2022    CREATININE 1.03 09/29/2022     09/29/2022    K 4.5 09/29/2022    CALCIUM 8.4 (L) 09/29/2022     (H) 09/29/2022    CO2 22 09/29/2022    PROT 7.5 09/14/2022    LABALBU 4.3 09/14/2022    BILITOT 0.3 09/14/2022    ALKPHOS 107 09/14/2022    ALT 10 09/14/2022    AST 13 09/14/2022       UA:   Lab Results   Component Value Date    COLORU Yellow 09/28/2022    SPECGRAV <1.005 (L) 09/28/2022    WBCUA 0 TO 2 09/28/2022    RBCUA 0 TO 2 09/28/2022    EPITHUA 0 TO 2 09/28/2022    LEUKOCYTESUR NEGATIVE 09/28/2022    GLUCOSEU NEGATIVE 09/28/2022    KETUA NEGATIVE 09/28/2022    PROTEINU NEGATIVE 09/28/2022    HGBUR NEGATIVE 09/28/2022    BACTERIA TRACE (A) 07/03/2022       Lactic Acid:   Lab Results   Component Value Date    LACTA 2.6 (H) 05/19/2022       D-Dimer:  Lab Results   Component Value Date    DDIMER 2.63 (H) 05/19/2022       PT/INR:  Lab Results   Component Value Date/Time    PROTIME 12.8 05/19/2022 06:20 PM    INR 1.0 05/19/2022 06:20 PM       High Sensitivity Troponin:  Recent Labs     09/28/22  1310   TROPHS 13       ABGs:   Lab Results   Component Value Date/Time    ZQT3CJV NOT REPORTED 01/04/2022 05:17 PM    FIO2 4.0 01/04/2022 05:17 PM           CT CHEST PULMONARY EMBOLISM W CONTRAST   Final Result   No evidence of pulmonary embolism. Postoperative changes of right lung and right hilum with atelectatic changes   of the right lower lobe and stable complex moderate right pleural effusion   with pleural thickening and nodularity. RECOMMENDATIONS:   Unavailable         CT Head W/O Contrast   Final Result   No acute intracranial abnormality. XR CHEST PORTABLE   Final Result   Moderate-sized right pleural effusion. The component of the pneumothorax   seen previous on the right is no longer detected. EKG reviewed    Assessment:    Principal Problem:    Syncope and collapse  Active Problems:    Primary hypertension    Type 2 diabetes mellitus without complication, without long-term current use of insulin (HCC)    Lung cancer, main bronchus, right ([address]  Resolved Problems:    * No resolved hospital problems.  *      Patient Active Problem List    Diagnosis Date Noted    Syncope and collapse 09/28/2022    Renal mass 09/22/2022    BPH with obstruction/lower urinary tract symptoms 08/11/2022    Pleural effusion on right 04/07/2022    Right-sided chest pain 04/07/2022    Nosebleed 03/17/2022    Chemotherapy adverse reaction 03/17/2022    Encounter for care related to vascular access port 01/28/2022    S/P thoracotomy     Lung cancer, main bronchus, right ([address]     Acute delirium 01/08/2022    Hyponatremia 01/08/2022    Postoperative ileus ([address] 01/08/2022    Pneumothorax after biopsy 07/28/2021    Pneumothorax, post biopsy, right 07/28/2021    Strain of lumbar region 05/27/2021    Abnormal CT scan, stomach 10/05/2019    Abnormal gastrointestinal PET scan 10/05/2019    Substernal chest pain 01/13/2019    Dyspepsia 01/04/2019    Epigastric pain 01/04/2019    Senile nuclear sclerosis 06/08/2015    Type II or unspecified type diabetes mellitus without mention of complication, not stated as uncontrolled     Calculus of kidney     Actinic keratosis     Shortness of breath, post procedure improving probably Effient side effect. 09/24/2013    S/P angioplasty with stent, obtuse marginal 09/24/2013    Angina pectoris ([facility] [address] 09/10/2013    Coronary artery disease involving native coronary artery of native heart without angina pectoris 09/10/2013    Primary hypertension 09/10/2013    Type 2 diabetes mellitus without complication, without long-term current use of insulin ([facility] [address] 09/10/2013    Hyperlipidemia 09/10/2013       Plan: This patient requires overnight observation because of syncope and collapse  Factors affecting the medical complexity of this patient include lung CA, hypertension, type 2 diabetes  Estimated length of stay is 2 days  Syncope and collapse  Appreciate Dr. Yusuf Wiley  Telemetry  IV fluids  Echocardiogram  Orthostatic vital signs  Lung CA  Currently under the care of Dr. Hailee Warenr  Hemoglobin at baseline  Hypertension  Continue Norvasc, atenolol  Type 2 diabetes  POCT before meals and at bedtime  Insulin sliding scale  Hypoglycemia protocol  Continue Nesina, Glucotrol, Lantus, Glucophage  DVT prophylaxis: Lovenox  Peptic ulcer prophylaxis: Pepcid  High risk medications: none  Social Service and Case Management consults for DC planning  Dietician consult initiated    CORE MEASURES  DVT prophylaxis: Lovenox  Decubitus ulcer present on admission: No  CODE STATUS: FULL CODE  Nutrition Status: good   Physical therapy: Yes   Old Charts reviewed: Yes  EKG Reviewed:  Yes  Advance Directive Addressed: Yes    ALONDRA Boswell CNP, ALONDRA, NP-C  9/29/2022, 12:16 PM

## 2022-09-29 NOTE — PROGRESS NOTES
Comprehensive Nutrition Assessment    Type and Reason for Visit:  Initial    Nutrition Recommendations/Plan:   Continue current diet. Start 4 oz chocolate Glucerna TID with meals. Malnutrition Assessment:  Malnutrition Status: At risk for malnutrition (Comment) (09/29/22 7436)    Context:  Acute Illness     Findings of the 6 clinical characteristics of malnutrition:  Energy Intake:  Mild decrease in energy intake (Comment)  Weight Loss:  No significant weight loss     Body Fat Loss:  Mild body fat loss Fat Overlying Ribs   Muscle Mass Loss:  No significant muscle mass loss    Fluid Accumulation:  No significant fluid accumulation     Strength:  Not Performed    Nutrition Assessment:    Altered nutrition related labs r/t endocrine dysfunction aeb A1c 6.4. Glucose 79 today. Pt admitted with syncope and collapse. Pt reports he has this pain in his \"belly that feels like a ball and it hurts. \" Nothing seems to help it. States he spoke with the doctor this morning about it. Agrees to Glucerna because of concern about the \"sugar\" in ensure. Pt in pre-diabetes range. Pt hopes to go home later today. Nutrition Related Findings:      Wound Type: None       Current Nutrition Intake & Therapies:    Average Meal Intake: %  Average Supplements Intake: None Ordered  ADULT DIET; Regular    Anthropometric Measures:  Height: 6' (182.9 cm)  Ideal Body Weight (IBW): 178 lbs (81 kg)    Admission Body Weight: 160 lb 3 oz (72.7 kg)  Current Body Weight: 161 lb 8 oz (73.3 kg), 90.7 % IBW.  Weight Source: Bed Scale  Current BMI (kg/m2): 21.9  Usual Body Weight: 144 lb (65.3 kg)  % Weight Change (Calculated): 12.2  Weight Adjustment For: No Adjustment                 BMI Categories: Normal Weight (BMI 22.0 to 24.9) age over 72      Nutrition Diagnosis:   Altered nutrition-related lab values related to endocrine dysfuntion as evidenced by lab values    Nutrition Interventions:   Food and/or Nutrient Delivery: Continue Current Diet, Start Oral Nutrition Supplement  Nutrition Education/Counseling: No recommendation at this time  Coordination of Nutrition Care: Continue to monitor while inpatient  Plan of Care discussed with: Patient    Goals:     Goals: PO intake 75% or greater     Recent Labs     09/28/22  1310 09/29/22  0550    140   K 4.7 4.5    108*   CO2 24 22   BUN 31* 22   CREATININE 1.41* 1.03   GLUCOSE 170* 79   GFR                            Lab Results   Component Value Date/Time    LABALBU 4.3 09/14/2022 07:16 AM       Lab Results   Component Value Date/Time    LABA1C 6.4 12/16/2021 12:54 PM      Recent Labs     09/28/22  1830 09/28/22  1921 09/29/22  0638   POCGLU 187* 205* 82      Lab Results   Component Value Date/Time    TRIG 127 01/18/2019 11:36 AM    HDL 29 01/18/2019 11:36 AM    LDLCALC 66 12/18/2015 07:35 AM    LABVLDL 30 12/18/2015 07:35 AM      Nutrition Monitoring and Evaluation:   Behavioral-Environmental Outcomes: None Identified  Food/Nutrient Intake Outcomes: Food and Nutrient Intake, Supplement Intake  Physical Signs/Symptoms Outcomes: Biochemical Data, Weight    Discharge Planning:    Continue current diet, Continue Oral Nutrition Supplement     Sylvain Wiley RD, LD  Contact: [phone]

## 2022-09-29 NOTE — PROGRESS NOTES
SW met with pt to complete assessment. Pt is alert and oriented and cooperative with assessment. Pt is a 67year old  male admitted for syncope and collapse. Pt lives with his spouse in their home in [facility] [place]. Pt uses no DME or community services at home. Pt drives and is able to get himself to appointments without concerns. Pt is a full code and follows with Yaneth Walker CNP as PCP. Pt reports that he does have advance directives and copy requested at his convenience to bring this in for his medical record. Pt reports that his spouse, son and dtr would be decision makers if needed. Pt reports that his medications are affordable for the most part but his Dr office has him on prescription assistance for a few of them. Pt plans to return home with spouse at discharge. Pt identifies no discharge needs or concerns at this time. SW will remain available as needed.   Sangita Gupta MSW AMANDAW 9/29/2022

## 2022-09-29 NOTE — PLAN OF CARE
Problem: Discharge Planning  Goal: Discharge to home or other facility with appropriate resources  Outcome: Progressing  Flowsheets (Taken 9/28/2022 2231)  Discharge to home or other facility with appropriate resources: Identify barriers to discharge with patient and caregiver     Problem: Safety - Adult  Goal: Free from fall injury  Outcome: Progressing  Flowsheets (Taken 9/28/2022 2231)  Free From Fall Injury: Instruct family/caregiver on patient safety

## 2022-10-04 LAB
ESTIMATED AVERAGE GLUCOSE: 166 MG/DL
HBA1C MFR BLD: 7.4 % (ref 4–6)

## 2022-10-06 RX ORDER — NITROGLYCERIN 80 MG/1
PATCH TRANSDERMAL
Qty: 30 PATCH | Refills: 2 | Status: SHIPPED | OUTPATIENT
Start: 2022-10-06

## 2022-10-11 ENCOUNTER — OFFICE VISIT (OUTPATIENT)
Dept: CARDIOLOGY | Age: 72
End: 2022-10-11
Payer: MEDICARE

## 2022-10-11 ENCOUNTER — HOSPITAL ENCOUNTER (OUTPATIENT)
Age: 72
Discharge: HOME | End: 2022-10-11
Payer: MEDICARE

## 2022-10-11 VITALS
HEART RATE: 66 BPM | BODY MASS INDEX: 21.13 KG/M2 | DIASTOLIC BLOOD PRESSURE: 68 MMHG | RESPIRATION RATE: 20 BRPM | WEIGHT: 156 LBS | HEIGHT: 72 IN | OXYGEN SATURATION: 98 % | SYSTOLIC BLOOD PRESSURE: 125 MMHG

## 2022-10-11 DIAGNOSIS — R10.31 RIGHT LOWER QUADRANT ABDOMINAL PAIN: ICD-10-CM

## 2022-10-11 DIAGNOSIS — Z95.820 S/P ANGIOPLASTY WITH STENT: ICD-10-CM

## 2022-10-11 DIAGNOSIS — E78.2 MIXED HYPERLIPIDEMIA: ICD-10-CM

## 2022-10-11 DIAGNOSIS — R06.02 SOB (SHORTNESS OF BREATH): ICD-10-CM

## 2022-10-11 DIAGNOSIS — D64.9 ANEMIA, UNSPECIFIED TYPE: ICD-10-CM

## 2022-10-11 DIAGNOSIS — I10 ESSENTIAL HYPERTENSION: ICD-10-CM

## 2022-10-11 DIAGNOSIS — I25.10 ASHD (ARTERIOSCLEROTIC HEART DISEASE): ICD-10-CM

## 2022-10-11 DIAGNOSIS — R40.20 LOC (LOSS OF CONSCIOUSNESS) (HCC): Primary | ICD-10-CM

## 2022-10-11 DIAGNOSIS — R40.20 LOC (LOSS OF CONSCIOUSNESS) (HCC): ICD-10-CM

## 2022-10-11 LAB
ABSOLUTE RETIC #: 0.04 M/UL (ref 0.03–0.08)
ANION GAP SERPL CALCULATED.3IONS-SCNC: 11 MMOL/L (ref 9–17)
BUN BLDV-MCNC: 22 MG/DL (ref 8–23)
BUN/CREAT BLD: 16 (ref 9–20)
CALCIUM SERPL-MCNC: 9.4 MG/DL (ref 8.6–10.4)
CHLORIDE BLD-SCNC: 101 MMOL/L (ref 98–107)
CO2: 26 MMOL/L (ref 20–31)
CREAT SERPL-MCNC: 1.35 MG/DL (ref 0.7–1.2)
GFR SERPL CREATININE-BSD FRML MDRD: 56 ML/MIN/1.73M2
GLUCOSE BLD-MCNC: 120 MG/DL (ref 70–99)
HCT VFR BLD CALC: 31.3 % (ref 40.7–50.3)
HEMOGLOBIN: 9.6 G/DL (ref 13–17)
IMMATURE RETIC FRACT: 16.8 % (ref 2.7–18.3)
MCH RBC QN AUTO: 24.9 PG (ref 25.2–33.5)
MCHC RBC AUTO-ENTMCNC: 30.7 G/DL (ref 28.4–34.8)
MCV RBC AUTO: 81.1 FL (ref 82.6–102.9)
NRBC AUTOMATED: 0 PER 100 WBC
PDW BLD-RTO: 17.6 % (ref 11.8–14.4)
PLATELET # BLD: 186 K/UL (ref 138–453)
PMV BLD AUTO: 9.6 FL (ref 8.1–13.5)
POTASSIUM SERPL-SCNC: 4.5 MMOL/L (ref 3.7–5.3)
RBC # BLD: 3.86 M/UL (ref 4.21–5.77)
RETIC %: 0.9 % (ref 0.5–1.9)
RETIC HEMOGLOBIN: 26.9 PG (ref 28.2–35.7)
SODIUM BLD-SCNC: 138 MMOL/L (ref 135–144)
WBC # BLD: 5.5 K/UL (ref 3.5–11.3)

## 2022-10-11 PROCEDURE — 83550 IRON BINDING TEST: CPT

## 2022-10-11 PROCEDURE — 85027 COMPLETE CBC AUTOMATED: CPT

## 2022-10-11 PROCEDURE — 99211 OFF/OP EST MAY X REQ PHY/QHP: CPT | Performed by: PHYSICIAN ASSISTANT

## 2022-10-11 PROCEDURE — G8484 FLU IMMUNIZE NO ADMIN: HCPCS | Performed by: PHYSICIAN ASSISTANT

## 2022-10-11 PROCEDURE — 99214 OFFICE O/P EST MOD 30 MIN: CPT | Performed by: PHYSICIAN ASSISTANT

## 2022-10-11 PROCEDURE — 1123F ACP DISCUSS/DSCN MKR DOCD: CPT | Performed by: PHYSICIAN ASSISTANT

## 2022-10-11 PROCEDURE — 82728 ASSAY OF FERRITIN: CPT

## 2022-10-11 PROCEDURE — G8427 DOCREV CUR MEDS BY ELIG CLIN: HCPCS | Performed by: PHYSICIAN ASSISTANT

## 2022-10-11 PROCEDURE — 80048 BASIC METABOLIC PNL TOTAL CA: CPT

## 2022-10-11 PROCEDURE — 83540 ASSAY OF IRON: CPT

## 2022-10-11 PROCEDURE — G8420 CALC BMI NORM PARAMETERS: HCPCS | Performed by: PHYSICIAN ASSISTANT

## 2022-10-11 PROCEDURE — 1036F TOBACCO NON-USER: CPT | Performed by: PHYSICIAN ASSISTANT

## 2022-10-11 PROCEDURE — 85045 AUTOMATED RETICULOCYTE COUNT: CPT

## 2022-10-11 PROCEDURE — 36415 COLL VENOUS BLD VENIPUNCTURE: CPT

## 2022-10-11 PROCEDURE — 3017F COLORECTAL CA SCREEN DOC REV: CPT | Performed by: PHYSICIAN ASSISTANT

## 2022-10-11 NOTE — PROGRESS NOTES
Toño Theodore am scribing for and in the presence of Gabrielle Kay [place]     Patient: Jovi Contreras  : 1950  Date of Visit: 2022    REASON FOR VISIT / CONSULTATION:   Chief Complaint   Patient presents with    Follow-up     Hx: ASHD, HTN, HLD. Pt was in hospital for loss of consciousness. Simona: CP, Palpitations, Lightheaded/ dizziness. C/o: SOB lung cancer. Dear Dr. Santos Powell had the pleasure of seeing Jovi Contreras today for follow up on his cardiac status. As you know, Mr. Bard Kent is 71 y.o male with PMH of hypertension, hyperlipidemia, type II diabetes and coronary artery disease with  heart cath done on 2013 at [facility]. Vs at that time he has NEDRA to OM1. The cath also showed mild disease of the other coronaries and normal left ventricular function by ventriculogram.      Earlier in , patient had an episode of prolonged chest pain. Retrosternal, burning in nature without radiation. Pain occurred at rest with partial relief with sublingual nitrates. Repeat cardiac cath on  2017 showed new totally occluded mid RCA. An attempt to open the totally occluded RCA on 2017 has failed. Patient treated medically. Echo done on 2018, global left ventricular systolic function appears preserved with an estimated ejection fraction of 60%. Mild LVH. No definite specific wall motion abnormalities were identified. No significant valvular abnormalities. Evidence of mild diastolic dysfunction is seen. Echo done on 2019 that showed EF of 65%. LV wall thickness is mildly increased. Aortic valve leaflets are mildly thickened. Holter Monitor done on 2019: The rhythm was sinus. Average CO interval 0.22 [1st Degree AV Block]. Average QRS duration 0.08. Average daily heart rate  69 ranging from 54 to110 bpm.2. Rare premature supraventricular ectopic beats consisting of 9 isolated PACs. 3. There were no ventricular ectopic beats. 4.  Diary returned with entry of \"sharp pain middle of chest\" with no ectopy noted. Sinus rhythm with rare isolated PACs, otherwise unremarkable Holter. Symptoms as above, were not correlated with any heart rate or rhythm abnormalities. Echo 12/15/2021: Joan Pod left ventricular systolic function appears preserved with an estimated ejection fraction of >60%. The left ventricular cavity size is within normal limits and the left ventricular wall thickness is within normal limits. No definite specific wall motion abnormalities were identified. No significant valvular abnormalities. Mild diastolic dysfunction. Compared to the previous study of 9/20/2019, no significant change was seen. Echo done on 9/29/2022: Global left ventricular systolic function appears preserved with an estimated ejection fraction of >60%. The left ventricular cavity size is within normal limits and the left ventricular wall thickness is mildly increased. No significant valvular disease was seen. Evidence of mild diastolic dysfunction is seen. Compared to the previous study of 12/15/21, no significant change was seen. No significant cardiac cause of syncope was identified from this study. ER/ Hospital on 9/28/2022 for loss of consciousness: He was having a shed delivered and went outside to watch them, when he walked back inside sat in chair sat down and passed out. He did mention he did not feel well. His wife reports his eyes rolled back, head went down, his dentures fell out and drooling. EMS was called at that time. His syncope was presumed to be caused by dehydration based on blood work. Mr. Deion Lozoya is here for follow up today after his above hospitalizations. He reports doing better since then. He has been tying to keep him self better hydrated. He does have chronic back pains and abdomin pain, he has had an MRI, PET, and HIDA scan. He is going to see someone about his back due to his abnormal MRI.  He does have back injections that are scheduled to be done on Thursday. He is having regular bowel movements. His abdomin pain he was told nothing can be done for him. He is not aware of any gallbladder issues. He has shortness of breath that has unchanged. He has chronic pain in stomach. He does not eat great as his appetite is not good. He denies any chest pain, pressure or tightness. Denies any blood in urine or stools. Denies any further syncope spells. No lightheaded/ dizziness or heart palpitations. No cough, fever or chills. Past Medical History:   Diagnosis Date    Abnormal cardiac cath 09/20/2013    LMCA; Mild irregularities 10-20%. LAD: Abnormal.  Mild to moderate irregularities throughout the LAD and branches. LCx:  abnormal.  80-90% stenosis in a moderate sized OM1. RCA: Abnormal.  40-50% mid RCA stenosis. Actinic keratosis     Arthritis     neck, fingers    CAD (coronary artery disease)     Calculus of kidney     Cancer (HCC)     Cervical stenosis of spine     Diabetes mellitus ([facility] [address]     Dr. Jessica Cunningham, CNP, [place]    Full dentures     GERD (gastroesophageal reflux disease)     H/O echocardiogram 04/29/2016    Stress echo. Normal resting LV systolic function,normal systolic restponse to exercise. No evidence of reversible  ischemia on this maximal,symptom limited,treadmill exerxiess stress echocardiography study    History of cardiac cath 02/09/2017    LMCA: Lesion on LMCA: Distal subsection . 20% stenosis. LAD: Lesion on 1st Diag: Mid subsection . 75% stenosis. RCA: Lesion on Mid RCA: Mid subsection . 100% stenosis. Comments: The distal RCA fills by left to right collateralization. Dominance: Mixed. LV function assessed as: Normal. EF 65%. History of echocardiogram 06/11/2018    EF 60%. Mildly increased LV wall thickness. Evidence of mild diastolic dysfunction seen. History of echocardiogram 01/18/2019    EF of 65%. LV wall thickness is mildly increased. Aortic valve leaflets are mildly thickened.      History of heart attack     History of tobacco abuse     Quit 10/2021, 1/4 ppd for 54 years    Hyperlipidemia     Hypertension     Dr. Alee Michelle, CNP    Pulmonary nodule 12/2021    Right middle lobe    Raynaud's phenomenon     S/P angioplasty with stent 09/20/2013    PTCA-NEDRA of  the OM 1    Wears hearing aid in both ears          Past Surgical History:   Procedure Laterality Date    CARDIAC CATHETERIZATION  2007    patient states had 2 small blockages    CARDIAC CATHETERIZATION  02/09/2017    LMCA: Lesion on LMCA: Distal subsection 20% stenosis. LAD: Lesion on 1st Diag: Mid subsection 75% stenosis. RCA: Lesion on Mid RCA: Mid subsection 100% stenosis. CARDIAC CATHETERIZATION  02/09/2017    Attempted PCI to chronic total occlusion of RCA was not successful. Unable to cross with multiple wires due to heavy calcification and toruosity.     CARDIAC CATHETERIZATION  01/14/2019    CATARACT REMOVAL  04/06/2015    Right eye - Dr. Merida Leonides  06/08/2015    Dr. Krishan Jackson - left eye    COLONOSCOPY  01/11/2006    Dr. Rodrigo Ferro - internal hemorrhoids, no polps    COLONOSCOPY  05/23/2016    -polyp    CT NEEDLE BIOPSY LUNG PERCUTANEOUS  07/28/2021    CT NEEDLE BIOPSY LUNG PERCUTANEOUS 7/28/2021 [facility] CT SCAN    HEMORRHOID SURGERY      HERNIA REPAIR Left     inguinal    IR CHEST TUBE INSERTION  01/11/2022    IR CHEST TUBE INSERTION 1/11/2022 Regina Dickey MD [facility] SPECIAL PROCEDURES    IR PORT PLACEMENT EQUAL OR GREATER THAN 5 YEARS  2/9/2022    IR PORT PLACEMENT EQUAL OR GREATER THAN 5 YEARS 2/9/2022 [facility] SPECIAL PROCEDURES    LUNG BIOPSY Right 07/28/2021    Dr Julia Carlos, TOTAL Right 01/04/2022    MIDDLE WEDGE RESECTION, POSSIBLE MIDDLE LOBECTOMY VIA THORACOTOMY performed by Erendira Pack MD at [facility]    c4-5 laminectomy and fusion    OTHER SURGICAL HISTORY  10/2014    Right foot - 7 from heal and 1 from great toe at [address]  12/11/2014 pain injection - cervical    TUNNELED CENTRAL VENOUS CATHETER W/ SUBCUTANEOUS PORT Left 2022    Dr Hoenninger/[facility] Chan    UPPER GASTROINTESTINAL ENDOSCOPY  2005    Dr. Tina Wright - mild esophagitis and gastritis    UPPER GASTROINTESTINAL ENDOSCOPY N/A 2019    -bx(neg H-Pylori)retained gastric content, possible submucosal mass of gastric cardia    UPPER GASTROINTESTINAL ENDOSCOPY N/A 10/22/2019    EGD BIOPSY performed by Humphrey Gaytan MD at [facility] History   Problem Relation Age of Onset    Heart Disease Mother     Diabetes Mother     High Blood Pressure Mother     Diabetes Father     Alzheimer's Disease Father     Heart Disease Sister         CABG    Heart Disease Brother     Heart Disease Brother     Heart Disease Brother     Cancer Brother          Social History     Tobacco Use    Smoking status: Former     Packs/day: 0.25     Years: 54.00     Pack years: 13.50     Types: Cigarettes     Quit date: 10/1/2021     Years since quittin.0    Smokeless tobacco: Never   Vaping Use    Vaping Use: Never used   Substance Use Topics    Alcohol use: No    Drug use: No     Family History   Problem Relation Age of Onset    Heart Disease Mother     Diabetes Mother     High Blood Pressure Mother     Diabetes Father     Alzheimer's Disease Father     Heart Disease Sister         CABG    Heart Disease Brother     Heart Disease Brother     Heart Disease Brother     Cancer Brother          Current Outpatient Medications:     nitroGLYCERIN (NITRODUR) 0.4 MG/HR, 1 dose under tongue as needed for chest pain.  max of 3 in one day, Disp: 30 patch, Rfl: 2    metFORMIN (GLUCOPHAGE) 1000 MG tablet, Take 1 tablet by mouth 2 times daily (with meals), Disp: 60 tablet, Rfl: 3    SITagliptin (JANUVIA) 50 MG tablet, Take 1 tablet by mouth daily, Disp: 90 tablet, Rfl: 1    morphine (MSIR) 15 MG tablet, Take 15 mg by mouth every 6 hours as needed for Pain., Disp: , Rfl:     clotrimazole-betamethasone (LOTRISONE) 1-0.05 % cream, Apply topically daily To both feet, Disp: , Rfl:     atenolol (TENORMIN) 25 MG tablet, Take 1 tablet by mouth daily, Disp: 90 tablet, Rfl: 3    Continuous Blood Gluc Sensor (FREESTYLE YAIMA 14 DAY SENSOR) [facility], as directed, Disp: , Rfl:     amLODIPine (NORVASC) 5 MG tablet, Take 1 tablet by mouth daily, Disp: 90 tablet, Rfl: 3    magnesium 30 MG tablet, Take 400 mg by mouth daily , Disp: , Rfl:     clopidogrel (PLAVIX) 75 MG tablet, Take 1 tablet by mouth daily, Disp: 90 tablet, Rfl: 3    zinc 50 MG CAPS, Take 50 mg by mouth daily (with breakfast), Disp: , Rfl:     promethazine (PHENERGAN) 25 MG tablet, Take 25 mg by mouth every 4 hours as needed for Nausea, Disp: , Rfl:     pantoprazole (PROTONIX) 40 MG tablet, Take 40 mg by mouth in the morning and at bedtime, Disp: , Rfl:     ondansetron (ZOFRAN ODT) 8 MG TBDP disintegrating tablet, Take 1 tablet by mouth every 8 hours as needed for Nausea, Disp: 30 tablet, Rfl: 2    lidocaine-prilocaine (EMLA) 2.5-2.5 % cream, Apply topically to port site 30- 60 minutes before access as needed. , Disp: 30 g, Rfl: 2    tamsulosin (FLOMAX) 0.4 MG capsule, Take 0.4 mg by mouth daily, Disp: , Rfl:     isosorbide mononitrate (IMDUR) 30 MG extended release tablet, 1/2 tablet in the morning, Disp: , Rfl:     Insulin Glargine, 2 Unit Dial, 300 UNIT/ML SOPN, Insulin Glargine Insulin Glargine U-300 Conc Active 10 UNIT Subcutaneous Every morning May 7th, 2020 11:47am 05-  [facility] ([id number]), Disp: , Rfl:     atorvastatin (LIPITOR) 20 MG tablet, Take 1 tablet by mouth daily, Disp: 90 tablet, Rfl: 3    glimepiride (AMARYL) 2 MG tablet, Take 1 tablet by mouth 2 times daily, Disp: 180 tablet, Rfl: 3    aspirin 81 MG tablet, Take 81 mg by mouth daily Ordered by heart doctor, Chan Sykes, Disp: , Rfl:     Allergies   Allergen Reactions    Niacin And Related      Turns red, no trouble breathing    Minocycline Hcl Rash    Other Nausea And Vomiting     Patient states on all pain medications he gets nausea and vomiting. Doctor ordered a medicine (nausea) to take before pain medication       ROS: 14 systems reviewed and negative except for pertinent positives as noted above. PHYSICAL EXAM:   /68 (Site: Left Upper Arm, Position: Sitting, Cuff Size: Medium Adult)   Pulse 66   Resp 20   Ht 6' (1.829 m)   Wt 156 lb (70.8 kg)   SpO2 98%   BMI 21.16 kg/m²  Body mass index is 21.16 kg/m². Constitutional: He is oriented to person, place, and time. He appears well-developed and well-nourished. In no acute distress. HEENT: Normocephalic and atraumatic. No JVD present. Carotid bruit is not present. No mass and no thyromegaly present. No lymphadenopathy present. Cardiovascular: Normal rate, regular rhythm, normal heart sounds. Exam reveals no gallop and no friction rubs. No heart murmur heard. Pulmonary/Chest: Effort normal and breath sounds normal. No respiratory distress. He has no wheezes, rhonchi or rales. Abdominal: Soft, non-tender. Bowel sounds and aorta are normal. He exhibits no organomegaly, mass or bruit. Extremities: No edema. No cyanosis and no clubbing. Pulses are 2+ radial and carotid pulses. 2+ dorsalis pedis and posterior tibial pulses bilaterally. Neurological: He is alert and oriented to person, place, and time. No evidence of gross cranial nerve deficit. Coordination appeared normal.   Skin: Skin is warm and dry. There is no rash or diaphoresis. Psychiatric: He has a normal mood and affect. His speech is normal and behavior is normal.       Diagnosis Orders   1. LOC (loss of consciousness) (HCC)  Basic Metabolic Panel    CBC    Ferritin    Reticulocytes    Iron and TIBC      2. SOB (shortness of breath)  Basic Metabolic Panel    CBC    Ferritin    Reticulocytes    Iron and TIBC      3. Anemia, unspecified type  Basic Metabolic Panel    CBC    Ferritin    Reticulocytes    Iron and TIBC      4.  ASHD (arteriosclerotic heart disease)  Basic Metabolic Panel    CBC    Ferritin    Reticulocytes    Iron and TIBC      5. S/P angioplasty with stent, obtuse marginal        6. Essential hypertension  Basic Metabolic Panel    CBC    Ferritin    Reticulocytes    Iron and TIBC      7. Mixed hyperlipidemia  Basic Metabolic Panel    CBC    Ferritin    Reticulocytes    Iron and TIBC      8. Right lower quadrant abdominal pain  Basic Metabolic Panel    CBC    Ferritin    Reticulocytes    Iron and TIBC        PLAN:     Syncope/near syncope of unknown etiology: No further episodes since his hospitalization as outlined above. No further episodes or lightheadedness at this time. He is trying to keep himself better hydrated. Nonpharmacologic counseling: Because of his condition, I reminded him to try and keep himself well-hydrated and to take extra time when moving from laying to sitting, sitting to standing and standing to walking. I also explained to him to help improve his symptoms he should include 3 g sodium diet, 1 or 2 L of sports drinks daily, knee-high compressions stockings. Shortness of breath with mild exertion: Chronic Anemia. History of Lung Cancer and has had 1/3 of his lung removed in the past. His shortness of breath is at his base line. He also mentioned having some right lower abdomin pain. He has had multiple tests and imaging done and he has not been told of anything that he can do for this. Last CBC done on 9/28/2022 was 8.6 g/dL at that time. I took the liberty of ordering a BMP for today to assess their potassium and renal function. I told them that they could get their lab work performed at the location of their choosing, unfortunately, if the lab work was not performed at a [facility]) facility I could not guarantee my ability to follow up with them on their results. and  I took the liberty of ordering a CBC.  I told them that they could get their lab work performed at the location of their choosing, unfortunately, if the lab work was not performed at a [facility]) facility I could not guarantee my ability to follow up with them on their results. Also ordered a Ferritin, Reticulocyte, Iron, and TIBC. Atherosclerotic Heart Disease: S/P PCI in in 2003 and 2011. Cardiac cath in 2017 showed totally occluded RCA, an attempt to open the RCA failed. Patient has good left-to-right collaterals. Treated medically since that time and is doing very good. Antiplatelet Agent: Continue Aspirin 81 mg daily and clopidogrel (Plavix) 75 mg daily. I also reminded him to watch for signs of blood in his stool or black tarry stools and stop the medication immediately if this develops as this could be life threatening. Beta Blocker Therapy: Continue  Atenolol  25 mg daily  Anti-anginal medications: Continue amlodipine (Norvasc) 5 mg daily. I also discussed the potential side effects of this medication including lightheadedness and dizziness and told him to call the office if this occurs. Anti-anginal medications: Continue isosorbide mononitrate (Imdur) 30 mg daily I also discussed the potential side effects of this medication including lightheadedness and dizziness and told him to call the office if this occurs. Statin Therapy: Continue atorvastatin (Lipitor) 20 mg nightly. Essential Hypertension: Controlled  Calcium Channel Blocker: Continue amlodipine (Norvasc)      Beta Blocker Therapy: Continue  atenolol  25 mg  daily       Hyperlipidemia: Mixed, LDL done on 1/18/2019 was 35 mg/dL  Statin Therapy: Continue atorvastatin (Lipitor) 20 mg nightly. Right Lower Quadrant pain  Continue follow up with Yaneth Walker. Finally, I recommended that he continue his other medications and follow up with you as previously scheduled. I discussed patient's symptoms and treatment plan with Dr Graciela Briggs, he was in agreement with the plan and follow up.       FOLLOW UP:   I told Mr. Jeannette Bautista to call my office if he had any problems, but otherwise told him to Return in about 1 year (around 10/11/2023). However, I would be happy to see him sooner should the need arise. Sincerely,  Parish Bone  [facility] [address]  Phone: [phone]; Fax: [phone]    I believe that the risk of significant morbidity and mortality related to the patient's current medical conditions are: Intermediate. Approximately 30 minutes were spent during prep work, discussion and exam of the patient, and follow up documentation and all of their questions were answered. The documentation recorded by the scribe, accurately and completely reflects the services I personally performed and the decisions made by me.  Niall Montana PA-C October 11, 2022

## 2022-10-11 NOTE — PATIENT INSTRUCTIONS
SURVEY:    You may be receiving a survey from [employer] regarding your visit today. Please complete the survey to enable us to provide the highest quality of care to you and your family. If you cannot score us a very good on any question, please call the office to discuss how we could have made your experience a very good one. Thank you.

## 2022-10-12 LAB
FERRITIN: 60 NG/ML (ref 30–400)
IRON SATURATION: 11 % (ref 20–55)
IRON: 35 UG/DL (ref 59–158)
TOTAL IRON BINDING CAPACITY: 319 UG/DL (ref 250–450)
UNSATURATED IRON BINDING CAPACITY: 284 UG/DL (ref 112–347)

## 2022-10-13 ENCOUNTER — TELEPHONE (OUTPATIENT)
Dept: PULMONOLOGY | Age: 72
End: 2022-10-13

## 2022-10-13 ENCOUNTER — OFFICE VISIT (OUTPATIENT)
Dept: PULMONOLOGY | Age: 72
End: 2022-10-13
Payer: MEDICARE

## 2022-10-13 VITALS
HEIGHT: 72 IN | BODY MASS INDEX: 21.66 KG/M2 | DIASTOLIC BLOOD PRESSURE: 56 MMHG | RESPIRATION RATE: 16 BRPM | OXYGEN SATURATION: 98 % | TEMPERATURE: 97.3 F | HEART RATE: 73 BPM | SYSTOLIC BLOOD PRESSURE: 108 MMHG | WEIGHT: 159.9 LBS

## 2022-10-13 DIAGNOSIS — J41.1 MUCOPURULENT CHRONIC BRONCHITIS (HCC): ICD-10-CM

## 2022-10-13 DIAGNOSIS — R91.1 RIGHT MIDDLE LOBE PULMONARY NODULE: Primary | ICD-10-CM

## 2022-10-13 DIAGNOSIS — J90 PLEURAL EFFUSION ON RIGHT: ICD-10-CM

## 2022-10-13 DIAGNOSIS — Z87.891 STOPPED SMOKING WITH GREATER THAN 40 PACK YEAR HISTORY: ICD-10-CM

## 2022-10-13 DIAGNOSIS — C34.01 LUNG CANCER, MAIN BRONCHUS, RIGHT (HCC): ICD-10-CM

## 2022-10-13 DIAGNOSIS — R07.9 RIGHT-SIDED CHEST PAIN: ICD-10-CM

## 2022-10-13 PROCEDURE — 1123F ACP DISCUSS/DSCN MKR DOCD: CPT | Performed by: NURSE PRACTITIONER

## 2022-10-13 PROCEDURE — 99214 OFFICE O/P EST MOD 30 MIN: CPT | Performed by: NURSE PRACTITIONER

## 2022-10-13 PROCEDURE — 3023F SPIROM DOC REV: CPT | Performed by: NURSE PRACTITIONER

## 2022-10-13 PROCEDURE — 1036F TOBACCO NON-USER: CPT | Performed by: NURSE PRACTITIONER

## 2022-10-13 PROCEDURE — G8484 FLU IMMUNIZE NO ADMIN: HCPCS | Performed by: NURSE PRACTITIONER

## 2022-10-13 PROCEDURE — 3017F COLORECTAL CA SCREEN DOC REV: CPT | Performed by: NURSE PRACTITIONER

## 2022-10-13 PROCEDURE — G8420 CALC BMI NORM PARAMETERS: HCPCS | Performed by: NURSE PRACTITIONER

## 2022-10-13 PROCEDURE — G8427 DOCREV CUR MEDS BY ELIG CLIN: HCPCS | Performed by: NURSE PRACTITIONER

## 2022-10-13 RX ORDER — FERROUS SULFATE 325(65) MG
325 TABLET ORAL
Qty: 90 TABLET | Refills: 1 | Status: SHIPPED | OUTPATIENT
Start: 2022-10-13

## 2022-10-13 ASSESSMENT — ENCOUNTER SYMPTOMS
ALLERGIC/IMMUNOLOGIC NEGATIVE: 1
GASTROINTESTINAL NEGATIVE: 1
EYES NEGATIVE: 1

## 2022-10-13 NOTE — PATIENT INSTRUCTIONS
SURVEY:    You may be receiving a survey from [employer] regarding your visit today. Please complete the survey to enable us to provide the highest quality of care to you and your family. If you cannot score us a very good on any question, please call the office to discuss how we could have made your experience a very good one. Thank you.

## 2022-10-13 NOTE — PROGRESS NOTES
Subjective:      Patient ID: Otto Mathew is a 67 y.o. male. HPI  Patient here for follow-up for right middle lobe pulmonary nodule. Patient was last seen in the office in November 2021 per Dr. Madyson Hernandez. Patient states that since he had his right VATS/wedge resection with conversion to right thoracotomy and right middle lobectomy on 1/4/2022 per Dr. Reyna Tavarez he has had ongoing right lower chest wall/upper abdomen pain more in the front than on the side or back. His pain is not at the incision line but is actually more distal and medial to the anterior. He does note that the pain is worse when he is seated or standing but when he is laying down and able to stretch out it does improve a minimal amount. He has had  ongoign issues with fluid acculmulation in the operative space, and has undergone 3 thoracentesis-first was done in April, second and third were done in June cytology was negative for malignancy, there are no cell counts available for my review. Fluid in June was reported to be bloody on both occasions. Unfortunately patient is having a significant amount of pain that is impacting his quality of life. He has had extensive work-up for this ongoing issue with pain including an MRI on 9/15/2022 of his spine noting abrupt anterior displacement and flattening of the spinal cord at the T2-T3 level. Posterior paraspinal soft tissues unremarkable, and moderate septated right sided effusion. MRI of his abdomen 8/5/2022 noted a 1.8 cm round nodule in the posterior medial midpole of the left kidney. Small bilateral renal cysts up to 12 mm largest in the upper pole of the left kidney with known hydronephrosis noted. Liver spleen pancreas adrenal glands are negative for any significant abnormalities. Multiple tiny gallstones are noted with no biliary ductal dilatation. Normal caliber pancreatic duct no pancreas divisum. No ascites.   CT of his abdomen pelvis 7/16/2022 noted complex loculated pleural fluid collection with internal septations and thick irregular pleural surfaces which is unchanged from June 2022 but new from May 2021. Stable postsurgical changes in the right hilar region. Tiny gallstones no pericholecystic fluid or fat stranding. No evidence of metastatic disease, spleen pancreas adrenal glands and right kidney are normal.  2.1 cm mass in the medial left kidney is unchanged from May 2021. Ultrasound of his liver and gallbladder 6/10/2022 noted normal echogenicity of his liver with no intrahepatic biliary ductal dilatation and small gallstones are noted with no sonographic Staples sign or biliary dilatation. HIDA scan 7/8/2022 was negative for acute cholecystitis with a gallbladder EF of 38%. CMP 9/14/2022 was negative for any LFT elevation. He has been referred to pain management and has had 3 injections into his back and 1 today into his abdomen which have not had any success in alleviating his ongoing complaint     Patient had a thoracentesis done in June 2022 with 700 cc of fluid removed which was reported to be bloody. Patient had a thoracentesis in April 25, 2022 with 400 mL of bloody fluid drained from the right side. Patient had on 4/13/2022 a thoracentesis with 750 mL of fluid drained. It was not described. It was sent for analysis. 4/13/2022 right thoracentesis fluid was reported to be negative for malignancy. I am unable to find any cell counts. Patient states that he was referred back to cardiothoracic surgery in June 2022-was seen by ALONDRA Sterling with recommendations for IR thoracentesis and follow-up with PCP regarding positive Staples sign. Note dictates no need to follow-up with CT surgery. Patient voices frustration with not having any resolution to his ongoing chronic pain. States that he cannot continue to live like this. He is currently on morphine which is not giving him much relief from his pain.   Previously tried Tylenol and Motrin which also did not have much relief. Due to his extensive work-up for pain that has been negative for any acute pathology to account for his pain other than the reaccumulation of fluid, work-up including CT imaging of his abdomen, MRI of his back and abdomen, ongoing recurrent thoracentesis days with no real improvement in his pain discussed that I will review his chart with Dr. Dima Gamboa for further direction for POC. Medications:   Morphine 15 mg tablet Q6H     PRIOR WORKUP:  PFT:  PFT 12/7/2021: FVC 4.53, 100% of predicted. FEV1 4.34, 102% of predicted. FEV1/FVC ratio is 76, 101% of predicted. FEF 25-75 is 2.93, 116% of predicted. RV is 3.02, 117% of predicted. TLC is 7.34, 98% of predicted. DLCO is 22.26, 63% of predicted. CT Imaging:  CTA chest 9/28/2022: Negative for pulmonary emboli. No mediastinal adenopathy. Postoperative changes to the right hilum and right lower lobe with atelectatic changes. Unchanged moderate sized right pleural effusion with pleural thickening and nodularity. Lungs are negative for pneumothorax. PET scan 4/58/6963: Hypermetabolic focus in the right middle lobe is not redemonstrated. Patient has a moderate right effusion with associated compressive atelectasis. Mildly increased FDG uptake along the atelectatic lung likely due to mild inflammation. No suspicious FDG uptake in the lung parenchyma. CT chest 1/16/2022: Left chest port is in place with distal tip in the mid to distal SVC. No mediastinal adenopathy. Moderate amount of heterogenous loculated pleural fluid at the right base is unchanged from prior CT imaging of his abdomen and pelvis from July 2022 and slightly decreased from CT chest in May 2022. Mild biapical pleural scar. Mild centrilobular emphysema. Mild atelectasis/scar at the right base. No new airspace consolidation, pulmonary nodule, pleural effusion. CT chest 5/19/2022: Negative for pulmonary embolism.   Mild volume loss in the right hemithorax. Moderate right pleural effusion with loculation. Mild right basilar atelectasis and scarring. Left lung is clear. No pulmonary masses noted. No pneumothorax. CT chest 1/11/2022: Extensive pneumomediastinum. No mediastinal adenopathy. Small right pneumothorax is noted. No left-sided pneumothorax. Mosaic lung attenuation and findings of subsegmental atelectasis in the lung bases. Subpleural emphysematous changes in the superior right lung. Status post partial right pneumonectomy. CT chest 11/2/2021: Emphysematous changes with biapical scarring. No focal consolidation, pneumothorax or pleural effusion. Previously biopsied somewhat bilobed nodule involving the right middle lobe has slightly increased in size now measuring 1.8 x 1.0 cm previously measuring 1.8 x 0.6 cm with some spiculation. No new pulmonary nodules identified. CT biopsy 7/28/2021: Successful right middle lobe pulmonary nodule biopsy with iatrogenic right pneumothorax post biopsy with an 8 Estonian chest tube inserted. PET scan 6/30/2021: Previously described irregular nodule in the right middle lobe with SUV max of 3.4. Tiny focus of increased metabolic activity in the right hilum with an SUV max of 3.2. CT chest 6/10/2021: No mediastinal, hilar or axillary adenopathy. Mild centrilobular emphysema. Minor biapical pleural-parenchymal scarring. Significant increase in the noncalcified nodule in the anterior superior middle lobe measuring 6 x 18 mm compared to 10 x 4 mm. Margins are partially spiculated. With traction of the fissure along the interface of the nodule. PET scan 10/2/2019: 8 mm nodule in the right minor fissure is negative for FDG avidity. Mediastinal and hilar lymph nodes are also negative. Focal hypermetabolic activity in the gastric fundus near the GE junction with an SUV max of 6.5 recommend upper EGD     CT chest 9/23/2019: No mediastinal or hilar adenopathy.   Slowly enlarging 8 x 5 mm pulmonary nodule in the right minor fissure previously measuring 7 x 4 mm in August 2018 and 7 x 4 mm in July 2017. Mild underlying emphysema. CT chest 8/29/2018: Mild pulmonary emphysema. 8 mm diameter nodule in the right middle lobe. No pleural effusion or pneumothorax. No mediastinal adenopathy. CT chest 7/19/2017: 2 adjacent micronodules in the right middle lobe measuring 3 and 4 mm in size. No other pulmonary nodules are seen. No focal consolidation, pleural effusion or pneumothorax. No mediastinal or hilar adenopathy. Sleep Study:     Laboratory Evaluation:  Right thoracentesis fluid on 4/13/2022: Negative for malignancy     Right lung middle lobe wedge biopsy 1/4/2022: Adenocarcinoma measuring 1.8 cm carcinoma invades into but not through the visceral pleura. Lymph node #9 biopsies negative for metastatic carcinoma, lymph node #7 is negative for metastatic carcinoma, right middle lobe lobectomy margins are free of carcinoma involvement. Peribronchial lymph node is negative for metastatic carcinoma, nonneoplastic lung shows emphysematous changes.      Lung needle biopsy 7/28/2021: Benign lung parenchyma with focal fibrosis and mild chronic inflammation with intra-alveolar histiocytes    Immunizations:   Immunization History   Administered Date(s) Administered    COVID-19, MODERNA BLUE border, Primary or Immunocompromised, (age 12y+), IM, 100 mcg/0.5mL 03/09/2021, 04/06/2021, 07/19/2022    COVID-19, MODERNA Booster BLUE border, (age 18y+), IM, 50mcg/0.25mL 12/08/2021    Influenza Virus Vaccine 10/26/2016    Influenza, [address] (age 72 y+), High Dose, 0.7mL 12/04/2017, 11/11/2019, 10/08/2020, 10/01/2021    Influenza, High Dose (Fluzone 65 yrs and older) 12/20/2017, 12/06/2018, 11/10/2019    Pneumococcal Conjugate 13-valent ([id number]) 10/26/2016    Pneumococcal Polysaccharide ([id number]) 12/20/2017    Tdap (Boostrix, Adacel) 09/25/2020    Zoster Live (Zostavax) 10/26/2016        No flowsheet data found. BP (!) 108/56   Pulse 73   Temp 97.3 °F (36.3 °C)   Resp 16   Ht 6' (1.829 m)   Wt 159 lb 14.4 oz (72.5 kg)   SpO2 98%   BMI 21.69 kg/m²     Past Medical History:   Diagnosis Date    Abnormal cardiac cath 09/20/2013    LMCA; Mild irregularities 10-20%. LAD: Abnormal.  Mild to moderate irregularities throughout the LAD and branches. LCx:  abnormal.  80-90% stenosis in a moderate sized OM1. RCA: Abnormal.  40-50% mid RCA stenosis. Actinic keratosis     Arthritis     neck, fingers    CAD (coronary artery disease)     Calculus of kidney     Cancer (HCC)     Cervical stenosis of spine     Diabetes mellitus ([address]     Dr. Sky Man, CNP, [place]    Full dentures     GERD (gastroesophageal reflux disease)     H/O echocardiogram 04/29/2016    Stress echo. Normal resting LV systolic function,normal systolic restponse to exercise. No evidence of reversible  ischemia on this maximal,symptom limited,treadmill exerxiess stress echocardiography study    History of cardiac cath 02/09/2017    LMCA: Lesion on LMCA: Distal subsection . 20% stenosis. LAD: Lesion on 1st Diag: Mid subsection . 75% stenosis. RCA: Lesion on Mid RCA: Mid subsection . 100% stenosis. Comments: The distal RCA fills by left to right collateralization. Dominance: Mixed. LV function assessed as: Normal. EF 65%. History of echocardiogram 06/11/2018    EF 60%. Mildly increased LV wall thickness. Evidence of mild diastolic dysfunction seen. History of echocardiogram 01/18/2019    EF of 65%. LV wall thickness is mildly increased. Aortic valve leaflets are mildly thickened.      History of heart attack     History of tobacco abuse     Quit 10/2021, 1/4 ppd for 54 years    Hyperlipidemia     Hypertension     Dr. Sky Man, CNP    Pulmonary nodule 12/2021    Right middle lobe    Raynaud's phenomenon     S/P angioplasty with stent 09/20/2013    PTCA-NEDRA of  the OM 1    Wears hearing aid in both ears      Past Surgical History: Procedure Laterality Date    CARDIAC CATHETERIZATION  2007    patient states had 2 small blockages    CARDIAC CATHETERIZATION  02/09/2017    LMCA: Lesion on LMCA: Distal subsection 20% stenosis. LAD: Lesion on 1st Diag: Mid subsection 75% stenosis. RCA: Lesion on Mid RCA: Mid subsection 100% stenosis. CARDIAC CATHETERIZATION  02/09/2017    Attempted PCI to chronic total occlusion of RCA was not successful. Unable to cross with multiple wires due to heavy calcification and toruosity.     CARDIAC CATHETERIZATION  01/14/2019    CATARACT REMOVAL  04/06/2015    Right eye - Dr. Vanda Rodrigues  06/08/2015    Dr. Bonnie Jones - left eye    COLONOSCOPY  01/11/2006    Dr. Sid Mckeon - internal hemorrhoids, no polps    COLONOSCOPY  05/23/2016    -polyp    CT NEEDLE BIOPSY LUNG PERCUTANEOUS  07/28/2021    CT NEEDLE BIOPSY LUNG PERCUTANEOUS 7/28/2021 [facility] CT SCAN    HEMORRHOID SURGERY      HERNIA REPAIR Left     inguinal    IR CHEST TUBE INSERTION  01/11/2022    IR CHEST TUBE INSERTION 1/11/2022 Mayuri Mak MD [facility] SPECIAL PROCEDURES    IR PORT PLACEMENT EQUAL OR GREATER THAN 5 YEARS  2/9/2022    IR PORT PLACEMENT EQUAL OR GREATER THAN 5 YEARS 2/9/2022 [facility] SPECIAL PROCEDURES    LUNG BIOPSY Right 07/28/2021    Dr Guillermo Delgadillo, TOTAL Right 01/04/2022    MIDDLE WEDGE RESECTION, POSSIBLE MIDDLE LOBECTOMY VIA THORACOTOMY performed by Kan Delcid MD at [facility]    c4-5 laminectomy and fusion    OTHER SURGICAL HISTORY  10/2014    Right foot - 7 from heal and 1 from great toe at [address]  12/11/2014    pain injection - cervical    TUNNELED CENTRAL VENOUS CATHETER W/ SUBCUTANEOUS PORT Left 02/09/2022    Dr Hoenninger/[facility]    UPPER GASTROINTESTINAL ENDOSCOPY  12/28/2005    Dr. Sid Mckeon - mild esophagitis and gastritis    UPPER GASTROINTESTINAL ENDOSCOPY N/A 02/05/2019    -bx(neg H-Pylori)retained gastric content, possible submucosal mass of gastric cardia    UPPER GASTROINTESTINAL ENDOSCOPY N/A 10/22/2019    EGD BIOPSY performed by Meli Pedroza MD at [facility] History   Problem Relation Age of Onset    Heart Disease Mother     Diabetes Mother     High Blood Pressure Mother     Diabetes Father     Alzheimer's Disease Father     Heart Disease Sister         CABG    Heart Disease Brother     Heart Disease Brother     Heart Disease Brother     Cancer Brother        Social History     Socioeconomic History    Marital status:      Spouse name: Not on file    Number of children: Not on file    Years of education: Not on file    Highest education level: Not on file   Occupational History    Not on file   Tobacco Use    Smoking status: Former     Packs/day: 0.25     Years: 54.00     Pack years: 13.50     Types: Cigarettes     Quit date: 10/1/2021     Years since quittin.0    Smokeless tobacco: Never   Vaping Use    Vaping Use: Never used   Substance and Sexual Activity    Alcohol use: No    Drug use: No    Sexual activity: Not on file   Other Topics Concern    Not on file   Social History Narrative    Not on file     Social Determinants of Health     Financial Resource Strain: Not on file   Food Insecurity: Not on file   Transportation Needs: Not on file   Physical Activity: Not on file   Stress: Not on file   Social Connections: Not on file   Intimate Partner Violence: Not on file   Housing Stability: Not on file       Review of Systems   Constitutional:         Decreased quality of life secondary to chronic ongoing pain since having his lung resection   HENT: Negative. Eyes: Negative. Respiratory:          Denies any significant changes with his breathing. Endorses exertional dyspnea. No significant cough. Denies purulent secretions or hemoptysis.   He is describing pain that is chronic in nature to his anterior chest distal at the level of his diaphragm/lower rib cage/liver that has been present since January 2022. Pain is unrelenting and is not alleviated by pain medication, spinal injections or positioning. He has tried heat and cold applications with no improvement. Cardiovascular: Negative. Gastrointestinal: Negative. Endocrine: Negative. Genitourinary: Negative. Musculoskeletal: Negative. Skin: Negative. Allergic/Immunologic: Negative. Neurological: Negative. Hematological: Negative. Psychiatric/Behavioral: Negative. Objective:       Physical Exam  General appearance - alert, well appearing, and in no distress, oriented to person, place, and time, and normal appearing weight  Mental status - alert, oriented to person, place, and time, normal mood, behavior, speech, dress, motor activity, and thought processes  Eyes - pupils equal and reactive, extraocular eye movements intact  Ears -not examined  Nose - normal and patent, no erythema, discharge or polyps  Mouth - mucous membranes moist, pharynx normal without lesions  Neck - supple, no significant adenopathy  Chest -increased AP diameter, decreased thoracic expansion and excursion, prolonged expiratory phases noted. Diminished breath sounds in right base greater than left base. He has incisions to his right lateral chest wall that are well-healed with no erythremia, drainage or warmth. No tenderness along incision sites. Patient has pain to palpation across his lateral anterior lower lower right chest wall at the level of his lower rib cage/diaphragm/liver. There is no rebound tenderness. I am unable to elicit the pain by pushing elsewhere in his back/chest wall.   No obvious skin deformity, bruising at this level  Heart -normal rate, regular rhythm, normal S1, S2, no murmurs, rubs, clicks or gallops  Abdomen - soft, nontender, nondistended, no masses or organomegaly  Neuro- alert, oriented, normal speech, no focal findings or movement disorder noted}  Extremities - peripheral pulses normal, no pedal edema, no clubbing or cyanosis  Skin - normal coloration and turgor, no rashes, no suspicious skin lesions noted     Wt Readings from Last 3 Encounters:   10/13/22 159 lb 14.4 oz (72.5 kg)   10/11/22 156 lb (70.8 kg)   09/29/22 161 lb 8 oz (73.3 kg)       Results for orders placed or performed during the hospital encounter of 10/11/22   Iron and TIBC   Result Value Ref Range    Iron 35 (L) 59 - 158 ug/dL    TIBC 319 250 - 450 ug/dL    Iron Saturation 11 (L) 20 - 55 %    UIBC 284 112 - 347 ug/dL   Reticulocytes   Result Value Ref Range    Retic % 0.9 0.5 - 1.9 %    Absolute Retic # 0.035 0.030 - 0.080 M/uL    Immature Retic Fract 16.800 2.7 - 18.3 %    Retic Hemoglobin 26.9 (L) 28.2 - 35.7 pg   Ferritin   Result Value Ref Range    Ferritin 60 30 - 400 ng/mL   CBC   Result Value Ref Range    WBC 5.5 3.5 - 11.3 k/uL    RBC 3.86 (L) 4.21 - 5.77 m/uL    Hemoglobin 9.6 (L) 13.0 - 17.0 g/dL    Hematocrit 31.3 (L) 40.7 - 50.3 %    MCV 81.1 (L) 82.6 - 102.9 fL    MCH 24.9 (L) 25.2 - 33.5 pg    MCHC 30.7 28.4 - 34.8 g/dL    RDW 17.6 (H) 11.8 - 14.4 %    Platelets 690 231 - 628 k/uL    MPV 9.6 8.1 - 13.5 fL    NRBC Automated 0.0 0.0 per 100 WBC   Basic Metabolic Panel   Result Value Ref Range    Glucose 120 (H) 70 - 99 mg/dL    BUN 22 8 - 23 mg/dL    Creatinine 1.35 (H) 0.70 - 1.20 mg/dL    Est, Glom Filt Rate 56 (L) >60 mL/min/1.73m2    Bun/Cre Ratio 16 9 - 20    Calcium 9.4 8.6 - 10.4 mg/dL    Sodium 138 135 - 144 mmol/L    Potassium 4.5 3.7 - 5.3 mmol/L    Chloride 101 98 - 107 mmol/L    CO2 26 20 - 31 mmol/L    Anion Gap 11 9 - 17 mmol/L       XR ABDOMEN (KUB) (SINGLE AP VIEW)    Result Date: 9/14/2022  EXAMINATION: ONE SUPINE XRAY VIEW(S) OF THE ABDOMEN 9/14/2022 7:20 am COMPARISON: None. HISTORY: ORDERING SYSTEM PROVIDED HISTORY: Kidney stones TECHNOLOGIST PROVIDED HISTORY: kidney stones FINDINGS: Bowel gas pattern nonobstructed. Renal shadows partially obscured by bowel gas and fecal debris.   No definite renal or neural canal of thoracic region TECHNOLOGIST PROVIDED HISTORY: STAT Creatinine as needed:->No FINDINGS: BONES/ALIGNMENT: There is a normal thoracic kyphosis. The vertebral body heights are maintained. There is no spondylolisthesis. SPINAL CORD: There is an abrupt anterior displacement and flattening of the spinal cord at the T2-3 level. SOFT TISSUES:  The posterior paraspinal soft tissues are unremarkable. There is a moderate septated right-sided effusion. DEGENERATIVE CHANGES: There is multilevel degenerative disc disease with loss of disc signal.  There is no canal stenosis or foraminal narrowing. 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 Multilevel degenerative change without canal stenosis or foraminal narrowing. XR CHEST PORTABLE    Result Date: 9/28/2022  EXAMINATION: ONE XRAY VIEW OF THE CHEST 9/28/2022 10:13 am COMPARISON: 06/06/2022 HISTORY: ORDERING SYSTEM PROVIDED HISTORY: sycope TECHNOLOGIST PROVIDED HISTORY: sycope FINDINGS: Jorge catheter is unchanged in position. The cardial pericardial silhouette is unremarkable. Hydropneumothorax seen previously at the base has filled with fluid, now reflecting a moderate-sized pleural effusion. Jorge catheter is unchanged in position. Moderate-sized right pleural effusion. The component of the pneumothorax seen previous on the right is no longer detected. CT CHEST PULMONARY EMBOLISM W CONTRAST    Result Date: 9/28/2022  EXAMINATION: CTA OF THE CHEST 9/28/2022 2:19 pm TECHNIQUE: CTA of the chest was performed after the administration of intravenous contrast.  Multiplanar reformatted images are provided for review. MIP images are provided for review. Automated exposure control, iterative reconstruction, and/or weight based adjustment of the mA/kV was utilized to reduce the radiation dose to as low as reasonably achievable.  COMPARISON: Chest CT of 07/15/2022 HISTORY: ORDERING SYSTEM PROVIDED HISTORY: syncope TECHNOLOGIST PROVIDED HISTORY: syncope Decision Support Exception - unselect if not a suspected or confirmed emergency medical condition->Emergency Medical Condition (MA) FINDINGS: Pulmonary Arteries: Pulmonary arteries are adequately opacified for evaluation. No evidence of intraluminal filling defect to suggest pulmonary embolism. Main pulmonary artery is normal in caliber. Mediastinum: No evidence of mediastinal lymphadenopathy. Coronary arterial calcification. The heart and pericardium demonstrate no acute abnormality. There is no acute abnormality of the thoracic aorta. Right central line with distal tip within the distal SVC. Lungs/pleura: There are changes related to postoperative changes of right hilum and right lower lobe with atelectatic changes. Unchanged moderate size right pleural effusion with pleural thickening and nodularity. The lungs are otherwise without acute process. No pneumothorax. Upper Abdomen: Limited images of the upper abdomen are unremarkable. Soft Tissues/Bones: No acute bone or soft tissue abnormality. No evidence of pulmonary embolism. Postoperative changes of right lung and right hilum with atelectatic changes of the right lower lobe and stable complex moderate right pleural effusion with pleural thickening and nodularity. RECOMMENDATIONS: Unavailable     PET CT SKULL BASE TO MID THIGH    Addendum Date: 9/23/2022    ADDENDUM: Comparison: MRI of the abdomen of 5 August 2022; CT abdomen and pelvis 3 July 2022; CT abdomen and pelvis 7 January 2022 As stated in the original report there is normal physiologic uptake of FDG in the kidneys. There is no abnormal FDG uptake above the surrounding renal parenchyma in the location of the patient's questionable left renal lesion. The lesion is not FDG avid and demonstrates no specific for metabolic activity on today's study.   Given combination of the findings including a rounded lesion that has remained stable since January, findings at this time favor a benign process. However, continued monitoring of the lesion with contrast enhanced CT scanning of the abdomen and pelvis is advised with next CT advised in 6 months. Case discussed with referring oncologist at approximately 1115 hours, 23 September 2022     Result Date: 9/23/2022  EXAMINATION: WHOLE BODY PET/CT 9/14/2022 TECHNIQUE: Following IV injection of 14.9 mCi of F-18 FDG, PET  tumor imaging was acquired from the base of the skull to the mid thighs. Computed tomography was used for purposes of attenuation correction and anatomic localization. Fusion imaging was utilized for interpretation. Uptake time 50 min. Glucose level 173 mg/dl. COMPARISON: 30 June 2021 HISTORY: ORDERING SYSTEM PROVIDED HISTORY: Lung cancer, main bronchus, right [facility]) TECHNOLOGIST PROVIDED HISTORY: Left renal mass question metastasis from the lung versus RCC FINDINGS: HEAD/NECK: Normal physiologic uptake of FDG is noted in the brain parenchyma and salivary glands. No abnormal hypermetabolic foci are noted within the cervical lymph node chain or thoracic inlet. CHEST: Normal physiologic uptake of FDG is noted in the great vessels and left ventricular myocardium. Again noted is unchanged mildly increased FDG uptake with SUV maximum of 3.4 is noted. The hypermetabolic focus in the right middle lobe is not redemonstrated. However, the patient has a moderate right effusion with associated compressive atelectasis. There is mildly increased FDG uptake along the atelectatic lung, likely due to mild inflammation. No suspicious FDG uptake is noted within the lung parenchymal proper. ABDOMEN/PELVIS: Normal physiologic uptake of FDG is noted in the liver, spleen and urinary collecting systems. Bowel activity is within physiologic levels. No abnormal metabolic activity in the abdomen, pelvis or groin areas.  BONES/SOFT TISSUE: No suspicious osseous or subcutaneous soft tissue uptake is noted. INCIDENTAL CT FINDINGS: Moderate right pleural effusion. Atheromatous plaque in the aorta, coronary arteries and iliac arteries. Colonic diverticulosis. Multilevel degenerative changes in the spine. Infusion port buried under the left upper chest with catheter terminating in the superior vena cava. 1.  Continued small focus of increased metabolic activity in the right hilum similar to prior study. 2.  Prior hypermetabolic nodule in the right middle lobe is not redemonstrated. 3.  Moderate right pleural effusion, non-FDG avid. Associated atelectatic lung beneath the effusion shows evidence of mild uptake likely due to inflammation/compression. 4.  No suspicious FDG uptake in the abdomen, pelvis, neck or groin areas. Assessment:      1. Right middle lobe pulmonary nodule    2. Stopped smoking with greater than 40 pack year history    3. Mucopurulent chronic bronchitis (HCC)    4. Lung cancer, main bronchus, right ([address]    5. Pleural effusion on right    6. Right-sided chest pain          Plan:      Discussed with patient that I do think his pain may be in part contributed by the reaccumulation of fluid, patient is reluctant to have a thoracentesis done due to no alleviation of prior complaints of pain. Patient asking for pain relief- medication is not helping. Educated and clarified the medication use. Recommend flu vaccination in the fall annually. Patient is up-to-date with pneumococcal vaccinations from pulmonary perspective. Patient has received Covid vaccination. Recommended booster when eligible. Discussed strategies to protect against Covid 19. Maintain an active lifestyle. Patient's questions were answered to his satisfaction.   Pulmonary function tests were reviewed   CT scan of the chest was reviewed  We'll see the patient back in 1 months with Dr. Sameera Lawson-          Electronically signed by ALONDRA Arevalo CNP on 10/13/2022 at 5:07 PM

## 2022-10-13 NOTE — RESULT ENCOUNTER NOTE
Please notify patient that their lab results show he has low iron, I would like to start him on ferrous sulfate 325 mg daily. Please continue current treatment and follow up. Dr Brannon Dukes  I have started this mutual patient on iron supplementation based on his anemia evaluation. Please let me know if I can be of any further assistance.    Thank you  Elissa Dickerson PA-C

## 2022-10-13 NOTE — Clinical Note
Could you please review this chart, this patient has had ongoing complaint of pain since his surgical resection in January 2022. He has had recurrent pleural effusion and is status postthoracentesis x3, last 2 were reported to be bloody effusions however no analysis was sent. Thoracentesis in April was negative for malignancy but again no cell counts were sent. He was told that he did not need to follow-up any further with CT surgery. He is reluctant to have another thoracentesis done because it does not seem to help with his pain and he has had a pretty extensive work-up to try to determine the etiology of this pain. I suspect is related to the fluid reaccumulation but I am not sure what to offer him from here to help address his complaint of pain.

## 2022-10-14 ENCOUNTER — TELEPHONE (OUTPATIENT)
Dept: CARDIOLOGY | Age: 72
End: 2022-10-14

## 2022-10-14 NOTE — TELEPHONE ENCOUNTER
----- Message from Christopher Golden PA-C sent at 10/13/2022  3:56 PM EDT -----  Please notify patient that their lab results show he has low iron, I would like to start him on ferrous sulfate 325 mg daily. Please continue current treatment and follow up. Dr Antony Rodgers  I have started this mutual patient on iron supplementation based on his anemia evaluation. Please let me know if I can be of any further assistance.    Thank you  Christopher Golden PA-C

## 2022-10-18 NOTE — TELEPHONE ENCOUNTER
Called and informed the patient the response back from Dr. Linda Nichols and verbalized understanding. Reminder for f/u appt. Given .

## 2022-10-19 ENCOUNTER — HOSPITAL ENCOUNTER (OUTPATIENT)
Dept: CT IMAGING | Age: 72
Discharge: HOME | End: 2022-10-21
Payer: MEDICARE

## 2022-10-19 ENCOUNTER — HOSPITAL ENCOUNTER (OUTPATIENT)
Age: 72
Discharge: HOME | End: 2022-10-19
Payer: MEDICARE

## 2022-10-19 DIAGNOSIS — C34.01 LUNG CANCER, MAIN BRONCHUS, RIGHT (HCC): ICD-10-CM

## 2022-10-19 DIAGNOSIS — J90 PLEURAL EFFUSION ON RIGHT: ICD-10-CM

## 2022-10-19 PROCEDURE — 6360000004 HC RX CONTRAST MEDICATION: Performed by: INTERNAL MEDICINE

## 2022-10-19 PROCEDURE — 71260 CT THORAX DX C+: CPT

## 2022-10-19 RX ADMIN — IOPAMIDOL 75 ML: 755 INJECTION, SOLUTION INTRAVENOUS at 08:50

## 2022-10-24 RX ORDER — ISOSORBIDE MONONITRATE 30 MG/1
TABLET, EXTENDED RELEASE ORAL
Qty: 45 TABLET | Refills: 3 | Status: SHIPPED | OUTPATIENT
Start: 2022-10-24

## 2022-11-07 ENCOUNTER — OFFICE VISIT (OUTPATIENT)
Dept: UROLOGY | Age: 72
End: 2022-11-07
Payer: MEDICARE

## 2022-11-07 ENCOUNTER — HOSPITAL ENCOUNTER (OUTPATIENT)
Age: 72
Setting detail: SPECIMEN
Discharge: HOME | End: 2022-11-07
Payer: MEDICARE

## 2022-11-07 VITALS
BODY MASS INDEX: 21.54 KG/M2 | DIASTOLIC BLOOD PRESSURE: 60 MMHG | HEIGHT: 72 IN | RESPIRATION RATE: 18 BRPM | SYSTOLIC BLOOD PRESSURE: 108 MMHG | TEMPERATURE: 97.5 F | OXYGEN SATURATION: 98 % | WEIGHT: 159 LBS | HEART RATE: 64 BPM

## 2022-11-07 DIAGNOSIS — N40.1 BPH WITH OBSTRUCTION/LOWER URINARY TRACT SYMPTOMS: Primary | ICD-10-CM

## 2022-11-07 DIAGNOSIS — R35.0 FREQUENCY OF URINATION: ICD-10-CM

## 2022-11-07 DIAGNOSIS — N28.89 RENAL MASS: ICD-10-CM

## 2022-11-07 DIAGNOSIS — R35.1 NOCTURIA: ICD-10-CM

## 2022-11-07 DIAGNOSIS — N13.8 BPH WITH OBSTRUCTION/LOWER URINARY TRACT SYMPTOMS: ICD-10-CM

## 2022-11-07 DIAGNOSIS — N40.1 BPH WITH OBSTRUCTION/LOWER URINARY TRACT SYMPTOMS: ICD-10-CM

## 2022-11-07 DIAGNOSIS — N13.8 BPH WITH OBSTRUCTION/LOWER URINARY TRACT SYMPTOMS: Primary | ICD-10-CM

## 2022-11-07 PROCEDURE — G8427 DOCREV CUR MEDS BY ELIG CLIN: HCPCS | Performed by: NURSE PRACTITIONER

## 2022-11-07 PROCEDURE — 3078F DIAST BP <80 MM HG: CPT | Performed by: NURSE PRACTITIONER

## 2022-11-07 PROCEDURE — 87086 URINE CULTURE/COLONY COUNT: CPT

## 2022-11-07 PROCEDURE — G8484 FLU IMMUNIZE NO ADMIN: HCPCS | Performed by: NURSE PRACTITIONER

## 2022-11-07 PROCEDURE — 51798 US URINE CAPACITY MEASURE: CPT | Performed by: NURSE PRACTITIONER

## 2022-11-07 PROCEDURE — 3017F COLORECTAL CA SCREEN DOC REV: CPT | Performed by: NURSE PRACTITIONER

## 2022-11-07 PROCEDURE — G8420 CALC BMI NORM PARAMETERS: HCPCS | Performed by: NURSE PRACTITIONER

## 2022-11-07 PROCEDURE — PBSHW PR MEASUREMENT,POST-VOID RESIDUAL VOLUME BY US,NON-IMAGING: Performed by: NURSE PRACTITIONER

## 2022-11-07 PROCEDURE — 1123F ACP DISCUSS/DSCN MKR DOCD: CPT | Performed by: NURSE PRACTITIONER

## 2022-11-07 PROCEDURE — 3074F SYST BP LT 130 MM HG: CPT | Performed by: NURSE PRACTITIONER

## 2022-11-07 PROCEDURE — 1036F TOBACCO NON-USER: CPT | Performed by: NURSE PRACTITIONER

## 2022-11-07 PROCEDURE — 99214 OFFICE O/P EST MOD 30 MIN: CPT | Performed by: NURSE PRACTITIONER

## 2022-11-07 RX ORDER — TAMSULOSIN HYDROCHLORIDE 0.4 MG/1
0.4 CAPSULE ORAL 2 TIMES DAILY
Qty: 60 CAPSULE | Refills: 2 | Status: SHIPPED | OUTPATIENT
Start: 2022-11-07

## 2022-11-07 ASSESSMENT — ENCOUNTER SYMPTOMS
NAUSEA: 0
COLOR CHANGE: 0
SHORTNESS OF BREATH: 0
VOMITING: 0
ABDOMINAL PAIN: 1
CONSTIPATION: 1
EYE REDNESS: 0
BACK PAIN: 0
WHEEZING: 0
COUGH: 0

## 2022-11-07 NOTE — PROGRESS NOTES
HPI:          Patient is a 67 y.o. male in no acute distress. He is alert and oriented to person, place, and time. History  Lung cancer, started on pain medication    7/2022 ER referral for urinary retention. Straight cathed for 400ml. Retention secondary due to constipation due to pain medication   Flomax started      Miralax started    CT with IV contrast showed a 2.1cm mass in the left kidney - patient wants oncology to follow    8/2022 MRI showed a 1.8cm left renal mass - followed by oncology    Today  Here today to follow-up for urinary retention, constipation and renal mass. He is having daily BMs without miralax except when he takes pain medication. He is taking flomax daily. He has had worsening frequency every hour during the day and nocturia 3-4 times per night. He denies dysuria or gross hematuria. His worsening urinary symptoms started approximately 1 month ago. PVR is low. MRI with and without contrast showed no  calcifications. There is a 1.8cm left renal mass in the midpole and enhances, concerning for malignancy. Oncology had ordered a PET CT following MRI results. There was no abnormal metabolic activity in the kidneys. The radiologist does recommend a short-term follow-up in 3-6 months. He does have an ultrasound ordered in December. He has been having significant abdominal pain that he is seeing [facility] for. Past Medical History:   Diagnosis Date    Abnormal cardiac cath 09/20/2013    LMCA; Mild irregularities 10-20%. LAD: Abnormal.  Mild to moderate irregularities throughout the LAD and branches. LCx:  abnormal.  80-90% stenosis in a moderate sized OM1. RCA: Abnormal.  40-50% mid RCA stenosis.     Actinic keratosis     Arthritis     neck, fingers    CAD (coronary artery disease)     Calculus of kidney     Cancer (HCC)     Cervical stenosis of spine     Diabetes mellitus ([facility] [address]     Dr. Garg Friend, CNP, [place]    Full dentures     GERD (gastroesophageal reflux disease) H/O echocardiogram 04/29/2016    Stress echo. Normal resting LV systolic function,normal systolic restponse to exercise. No evidence of reversible  ischemia on this maximal,symptom limited,treadmill exerxiess stress echocardiography study    History of cardiac cath 02/09/2017    LMCA: Lesion on LMCA: Distal subsection . 20% stenosis. LAD: Lesion on 1st Diag: Mid subsection . 75% stenosis. RCA: Lesion on Mid RCA: Mid subsection . 100% stenosis. Comments: The distal RCA fills by left to right collateralization. Dominance: Mixed. LV function assessed as: Normal. EF 65%. History of echocardiogram 06/11/2018    EF 60%. Mildly increased LV wall thickness. Evidence of mild diastolic dysfunction seen. History of echocardiogram 01/18/2019    EF of 65%. LV wall thickness is mildly increased. Aortic valve leaflets are mildly thickened. History of heart attack     History of tobacco abuse     Quit 10/2021, 1/4 ppd for 54 years    Hyperlipidemia     Hypertension     Dr. Leticia Tabares, CNP    Pulmonary nodule 12/2021    Right middle lobe    Raynaud's phenomenon     S/P angioplasty with stent 09/20/2013    PTCA-NEDRA of  the OM 1    Wears hearing aid in both ears      Past Surgical History:   Procedure Laterality Date    CARDIAC CATHETERIZATION  2007    patient states had 2 small blockages    CARDIAC CATHETERIZATION  02/09/2017    LMCA: Lesion on LMCA: Distal subsection 20% stenosis. LAD: Lesion on 1st Diag: Mid subsection 75% stenosis. RCA: Lesion on Mid RCA: Mid subsection 100% stenosis. CARDIAC CATHETERIZATION  02/09/2017    Attempted PCI to chronic total occlusion of RCA was not successful. Unable to cross with multiple wires due to heavy calcification and toruosity.     CARDIAC CATHETERIZATION  01/14/2019    CATARACT REMOVAL  04/06/2015    Right eye - Dr. Pennie Esteves  06/08/2015    Dr. Shoaib Ballesteros - left eye    COLONOSCOPY  01/11/2006    Dr. Reyes Yalobusha - internal hemorrhoids, no polps    COLONOSCOPY  05/23/2016 -polyp    CT NEEDLE BIOPSY LUNG PERCUTANEOUS  07/28/2021    CT NEEDLE BIOPSY LUNG PERCUTANEOUS 7/28/2021 [facility] CT SCAN    HEMORRHOID SURGERY      HERNIA REPAIR Left     inguinal    IR CHEST TUBE INSERTION  01/11/2022    IR CHEST TUBE INSERTION 1/11/2022 Claudio Melton MD [facility] SPECIAL PROCEDURES    IR PORT PLACEMENT EQUAL OR GREATER THAN 5 YEARS  2/9/2022    IR PORT PLACEMENT EQUAL OR GREATER THAN 5 YEARS 2/9/2022 [facility] SPECIAL PROCEDURES    LUNG BIOPSY Right 07/28/2021    Dr Thomas/[address], TOTAL Right 01/04/2022    MIDDLE WEDGE RESECTION, POSSIBLE MIDDLE LOBECTOMY VIA THORACOTOMY performed by Flaco Delgado MD at [facility]    c4-5 laminectomy and fusion    OTHER SURGICAL HISTORY  10/2014    Right foot - 7 from heal and 1 from great toe at [address]  12/11/2014    pain injection - cervical    TUNNELED CENTRAL VENOUS CATHETER W/ SUBCUTANEOUS PORT Left 02/09/2022    Dr Hoenninger/[facility]    UPPER GASTROINTESTINAL ENDOSCOPY  12/28/2005    Dr. Francine Quinonez - mild esophagitis and gastritis    UPPER GASTROINTESTINAL ENDOSCOPY N/A 02/05/2019    -bx(neg H-Pylori)retained gastric content, possible submucosal mass of gastric cardia    UPPER GASTROINTESTINAL ENDOSCOPY N/A 10/22/2019    EGD BIOPSY performed by No Delgado MD at [facility] Encounter Medications as of 11/7/2022   Medication Sig Dispense Refill    tamsulosin (FLOMAX) 0.4 MG capsule Take 1 capsule by mouth in the morning and at bedtime 60 capsule 2    isosorbide mononitrate (IMDUR) 30 MG extended release tablet TAKE 1/2 TABLET BY MOUTH DAILY 45 tablet 3    ferrous sulfate (IRON 325) 325 (65 Fe) MG tablet Take 1 tablet by mouth daily (with breakfast) 90 tablet 1    metFORMIN (GLUCOPHAGE) 1000 MG tablet Take 1 tablet by mouth 2 times daily (with meals) 60 tablet 3    SITagliptin (JANUVIA) 50 MG tablet Take 1 tablet by mouth daily 90 tablet 1    clotrimazole-betamethasone (LOTRISONE) 1-0.05 % cream Apply topically daily To both feet      atenolol (TENORMIN) 25 MG tablet Take 1 tablet by mouth daily 90 tablet 3    Continuous Blood Gluc Sensor (FREESTYLE YAIMA 14 DAY SENSOR) MISC as directed      amLODIPine (NORVASC) 5 MG tablet Take 1 tablet by mouth daily 90 tablet 3    magnesium 30 MG tablet Take 400 mg by mouth daily       clopidogrel (PLAVIX) 75 MG tablet Take 1 tablet by mouth daily 90 tablet 3    zinc 50 MG CAPS Take 50 mg by mouth daily (with breakfast)      promethazine (PHENERGAN) 25 MG tablet Take 25 mg by mouth every 4 hours as needed for Nausea      pantoprazole (PROTONIX) 40 MG tablet Take 40 mg by mouth in the morning and at bedtime      ondansetron (ZOFRAN ODT) 8 MG TBDP disintegrating tablet Take 1 tablet by mouth every 8 hours as needed for Nausea 30 tablet 2    atorvastatin (LIPITOR) 20 MG tablet Take 1 tablet by mouth daily 90 tablet 3    glimepiride (AMARYL) 2 MG tablet Take 1 tablet by mouth 2 times daily 180 tablet 3    aspirin 81 MG tablet Take 81 mg by mouth daily Ordered by heart doctor, Chan Lopez      nitroGLYCERIN (NITRODUR) 0.4 MG/HR 1 dose under tongue as needed for chest pain. max of 3 in one day (Patient not taking: No sig reported) 30 patch 2    morphine (MSIR) 15 MG tablet Take 15 mg by mouth every 6 hours as needed for Pain. (Patient not taking: No sig reported)      lidocaine-prilocaine (EMLA) 2.5-2.5 % cream Apply topically to port site 30- 60 minutes before access as needed. (Patient not taking: No sig reported) 30 g 2    [DISCONTINUED] folic acid (FOLVITE) 1 MG tablet Take 1 tablet by mouth daily for 30 doses Start 7 days before first scheduled dose and continue for 21 days after last dose of PEMEtrexed.  30 tablet 2    [DISCONTINUED] tamsulosin (FLOMAX) 0.4 MG capsule Take 0.4 mg by mouth daily      Insulin Glargine, 2 Unit Dial, 300 UNIT/ML SOPN Insulin Glargine Insulin Glargine U-300 Conc Active 10 UNIT Subcutaneous Every morning May 7th, 2020 11:47am 05-  [facility] ([id number]) (Patient not taking: No sig reported)       No facility-administered encounter medications on file as of 11/7/2022. Current Outpatient Medications on File Prior to Visit   Medication Sig Dispense Refill    isosorbide mononitrate (IMDUR) 30 MG extended release tablet TAKE 1/2 TABLET BY MOUTH DAILY 45 tablet 3    ferrous sulfate (IRON 325) 325 (65 Fe) MG tablet Take 1 tablet by mouth daily (with breakfast) 90 tablet 1    metFORMIN (GLUCOPHAGE) 1000 MG tablet Take 1 tablet by mouth 2 times daily (with meals) 60 tablet 3    SITagliptin (JANUVIA) 50 MG tablet Take 1 tablet by mouth daily 90 tablet 1    clotrimazole-betamethasone (LOTRISONE) 1-0.05 % cream Apply topically daily To both feet      atenolol (TENORMIN) 25 MG tablet Take 1 tablet by mouth daily 90 tablet 3    Continuous Blood Gluc Sensor (AncestryYLE YAIMA 14 DAY SENSOR) MISC as directed      amLODIPine (NORVASC) 5 MG tablet Take 1 tablet by mouth daily 90 tablet 3    magnesium 30 MG tablet Take 400 mg by mouth daily       clopidogrel (PLAVIX) 75 MG tablet Take 1 tablet by mouth daily 90 tablet 3    zinc 50 MG CAPS Take 50 mg by mouth daily (with breakfast)      promethazine (PHENERGAN) 25 MG tablet Take 25 mg by mouth every 4 hours as needed for Nausea      pantoprazole (PROTONIX) 40 MG tablet Take 40 mg by mouth in the morning and at bedtime      ondansetron (ZOFRAN ODT) 8 MG TBDP disintegrating tablet Take 1 tablet by mouth every 8 hours as needed for Nausea 30 tablet 2    atorvastatin (LIPITOR) 20 MG tablet Take 1 tablet by mouth daily 90 tablet 3    glimepiride (AMARYL) 2 MG tablet Take 1 tablet by mouth 2 times daily 180 tablet 3    aspirin 81 MG tablet Take 81 mg by mouth daily Ordered by heart doctor, Chan Moses      nitroGLYCERIN (NITRODUR) 0.4 MG/HR 1 dose under tongue as needed for chest pain.  max of 3 in one day (Patient not taking: No sig reported) 30 patch 2    morphine (MSIR) 15 MG tablet Take 15 mg by mouth every 6 hours as needed for Pain. (Patient not taking: No sig reported)      lidocaine-prilocaine (EMLA) 2.5-2.5 % cream Apply topically to port site 30- 60 minutes before access as needed. (Patient not taking: No sig reported) 30 g 2    [DISCONTINUED] folic acid (FOLVITE) 1 MG tablet Take 1 tablet by mouth daily for 30 doses Start 7 days before first scheduled dose and continue for 21 days after last dose of PEMEtrexed. 30 tablet 2    Insulin Glargine, 2 Unit Dial, 300 UNIT/ML SOPN Insulin Glargine Insulin Glargine U-300 Conc Active 10 UNIT Subcutaneous Every morning May 7th, 2020 11:47am 2020  [facility] ([id number]) (Patient not taking: No sig reported)       No current facility-administered medications on file prior to visit. Niacin and related, Morphine, Oxycodone, Minocycline, Minocycline hcl, and Other  Family History   Problem Relation Age of Onset    Heart Disease Mother     Diabetes Mother     High Blood Pressure Mother     Diabetes Father     Alzheimer's Disease Father     Heart Disease Sister         CABG    Heart Disease Brother     Heart Disease Brother     Heart Disease Brother     Cancer Brother      Social History     Tobacco Use   Smoking Status Former    Packs/day: 0.25    Years: 54.00    Pack years: 13.50    Types: Cigarettes    Quit date: 10/1/2021    Years since quittin.1   Smokeless Tobacco Never       Social History     Substance and Sexual Activity   Alcohol Use No       Review of Systems   Constitutional:  Negative for appetite change, chills and fever. Eyes:  Negative for redness and visual disturbance. Respiratory:  Negative for cough, shortness of breath and wheezing. Cardiovascular:  Negative for chest pain and leg swelling. Gastrointestinal:  Positive for abdominal pain and constipation. Negative for nausea and vomiting.    Genitourinary:  Positive for frequency and urgency. Negative for decreased urine volume, difficulty urinating, dysuria, enuresis, flank pain, hematuria, penile discharge, penile pain, scrotal swelling and testicular pain. Musculoskeletal:  Negative for back pain, joint swelling and myalgias. Skin:  Negative for color change, rash and wound. Neurological:  Negative for dizziness, tremors and numbness. Hematological:  Negative for adenopathy. Does not bruise/bleed easily. /60 (Site: Right Upper Arm, Position: Sitting, Cuff Size: Medium Adult)   Pulse 64   Temp 97.5 °F (36.4 °C)   Resp 18   Ht 6' (1.829 m)   Wt 159 lb (72.1 kg)   SpO2 98%   BMI 21.56 kg/m²       PHYSICAL EXAM:  Constitutional: Patient in no acute distress; Neuro: alert and oriented to person place and time. Psych: Mood and affect normal.  Skin: Normal  Lungs: Respiratory effort normal  Cardiovascular:  Normal peripheral pulses  Abdomen: Soft, tender, non-distended with no CVA, flank pain  Bladder non-tender and not distended. Lab Results   Component Value Date    BUN 22 10/11/2022     Lab Results   Component Value Date    CREATININE 1.35 (H) 10/11/2022     Lab Results   Component Value Date    PSA 0.97 08/03/2022    PSA 0.57 08/02/2013    PSA 0.43 08/17/2012       ASSESSMENT:   Diagnosis Orders   1. BPH with obstruction/lower urinary tract symptoms  MO MEASUREMENT,POST-VOID RESIDUAL VOLUME BY US,NON-IMAGING    Culture, Urine      2. Nocturia  MO MEASUREMENT,POST-VOID RESIDUAL VOLUME BY US,NON-IMAGING    Culture, Urine      3. Frequency of urination  MO MEASUREMENT,POST-VOID RESIDUAL VOLUME BY US,NON-IMAGING    Culture, Urine      4.  Renal mass              PLAN:  Urine culture    Increase flomax to BID    F/U in 6 weeks, after renal US is completed

## 2022-11-08 LAB
CULTURE: NO GROWTH
SPECIMEN DESCRIPTION: NORMAL

## 2022-11-09 ENCOUNTER — HOSPITAL ENCOUNTER (OUTPATIENT)
Dept: INTERVENTIONAL RADIOLOGY/VASCULAR | Age: 72
Discharge: HOME | End: 2022-11-09
Attending: RADIOLOGY | Admitting: RADIOLOGY
Payer: MEDICARE

## 2022-11-09 ENCOUNTER — TELEPHONE (OUTPATIENT)
Dept: UROLOGY | Age: 72
End: 2022-11-09

## 2022-11-09 VITALS
SYSTOLIC BLOOD PRESSURE: 104 MMHG | HEIGHT: 72 IN | OXYGEN SATURATION: 98 % | TEMPERATURE: 97.3 F | RESPIRATION RATE: 16 BRPM | HEART RATE: 60 BPM | BODY MASS INDEX: 21.54 KG/M2 | DIASTOLIC BLOOD PRESSURE: 56 MMHG | WEIGHT: 159 LBS

## 2022-11-09 PROCEDURE — 2500000003 HC RX 250 WO HCPCS

## 2022-11-09 PROCEDURE — 77001 FLUOROGUIDE FOR VEIN DEVICE: CPT

## 2022-11-09 PROCEDURE — 2709999900 IR REMOVE TUNNELED CVAD W SQ PORT/PUMP INSERT

## 2022-11-09 PROCEDURE — 36590 REMOVAL TUNNELED CV CATH: CPT

## 2022-11-09 PROCEDURE — 2500000003 HC RX 250 WO HCPCS: Performed by: RADIOLOGY

## 2022-11-09 RX ORDER — LIDOCAINE HYDROCHLORIDE 10 MG/ML
INJECTION, SOLUTION EPIDURAL; INFILTRATION; INTRACAUDAL; PERINEURAL
Status: COMPLETED | OUTPATIENT
Start: 2022-11-09 | End: 2022-11-09

## 2022-11-09 RX ADMIN — LIDOCAINE HYDROCHLORIDE 7 ML: 10 INJECTION, SOLUTION EPIDURAL; INFILTRATION; INTRACAUDAL; PERINEURAL at 11:49

## 2022-11-09 RX ADMIN — LIDOCAINE HYDROCHLORIDE 1 ML: 10 INJECTION, SOLUTION EPIDURAL; INFILTRATION; INTRACAUDAL; PERINEURAL at 11:50

## 2022-11-09 ASSESSMENT — PAIN - FUNCTIONAL ASSESSMENT: PAIN_FUNCTIONAL_ASSESSMENT: 0-10

## 2022-11-09 ASSESSMENT — PAIN DESCRIPTION - ORIENTATION: ORIENTATION: RIGHT

## 2022-11-09 ASSESSMENT — PAIN DESCRIPTION - DESCRIPTORS
DESCRIPTORS: ACHING
DESCRIPTORS: ACHING

## 2022-11-09 ASSESSMENT — PAIN DESCRIPTION - PAIN TYPE: TYPE: CHRONIC PAIN;SURGICAL PAIN

## 2022-11-09 ASSESSMENT — PAIN SCALES - GENERAL: PAINLEVEL_OUTOF10: 4

## 2022-11-09 ASSESSMENT — PAIN DESCRIPTION - LOCATION: LOCATION: CHEST;RIB CAGE

## 2022-11-09 NOTE — DISCHARGE INSTRUCTIONS
DISCHARGE INSTRUCTIONS FOR PORTCATH Removal    WOUND CARE:  Your wound was closed using sutures inside which will dissolve on their own. The incision is held together with derma bond and covered with a tegaderm and telfa. This clear bandage may be changed after 24 hours and daily until healed. You may shower after 24 hours but keep dressing covered with clear dressing to keep dry. No baths or swimming at this time. Some patients are allergic to the dressing and notice a rash; if this happens notify your physician. After showering pat incision area dry. If steri strips do not fall off after 1 wk please remove them from the incision. DIET:    Resume your normal diet. ACTIVITY:   You may resume your normal activity after 24 hours. No exercising, lifting heavy objects or strenuous activity for 7 days. PAIN:   You may use Tylenol for pain or apply ice to the site on for 20 min each hour x 24 hours. WHEN TO CALL PHYSICIAN:  Fever greater than 101.5 and chills. Swelling or severe pain in arm/leg on side of port. Bleeding, redness, drainage or swelling at or around the port. Call 911 for any shortness of breath, severe bleeding or chest pain.

## 2022-11-09 NOTE — TELEPHONE ENCOUNTER
----- Message from ALONDRA Ortiz - CNP sent at 11/9/2022  9:10 AM EST -----  Call pt - urine cx reviewed and negative for UTI

## 2022-11-16 ENCOUNTER — OFFICE VISIT (OUTPATIENT)
Dept: PULMONOLOGY | Age: 72
End: 2022-11-16
Payer: MEDICARE

## 2022-11-16 VITALS
WEIGHT: 160.3 LBS | SYSTOLIC BLOOD PRESSURE: 95 MMHG | DIASTOLIC BLOOD PRESSURE: 51 MMHG | HEIGHT: 72 IN | OXYGEN SATURATION: 99 % | HEART RATE: 67 BPM | BODY MASS INDEX: 21.71 KG/M2 | RESPIRATION RATE: 16 BRPM | TEMPERATURE: 96.9 F

## 2022-11-16 DIAGNOSIS — C34.2 PRIMARY ADENOCARCINOMA OF MIDDLE LOBE OF RIGHT LUNG (HCC): ICD-10-CM

## 2022-11-16 DIAGNOSIS — Z87.891 STOPPED SMOKING WITH GREATER THAN 40 PACK YEAR HISTORY: ICD-10-CM

## 2022-11-16 DIAGNOSIS — G89.12 POST-THORACOTOMY PAIN SYNDROME: Primary | ICD-10-CM

## 2022-11-16 DIAGNOSIS — J90 PLEURAL EFFUSION ON RIGHT: ICD-10-CM

## 2022-11-16 PROCEDURE — 3017F COLORECTAL CA SCREEN DOC REV: CPT | Performed by: INTERNAL MEDICINE

## 2022-11-16 PROCEDURE — 3078F DIAST BP <80 MM HG: CPT | Performed by: INTERNAL MEDICINE

## 2022-11-16 PROCEDURE — 1036F TOBACCO NON-USER: CPT | Performed by: INTERNAL MEDICINE

## 2022-11-16 PROCEDURE — 99214 OFFICE O/P EST MOD 30 MIN: CPT | Performed by: INTERNAL MEDICINE

## 2022-11-16 PROCEDURE — G8484 FLU IMMUNIZE NO ADMIN: HCPCS | Performed by: INTERNAL MEDICINE

## 2022-11-16 PROCEDURE — G8427 DOCREV CUR MEDS BY ELIG CLIN: HCPCS | Performed by: INTERNAL MEDICINE

## 2022-11-16 PROCEDURE — 3074F SYST BP LT 130 MM HG: CPT | Performed by: INTERNAL MEDICINE

## 2022-11-16 PROCEDURE — 99213 OFFICE O/P EST LOW 20 MIN: CPT | Performed by: INTERNAL MEDICINE

## 2022-11-16 PROCEDURE — 1123F ACP DISCUSS/DSCN MKR DOCD: CPT | Performed by: INTERNAL MEDICINE

## 2022-11-16 PROCEDURE — G8420 CALC BMI NORM PARAMETERS: HCPCS | Performed by: INTERNAL MEDICINE

## 2022-11-16 ASSESSMENT — ENCOUNTER SYMPTOMS
RESPIRATORY NEGATIVE: 1
EYES NEGATIVE: 1

## 2022-11-16 NOTE — PATIENT INSTRUCTIONS
SURVEY:    You may be receiving a survey from [employer] regarding your visit today. Please complete the survey to enable us to provide the highest quality of care to you and your family. If you cannot score us a very good on any question, please call the office to discuss how we could have made your experience a very good one. Thank you.

## 2022-11-17 NOTE — PROGRESS NOTES
Subjective:      Patient ID: Nicho Kirk is a 67 y.o. 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 Unfortunately, procedure complicated by severe, chronic postthoracotomy pain syndrome. States that he has been to pain therapy without much relief. Had several injections. Most recent CT scan of the chest shows no evidence for recurrence however there is loculated fluid in the region of the right lung base. He has had thoracenteses which have been negative. Currently following at [facility] in [place]. Has a follow-up appointment with a pain specialist.  Completed adjuvant chemotherapy. Patient has received his COVID and flu shot. Review of Systems   Constitutional: Negative. HENT: Negative. Eyes: Negative. Respiratory: Negative. Cardiovascular:  Positive for chest pain. All other systems reviewed and are negative.     Objective:     Vitals:    11/16/22 1258   BP: (!) 95/51   Pulse: 67   Resp: 16   Temp: 96.9 °F (36.1 °C)   SpO2: 99%   Weight: 160 lb 4.8 oz (72.7 kg)   Height: 6' (1.829 m)     Current Outpatient Medications   Medication Sig Dispense Refill    tamsulosin (FLOMAX) 0.4 MG capsule Take 1 capsule by mouth in the morning and at bedtime 60 capsule 2    isosorbide mononitrate (IMDUR) 30 MG extended release tablet TAKE 1/2 TABLET BY MOUTH DAILY 45 tablet 3    ferrous sulfate (IRON 325) 325 (65 Fe) MG tablet Take 1 tablet by mouth daily (with breakfast) 90 tablet 1    metFORMIN (GLUCOPHAGE) 1000 MG tablet Take 1 tablet by mouth 2 times daily (with meals) 60 tablet 3    SITagliptin (JANUVIA) 50 MG tablet Take 1 tablet by mouth daily (Patient taking differently: Take 100 mg by mouth daily) 90 tablet 1    morphine (MSIR) 15 MG tablet Take 15 mg by mouth every 6 hours as needed for Pain. clotrimazole-betamethasone (LOTRISONE) 1-0.05 % cream Apply topically daily To both feet      atenolol (TENORMIN) 25 MG tablet Take 1 tablet by mouth daily 90 tablet 3    amLODIPine (NORVASC) 5 MG tablet Take 1 tablet by mouth daily 90 tablet 3    magnesium 30 MG tablet Take 400 mg by mouth daily       clopidogrel (PLAVIX) 75 MG tablet Take 1 tablet by mouth daily 90 tablet 3    zinc 50 MG CAPS Take 50 mg by mouth daily (with breakfast)      pantoprazole (PROTONIX) 40 MG tablet Take 40 mg by mouth in the morning and at bedtime      ondansetron (ZOFRAN ODT) 8 MG TBDP disintegrating tablet Take 1 tablet by mouth every 8 hours as needed for Nausea 30 tablet 2    atorvastatin (LIPITOR) 20 MG tablet Take 1 tablet by mouth daily 90 tablet 3    glimepiride (AMARYL) 2 MG tablet Take 1 tablet by mouth 2 times daily 180 tablet 3    aspirin 81 MG tablet Take 81 mg by mouth daily Ordered by heart doctor, Dr. Elayne Lewis, [place]      nitroGLYCERIN (NITRODUR) 0.4 MG/HR 1 dose under tongue as needed for chest pain. max of 3 in one day (Patient not taking: No sig reported) 30 patch 2    Continuous Blood Gluc Sensor (App.ioSTYLE YAIMA 14 DAY SENSOR) MISC as directed      promethazine (PHENERGAN) 25 MG tablet Take 25 mg by mouth every 4 hours as needed for Nausea (Patient not taking: Reported on 11/16/2022)      lidocaine-prilocaine (EMLA) 2.5-2.5 % cream Apply topically to port site 30- 60 minutes before access as needed. (Patient not taking: No sig reported) 30 g 2    Insulin Glargine, 2 Unit Dial, 300 UNIT/ML SOPN Insulin Glargine Insulin Glargine U-300 Conc Active 10 UNIT Subcutaneous Every morning May 7th, 2020 11:47am 05-  [facility] ([id number]) (Patient not taking: No sig reported)       No current facility-administered medications for this visit. Physical Exam  Vitals and nursing note reviewed.    Constitutional:       Appearance: He is well-developed. HENT:      Nose: Nose normal. No congestion. Mouth/Throat:      Mouth: Mucous membranes are moist.      Pharynx: Oropharynx is clear. No oropharyngeal exudate. Eyes:      General: No scleral icterus. Conjunctiva/sclera: Conjunctivae normal.   Neck:      Thyroid: No thyromegaly. Vascular: No JVD. Trachea: No tracheal deviation. Cardiovascular:      Rate and Rhythm: Normal rate and regular rhythm. Heart sounds: Normal heart sounds. No murmur heard. No gallop. Pulmonary:      Effort: Pulmonary effort is normal. No respiratory distress. Breath sounds: No wheezing or rales. Comments: Well-healed right thoracotomy. Some dullness to percussion at the right lung base. Chest:      Chest wall: Tenderness present. Abdominal:      Palpations: Abdomen is soft. Tenderness: There is no abdominal tenderness. Musculoskeletal:      Cervical back: Neck supple. Right lower leg: No edema. Left lower leg: No edema. Comments: No clubbing   Lymphadenopathy:      Cervical: No cervical adenopathy. Skin:     General: Skin is warm and dry. Neurological:      Mental Status: He is alert and oriented to person, place, and time. Wt Readings from Last 3 Encounters:   11/16/22 160 lb 4.8 oz (72.7 kg)   11/09/22 159 lb (72.1 kg)   11/07/22 159 lb (72.1 kg)     Results for orders placed or performed during the hospital encounter of 11/07/22   Culture, Urine    Specimen: Urine, clean catch   Result Value Ref Range    Specimen Description . CLEAN CATCH URINE     Culture NO GROWTH        :      1. Post-thoracotomy pain syndrome    2. Primary adenocarcinoma of middle lobe of right lung ([address]    3. Pleural effusion on right    4.  Stopped smoking with greater than 40 pack year history      Patient Active Problem List   Diagnosis    Angina pectoris (HCC)    ASHD (arteriosclerotic heart disease)    Primary hypertension    Type 2 diabetes mellitus without complication, without long-term current use of insulin (HCC)    Hyperlipidemia    Shortness of breath, post procedure improving probably Effient side effect. S/P angioplasty with stent, obtuse marginal    Type II or unspecified type diabetes mellitus without mention of complication, not stated as uncontrolled    Calculus of kidney    Actinic keratosis    Senile nuclear sclerosis    Dyspepsia    Epigastric pain    Substernal chest pain    Abnormal CT scan, stomach    Abnormal gastrointestinal PET scan    Strain of lumbar region    Pneumothorax after biopsy    Pneumothorax, post biopsy, right    Acute delirium    Hyponatremia    Postoperative ileus (HCC)    S/P thoracotomy    Lung cancer, main bronchus, right ([address]    Encounter for care related to vascular access port    Nosebleed    Chemotherapy adverse reaction    Pleural effusion on right    Right-sided chest pain    BPH with obstruction/lower urinary tract symptoms    Renal mass    Syncope and collapse         Plan:      Encouraged him to follow-up at [facility]. Follow-up with oncology. Encouraged regular exercise. Return in 4 months. No orders of the defined types were placed in this encounter. No orders of the defined types were placed in this encounter. Return in about 4 months (around 3/16/2023).        Electronically signed by Ele Villalba DO on 11/16/2022at 9:57 PM

## 2022-11-21 ENCOUNTER — HOSPITAL ENCOUNTER (OUTPATIENT)
Dept: CT IMAGING | Age: 72
Discharge: HOME | End: 2022-11-23
Payer: MEDICARE

## 2022-11-21 ENCOUNTER — TELEPHONE (OUTPATIENT)
Dept: ONCOLOGY | Age: 72
End: 2022-11-21

## 2022-11-21 DIAGNOSIS — R19.09 GROIN SWELLING: ICD-10-CM

## 2022-11-21 DIAGNOSIS — R10.829 REBOUND ABDOMINAL TENDERNESS: ICD-10-CM

## 2022-11-21 PROCEDURE — 74177 CT ABD & PELVIS W/CONTRAST: CPT

## 2022-11-21 PROCEDURE — 6360000004 HC RX CONTRAST MEDICATION

## 2022-11-21 RX ADMIN — IOPAMIDOL 75 ML: 755 INJECTION, SOLUTION INTRAVENOUS at 15:26

## 2022-12-02 ENCOUNTER — TELEPHONE (OUTPATIENT)
Dept: SURGERY | Age: 72
End: 2022-12-02

## 2022-12-02 NOTE — TELEPHONE ENCOUNTER
Patient has hernia containing part of appendix. Yaneth Walker wanted Dr Tara Bhatia to review CT scan to see if this was emergent. Per Dr Tara Bhatia if patient is having pain, he can be scheduled at next available appt. However, he does have a renal mass which is the more concerning of the 2 issues. Spoke to [employer] office and they will let Yaneth know so she can discuss options with the patient.

## 2022-12-12 ENCOUNTER — APPOINTMENT (OUTPATIENT)
Dept: ULTRASOUND IMAGING | Age: 72
End: 2022-12-12
Payer: MEDICARE

## 2022-12-12 DIAGNOSIS — N28.89 RENAL MASS: ICD-10-CM

## 2022-12-12 DIAGNOSIS — C64.1 RENAL CELL CARCINOMA OF RIGHT KIDNEY (HCC): ICD-10-CM

## 2022-12-12 PROCEDURE — 76775 US EXAM ABDO BACK WALL LIM: CPT

## 2022-12-16 ENCOUNTER — OFFICE VISIT (OUTPATIENT)
Dept: UROLOGY | Age: 72
End: 2022-12-16

## 2022-12-16 VITALS
BODY MASS INDEX: 21.43 KG/M2 | HEART RATE: 69 BPM | SYSTOLIC BLOOD PRESSURE: 124 MMHG | WEIGHT: 158 LBS | DIASTOLIC BLOOD PRESSURE: 71 MMHG

## 2022-12-16 DIAGNOSIS — K40.90 NON-RECURRENT UNILATERAL INGUINAL HERNIA WITHOUT OBSTRUCTION OR GANGRENE: Primary | ICD-10-CM

## 2022-12-16 DIAGNOSIS — R35.0 FREQUENCY OF URINATION: ICD-10-CM

## 2022-12-16 DIAGNOSIS — R35.1 NOCTURIA: ICD-10-CM

## 2022-12-16 DIAGNOSIS — N28.89 RENAL MASS: ICD-10-CM

## 2022-12-16 ASSESSMENT — ENCOUNTER SYMPTOMS
WHEEZING: 0
CONSTIPATION: 0
VOMITING: 0
BACK PAIN: 0
NAUSEA: 0
SHORTNESS OF BREATH: 0
COLOR CHANGE: 0
COUGH: 0
EYE REDNESS: 0
ABDOMINAL PAIN: 1

## 2022-12-16 NOTE — PROGRESS NOTES
HPI:          Patient is a 67 y.o. male in no acute distress. He is alert and oriented to person, place, and time. History  Lung cancer, started on pain medication    7/2022 ER referral for urinary retention. Straight cathed for 400ml. Retention secondary due to constipation due to pain medication   Flomax started      Miralax started    CT with IV contrast showed a 2.1cm mass in the left kidney - patient wants oncology to follow    8/2022 MRI showed a 1.8cm left renal mass - followed by oncology    11/2022 frequency every hour and nocturia 3-4 times per night, PVR low   His Flomax to twice per day    Today  Here today to follow-up for urinary retention, constipation and renal mass. He is having daily BMs without miralax except when he takes pain medication. He is taking flomax twice per day. He continues to complain of frequency every hour during the day and nocturia 3-4 times per night. Urine culture was negative for infection. He denies dysuria or gross hematuria. Due to history of left renal mass and abdominal pain we did obtain a CT abdomen and pelvis. This film was independently reviewed and does show a stable 1.8 cm left renal mass that is suspicious for malignancy. The radiologist does mention a right inguinal hernia containing fat, fluid, and a portion of the appendix. This is where patient's abdominal pain is located. The radiologist mentions that the volume of fluid within the hernia sac is increased from prior. Past Medical History:   Diagnosis Date    Abnormal cardiac cath 09/20/2013    LMCA; Mild irregularities 10-20%. LAD: Abnormal.  Mild to moderate irregularities throughout the LAD and branches. LCx:  abnormal.  80-90% stenosis in a moderate sized OM1. RCA: Abnormal.  40-50% mid RCA stenosis.     Actinic keratosis     Arthritis     neck, fingers    CAD (coronary artery disease)     Calculus of kidney     Cancer (HCC)     Cervical stenosis of spine     Diabetes mellitus [facility])     Dr. Mala Mercedes, CNP, [place]    Full dentures     GERD (gastroesophageal reflux disease)     H/O echocardiogram 04/29/2016    Stress echo. Normal resting LV systolic function,normal systolic restponse to exercise. No evidence of reversible  ischemia on this maximal,symptom limited,treadmill exerxiess stress echocardiography study    History of cardiac cath 02/09/2017    LMCA: Lesion on LMCA: Distal subsection . 20% stenosis. LAD: Lesion on 1st Diag: Mid subsection . 75% stenosis. RCA: Lesion on Mid RCA: Mid subsection . 100% stenosis. Comments: The distal RCA fills by left to right collateralization. Dominance: Mixed. LV function assessed as: Normal. EF 65%. History of echocardiogram 06/11/2018    EF 60%. Mildly increased LV wall thickness. Evidence of mild diastolic dysfunction seen. History of echocardiogram 01/18/2019    EF of 65%. LV wall thickness is mildly increased. Aortic valve leaflets are mildly thickened. History of heart attack     History of removal of tunneled central venous catheter (CVC) with port 11/09/2022    [address]    History of tobacco abuse     Quit 10/2021, 1/4 ppd for 54 years    Hyperlipidemia     Hypertension     Dr. Mala Mercedes, CNP    Pulmonary nodule 12/2021    Right middle lobe    Raynaud's phenomenon     S/P angioplasty with stent 09/20/2013    PTCA-NEDRA of  the OM 1    Wears hearing aid in both ears      Past Surgical History:   Procedure Laterality Date    CARDIAC CATHETERIZATION  2007    patient states had 2 small blockages    CARDIAC CATHETERIZATION  02/09/2017    LMCA: Lesion on LMCA: Distal subsection 20% stenosis. LAD: Lesion on 1st Diag: Mid subsection 75% stenosis. RCA: Lesion on Mid RCA: Mid subsection 100% stenosis. CARDIAC CATHETERIZATION  02/09/2017    Attempted PCI to chronic total occlusion of RCA was not successful. Unable to cross with multiple wires due to heavy calcification and toruosity.     CARDIAC CATHETERIZATION 01/14/2019    CATARACT REMOVAL  04/06/2015    Right eye - Dr. Megan Huerta  06/08/2015    Dr. Vanessa Aden - left eye    COLONOSCOPY  01/11/2006    Dr. Tina Wright - internal hemorrhoids, no polps    COLONOSCOPY  05/23/2016    -polyp    CT NEEDLE BIOPSY LUNG PERCUTANEOUS  07/28/2021    CT NEEDLE BIOPSY LUNG PERCUTANEOUS 7/28/2021 [facility] CT SCAN    HEMORRHOID SURGERY      HERNIA REPAIR Left     inguinal    IR CHEST TUBE INSERTION  01/11/2022    IR CHEST TUBE INSERTION 1/11/2022 Gianna Agudelo MD [facility] SPECIAL PROCEDURES    IR PORT PLACEMENT EQUAL OR GREATER THAN 5 YEARS  2/9/2022    IR PORT PLACEMENT EQUAL OR GREATER THAN 5 YEARS 2/9/2022 [facility] SPECIAL PROCEDURES    LUNG BIOPSY Right 07/28/2021    Dr Derik Urrutia, TOTAL Right 01/04/2022    MIDDLE WEDGE RESECTION, POSSIBLE MIDDLE LOBECTOMY VIA THORACOTOMY performed by Jin Arcos MD at [facility]    c4-5 laminectomy and fusion    OTHER SURGICAL HISTORY  10/2014    Right foot - 7 from heal and 1 from great toe at [address]  12/11/2014    pain injection - cervical    TUNNELED CENTRAL VENOUS CATHETER W/ SUBCUTANEOUS PORT Left 02/09/2022    Dr Hoenninger/[facility]    UPPER GASTROINTESTINAL ENDOSCOPY  12/28/2005    Dr. Tina Wright - mild esophagitis and gastritis    UPPER GASTROINTESTINAL ENDOSCOPY N/A 02/05/2019    -bx(neg H-Pylori)retained gastric content, possible submucosal mass of gastric cardia    UPPER GASTROINTESTINAL ENDOSCOPY N/A 10/22/2019    EGD BIOPSY performed by Humphrey Gaytan MD at [facility] [facility] Encounter Medications as of 12/16/2022   Medication Sig Dispense Refill    tamsulosin (FLOMAX) 0.4 MG capsule Take 1 capsule by mouth in the morning and at bedtime 60 capsule 2    isosorbide mononitrate (IMDUR) 30 MG extended release tablet TAKE 1/2 TABLET BY MOUTH DAILY 45 tablet 3    ferrous sulfate (IRON 325) 325 (65 Fe) MG tablet Take 1 tablet by mouth daily (with breakfast) 90 tablet 1    metFORMIN (GLUCOPHAGE) 1000 MG tablet Take 1 tablet by mouth 2 times daily (with meals) 60 tablet 3    SITagliptin (JANUVIA) 50 MG tablet Take 1 tablet by mouth daily (Patient taking differently: Take 100 mg by mouth daily) 90 tablet 1    morphine (MSIR) 15 MG tablet Take 15 mg by mouth every 6 hours as needed for Pain. clotrimazole-betamethasone (LOTRISONE) 1-0.05 % cream Apply topically daily To both feet      atenolol (TENORMIN) 25 MG tablet Take 1 tablet by mouth daily 90 tablet 3    Continuous Blood Gluc Sensor (FREESTYLE YAIMA 14 DAY SENSOR) MISC as directed      amLODIPine (NORVASC) 5 MG tablet Take 1 tablet by mouth daily 90 tablet 3    magnesium 30 MG tablet Take 400 mg by mouth daily       clopidogrel (PLAVIX) 75 MG tablet Take 1 tablet by mouth daily 90 tablet 3    zinc 50 MG CAPS Take 50 mg by mouth daily (with breakfast)      promethazine (PHENERGAN) 25 MG tablet Take 25 mg by mouth every 4 hours as needed for Nausea      pantoprazole (PROTONIX) 40 MG tablet Take 40 mg by mouth in the morning and at bedtime      ondansetron (ZOFRAN ODT) 8 MG TBDP disintegrating tablet Take 1 tablet by mouth every 8 hours as needed for Nausea 30 tablet 2    lidocaine-prilocaine (EMLA) 2.5-2.5 % cream Apply topically to port site 30- 60 minutes before access as needed. 30 g 2    atorvastatin (LIPITOR) 20 MG tablet Take 1 tablet by mouth daily 90 tablet 3    glimepiride (AMARYL) 2 MG tablet Take 1 tablet by mouth 2 times daily 180 tablet 3    aspirin 81 MG tablet Take 81 mg by mouth daily Ordered by heart doctor, Chan Arreola      nitroGLYCERIN (NITRODUR) 0.4 MG/HR 1 dose under tongue as needed for chest pain.  max of 3 in one day (Patient not taking: No sig reported) 30 patch 2    [DISCONTINUED] folic acid (FOLVITE) 1 MG tablet Take 1 tablet by mouth daily for 30 doses Start 7 days before first scheduled dose and continue for 21 days after last dose of PEMEtrexed. 30 tablet 2    Insulin Glargine, 2 Unit Dial, 300 UNIT/ML SOPN Insulin Glargine Insulin Glargine U-300 Conc Active 10 UNIT Subcutaneous Every morning May 7th, 2020 11:47am 05-  [facility] ([id number]) (Patient not taking: No sig reported)       No facility-administered encounter medications on file as of 12/16/2022. Current Outpatient Medications on File Prior to Visit   Medication Sig Dispense Refill    tamsulosin (FLOMAX) 0.4 MG capsule Take 1 capsule by mouth in the morning and at bedtime 60 capsule 2    isosorbide mononitrate (IMDUR) 30 MG extended release tablet TAKE 1/2 TABLET BY MOUTH DAILY 45 tablet 3    ferrous sulfate (IRON 325) 325 (65 Fe) MG tablet Take 1 tablet by mouth daily (with breakfast) 90 tablet 1    metFORMIN (GLUCOPHAGE) 1000 MG tablet Take 1 tablet by mouth 2 times daily (with meals) 60 tablet 3    SITagliptin (JANUVIA) 50 MG tablet Take 1 tablet by mouth daily (Patient taking differently: Take 100 mg by mouth daily) 90 tablet 1    morphine (MSIR) 15 MG tablet Take 15 mg by mouth every 6 hours as needed for Pain.       clotrimazole-betamethasone (LOTRISONE) 1-0.05 % cream Apply topically daily To both feet      atenolol (TENORMIN) 25 MG tablet Take 1 tablet by mouth daily 90 tablet 3    Continuous Blood Gluc Sensor (FREESTYLE YAIMA 14 DAY SENSOR) MISC as directed      amLODIPine (NORVASC) 5 MG tablet Take 1 tablet by mouth daily 90 tablet 3    magnesium 30 MG tablet Take 400 mg by mouth daily       clopidogrel (PLAVIX) 75 MG tablet Take 1 tablet by mouth daily 90 tablet 3    zinc 50 MG CAPS Take 50 mg by mouth daily (with breakfast)      promethazine (PHENERGAN) 25 MG tablet Take 25 mg by mouth every 4 hours as needed for Nausea      pantoprazole (PROTONIX) 40 MG tablet Take 40 mg by mouth in the morning and at bedtime      ondansetron (ZOFRAN ODT) 8 MG TBDP disintegrating tablet Take 1 tablet by mouth every 8 hours as needed for Nausea 30 tablet 2 lidocaine-prilocaine (EMLA) 2.5-2.5 % cream Apply topically to port site 30- 60 minutes before access as needed. 30 g 2    atorvastatin (LIPITOR) 20 MG tablet Take 1 tablet by mouth daily 90 tablet 3    glimepiride (AMARYL) 2 MG tablet Take 1 tablet by mouth 2 times daily 180 tablet 3    aspirin 81 MG tablet Take 81 mg by mouth daily Ordered by heart doctor, Dr. Zoraida Wilkins, [place]      nitroGLYCERIN (NITRODUR) 0.4 MG/HR 1 dose under tongue as needed for chest pain. max of 3 in one day (Patient not taking: No sig reported) 30 patch 2    [DISCONTINUED] folic acid (FOLVITE) 1 MG tablet Take 1 tablet by mouth daily for 30 doses Start 7 days before first scheduled dose and continue for 21 days after last dose of PEMEtrexed. 30 tablet 2    Insulin Glargine, 2 Unit Dial, 300 UNIT/ML SOPN Insulin Glargine Insulin Glargine U-300 Conc Active 10 UNIT Subcutaneous Every morning May 7th, 2020 11:47am 2020  [facility] ([id number]) (Patient not taking: No sig reported)       No current facility-administered medications on file prior to visit. Niacin and related, Morphine, Oxycodone, Minocycline, Minocycline hcl, and Other  Family History   Problem Relation Age of Onset    Heart Disease Mother     Diabetes Mother     High Blood Pressure Mother     Diabetes Father     Alzheimer's Disease Father     Heart Disease Sister         CABG    Heart Disease Brother     Heart Disease Brother     Heart Disease Brother     Cancer Brother      Social History     Tobacco Use   Smoking Status Former    Packs/day: 0.25    Years: 54.00    Pack years: 13.50    Types: Cigarettes    Quit date: 10/1/2021    Years since quittin.2   Smokeless Tobacco Never       Social History     Substance and Sexual Activity   Alcohol Use No       Review of Systems   Constitutional:  Negative for appetite change, chills and fever. Eyes:  Negative for redness and visual disturbance.    Respiratory:  Negative for cough, shortness of breath and wheezing. Cardiovascular:  Negative for chest pain and leg swelling. Gastrointestinal:  Positive for abdominal pain. Negative for constipation, nausea and vomiting. Genitourinary:  Positive for frequency. Negative for decreased urine volume, difficulty urinating, dysuria, enuresis, flank pain, hematuria, penile discharge, penile pain, scrotal swelling, testicular pain and urgency. Musculoskeletal:  Negative for back pain, joint swelling and myalgias. Skin:  Negative for color change, rash and wound. Neurological:  Negative for dizziness, tremors and numbness. Hematological:  Negative for adenopathy. Does not bruise/bleed easily. /71 (Site: Left Upper Arm, Position: Sitting, Cuff Size: Medium Adult)   Pulse 69   Wt 158 lb (71.7 kg)   BMI 21.43 kg/m²       PHYSICAL EXAM:  Constitutional: Patient in no acute distress; Neuro: alert and oriented to person place and time. Psych: Mood and affect normal.  Skin: Normal  Lungs: Respiratory effort normal  Cardiovascular:  Normal peripheral pulses  Abdomen: Soft, tender, non-distended with no CVA, flank pain  Bladder non-tender and not distended. Lab Results   Component Value Date    BUN 22 10/11/2022     Lab Results   Component Value Date    CREATININE 1.35 (H) 10/11/2022     Lab Results   Component Value Date    PSA 0.97 08/03/2022    PSA 0.57 08/02/2013    PSA 0.43 08/17/2012       ASSESSMENT:   Diagnosis Orders   1. Non-recurrent unilateral inguinal hernia without obstruction or gangrene  Antwan Patrick MD, General Surgery, [place]      2. Renal mass  CT ABDOMEN PELVIS W WO CONTRAST Additional Contrast? None      3. Nocturia        4. Frequency of urination              PLAN:  Referral to general surgery for hernia containing fluid and appendix since this is where is pain is located    Continue flomax BID    Return for cystoscopy due to persistent lower urinary tract symptoms.   If there is not an anatomical source for his urinary symptoms we can consider starting an anticholinergic. I am hesitant to do this due to history of retention.     CT renal mass protocol in 6 months

## 2022-12-21 ENCOUNTER — TELEPHONE (OUTPATIENT)
Dept: ONCOLOGY | Age: 72
End: 2022-12-21

## 2022-12-21 NOTE — TELEPHONE ENCOUNTER
Call returned by patient's wife, Lorraine Bernstein. Lorraine Bernstein had questions regarding appointment with urology and where to go for appointments on 1/5 and 1/6. Navigator called urology office for clarification. Vania notified to go to urology office on 1/5/23 and across the mehta office on 1/6/23. Vania verbalized understanding. Lorraine Bernstein states that Landy Duran continues to have pain and is tired often. Vania notes that Landy Duran would really like to have relief from his pain and is hoping the this surgeon will be able to help. Lorraine Bernstein denies any other needs from navigation at this time. Will continue to follow.     eRbeca Jones RN

## 2022-12-21 NOTE — TELEPHONE ENCOUNTER
Name: Otto Mathew  : 1950  MRN: [id number]    Oncology Navigation Follow-Up Note    Contact Type:  Telephone    Notes: Call made to patient for ONN follow up. No answer. Left message stating writer called to check in and assess needs. Requested returned phone call.        Electronically signed by Sowmya Lerner RN on 2022 at 1:57 PM

## 2022-12-22 ENCOUNTER — OFFICE VISIT (OUTPATIENT)
Dept: ONCOLOGY | Age: 72
End: 2022-12-22
Payer: MEDICARE

## 2022-12-22 VITALS
HEIGHT: 72 IN | RESPIRATION RATE: 18 BRPM | HEART RATE: 71 BPM | SYSTOLIC BLOOD PRESSURE: 103 MMHG | BODY MASS INDEX: 21.54 KG/M2 | WEIGHT: 159 LBS | DIASTOLIC BLOOD PRESSURE: 57 MMHG | TEMPERATURE: 97.6 F

## 2022-12-22 DIAGNOSIS — C34.01 LUNG CANCER, MAIN BRONCHUS, RIGHT (HCC): ICD-10-CM

## 2022-12-22 DIAGNOSIS — J90 PLEURAL EFFUSION ON RIGHT: ICD-10-CM

## 2022-12-22 DIAGNOSIS — T45.1X5D ADVERSE EFFECT OF CHEMOTHERAPY, SUBSEQUENT ENCOUNTER: ICD-10-CM

## 2022-12-22 DIAGNOSIS — N28.89 RENAL MASS: Primary | ICD-10-CM

## 2022-12-22 DIAGNOSIS — Z98.890 S/P THORACOTOMY: ICD-10-CM

## 2022-12-22 PROCEDURE — 99214 OFFICE O/P EST MOD 30 MIN: CPT | Performed by: INTERNAL MEDICINE

## 2022-12-22 PROCEDURE — G8484 FLU IMMUNIZE NO ADMIN: HCPCS | Performed by: INTERNAL MEDICINE

## 2022-12-22 PROCEDURE — 3074F SYST BP LT 130 MM HG: CPT | Performed by: INTERNAL MEDICINE

## 2022-12-22 PROCEDURE — G8427 DOCREV CUR MEDS BY ELIG CLIN: HCPCS | Performed by: INTERNAL MEDICINE

## 2022-12-22 PROCEDURE — 1123F ACP DISCUSS/DSCN MKR DOCD: CPT | Performed by: INTERNAL MEDICINE

## 2022-12-22 PROCEDURE — 3078F DIAST BP <80 MM HG: CPT | Performed by: INTERNAL MEDICINE

## 2022-12-22 PROCEDURE — G8420 CALC BMI NORM PARAMETERS: HCPCS | Performed by: INTERNAL MEDICINE

## 2022-12-22 PROCEDURE — 1036F TOBACCO NON-USER: CPT | Performed by: INTERNAL MEDICINE

## 2022-12-22 PROCEDURE — 99211 OFF/OP EST MAY X REQ PHY/QHP: CPT | Performed by: INTERNAL MEDICINE

## 2022-12-22 PROCEDURE — 3017F COLORECTAL CA SCREEN DOC REV: CPT | Performed by: INTERNAL MEDICINE

## 2022-12-22 NOTE — PROGRESS NOTES
Patient ID: Mary Noyola, [birth year], [id number], 67 y.o. Referred by :  No ref.  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 male history of tobacco smoking and has been following with pulmonary for right middle lobe lung nodule where CT chest did show increase in the size in the scan on November 2, 2021 patient had biopsy on July 28, 2021 which showed benign parenchyma with chronic inflammation however the lesion was active on the PET there was no evidence of metastasis patient admitted to [address] [place] [address] on January 4, 2022 for VATS and wedge resection which ended up with lobectomy, final pathology compatible with adenocarcinoma 1.8 cm and carcinoma invade into but not through the visceral pleura lymph nodes were negative patient status post right middle lobe lobectomy and margin were free of involvement by carcinoma   Patient developed right apical pneumothorax chest tube placed in the hospital and was discharged after chest tube removed and patient was started on adjuvant chemotherapy on February 24, 2022  Patient developed pleural effusion on the right side and underwent thoracentesis after 2 cycle of chemotherapy and done with 4 cycles of chemotherapy, if still persistent have right side chest pain  Had loculated pneumothorax status post thoracentesis and recommendation was for no further thoracentesis  evaluated for gallstones with no evidence of cholecystitis of the kidney there was a 1.9 cm nodule recommended further work-up with a CT which was done and recommended MRI of the kidney/kidney protocol scan  CT chest was done and did show loculation of the pleural fluid which diminished from before  Was seen by cardiovascular and recommended pain control with nerve block which was done  MRI of the abdomen did show 1.8 cm posterior medial left adrenal nodule and was seen by  and referred back to medical oncology  PET scan did not show any activity on the kidney lesion    Interval history  Patient presents to the clinic for a follow-up visit and to discuss results of his lab work-up and other relevant clinical data. During this visit patient's allergy, social, medical, surgical history and medications were reviewed and updated. Still have right-sided chest pain where he had a surgery and was seen by surgeon at [facility] refer him to pain management  CT abdomen pelvis stable kidney lesion      Past Medical History:   Diagnosis Date    Abnormal cardiac cath 09/20/2013    LMCA; Mild irregularities 10-20%. LAD: Abnormal.  Mild to moderate irregularities throughout the LAD and branches. LCx:  abnormal.  80-90% stenosis in a moderate sized OM1. RCA: Abnormal.  40-50% mid RCA stenosis. Actinic keratosis     Arthritis     neck, fingers    CAD (coronary artery disease)     Calculus of kidney     Cancer (HCC)     Cervical stenosis of spine     Diabetes mellitus ([address]     Dr. Cornelia Esquivel, CNP, [place]    Full dentures     GERD (gastroesophageal reflux disease)     H/O echocardiogram 04/29/2016    Stress echo. Normal resting LV systolic function,normal systolic restponse to exercise. No evidence of reversible  ischemia on this maximal,symptom limited,treadmill exerxiess stress echocardiography study    History of cardiac cath 02/09/2017    LMCA: Lesion on LMCA: Distal subsection . 20% stenosis. LAD: Lesion on 1st Diag: Mid subsection . 75% stenosis. RCA: Lesion on Mid RCA: Mid subsection . 100% stenosis.  Comments: OR GREATER THAN 5 YEARS  2/9/2022    IR PORT PLACEMENT EQUAL OR GREATER THAN 5 YEARS 2/9/2022 [facility] SPECIAL PROCEDURES    LUNG BIOPSY Right 07/28/2021    Dr Thomas/[address], TOTAL Right 01/04/2022    MIDDLE WEDGE RESECTION, POSSIBLE MIDDLE LOBECTOMY VIA THORACOTOMY performed by Kathleen Becerril MD at [address] [facility]    c4-5 laminectomy and fusion    OTHER SURGICAL HISTORY  10/2014    Right foot - 7 from heal and 1 from great toe at [address]  12/11/2014    pain injection - cervical    TUNNELED CENTRAL VENOUS CATHETER W/ SUBCUTANEOUS PORT Left 02/09/2022    Dr Hoenninger/[facility] [place] [facility]    UPPER GASTROINTESTINAL ENDOSCOPY  12/28/2005    Dr. Raisa Klein - mild esophagitis and gastritis    UPPER GASTROINTESTINAL ENDOSCOPY N/A 02/05/2019    -bx(neg H-Pylori)retained gastric content, possible submucosal mass of gastric cardia    UPPER GASTROINTESTINAL ENDOSCOPY N/A 10/22/2019    EGD BIOPSY performed by Melquiades Tapia MD at [facility]   Allergen Reactions    Niacin And Related      Turns red, no trouble breathing    Morphine      nausea    Oxycodone      nausea    Minocycline Rash    Minocycline Hcl Rash    Other Nausea And Vomiting     Patient states on all pain medications he gets nausea and vomiting.  Doctor ordered a medicine (nausea) to take before pain medication       Current Outpatient Medications   Medication Sig Dispense Refill    tamsulosin (FLOMAX) 0.4 MG capsule Take 1 capsule by mouth in the morning and at bedtime 60 capsule 2    isosorbide mononitrate (IMDUR) 30 MG extended release tablet TAKE 1/2 TABLET BY MOUTH DAILY 45 tablet 3    ferrous sulfate (IRON 325) 325 (65 Fe) MG tablet Take 1 tablet by mouth daily (with breakfast) 90 tablet 1    metFORMIN (GLUCOPHAGE) 1000 MG tablet Take 1 tablet by mouth 2 times daily (with meals) 60 tablet 3    SITagliptin (JANUVIA) 50 MG tablet Take 1 tablet by mouth daily (Patient taking differently: Take 100 mg by mouth daily) 90 tablet 1    morphine (MSIR) 15 MG tablet Take 15 mg by mouth every 6 hours as needed for Pain. clotrimazole-betamethasone (LOTRISONE) 1-0.05 % cream Apply topically daily To both feet      atenolol (TENORMIN) 25 MG tablet Take 1 tablet by mouth daily 90 tablet 3    Continuous Blood Gluc Sensor (FREESTYLE YAIMA 14 DAY SENSOR) MISC as directed      amLODIPine (NORVASC) 5 MG tablet Take 1 tablet by mouth daily 90 tablet 3    magnesium 30 MG tablet Take 400 mg by mouth daily       clopidogrel (PLAVIX) 75 MG tablet Take 1 tablet by mouth daily 90 tablet 3    zinc 50 MG CAPS Take 50 mg by mouth daily (with breakfast)      promethazine (PHENERGAN) 25 MG tablet Take 25 mg by mouth every 4 hours as needed for Nausea      pantoprazole (PROTONIX) 40 MG tablet Take 40 mg by mouth in the morning and at bedtime      ondansetron (ZOFRAN ODT) 8 MG TBDP disintegrating tablet Take 1 tablet by mouth every 8 hours as needed for Nausea 30 tablet 2    lidocaine-prilocaine (EMLA) 2.5-2.5 % cream Apply topically to port site 30- 60 minutes before access as needed. 30 g 2    atorvastatin (LIPITOR) 20 MG tablet Take 1 tablet by mouth daily 90 tablet 3    glimepiride (AMARYL) 2 MG tablet Take 1 tablet by mouth 2 times daily 180 tablet 3    aspirin 81 MG tablet Take 81 mg by mouth daily Ordered by heart doctor, Chan Gandara      nitroGLYCERIN (NITRODUR) 0.4 MG/HR 1 dose under tongue as needed for chest pain. max of 3 in one day (Patient not taking: Reported on 12/22/2022) 30 patch 2    Insulin Glargine, 2 Unit Dial, 300 UNIT/ML SOPN Insulin Glargine Insulin Glargine U-300 Conc Active 10 UNIT Subcutaneous Every morning May 7th, 2020 11:47am 05-  [facility] ([id number]) (Patient not taking: Reported on 12/22/2022)       No current facility-administered medications for this visit.        Social History     Socioeconomic History    Marital status:  Spouse name: Not on file    Number of children: Not on file    Years of education: Not on file    Highest education level: Not on file   Occupational History    Not on file   Tobacco Use    Smoking status: Former     Packs/day: 0.25     Years: 54.00     Pack years: 13.50     Types: Cigarettes     Quit date: 10/1/2021     Years since quittin.2    Smokeless tobacco: Never   Vaping Use    Vaping Use: Never used   Substance and Sexual Activity    Alcohol use: No    Drug use: No    Sexual activity: Not on file   Other Topics Concern    Not on file   Social History Narrative    Not on file     Social Determinants of Health     Financial Resource Strain: Not on file   Food Insecurity: Not on file   Transportation Needs: Not on file   Physical Activity: Not on file   Stress: Not on file   Social Connections: Not on file   Intimate Partner Violence: Not on file   Housing Stability: Not on file       Family History   Problem Relation Age of Onset    Heart Disease Mother     Diabetes Mother     High Blood Pressure Mother     Diabetes Father     Alzheimer's Disease Father     Heart Disease Sister         CABG    Heart Disease Brother     Heart Disease Brother     Heart Disease Brother     Cancer Brother         REVIEW OF SYSTEM:     Constitutional: No fever or chills. No night sweats, no weight loss   Eyes: No eye discharge, double vision, or eye pain   HEENT: negative for sore mouth, sore throat, hoarseness and voice change   Respiratory: +cough , no sputum, dyspnea, wheezing, hemoptysis, +chest pain   Cardiovascular: negative for chest pain, dyspnea, palpitations, orthopnea, PND   Gastrointestinal: negative for nausea, vomiting, diarrhea, constipation, abdominal pain, Dysphagia, hematemesis and hematochezia   Genitourinary: negative for frequency, dysuria, nocturia, urinary incontinence, and hematuria   Integument: negative for rash, skin lesions, bruises.    Hematologic/Lymphatic: negative for easy bruising, bleeding, lymphadenopathy, petechiae and swelling/edema   Endocrine: negative for heat or cold intolerance, tremor, weight changes, change in bowel habits and hair loss   Musculoskeletal: negative for myalgias, arthralgias, pain, joint swelling,and bone pain   Neurological: negative for headaches, dizziness, seizures, weakness, numbness       OBJECTIVE:         Vitals:    12/22/22 1555   BP: (!) 103/57   Pulse: 71   Resp: 18   Temp: 97.6 °F (36.4 °C)       PHYSICAL EXAM:   General appearance - well appearing, no in pain or distress   Mental status - alert and cooperative   Eyes - pupils equal and reactive, extraocular eye movements intact   Ears - bilateral TM's and external ear canals normal   Mouth - mucous membranes moist, pharynx normal without lesions   Neck - supple, no significant adenopathy   Lymphatics - no palpable lymphadenopathy, no hepatosplenomegaly   Chest - clear to auscultation, no wheezes, rales or rhonchi, symmetric air entry, diminished sounds on the right side  Heart - normal rate, regular rhythm, normal S1, S2, no murmurs, rubs, clicks or gallops   Abdomen - soft, nontender, nondistended, no masses or organomegaly   Neurological - alert, oriented, normal speech, no focal findings or movement disorder noted   Musculoskeletal - no joint tenderness, deformity or swelling   Extremities - peripheral pulses normal, no pedal edema, no clubbing or cyanosis   Skin - normal coloration and turgor, no rashes, no suspicious skin lesions noted ,      LABORATORY DATA:     Lab Results   Component Value Date    WBC 5.5 10/11/2022    HGB 9.6 (L) 10/11/2022    HCT 31.3 (L) 10/11/2022    MCV 81.1 (L) 10/11/2022     10/11/2022    LYMPHOPCT 27 09/29/2022    RBC 3.86 (L) 10/11/2022    MCH 24.9 (L) 10/11/2022    MCHC 30.7 10/11/2022    RDW 17.6 (H) 10/11/2022    NEUTOPHILPCT 50.9 02/02/2017    MONOPCT 10 09/29/2022    EOSPCT 3.1 02/02/2017    BASOPCT 1 09/29/2022    NEUTROABS 4.20 09/29/2022    LYMPHSABS 1.89 09/29/2022 MONOSABS 0.70 09/29/2022    EOSABS 0.14 09/29/2022    BASOSABS 0.05 09/29/2022         Chemistry        Component Value Date/Time     10/11/2022 0850    K 4.5 10/11/2022 0850     10/11/2022 0850    CO2 26 10/11/2022 0850    BUN 22 10/11/2022 0850    CREATININE 1.35 (H) 10/11/2022 0850        Component Value Date/Time    CALCIUM 9.4 10/11/2022 0850    ALKPHOS 107 09/14/2022 0716    AST 13 09/14/2022 0716    ALT 10 09/14/2022 0716    BILITOT 0.3 09/14/2022 0716            PATHOLOGY DATA:     Surgical Pathology Report -- Diagnosis --     1. Right lung, middle lobe wedge biopsy:     -  Adenocarcinoma. -  Tumor is 1.8 cm.     -  Carcinoma invades into but not through the visceral pleura. 2.  Lymph node #9, biopsy:     -  Negative for metastatic carcinoma. 3.  Lymph node #7, biopsy:     -  Negative for metastatic carcinoma. 4.  Right middle lobe lung, lobectomy:     -  Margins are free of involvement by carcinoma. -  Peribronchial lymph node, negative for metastatic carcinoma. -  Nonneoplastic lung shows emphysematous change. Emma Nieves M.D.   **Electronically Signed Out**         rdd/1/6/2022         Clinical Information   Pre-op Diagnosis:  RIGHT MIDDLE LOBE NODULE   Operative Findings: MIDDLE LOBE OF RIGHT LUNG; LYMPH NODE #9 OF LUNG;   LYMPH NODE #7; RIGHT MIDDLE LOBE   Operation Performed: MIDDLE WEDGE RESECTION, POSSIBLE MIDDLE   LOBECTOMY VIA THORACOTOMY     Source of Specimen   1: MIDDLE LOBE OF RIGHT LUNG (A)   2: LYMPH NODE #9 OF LUNG (B)   3: LYMPH NODE #7   4: RIGHT MIDDLE LOBE     Gross Description   1. \"ANA DAVIS, MIDDLE LOBE OF RIGHT LUNG\" Received fresh is a   6.4 x 4.2 x 2.8 cm lung wedge with staple line. The pleura is   pink-tan with an area of slightly thickening (inked black). Sectioning reveals a 1.8 x 1.2 x 0.8 cm subpleural tan-white mass   lesion that is 0.8 cm from the staple line margin.   The remaining   parenchyma is pink-tan lung primary. Note: The case is reviewed by a second Pathologist for quality   assurance with consensus (DS). HISTOLOGIC GRADE: Grade 2 (of 4)           VISCERAL PLEURA INVASION:Reticulin stain with appropriately reactive   control is utilized to evaluate pleura. Tumor extends beyond the   elastic layer into the pleura but not to the pleural surface (PL1)     DIRECT INVASION OF ADJACENT STRUCTURES: No     TREATMENT EFFECT: None apparent. LYMPHOVASCULAR INVASION: Absent                         MARGINS --   -BRONCHIAL: Free of tumor       -VASCULAR: Free of tumor       -PARENCHYMAL: Free of tumor       -OTHER ATTACHED TISSUE MARGIN (IF APPLICABLE): N/A       REGIONAL LYMPH NODES: 3   LYMPH NODES(S) FROM PRIOR PROCEDURES: No       NUMBER AND STATION(S) EXAMINED:   See diagnoses   NUMBER AND STATION(S) WITH METASTASIS: 0         DISTANT METASTASIS--   DISTANT SITE(S) INVOLVED, IF APPLICABLE: N/A         PATHOLOGIC STAGE CLASSIFICATION:     pT2a  pN0   CAP Lung 4200 in conjunction with AJCC 8 ed. SURGICAL PATHOLOGY CONSULTATION         Patient Name: Alfonse Bamberger: [id number]   Path Number: [id number]     [address] [place] [address]. [facility], [address]   [phone]   Fax: [phone]          IMAGING DATA:      US RENAL LIMITED  Narrative: EXAMINATION:  ULTRASOUND OF THE KIDNEYS    12/12/2022 9:38 am    COMPARISON:  None. HISTORY:  ORDERING SYSTEM PROVIDED HISTORY: Renal cell carcinoma of right kidney (HCC)    FINDINGS:  The right kidney measures 10.3 cm in length and the left kidney measures 10.6  cm in length. Normal renal cortical echogenicity. No hydronephrosis or nephrolithiasis. Simple bilateral renal cysts largest on the right 15 mm. Largest on the left  8 mm. .  Impression: Simple bilateral renal cysts with otherwise unremarkable exam.    RECOMMENDATIONS:  Unavailable       CT chest with IV contrast on 11/2/2021    The previously biopsied, somewhat bilobed nodule involving right middle lobe   is slightly increased in size since the prior study now measuring 1.8 x 1.0   cm, once measuring 1.8 x 0.6 cm. There does appear to be some spiculation. No new pulmonary nodule is seen. PET/CT on June 30, 2021    1. Significant oval increase in size of known anterior superior middle lobe   nodule along the minor fissure. This now measures 6 x 18 mm compared to 10 x   4 mm on the prior. This remains a LUNG RADS 4B (very suspicious) nodule. Recommend repeat PET-CT and or tissue sampling given appearance and   significant interval increase in size. 2. No major change in the fat density 2.3 cm nodule in the gastric fundus   adjacent to the GE junction. Note this was FDG avid on the prior PET-CT. 3. Mild emphysema. The findings were sent to the Radiology Results [address] at 12:06   pm on 6/10/2021to be communicated to a licensed caregiver. ASSESSMENT / PLAN:    Pooja Lewis was seen today for follow-up and abdominal pain.     Diagnoses and all orders for this visit:    Renal mass    Lung cancer, main bronchus, right (HCC)    Pleural effusion on right    S/P thoracotomy    Adverse effect of chemotherapy, subsequent encounter           80-year-old man history of smoking diagnosed with pathology stage Ib(T2 aN0 M0) adenocarcinoma of the right middle lobe status post right VATS thoracotomy with right middle lobectomy done on January 4, 2022, due to high risk features manifested by involvement of visceral pleura recommended adjuvant chemotherapy which is Alimta and carboplatin, carboplatin substitute cisplatin due to frailed/nausea related to gastroparesis from diabetes in addition to mild neuropathy, started on chemotherapy on February 21, 2022 and done with 4 cycles of treatment, did well however the last cycle he had weakness fatigue and lightheadedness  Hypotension/orthostatic likely related to diabetic orthostatic  Right-sided pleural effusion status post thoracentesis> fluid sent for cytology and was negative>   right-sided chest pain since surgery> CT chest loculated pleural fluid at the right base has been followed by cardiovascular surgery and recommended nerve block which was done and the pain did not improve and was seen by [facility] and referred back for pain management  On morphine sulfate which is not helping currently managed by pain service and follow-up with Ortho/neurosurgery  MRI of the kidney did show left renal nodule concern about RCC> PET scan was done and there was no activity on the kidney, repeated scan stable, seen by  recommend surveillance will have follow-up scan chest abdomen pelvis in 4 months  RTC in 4 months                                                   [facility]                            This note is created with the assistance of a speech recognition program.  While intending to generate a document that actually reflects the content of the visit, the document can still have some errors including those of syntax and sound a like substitutions which may escape proof reading. It such instances, actual meaning can be extrapolated by contextual diversion.

## 2022-12-30 ENCOUNTER — HOSPITAL ENCOUNTER (OUTPATIENT)
Age: 72
Discharge: HOME | End: 2022-12-30
Payer: MEDICARE

## 2022-12-30 ENCOUNTER — HOSPITAL ENCOUNTER (EMERGENCY)
Age: 72
Discharge: HOME | End: 2022-12-30
Attending: EMERGENCY MEDICINE
Payer: MEDICARE

## 2022-12-30 ENCOUNTER — APPOINTMENT (OUTPATIENT)
Dept: GENERAL RADIOLOGY | Age: 72
End: 2022-12-30
Payer: MEDICARE

## 2022-12-30 ENCOUNTER — APPOINTMENT (OUTPATIENT)
Dept: CT IMAGING | Age: 72
End: 2022-12-30
Payer: MEDICARE

## 2022-12-30 VITALS
OXYGEN SATURATION: 98 % | SYSTOLIC BLOOD PRESSURE: 133 MMHG | DIASTOLIC BLOOD PRESSURE: 73 MMHG | RESPIRATION RATE: 24 BRPM | TEMPERATURE: 98 F | HEART RATE: 67 BPM

## 2022-12-30 DIAGNOSIS — R55 SYNCOPE, UNSPECIFIED SYNCOPE TYPE: Primary | ICD-10-CM

## 2022-12-30 DIAGNOSIS — C34.01 LUNG CANCER, MAIN BRONCHUS, RIGHT (HCC): ICD-10-CM

## 2022-12-30 LAB
ABSOLUTE EOS #: 0.23 K/UL (ref 0–0.44)
ABSOLUTE EOS #: 0.28 K/UL (ref 0–0.44)
ABSOLUTE IMMATURE GRANULOCYTE: 0.04 K/UL (ref 0–0.3)
ABSOLUTE IMMATURE GRANULOCYTE: 0.07 K/UL (ref 0–0.3)
ABSOLUTE LYMPH #: 2.05 K/UL (ref 1.1–3.7)
ABSOLUTE LYMPH #: 2.37 K/UL (ref 1.1–3.7)
ABSOLUTE MONO #: 0.79 K/UL (ref 0.1–1.2)
ABSOLUTE MONO #: 0.82 K/UL (ref 0.1–1.2)
ALBUMIN SERPL-MCNC: 4.2 G/DL (ref 3.5–5.2)
ALBUMIN/GLOBULIN RATIO: 1.3 (ref 1–2.5)
ALP BLD-CCNC: 125 U/L (ref 40–129)
ALT SERPL-CCNC: 15 U/L (ref 5–41)
ANION GAP SERPL CALCULATED.3IONS-SCNC: 10 MMOL/L (ref 9–17)
ANION GAP SERPL CALCULATED.3IONS-SCNC: 10 MMOL/L (ref 9–17)
AST SERPL-CCNC: 13 U/L
BASOPHILS # BLD: 1 % (ref 0–2)
BASOPHILS # BLD: 1 % (ref 0–2)
BASOPHILS ABSOLUTE: 0.08 K/UL (ref 0–0.2)
BASOPHILS ABSOLUTE: 0.1 K/UL (ref 0–0.2)
BILIRUB SERPL-MCNC: 0.4 MG/DL (ref 0.3–1.2)
BILIRUBIN URINE: NEGATIVE
BUN BLDV-MCNC: 20 MG/DL (ref 8–23)
BUN BLDV-MCNC: 21 MG/DL (ref 8–23)
BUN/CREAT BLD: 16 (ref 9–20)
BUN/CREAT BLD: 16 (ref 9–20)
CALCIUM SERPL-MCNC: 8.9 MG/DL (ref 8.6–10.4)
CALCIUM SERPL-MCNC: 9.3 MG/DL (ref 8.6–10.4)
CHLORIDE BLD-SCNC: 100 MMOL/L (ref 98–107)
CHLORIDE BLD-SCNC: 101 MMOL/L (ref 98–107)
CO2: 24 MMOL/L (ref 20–31)
CO2: 26 MMOL/L (ref 20–31)
COLOR: YELLOW
CREAT SERPL-MCNC: 1.23 MG/DL (ref 0.7–1.2)
CREAT SERPL-MCNC: 1.33 MG/DL (ref 0.7–1.2)
D-DIMER QUANTITATIVE: 2.28 MG/L FEU (ref 0–0.59)
EKG ATRIAL RATE: 61 BPM
EKG P AXIS: 32 DEGREES
EKG P-R INTERVAL: 192 MS
EKG Q-T INTERVAL: 402 MS
EKG QRS DURATION: 92 MS
EKG QTC CALCULATION (BAZETT): 404 MS
EKG R AXIS: 36 DEGREES
EKG T AXIS: 50 DEGREES
EKG VENTRICULAR RATE: 61 BPM
EOSINOPHILS RELATIVE PERCENT: 3 % (ref 1–4)
EOSINOPHILS RELATIVE PERCENT: 4 % (ref 1–4)
EPITHELIAL CELLS UA: ABNORMAL /HPF (ref 0–5)
GFR SERPL CREATININE-BSD FRML MDRD: 57 ML/MIN/1.73M2
GFR SERPL CREATININE-BSD FRML MDRD: >60 ML/MIN/1.73M2
GLUCOSE BLD-MCNC: 186 MG/DL (ref 70–99)
GLUCOSE BLD-MCNC: 197 MG/DL (ref 70–99)
GLUCOSE URINE: NEGATIVE
HCT VFR BLD CALC: 29.5 % (ref 40.7–50.3)
HCT VFR BLD CALC: 30.9 % (ref 40.7–50.3)
HEMOGLOBIN: 9.1 G/DL (ref 13–17)
HEMOGLOBIN: 9.7 G/DL (ref 13–17)
IMMATURE GRANULOCYTES: 1 %
IMMATURE GRANULOCYTES: 1 %
KETONES, URINE: NEGATIVE
LEUKOCYTE ESTERASE, URINE: NEGATIVE
LYMPHOCYTES # BLD: 29 % (ref 24–43)
LYMPHOCYTES # BLD: 33 % (ref 24–43)
MCH RBC QN AUTO: 25.7 PG (ref 25.2–33.5)
MCH RBC QN AUTO: 26.1 PG (ref 25.2–33.5)
MCHC RBC AUTO-ENTMCNC: 30.8 G/DL (ref 28.4–34.8)
MCHC RBC AUTO-ENTMCNC: 31.4 G/DL (ref 28.4–34.8)
MCV RBC AUTO: 83.1 FL (ref 82.6–102.9)
MCV RBC AUTO: 83.3 FL (ref 82.6–102.9)
MONOCYTES # BLD: 11 % (ref 3–12)
MONOCYTES # BLD: 11 % (ref 3–12)
NITRITE, URINE: NEGATIVE
NRBC AUTOMATED: 0 PER 100 WBC
NRBC AUTOMATED: 0 PER 100 WBC
PDW BLD-RTO: 16.4 % (ref 11.8–14.4)
PDW BLD-RTO: 16.5 % (ref 11.8–14.4)
PH UA: 6 (ref 5–9)
PLATELET # BLD: 199 K/UL (ref 138–453)
PLATELET # BLD: 212 K/UL (ref 138–453)
PMV BLD AUTO: 10.2 FL (ref 8.1–13.5)
PMV BLD AUTO: 9.4 FL (ref 8.1–13.5)
POTASSIUM SERPL-SCNC: 4.3 MMOL/L (ref 3.7–5.3)
POTASSIUM SERPL-SCNC: 4.3 MMOL/L (ref 3.7–5.3)
PROTEIN UA: NEGATIVE
RBC # BLD: 3.54 M/UL (ref 4.21–5.77)
RBC # BLD: 3.72 M/UL (ref 4.21–5.77)
RBC UA: ABNORMAL /HPF (ref 0–2)
SEG NEUTROPHILS: 50 % (ref 36–65)
SEG NEUTROPHILS: 55 % (ref 36–65)
SEGMENTED NEUTROPHILS ABSOLUTE COUNT: 3.63 K/UL (ref 1.5–8.1)
SEGMENTED NEUTROPHILS ABSOLUTE COUNT: 3.77 K/UL (ref 1.5–8.1)
SODIUM BLD-SCNC: 134 MMOL/L (ref 135–144)
SODIUM BLD-SCNC: 137 MMOL/L (ref 135–144)
SPECIFIC GRAVITY UA: <1.005 (ref 1.01–1.02)
TOTAL PROTEIN: 7.5 G/DL (ref 6.4–8.3)
TROPONIN, HIGH SENSITIVITY: 15 NG/L (ref 0–22)
TURBIDITY: CLEAR
URINE HGB: NEGATIVE
UROBILINOGEN, URINE: NORMAL
WBC # BLD: 7 K/UL (ref 3.5–11.3)
WBC # BLD: 7.2 K/UL (ref 3.5–11.3)
WBC UA: ABNORMAL /HPF (ref 0–5)

## 2022-12-30 PROCEDURE — 71045 X-RAY EXAM CHEST 1 VIEW: CPT

## 2022-12-30 PROCEDURE — 85379 FIBRIN DEGRADATION QUANT: CPT

## 2022-12-30 PROCEDURE — 81001 URINALYSIS AUTO W/SCOPE: CPT

## 2022-12-30 PROCEDURE — 93005 ELECTROCARDIOGRAM TRACING: CPT | Performed by: EMERGENCY MEDICINE

## 2022-12-30 PROCEDURE — 36415 COLL VENOUS BLD VENIPUNCTURE: CPT

## 2022-12-30 PROCEDURE — 2580000003 HC RX 258: Performed by: EMERGENCY MEDICINE

## 2022-12-30 PROCEDURE — 99285 EMERGENCY DEPT VISIT HI MDM: CPT

## 2022-12-30 PROCEDURE — 80053 COMPREHEN METABOLIC PANEL: CPT

## 2022-12-30 PROCEDURE — 80048 BASIC METABOLIC PNL TOTAL CA: CPT

## 2022-12-30 PROCEDURE — 84484 ASSAY OF TROPONIN QUANT: CPT

## 2022-12-30 PROCEDURE — 93010 ELECTROCARDIOGRAM REPORT: CPT | Performed by: INTERNAL MEDICINE

## 2022-12-30 PROCEDURE — 85025 COMPLETE CBC W/AUTO DIFF WBC: CPT

## 2022-12-30 PROCEDURE — 71260 CT THORAX DX C+: CPT | Performed by: EMERGENCY MEDICINE

## 2022-12-30 PROCEDURE — 6360000004 HC RX CONTRAST MEDICATION: Performed by: EMERGENCY MEDICINE

## 2022-12-30 RX ORDER — 0.9 % SODIUM CHLORIDE 0.9 %
1000 INTRAVENOUS SOLUTION INTRAVENOUS ONCE
Status: COMPLETED | OUTPATIENT
Start: 2022-12-30 | End: 2022-12-30

## 2022-12-30 RX ADMIN — IOPAMIDOL 75 ML: 755 INJECTION, SOLUTION INTRAVENOUS at 13:14

## 2022-12-30 RX ADMIN — SODIUM CHLORIDE 1000 ML: 9 INJECTION, SOLUTION INTRAVENOUS at 10:34

## 2022-12-30 ASSESSMENT — LIFESTYLE VARIABLES
HOW MANY STANDARD DRINKS CONTAINING ALCOHOL DO YOU HAVE ON A TYPICAL DAY: PATIENT DOES NOT DRINK
HOW OFTEN DO YOU HAVE A DRINK CONTAINING ALCOHOL: NEVER

## 2022-12-30 ASSESSMENT — ENCOUNTER SYMPTOMS
ABDOMINAL PAIN: 1
NAUSEA: 0
SHORTNESS OF BREATH: 0

## 2022-12-30 ASSESSMENT — PAIN SCALES - GENERAL: PAINLEVEL_OUTOF10: 0

## 2022-12-30 ASSESSMENT — PAIN - FUNCTIONAL ASSESSMENT: PAIN_FUNCTIONAL_ASSESSMENT: NONE - DENIES PAIN

## 2022-12-30 NOTE — ED PROVIDER NOTES
[address]      Pt Name: Justin Hammond  MRN: [id number]  [place] 1950  Date of evaluation: 12/30/2022  Provider: Yolanda Umanzor MD    CHIEF COMPLAINT       Chief Complaint   Patient presents with    Loss of Consciousness     Onset PTA while at service desk of grocery store. Pt reports he felt light-headed and laid his head on counter, then passed out. Denies pain. History of syncope         HISTORY OF PRESENT ILLNESS      Justin Hammond is a 67 y.o. male who presents to the emergency department for evaluation of a syncopal episodes. States he was shopping at [employer] when he became lightheaded. Placed his head on the service desk and then \"went out. \" No fall or trauma. EMS reports that he was hypotensive (and orthostatic). On arrival, BP improving. Patient states he still has some generalized weakness but overall feels better. Did not take any of his medications this morning, including his antihypertensive. No other complaints at this time. REVIEW OF SYSTEMS       Review of Systems   Constitutional:  Negative for fever. HENT: Negative. Respiratory:  Negative for shortness of breath. Cardiovascular:  Negative for chest pain and leg swelling. Gastrointestinal:  Positive for abdominal pain (Chronic, unchanged). Negative for nausea. Genitourinary:  Negative for difficulty urinating and dysuria. Neurological:  Positive for syncope and weakness (Generalized. No focal complaint. ). Negative for dizziness, numbness and headaches. PAST MEDICAL HISTORY     Past Medical History:   Diagnosis Date    Abnormal cardiac cath 09/20/2013    LMCA; Mild irregularities 10-20%. LAD: Abnormal.  Mild to moderate irregularities throughout the LAD and branches. LCx:  abnormal.  80-90% stenosis in a moderate sized OM1. RCA: Abnormal.  40-50% mid RCA stenosis.     Actinic keratosis     Arthritis     neck, fingers    CAD (coronary artery disease)     Calculus of kidney     Cancer ([facility] [address] [facility]     Cervical stenosis of spine     [facility]     Dr. Trell Mirza, CNP, [place]    Full dentures     GERD (gastroesophageal reflux disease)     H/O echocardiogram 04/29/2016    Stress echo. Normal resting LV systolic function,normal systolic restponse to exercise. No evidence of reversible  ischemia on this maximal,symptom limited,treadmill exerxiess stress echocardiography study    History of cardiac cath 02/09/2017    LMCA: Lesion on LMCA: Distal subsection . 20% stenosis. LAD: Lesion on 1st Diag: Mid subsection . 75% stenosis. RCA: Lesion on Mid RCA: Mid subsection . 100% stenosis. Comments: The distal RCA fills by left to right collateralization. Dominance: Mixed. LV function assessed as: Normal. EF 65%. History of echocardiogram 06/11/2018    EF 60%. Mildly increased LV wall thickness. Evidence of mild diastolic dysfunction seen. History of echocardiogram 01/18/2019    EF of 65%. LV wall thickness is mildly increased. Aortic valve leaflets are mildly thickened. History of heart attack     History of removal of tunneled central venous catheter (CVC) with port 11/09/2022    [address]    History of tobacco abuse     Quit 10/2021, 1/4 ppd for 54 years    Hyperlipidemia     Hypertension     Dr. Trell Mirza, CNP    Pulmonary nodule 12/2021    Right middle lobe    Raynaud's phenomenon     S/P angioplasty with stent 09/20/2013    PTCA-NEDRA of  the OM 1    Wears hearing aid in both ears          SURGICAL HISTORY       Past Surgical History:   Procedure Laterality Date    CARDIAC CATHETERIZATION  2007    patient states had 2 small blockages    CARDIAC CATHETERIZATION  02/09/2017    LMCA: Lesion on LMCA: Distal subsection 20% stenosis. LAD: Lesion on 1st Diag: Mid subsection 75% stenosis. RCA: Lesion on Mid RCA: Mid subsection 100% stenosis. CARDIAC CATHETERIZATION  02/09/2017    Attempted PCI to chronic total occlusion of RCA was not successful.  Unable to cross with multiple wires due to heavy calcification and toruosity.     CARDIAC CATHETERIZATION  01/14/2019    CATARACT REMOVAL  04/06/2015    Right eye - Dr. Wyman Prophet  06/08/2015    Dr. Julien Mondragon - left eye    COLONOSCOPY  01/11/2006    Dr. Brad Colindres - internal hemorrhoids, no polps    COLONOSCOPY  05/23/2016    -polyp    CT NEEDLE BIOPSY LUNG PERCUTANEOUS  07/28/2021    CT NEEDLE BIOPSY LUNG PERCUTANEOUS 7/28/2021 [facility] CT SCAN    HEMORRHOID SURGERY      HERNIA REPAIR Left     inguinal    IR CHEST TUBE INSERTION  01/11/2022    IR CHEST TUBE INSERTION 1/11/2022 Hira Mckeon MD [facility] SPECIAL PROCEDURES    IR PORT PLACEMENT EQUAL OR GREATER THAN 5 YEARS  2/9/2022    IR PORT PLACEMENT EQUAL OR GREATER THAN 5 YEARS 2/9/2022 [facility] SPECIAL PROCEDURES    LUNG BIOPSY Right 07/28/2021    Dr Wing Verma, TOTAL Right 01/04/2022    MIDDLE WEDGE RESECTION, POSSIBLE MIDDLE LOBECTOMY VIA THORACOTOMY performed by Juana Warren MD at [facility]    c4-5 laminectomy and fusion    OTHER SURGICAL HISTORY  10/2014    Right foot - 7 from heal and 1 from great toe at [address]  12/11/2014    pain injection - cervical    TUNNELED CENTRAL VENOUS CATHETER W/ SUBCUTANEOUS PORT Left 02/09/2022    Dr Hoenninger/[facility]    UPPER GASTROINTESTINAL ENDOSCOPY  12/28/2005    Dr. Brad Colindres - mild esophagitis and gastritis    UPPER GASTROINTESTINAL ENDOSCOPY N/A 02/05/2019    -bx(neg H-Pylori)retained gastric content, possible submucosal mass of gastric cardia    UPPER GASTROINTESTINAL ENDOSCOPY N/A 10/22/2019    EGD BIOPSY performed by Nitesh Leon MD at [address] [place] [address]       Previous Medications    AMLODIPINE (NORVASC) 5 MG TABLET    Take 1 tablet by mouth daily    ASPIRIN 81 MG TABLET    Take 81 mg by mouth daily Ordered by heart doctor, Chan Shelton    ATENOLOL (TENORMIN) 25 MG TABLET    Take 1 tablet by mouth daily    ATORVASTATIN (LIPITOR) 20 MG TABLET    Take 1 tablet by mouth daily    CLOPIDOGREL (PLAVIX) 75 MG TABLET    Take 1 tablet by mouth daily    CLOTRIMAZOLE-BETAMETHASONE (LOTRISONE) 1-0.05 % CREAM    Apply topically daily To both feet    CONTINUOUS BLOOD GLUC SENSOR (FREESTYLE YAIMA 14 DAY SENSOR) MISC    as directed    FERROUS SULFATE (IRON 325) 325 (65 FE) MG TABLET    Take 1 tablet by mouth daily (with breakfast)    GLIMEPIRIDE (AMARYL) 2 MG TABLET    Take 1 tablet by mouth 2 times daily    INSULIN GLARGINE, 2 UNIT DIAL, 300 UNIT/ML SOPN        ISOSORBIDE MONONITRATE (IMDUR) 30 MG EXTENDED RELEASE TABLET    TAKE 1/2 TABLET BY MOUTH DAILY    LIDOCAINE-PRILOCAINE (EMLA) 2.5-2.5 % CREAM    Apply topically to port site 30- 60 minutes before access as needed. MAGNESIUM 30 MG TABLET    Take 400 mg by mouth daily     METFORMIN (GLUCOPHAGE) 1000 MG TABLET    Take 1 tablet by mouth 2 times daily (with meals)    MORPHINE (MSIR) 15 MG TABLET    Take 15 mg by mouth every 6 hours as needed for Pain. NITROGLYCERIN (NITRODUR) 0.4 MG/HR    1 dose under tongue as needed for chest pain.  max of 3 in one day    ONDANSETRON (ZOFRAN ODT) 8 MG TBDP DISINTEGRATING TABLET    Take 1 tablet by mouth every 8 hours as needed for Nausea    PANTOPRAZOLE (PROTONIX) 40 MG TABLET    Take 40 mg by mouth in the morning and at bedtime    PROMETHAZINE (PHENERGAN) 25 MG TABLET    Take 25 mg by mouth every 4 hours as needed for Nausea    SITAGLIPTIN (JANUVIA) 50 MG TABLET    Take 1 tablet by mouth daily    TAMSULOSIN (FLOMAX) 0.4 MG CAPSULE    Take 1 capsule by mouth in the morning and at bedtime    ZINC 50 MG CAPS    Take 50 mg by mouth daily (with breakfast)       ALLERGIES       Niacin and related, Morphine, Oxycodone, Minocycline, Minocycline hcl, and Other    FAMILY HISTORY       Family History   Problem Relation Age of Onset    Heart Disease Mother     Diabetes Mother     High Blood Pressure Mother Diabetes Father     Alzheimer's Disease Father     Heart Disease Sister         CABG    Heart Disease Brother     Heart Disease Brother     Heart Disease Brother     Cancer Brother           SOCIAL HISTORY       Social History     Tobacco Use    Smoking status: Former     Packs/day: 0.25     Years: 54.00     Pack years: 13.50     Types: Cigarettes     Quit date: 10/1/2021     Years since quittin.2    Smokeless tobacco: Never   Vaping Use    Vaping Use: Never used   Substance Use Topics    Alcohol use: No    Drug use: No         PHYSICAL EXAM       ED Triage Vitals   BP Temp Temp src Heart Rate Resp SpO2 Height Weight   22 0958 -- -- 22 0959 22 0958 22 0958 -- --   95/70   61 22 100 %         Physical Exam  Vitals reviewed. Constitutional:       General: He is not in acute distress. Appearance: Normal appearance. He is not ill-appearing, toxic-appearing or diaphoretic. HENT:      Head: Normocephalic and atraumatic. Nose: Nose normal.      Mouth/Throat:      Mouth: Mucous membranes are moist.      Pharynx: Oropharynx is clear. No oropharyngeal exudate or posterior oropharyngeal erythema. Eyes:      General: No scleral icterus. Right eye: No discharge. Left eye: No discharge. Extraocular Movements: Extraocular movements intact. Conjunctiva/sclera: Conjunctivae normal.      Pupils: Pupils are equal, round, and reactive to light. Neck:      Vascular: No JVD. Cardiovascular:      Rate and Rhythm: Normal rate and regular rhythm. Heart sounds: No murmur heard. Pulmonary:      Effort: Pulmonary effort is normal. No respiratory distress. Breath sounds: Normal breath sounds. No stridor. No wheezing, rhonchi or rales. Abdominal:      General: There is no distension. Palpations: Abdomen is soft. Tenderness: There is no abdominal tenderness. There is no guarding. Musculoskeletal:      Cervical back: Neck supple.       Comments: No LE swelling or TTP. Skin:     General: Skin is warm and dry. Coloration: Skin is not jaundiced or pale. Neurological:      General: No focal deficit present. Mental Status: He is alert and oriented to person, place, and time. GCS: GCS eye subscore is 4. GCS verbal subscore is 5. GCS motor subscore is 6. Cranial Nerves: No dysarthria or facial asymmetry. Sensory: Sensation is intact. Motor: Motor function is intact. DIAGNOSTIC RESULTS     EKG: Sinus rhythm, 60 BPM. No STEMI. RADIOLOGY:     Interpretation per the Radiologist below, if available at the time of this note:    CT CHEST PULMONARY EMBOLISM W CONTRAST   Final Result   1. No evidence of pulmonary embolism or acute pulmonary abnormality. 2.  Unchanged chronic and postoperative findings in the right hemithorax with   small right pleural effusion with pleural thickening. XR CHEST PORTABLE   Final Result   Right basilar effusion with adjacent infiltrate. LABS:  Labs Reviewed   BASIC METABOLIC PANEL - Abnormal; Notable for the following components:       Result Value    Glucose 197 (*)     Creatinine 1.33 (*)     Est, Glom Filt Rate 57 (*)     Sodium 134 (*)     All other components within normal limits   CBC WITH AUTO DIFFERENTIAL - Abnormal; Notable for the following components:    RBC 3.54 (*)     Hemoglobin 9.1 (*)     Hematocrit 29.5 (*)     RDW 16.4 (*)     Immature Granulocytes 1 (*)     All other components within normal limits   URINALYSIS WITH MICROSCOPIC - Abnormal; Notable for the following components:    Specific Gravity, UA <1.005 (*)     All other components within normal limits   D-DIMER, QUANTITATIVE - Abnormal; Notable for the following components:    D-Dimer, Quant 2.28 (*)     All other components within normal limits   TROPONIN       All other labs were within normal range or not returned as of this dictation.     EMERGENCY DEPARTMENT COURSE and DIFFERENTIAL DIAGNOSIS/MDM: Patient presented to the ED for evaluation of a single syncopal episode. Well-appearing, stable on arrival in ED. Exam unremarkable.    + [employer]. BP improved with IV fluids. EKG unremarkable. CTPA performed for evaluation of PE given the elevated d-dimer. Negative for any acute process. Troponin negative. Doubt cardiac dysrhythmia. Chronic, stable anemia. Doubt infectious process. Doubt CVA. Patient stable and without sx during ED evaluation. Will d/c with outpatient f/u and return precautions. FINAL IMPRESSION      1.  Syncope, unspecified syncope type          DISPOSITION/PLAN     DISPOSITION Decision To Discharge 12/30/2022 03:04:13 PM      PATIENT REFERRED TO:  [facility]  [address] [place] [id number] [phone]    Schedule an appointment as soon as possible for a visit in 3 days    (Please note that portions of this note were completed with a voice recognition program.  Efforts were made to edit the dictations but occasionally words are mis-transcribed.)    Yolanda Umanzor MD (electronically signed)  Attending Emergency Physician           Yolanda Umanzor MD  12/30/22 [id number]

## 2022-12-30 NOTE — DISCHARGE INSTRUCTIONS
Return to the ED for any additional episodes of passing out, any chest pain, difficulty breathing, fever or any other concerns.

## 2023-01-05 ENCOUNTER — PROCEDURE VISIT (OUTPATIENT)
Dept: UROLOGY | Age: 73
End: 2023-01-05
Payer: MEDICARE

## 2023-01-05 VITALS
WEIGHT: 159 LBS | BODY MASS INDEX: 21.54 KG/M2 | TEMPERATURE: 97.7 F | SYSTOLIC BLOOD PRESSURE: 106 MMHG | HEART RATE: 73 BPM | DIASTOLIC BLOOD PRESSURE: 52 MMHG | HEIGHT: 72 IN

## 2023-01-05 DIAGNOSIS — N40.1 BPH WITH OBSTRUCTION/LOWER URINARY TRACT SYMPTOMS: ICD-10-CM

## 2023-01-05 DIAGNOSIS — R35.0 FREQUENCY OF URINATION: Primary | ICD-10-CM

## 2023-01-05 DIAGNOSIS — R33.9 URINARY RETENTION: ICD-10-CM

## 2023-01-05 DIAGNOSIS — N13.8 BPH WITH OBSTRUCTION/LOWER URINARY TRACT SYMPTOMS: ICD-10-CM

## 2023-01-05 PROCEDURE — G8427 DOCREV CUR MEDS BY ELIG CLIN: HCPCS | Performed by: UROLOGY

## 2023-01-05 PROCEDURE — 3074F SYST BP LT 130 MM HG: CPT | Performed by: UROLOGY

## 2023-01-05 PROCEDURE — 52000 CYSTOURETHROSCOPY: CPT | Performed by: UROLOGY

## 2023-01-05 PROCEDURE — 1123F ACP DISCUSS/DSCN MKR DOCD: CPT | Performed by: UROLOGY

## 2023-01-05 PROCEDURE — 99214 OFFICE O/P EST MOD 30 MIN: CPT | Performed by: UROLOGY

## 2023-01-05 PROCEDURE — 3078F DIAST BP <80 MM HG: CPT | Performed by: UROLOGY

## 2023-01-05 PROCEDURE — 1036F TOBACCO NON-USER: CPT | Performed by: UROLOGY

## 2023-01-05 PROCEDURE — 3017F COLORECTAL CA SCREEN DOC REV: CPT | Performed by: UROLOGY

## 2023-01-05 PROCEDURE — G8484 FLU IMMUNIZE NO ADMIN: HCPCS | Performed by: UROLOGY

## 2023-01-05 PROCEDURE — G8420 CALC BMI NORM PARAMETERS: HCPCS | Performed by: UROLOGY

## 2023-01-05 ASSESSMENT — ENCOUNTER SYMPTOMS
CONSTIPATION: 0
VOMITING: 0
NAUSEA: 0
COUGH: 0
BACK PAIN: 0
WHEEZING: 0
COLOR CHANGE: 0
EYE REDNESS: 0
SHORTNESS OF BREATH: 0
ABDOMINAL PAIN: 0

## 2023-01-05 NOTE — PROGRESS NOTES
During cystoscopy the following was utilized on patient with no adverse affects:    45% SODIUM CHLORIDE 500 ML BAG  Lot number: [id number]  Expiration date: 11/23      LIDOCAINE HYDROCHLORIDE JELLY 2%   Lot number: [id number]  Expiration date: 07/24

## 2023-01-05 NOTE — PATIENT INSTRUCTIONS
SURVEY:    You may be receiving a survey from [employer] regarding your visit today. Please complete the survey to enable us to provide the highest quality of care to you and your family. If you cannot score us a very good on any question, please call the office to discuss how we could have made your experience a very good one. Thank you.

## 2023-01-05 NOTE — PROGRESS NOTES
HPI:          Patient is a 67 y.o. male in no acute distress. He is alert and oriented to person, place, and time. History  Lung cancer, started on pain medication     7/2022 ER referral for urinary retention. Straight cathed for 400ml. Retention secondary due to constipation due to pain medication              Flomax started                            Miralax started     CT with IV contrast showed a 2.1cm mass in the left kidney - patient wants oncology to follow     8/2022 MRI showed a 1.8cm left renal mass - followed by oncology     11/2022 frequency every hour and nocturia 3-4 times per night, PVR low              His Flomax to twice per day    Currently  Patient is here today for lower tract visualization. This secondary to persistent lower urinary tract symptoms. Patient's main complaints are with urinary frequency and urgency. He does have nocturia 3-4 times at night. We did also make a referral to general surgery. Patient also has been taking Flomax twice daily. Cystoscopy Procedure Note    Pre-operative Diagnosis: BPH with LUTs    Post-operative Diagnosis: Same     Surgeon: aTta Augustine    Assistants: None    Anesthesia : Local    Procedure Details   The risks, benefits, complications, treatment options, and expected outcomes were discussed with the patient. The patient concurred with the proposed plan, giving informed consent. Cystoscopy was performed today under local anesthesia, using sterile technique. The patient was placed in the dorsal lithotomy position, prepped with CHG, and draped in the usual sterile fashion. A 14 [place] flexible cystoscope was used to systematically inspect both the urethra and bladder in their entirety. Findings:  Anterior urethra: normal without strictures  Hyperplasia: Bilobar  Bladder: Normal mucosa, without lesions.   Ureteral orifice(s) was/were seen in the normal position and effluxing clear urine  Trabeculations No  Diverticulum No  Description: Bilobar hyperplasia with significant high riding bladder neck         Specimens: Cytology/urine culture No                 Complications:  None; patient tolerated the procedure well. Disposition: home           Condition: stable     Past Medical History:   Diagnosis Date    Abnormal cardiac cath 09/20/2013    LMCA; Mild irregularities 10-20%. LAD: Abnormal.  Mild to moderate irregularities throughout the LAD and branches. LCx:  abnormal.  80-90% stenosis in a moderate sized OM1. RCA: Abnormal.  40-50% mid RCA stenosis. Actinic keratosis     Arthritis     neck, fingers    CAD (coronary artery disease)     Calculus of kidney     Cancer (HCC)     Cervical stenosis of spine     Diabetes mellitus ([facility] [address]     Dr. Cloyd Osgood, CNP, [place]    Full dentures     GERD (gastroesophageal reflux disease)     H/O echocardiogram 04/29/2016    Stress echo. Normal resting LV systolic function,normal systolic restponse to exercise. No evidence of reversible  ischemia on this maximal,symptom limited,treadmill exerxiess stress echocardiography study    History of cardiac cath 02/09/2017    LMCA: Lesion on LMCA: Distal subsection . 20% stenosis. LAD: Lesion on 1st Diag: Mid subsection . 75% stenosis. RCA: Lesion on Mid RCA: Mid subsection . 100% stenosis. Comments: The distal RCA fills by left to right collateralization. Dominance: Mixed. LV function assessed as: Normal. EF 65%. History of echocardiogram 06/11/2018    EF 60%. Mildly increased LV wall thickness. Evidence of mild diastolic dysfunction seen. History of echocardiogram 01/18/2019    EF of 65%. LV wall thickness is mildly increased. Aortic valve leaflets are mildly thickened.      History of heart attack     History of removal of tunneled central venous catheter (CVC) with port 11/09/2022    [address]    History of tobacco abuse     Quit 10/2021, 1/4 ppd for 54 years    Hyperlipidemia     Hypertension     Dr. Cloyd Osgood, CNP    Pulmonary nodule 12/2021    Right middle lobe    Raynaud's phenomenon     S/P angioplasty with stent 09/20/2013    PTCA-NEDRA of  the OM 1    Wears hearing aid in both ears      Past Surgical History:   Procedure Laterality Date    CARDIAC CATHETERIZATION  2007    patient states had 2 small blockages    CARDIAC CATHETERIZATION  02/09/2017    LMCA: Lesion on LMCA: Distal subsection 20% stenosis. LAD: Lesion on 1st Diag: Mid subsection 75% stenosis. RCA: Lesion on Mid RCA: Mid subsection 100% stenosis. CARDIAC CATHETERIZATION  02/09/2017    Attempted PCI to chronic total occlusion of RCA was not successful. Unable to cross with multiple wires due to heavy calcification and toruosity.     CARDIAC CATHETERIZATION  01/14/2019    CATARACT REMOVAL  04/06/2015    Right eye - Dr. Mike Mims  06/08/2015    Dr. Nancy Castaneda - left eye    COLONOSCOPY  01/11/2006    Dr. Carlo Griffin - internal hemorrhoids, no polps    COLONOSCOPY  05/23/2016    -polyp    CT NEEDLE BIOPSY LUNG PERCUTANEOUS  07/28/2021    CT NEEDLE BIOPSY LUNG PERCUTANEOUS 7/28/2021 [facility] CT SCAN    HEMORRHOID SURGERY      HERNIA REPAIR Left     inguinal    IR CHEST TUBE INSERTION  01/11/2022    IR CHEST TUBE INSERTION 1/11/2022 Marino Russell MD [facility] SPECIAL PROCEDURES    IR PORT PLACEMENT EQUAL OR GREATER THAN 5 YEARS  2/9/2022    IR PORT PLACEMENT EQUAL OR GREATER THAN 5 YEARS 2/9/2022 [facility] SPECIAL PROCEDURES    LUNG BIOPSY Right 07/28/2021    Dr Boogie Yo, TOTAL Right 01/04/2022    MIDDLE WEDGE RESECTION, POSSIBLE MIDDLE LOBECTOMY VIA THORACOTOMY performed by Janeth Moran MD at [facility]    c4-5 laminectomy and fusion    OTHER SURGICAL HISTORY  10/2014    Right foot - 7 from heal and 1 from great toe at [address]  12/11/2014    pain injection - cervical    TUNNELED CENTRAL VENOUS CATHETER W/ SUBCUTANEOUS PORT Left 02/09/2022    [facility]    UPPER GASTROINTESTINAL ENDOSCOPY  12/28/2005    Dr. Eather Ormond - mild esophagitis and gastritis    UPPER GASTROINTESTINAL ENDOSCOPY N/A 02/05/2019    -bx(neg H-Pylori)retained gastric content, possible submucosal mass of gastric cardia    UPPER GASTROINTESTINAL ENDOSCOPY N/A 10/22/2019    EGD BIOPSY performed by Ramila Bustamante MD at [facility] Encounter Medications as of 1/5/2023   Medication Sig Dispense Refill    mirabegron (MYRBETRIQ) 50 MG TB24 Take 50 mg by mouth daily 30 tablet 11    tamsulosin (FLOMAX) 0.4 MG capsule Take 1 capsule by mouth in the morning and at bedtime 60 capsule 2    isosorbide mononitrate (IMDUR) 30 MG extended release tablet TAKE 1/2 TABLET BY MOUTH DAILY (Patient taking differently: Take 30 mg by mouth daily) 45 tablet 3    ferrous sulfate (IRON 325) 325 (65 Fe) MG tablet Take 1 tablet by mouth daily (with breakfast) 90 tablet 1    metFORMIN (GLUCOPHAGE) 1000 MG tablet Take 1 tablet by mouth 2 times daily (with meals) 60 tablet 3    SITagliptin (JANUVIA) 50 MG tablet Take 1 tablet by mouth daily (Patient taking differently: Take 100 mg by mouth daily) 90 tablet 1    morphine (MSIR) 15 MG tablet Take 15 mg by mouth every 6 hours as needed for Pain.       clotrimazole-betamethasone (LOTRISONE) 1-0.05 % cream Apply topically daily To both feet      atenolol (TENORMIN) 25 MG tablet Take 1 tablet by mouth daily 90 tablet 3    Continuous Blood Gluc Sensor (FREESTYLE YAIMA 14 DAY SENSOR) MISC as directed      amLODIPine (NORVASC) 5 MG tablet Take 1 tablet by mouth daily 90 tablet 3    magnesium 30 MG tablet Take 400 mg by mouth daily       clopidogrel (PLAVIX) 75 MG tablet Take 1 tablet by mouth daily 90 tablet 3    zinc 50 MG CAPS Take 50 mg by mouth daily (with breakfast)      promethazine (PHENERGAN) 25 MG tablet Take 25 mg by mouth every 4 hours as needed for Nausea      pantoprazole (PROTONIX) 40 MG tablet Take 40 mg by mouth in the morning and at bedtime ondansetron (ZOFRAN ODT) 8 MG TBDP disintegrating tablet Take 1 tablet by mouth every 8 hours as needed for Nausea 30 tablet 2    atorvastatin (LIPITOR) 20 MG tablet Take 1 tablet by mouth daily 90 tablet 3    aspirin 81 MG tablet Take 81 mg by mouth daily Ordered by heart doctor, [place] Simons      nitroGLYCERIN (NITRODUR) 0.4 MG/HR 1 dose under tongue as needed for chest pain. max of 3 in one day (Patient not taking: No sig reported) 30 patch 2    [DISCONTINUED] folic acid (FOLVITE) 1 MG tablet Take 1 tablet by mouth daily for 30 doses Start 7 days before first scheduled dose and continue for 21 days after last dose of PEMEtrexed. 30 tablet 2    [DISCONTINUED] lidocaine-prilocaine (EMLA) 2.5-2.5 % cream Apply topically to port site 30- 60 minutes before access as needed. (Patient not taking: Reported on 1/5/2023) 30 g 2    Insulin Glargine, 2 Unit Dial, 300 UNIT/ML SOPN  (Patient not taking: Reported on 1/5/2023)      glimepiride (AMARYL) 2 MG tablet Take 1 tablet by mouth 2 times daily (Patient not taking: Reported on 1/5/2023) 180 tablet 3     No facility-administered encounter medications on file as of 1/5/2023. Current Outpatient Medications on File Prior to Visit   Medication Sig Dispense Refill    tamsulosin (FLOMAX) 0.4 MG capsule Take 1 capsule by mouth in the morning and at bedtime 60 capsule 2    isosorbide mononitrate (IMDUR) 30 MG extended release tablet TAKE 1/2 TABLET BY MOUTH DAILY (Patient taking differently: Take 30 mg by mouth daily) 45 tablet 3    ferrous sulfate (IRON 325) 325 (65 Fe) MG tablet Take 1 tablet by mouth daily (with breakfast) 90 tablet 1    metFORMIN (GLUCOPHAGE) 1000 MG tablet Take 1 tablet by mouth 2 times daily (with meals) 60 tablet 3    SITagliptin (JANUVIA) 50 MG tablet Take 1 tablet by mouth daily (Patient taking differently: Take 100 mg by mouth daily) 90 tablet 1    morphine (MSIR) 15 MG tablet Take 15 mg by mouth every 6 hours as needed for Pain. clotrimazole-betamethasone (LOTRISONE) 1-0.05 % cream Apply topically daily To both feet      atenolol (TENORMIN) 25 MG tablet Take 1 tablet by mouth daily 90 tablet 3    Continuous Blood Gluc Sensor (FREESTYLE YAIMA 14 DAY SENSOR) MISC as directed      amLODIPine (NORVASC) 5 MG tablet Take 1 tablet by mouth daily 90 tablet 3    magnesium 30 MG tablet Take 400 mg by mouth daily       clopidogrel (PLAVIX) 75 MG tablet Take 1 tablet by mouth daily 90 tablet 3    zinc 50 MG CAPS Take 50 mg by mouth daily (with breakfast)      promethazine (PHENERGAN) 25 MG tablet Take 25 mg by mouth every 4 hours as needed for Nausea      pantoprazole (PROTONIX) 40 MG tablet Take 40 mg by mouth in the morning and at bedtime      ondansetron (ZOFRAN ODT) 8 MG TBDP disintegrating tablet Take 1 tablet by mouth every 8 hours as needed for Nausea 30 tablet 2    atorvastatin (LIPITOR) 20 MG tablet Take 1 tablet by mouth daily 90 tablet 3    aspirin 81 MG tablet Take 81 mg by mouth daily Ordered by heart doctor, Dr. Taylor Stratton, [place]      nitroGLYCERIN (NITRODUR) 0.4 MG/HR 1 dose under tongue as needed for chest pain. max of 3 in one day (Patient not taking: No sig reported) 30 patch 2    [DISCONTINUED] folic acid (FOLVITE) 1 MG tablet Take 1 tablet by mouth daily for 30 doses Start 7 days before first scheduled dose and continue for 21 days after last dose of PEMEtrexed. 30 tablet 2    Insulin Glargine, 2 Unit Dial, 300 UNIT/ML SOPN  (Patient not taking: Reported on 1/5/2023)      glimepiride (AMARYL) 2 MG tablet Take 1 tablet by mouth 2 times daily (Patient not taking: Reported on 1/5/2023) 180 tablet 3     No current facility-administered medications on file prior to visit.      Niacin and related, Morphine, Oxycodone, Minocycline, Minocycline hcl, and Other  Family History   Problem Relation Age of Onset    Heart Disease Mother     Diabetes Mother     High Blood Pressure Mother     Diabetes Father     Alzheimer's Disease Father     Heart Disease Sister         CABG    Heart Disease Brother     Heart Disease Brother     Heart Disease Brother     Cancer Brother      Social History     Tobacco Use   Smoking Status Former    Packs/day: 0.25    Years: 54.00    Pack years: 13.50    Types: Cigarettes    Quit date: 10/1/2021    Years since quittin.2   Smokeless Tobacco Never       Social History     Substance and Sexual Activity   Alcohol Use No       Review of Systems   Constitutional:  Negative for appetite change, chills and fever. Eyes:  Negative for redness and visual disturbance. Respiratory:  Negative for cough, shortness of breath and wheezing. Cardiovascular:  Negative for chest pain and leg swelling. Gastrointestinal:  Negative for abdominal pain, constipation, nausea and vomiting. Genitourinary:  Positive for frequency and urgency. Negative for decreased urine volume, difficulty urinating, dysuria, enuresis, flank pain, hematuria, penile discharge, penile pain, scrotal swelling and testicular pain. Musculoskeletal:  Negative for back pain, joint swelling and myalgias. Skin:  Negative for color change, rash and wound. Neurological:  Negative for dizziness, tremors and numbness. Hematological:  Negative for adenopathy. Does not bruise/bleed easily. BP (!) 106/52 (Site: Right Upper Arm, Position: Sitting, Cuff Size: Medium Adult)   Pulse 73   Temp 97.7 °F (36.5 °C)   Ht 6' (1.829 m)   Wt 159 lb (72.1 kg)   BMI 21.56 kg/m²       PHYSICAL EXAM:  Constitutional: Patient in no acute distress; Neuro: alert and oriented to person place and time. Psych: Mood and affect normal.  Skin: Normal  Lungs: Respiratory effort normal  Cardiovascular:  Normal peripheral pulses  Abdomen: Soft, non-tender, non-distended with no CVA, flank pain  Bladder non-tender and not distended.   Lymphatics: no palpable lymphadenopathy  Penis normal  Urethral meatus normal  Scrotal exam normal  Testicles normal bilaterally  Epididymis normal bilaterally  No evidence of inguinal hernia      Lab Results   Component Value Date    BUN 21 12/30/2022     Lab Results   Component Value Date    CREATININE 1.33 (H) 12/30/2022     Lab Results   Component Value Date    PSA 0.97 08/03/2022    PSA 0.57 08/02/2013    PSA 0.43 08/17/2012       ASSESSMENT:  This is a 67 y.o. male with the following diagnoses:   Diagnosis Orders   1. Frequency of urination        2. BPH with obstruction/lower urinary tract symptoms        3. Urinary retention             PLAN:  Patient would benefit from PVP greenlight. We will start him on Myrbetriq. I am reluctant to start an anticholinergic but he has a history of retention. If he is intolerant of the Myrbetriq then we will have to consider surgical correction. We did start him on 50 mg of Myrbetriq today. He will follow-up with us in 6 weeks.

## 2023-01-06 ENCOUNTER — OFFICE VISIT (OUTPATIENT)
Dept: SURGERY | Age: 73
End: 2023-01-06

## 2023-01-06 VITALS — HEART RATE: 75 BPM | DIASTOLIC BLOOD PRESSURE: 59 MMHG | RESPIRATION RATE: 16 BRPM | SYSTOLIC BLOOD PRESSURE: 115 MMHG

## 2023-01-06 DIAGNOSIS — K40.90 NON-RECURRENT UNILATERAL INGUINAL HERNIA WITHOUT OBSTRUCTION OR GANGRENE: Primary | ICD-10-CM

## 2023-01-06 DIAGNOSIS — K81.1 CHOLECYSTITIS, CHRONIC: ICD-10-CM

## 2023-01-06 NOTE — PROGRESS NOTES
Subjective:      Patient ID: Rani Rossi is a 67 y.o. male who presents today for:  Chief Complaint   Patient presents with    New Patient     Hernia eval       HPI  Rosalie Raines presents with nearly year-long history of right-sided abdominal pain. The pain is in the upper abdomen. He describes it as being under the rib cage. It does not necessarily radiate through to the back. He denies nausea or vomiting. The pain is not typically postprandial in nature. Patient has developed a right inguinal hernia over the last year or so. This does not cause him much in the way of symptoms. Past Medical History:   Diagnosis Date    Abnormal cardiac cath 09/20/2013    LMCA; Mild irregularities 10-20%. LAD: Abnormal.  Mild to moderate irregularities throughout the LAD and branches. LCx:  abnormal.  80-90% stenosis in a moderate sized OM1. RCA: Abnormal.  40-50% mid RCA stenosis. Actinic keratosis     Arthritis     neck, fingers    CAD (coronary artery disease)     Calculus of kidney     Cancer (HCC)     Cervical stenosis of spine     Diabetes mellitus ([facility] [address]     Dr. Lauryn Bean, CNP, [place]    Full dentures     GERD (gastroesophageal reflux disease)     H/O echocardiogram 04/29/2016    Stress echo. Normal resting LV systolic function,normal systolic restponse to exercise. No evidence of reversible  ischemia on this maximal,symptom limited,treadmill exerxiess stress echocardiography study    History of cardiac cath 02/09/2017    LMCA: Lesion on LMCA: Distal subsection . 20% stenosis. LAD: Lesion on 1st Diag: Mid subsection . 75% stenosis. RCA: Lesion on Mid RCA: Mid subsection . 100% stenosis. Comments: The distal RCA fills by left to right collateralization. Dominance: Mixed. LV function assessed as: Normal. EF 65%. History of echocardiogram 06/11/2018    EF 60%. Mildly increased LV wall thickness. Evidence of mild diastolic dysfunction seen. History of echocardiogram 01/18/2019    EF of 65%.  LV wall thickness is mildly increased. Aortic valve leaflets are mildly thickened. History of heart attack     History of removal of tunneled central venous catheter (CVC) with port 11/09/2022    [address]    History of tobacco abuse     Quit 10/2021, 1/4 ppd for 54 years    Hyperlipidemia     Hypertension     Dr. Silke Caceres, CNP    Pulmonary nodule 12/2021    Right middle lobe    Raynaud's phenomenon     S/P angioplasty with stent 09/20/2013    PTCA-NEDRA of  the OM 1    Wears hearing aid in both ears        Past Surgical History:   Procedure Laterality Date    CARDIAC CATHETERIZATION  2007    patient states had 2 small blockages    CARDIAC CATHETERIZATION  02/09/2017    LMCA: Lesion on LMCA: Distal subsection 20% stenosis. LAD: Lesion on 1st Diag: Mid subsection 75% stenosis. RCA: Lesion on Mid RCA: Mid subsection 100% stenosis. CARDIAC CATHETERIZATION  02/09/2017    Attempted PCI to chronic total occlusion of RCA was not successful. Unable to cross with multiple wires due to heavy calcification and toruosity.     CARDIAC CATHETERIZATION  01/14/2019    CATARACT REMOVAL  04/06/2015    Right eye - Dr. Adrianne Resendiz  06/08/2015    Dr. Pamela Garcia - left eye    COLONOSCOPY  01/11/2006    Dr. Sheridan Nuñez - internal hemorrhoids, no polps    COLONOSCOPY  05/23/2016    -polyp    CT NEEDLE BIOPSY LUNG PERCUTANEOUS  07/28/2021    CT NEEDLE BIOPSY LUNG PERCUTANEOUS 7/28/2021 [facility] CT SCAN    HEMORRHOID SURGERY      HERNIA REPAIR Left     inguinal    IR CHEST TUBE INSERTION  01/11/2022    IR CHEST TUBE INSERTION 1/11/2022 Constantino Alba MD [facility] SPECIAL PROCEDURES    IR PORT PLACEMENT EQUAL OR GREATER THAN 5 YEARS  2/9/2022    IR PORT PLACEMENT EQUAL OR GREATER THAN 5 YEARS 2/9/2022 [facility] SPECIAL PROCEDURES    LUNG BIOPSY Right 07/28/2021    Dr Lonnie Tom, TOTAL Right 01/04/2022    MIDDLE WEDGE RESECTION, POSSIBLE MIDDLE LOBECTOMY VIA THORACOTOMY performed by Nubia Bhagat MD at [facility]    c4-5 laminectomy and fusion    OTHER SURGICAL HISTORY  10/2014    Right foot - 7 from heal and 1 from great toe at [address]  12/11/2014    pain injection - cervical    TUNNELED CENTRAL VENOUS CATHETER W/ SUBCUTANEOUS PORT Left 02/09/2022    Dr Hoenninger/[facility]    UPPER GASTROINTESTINAL ENDOSCOPY  12/28/2005     Dinesh Arco - mild esophagitis and gastritis    UPPER GASTROINTESTINAL ENDOSCOPY N/A 02/05/2019    -bx(neg H-Pylori)retained gastric content, possible submucosal mass of gastric cardia    UPPER GASTROINTESTINAL ENDOSCOPY N/A 10/22/2019    EGD BIOPSY performed by Navi Brannon MD at [facility]   Allergen Reactions    Niacin And Related      Turns red, no trouble breathing    Morphine      nausea    Oxycodone      nausea    Minocycline Rash    Minocycline Hcl Rash    Other Nausea And Vomiting     Patient states on all pain medications he gets nausea and vomiting. Doctor ordered a medicine (nausea) to take before pain medication       Current Outpatient Medications on File Prior to Visit   Medication Sig Dispense Refill    mirabegron (MYRBETRIQ) 50 MG TB24 Take 50 mg by mouth daily 30 tablet 11    tamsulosin (FLOMAX) 0.4 MG capsule Take 1 capsule by mouth in the morning and at bedtime 60 capsule 2    isosorbide mononitrate (IMDUR) 30 MG extended release tablet TAKE 1/2 TABLET BY MOUTH DAILY (Patient taking differently: Take 30 mg by mouth daily) 45 tablet 3    ferrous sulfate (IRON 325) 325 (65 Fe) MG tablet Take 1 tablet by mouth daily (with breakfast) 90 tablet 1    nitroGLYCERIN (NITRODUR) 0.4 MG/HR 1 dose under tongue as needed for chest pain.  max of 3 in one day (Patient not taking: No sig reported) 30 patch 2    metFORMIN (GLUCOPHAGE) 1000 MG tablet Take 1 tablet by mouth 2 times daily (with meals) 60 tablet 3    SITagliptin (JANUVIA) 50 MG tablet Take 1 tablet by mouth daily (Patient taking differently: Take 100 mg by mouth daily) 90 tablet 1    morphine (MSIR) 15 MG tablet Take 15 mg by mouth every 6 hours as needed for Pain. clotrimazole-betamethasone (LOTRISONE) 1-0.05 % cream Apply topically daily To both feet      atenolol (TENORMIN) 25 MG tablet Take 1 tablet by mouth daily 90 tablet 3    Continuous Blood Gluc Sensor (FREESTYLE YAIMA 14 DAY SENSOR) MISC as directed      amLODIPine (NORVASC) 5 MG tablet Take 1 tablet by mouth daily 90 tablet 3    magnesium 30 MG tablet Take 400 mg by mouth daily       clopidogrel (PLAVIX) 75 MG tablet Take 1 tablet by mouth daily 90 tablet 3    zinc 50 MG CAPS Take 50 mg by mouth daily (with breakfast)      promethazine (PHENERGAN) 25 MG tablet Take 25 mg by mouth every 4 hours as needed for Nausea      pantoprazole (PROTONIX) 40 MG tablet Take 40 mg by mouth in the morning and at bedtime      ondansetron (ZOFRAN ODT) 8 MG TBDP disintegrating tablet Take 1 tablet by mouth every 8 hours as needed for Nausea 30 tablet 2    [DISCONTINUED] folic acid (FOLVITE) 1 MG tablet Take 1 tablet by mouth daily for 30 doses Start 7 days before first scheduled dose and continue for 21 days after last dose of PEMEtrexed. 30 tablet 2    Insulin Glargine, 2 Unit Dial, 300 UNIT/ML SOPN  (Patient not taking: Reported on 1/5/2023)      atorvastatin (LIPITOR) 20 MG tablet Take 1 tablet by mouth daily 90 tablet 3    glimepiride (AMARYL) 2 MG tablet Take 1 tablet by mouth 2 times daily (Patient not taking: Reported on 1/5/2023) 180 tablet 3    aspirin 81 MG tablet Take 81 mg by mouth daily Ordered by heart doctor, Chan Potts       No current facility-administered medications on file prior to visit. Objective:   Physical Examination:    BP (!) 115/59 (Site: Right Upper Arm, Position: Sitting, Cuff Size: Large Adult)   Pulse 75   Resp 16     Physical Exam  Vitals and nursing note reviewed. Constitutional:       Appearance: Normal appearance.  He is normal weight. HENT:      Head: Normocephalic and atraumatic. Nose: Nose normal.   Eyes:      General: No scleral icterus. Extraocular Movements: Extraocular movements intact. Pupils: Pupils are equal, round, and reactive to light. Cardiovascular:      Rate and Rhythm: Normal rate and regular rhythm. Pulses: Normal pulses. Heart sounds: Normal heart sounds. No murmur heard. No friction rub. No gallop. Abdominal:      General: There is no distension. Palpations: There is no mass. Tenderness: There is no abdominal tenderness. Hernia: No hernia is present. Genitourinary:     Testes: Normal.   Musculoskeletal:         General: Normal range of motion. Cervical back: Normal range of motion and neck supple. Skin:     General: Skin is warm. Coloration: Skin is not jaundiced. Neurological:      General: No focal deficit present. Mental Status: He is alert and oriented to person, place, and time. Psychiatric:         Mood and Affect: Mood normal.         Behavior: Behavior normal.      Patient has an easily reducible right inguinal hernia. There is no tenderness in the right groin. I appreciate no evidence of a recurrent left inguinal hernia. Patient does have tenderness in the right upper quadrant to deep palpation. I did review the patient's CT scan of the abdomen and pelvis as well as an MRI of the abdomen. This demonstrates gallstones. Common bile duct is not dilated. The CT scan does demonstrate that a portion of the appendix is within the right inguinal hernia. There is some fluid within the hernia sac. There is no significant inflammatory change involving this area.     Assessment:     Patient Active Problem List   Diagnosis    Angina pectoris ([address]    ASHD (arteriosclerotic heart disease)    Primary hypertension    Type 2 diabetes mellitus without complication, without long-term current use of insulin (HCC)    Hyperlipidemia    Shortness of breath, post procedure improving probably Effient side effect. S/P angioplasty with stent, obtuse marginal    Type II or unspecified type diabetes mellitus without mention of complication, not stated as uncontrolled    Calculus of kidney    Actinic keratosis    Senile nuclear sclerosis    Dyspepsia    Epigastric pain    Substernal chest pain    Abnormal CT scan, stomach    Abnormal gastrointestinal PET scan    Strain of lumbar region    Pneumothorax after biopsy    Pneumothorax, post biopsy, right    Acute delirium    Hyponatremia    Postoperative ileus (HCC)    S/P thoracotomy    Lung cancer, main bronchus, right ([address]    Encounter for care related to vascular access port    Nosebleed    Chemotherapy adverse reaction    Pleural effusion on right    Right-sided chest pain    BPH with obstruction/lower urinary tract symptoms    Renal mass    Syncope and collapse   I believe the patient's abdominal pain is most likely related to his gallstones. Plan:   I have suggested we proceed with robotic cholecystectomy. Patient is agreeable. He will be sent for medical clearance.           Guerline Virk MD   1/6/2023 2:37 PM EST

## 2023-01-09 PROBLEM — K80.20 CHOLELITHIASIS: Status: ACTIVE | Noted: 2023-01-09

## 2023-01-25 ENCOUNTER — TELEPHONE (OUTPATIENT)
Dept: ONCOLOGY | Age: 73
End: 2023-01-25

## 2023-01-25 NOTE — TELEPHONE ENCOUNTER
Name: Salvador Carballo  : 1950  MRN: [id number]    Oncology Navigation Follow-Up Note    Contact Type:  Telephone    Notes: Call made to patient for ONN follow up. No answer. Left message stating writer called to check in and assess needs. Requested returned phone call.        Electronically signed by Aaliyah Kaur RN on 2023 at 2:49 PM Terbinafine Counseling: Patient counseling regarding adverse effects of terbinafine including but not limited to headache, diarrhea, rash, upset stomach, liver function test abnormalities, itching, taste/smell disturbance, nausea, abdominal pain, and flatulence.  There is a rare possibility of liver failure that can occur when taking terbinafine.  The patient understands that a baseline LFT and kidney function test may be required. The patient verbalized understanding of the proper use and possible adverse effects of terbinafine.  All of the patient's questions and concerns were addressed.

## 2023-02-07 ENCOUNTER — HOSPITAL ENCOUNTER (OUTPATIENT)
Dept: NON INVASIVE DIAGNOSTICS | Age: 73
Discharge: HOME | End: 2023-02-07
Payer: MEDICARE

## 2023-02-07 ENCOUNTER — OFFICE VISIT (OUTPATIENT)
Dept: CARDIOLOGY | Age: 73
End: 2023-02-07
Payer: MEDICARE

## 2023-02-07 VITALS
OXYGEN SATURATION: 97 % | SYSTOLIC BLOOD PRESSURE: 117 MMHG | HEIGHT: 72 IN | BODY MASS INDEX: 22.4 KG/M2 | HEART RATE: 80 BPM | WEIGHT: 165.4 LBS | RESPIRATION RATE: 18 BRPM | DIASTOLIC BLOOD PRESSURE: 59 MMHG

## 2023-02-07 DIAGNOSIS — I10 ESSENTIAL HYPERTENSION: ICD-10-CM

## 2023-02-07 DIAGNOSIS — R10.31 RIGHT LOWER QUADRANT ABDOMINAL PAIN: ICD-10-CM

## 2023-02-07 DIAGNOSIS — I25.10 ASHD (ARTERIOSCLEROTIC HEART DISEASE): ICD-10-CM

## 2023-02-07 DIAGNOSIS — R55 SYNCOPE, UNSPECIFIED SYNCOPE TYPE: ICD-10-CM

## 2023-02-07 DIAGNOSIS — Z01.810 PREOP CARDIOVASCULAR EXAM: ICD-10-CM

## 2023-02-07 DIAGNOSIS — Z01.810 PREOP CARDIOVASCULAR EXAM: Primary | ICD-10-CM

## 2023-02-07 DIAGNOSIS — E78.2 MIXED HYPERLIPIDEMIA: ICD-10-CM

## 2023-02-07 DIAGNOSIS — R06.02 SOB (SHORTNESS OF BREATH): ICD-10-CM

## 2023-02-07 DIAGNOSIS — Z95.820 S/P ANGIOPLASTY WITH STENT: ICD-10-CM

## 2023-02-07 DIAGNOSIS — C34.01 LUNG CANCER, MAIN BRONCHUS, RIGHT (HCC): ICD-10-CM

## 2023-02-07 LAB
LV EF: 60 %
LVEF MODALITY: NORMAL

## 2023-02-07 PROCEDURE — 93306 TTE W/DOPPLER COMPLETE: CPT

## 2023-02-07 PROCEDURE — 99211 OFF/OP EST MAY X REQ PHY/QHP: CPT | Performed by: INTERNAL MEDICINE

## 2023-02-07 PROCEDURE — 93242 EXT ECG>48HR<7D RECORDING: CPT

## 2023-02-07 PROCEDURE — 93005 ELECTROCARDIOGRAM TRACING: CPT | Performed by: INTERNAL MEDICINE

## 2023-02-07 PROCEDURE — 93243 EXT ECG>48HR<7D SCAN A/R: CPT

## 2023-02-07 RX ORDER — AMLODIPINE BESYLATE 2.5 MG/1
2.5 TABLET ORAL DAILY
Qty: 90 TABLET | Refills: 3 | Status: SHIPPED | OUTPATIENT
Start: 2023-02-07

## 2023-02-07 NOTE — PROGRESS NOTES
Fred Aquino am scribing for and in the presence of Prabha Bonner MD, F.A.C.C. Patient: Sandip Rutledge  : 1950  Date of Visit: 2023    REASON FOR VISIT / CONSULTATION:   Chief Complaint   Patient presents with    Cardiac Clearance     HX: ASHD, S/P angioplasty w/ stent, HTN, HLD, tobacco abuse, atypical CP. Pt is here for a cardiac clearance for gallbladder at [facility] Dr. Sanchez Cons on 2/15/23. He said he passed out 3 times for dehydration. He says he has been drinking more fluids. SOB due to having part of his lung removed. Denies CP, light headed/dizziness, palps. Dear Dr. Ariane Ferro had the pleasure of seeing Sandip Rutledge today for follow up on his cardiac status. As you know, Mr. Landon Casillas is 71 y.o male with PMH of hypertension, hyperlipidemia, type II diabetes and coronary artery disease with  heart cath done on 2013 at [facility]. Vs at that time he has NEDRA to OM1. The cath also showed mild disease of the other coronaries and normal left ventricular function by ventriculogram.      Earlier in , patient had an episode of prolonged chest pain. Retrosternal, burning in nature without radiation. Pain occurred at rest with partial relief with sublingual nitrates. Repeat cardiac cath on  2017 showed new totally occluded mid RCA. An attempt to open the totally occluded RCA on 2017 has failed. Patient treated medically. Echo done on 2018, global left ventricular systolic function appears preserved with an estimated ejection fraction of 60%. Mild LVH. No definite specific wall motion abnormalities were identified. No significant valvular abnormalities. Evidence of mild diastolic dysfunction is seen. Echo done on 2019 that showed EF of 65%. LV wall thickness is mildly increased. Aortic valve leaflets are mildly thickened. Holter Monitor done on 2019: The rhythm was sinus. Average ID interval 0.22 [1st Degree AV Block].  Average QRS duration 0.08. Average daily heart rate  69 ranging from 54 to110 bpm.2. Rare premature supraventricular ectopic beats consisting of 9 isolated PACs. 3. There were no ventricular ectopic beats. 4. Diary returned with entry of \"sharp pain middle of chest\" with no ectopy noted. Sinus rhythm with rare isolated PACs, otherwise unremarkable Holter. Symptoms as above, were not correlated with any heart rate or rhythm abnormalities. Echo 12/15/2021: Bernarda Solano left ventricular systolic function appears preserved with an estimated ejection fraction of >60%. The left ventricular cavity size is within normal limits and the left ventricular wall thickness is within normal limits. No definite specific wall motion abnormalities were identified. No significant valvular abnormalities. Mild diastolic dysfunction. Compared to the previous study of 9/20/2019, no significant change was seen. Echo done on 9/29/2022: Global left ventricular systolic function appears preserved with an estimated ejection fraction of >60%. The left ventricular cavity size is within normal limits and the left ventricular wall thickness is mildly increased. No significant valvular disease was seen. Evidence of mild diastolic dysfunction is seen. Compared to the previous study of 12/15/21, no significant change was seen. No significant cardiac cause of syncope was identified from this study. ER/ Hospital on 9/28/2022 for loss of consciousness: He was having a shed delivered and went outside to watch them, when he walked back inside sat in chair sat down and passed out. He did mention he did not feel well. His wife reports his eyes rolled back, head went down, his dentures fell out and drooling. EMS was called at that time. His syncope was presumed to be caused by dehydration based on blood work. Mr. Jovana Connolly is here today for cardiac clearance for gallbladder surgery. He is scheduled to have this done on 2/15/23 at [facility] with Dr. Lise Hawkins.  He says he has had about 3 passing out episodes. He passed out at [employer] 3 weeks ago and his blood pressure has been dropping. He passed out and went to the emergency room on 12/30/22 and they told him he was dehydrated. He passed out about 2 months ago before this as well. He says he is drinking more water now. He did have lung cancer and his last chemo treatment was last year. He has part of his lung removed and he does get short of breath due to that. No blood in his urine or stool. He denies any further light headed/dizziness. He denies palpitations. Denies chest pain, pressure, or tightness. He is not sleeping well at night and he gets up to go to the bathroom a lot. He drinks one cup of coffee, water and 7 up. He has been having sinus issues. No cough, fever or chills. EKG done today in office 2/7/23: Normal sinus rhythm, normal ECG. Past Medical History:   Diagnosis Date    Abnormal cardiac cath 09/20/2013    LMCA; Mild irregularities 10-20%. LAD: Abnormal.  Mild to moderate irregularities throughout the LAD and branches. LCx:  abnormal.  80-90% stenosis in a moderate sized OM1. RCA: Abnormal.  40-50% mid RCA stenosis. Actinic keratosis     Arthritis     neck, fingers    CAD (coronary artery disease)     Calculus of kidney     Cancer (HCC)     Cervical stenosis of spine     Diabetes mellitus ([facility] [address]     Dr. Zannie Frankel, CNP, [place]    Full dentures     GERD (gastroesophageal reflux disease)     H/O echocardiogram 04/29/2016    Stress echo. Normal resting LV systolic function,normal systolic restponse to exercise. No evidence of reversible  ischemia on this maximal,symptom limited,treadmill exerxiess stress echocardiography study    History of cardiac cath 02/09/2017    LMCA: Lesion on LMCA: Distal subsection . 20% stenosis. LAD: Lesion on 1st Diag: Mid subsection . 75% stenosis. RCA: Lesion on Mid RCA: Mid subsection . 100% stenosis. Comments: The distal RCA fills by left to right collateralization. Dominance: Mixed. LV function assessed as: Normal. EF 65%. History of echocardiogram 06/11/2018    EF 60%. Mildly increased LV wall thickness. Evidence of mild diastolic dysfunction seen. History of echocardiogram 01/18/2019    EF of 65%. LV wall thickness is mildly increased. Aortic valve leaflets are mildly thickened. History of heart attack     History of removal of tunneled central venous catheter (CVC) with port 11/09/2022    [address]    History of tobacco abuse     Quit 10/2021, 1/4 ppd for 54 years    Hyperlipidemia     Hypertension     Dr. Jesu Schuster, CNP    Pulmonary nodule 12/2021    Right middle lobe    Raynaud's phenomenon     S/P angioplasty with stent 09/20/2013    PTCA-NEDRA of  the OM 1    Wears hearing aid in both ears          Past Surgical History:   Procedure Laterality Date    CARDIAC CATHETERIZATION  2007    patient states had 2 small blockages    CARDIAC CATHETERIZATION  02/09/2017    LMCA: Lesion on LMCA: Distal subsection 20% stenosis. LAD: Lesion on 1st Diag: Mid subsection 75% stenosis. RCA: Lesion on Mid RCA: Mid subsection 100% stenosis. CARDIAC CATHETERIZATION  02/09/2017    Attempted PCI to chronic total occlusion of RCA was not successful. Unable to cross with multiple wires due to heavy calcification and toruosity.     CARDIAC CATHETERIZATION  01/14/2019    CATARACT REMOVAL  04/06/2015    Right eye - Dr. Tanya Ge  06/08/2015    Dr. Bradly Briggs - left eye    COLONOSCOPY  01/11/2006    Dr. Irwin Delgado - internal hemorrhoids, no polps    COLONOSCOPY  05/23/2016    -polyp    CT NEEDLE BIOPSY LUNG PERCUTANEOUS  07/28/2021    CT NEEDLE BIOPSY LUNG PERCUTANEOUS 7/28/2021 [facility] CT SCAN    HEMORRHOID SURGERY      HERNIA REPAIR Left     inguinal    IR CHEST TUBE INSERTION  01/11/2022    IR CHEST TUBE INSERTION 1/11/2022 Fei Sanchez MD [facility]    IR PORT PLACEMENT EQUAL OR GREATER THAN 5 YEARS  2/9/2022    IR PORT PLACEMENT EQUAL OR GREATER THAN 5 YEARS 2022 [facility] SPECIAL PROCEDURES    LUNG BIOPSY Right 2021    Dr Foreman [address] TOTAL Right 2022    MIDDLE WEDGE RESECTION, POSSIBLE MIDDLE LOBECTOMY VIA THORACOTOMY performed by James Darnell MD at [address] [facility]    c4-5 laminectomy and fusion    OTHER SURGICAL HISTORY  10/2014    Right foot - 7 from heal and 1 from great toe at [address]  2014    pain injection - cervical    TUNNELED CENTRAL VENOUS CATHETER W/ SUBCUTANEOUS PORT Left 2022    Dr Hoenninger/[facility]    UPPER GASTROINTESTINAL ENDOSCOPY  2005    Dr. Senthil Dooley - mild esophagitis and gastritis    UPPER GASTROINTESTINAL ENDOSCOPY N/A 2019    -bx(neg H-Pylori)retained gastric content, possible submucosal mass of gastric cardia    UPPER GASTROINTESTINAL ENDOSCOPY N/A 10/22/2019    EGD BIOPSY performed by Dequan Luis MD at [facility] History   Problem Relation Age of Onset    Heart Disease Mother     Diabetes Mother     High Blood Pressure Mother     Diabetes Father     Alzheimer's Disease Father     Heart Disease Sister         CABG    Heart Disease Brother     Heart Disease Brother     Heart Disease Brother     Cancer Brother          Social History     Tobacco Use    Smoking status: Former     Packs/day: 0.25     Years: 54.00     Pack years: 13.50     Types: Cigarettes     Quit date: 10/1/2021     Years since quittin.3    Smokeless tobacco: Never   Vaping Use    Vaping Use: Never used   Substance Use Topics    Alcohol use: No    Drug use: No     Family History   Problem Relation Age of Onset    Heart Disease Mother     Diabetes Mother     High Blood Pressure Mother     Diabetes Father     Alzheimer's Disease Father     Heart Disease Sister         CABG    Heart Disease Brother     Heart Disease Brother     Heart Disease Brother     Cancer Brother          Current Outpatient Medications:     tamsulosin (FLOMAX) 0.4 MG capsule, Take 0.4 mg by mouth daily Takes in afternoon, Disp: , Rfl:     isosorbide mononitrate (IMDUR) 30 MG extended release tablet, TAKE 1/2 TABLET BY MOUTH DAILY (Patient taking differently: Take 30 mg by mouth daily), Disp: 45 tablet, Rfl: 3    ferrous sulfate (IRON 325) 325 (65 Fe) MG tablet, Take 1 tablet by mouth daily (with breakfast), Disp: 90 tablet, Rfl: 1    metFORMIN (GLUCOPHAGE) 1000 MG tablet, Take 1 tablet by mouth 2 times daily (with meals), Disp: 60 tablet, Rfl: 3    SITagliptin (JANUVIA) 50 MG tablet, Take 1 tablet by mouth daily (Patient taking differently: Take 100 mg by mouth daily), Disp: 90 tablet, Rfl: 1    morphine (MSIR) 15 MG tablet, Take 15 mg by mouth every 6 hours as needed for Pain., Disp: , Rfl:     clotrimazole-betamethasone (LOTRISONE) 1-0.05 % cream, Apply topically daily To both feet, Disp: , Rfl:     atenolol (TENORMIN) 25 MG tablet, Take 1 tablet by mouth daily, Disp: 90 tablet, Rfl: 3    Continuous Blood Gluc Sensor (FREESTYLE YAIMA 14 DAY SENSOR) [facility], as directed, Disp: , Rfl:     amLODIPine (NORVASC) 5 MG tablet, Take 1 tablet by mouth daily, Disp: 90 tablet, Rfl: 3    magnesium 30 MG tablet, Take 400 mg by mouth daily , Disp: , Rfl:     clopidogrel (PLAVIX) 75 MG tablet, Take 1 tablet by mouth daily, Disp: 90 tablet, Rfl: 3    zinc 50 MG CAPS, Take 50 mg by mouth daily (with breakfast), Disp: , Rfl:     promethazine (PHENERGAN) 25 MG tablet, Take 25 mg by mouth every 4 hours as needed for Nausea, Disp: , Rfl:     pantoprazole (PROTONIX) 40 MG tablet, Take 40 mg by mouth in the morning and at bedtime, Disp: , Rfl:     ondansetron (ZOFRAN ODT) 8 MG TBDP disintegrating tablet, Take 1 tablet by mouth every 8 hours as needed for Nausea, Disp: 30 tablet, Rfl: 2    atorvastatin (LIPITOR) 20 MG tablet, Take 1 tablet by mouth daily, Disp: 90 tablet, Rfl: 3    glimepiride (AMARYL) 2 MG tablet, Take 1 tablet by mouth 2 times daily, Disp: 180 tablet, Rfl: 3    aspirin 81 MG tablet, Take 81 mg by mouth daily Ordered by heart doctor, Dr. Tolu Velazquez [place], Disp: , Rfl:     mirabegron (MYRBETRIQ) 50 MG TB24, Take 50 mg by mouth daily (Patient not taking: Reported on 2/7/2023), Disp: 30 tablet, Rfl: 11    nitroGLYCERIN (NITRODUR) 0.4 MG/HR, 1 dose under tongue as needed for chest pain. max of 3 in one day (Patient not taking: No sig reported), Disp: 30 patch, Rfl: 2    Insulin Glargine, 2 Unit Dial, 300 UNIT/ML SOPN, , Disp: , Rfl:     Allergies   Allergen Reactions    Niacin And Related Other (See Comments)     Turns red, diaphoretic, no trouble breathing    Morphine      nausea    Oxycodone      nausea    Minocycline Hcl Rash    Other Nausea And Vomiting     Patient states on all pain medications he gets nausea and vomiting. Doctor ordered a medicine (nausea) to take before pain medication       ROS: 14 systems reviewed and negative except for pertinent positives as noted above. PHYSICAL EXAM:   BP (!) 117/59 (Site: Left Upper Arm, Position: Standing, Cuff Size: Medium Adult)   Pulse 80   Resp 18   Ht 6' (1.829 m)   Wt 165 lb 6.4 oz (75 kg)   SpO2 97%   BMI 22.43 kg/m²  Body mass index is 22.43 kg/m². Constitutional: He is oriented to person, place, and time. He appears well-developed and well-nourished. In no acute distress. HEENT: Normocephalic and atraumatic. No JVD present. Carotid bruit is not present. No mass and no thyromegaly present. No lymphadenopathy present. Cardiovascular: Normal rate, regular rhythm, normal heart sounds. Exam reveals no gallop and no friction rubs. No heart murmur heard. Pulmonary/Chest: Effort normal and breath sounds normal. No respiratory distress. He has no wheezes, rhonchi or rales. Abdominal: Soft, non-tender. Bowel sounds and aorta are normal. He exhibits no organomegaly, mass or bruit. Extremities: No edema. No cyanosis and no clubbing. Pulses are 2+ radial and carotid pulses.  2+ dorsalis pedis and posterior tibial pulses bilaterally. Neurological: He is alert and oriented to person, place, and time. No evidence of gross cranial nerve deficit. Coordination appeared normal.   Skin: Skin is warm and dry. There is no rash or diaphoresis. Psychiatric: He has a normal mood and affect. His speech is normal and behavior is normal.       Diagnosis Orders   1. Preop cardiovascular exam  EKG 12 lead      2. Syncope, unspecified syncope type        3. SOB (shortness of breath)        4. ASHD (arteriosclerotic heart disease)  EKG 12 lead      5. S/P angioplasty with stent, obtuse marginal  EKG 12 lead      6. Lung cancer, main bronchus, right (HCC)  EKG 12 lead      7. Essential hypertension        8. Mixed hyperlipidemia        9. Right lower quadrant abdominal pain          PLAN:   Pre-Op Clearance: Mr. Chyna Lockhart is planning to have gallbladder surgery on 2/15/22 at [facility] with Rolly Elders. Pre-Operative Risk assessment using 2014 ACC/AHA guidelines   Emergent procedure NO  Active Cardiac Condition Yes (decompensated HF, Arrhythmia, MI <3 weeks, severe valve disease)  Risk Level of Procedure Intermediate Risk (intraperitoneal, intrathoracic, HENT, orthopedic, or carotid endarterectomy, etc.)  Revised Cardiac Risk Index Risk factors: History of ischemic heart disease  Measurement of Exercise Tolerance before Surgery >4 YES  According to the 2014 ACC/AHA pre-operative risk assessment guidelines Dinesh Knowles is at intermediate risk for major cardiac complications during a intermediate risk procedure and may continue as planned. Specific medication recommendations are listed below. Medications recommended to continue should be taken with a sip of water even when NPO. Medical management to reduce perioperative risk:  Antiplatelet Agent: STOP aspirin  Additional Recommendations: I would also suggest that he continue his statin throughout the perioperative period.   Additional Testing List: I ordered a echocardiogram to better assess for the etiology of this problem and to help guide future management and I ordered an EVENT MONITOR to try and pinpoint the etiology of their symptoms I also ordered a CT scan of the chest as well.     Syncope/near syncope of unknown etiology: passing out episode on 12/30/23 and he was taken to emergency room. He also has had two passing out episodes before this.  No further episodes or lightheadedness at this time. He is trying to keep himself better hydrated.   Nonpharmacologic counseling: Because of his condition, I reminded him to try and keep himself well-hydrated and to take extra time when moving from laying to sitting, sitting to standing and standing to walking. I also explained to him to help improve his symptoms he should include 3 g sodium diet, 1 or 2 L of sports drinks daily, knee-high compressions stockings.    Shortness of breath with mild exertion: Chronic Anemia. History of Lung Cancer and has had 1/3 of his lung removed in the past. His shortness of breath is at his base line.     Atherosclerotic Heart Disease: S/P PCI in in 2003 and 2011. Cardiac cath in 2017 showed totally occluded RCA, an attempt to open the RCA failed.  Patient has good left-to-right collaterals.  Treated medically since that time and is doing very good.  Antiplatelet Agent: Continue Aspirin 81 mg daily and clopidogrel (Plavix) 75 mg daily. I also reminded him to watch for signs of blood in his stool or black tarry stools and stop the medication immediately if this develops as this could be life threatening.    Beta Blocker Therapy: Continue  Atenolol  25 mg daily  Anti-anginal medications: Decrease amlodipine (Norvasc) 2.5 mg daily. I also discussed the potential side effects of this medication including lightheadedness and dizziness and told him to call the office if this occurs.   Anti-anginal medications: Stop isosorbide mononitrate (Imdur) 30 mg daily    Statin  Therapy: Continue atorvastatin (Lipitor) 20 mg nightly. Essential Hypertension: Controlled  Calcium Channel Blocker: Decrease amlodipine (Norvasc)    to 2.5 mg daily. Beta Blocker Therapy: Continue  atenolol  25 mg  daily       Hyperlipidemia: Mixed, LDL done on 1/18/2019 was 35 mg/dL  Statin Therapy: Continue atorvastatin (Lipitor) 20 mg nightly. Finally, I recommended that he continue his other medications and follow up with you as previously scheduled. FOLLOW UP:   I told Mr. Storm Flores to call my office if he had any problems, but otherwise told him to Return in about 10 days (around 2/17/2023). However, I would be happy to see him sooner should the need arise. Sincerely,  Erica Guillermo MD, MS, F.A.C.C. [address] [place] [address] [facility], [address] [place] [address]  Phone: [phone], Fax: [phone]    I believe that the risk of significant morbidity and mortality related to the patient's current medical conditions are: intermediate-high. Approximately 40 minutes were spent during prep work, discussion and exam of the patient, and follow up documentation and all of their questions were answered. The documentation recorded by the scribe, accurately and completely reflects the services I personally performed and the decisions made by me. Erica Guillermo MD, F.A.C.C.  February 7, 2023

## 2023-02-07 NOTE — PATIENT INSTRUCTIONS
SURVEY:    You may be receiving a survey from [email or web address] regarding your visit today. Please complete the survey to enable us to provide the highest quality of care to you and your family. If you cannot score us a very good on any question, please call the office to discuss how we could have made your experience a very good one. Thank you.

## 2023-02-09 ENCOUNTER — TELEPHONE (OUTPATIENT)
Dept: CARDIOLOGY | Age: 73
End: 2023-02-09

## 2023-02-09 NOTE — TELEPHONE ENCOUNTER
----- Message from Lyssa Peralta MD sent at 2/8/2023 11:22 PM EST -----  Echo is good.    Heart function is normal.  No change from prior study back in 9/29/2022

## 2023-02-16 ENCOUNTER — TELEPHONE (OUTPATIENT)
Dept: UROLOGY | Age: 73
End: 2023-02-16

## 2023-02-16 ENCOUNTER — OFFICE VISIT (OUTPATIENT)
Dept: UROLOGY | Age: 73
End: 2023-02-16
Payer: MEDICARE

## 2023-02-16 VITALS
TEMPERATURE: 97.7 F | BODY MASS INDEX: 22.48 KG/M2 | SYSTOLIC BLOOD PRESSURE: 117 MMHG | DIASTOLIC BLOOD PRESSURE: 60 MMHG | HEIGHT: 72 IN | WEIGHT: 166 LBS | HEART RATE: 63 BPM

## 2023-02-16 DIAGNOSIS — N40.1 BPH WITH OBSTRUCTION/LOWER URINARY TRACT SYMPTOMS: ICD-10-CM

## 2023-02-16 DIAGNOSIS — R33.9 URINARY RETENTION: ICD-10-CM

## 2023-02-16 DIAGNOSIS — R35.0 FREQUENCY OF URINATION: Primary | ICD-10-CM

## 2023-02-16 DIAGNOSIS — N13.8 BPH WITH OBSTRUCTION/LOWER URINARY TRACT SYMPTOMS: ICD-10-CM

## 2023-02-16 DIAGNOSIS — R35.1 NOCTURIA: ICD-10-CM

## 2023-02-16 PROCEDURE — 51798 US URINE CAPACITY MEASURE: CPT | Performed by: NURSE PRACTITIONER

## 2023-02-16 ASSESSMENT — ENCOUNTER SYMPTOMS
COLOR CHANGE: 0
WHEEZING: 0
NAUSEA: 0
COUGH: 0
SHORTNESS OF BREATH: 0
ABDOMINAL PAIN: 0
CONSTIPATION: 0
BACK PAIN: 0
EYE REDNESS: 0
VOMITING: 0

## 2023-02-16 NOTE — PROGRESS NOTES
HPI:          Patient is a 67 y.o. male in no acute distress. He is alert and oriented to person, place, and time. History  Lung cancer, started on pain medication     7/2022 ER referral for urinary retention. Straight cathed for 400ml. Retention secondary due to constipation due to pain medication              Flomax started                            Miralax started     CT with IV contrast showed a 2.1cm mass in the left kidney - patient wants oncology to follow     8/2022 MRI showed a 1.8cm left renal mass - followed by oncology     11/2022 frequency every hour and nocturia 3-4 times per night, PVR low              Increased flomax to BID    1/2023 cystoscopy due to urinary frequency and urgency, nocturia 3-4 times at night. Cystoscopy showed bilobar hyperplasia with significant high riding bladder neck   Offered PVP greenlight     Started Myrbetriq 50 mg daily    Today  Here today to follow-up for lower urinary tract symptoms. He continues to have nocturia 3-4 times per night. He urinates every hour during the day. He did not start Myrbetriq due to cost.  He did not notify our office if this was an issue. He denies any dysuria or gross hematuria. PVR is low. Past Medical History:   Diagnosis Date    Abnormal cardiac cath 09/20/2013    LMCA; Mild irregularities 10-20%. LAD: Abnormal.  Mild to moderate irregularities throughout the LAD and branches. LCx:  abnormal.  80-90% stenosis in a moderate sized OM1. RCA: Abnormal.  40-50% mid RCA stenosis. Actinic keratosis     Arthritis     neck, fingers    CAD (coronary artery disease)     Calculus of kidney     Cancer (HCC)     Cervical stenosis of spine     Diabetes mellitus ([facility] [address]     Dr. Rl Moncada, CNP, [place]    Full dentures     GERD (gastroesophageal reflux disease)     H/O echocardiogram 04/29/2016    Stress echo. Normal resting LV systolic function,normal systolic restponse to exercise.  No evidence of reversible  ischemia on this maximal,symptom limited,treadmill exerxiess stress echocardiography study    History of cardiac cath 02/09/2017    LMCA: Lesion on LMCA: Distal subsection . 20% stenosis. LAD: Lesion on 1st Diag: Mid subsection . 75% stenosis. RCA: Lesion on Mid RCA: Mid subsection . 100% stenosis. Comments: The distal RCA fills by left to right collateralization. Dominance: Mixed. LV function assessed as: Normal. EF 65%. History of echocardiogram 06/11/2018    EF 60%. Mildly increased LV wall thickness. Evidence of mild diastolic dysfunction seen. History of echocardiogram 01/18/2019    EF of 65%. LV wall thickness is mildly increased. Aortic valve leaflets are mildly thickened. History of heart attack     History of removal of tunneled central venous catheter (CVC) with port 11/09/2022    [address]    History of tobacco abuse     Quit 10/2021, 1/4 ppd for 54 years    Hyperlipidemia     Hypertension     Dr. Kaplan Hence, CNP    Pulmonary nodule 12/2021    Right middle lobe    Raynaud's phenomenon     S/P angioplasty with stent 09/20/2013    PTCA-NEDRA of  the OM 1    Wears hearing aid in both ears      Past Surgical History:   Procedure Laterality Date    CARDIAC CATHETERIZATION  2007    patient states had 2 small blockages    CARDIAC CATHETERIZATION  02/09/2017    LMCA: Lesion on LMCA: Distal subsection 20% stenosis. LAD: Lesion on 1st Diag: Mid subsection 75% stenosis. RCA: Lesion on Mid RCA: Mid subsection 100% stenosis. CARDIAC CATHETERIZATION  02/09/2017    Attempted PCI to chronic total occlusion of RCA was not successful. Unable to cross with multiple wires due to heavy calcification and toruosity.     CARDIAC CATHETERIZATION  01/14/2019    CATARACT REMOVAL  04/06/2015    Right eye - Dr. Estefania Bowman  06/08/2015    Dr. Gunnar Astudillo - left eye    COLONOSCOPY  01/11/2006    Dr. Amilcar Hernandez - internal hemorrhoids, no polps    COLONOSCOPY  05/23/2016    -polyp    CT NEEDLE BIOPSY LUNG PERCUTANEOUS  07/28/2021    CT NEEDLE BIOPSY LUNG PERCUTANEOUS 7/28/2021 [facility] CT SCAN    HEMORRHOID SURGERY      HERNIA REPAIR Left     inguinal    IR CHEST TUBE INSERTION  01/11/2022    IR CHEST TUBE INSERTION 1/11/2022 Edda Gary MD [facility] SPECIAL PROCEDURES    IR PORT PLACEMENT EQUAL OR GREATER THAN 5 YEARS  2/9/2022    IR PORT PLACEMENT EQUAL OR GREATER THAN 5 YEARS 2/9/2022 [facility] SPECIAL PROCEDURES    LUNG BIOPSY Right 07/28/2021    Dr Thomas/[address], TOTAL Right 01/04/2022    MIDDLE WEDGE RESECTION, POSSIBLE MIDDLE LOBECTOMY VIA THORACOTOMY performed by Remington De Paz MD at [address] [facility]    c4-5 laminectomy and fusion    OTHER SURGICAL HISTORY  10/2014    Right foot - 7 from heal and 1 from great toe at [address]  12/11/2014    pain injection - cervical    TUNNELED CENTRAL VENOUS CATHETER W/ SUBCUTANEOUS PORT Left 02/09/2022    Dr Hoenninger/[facility]    UPPER GASTROINTESTINAL ENDOSCOPY  12/28/2005    Dr. Amilcar Hernandez - mild esophagitis and gastritis    UPPER GASTROINTESTINAL ENDOSCOPY N/A 02/05/2019    -bx(neg H-Pylori)retained gastric content, possible submucosal mass of gastric cardia    UPPER GASTROINTESTINAL ENDOSCOPY N/A 10/22/2019    EGD BIOPSY performed by Paresh Cho MD at [facility] Encounter Medications as of 2/16/2023   Medication Sig Dispense Refill    amLODIPine (NORVASC) 2.5 MG tablet Take 1 tablet by mouth daily 90 tablet 3    tamsulosin (FLOMAX) 0.4 MG capsule Take 0.4 mg by mouth daily Takes in afternoon      mirabegron (MYRBETRIQ) 50 MG TB24 Take 50 mg by mouth daily (Patient not taking: Reported on 2/7/2023) 30 tablet 11    ferrous sulfate (IRON 325) 325 (65 Fe) MG tablet Take 1 tablet by mouth daily (with breakfast) 90 tablet 1    nitroGLYCERIN (NITRODUR) 0.4 MG/HR 1 dose under tongue as needed for chest pain.  max of 3 in one day (Patient not taking: No sig reported) 30 patch 2    metFORMIN (GLUCOPHAGE) 1000 MG tablet Take 1 tablet by mouth 2 times daily (with meals) 60 tablet 3    SITagliptin (JANUVIA) 50 MG tablet Take 1 tablet by mouth daily (Patient taking differently: Take 100 mg by mouth daily) 90 tablet 1    morphine (MSIR) 15 MG tablet Take 15 mg by mouth every 6 hours as needed for Pain. clotrimazole-betamethasone (LOTRISONE) 1-0.05 % cream Apply topically daily To both feet      atenolol (TENORMIN) 25 MG tablet Take 1 tablet by mouth daily 90 tablet 3    Continuous Blood Gluc Sensor (FREESTYLE YAIMA 14 DAY SENSOR) MISC as directed      magnesium 30 MG tablet Take 400 mg by mouth daily       clopidogrel (PLAVIX) 75 MG tablet Take 1 tablet by mouth daily 90 tablet 3    zinc 50 MG CAPS Take 50 mg by mouth daily (with breakfast)      promethazine (PHENERGAN) 25 MG tablet Take 25 mg by mouth every 4 hours as needed for Nausea      pantoprazole (PROTONIX) 40 MG tablet Take 40 mg by mouth in the morning and at bedtime      ondansetron (ZOFRAN ODT) 8 MG TBDP disintegrating tablet Take 1 tablet by mouth every 8 hours as needed for Nausea 30 tablet 2    [DISCONTINUED] folic acid (FOLVITE) 1 MG tablet Take 1 tablet by mouth daily for 30 doses Start 7 days before first scheduled dose and continue for 21 days after last dose of PEMEtrexed. 30 tablet 2    Insulin Glargine, 2 Unit Dial, 300 UNIT/ML SOPN  (Patient not taking: Reported on 2/7/2023)      atorvastatin (LIPITOR) 20 MG tablet Take 1 tablet by mouth daily 90 tablet 3    glimepiride (AMARYL) 2 MG tablet Take 1 tablet by mouth 2 times daily 180 tablet 3    aspirin 81 MG tablet Take 81 mg by mouth daily Ordered by heart doctor, Dr. Bety Malik, [place]       No facility-administered encounter medications on file as of 2/16/2023.       Current Outpatient Medications on File Prior to Visit   Medication Sig Dispense Refill    amLODIPine (NORVASC) 2.5 MG tablet Take 1 tablet by mouth daily 90 tablet 3    tamsulosin (FLOMAX) 0.4 MG capsule Take 0.4 mg by mouth daily Takes in afternoon      mirabegron (MYRBETRIQ) 50 MG TB24 Take 50 mg by mouth daily (Patient not taking: Reported on 2/7/2023) 30 tablet 11    ferrous sulfate (IRON 325) 325 (65 Fe) MG tablet Take 1 tablet by mouth daily (with breakfast) 90 tablet 1    nitroGLYCERIN (NITRODUR) 0.4 MG/HR 1 dose under tongue as needed for chest pain. max of 3 in one day (Patient not taking: No sig reported) 30 patch 2    metFORMIN (GLUCOPHAGE) 1000 MG tablet Take 1 tablet by mouth 2 times daily (with meals) 60 tablet 3    SITagliptin (JANUVIA) 50 MG tablet Take 1 tablet by mouth daily (Patient taking differently: Take 100 mg by mouth daily) 90 tablet 1    morphine (MSIR) 15 MG tablet Take 15 mg by mouth every 6 hours as needed for Pain. clotrimazole-betamethasone (LOTRISONE) 1-0.05 % cream Apply topically daily To both feet      atenolol (TENORMIN) 25 MG tablet Take 1 tablet by mouth daily 90 tablet 3    Continuous Blood Gluc Sensor (MindshapesSTYLE YAIMA 14 DAY SENSOR) MISC as directed      magnesium 30 MG tablet Take 400 mg by mouth daily       clopidogrel (PLAVIX) 75 MG tablet Take 1 tablet by mouth daily 90 tablet 3    zinc 50 MG CAPS Take 50 mg by mouth daily (with breakfast)      promethazine (PHENERGAN) 25 MG tablet Take 25 mg by mouth every 4 hours as needed for Nausea      pantoprazole (PROTONIX) 40 MG tablet Take 40 mg by mouth in the morning and at bedtime      ondansetron (ZOFRAN ODT) 8 MG TBDP disintegrating tablet Take 1 tablet by mouth every 8 hours as needed for Nausea 30 tablet 2    [DISCONTINUED] folic acid (FOLVITE) 1 MG tablet Take 1 tablet by mouth daily for 30 doses Start 7 days before first scheduled dose and continue for 21 days after last dose of PEMEtrexed.  30 tablet 2    Insulin Glargine, 2 Unit Dial, 300 UNIT/ML SOPN  (Patient not taking: Reported on 2/7/2023)      atorvastatin (LIPITOR) 20 MG tablet Take 1 tablet by mouth daily 90 tablet 3    glimepiride (AMARYL) 2 MG tablet Take 1 tablet by mouth 2 times daily 180 tablet 3    aspirin 81 MG tablet Take 81 mg by mouth daily Ordered by heart doctor, Chan Forde       No current facility-administered medications on file prior to visit. Niacin and related, Morphine, Oxycodone, Minocycline hcl, and Other  Family History   Problem Relation Age of Onset    Heart Disease Mother     Diabetes Mother     High Blood Pressure Mother     Diabetes Father     Alzheimer's Disease Father     Heart Disease Sister         CABG    Heart Disease Brother     Heart Disease Brother     Heart Disease Brother     Cancer Brother      Social History     Tobacco Use   Smoking Status Former    Packs/day: 0.25    Years: 54.00    Pack years: 13.50    Types: Cigarettes    Quit date: 10/1/2021    Years since quittin.3   Smokeless Tobacco Never       Social History     Substance and Sexual Activity   Alcohol Use No       Review of Systems   Constitutional:  Negative for appetite change, chills and fever. Eyes:  Negative for redness and visual disturbance. Respiratory:  Negative for cough, shortness of breath and wheezing. Cardiovascular:  Negative for chest pain and leg swelling. Gastrointestinal:  Negative for abdominal pain, constipation, nausea and vomiting. Genitourinary:  Negative for decreased urine volume, difficulty urinating, dysuria, enuresis, flank pain, frequency, hematuria, penile discharge, penile pain, scrotal swelling, testicular pain and urgency. Musculoskeletal:  Negative for back pain, joint swelling and myalgias. Skin:  Negative for color change, rash and wound. Neurological:  Negative for dizziness, tremors and numbness. Hematological:  Negative for adenopathy. Does not bruise/bleed easily. Temp 97.7 °F (36.5 °C)   Wt 166 lb (75.3 kg)   BMI 22.51 kg/m²       PHYSICAL EXAM:  Constitutional: Patient in no acute distress; Neuro: alert and oriented to person place and time.     Psych: Mood and affect normal.  Skin: Normal  Lungs: Respiratory effort normal  Cardiovascular:  Normal peripheral pulses  Abdomen: Soft, non-tender, non-distended with no CVA, flank pain  Bladder non-tender and not distended. Lab Results   Component Value Date    BUN 21 12/30/2022     Lab Results   Component Value Date    CREATININE 1.33 (H) 12/30/2022     Lab Results   Component Value Date    PSA 0.97 08/03/2022    PSA 0.57 08/02/2013    PSA 0.43 08/17/2012       ASSESSMENT:   Diagnosis Orders   1. Frequency of urination  TX MINDA POST-VOIDING RESIDUAL URINE&/BLADDER CAP      2. Nocturia  TX MINDA POST-VOIDING RESIDUAL URINE&/BLADDER CAP      3. BPH with obstruction/lower urinary tract symptoms  TX MINDA POST-VOIDING RESIDUAL URINE&/BLADDER CAP      4.  Urinary retention  TX MINDA POST-VOIDING RESIDUAL URINE&/BLADDER CAP            PLAN:  We will work with insurance to see if we can get Myrbetriq to a lower tier    F/U in 8 weeks

## 2023-02-16 NOTE — TELEPHONE ENCOUNTER
Patient was prescribed Myrbetriq at his last visit, but did not start this due to cost.  He said it was >$600. Please call and find out if this is a deductible issue or if we are able to get this lowered to a less expensive tier.   Patient mentioned that they have to get free medications from other drug companies due to cost, so this might be an option for them as well

## 2023-02-16 NOTE — PATIENT INSTRUCTIONS
SURVEY:    You may be receiving a survey from [employer] regarding your visit today. Please complete the survey to enable us to provide the highest quality of care to you and your family. If you cannot score us a very good on any question, please call the office to discuss how we could have made your experience a very good one. Thank you.

## 2023-02-17 ENCOUNTER — OFFICE VISIT (OUTPATIENT)
Dept: CARDIOLOGY | Age: 73
End: 2023-02-17
Payer: MEDICARE

## 2023-02-17 VITALS
HEART RATE: 66 BPM | HEIGHT: 72 IN | WEIGHT: 163 LBS | BODY MASS INDEX: 22.08 KG/M2 | OXYGEN SATURATION: 100 % | DIASTOLIC BLOOD PRESSURE: 62 MMHG | SYSTOLIC BLOOD PRESSURE: 125 MMHG | RESPIRATION RATE: 18 BRPM

## 2023-02-17 DIAGNOSIS — I47.29 NSVT (NONSUSTAINED VENTRICULAR TACHYCARDIA): ICD-10-CM

## 2023-02-17 DIAGNOSIS — Z01.818 PRE-OPERATIVE CLEARANCE: Primary | ICD-10-CM

## 2023-02-17 DIAGNOSIS — I10 ESSENTIAL HYPERTENSION: ICD-10-CM

## 2023-02-17 DIAGNOSIS — Z87.898 HISTORY OF SYNCOPE: ICD-10-CM

## 2023-02-17 DIAGNOSIS — E78.2 MIXED HYPERLIPIDEMIA: ICD-10-CM

## 2023-02-17 DIAGNOSIS — Z95.820 S/P ANGIOPLASTY WITH STENT: ICD-10-CM

## 2023-02-17 DIAGNOSIS — I25.10 ASHD (ARTERIOSCLEROTIC HEART DISEASE): ICD-10-CM

## 2023-02-17 PROCEDURE — 1123F ACP DISCUSS/DSCN MKR DOCD: CPT | Performed by: INTERNAL MEDICINE

## 2023-02-17 PROCEDURE — 3078F DIAST BP <80 MM HG: CPT | Performed by: INTERNAL MEDICINE

## 2023-02-17 PROCEDURE — 99211 OFF/OP EST MAY X REQ PHY/QHP: CPT | Performed by: INTERNAL MEDICINE

## 2023-02-17 PROCEDURE — G8484 FLU IMMUNIZE NO ADMIN: HCPCS | Performed by: INTERNAL MEDICINE

## 2023-02-17 PROCEDURE — 3074F SYST BP LT 130 MM HG: CPT | Performed by: INTERNAL MEDICINE

## 2023-02-17 PROCEDURE — 1036F TOBACCO NON-USER: CPT | Performed by: INTERNAL MEDICINE

## 2023-02-17 PROCEDURE — 3017F COLORECTAL CA SCREEN DOC REV: CPT | Performed by: INTERNAL MEDICINE

## 2023-02-17 PROCEDURE — 99214 OFFICE O/P EST MOD 30 MIN: CPT | Performed by: INTERNAL MEDICINE

## 2023-02-17 PROCEDURE — G8420 CALC BMI NORM PARAMETERS: HCPCS | Performed by: INTERNAL MEDICINE

## 2023-02-17 PROCEDURE — G8427 DOCREV CUR MEDS BY ELIG CLIN: HCPCS | Performed by: INTERNAL MEDICINE

## 2023-02-17 RX ORDER — ATENOLOL 50 MG/1
50 TABLET ORAL DAILY
Qty: 90 TABLET | Refills: 3 | Status: CANCELLED | OUTPATIENT
Start: 2023-02-17

## 2023-02-17 RX ORDER — METOPROLOL SUCCINATE 50 MG/1
50 TABLET, EXTENDED RELEASE ORAL DAILY
Qty: 90 TABLET | Refills: 3 | Status: SHIPPED | OUTPATIENT
Start: 2023-02-17

## 2023-02-17 NOTE — PROGRESS NOTES
Alla Frausto am scribing for and in the presence of Tsering Weeks MD, F.A.C.C. Patient: Julisa Morse  : 1950  Date of Visit: 2023    REASON FOR VISIT / CONSULTATION:   Chief Complaint   Patient presents with    Follow-up     HX: CAD, HTN, HLD, CHERRI. Pt states he is doing ok. Denies: CP, Palpitations, Lightheaded/ dizziness, SOB. Dear Dr. Christina Shahid had the pleasure of seeing Julisa Morse today for follow up on his cardiac status. As you know, Mr. Salma De La Garza is 71 y.o male with PMH of hypertension, hyperlipidemia, type II diabetes and coronary artery disease with  heart cath done on 2013 at [address] [place] [address]. Vs at that time he has NEDRA to OM1. The cath also showed mild disease of the other coronaries and normal left ventricular function by ventriculogram.      Earlier in , patient had an episode of prolonged chest pain. Retrosternal, burning in nature without radiation. Pain occurred at rest with partial relief with sublingual nitrates. Repeat cardiac cath on  2017 showed new totally occluded mid RCA. An attempt to open the totally occluded RCA on 2017 has failed. Patient treated medically. Echo done on 2018, global left ventricular systolic function appears preserved with an estimated ejection fraction of 60%. Mild LVH. No definite specific wall motion abnormalities were identified. No significant valvular abnormalities. Evidence of mild diastolic dysfunction is seen. Echo done on 2019 that showed EF of 65%. LV wall thickness is mildly increased. Aortic valve leaflets are mildly thickened. Holter Monitor done on 2019: The rhythm was sinus. Average NV interval 0.22 [1st Degree AV Block]. Average QRS duration 0.08. Average daily heart rate  69 ranging from 54 to110 bpm.2. Rare premature supraventricular ectopic beats consisting of 9 isolated PACs. 3. There were no ventricular ectopic beats. 4.  Diary returned with entry of \"sharp pain middle of chest\" with no ectopy noted. Sinus rhythm with rare isolated PACs, otherwise unremarkable Holter. Symptoms as above, were not correlated with any heart rate or rhythm abnormalities. Echo 12/15/2021: Gerardo Ray left ventricular systolic function appears preserved with an estimated ejection fraction of >60%. The left ventricular cavity size is within normal limits and the left ventricular wall thickness is within normal limits. No definite specific wall motion abnormalities were identified. No significant valvular abnormalities. Mild diastolic dysfunction. Compared to the previous study of 9/20/2019, no significant change was seen. Echo done on 9/29/2022: Global left ventricular systolic function appears preserved with an estimated ejection fraction of >60%. The left ventricular cavity size is within normal limits and the left ventricular wall thickness is mildly increased. No significant valvular disease was seen. Evidence of mild diastolic dysfunction is seen. Compared to the previous study of 12/15/21, no significant change was seen. No significant cardiac cause of syncope was identified from this study. ER/ Hospital on 9/28/2022 for loss of consciousness: He was having a shed delivered and went outside to watch them, when he walked back inside sat in chair sat down and passed out. He did mention he did not feel well. His wife reports his eyes rolled back, head went down, his dentures fell out and drooling. EMS was called at that time. His syncope was presumed to be caused by dehydration based on blood work. EKG done today in office 2/7/23: Normal sinus rhythm, normal ECG. Echo done on 2/7/2023: Global left ventricular systolic function appears preserved with an estimated ejection fraction of 60%. The left ventricular cavity size is within normal limits and the left ventricular wall thickness is mildly increased. No definite specific wall motion abnormalities were identified.  Aortic leaflets show calcification without restriction of motion. No aortic insufficiency. Mild diastolic dysfunction. Compared to the previous study of 9/29/2022, no significant change was seen. CAM done on 2/7/2023: Showed one episode of non sustained ventricular tachycardia five beats in duration. Mr. Kavitha Jarrett is here today for cardiac clearance for gallbladder surgery. He is planning on having this done at [facility] with Dr. Nalini Valderrama. He reports doing well at this time. He at times can get neck pains on and off. He does do the dishes at times however he can only do this for about 15 minutes and then he has to stop due to his neck pain. He denies any further light headed/dizziness. He denies palpitations. Denies chest pain, pressure, or tightness. He is not sleeping well at night and he gets up to go to the bathroom a lot. He did follow up with Dr. Eunice Briggs for this issue. No cough, fever or chills. No stomach pain, nausea or vomiting. He is drinking enough fluids he feels at this time. He does not try to drink to much at night due to his over bladder issue. He did not sleep well last night at all however he is not sure why this is but he is tired today of course. He does help with the house hold chores with no external symptoms. Bleeding Risks: Mr. Kavitha Jarrett denies any current or recent bleeding problems including a history of a GI bleed, ulcers, recent or upcoming surgeries, blood in his stool or black tarry stools or blood in his urine. Past Medical History:   Diagnosis Date    Abnormal cardiac cath 09/20/2013    LMCA; Mild irregularities 10-20%. LAD: Abnormal.  Mild to moderate irregularities throughout the LAD and branches. LCx:  abnormal.  80-90% stenosis in a moderate sized OM1. RCA: Abnormal.  40-50% mid RCA stenosis.     Actinic keratosis     Arthritis     neck, fingers    CAD (coronary artery disease)     Calculus of kidney     Cancer (HCC)     Cervical stenosis of spine     Diabetes mellitus ([facility] [address] Dr. Agustin Mccauley, CNP, [place]    Full dentures     GERD (gastroesophageal reflux disease)     H/O echocardiogram 04/29/2016    Stress echo. Normal resting LV systolic function,normal systolic restponse to exercise. No evidence of reversible  ischemia on this maximal,symptom limited,treadmill exerxiess stress echocardiography study    History of cardiac cath 02/09/2017    LMCA: Lesion on LMCA: Distal subsection . 20% stenosis. LAD: Lesion on 1st Diag: Mid subsection . 75% stenosis. RCA: Lesion on Mid RCA: Mid subsection . 100% stenosis. Comments: The distal RCA fills by left to right collateralization. Dominance: Mixed. LV function assessed as: Normal. EF 65%. History of echocardiogram 06/11/2018    EF 60%. Mildly increased LV wall thickness. Evidence of mild diastolic dysfunction seen. History of echocardiogram 01/18/2019    EF of 65%. LV wall thickness is mildly increased. Aortic valve leaflets are mildly thickened. History of heart attack     History of removal of tunneled central venous catheter (CVC) with port 11/09/2022    [address]    History of tobacco abuse     Quit 10/2021, 1/4 ppd for 54 years    Hyperlipidemia     Hypertension     Dr. Agustin Mccauley, CNP    Pulmonary nodule 12/2021    Right middle lobe    Raynaud's phenomenon     S/P angioplasty with stent 09/20/2013    PTCA-NEDRA of  the OM 1    Wears hearing aid in both ears      Past Surgical History:   Procedure Laterality Date    CARDIAC CATHETERIZATION  2007    patient states had 2 small blockages    CARDIAC CATHETERIZATION  02/09/2017    LMCA: Lesion on LMCA: Distal subsection 20% stenosis. LAD: Lesion on 1st Diag: Mid subsection 75% stenosis. RCA: Lesion on Mid RCA: Mid subsection 100% stenosis. CARDIAC CATHETERIZATION  02/09/2017    Attempted PCI to chronic total occlusion of RCA was not successful. Unable to cross with multiple wires due to heavy calcification and toruosity.     CARDIAC CATHETERIZATION  01/14/2019 CATARACT REMOVAL  04/06/2015    Right eye - Dr. Daniel Schneider  06/08/2015    Dr. Juaquin Montalvo - left eye    COLONOSCOPY  01/11/2006    Dr. Melisa Wetzel - internal hemorrhoids, no polps    COLONOSCOPY  05/23/2016    -polyp    CT NEEDLE BIOPSY LUNG PERCUTANEOUS  07/28/2021    CT NEEDLE BIOPSY LUNG PERCUTANEOUS 7/28/2021 [facility] CT SCAN    HEMORRHOID SURGERY      HERNIA REPAIR Left     inguinal    IR CHEST TUBE INSERTION  01/11/2022    IR CHEST TUBE INSERTION 1/11/2022 Magaly Meneses MD [facility] SPECIAL PROCEDURES    IR PORT PLACEMENT EQUAL OR GREATER THAN 5 YEARS  2/9/2022    IR PORT PLACEMENT EQUAL OR GREATER THAN 5 YEARS 2/9/2022 [facility] SPECIAL PROCEDURES    LUNG BIOPSY Right 07/28/2021    Dr Paco Eugene, TOTAL Right 01/04/2022    MIDDLE WEDGE RESECTION, POSSIBLE MIDDLE LOBECTOMY VIA THORACOTOMY performed by Marylu Gaming MD at [address] [facility]    c4-5 laminectomy and fusion    OTHER SURGICAL HISTORY  10/2014    Right foot - 7 from heal and 1 from great toe at [address]  12/11/2014    pain injection - cervical    TUNNELED CENTRAL VENOUS CATHETER W/ SUBCUTANEOUS PORT Left 02/09/2022    Dr Hoenninger/[facility]    UPPER GASTROINTESTINAL ENDOSCOPY  12/28/2005    Dr. Melisa Wetzel - mild esophagitis and gastritis    UPPER GASTROINTESTINAL ENDOSCOPY N/A 02/05/2019    -bx(neg H-Pylori)retained gastric content, possible submucosal mass of gastric cardia    UPPER GASTROINTESTINAL ENDOSCOPY N/A 10/22/2019    EGD BIOPSY performed by Abad Garcia MD at [place] [address] History   Problem Relation Age of Onset    Heart Disease Mother     Diabetes Mother     High Blood Pressure Mother     Diabetes Father     Alzheimer's Disease Father     Heart Disease Sister         CABG    Heart Disease Brother     Heart Disease Brother     Heart Disease Brother     Cancer Brother      Social History     Tobacco Use    Smoking status: Former     Packs/day: 0.25     Years: 54.00     Pack years: 13.50     Types: Cigarettes     Quit date: 10/1/2021     Years since quittin.3    Smokeless tobacco: Never   Vaping Use    Vaping Use: Never used   Substance Use Topics    Alcohol use: No    Drug use: No     Family History   Problem Relation Age of Onset    Heart Disease Mother     Diabetes Mother     High Blood Pressure Mother     Diabetes Father     Alzheimer's Disease Father     Heart Disease Sister         CABG    Heart Disease Brother     Heart Disease Brother     Heart Disease Brother     Cancer Brother        Current Outpatient Medications:     tamsulosin (FLOMAX) 0.4 MG capsule, Take 0.4 mg by mouth daily Takes in afternoon, Disp: , Rfl:     ferrous sulfate (IRON 325) 325 (65 Fe) MG tablet, Take 1 tablet by mouth daily (with breakfast), Disp: 90 tablet, Rfl: 1    metFORMIN (GLUCOPHAGE) 1000 MG tablet, Take 1 tablet by mouth 2 times daily (with meals), Disp: 60 tablet, Rfl: 3    SITagliptin (JANUVIA) 50 MG tablet, Take 1 tablet by mouth daily (Patient taking differently: Take 100 mg by mouth daily), Disp: 90 tablet, Rfl: 1    clotrimazole-betamethasone (LOTRISONE) 1-0.05 % cream, Apply topically daily To both feet, Disp: , Rfl:     atenolol (TENORMIN) 25 MG tablet, Take 1 tablet by mouth daily, Disp: 90 tablet, Rfl: 3    Continuous Blood Gluc Sensor (FREESTYLE YAIMA 14 DAY SENSOR) [facility], as directed, Disp: , Rfl:     magnesium 30 MG tablet, Take 400 mg by mouth daily , Disp: , Rfl:     clopidogrel (PLAVIX) 75 MG tablet, Take 1 tablet by mouth daily, Disp: 90 tablet, Rfl: 3    zinc 50 MG CAPS, Take 50 mg by mouth daily (with breakfast), Disp: , Rfl:     ondansetron (ZOFRAN ODT) 8 MG TBDP disintegrating tablet, Take 1 tablet by mouth every 8 hours as needed for Nausea, Disp: 30 tablet, Rfl: 2    Insulin Glargine, 2 Unit Dial, 300 UNIT/ML SOPN, , Disp: , Rfl:     atorvastatin (LIPITOR) 20 MG tablet, Take 1 tablet by mouth daily, Disp: 90 tablet, Rfl: 3    glimepiride (AMARYL) 2 MG tablet, Take 1 tablet by mouth 2 times daily, Disp: 180 tablet, Rfl: 3    aspirin 81 MG tablet, Take 81 mg by mouth daily Ordered by heart doctor, Chan Farrell, Disp: , Rfl:     mirabegron (MYRBETRIQ) 50 MG TB24, Take 50 mg by mouth daily (Patient not taking: No sig reported), Disp: 30 tablet, Rfl: 11    nitroGLYCERIN (NITRODUR) 0.4 MG/HR, 1 dose under tongue as needed for chest pain. max of 3 in one day (Patient not taking: No sig reported), Disp: 30 patch, Rfl: 2    Allergies   Allergen Reactions    Niacin And Related Other (See Comments)     Turns red, diaphoretic, no trouble breathing    Morphine      nausea    Oxycodone      nausea    Minocycline Hcl Rash    Other Nausea And Vomiting     Patient states on all pain medications he gets nausea and vomiting. Doctor ordered a medicine (nausea) to take before pain medication     ROS: 14 systems reviewed and negative except for pertinent positives as noted above. PHYSICAL EXAM:   /62 (Site: Left Upper Arm, Position: Sitting, Cuff Size: Medium Adult)   Pulse 66   Resp 18   Ht 6' (1.829 m)   Wt 163 lb (73.9 kg)   SpO2 100%   BMI 22.11 kg/m²  Body mass index is 22.11 kg/m². Constitutional: He is oriented to person, place, and time. He appears well-developed and well-nourished. In no acute distress. HEENT: Normocephalic and atraumatic. No JVD present. Carotid bruit is not present. No mass and no thyromegaly present. No lymphadenopathy present. Cardiovascular: Normal rate, regular rhythm, normal heart sounds. Exam reveals no gallop and no friction rubs. No heart murmur heard. Pulmonary/Chest: Effort normal and breath sounds normal. No respiratory distress. He has no wheezes, rhonchi or rales. Abdominal: Soft, non-tender. Bowel sounds and aorta are normal. He exhibits no organomegaly, mass or bruit. Extremities: No edema. No cyanosis and no clubbing. Pulses are 2+ radial and carotid pulses.  2+ dorsalis pedis and posterior tibial pulses bilaterally. Neurological: He is alert and oriented to person, place, and time. No evidence of gross cranial nerve deficit. Coordination appeared normal.   Skin: Skin is warm and dry. There is no rash or diaphoresis. Psychiatric: He has a normal mood and affect. His speech is normal and behavior is normal.       Diagnosis Orders   1. Pre-operative clearance        2. NSVT (nonsustained ventricular tachycardia)        3. History of syncope        4. ASHD (arteriosclerotic heart disease)        5. S/P angioplasty with stent, obtuse marginal        6. Essential hypertension        7. Mixed hyperlipidemia          PLAN:   Pre-Op Clearance: Mr. Mary Stone is planning to have gallbladder surgery on 2/15/22 at [facility] with Lamar Carmona. Pre-Operative Risk assessment using 2014 ACC/AHA guidelines   Emergent procedure No  Active Cardiac Condition Yes (decompensated HF, Arrhythmia, MI <3 weeks, severe valve disease)  Risk Level of Procedure Intermediate Risk (intraperitoneal, intrathoracic, HENT, orthopedic, or carotid endarterectomy, etc.)  Revised Cardiac Risk Index Risk factors: History of ischemic heart disease  Measurement of Exercise Tolerance before Surgery >4 YES  According to the 2014 ACC/AHA pre-operative risk assessment guidelines Leann Mendoza is at intermediate risk for major cardiac complications during a intermediate risk procedure and may continue as planned. Specific medication recommendations are listed below. Medications recommended to continue should be taken with a sip of water even when NPO. Medical management to reduce perioperative risk:  Antiplatelet Agent: I would prefer that his aspirin 81 mg be continued throughout the perioperative period but if bleeding risk is to high, this can be stopped 5-7 days prior to the planned procedure but please restart this as soon as safely possible.  Also ok to HOLD Palvix for 5-7 days  Additional Recommendations: I would also suggest that he continue his beta blocker and statin throughout the perioperative period. Non Sustained Ventricular Tachycardia: Rate Control Asymptomatic Seen on CAM done on 2/7/2023: Showed one episode of non sustained ventricular tachycardia five beats in duration. Ejection fraction is normal.  Known totally occluded RCA with collaterals from the left system. No chest pain or worsening shortness of breath. No evidence of acute coronary syndrome. Monomorphic ventricular tachycardia is usually non-ischemic in etiology. Beta Blocker: STOP Atenolol and START Metoprolol succinate (Toprol XL) 50 mg daily. I also discussed the potential side effects of this medication including lightheadedness and dizziness and instructed them to stop the medication of this occurs and call our office if this occurs. History of Syncope/near syncope of unknown etiology: Passed out on 12/30/23 and he was taken to emergency room. He also has had two passing out episodes before this. No further episodes or lightheadedness at this time. He is trying to keep himself better hydrated. Nonpharmacologic counseling: Because of his condition, I reminded him to try and keep himself well-hydrated and to take extra time when moving from laying to sitting, sitting to standing and standing to walking. I also explained to him to help improve his symptoms he should include 3 g sodium diet, 1 or 2 L of sports drinks daily, knee-high compressions stockings. Atherosclerotic Heart Disease: S/P PCI in in 2003 and 2011. Cardiac cath in 2017 showed totally occluded RCA, an attempt to open the RCA failed. Patient has good left-to-right collaterals. Treated medically since that time and is doing very good. Antiplatelet Agent: Continue Aspirin 81 mg daily and clopidogrel (Plavix) 75 mg daily.  I also reminded him to watch for signs of blood in his stool or black tarry stools and stop the medication immediately if this develops as this could be life threatening. Beta Blocker: STOP Atenolol and START Metoprolol succinate (Toprol XL) 50 mg daily. Anti-anginal medications: Continue nitroglycerin 0.4 mg tablets as needed for chest pain. Statin Therapy: Continue atorvastatin (Lipitor) 20 mg nightly. Essential Hypertension: Controlled  Beta Blocker: STOP Atenolol and START Metoprolol succinate (Toprol XL) 50 mg daily. Hyperlipidemia: Mixed, LDL done on 1/18/2019 was 35 mg/dL  Statin Therapy: Continue atorvastatin (Lipitor) 20 mg nightly. Finally, I recommended that he continue his other medications and follow up with you as previously scheduled. FOLLOW UP:   I told Mr. Nathan Villalba to call my office if he had any problems, but otherwise told him to Return in about 6 months (around 8/17/2023). However, I would be happy to see him sooner should the need arise. Sincerely,  Cameron Dial MD, MS, F.A.C.C. [facility]) [facility], [address] [place] [address]  Phone: [phone], Fax: [phone]    I believe that the risk of significant morbidity and mortality related to the patient's current medical conditions are: Intermediate. >35 minutes were spent during prep work, discussion and exam of the patient, and follow up documentation and all of their questions were answered. The documentation recorded by the scribe, accurately and completely reflects the services I personally performed and the decisions made by me. Cameron Dial MD, F.A.C.C.  February 17, 2023

## 2023-02-17 NOTE — PATIENT INSTRUCTIONS
SURVEY:    You may be receiving a survey from [employer] regarding your visit today. Please complete the survey to enable us to provide the highest quality of care to you and your family. If you cannot score us a very good on any question, please call the office to discuss how we could have made your experience a very good one. Thank you.

## 2023-02-20 NOTE — TELEPHONE ENCOUNTER
The tier exception was approved to a tier 3. Per [facility], the cost is 495. 12. They said it is hitting his deductible. Patient said he can't afford that. What do you want him to do now?

## 2023-02-20 NOTE — TELEPHONE ENCOUNTER
We can give samples for now, but have them fill out patient assistance through the company. It sounds like they have qualified for this in the past with other medications.

## 2023-02-20 NOTE — TELEPHONE ENCOUNTER
Spoke to wife and she will give the phone number to someone at their PCP's office. They have her financial info and take care of this for them. How many samples can they have while they are getting this done?

## 2023-03-01 ENCOUNTER — TELEPHONE (OUTPATIENT)
Dept: ONCOLOGY | Age: 73
End: 2023-03-01

## 2023-03-06 NOTE — TELEPHONE ENCOUNTER
Name: Laurie Bautista  : 1950  MRN: [id number]    Oncology Navigation Follow-Up Note    Contact Type:  Telephone    Notes: Call made to patient for ONN follow up. No answer. Left message stating writer called to check in and assess needs. Requested returned phone call.        Electronically signed by Natacha Mills RN on 3/6/2023 at 2:03 PM

## 2023-03-08 NOTE — PROGRESS NOTES
Subjective:      Patient ID: Zen Langley is a 67 y.o. male. HPI  Patient here for follow-up for postthoracotomy pain syndrome, primary adenocarcinoma of the right middle lobe, right pleural effusion and smoking. Last seen in office per Dr. Son Staff in November 2022    Patient was seen by Dr. Daryl Mckee at [facility] at [facility]. Note was reviewed and provider noted that review of recent imaging of the effusion is felt to be secondary to his spaces residual lung has not fully expanded. Recommended against pulmonary decortication. Referred to pain specialist Dr. Kalee Beavers for postthoracotomy pain    Patient was seen by general surgery Dr. Lana Sam. Dr. Lana Sam reviewed images of CT abdomen and pelvis and noted that patient has cholelithiasis. Patient is being scheduled 3/31/23 for cholecystectomy. Dr. Lana Sam believes that his pain is related to his gallbladder. He also has an abdominal hernia that does need to be repaired. He had an echocardiogram done on 2/7/2023 which noted LVEF of 47% and mild diastolic dysfunction. Patient states that he stopped taking the morphine, has been having ongoing issues with pain and morphine did not seem to help much. He has been endorsing frequency of stools but denies diarrhea. Previously was having difficulty with constipation. Medications:        PRIOR WORKUP:  PFT:  PFT 12/7/2021: FVC 4.53, 100% of predicted. FEV1 4.34, 102% of predicted. FEV1/FVC ratio is 76, 101% of predicted. FEF 25-75 is 2.93, 116% of predicted. RV is 3.02, 117% of predicted. TLC is 7.34, 98% of predicted. DLCO is 22.26, 63% of predicted. CT Imaging:  Echo 2/7/23:  LVEF 60%. Mild diastolic dysfunction. CT chest 12/30/2022: Negative for pulmonary embolism. No mediastinal adenopathy. Postoperative findings s/p right middle lobectomy. Small loculated pleural effusion at the base with pleural thickening and internal septation without appreciable change.   Mild compressive atelectasis in the adjacent right lower lobe. Mild centrilobular and paraseptal emphysematous changes. CT chest 10/21/2022: No mediastinal or hilar adenopathy. Postsurgical changes s/p right middle lobectomy. Volume loss in the right lung base with chronic loculated right basilar pleural fluid collection not significantly changed from prior imaging. No new or enlarging pulmonary nodules. Mild centrilobular emphysema in the lungs. CTA chest 9/28/2022: Negative for pulmonary emboli. No mediastinal adenopathy. Postoperative changes to the right hilum and right lower lobe with atelectatic changes. Unchanged moderate sized right pleural effusion with pleural thickening and nodularity. Lungs are negative for pneumothorax. PET scan 9/05/2665: Hypermetabolic focus in the right middle lobe is not redemonstrated. Patient has a moderate right effusion with associated compressive atelectasis. Mildly increased FDG uptake along the atelectatic lung likely due to mild inflammation. No suspicious FDG uptake in the lung parenchyma. CT chest 1/16/2022: Left chest port is in place with distal tip in the mid to distal SVC. No mediastinal adenopathy. Moderate amount of heterogenous loculated pleural fluid at the right base is unchanged from prior CT imaging of his abdomen and pelvis from July 2022 and slightly decreased from CT chest in May 2022. Mild biapical pleural scar. Mild centrilobular emphysema. Mild atelectasis/scar at the right base. No new airspace consolidation, pulmonary nodule, pleural effusion. CT chest 5/19/2022: Negative for pulmonary embolism. Mild volume loss in the right hemithorax. Moderate right pleural effusion with loculation. Mild right basilar atelectasis and scarring. Left lung is clear. No pulmonary masses noted. No pneumothorax. CT chest 1/11/2022: Extensive pneumomediastinum. No mediastinal adenopathy. Small right pneumothorax is noted.   No left-sided pneumothorax. Mosaic lung attenuation and findings of subsegmental atelectasis in the lung bases. Subpleural emphysematous changes in the superior right lung. Status post partial right pneumonectomy. CT chest 11/2/2021: Emphysematous changes with biapical scarring. No focal consolidation, pneumothorax or pleural effusion. Previously biopsied somewhat bilobed nodule involving the right middle lobe has slightly increased in size now measuring 1.8 x 1.0 cm previously measuring 1.8 x 0.6 cm with some spiculation. No new pulmonary nodules identified. CT biopsy 7/28/2021: Successful right middle lobe pulmonary nodule biopsy with iatrogenic right pneumothorax post biopsy with an 8 Greek chest tube inserted. PET scan 6/30/2021: Previously described irregular nodule in the right middle lobe with SUV max of 3.4. Tiny focus of increased metabolic activity in the right hilum with an SUV max of 3.2. CT chest 6/10/2021: No mediastinal, hilar or axillary adenopathy. Mild centrilobular emphysema. Minor biapical pleural-parenchymal scarring. Significant increase in the noncalcified nodule in the anterior superior middle lobe measuring 6 x 18 mm compared to 10 x 4 mm. Margins are partially spiculated. With traction of the fissure along the interface of the nodule. PET scan 10/2/2019: 8 mm nodule in the right minor fissure is negative for FDG avidity. Mediastinal and hilar lymph nodes are also negative. Focal hypermetabolic activity in the gastric fundus near the GE junction with an SUV max of 6.5 recommend upper EGD     CT chest 9/23/2019: No mediastinal or hilar adenopathy. Slowly enlarging 8 x 5 mm pulmonary nodule in the right minor fissure previously measuring 7 x 4 mm in August 2018 and 7 x 4 mm in July 2017. Mild underlying emphysema. CT chest 8/29/2018: Mild pulmonary emphysema. 8 mm diameter nodule in the right middle lobe. No pleural effusion or pneumothorax.   No mediastinal adenopathy. CT chest 7/19/2017: 2 adjacent micronodules in the right middle lobe measuring 3 and 4 mm in size. No other pulmonary nodules are seen. No focal consolidation, pleural effusion or pneumothorax. No mediastinal or hilar adenopathy. Sleep Study:     Laboratory Evaluation:  Right thoracentesis fluid on 4/13/2022: Negative for malignancy     Right lung middle lobe wedge biopsy 1/4/2022: Adenocarcinoma measuring 1.8 cm carcinoma invades into but not through the visceral pleura. Lymph node #9 biopsies negative for metastatic carcinoma, lymph node #7 is negative for metastatic carcinoma, right middle lobe lobectomy margins are free of carcinoma involvement. Peribronchial lymph node is negative for metastatic carcinoma, nonneoplastic lung shows emphysematous changes. Lung needle biopsy 7/28/2021: Benign lung parenchyma with focal fibrosis and mild chronic inflammation with intra-alveolar histiocytes:    Immunizations:   Immunization History   Administered Date(s) Administered    COVID-19, MODERNA BLUE border, Primary or Immunocompromised, (age 12y+), IM, 100 mcg/0.5mL 03/09/2021, 04/06/2021, 07/19/2022    COVID-19, MODERNA Booster BLUE border, (age 18y+), IM, 50mcg/0.25mL 12/08/2021    COVID-19, PFIZER Bivalent BOOSTER, DO NOT Dilute, (age 12y+), IM, 30 mcg/0.3 mL 10/06/2022    Influenza Virus Vaccine 10/26/2016    Influenza, FLUZONE (age 72 y+), High Dose, 0.7mL 12/04/2017, 11/11/2019, 10/08/2020, 10/01/2021    Influenza, High Dose (Fluzone 65 yrs and older) 12/20/2017, 12/06/2018, 11/10/2019    Pneumococcal Conjugate 13-valent ([id number]) 10/26/2016    Pneumococcal Polysaccharide ([id number]) 12/20/2017    Tdap (Boostrix, Adacel) 09/25/2020    Zoster Live (Zostavax) 10/26/2016        No flowsheet data found.     /65 (Site: Left Upper Arm, Position: Sitting, Cuff Size: Medium Adult)   Pulse 72   Temp 97.8 °F (36.6 °C) (Temporal)   Resp 18   Ht 6' (1.829 m)   Wt 165 lb 11.2 oz (75.2 kg)   SpO2 97%   BMI 22.47 kg/m²     Past Medical History:   Diagnosis Date    Abnormal cardiac cath 09/20/2013    LMCA; Mild irregularities 10-20%. LAD: Abnormal.  Mild to moderate irregularities throughout the LAD and branches. LCx:  abnormal.  80-90% stenosis in a moderate sized OM1. RCA: Abnormal.  40-50% mid RCA stenosis. Actinic keratosis     Arthritis     neck, fingers    CAD (coronary artery disease)     Calculus of kidney     Cancer (HCC)     Cervical stenosis of spine     Diabetes mellitus ([facility] [address]     Dr. Mala Mercedes, CNP, [place]    Full dentures     GERD (gastroesophageal reflux disease)     H/O echocardiogram 04/29/2016    Stress echo. Normal resting LV systolic function,normal systolic restponse to exercise. No evidence of reversible  ischemia on this maximal,symptom limited,treadmill exerxiess stress echocardiography study    History of cardiac cath 02/09/2017    LMCA: Lesion on LMCA: Distal subsection . 20% stenosis. LAD: Lesion on 1st Diag: Mid subsection . 75% stenosis. RCA: Lesion on Mid RCA: Mid subsection . 100% stenosis. Comments: The distal RCA fills by left to right collateralization. Dominance: Mixed. LV function assessed as: Normal. EF 65%. History of echocardiogram 06/11/2018    EF 60%. Mildly increased LV wall thickness. Evidence of mild diastolic dysfunction seen. History of echocardiogram 01/18/2019    EF of 65%. LV wall thickness is mildly increased. Aortic valve leaflets are mildly thickened.      History of heart attack     History of removal of tunneled central venous catheter (CVC) with port 11/09/2022    [address]    History of tobacco abuse     Quit 10/2021, 1/4 ppd for 54 years    Hyperlipidemia     Hypertension     Dr. Mala Mercedes, CNP    Pulmonary nodule 12/2021    Right middle lobe    Raynaud's phenomenon     S/P angioplasty with stent 09/20/2013    PTCA-NEDRA of  the OM 1    Wears hearing aid in both ears      Past Surgical History:   Procedure Laterality Date    CARDIAC CATHETERIZATION  2007    patient states had 2 small blockages    CARDIAC CATHETERIZATION  02/09/2017    LMCA: Lesion on LMCA: Distal subsection 20% stenosis. LAD: Lesion on 1st Diag: Mid subsection 75% stenosis. RCA: Lesion on Mid RCA: Mid subsection 100% stenosis.      CARDIAC CATHETERIZATION  02/09/2017    Attempted PCI to chronic total occlusion of RCA was not successful. Unable to cross with multiple wires due to heavy calcification and toruosity.    CARDIAC CATHETERIZATION  01/14/2019    CATARACT REMOVAL  04/06/2015    Right eye - Dr. Peter    CATARACT REMOVAL  06/08/2015    Dr. Peter - left eye    COLONOSCOPY  01/11/2006    Dr. Schwartz - internal hemorrhoids, no polps    COLONOSCOPY  05/23/2016    -polyp    CT NEEDLE BIOPSY LUNG PERCUTANEOUS  07/28/2021    CT NEEDLE BIOPSY LUNG PERCUTANEOUS 7/28/2021 [facility] CT SCAN    HEMORRHOID SURGERY      HERNIA REPAIR Left     inguinal    IR CHEST TUBE INSERTION  01/11/2022    IR CHEST TUBE INSERTION 1/11/2022 Kobe MARIE MD [facility] SPECIAL PROCEDURES    IR PORT PLACEMENT EQUAL OR GREATER THAN 5 YEARS  2/9/2022    IR PORT PLACEMENT EQUAL OR GREATER THAN 5 YEARS 2/9/2022 [facility] SPECIAL PROCEDURES    LUNG BIOPSY Right 07/28/2021    [facility]    LUNG REMOVAL, TOTAL Right 01/04/2022    MIDDLE WEDGE RESECTION, POSSIBLE MIDDLE LOBECTOMY VIA THORACOTOMY performed by Dennis Webb MD at [facility] CVOR    NECK SURGERY  1997    c4-5 laminectomy and fusion    OTHER SURGICAL HISTORY  10/2014    Right foot - 7 from heal and 1 from great toe at [facility]    OTHER SURGICAL HISTORY  12/11/2014    pain injection - cervical    TUNNELED CENTRAL VENOUS CATHETER W/ SUBCUTANEOUS PORT Left 02/09/2022    [facility]    UPPER GASTROINTESTINAL ENDOSCOPY  12/28/2005    Dr. Schwartz - mild esophagitis and gastritis    UPPER GASTROINTESTINAL ENDOSCOPY N/A 02/05/2019    -bx(neg H-Pylori)retained gastric  content, possible submucosal mass of gastric cardia    UPPER GASTROINTESTINAL ENDOSCOPY N/A 10/22/2019    EGD BIOPSY performed by Jesus Sanchez MD at [facility] OR     Family History   Problem Relation Age of Onset    Heart Disease Mother     Diabetes Mother     High Blood Pressure Mother     Diabetes Father     Alzheimer's Disease Father     Heart Disease Sister         CABG    Heart Disease Brother     Heart Disease Brother     Heart Disease Brother     Cancer Brother        Social History     Socioeconomic History    Marital status:      Spouse name: Not on file    Number of children: Not on file    Years of education: Not on file    Highest education level: Not on file   Occupational History    Not on file   Tobacco Use    Smoking status: Former     Packs/day: 0.25     Years: 54.00     Pack years: 13.50     Types: Cigarettes     Quit date: 10/1/2021     Years since quittin.4    Smokeless tobacco: Never   Vaping Use    Vaping Use: Never used   Substance and Sexual Activity    Alcohol use: No    Drug use: No    Sexual activity: Not on file   Other Topics Concern    Not on file   Social History Narrative    Not on file     Social Determinants of Health     Financial Resource Strain: Not on file   Food Insecurity: Not on file   Transportation Needs: Not on file   Physical Activity: Not on file   Stress: Not on file   Social Connections: Not on file   Intimate Partner Violence: Not on file   Housing Stability: Not on file       Review of Systems  Constitutional:         Decreased quality of life secondary to chronic ongoing pain since having his lung resection   HENT: Negative.     Eyes: Negative.    Respiratory:          Denies any significant changes with his breathing.  Endorses exertional dyspnea.  No significant cough.  Denies purulent secretions or hemoptysis.  He is describing pain that is chronic in nature to his anterior chest distal at the level of his diaphragm/lower rib cage/liver that has been  present since January 2022. Pain is unrelenting and is not alleviated by pain medication, spinal injections or positioning. He has tried heat and cold applications with no improvement. Cardiovascular: Negative. Gastrointestinal: Negative. Endocrine: Negative. Genitourinary: Negative. Musculoskeletal: Negative. Skin: Negative. Allergic/Immunologic: Negative. Neurological: Negative. Hematological: Negative. Psychiatric/Behavioral: Negative. Objective:       Physical Exam  General appearance - alert, well appearing, and in no distress, oriented to person, place, and time, and normal appearing weight  Mental status - alert, oriented to person, place, and time, normal mood, behavior, speech, dress, motor activity, and thought processes  Eyes - pupils equal and reactive, extraocular eye movements intact  Ears -not examined  Nose - normal and patent, no erythema, discharge or polyps  Mouth - mucous membranes moist, pharynx normal without lesions  Neck - supple, no significant adenopathy  Chest -increased AP diameter, decreased thoracic expansion and excursion, prolonged expiratory phases noted. Diminished breath sounds in right base greater than left base. He has incisions to his right lateral chest wall that are well-healed with no erythremia, drainage or warmth. No tenderness along incision sites. Patient has pain to palpation across his lateral anterior lower lower right chest wall at the level of his lower rib cage/diaphragm/liver. There is no rebound tenderness. I am unable to elicit the pain by pushing elsewhere in his back/chest wall.   No obvious skin deformity, bruising at this level  Heart -normal rate, regular rhythm, normal S1, S2, no murmurs, rubs, clicks or gallops  Abdomen - soft, nontender, nondistended, no masses or organomegaly  Neuro- alert, oriented, normal speech, no focal findings or movement disorder noted}  Extremities - peripheral pulses normal, no pedal edema, no clubbing or cyanosis  Skin - normal coloration and turgor, no rashes, no suspicious skin lesions noted     Wt Readings from Last 3 Encounters:   03/15/23 165 lb 11.2 oz (75.2 kg)   02/17/23 163 lb (73.9 kg)   02/16/23 166 lb (75.3 kg)       Results for orders placed or performed during the hospital encounter of 02/07/23   Echo 2D w doppler w color complete   Result Value Ref Range    Left Ventricular Ejection Fraction 60     LVEF MODALITY ECHO        No results found. Assessment:      1. Post-thoracotomy pain syndrome    2. Pleural effusion on right    3. Primary adenocarcinoma of middle lobe of right lung ([address]    4. Right-sided chest pain    5. Calculus of gallbladder without cholecystitis without obstruction          Plan:   Medications reviewed, no pulmonary medications. Educated and clarified the medication use. Recommend flu vaccination in the fall annually. Patient is up-to-date with pneumococcal vaccinations from pulmonary perspective. Patient has received Covid vaccination. Recommended booster when eligible. Discussed strategies to protect against Covid 19. Maintain an active lifestyle. Patient's questions were answered to his satisfaction.   Pulmonary function tests were reviewed   CT scan of the chest was reviewed  We'll see the patient back in 3 months with Dr. Josue Malone-          Electronically signed by ALONDRA Delatorre CNP on 3/15/2023 at 2:48 PM

## 2023-03-15 ENCOUNTER — OFFICE VISIT (OUTPATIENT)
Dept: PULMONOLOGY | Age: 73
End: 2023-03-15
Payer: MEDICARE

## 2023-03-15 VITALS
DIASTOLIC BLOOD PRESSURE: 65 MMHG | HEIGHT: 72 IN | RESPIRATION RATE: 18 BRPM | OXYGEN SATURATION: 97 % | SYSTOLIC BLOOD PRESSURE: 117 MMHG | WEIGHT: 165.7 LBS | HEART RATE: 72 BPM | BODY MASS INDEX: 22.44 KG/M2 | TEMPERATURE: 97.8 F

## 2023-03-15 DIAGNOSIS — K80.20 CALCULUS OF GALLBLADDER WITHOUT CHOLECYSTITIS WITHOUT OBSTRUCTION: ICD-10-CM

## 2023-03-15 DIAGNOSIS — G89.12 POST-THORACOTOMY PAIN SYNDROME: Primary | ICD-10-CM

## 2023-03-15 DIAGNOSIS — R07.9 RIGHT-SIDED CHEST PAIN: ICD-10-CM

## 2023-03-15 DIAGNOSIS — C34.2 PRIMARY ADENOCARCINOMA OF MIDDLE LOBE OF RIGHT LUNG (HCC): ICD-10-CM

## 2023-03-15 DIAGNOSIS — J90 PLEURAL EFFUSION ON RIGHT: ICD-10-CM

## 2023-03-15 PROCEDURE — 1123F ACP DISCUSS/DSCN MKR DOCD: CPT | Performed by: NURSE PRACTITIONER

## 2023-03-15 PROCEDURE — 3017F COLORECTAL CA SCREEN DOC REV: CPT | Performed by: NURSE PRACTITIONER

## 2023-03-15 PROCEDURE — 3074F SYST BP LT 130 MM HG: CPT | Performed by: NURSE PRACTITIONER

## 2023-03-15 PROCEDURE — 99214 OFFICE O/P EST MOD 30 MIN: CPT

## 2023-03-15 PROCEDURE — G8427 DOCREV CUR MEDS BY ELIG CLIN: HCPCS | Performed by: NURSE PRACTITIONER

## 2023-03-15 PROCEDURE — 1036F TOBACCO NON-USER: CPT | Performed by: NURSE PRACTITIONER

## 2023-03-15 PROCEDURE — 3078F DIAST BP <80 MM HG: CPT | Performed by: NURSE PRACTITIONER

## 2023-03-15 PROCEDURE — G8420 CALC BMI NORM PARAMETERS: HCPCS | Performed by: NURSE PRACTITIONER

## 2023-03-15 PROCEDURE — G8484 FLU IMMUNIZE NO ADMIN: HCPCS | Performed by: NURSE PRACTITIONER

## 2023-03-15 PROCEDURE — 99214 OFFICE O/P EST MOD 30 MIN: CPT | Performed by: NURSE PRACTITIONER

## 2023-03-15 NOTE — PATIENT INSTRUCTIONS
SURVEY:    You may be receiving a survey from [employer] regarding your visit today. Please complete the survey to enable us to provide the highest quality of care to you and your family. If you cannot score us a very good on any question, please call the office to discuss how we could have made your experience a very good one. Thank you.

## 2023-03-15 NOTE — LETTER
March 15, 2023       [address] [facility]      Dear Doctor Monet Blanco    It has come to my attention that our mutual patient, Mr Omid Sargent is having a robotic cholecystectomy on 3/31/23 and is in need of surgical clearance. He has a past medical history of lung cancer s/p resection, COPD and pleural effusion with post thoracotomy pain syndrome. He was last seen in our office on  3/15/23 by Nolan HOLLIS. He is presently asymptomatic from a pulmonary standpoint other than pain and as long as he is fully aware of the risk  and benefits of his upcoming procedure as described by you, I see no reason why he/she cannot proceed at this time. I would ask however IV fluid be held to a minimum to maintain blood pressure and prevent volume overload, postoperative use of incentive spirometer and early ambulation as deemed appropriate by you, to help prevent post-operative atelectasis. It is my hope that this information will be of help to you and our mutual patient. Should you have further questions regarding this matter, please do not hesitate to contact my office.     Sincerely yours,        ALONDRA Huynh, [facility]  [address] [facility],  [address]  [place] [address]  Phone: [phone]  Fax: [phone]

## 2023-03-16 ENCOUNTER — TELEPHONE (OUTPATIENT)
Dept: ONCOLOGY | Age: 73
End: 2023-03-16

## 2023-03-16 NOTE — TELEPHONE ENCOUNTER
Name: Tanna Carlson  : 1950  MRN: [id number]    Oncology Navigation Follow-Up Note    Contact Type:  Telephone    Notes: Call made to patient for ONN follow up. No answer. Left message stating writer called to check in and assess needs. Requested returned phone call.        Electronically signed by Chelsea Eason RN on 3/16/2023 at 11:49 AM

## 2023-04-12 PROBLEM — R07.81 COSTAL MARGIN PAIN: Status: ACTIVE | Noted: 2023-04-12

## 2023-04-17 ENCOUNTER — HOSPITAL ENCOUNTER (OUTPATIENT)
Age: 73
Discharge: HOME | End: 2023-04-17
Payer: MEDICARE

## 2023-04-17 ENCOUNTER — HOSPITAL ENCOUNTER (OUTPATIENT)
Dept: CT IMAGING | Age: 73
Discharge: HOME | End: 2023-04-19
Payer: MEDICARE

## 2023-04-17 DIAGNOSIS — C34.01 LUNG CANCER, MAIN BRONCHUS, RIGHT (HCC): ICD-10-CM

## 2023-04-17 DIAGNOSIS — N28.89 RENAL MASS: ICD-10-CM

## 2023-04-17 LAB
ABSOLUTE EOS #: 0.19 K/UL (ref 0–0.44)
ABSOLUTE IMMATURE GRANULOCYTE: 0.03 K/UL (ref 0–0.3)
ABSOLUTE LYMPH #: 1.57 K/UL (ref 1.1–3.7)
ABSOLUTE MONO #: 0.68 K/UL (ref 0.1–1.2)
ALBUMIN SERPL-MCNC: 4.1 G/DL (ref 3.5–5.2)
ALBUMIN/GLOBULIN RATIO: 1.2 (ref 1–2.5)
ALP SERPL-CCNC: 99 U/L (ref 40–129)
ALT SERPL-CCNC: 16 U/L (ref 5–41)
ANION GAP SERPL CALCULATED.3IONS-SCNC: 10 MMOL/L (ref 9–17)
AST SERPL-CCNC: 16 U/L
BASOPHILS # BLD: 1 % (ref 0–2)
BASOPHILS ABSOLUTE: 0.08 K/UL (ref 0–0.2)
BILIRUB SERPL-MCNC: 0.5 MG/DL (ref 0.3–1.2)
BUN SERPL-MCNC: 23 MG/DL (ref 8–23)
BUN/CREAT BLD: 17 (ref 9–20)
CALCIUM SERPL-MCNC: 9.5 MG/DL (ref 8.6–10.4)
CHLORIDE SERPL-SCNC: 102 MMOL/L (ref 98–107)
CO2 SERPL-SCNC: 24 MMOL/L (ref 20–31)
CREAT SERPL-MCNC: 1.36 MG/DL (ref 0.7–1.2)
EOSINOPHILS RELATIVE PERCENT: 3 % (ref 1–4)
GFR SERPL CREATININE-BSD FRML MDRD: 55 ML/MIN/1.73M2
GLUCOSE SERPL-MCNC: 253 MG/DL (ref 70–99)
HCT VFR BLD AUTO: 33.8 % (ref 40.7–50.3)
HGB BLD-MCNC: 10.5 G/DL (ref 13–17)
IMMATURE GRANULOCYTES: 1 %
LYMPHOCYTES # BLD: 25 % (ref 24–43)
MCH RBC QN AUTO: 26.3 PG (ref 25.2–33.5)
MCHC RBC AUTO-ENTMCNC: 31.1 G/DL (ref 28.4–34.8)
MCV RBC AUTO: 84.7 FL (ref 82.6–102.9)
MONOCYTES # BLD: 11 % (ref 3–12)
NRBC AUTOMATED: 0 PER 100 WBC
PDW BLD-RTO: 16 % (ref 11.8–14.4)
PLATELET # BLD AUTO: 181 K/UL (ref 138–453)
PMV BLD AUTO: 10.7 FL (ref 8.1–13.5)
POTASSIUM SERPL-SCNC: 4.2 MMOL/L (ref 3.7–5.3)
PROT SERPL-MCNC: 7.5 G/DL (ref 6.4–8.3)
RBC # BLD: 3.99 M/UL (ref 4.21–5.77)
SEG NEUTROPHILS: 59 % (ref 36–65)
SEGMENTED NEUTROPHILS ABSOLUTE COUNT: 3.87 K/UL (ref 1.5–8.1)
SODIUM SERPL-SCNC: 136 MMOL/L (ref 135–144)
WBC # BLD AUTO: 6.4 K/UL (ref 3.5–11.3)

## 2023-04-17 PROCEDURE — 36415 COLL VENOUS BLD VENIPUNCTURE: CPT

## 2023-04-17 PROCEDURE — 80053 COMPREHEN METABOLIC PANEL: CPT

## 2023-04-17 PROCEDURE — 74177 CT ABD & PELVIS W/CONTRAST: CPT

## 2023-04-17 PROCEDURE — 85025 COMPLETE CBC W/AUTO DIFF WBC: CPT

## 2023-04-17 PROCEDURE — 6360000004 HC RX CONTRAST MEDICATION: Performed by: INTERNAL MEDICINE

## 2023-04-17 RX ADMIN — IOPAMIDOL 75 ML: 755 INJECTION, SOLUTION INTRAVENOUS at 09:20

## 2023-04-24 ENCOUNTER — OFFICE VISIT (OUTPATIENT)
Dept: ONCOLOGY | Age: 73
End: 2023-04-24
Payer: MEDICARE

## 2023-04-24 ENCOUNTER — TELEPHONE (OUTPATIENT)
Dept: ONCOLOGY | Age: 73
End: 2023-04-24

## 2023-04-24 VITALS
TEMPERATURE: 97.6 F | SYSTOLIC BLOOD PRESSURE: 143 MMHG | BODY MASS INDEX: 22.51 KG/M2 | WEIGHT: 166 LBS | RESPIRATION RATE: 18 BRPM | HEART RATE: 67 BPM | DIASTOLIC BLOOD PRESSURE: 75 MMHG

## 2023-04-24 DIAGNOSIS — C64.1 RENAL CELL CARCINOMA OF RIGHT KIDNEY (HCC): ICD-10-CM

## 2023-04-24 DIAGNOSIS — C34.01 LUNG CANCER, MAIN BRONCHUS, RIGHT (HCC): Primary | ICD-10-CM

## 2023-04-24 DIAGNOSIS — M54.9 BACK PAIN, UNSPECIFIED BACK LOCATION, UNSPECIFIED BACK PAIN LATERALITY, UNSPECIFIED CHRONICITY: ICD-10-CM

## 2023-04-24 DIAGNOSIS — R39.9 ABNORMAL CYSTOSCOPY: ICD-10-CM

## 2023-04-24 PROCEDURE — 99214 OFFICE O/P EST MOD 30 MIN: CPT | Performed by: INTERNAL MEDICINE

## 2023-04-24 PROCEDURE — G8428 CUR MEDS NOT DOCUMENT: HCPCS | Performed by: INTERNAL MEDICINE

## 2023-04-24 PROCEDURE — 3077F SYST BP >= 140 MM HG: CPT | Performed by: INTERNAL MEDICINE

## 2023-04-24 PROCEDURE — 1123F ACP DISCUSS/DSCN MKR DOCD: CPT | Performed by: INTERNAL MEDICINE

## 2023-04-24 PROCEDURE — 1036F TOBACCO NON-USER: CPT | Performed by: INTERNAL MEDICINE

## 2023-04-24 PROCEDURE — G8420 CALC BMI NORM PARAMETERS: HCPCS | Performed by: INTERNAL MEDICINE

## 2023-04-24 PROCEDURE — 3017F COLORECTAL CA SCREEN DOC REV: CPT | Performed by: INTERNAL MEDICINE

## 2023-04-24 PROCEDURE — 3078F DIAST BP <80 MM HG: CPT | Performed by: INTERNAL MEDICINE

## 2023-04-24 NOTE — PATIENT INSTRUCTIONS
Follow-up with [facility] oncology Dr. Reina gonzalez   RTC in 6 months with repeated labs and scan

## 2023-04-27 ENCOUNTER — TELEPHONE (OUTPATIENT)
Dept: UROLOGY | Age: 73
End: 2023-04-27

## 2023-04-27 NOTE — TELEPHONE ENCOUNTER
Patient called and cancelled his PVP greenlight surgery. Says he is having back surgery on May 18th and wants to wait until he is healed from that and then reschedule.

## 2023-04-27 NOTE — TELEPHONE ENCOUNTER
Please make him an appointment for the middle of July to see how he is doing from his back surgery and possibly reschedule his greenlight for sometime thereafter

## 2023-04-28 DIAGNOSIS — I25.10 ASHD (ARTERIOSCLEROTIC HEART DISEASE): ICD-10-CM

## 2023-04-28 RX ORDER — CLOPIDOGREL BISULFATE 75 MG/1
TABLET ORAL
Qty: 90 TABLET | Refills: 3 | Status: SHIPPED | OUTPATIENT
Start: 2023-04-28

## 2023-05-19 ENCOUNTER — HOSPITAL ENCOUNTER (OUTPATIENT)
Dept: GENERAL RADIOLOGY | Age: 73
End: 2023-05-19
Payer: MEDICARE

## 2023-05-19 ENCOUNTER — HOSPITAL ENCOUNTER (OUTPATIENT)
Age: 73
End: 2023-05-19
Payer: MEDICARE

## 2023-05-19 ENCOUNTER — TELEPHONE (OUTPATIENT)
Dept: CARDIOLOGY | Age: 73
End: 2023-05-19

## 2023-05-19 ENCOUNTER — HOSPITAL ENCOUNTER (OUTPATIENT)
Age: 73
Discharge: HOME | End: 2023-05-19
Payer: MEDICARE

## 2023-05-19 ENCOUNTER — OFFICE VISIT (OUTPATIENT)
Dept: CARDIOLOGY | Age: 73
End: 2023-05-19
Payer: MEDICARE

## 2023-05-19 VITALS
OXYGEN SATURATION: 98 % | DIASTOLIC BLOOD PRESSURE: 75 MMHG | SYSTOLIC BLOOD PRESSURE: 164 MMHG | BODY MASS INDEX: 23.32 KG/M2 | WEIGHT: 172.2 LBS | HEART RATE: 63 BPM | HEIGHT: 72 IN | RESPIRATION RATE: 18 BRPM

## 2023-05-19 DIAGNOSIS — I25.10 ASHD (ARTERIOSCLEROTIC HEART DISEASE): ICD-10-CM

## 2023-05-19 DIAGNOSIS — I10 PRIMARY HYPERTENSION: ICD-10-CM

## 2023-05-19 DIAGNOSIS — I47.29 NSVT (NONSUSTAINED VENTRICULAR TACHYCARDIA) (HCC): ICD-10-CM

## 2023-05-19 DIAGNOSIS — Z87.898 HISTORY OF SYNCOPE: ICD-10-CM

## 2023-05-19 DIAGNOSIS — Z95.820 S/P ANGIOPLASTY WITH STENT: ICD-10-CM

## 2023-05-19 DIAGNOSIS — Z01.810 PREOP CARDIOVASCULAR EXAM: ICD-10-CM

## 2023-05-19 DIAGNOSIS — Z01.810 PREOP CARDIOVASCULAR EXAM: Primary | ICD-10-CM

## 2023-05-19 DIAGNOSIS — E78.2 MIXED HYPERLIPIDEMIA: ICD-10-CM

## 2023-05-19 DIAGNOSIS — R06.02 SOB (SHORTNESS OF BREATH): ICD-10-CM

## 2023-05-19 DIAGNOSIS — R06.02 SOB (SHORTNESS OF BREATH): Primary | ICD-10-CM

## 2023-05-19 LAB — BNP SERPL-MCNC: 246 PG/ML

## 2023-05-19 PROCEDURE — 93005 ELECTROCARDIOGRAM TRACING: CPT | Performed by: INTERNAL MEDICINE

## 2023-05-19 PROCEDURE — 71046 X-RAY EXAM CHEST 2 VIEWS: CPT

## 2023-05-19 PROCEDURE — 83880 ASSAY OF NATRIURETIC PEPTIDE: CPT

## 2023-05-19 PROCEDURE — 36415 COLL VENOUS BLD VENIPUNCTURE: CPT

## 2023-05-19 RX ORDER — GABAPENTIN 300 MG/1
300 CAPSULE ORAL 3 TIMES DAILY
COMMUNITY
Start: 2023-05-03

## 2023-05-19 RX ORDER — LISINOPRIL 10 MG/1
10 TABLET ORAL DAILY
Qty: 30 TABLET | Refills: 3 | Status: SHIPPED | OUTPATIENT
Start: 2023-05-19

## 2023-05-19 NOTE — TELEPHONE ENCOUNTER
----- Message from Severa Savory, PA-C sent at 5/19/2023  1:40 PM EDT -----  Please notify patient that their lab results are normal.   Please continue current treatment and follow up.

## 2023-05-19 NOTE — TELEPHONE ENCOUNTER
----- Message from Annie Barrett MD sent at 5/19/2023 12:45 PM EDT -----  Increasing fluid around the right lung. He should get clearance from oncology and pulmonology prior to the surgery. Repeat CT scan prior to his surgery may be needed.   Thank you

## 2023-05-19 NOTE — PROGRESS NOTES
moving from laying to sitting, sitting to standing and standing to walking. I also explained to him to help improve his symptoms he should include 3 g sodium diet, 1 or 2 L of sports drinks daily, knee-high compressions stockings. Thought to be due to dehydration. Atherosclerotic Heart Disease: S/P PCI in in 2003 and 2011. Cardiac cath in 2017 showed totally occluded RCA, an attempt to open the RCA failed. Patient has good left-to-right collaterals. Treated medically since that time and is doing very good. Antiplatelet Agent: Continue Aspirin 81 mg daily and clopidogrel (Plavix) 75 mg daily. I also reminded him to watch for signs of blood in his stool or black tarry stools and stop the medication immediately if this develops as this could be life threatening. Beta Blocker: Continue Metoprolol succinate (Toprol XL) 50 mg daily. Anti-anginal medications: Continue nitroglycerin 0.4 mg tablets as needed for chest pain. Statin Therapy: Continue atorvastatin (Lipitor) 20 mg nightly. Essential Hypertension: Controlled  Beta Blocker: Continue Metoprolol succinate (Toprol XL) 50 mg daily. ACE Inibitor/ARB: START lisinopril 5 mg daily. Hyperlipidemia: Mixed, LDL done on 1/18/2019 was 35 mg/dL  Statin Therapy: Continue atorvastatin (Lipitor) 20 mg nightly. Finally, I recommended that he continue his other medications and follow up with you as previously scheduled. I discussed patient's symptoms and treatment plan with Dr Jade Reese, he was in agreement with the plan and follow up. FOLLOW UP:   I told Mr. Arnaldo Laguna to call my office if he had any problems, but otherwise told him to Return in about 3 months (around 8/19/2023). However, I would be happy to see him sooner should the need arise.       Sincerely,  Mu Johnson PA-C  [facility]    [address], [place], [address] [place] [address]  Phone: [phone], Fax: [phone]     I believe that the risk of significant

## 2023-05-19 NOTE — PATIENT INSTRUCTIONS
SURVEY:    You may be receiving a survey from [employer] regarding your visit today. Please complete the survey to enable us to provide the highest quality of care to you and your family. If you cannot score us a very good on any question, please call the office to discuss how we could have made your experience a very good one. Thank you.

## 2023-06-05 ENCOUNTER — TELEPHONE (OUTPATIENT)
Dept: ONCOLOGY | Age: 73
End: 2023-06-05

## 2023-06-05 ENCOUNTER — HOSPITAL ENCOUNTER (OUTPATIENT)
Age: 73
Setting detail: SPECIMEN
Discharge: HOME | End: 2023-06-05
Payer: MEDICARE

## 2023-06-05 LAB
ALBUMIN SERPL-MCNC: 3.5 G/DL (ref 3.5–5.2)
ALBUMIN/GLOB SERPL: 0.9 {RATIO} (ref 1–2.5)
ALP SERPL-CCNC: 83 U/L (ref 40–129)
ALT SERPL-CCNC: 15 U/L (ref 5–41)
ANION GAP SERPL CALCULATED.3IONS-SCNC: 8 MMOL/L (ref 9–17)
AST SERPL-CCNC: 25 U/L
BASOPHILS # BLD: 0.09 K/UL (ref 0–0.2)
BASOPHILS NFR BLD: 1 % (ref 0–2)
BILIRUB SERPL-MCNC: 0.4 MG/DL (ref 0.3–1.2)
BUN SERPL-MCNC: 16 MG/DL (ref 8–23)
BUN/CREAT SERPL: 17 (ref 9–20)
CALCIUM SERPL-MCNC: 8.9 MG/DL (ref 8.6–10.4)
CHLORIDE SERPL-SCNC: 104 MMOL/L (ref 98–107)
CO2 SERPL-SCNC: 27 MMOL/L (ref 20–31)
CREAT SERPL-MCNC: 0.93 MG/DL (ref 0.7–1.2)
EOSINOPHIL # BLD: 0.62 K/UL (ref 0–0.44)
EOSINOPHILS RELATIVE PERCENT: 6 % (ref 1–4)
ERYTHROCYTE [DISTWIDTH] IN BLOOD BY AUTOMATED COUNT: 15.8 % (ref 11.8–14.4)
GFR SERPL CREATININE-BSD FRML MDRD: >60 ML/MIN/1.73M2
GLUCOSE SERPL-MCNC: 111 MG/DL (ref 70–99)
HCT VFR BLD AUTO: 30.2 % (ref 40.7–50.3)
HGB BLD-MCNC: 9.4 G/DL (ref 13–17)
IMM GRANULOCYTES # BLD AUTO: 0.21 K/UL (ref 0–0.3)
IMM GRANULOCYTES NFR BLD: 2 %
LYMPHOCYTES # BLD: 18 % (ref 24–43)
LYMPHOCYTES NFR BLD: 1.99 K/UL (ref 1.1–3.7)
MCH RBC QN AUTO: 26.2 PG (ref 25.2–33.5)
MCHC RBC AUTO-ENTMCNC: 31.1 G/DL (ref 28.4–34.8)
MCV RBC AUTO: 84.1 FL (ref 82.6–102.9)
MONOCYTES NFR BLD: 0.69 K/UL (ref 0.1–1.2)
MONOCYTES NFR BLD: 6 % (ref 3–12)
NEUTROPHILS NFR BLD: 67 % (ref 36–65)
NEUTS SEG NFR BLD: 7.19 K/UL (ref 1.5–8.1)
NRBC AUTOMATED: 0 PER 100 WBC
PLATELET # BLD AUTO: 320 K/UL (ref 138–453)
PMV BLD AUTO: 10.4 FL (ref 8.1–13.5)
POTASSIUM SERPL-SCNC: 4.3 MMOL/L (ref 3.7–5.3)
PROT SERPL-MCNC: 7.2 G/DL (ref 6.4–8.3)
RBC # BLD AUTO: 3.59 M/UL (ref 4.21–5.77)
SODIUM SERPL-SCNC: 139 MMOL/L (ref 135–144)
WBC OTHER # BLD: 10.8 K/UL (ref 3.5–11.3)

## 2023-06-05 PROCEDURE — 36415 COLL VENOUS BLD VENIPUNCTURE: CPT

## 2023-06-05 PROCEDURE — 85027 COMPLETE CBC AUTOMATED: CPT

## 2023-06-05 PROCEDURE — 80053 COMPREHEN METABOLIC PANEL: CPT

## 2023-06-19 ENCOUNTER — HOSPITAL ENCOUNTER (OUTPATIENT)
Dept: PHYSICAL THERAPY | Age: 73
Setting detail: THERAPIES SERIES
Discharge: HOME | End: 2023-06-19
Payer: MEDICARE

## 2023-06-19 PROCEDURE — 97162 PT EVAL MOD COMPLEX 30 MIN: CPT

## 2023-06-19 PROCEDURE — 97110 THERAPEUTIC EXERCISES: CPT

## 2023-06-19 NOTE — PROGRESS NOTES
Phone: [address]          Fax: [phone]                      Outpatient Physical Therapy                                                                      Evaluation  Date: 2023  Patient: Jonelle Mars  : 1950  CSN #: [id number]    Referring Physician: Thuy Gonzalez MD    Medical Diagnosis: Thoracic Spinal Laminectomy    Treatment Diagnosis: s/p Thoracic Laminectomy  Onset Date: 23  PT Insurance Information: Medicare  Total # of Visits Approved: 12   Total # of Visits to Date: 1  No Show: 0  Canceled Appointment: 0     Subjective  Subjective: Pt states that he has been doing better every day since thoracic laminectomy surgery on 23. Pt states he has been having occurances of anterior stomach pain as well as levator scap pain bilaterally. He feels as if he is sleeping well, and almost too much at times. He has had a few instances of losing conciousness over the past few months without warning as well.  Pt notes pain ranges from 2/10 at best to 10/10 at worst.  Additional Pertinent Hx: HTN, Cancer, Vision Problems, Heart Problems, Hearing Problems, Breathing Problems, DM    Palpation:   Thoracic Spine Palpation: Incision site is mobile and healing well  Right Shoulder Palpation: LS Tenderness/UT Tenderness  Left Shoulder Palpation: LS Tenderness/UT Tenderness    Objective    AROM    General AROM UE: Right WFL, Left WFL        Strength       Strength LUE  L Shoulder Flexion: 4/5  L Shoulder ABduction: 4/5  L Shoulder Internal Rotation: 4/5  L Shoulder External Rotation: 4/5  L Elbow Flexion: 4/5  L Elbow Extension: 4/5  Strength RUE  R Shoulder Flexion: 4/5  R Shoulder ABduction: 4/5  R Shoulder Internal Rotation: 4/5  R Shoulder External Rotation: 4/5  R Elbow Flexion: 4/5  R Elbow Extension: 4/5     Cervical Assessment     AROM Cervical Spine   Cervical Spine AROM : Painful  Measured as: Degrees  Cervical flexion: 40 (Recreated pain

## 2023-06-19 NOTE — PLAN OF CARE
[facility]           Phone: [phone]             Outpatient Physical Therapy  Fax: [phone]                                           Date: 2023  Patient: Starr Whitnig : 1950 [facility] #: [id number]   Referring Physician: Tierney Cortes MD      [x] Plan of Care   [] Updated Plan of Care    Dates of Service to Include: 2023 to 23    Diagnosis:  Thoracic Spinal Laminectomy    Rehab (Treatment) Diagnosis:  s/p Thoracic Laminectomy             Onset Date:  23    Attendance  Total # of Visits to Date: 1 No Show: 0 Canceled Appointment: 0    Assessment  Assessment: Pt is a pleasant 67 yo man who presents s/p thoracic laminectomy. Pt demonstrates mild reduction to BUE strength as well as limitations to cervical and thoracic range of motion at this time. Pt has increased pain with functional mobility and with ADLs as well as having some recent issues with loss of conciousness without clear reason. Pt presents with B  strength of appx 65#. Pt will benefit from skilled therapy to improve functional UE strength and spinal mobility to improve ease and decrease pain of ADLs. Goals  Short Term Goals  Time Frame for Short Term Goals: 3 weeks  Short Term Goal 1: Pt to initiate HEP  Short Term Goal 2: Pt to not exceed 8/10 pain to improve functional capacity for therex/act  Long Term Goals  Time Frame for Long Term Goals : 6 weeks  Long Term Goal 1: Pt to be comfortable and complient with HEP  Long Term Goal 2: Pt to not exceed 4/10 pain to improve functional capacity for ADLs. Long Term Goal 3: Pt to demonstrate >/= 4+/5 B Shoulder strength to increase dynamic stability and safety of overhead ADLs.   Long Term Goal 4: Pt to demonstrate >/= 50 degrees cervical flexion without increase in pain to improve functional mobility     Prognosis  Therapy Prognosis: Good    Treatment Plan   Plan

## 2023-06-23 ENCOUNTER — HOSPITAL ENCOUNTER (OUTPATIENT)
Dept: PHYSICAL THERAPY | Age: 73
Setting detail: THERAPIES SERIES
Discharge: HOME | End: 2023-06-23
Payer: MEDICARE

## 2023-06-23 PROCEDURE — 97110 THERAPEUTIC EXERCISES: CPT

## 2023-06-23 NOTE — PROGRESS NOTES
Phone: [address]           Fax: [phone]                           Outpatient Physical Therapy                                                                            Daily Note    Patient: Isi Barragan : 1950  [facility] #: [id number]   Referring Physician: Edouard Henry MD  Date: 2023       Treatment Diagnosis: s/p Thoracic Laminectomy    Onset Date: 23  PT Insurance Information: Medicare  Total # of Visits Approved: 12 Per Physician Order  Total # of Visits to Date: 2  No Show: 0  Canceled Appointment: 0    23 Plan of Care/Recert Due    Pre-Treatment Pain:  3/10  Subjective: Pt reports feeling very fatigued lately, \"I could fall asleep right now\" with pain 3/10 today in upper/mid traps. Pt states his wife says there is a knot at the top of his incision from stitches. Exercises:  Exercise 1: HEP: Shoulder Rolls, LS Stretching, Thoracic/Cervical Mobility  Exercise 2: UBE 8' - 4'fwd/4'bwd  Exercise 3: rows/ext green TB 2 x10  Exercise 4: bicep curls 2# 2x10  Exercise 5: shloulder rolls/shrugs 2# 2x10  Exercise 6: chin tucks 2x10     Modalities:   MHP applied for 10 min to reduce muscle tone and increase tissue extensibility. CP applied for 5 min to reduce pain and discomfort. Assessment  Assessment: Pt c/o feeling lightheaded with recent syncope episodes requiring seated break post strengthening exercises. Pt demo's fair strength and endurance throughout ex. MHP applied to thoracic region to reduce pain and increase tissue extensibility post treatment session, pt demo'd push up style bed mobility causing increased pain in thoracic region. CP applied for 5 min to reduce that pain. Pt reported CP was more beneficial and helped with lightheadedness.     Activity Tolerance  Activity Tolerance: Patient limited by pain, Other (comment)    Patient Education  Patient Education: HEP + POC  Pt verbalized/demonstrated good understanding:     [x]

## 2023-06-28 ENCOUNTER — HOSPITAL ENCOUNTER (OUTPATIENT)
Dept: PHYSICAL THERAPY | Age: 73
Setting detail: THERAPIES SERIES
Discharge: HOME | End: 2023-06-28
Payer: MEDICARE

## 2023-06-30 ENCOUNTER — APPOINTMENT (OUTPATIENT)
Dept: PHYSICAL THERAPY | Age: 73
End: 2023-06-30
Payer: MEDICARE

## 2023-07-03 ENCOUNTER — HOSPITAL ENCOUNTER (OUTPATIENT)
Dept: PHYSICAL THERAPY | Age: 73
Setting detail: THERAPIES SERIES
Discharge: HOME | End: 2023-07-03
Payer: MEDICARE

## 2023-07-03 PROCEDURE — 97140 MANUAL THERAPY 1/> REGIONS: CPT

## 2023-07-03 PROCEDURE — 97110 THERAPEUTIC EXERCISES: CPT

## 2023-07-03 NOTE — PROGRESS NOTES
Phone: [address]           Fax: [phone]                           Outpatient Physical Therapy                                                                            Daily Note    Patient: Cindy Mock : 1950  [facility] #: [id number]   Referring Physician: Ginny Ndiaye MD  Date: 7/3/2023       Treatment Diagnosis: s/p Thoracic Laminectomy    Onset Date: 23  PT Insurance Information: Medicare  Total # of Visits Approved: 12 Per Physician Order  Total # of Visits to Date: 3  No Show: 0  Canceled Appointment: 0    23 Plan of Care/Recert Due    Pre-Treatment Pain: 7 /10  Subjective: Pt arrives with c/o /10 R sided upper thoracic pain. He states he was sitting for a while at a table and felt his pain worsened from that. Exercises:  Exercise 1: HEP: Shoulder Rolls, LS Stretching, Thoracic/Cervical Mobility  Exercise 5: shloulder rolls/shrugs/retracts 2# 2x10 -no weight this date  Exercise 6: chin tucks 2x10  Exercise 7: CROM x10 ea  Exercise 8: LS stretch 3x20\";  posterior capsule stretch 3x20\" ea    Manual:  Soft Tissue Mobilizaton: Heated thermaprobe to upper thoracic/lower cervical spine to decrease tone/pain    Modalities:   Pt declined    Assessment  Assessment: severe TTP along upper/middle trap R>L. Used heated thermaprobe to upper thoracic/lower cervical spine to decrease tone/pain. Pt did report reduction in pain from this and with CROM. He does admit he hasn't been compliant with HEP and sits a lot. spent time educating pt on importance of HEP and general movement to improve tissue mobility and muscle strength. Focused on gentle scapular/cervical mobility within tolerance to improve tissue mobility and decrease pain. Pt reported increased pain post tx. He kindly refused modalities, states he feels like it made it worse last time. WIll continue as tolerated.     Activity Tolerance  Activity Tolerance: Patient limited by

## 2023-07-06 ENCOUNTER — HOSPITAL ENCOUNTER (OUTPATIENT)
Dept: PHYSICAL THERAPY | Age: 73
Setting detail: THERAPIES SERIES
Discharge: HOME | End: 2023-07-06
Payer: MEDICARE

## 2023-07-06 PROCEDURE — 97140 MANUAL THERAPY 1/> REGIONS: CPT

## 2023-07-06 PROCEDURE — 97110 THERAPEUTIC EXERCISES: CPT

## 2023-07-06 NOTE — PROGRESS NOTES
Phone: Seth Ellis           Fax: [phone]                           Outpatient Physical Therapy                                                                            Daily Note    Patient: Alessandra Notice : 1950  CSN #: [id number]   Referring Physician: Olga Lidia Cordova MD  Date: 2023     Treatment Diagnosis: s/p Thoracic Laminectomy    Onset Date: 23  PT Insurance Information: Medicare  Total # of Visits Approved: 12 Per Physician Order  Total # of Visits to Date: 4  No Show: 0  Canceled Appointment: 0    23 Plan of Care/Recert Due    Pre-Treatment Pain:  7/10  Subjective: Pt reports his current pain is a 7/10. Pt reports he was very sore yesterday early but a hot pack helped him so he could at least could mow. Exercises:  Exercise 5: shloulder rolls/shrugs/retracts 2# 2x10 -no weight this date  Exercise 7: CROM x10 ea  Exercise 8: LS stretch 3x20\";  posterior capsule stretch 3x20\" ea  Exercise 9: jailpose stretch 3x30    Manual:  Other: STM to Denis UT. scap and thoracic region to decrease muscle tightness. Modalities:   MHP 10 mins     Assessment  Assessment: Pt continues to display moderate to severe forward flexed resting shoulder posture. Reviewed body mechanics for ADLs. STM completed to denis shoulders and scap region to decrease muscle guarding. Will continue. Activity Tolerance  Activity Tolerance: Patient limited by pain    Patient Education  Patient Education: Continue stretching  Pt verbalized/demonstrated good understanding:     [x] Yes         [] No, pt required further clarification.      Post Treatment Pain:  7/10    Plan  Plan Frequency: 2x/week  Plan weeks: 6 weeks     Goals  (Total # of Visits to Date: 4)      Short Term Goals  Time Frame for Short Term Goals: 3 weeks  Short Term Goal 1: Pt to initiate HEP -MET  Short Term Goal 2: Pt to not exceed 8/10 pain to improve functional capacity for therex/act Pt was returning our call . Please give her a call.

## 2023-07-11 ENCOUNTER — HOSPITAL ENCOUNTER (OUTPATIENT)
Dept: PHYSICAL THERAPY | Age: 73
Setting detail: THERAPIES SERIES
Discharge: HOME | End: 2023-07-11
Payer: MEDICARE

## 2023-07-11 PROCEDURE — 97110 THERAPEUTIC EXERCISES: CPT

## 2023-07-11 NOTE — PROGRESS NOTES
Phone: [address]           Fax: [phone]                           Outpatient Physical Therapy                                                                            Daily Note    Patient: Fidel Garibay : 1950  [facility] #: [id number]   Referring Physician: Jose A Brown MD  Date: 2023    Diagnosis: Thoracic Spinal Laminectomy  Treatment Diagnosis: s/p Thoracic Laminectomy    Onset Date: 23  PT Insurance Information: Medicare  Total # of Visits Approved: 12 Per Physician Order  Total # of Visits to Date: 5  No Show: 0  Canceled Appointment: 0    23 Plan of Care/Recert Due    Pre-Treatment Pain:  9/10  Subjective: Pt states yesteday he felt really good, but today he is very sore and has sharp pain. Pt states pain is 9/10    Exercises:  Exercise 1: HEP: Shoulder Rolls, LS Stretching, Thoracic/Cervical Mobility  Exercise 2: UBE 8' - 4'fwd/4'bwd  Exercise 3: rows/ext green TB 2 x10  Exercise 5: shloulder rolls/shrugs/retracts 2# 2x10 -no weight this date  Exercise 7: CROM x10 ea  Exercise 10: SCARLETT's --head press, shld press, elbow press 10x hold 3 sec         Assessment  Assessment: Pt is having a lot of pain today. States he did not sleep well due to pain. States pain is sharp. Exercise modified today with fait tolerane. Added SCARLETT's exercies with good tolerance. Pt stated pain was 6/10 following session    Activity Tolerance  Activity Tolerance: Patient tolerated treatment well    Patient Education  Patient Education: HEP progression  Pt verbalized/demonstrated good understanding:     [x] Yes         [] No, pt required further clarification.        Post Treatment Pain:  6/10      Plan  Plan Frequency: 2x/week  Plan weeks: 6 weeks       Goals  (Total # of Visits to Date: 5)      Short Term Goals  Time Frame for Short Term Goals: 3 weeks  Short Term Goal 1: Pt to initiate HEP -MET  Short Term Goal 2: Pt to not exceed 8/10 pain to improve

## 2023-07-13 ENCOUNTER — TELEPHONE (OUTPATIENT)
Dept: UROLOGY | Age: 73
End: 2023-07-13

## 2023-07-13 ENCOUNTER — HOSPITAL ENCOUNTER (OUTPATIENT)
Dept: PHYSICAL THERAPY | Age: 73
Setting detail: THERAPIES SERIES
Discharge: HOME | End: 2023-07-13
Payer: MEDICARE

## 2023-07-13 PROCEDURE — 97110 THERAPEUTIC EXERCISES: CPT

## 2023-07-13 NOTE — PROGRESS NOTES
Phone: [address]           Fax: [phone]                           Outpatient Physical Therapy                                                                            Daily Note    Patient: Dudley Leyden : 1950  CSN #: [id number]   Referring Physician: Nuno Bean MD  Date: 2023     Treatment Diagnosis: s/p Thoracic Laminectomy    PT Insurance Information: Medicare  Total # of Visits Approved: 12 Per Physician Order  Total # of Visits to Date: 6  No Show: 0  Canceled Appointment: 0    23 Plan of Care/Recert Due    Pre-Treatment Pain:  0/10  Subjective: Pt reports he is feeling really good today. Pt stateshe took one pain pill 2 days ago but his pain has really decreased. Pt rates current pain a 0/10. Exercises:  Exercise 2: UBE 8' - 4'fwd/4'bwd  Exercise 3: rows/ext green TB 2 x10  Exercise 5: shloulder rolls/shrugs/retracts 3# 2x10  Exercise 6: chin tucks 2x10  Exercise 7: CROM x10 ea  Exercise 10: SCARLETT's --head press, shld press, elbow press 10x hold 3 sec  Exercise 11: HAB/ diags 10x ea (supine). Assessment  Assessment: Progressed exercises today aimed at increased postural awareness and control. Will contnue to progress as Pt tolerates. Activity Tolerance  Activity Tolerance: Patient tolerated treatment well    Patient Education  Patient Education: Cont stretching. Pt verbalized/demonstrated good understanding:     [x] Yes         [] No, pt required further clarification.      Post Treatment Pain:  0/10    Plan  Plan Frequency: 2x/week  Plan weeks: 6 weeks     Goals  (Total # of Visits to Date: 6)      Short Term Goals  Time Frame for Short Term Goals: 3 weeks  Short Term Goal 1: Pt to initiate HEP -MET  Short Term Goal 2: Pt to not exceed 8/10 pain to improve functional capacity for therex/act -Met    Long Term Goals  Time Frame for Long Term Goals : 6 weeks  Long Term Goal 1: Pt to be comfortable and complient with

## 2023-07-18 ENCOUNTER — HOSPITAL ENCOUNTER (OUTPATIENT)
Dept: PHYSICAL THERAPY | Age: 73
Setting detail: THERAPIES SERIES
Discharge: HOME | End: 2023-07-18
Payer: MEDICARE

## 2023-07-18 PROCEDURE — 97110 THERAPEUTIC EXERCISES: CPT

## 2023-07-18 NOTE — PROGRESS NOTES
Phone: [address] [place]           Fax: [phone]                           Outpatient Physical Therapy                                                                            Daily Note    Patient: Crow Raya : 1950  CSN #: [id number]   Referring Physician: Yolonda Saint, MD  Date: 2023    Diagnosis: Thoracic Spinal Laminectomy  Treatment Diagnosis: s/p Thoracic Laminectomy    Onset Date: 23  PT Insurance Information: Medicare  Total # of Visits Approved: 12 Per Physician Order  Total # of Visits to Date: 7  No Show: 0  Canceled Appointment: 0    23 Plan of Care/Recert Due    Pre-Treatment Pain:  0/10  Subjective: Pt states he did not have any pain this weekend, but states he was not to active    Exercises:  Exercise 1: HEP: Shoulder Rolls, LS Stretching, Thoracic/Cervical Mobility  Exercise 2: UBE 8' - 4'fwd/4'bwd  Exercise 3: rows/ext green TB 2 x10  Exercise 4: bicep curls 3# 210  Exercise 5: shloulder rolls/shrugs/retracts 3# 2x10  Exercise 6: chin tucks 2x10  Exercise 7: CROM x10 ea  Exercise 8: LS stretch 3x20\";  posterior capsule stretch 3x20\" ea  Exercise 9: jailpose stretch 3x30  Exercise 10: SCARLETT's --head press, shld press, elbow press 10x hold 3 sec  Exercise 11: HAB/ diags 10x ea (supine). Assessment  Assessment: Pt withb no painthis weekend, Pt has discomfort L rhomboids region. Postural exercise completed with pain increasing following rows and shrugs. Stretching decreased sxs. Supine exercise completed and reviewed. Pt has one scheduled visit remaining    Activity Tolerance  Activity Tolerance: Patient tolerated treatment well    Patient Education  Patient Education: HEP  Pt verbalized/demonstrated good understanding:     [x] Yes         [] No, pt required further clarification.        Post Treatment Pain:  3/10      Plan  Plan Frequency: 2x/week  Plan weeks: 6 weeks       Goals  (Total # of Visits to Date: 7)      Short

## 2023-07-19 ENCOUNTER — HOSPITAL ENCOUNTER (OUTPATIENT)
Dept: GENERAL RADIOLOGY | Age: 73
Discharge: HOME | End: 2023-07-21
Payer: MEDICARE

## 2023-07-19 ENCOUNTER — OFFICE VISIT (OUTPATIENT)
Dept: PULMONOLOGY | Age: 73
End: 2023-07-19
Payer: MEDICARE

## 2023-07-19 ENCOUNTER — HOSPITAL ENCOUNTER (OUTPATIENT)
Age: 73
Discharge: HOME | End: 2023-07-21
Payer: MEDICARE

## 2023-07-19 VITALS
TEMPERATURE: 96.9 F | HEART RATE: 73 BPM | HEIGHT: 72 IN | RESPIRATION RATE: 16 BRPM | OXYGEN SATURATION: 98 % | DIASTOLIC BLOOD PRESSURE: 58 MMHG | BODY MASS INDEX: 22.16 KG/M2 | WEIGHT: 163.6 LBS | SYSTOLIC BLOOD PRESSURE: 121 MMHG

## 2023-07-19 DIAGNOSIS — J90 PLEURAL EFFUSION ON RIGHT: ICD-10-CM

## 2023-07-19 DIAGNOSIS — C34.2 PRIMARY ADENOCARCINOMA OF MIDDLE LOBE OF RIGHT LUNG (HCC): ICD-10-CM

## 2023-07-19 DIAGNOSIS — R53.81 PHYSICAL DECONDITIONING: ICD-10-CM

## 2023-07-19 DIAGNOSIS — Z87.891 STOPPED SMOKING WITH GREATER THAN 40 PACK YEAR HISTORY: ICD-10-CM

## 2023-07-19 DIAGNOSIS — J90 PLEURAL EFFUSION ON RIGHT: Primary | ICD-10-CM

## 2023-07-19 PROCEDURE — 3074F SYST BP LT 130 MM HG: CPT | Performed by: NURSE PRACTITIONER

## 2023-07-19 PROCEDURE — 1036F TOBACCO NON-USER: CPT | Performed by: NURSE PRACTITIONER

## 2023-07-19 PROCEDURE — G8427 DOCREV CUR MEDS BY ELIG CLIN: HCPCS | Performed by: NURSE PRACTITIONER

## 2023-07-19 PROCEDURE — 99214 OFFICE O/P EST MOD 30 MIN: CPT | Performed by: NURSE PRACTITIONER

## 2023-07-19 PROCEDURE — G8420 CALC BMI NORM PARAMETERS: HCPCS | Performed by: NURSE PRACTITIONER

## 2023-07-19 PROCEDURE — 1123F ACP DISCUSS/DSCN MKR DOCD: CPT | Performed by: NURSE PRACTITIONER

## 2023-07-19 PROCEDURE — 3078F DIAST BP <80 MM HG: CPT | Performed by: NURSE PRACTITIONER

## 2023-07-19 PROCEDURE — 71046 X-RAY EXAM CHEST 2 VIEWS: CPT

## 2023-07-19 PROCEDURE — 3017F COLORECTAL CA SCREEN DOC REV: CPT | Performed by: NURSE PRACTITIONER

## 2023-07-19 RX ORDER — FOLIC ACID 1 MG/1
1 TABLET ORAL DAILY
COMMUNITY

## 2023-07-19 ASSESSMENT — ENCOUNTER SYMPTOMS
EYES NEGATIVE: 1
ALLERGIC/IMMUNOLOGIC NEGATIVE: 1
GASTROINTESTINAL NEGATIVE: 1

## 2023-07-19 NOTE — PATIENT INSTRUCTIONS
SURVEY:    You may be receiving a survey from [employer] regarding your visit today. Please complete the survey to enable us to provide the highest quality of care to you and your family. If you cannot score us a very good on any question, please call the office to discuss how we could have made your experience a very good one. Thank you.

## 2023-07-20 ENCOUNTER — HOSPITAL ENCOUNTER (OUTPATIENT)
Dept: PHYSICAL THERAPY | Age: 73
Setting detail: THERAPIES SERIES
Discharge: HOME | End: 2023-07-20
Payer: MEDICARE

## 2023-07-20 PROCEDURE — 97110 THERAPEUTIC EXERCISES: CPT

## 2023-07-20 NOTE — PROGRESS NOTES
Goal 1: Pt to be comfortable and complient with HEP  Long Term Goal 2: Pt to not exceed 4/10 pain to improve functional capacity for ADLs. Long Term Goal 3: Pt to demonstrate >/= 4+/5 B Shoulder strength to increase dynamic stability and safety of overhead ADLs.   Long Term Goal 4: Pt to demonstrate >/= 50 degrees cervical flexion without increase in pain to improve functional mobility    Minutes Tracking:  Time In: 1662  Time Out: 1315  Minutes: 44  Timed Code Treatment Minutes: Katherine Beckman     Date: 7/20/2023

## 2023-07-27 ENCOUNTER — HOSPITAL ENCOUNTER (OUTPATIENT)
Dept: PHYSICAL THERAPY | Age: 73
Setting detail: THERAPIES SERIES
Discharge: HOME | End: 2023-07-27
Payer: MEDICARE

## 2023-07-27 PROCEDURE — 97140 MANUAL THERAPY 1/> REGIONS: CPT

## 2023-07-27 PROCEDURE — 97110 THERAPEUTIC EXERCISES: CPT

## 2023-07-27 NOTE — PROGRESS NOTES
Phone: [address]           Fax: [phone]                           Outpatient Physical Therapy                                                                            Daily Note    Patient: Missy Roman : 1950  [facility] #: [id number]   Referring Physician: Alessandra Ramos MD  Date: 2023       Treatment Diagnosis: s/p Thoracic Laminectomy    Onset Date: 23  PT Insurance Information: Medicare  Total # of Visits Approved: 22 Per Physician Order  Total # of Visits to Date: 9  No Show: 0  Canceled Appointment: 0    23 Plan of Care/Recert Due    Pre-Treatment Pain:  2/10  Subjective: Patient reports returning to the surgeon yesterday, and she would like him to try aquatic exercise. Patient reports pain was bad yesterday going to [facility] for his appointments and had to take pain medication. He reports pain isn't too bad coming into therapy today and rates it at 2/10. Exercises:  Exercise 2: UBE 8' - 4'fwd/4'bwd, level 2  Exercise 3: rows/ext green TB 2 x12  Exercise 6: chin tucks 2x10  Exercise 7: CROM x5 ea supine  Exercise 9: jailpose stretch 3x30  Exercise 12: standing flexion 10x  Exercise 13: Wall push-up 2x10  Exercise 14: Sit to stands 2x10    Manual:  Other: STM to Yoni UT. scap and thoracic region to decrease muscle tightness. Assessment  Assessment: Patient has attended his initial evaluation and 8 follow-up visits working toward his long term goals. He continues to demonstrate scapular winging with UE ROM and reports increased levator and upper trapezius pain. Patient's pain levels have been his limiting factor with exercise progression. He would benefit from aquatic and land based exercise to work toward his long term goals.     Activity Tolerance  Activity Tolerance: Patient tolerated treatment well    Patient Education  Patient Education: HEP  Pt verbalized/demonstrated good understanding:     [x] Yes         [] No, pt

## 2023-07-27 NOTE — PLAN OF CARE
[facility]           Phone: [phone]             Outpatient Physical Therapy  Fax: [phone]                                           Date: 2023  Patient: Lyn Jacobsen : 1950 CSN #: [id number]   Referring Physician: Jazmine Ba MD      [] Plan of Care   [x] Updated Plan of Care    Dates of Service to Include: 2023 to 23    Diagnosis:       Rehab (Treatment) Diagnosis:  s/p Thoracic Laminectomy             Onset Date:  23    Attendance  Total # of Visits to Date: 9 No Show: 0 Canceled Appointment: 0    Assessment  Assessment: Patient has attended his initial evaluation and 8 follow-up visits working toward his long term goals. He continues to demonstrate scapular winging with UE ROM and reports increased levator and upper trapezius pain. Patient's pain levels have been his limiting factor with exercise progression. He would benefit from aquatic and land based exercise to work toward his long term goals. Goals  Short Term Goals  Time Frame for Short Term Goals: 3 weeks  Short Term Goal 1: Pt to initiate HEP -MET  Short Term Goal 2: Pt to not exceed 8/10 pain to improve functional capacity for therex/act -Met  Long Term Goals  Time Frame for Long Term Goals : 6 weeks  Long Term Goal 1: Pt to be comfortable and complient with HEP  Long Term Goal 2: Pt to not exceed 4/10 pain to improve functional capacity for ADLs. Long Term Goal 3: Pt to demonstrate >/= 4+/5 B Shoulder strength to increase dynamic stability and safety of overhead ADLs.   Long Term Goal 4: Pt to demonstrate >/= 50 degrees cervical flexion without increase in pain to improve functional mobility     Prognosis  Therapy Prognosis: Good    Treatment Plan   Plan Frequency: 2x/week  Plan weeks: 5 weeks  [x] HP/CP      [] Electrical Stim   [x] Therapeutic Exercise      [x] Gait Training  [x] Aquatics   []

## 2023-08-01 ENCOUNTER — HOSPITAL ENCOUNTER (OUTPATIENT)
Dept: PHYSICAL THERAPY | Age: 73
Setting detail: THERAPIES SERIES
Discharge: HOME | End: 2023-08-01
Payer: MEDICARE

## 2023-08-01 PROCEDURE — 97110 THERAPEUTIC EXERCISES: CPT

## 2023-08-01 PROCEDURE — 97140 MANUAL THERAPY 1/> REGIONS: CPT

## 2023-08-01 NOTE — PROGRESS NOTES
Phone: [address] [place]           Fax: [phone]                           Outpatient Physical Therapy                                                                            Daily Note    Patient: Pennie Reyes : 1950  [facility] #: [id number]   Referring Physician: Tawanna Matias MD  Date: 2023       Treatment Diagnosis: s/p Thoracic Laminectomy    Onset Date: 23  PT Insurance Information: Medicare  Total # of Visits Approved: 22 Per Physician Order  Total # of Visits to Date: 10  No Show: 0  Canceled Appointment: 0    23 Plan of Care/Recert Due    Pre-Treatment Pain:  8/10  Subjective: Patient reports he went to the Dr. Michell Skaggs and mowed on Saturday, but all of a sudden he started having severe L scapular/levator pain. He reports pain remains this AM and rates it at an 8/10. Exercises:  Exercise 2: UBE 8' - 4'fwd/4'bwd, level 2  Exercise 3: rows/ext green TB 2 x15  Exercise 12: standing flexion 10x  Exercise 13: Wall push-up 2x10  Exercise 14: Sit to stands 2x10  Exercise 15: Corner pec stretch 2x20 seconds    Manual:  Other: STM to Yoni UT. scap and thoracic region to decrease muscle tightness. Assessment  Assessment: Patient with increased pain coming into therapy. He was able to continue with exercises to work toward his long term strength, and endurance goals. Used STM to decrease pain and musucular tension. He reported relief following his treatment session today. Activity Tolerance  Activity Tolerance: Patient limited by pain    Patient Education  Patient Education: HEP  Pt verbalized/demonstrated good understanding:     [x] Yes         [] No, pt required further clarification.        Post Treatment Pain:  2/10      Plan  Plan Frequency: 2x/week  Plan weeks: 6 weeks       Goals  (Total # of Visits to Date: 10)      Short Term Goals  Time Frame for Short Term Goals: 3 weeks  Short Term Goal 1: Pt to initiate HEP -MET  Short Term

## 2023-08-03 ENCOUNTER — HOSPITAL ENCOUNTER (OUTPATIENT)
Dept: PHYSICAL THERAPY | Age: 73
Setting detail: THERAPIES SERIES
Discharge: HOME | End: 2023-08-03
Payer: MEDICARE

## 2023-08-03 NOTE — PROGRESS NOTES
[facility]  Inpatient/Observation/Outpatient Rehabilitation    Date: 8/3/2023  Patient Name: Kamila Gerard       [] Inpatient Acute/Observation       []  Outpatient  : 1950         [] Pt no showed for scheduled appointment    [] Pt refused/declined therapy at this time due to:           [x] Pt cancelled due to:  [] No Reason Given   [x] Sick/ill   [] Other:    Therapist/Assistant will attempt to see this patient, at our earliest opportunity.        Juana Franklin Date: 8/3/2023

## 2023-08-08 ENCOUNTER — HOSPITAL ENCOUNTER (OUTPATIENT)
Dept: PHYSICAL THERAPY | Age: 73
Setting detail: THERAPIES SERIES
Discharge: HOME | End: 2023-08-08
Payer: MEDICARE

## 2023-08-08 PROCEDURE — 97110 THERAPEUTIC EXERCISES: CPT

## 2023-08-08 PROCEDURE — 97140 MANUAL THERAPY 1/> REGIONS: CPT

## 2023-08-10 ENCOUNTER — HOSPITAL ENCOUNTER (OUTPATIENT)
Dept: PHYSICAL THERAPY | Age: 73
Setting detail: THERAPIES SERIES
Discharge: HOME | End: 2023-08-10
Payer: MEDICARE

## 2023-08-10 PROCEDURE — 97113 AQUATIC THERAPY/EXERCISES: CPT

## 2023-08-10 NOTE — PROGRESS NOTES
Phone: [address] [place]           Fax: [phone]                           Outpatient Physical Therapy                                                                            Daily Note    Patient: Olivia Aguayo : 1950  [facility] #: [id number]   Referring Physician: Viviana Randhawa MD  Date: 8/10/2023    Diagnosis: Thoracic Spinal Laminectomy  Treatment Diagnosis: s/p Thoracic Laminectomy    Onset Date: 23  PT Insurance Information: Medicare  Total # of Visits Approved: 22 Per Physician Order  Total # of Visits to Date: 12  No Show: 0  Canceled Appointment: 0    23 Plan of Care/Recert Due    Pre-Treatment Pain:  4/10  Subjective: pt reports he is not feeling very good today, tired and had low BP earlier. pt reports he took his BP medications, BP taken prior to tx--110/64. pt reports 4/10 upper back/neck pain prior to treatment. Exercises:  Exercise 16: aq: in offloaded enviornment to reduce compressive forces: Deep water walking fwd and lateral with arm swim x 2 laps each  Exercise 17: aq: Seated shoulder matrix x 10 each, 90/90 and chest fly, punches, speed bag fwd and bwd x 10 each  Exercise 18: aq: BTB rows/ext x10, standing green hands flex/ext simultaneous and alternating x 10 each, 1 lap deep walking with green hands and arm swing. Exercise 19: aq: corner pec stretch 20\" x 3, wall push ups x 10    Assessment  Assessment: Initiated aquatic therapy this date in chest/shoulder level water to offload joints and target upper extremity/cervical recruitment. Gentle walking and UE therex implemented with fair tolerance. pt reported a decrease in pain to 2/10 following tx. pt limited by fatigue and LE weakness. Will continue to progress as tolerated.     Activity Tolerance  Activity Tolerance: Patient limited by fatigue, Patient limited by pain    Patient Education  Patient Education: Initiated aquatic therapy this date with instruction on safety protocol

## 2023-08-16 RX ORDER — LISINOPRIL 10 MG/1
10 TABLET ORAL DAILY
Qty: 90 TABLET | Refills: 3 | Status: SHIPPED | OUTPATIENT
Start: 2023-08-16

## 2023-08-17 ENCOUNTER — HOSPITAL ENCOUNTER (OUTPATIENT)
Dept: PHYSICAL THERAPY | Age: 73
Setting detail: THERAPIES SERIES
Discharge: HOME | End: 2023-08-17
Payer: MEDICARE

## 2023-08-17 NOTE — PROGRESS NOTES
[facility]  Inpatient/Observation/Outpatient Rehabilitation    Date: 2023  Patient Name: Beth Buerger       [] Inpatient Acute/Observation       [x]  Outpatient  : 1950          [x] Pt cancelled due to:  [] No Reason Given   [] Sick/ill   [x] Other:  Gout flare up    Therapist/Assistant will attempt to see this patient, at our earliest opportunity.        Katherine Zhou [id number] Date: 2023

## 2023-08-21 ENCOUNTER — OFFICE VISIT (OUTPATIENT)
Dept: CARDIOLOGY | Age: 73
End: 2023-08-21
Payer: MEDICARE

## 2023-08-21 ENCOUNTER — HOSPITAL ENCOUNTER (OUTPATIENT)
Age: 73
Discharge: HOME | End: 2023-08-21
Payer: MEDICARE

## 2023-08-21 ENCOUNTER — TELEPHONE (OUTPATIENT)
Dept: CARDIOLOGY | Age: 73
End: 2023-08-21

## 2023-08-21 VITALS — OXYGEN SATURATION: 99 % | SYSTOLIC BLOOD PRESSURE: 127 MMHG | HEART RATE: 83 BPM | DIASTOLIC BLOOD PRESSURE: 73 MMHG

## 2023-08-21 DIAGNOSIS — Z87.898 HISTORY OF SYNCOPE: ICD-10-CM

## 2023-08-21 DIAGNOSIS — R63.4 WEIGHT LOSS: ICD-10-CM

## 2023-08-21 DIAGNOSIS — Z95.820 S/P ANGIOPLASTY WITH STENT: ICD-10-CM

## 2023-08-21 DIAGNOSIS — I25.10 ASHD (ARTERIOSCLEROTIC HEART DISEASE): ICD-10-CM

## 2023-08-21 DIAGNOSIS — I47.29 NSVT (NONSUSTAINED VENTRICULAR TACHYCARDIA) (HCC): ICD-10-CM

## 2023-08-21 DIAGNOSIS — I10 PRIMARY HYPERTENSION: ICD-10-CM

## 2023-08-21 DIAGNOSIS — I47.29 NSVT (NONSUSTAINED VENTRICULAR TACHYCARDIA) (HCC): Primary | ICD-10-CM

## 2023-08-21 DIAGNOSIS — E78.2 MIXED HYPERLIPIDEMIA: ICD-10-CM

## 2023-08-21 LAB
ANION GAP SERPL CALCULATED.3IONS-SCNC: 10 MMOL/L (ref 9–17)
BUN SERPL-MCNC: 34 MG/DL (ref 8–23)
BUN/CREAT SERPL: 24 (ref 9–20)
CALCIUM SERPL-MCNC: 10 MG/DL (ref 8.6–10.4)
CHLORIDE SERPL-SCNC: 101 MMOL/L (ref 98–107)
CHOLEST SERPL-MCNC: 105 MG/DL
CHOLESTEROL/HDL RATIO: 3.2
CO2 SERPL-SCNC: 26 MMOL/L (ref 20–31)
CREAT SERPL-MCNC: 1.4 MG/DL (ref 0.7–1.2)
ERYTHROCYTE [DISTWIDTH] IN BLOOD BY AUTOMATED COUNT: 15.4 % (ref 11.8–14.4)
GFR SERPL CREATININE-BSD FRML MDRD: 53 ML/MIN/1.73M2
GLUCOSE SERPL-MCNC: 124 MG/DL (ref 70–99)
HCT VFR BLD AUTO: 35.6 % (ref 40.7–50.3)
HDLC SERPL-MCNC: 33 MG/DL
HGB BLD-MCNC: 11.2 G/DL (ref 13–17)
LDLC SERPL CALC-MCNC: 45 MG/DL (ref 0–130)
MCH RBC QN AUTO: 26.6 PG (ref 25.2–33.5)
MCHC RBC AUTO-ENTMCNC: 31.5 G/DL (ref 28.4–34.8)
MCV RBC AUTO: 84.6 FL (ref 82.6–102.9)
NRBC BLD-RTO: 0 PER 100 WBC
PLATELET # BLD AUTO: 244 K/UL (ref 138–453)
PMV BLD AUTO: 10.1 FL (ref 8.1–13.5)
POTASSIUM SERPL-SCNC: 4.9 MMOL/L (ref 3.7–5.3)
RBC # BLD AUTO: 4.21 M/UL (ref 4.21–5.77)
SODIUM SERPL-SCNC: 137 MMOL/L (ref 135–144)
TRIGL SERPL-MCNC: 134 MG/DL
WBC OTHER # BLD: 10 K/UL (ref 3.5–11.3)

## 2023-08-21 PROCEDURE — G8427 DOCREV CUR MEDS BY ELIG CLIN: HCPCS | Performed by: INTERNAL MEDICINE

## 2023-08-21 PROCEDURE — 1123F ACP DISCUSS/DSCN MKR DOCD: CPT | Performed by: INTERNAL MEDICINE

## 2023-08-21 PROCEDURE — G8420 CALC BMI NORM PARAMETERS: HCPCS | Performed by: INTERNAL MEDICINE

## 2023-08-21 PROCEDURE — 99211 OFF/OP EST MAY X REQ PHY/QHP: CPT | Performed by: INTERNAL MEDICINE

## 2023-08-21 PROCEDURE — 3078F DIAST BP <80 MM HG: CPT | Performed by: INTERNAL MEDICINE

## 2023-08-21 PROCEDURE — 85027 COMPLETE CBC AUTOMATED: CPT

## 2023-08-21 PROCEDURE — 36415 COLL VENOUS BLD VENIPUNCTURE: CPT

## 2023-08-21 PROCEDURE — 80061 LIPID PANEL: CPT

## 2023-08-21 PROCEDURE — 3017F COLORECTAL CA SCREEN DOC REV: CPT | Performed by: INTERNAL MEDICINE

## 2023-08-21 PROCEDURE — 3074F SYST BP LT 130 MM HG: CPT | Performed by: INTERNAL MEDICINE

## 2023-08-21 PROCEDURE — 1036F TOBACCO NON-USER: CPT | Performed by: INTERNAL MEDICINE

## 2023-08-21 PROCEDURE — 80048 BASIC METABOLIC PNL TOTAL CA: CPT

## 2023-08-21 PROCEDURE — 99214 OFFICE O/P EST MOD 30 MIN: CPT | Performed by: INTERNAL MEDICINE

## 2023-08-21 RX ORDER — CHOLECALCIFEROL (VITAMIN D3) 125 MCG
500 CAPSULE ORAL DAILY
COMMUNITY

## 2023-08-21 RX ORDER — ATORVASTATIN CALCIUM 20 MG/1
20 TABLET, FILM COATED ORAL DAILY
COMMUNITY

## 2023-08-21 RX ORDER — PANTOPRAZOLE SODIUM 40 MG/1
40 TABLET, DELAYED RELEASE ORAL DAILY
COMMUNITY

## 2023-08-21 RX ORDER — DULOXETIN HYDROCHLORIDE 30 MG/1
30 CAPSULE, DELAYED RELEASE ORAL DAILY
COMMUNITY

## 2023-08-21 RX ORDER — INSULIN GLARGINE 100 [IU]/ML
INJECTION, SOLUTION SUBCUTANEOUS NIGHTLY
COMMUNITY

## 2023-08-21 RX ORDER — INSULIN ASPART 100 [IU]/ML
INJECTION, SOLUTION INTRAVENOUS; SUBCUTANEOUS 3 TIMES DAILY
COMMUNITY

## 2023-08-21 RX ORDER — GLIMEPIRIDE 2 MG/1
2 TABLET ORAL 2 TIMES DAILY
COMMUNITY

## 2023-08-21 NOTE — TELEPHONE ENCOUNTER
----- Message from Estela Mujica MD sent at 8/21/2023 11:24 AM EDT -----  He is likely dehydrated particularly with the reported weight loss. Please advise him regarding adequate hydration and follow-up with GI for the dysphagia as discussed in the office today.

## 2023-08-21 NOTE — PROGRESS NOTES
Jonh Ziegler am scribing for and in the presence of Taiwo Wiley MD, F.A.C.C. Patient: Rodrigue Boland  : 1950  Date of Visit: 2023    REASON FOR VISIT / CONSULTATION:   Chief Complaint   Patient presents with    Coronary Artery Disease     Hx: PreOp,NSVT,Syncope,ASHD,S/P Stent,HTN,HLD. 6 month follow up. He has been doing well. Did have back surgery 3 months ago. Some Sob at times, staying the same. Lightheaded/dizziness, from sitting to standing, lasting 5 minutes or so. Denies: CP, Palpitations. Dear Dr. Ursula Long MD    I had the pleasure of seeing Rodrigue Boland today for a pre-op evaluation. As you know, Mr. Liseth Moore is 71 y.o male with PMH of hypertension, hyperlipidemia, type II diabetes and coronary artery disease with  heart cath done on 2013 at [facility]. V's at that time he has [facility]RA to [facility]. The cath also showed mild disease of the other coronaries and normal left ventricular function by ventriculogram.      Earlier in , patient had an episode of prolonged chest pain. Retrosternal, burning in nature without radiation. Pain occurred at rest with partial relief with sublingual nitrates. Repeat cardiac cath on  2017 showed new totally occluded mid RCA. An attempt to open the totally occluded RCA on 2017 has failed. Patient treated medically. Echo done on 2018, global left ventricular systolic function appears preserved with an estimated ejection fraction of 60%. Mild LVH. No definite specific wall motion abnormalities were identified. No significant valvular abnormalities. Evidence of mild diastolic dysfunction is seen. Echo done on 2019 that showed EF of 65%. LV wall thickness is mildly increased. Aortic valve leaflets are mildly thickened. Holter Monitor done on 2019: The rhythm was sinus. Average NE interval 0.22 [1st Degree AV Block]. Average QRS duration 0.08.  Average daily heart rate  69 ranging from 54 to110

## 2023-08-21 NOTE — PATIENT INSTRUCTIONS
SURVEY:    You may be receiving a survey from [employer] regarding your visit today. Please complete the survey to enable us to provide the highest quality of care to you and your family. If you cannot score us a very good on any question, please call the office to discuss how we could have made your experience a very good one. Thank you.

## 2023-08-22 ENCOUNTER — HOSPITAL ENCOUNTER (OUTPATIENT)
Dept: PHYSICAL THERAPY | Age: 73
Setting detail: THERAPIES SERIES
Discharge: HOME | End: 2023-08-22
Payer: MEDICARE

## 2023-08-22 PROCEDURE — 97110 THERAPEUTIC EXERCISES: CPT

## 2023-08-22 NOTE — PROGRESS NOTES
Phone: [address] [facility]           Fax: [phone]                           Outpatient Physical Therapy                                                                            Daily Note    Patient: Shankar Hill : 1950  CSN #: [id number]   Referring Physician: Mary Grace Estrada MD  Date: 2023    Diagnosis: Thoracic Spinal Laminectomy  Treatment Diagnosis: s/p Thoracic Laminectomy    Onset Date: 23  PT Insurance Information: Medicare  Total # of Visits Approved: 22 Per Physician Order  Total # of Visits to Date: 13  No Show: 0  Canceled Appointment: 1    23 Plan of Care/Recert Due    Pre-Treatment Pain:  8/10  Subjective: Patient reports he had an injection which made him feel good for about 6 hours before pain returned. He reports 8/10 scapular pain coming into therapy today. He returned to surgeon who recommended him to continue with land based PT. Exercises:  Exercise 2: UBE 8' - 4'fwd/4'bwd, level 2  Exercise 3: rows/ext blue TB 2 x10  Exercise 11: Yellow theraband HAB/ diags 10x2 ea (standing today). Exercise 13: Wall push-up 2x10  Exercise 14: Sit to stands 2x10  Exercise 15: Corner pec stretch 2x20 seconds      Assessment  Assessment: Patient was continued with scapular strengthening exercises. Patient was completing his last exercise when he started to feel lightheaded. Patient was sat in a chair and his blood pressure was taken which measured 121/77. He took his glucose levels which measured 193 which he reports is an improvement from this AM.  Stopped his treatment session at that time with the patient reporting his pain had decreased significantly. Activity Tolerance  Activity Tolerance: Patient limited by fatigue, Other (comment)    Patient Education  Patient Education: Monitor symptoms  Pt verbalized/demonstrated good understanding:     [x] Yes         [] No, pt required further clarification.        Post Treatment Pain:

## 2023-08-29 ENCOUNTER — HOSPITAL ENCOUNTER (OUTPATIENT)
Dept: PHYSICAL THERAPY | Age: 73
Setting detail: THERAPIES SERIES
Discharge: HOME | End: 2023-08-29
Payer: MEDICARE

## 2023-08-29 PROCEDURE — 97110 THERAPEUTIC EXERCISES: CPT

## 2023-08-30 NOTE — PROGRESS NOTES
Phone: [address] [place]           Fax: [phone]                           Outpatient Physical Therapy                                                                            Daily Note    Patient: Prachi Murcia : 1950  [facility] #: [id number]   Referring Physician: Madonna Vora MD  Date: 2023       Treatment Diagnosis: s/p Thoracic Laminectomy    Onset Date: 23  PT Insurance Information: Medicare  Total # of Visits Approved: 22 Per Physician Order  Total # of Visits to Date: 14  No Show: 0  Canceled Appointment: 1    23 Plan of Care/Recert Due    Pre-Treatment Pain:  0/10  Subjective: Patient reports having trigger point injections last Thursday and reports he is feeling better overall. He denies pain coming into therapy today. Exercises:  Exercise 2: UBE 8' - 4'fwd/4'bwd, level 2.5  Exercise 3: rows/ext blue TB 2 x10  Exercise 4: bicep curls 3# 2x10  Exercise 8: LS stretch 3x20\";  posterior capsule stretch 3x20\" ea  Exercise 11: Yellow theraband HAB/ diags 10x2 ea (standing today). Exercise 13: Wall push-up 2x10  Exercise 14: Sit to stands 2x10  Exercise 15: Corner pec stretch 2x20 seconds    Assessment  Assessment: Patient denied pain coming into therapy today and denied pain during or following exercises. He reports he has been pain-free since trigger point injections. Will continue to progress his exercise program as tolerated. Activity Tolerance  Activity Tolerance: Patient tolerated treatment well    Patient Education  Patient Education: Continue with HEP  Pt verbalized/demonstrated good understanding:     [x] Yes         [] No, pt required further clarification.        Post Treatment Pain:  0/10      Plan  Plan Frequency: 2x/week  Plan weeks: 6 weeks       Goals  (Total # of Visits to Date: 15)      Short Term Goals  Time Frame for Short Term Goals: 3 weeks  Short Term Goal 1: Pt to initiate HEP -MET  Short Term Goal 2: Pt to not

## 2023-08-31 ENCOUNTER — HOSPITAL ENCOUNTER (OUTPATIENT)
Dept: PHYSICAL THERAPY | Age: 73
Setting detail: THERAPIES SERIES
Discharge: HOME | End: 2023-08-31
Payer: MEDICARE

## 2023-08-31 PROCEDURE — 97110 THERAPEUTIC EXERCISES: CPT

## 2023-08-31 NOTE — PROGRESS NOTES
Phone: [address] [facility]           Fax: [phone]                           Outpatient Physical Therapy                                                                            Daily Note    Patient: Crow Raya : 1950  CSN #: [id number]   Referring Physician: Yolonda Saint, MD  Date: 2023    Diagnosis: Thoracic Spinal Laminectomy  Treatment Diagnosis: s/p Thoracic Laminectomy    Onset Date: 23  PT Insurance Information: Medicare  Total # of Visits Approved: 22 Per Physician Order  Total # of Visits to Date: 15  No Show: 0  Canceled Appointment: 1    23 Plan of Care/Recert Due    Pre-Treatment Pain:  0/10  Subjective: Pt states he's felt pretty good following his injections. States he's just very weak    Exercises:  Exercise 1: HEP: Shoulder Rolls, LS Stretching, Thoracic/Cervical Mobility  Exercise 2: UBE 8' - 4'fwd/4'bwd, level 2.5  Exercise 3: rows/ext blue TB 2 x10  Exercise 4: bicep curls 4# 2x12  Exercise 5: shloulder rolls/shrugs/retracts 3# 2x10  Exercise 6: chin tucks 2x10  Exercise 8: LS stretch 3x20\";  posterior capsule stretch 3x20\" ea  Exercise 11: Yellow theraband HAB/ diags 10x2 ea (standing today). Exercise 12: standing flexion 10x  Exercise 13: Wall push-up 2x10  Exercise 14: Sit to stands 2x12  Exercise 15: Corner pec stretch 2x20 seconds  Exercise 16: bike 7 min 2.5          Assessment  Assessment: Pt denies pain, states he feels pretty good following injections. Mild exercise progressions made for LE strength and endurance. Plan to progress as tolerated    Activity Tolerance  Activity Tolerance: Patient tolerated treatment well    Patient Education  Patient Education: HEP  Pt verbalized/demonstrated good understanding:     [x] Yes         [] No, pt required further clarification.        Post Treatment Pain:  0/10      Plan  Plan Frequency: 2x/week  Plan weeks: 6 weeks       Goals  (Total # of Visits to Date: 13)      Short

## 2023-09-05 ENCOUNTER — HOSPITAL ENCOUNTER (OUTPATIENT)
Dept: PHYSICAL THERAPY | Age: 73
Setting detail: THERAPIES SERIES
Discharge: HOME | End: 2023-09-05
Payer: MEDICARE

## 2023-09-05 PROCEDURE — 97110 THERAPEUTIC EXERCISES: CPT

## 2023-09-05 PROCEDURE — 97140 MANUAL THERAPY 1/> REGIONS: CPT

## 2023-09-05 NOTE — PROGRESS NOTES
Phone: [address] [place]           Fax: [phone]                           Outpatient Physical Therapy                                                                            Daily Note    Patient: Eric Marks : 1950  [facility] #: [id number]   Referring Physician: Haroldo Ho MD  Date: 2023       Treatment Diagnosis: s/p Thoracic Laminectomy    Onset Date: 23  PT Insurance Information: Medicare  Total # of Visits Approved: 22 Per Physician Order  Total # of Visits to Date: 16  No Show: 0  Canceled Appointment: 1    23 Plan of Care/Recert Due    Pre-Treatment Pain:  0/10  Subjective: Patient denies pain coming into therapy. Patient reports his legs feel weak today. Exercises:  Exercise 2: UBE 8' - 4'fwd/4'bwd, level 3.0  Exercise 3: rows/ext blue TB 2 x12  Exercise 11: Yellow theraband HAB/ diags 10x2 ea (standing today). Exercise 12: standing flexion 10x, shoulder scaption 10x with back against wall  Exercise 13: Wall push-up 2x10  Exercise 14: Sit to stands 2x12  Exercise 15: Corner pec stretch 2x20 seconds    Manual:  Other: STM to [place]. scap and thoracic region to decrease muscle tightness. Assessment  Assessment: Patient was progressed with repetitions and was progressed to bilateral shoulder scaption AROM to improve scapular stabilizer strength. After scaption the patient reported L sided levator scapula pain. Used STM to help decrease pain. He was educated to ice or heat as needed. Activity Tolerance  Activity Tolerance: Patient tolerated treatment well    Patient Education  Patient Education: HEP  Pt verbalized/demonstrated good understanding:     [x] Yes         [] No, pt required further clarification.        Post Treatment Pain:  1/10      Plan  Plan Frequency: 2x/week  Plan weeks: 6 weeks       Goals  (Total # of Visits to Date: 12)      Short Term Goals  Time Frame for Short Term Goals: 3 weeks  Short Term Goal 1: Pt to

## 2023-09-07 ENCOUNTER — HOSPITAL ENCOUNTER (OUTPATIENT)
Dept: PHYSICAL THERAPY | Age: 73
Setting detail: THERAPIES SERIES
Discharge: HOME | End: 2023-09-07
Payer: MEDICARE

## 2023-09-07 PROCEDURE — 97110 THERAPEUTIC EXERCISES: CPT

## 2023-09-08 NOTE — PROGRESS NOTES
Phone: [address] [facility]           Fax: [phone]                           Outpatient Physical Therapy                                                                            Daily Note    Patient: Olivia Aguayo : 1950  CSN #: [id number]   Referring Physician: Viviana Randhawa MD  Date: 2023     Treatment Diagnosis: s/p Thoracic Laminectomy    Onset Date: 23  PT Insurance Information: Medicare  Total # of Visits Approved: 22 Per Physician Order  Total # of Visits to Date: 17  No Show: 0  Canceled Appointment: 1    23 Plan of Care/Recert Due    Pre-Treatment Pain:  0/10  Subjective: Pt reports he saw  yesterday and they want him to work more on general strength as well. Pt denies pain. Exercises:  Exercise 2: UBE 8' - 4'fwd/4'bwd, level 3.0  Exercise 3: rows/ext blue TB 2 x12  Exercise 5: shloulder rolls/shrugs/retracts 3# 2x10  Exercise 8: LS stretch 3x20\";  posterior capsule stretch 3x20\" ea  Exercise 9: jailpose stretch 3x30  Exercise 11: Yellow theraband HAB/ diags 10x2 ea (standing today). Exercise 12: standing flexion 10x, shoulder scaption 10x with back against wall  Exercise 13: Wall push-up 2x10  Exercise 14: Sit to stands 2x12  Exercise 15: Corner pec stretch 2x20 seconds  Exercise 16: bike 7 min 2.5  Exercise 17: FSU/ LSU 10x ea    Assessment  Assessment: Pt continues to require frequent rest breaks throughout session d/t fatigue. Focused on UE strengthneig within tolerance along with general strengthening and endurance based exercises for greater safety with ADLs. Will continue. Activity Tolerance  Activity Tolerance: Patient tolerated treatment well    Patient Education  Patient Education: HEP  Pt verbalized/demonstrated good understanding:     [x] Yes         [] No, pt required further clarification.      Post Treatment Pain:  0/10    Plan  Plan Frequency: 2x/week  Plan weeks: 6 weeks     Goals  (Total # of Visits to

## 2023-09-12 ENCOUNTER — HOSPITAL ENCOUNTER (OUTPATIENT)
Dept: PHYSICAL THERAPY | Age: 73
Setting detail: THERAPIES SERIES
Discharge: HOME | End: 2023-09-12
Payer: MEDICARE

## 2023-09-12 PROCEDURE — 97110 THERAPEUTIC EXERCISES: CPT

## 2023-09-12 NOTE — PROGRESS NOTES
Phone: [address] [facility]           Fax: [phone]                           Outpatient Physical Therapy                                                                            Daily Note    Patient: Eric Marks : 1950  [facility] #: [id number]   Referring Physician: Haroldo Ho MD  Date: 2023    Diagnosis: Thoracic Spinal Laminectomy  Treatment Diagnosis: s/p Thoracic Laminectomy    Onset Date: 23  PT Insurance Information: Medicare  Total # of Visits Approved: 22 Per Physician Order  Total # of Visits to Date: 18  No Show: 0  Canceled Appointment: 1    23 Plan of Care/Recert Due    Pre-Treatment Pain:  0/10  Subjective: Pt states he feels tired today. Denies pain prior to session    Exercises:  Exercise 2: UBE 10' - 5'fwd/5'bwd, level 3.0  Exercise 3: rows/ext blue TB 2 x12  Exercise 4: bicep curls 5# 2x12  Exercise 5: shloulder rolls/shrugs/retracts 3# 2x10  Exercise 7: CROM x5 ea supine  Exercise 12: standing flexion 10x, shoulder scaption 10x with back against wall  Exercise 13: Wall push-up 2x10  Exercise 14: Sit to stands 2x12  Exercise 16: bike 9 min 2.5  Exercise 17: FSU/ LSU 10x ea         Assessment  Assessment: Pt feeling fatigued today. progressed wts with biceps curls and added 1# wts to 90/90. Bike and UBE time progressed. Several rest needed to complete session    Activity Tolerance  Activity Tolerance: Patient tolerated treatment well    Patient Education  Patient Education: Progression of wts  Pt verbalized/demonstrated good understanding:     [x] Yes         [] No, pt required further clarification.        Post Treatment Pain:  0/10      Plan  Plan Frequency: 2x/week  Plan weeks: 6 weeks       Goals  (Total # of Visits to Date: 25)      Short Term Goals  Time Frame for Short Term Goals: 3 weeks  Short Term Goal 1: Pt to initiate HEP -MET  Short Term Goal 2: Pt to not exceed 8/10 pain to improve functional capacity for therex/act

## 2023-09-14 ENCOUNTER — HOSPITAL ENCOUNTER (OUTPATIENT)
Dept: PHYSICAL THERAPY | Age: 73
Setting detail: THERAPIES SERIES
Discharge: HOME | End: 2023-09-14
Payer: MEDICARE

## 2023-09-14 PROCEDURE — 97110 THERAPEUTIC EXERCISES: CPT

## 2023-09-14 NOTE — PROGRESS NOTES
Phone: [address]           Fax: [phone]                           Outpatient Physical Therapy                                                                            Daily Note    Patient: Terrace Jeans : 1950  [facility] #: [id number]   Referring Physician: Jo Shannon MD  Date: 2023    Diagnosis: Thoracic Spinal Laminectomy  Treatment Diagnosis: s/p Thoracic Laminectomy    Onset Date: 23  PT Insurance Information: Medicare  Total # of Visits Approved: 22 Per Physician Order  Total # of Visits to Date: 19  No Show: 0  Canceled Appointment: 1    23 Plan of Care/Recert Due    Pre-Treatment Pain:  0/10  Subjective: Pt denies pain, states he is doing good today. Pt states he sees his MD this friday. Pt states his B LE feel weak. Exercises:  Exercise 1: HEP: Shoulder Rolls, LS Stretching, Thoracic/Cervical Mobility  Exercise 2: UBE 10' - 5'fwd/5'bwd, level 3.0  Exercise 3: rows/ext blue TB 2 x12  Exercise 4: bicep curls 5# 2x12  Exercise 5: shloulder rolls/shrugs/retracts 4# 2x10  Exercise 7: CROM x5 ea supine  Exercise 12: standing flexion 10x, shoulder scaption 10x with back against wall  Exercise 13: Wall push-up 2x10  Exercise 14: Sit to stands 2x12  Exercise 16: bike 9 min 2.5  Exercise 17: FSU/ LSU 10x ea    Assessment  Assessment: Pt required frequent rest breaks during session d/t fatigue. Pt denies dizziness/lightheadedness this visit. Pt B SHLD strength improving, grossly 4/5 with B SHLD ABD 4 to 4+/5. Progressed shoulder exercises with 4# weight, good tolerance, mild muscle soreness post tx. Activity Tolerance  Activity Tolerance: Patient tolerated treatment well    Patient Education  Patient Education: Strength  Pt verbalized/demonstrated good understanding:     [x] Yes         [] No, pt required further clarification.        Post Treatment Pain:  0/10      Plan  Plan Frequency: 2x/week  Plan weeks: 6 weeks       Goals  (Total

## 2023-09-19 ENCOUNTER — HOSPITAL ENCOUNTER (OUTPATIENT)
Dept: PHYSICAL THERAPY | Age: 73
Setting detail: THERAPIES SERIES
Discharge: HOME | End: 2023-09-19
Payer: MEDICARE

## 2023-09-19 PROCEDURE — 97110 THERAPEUTIC EXERCISES: CPT

## 2023-09-20 NOTE — PROGRESS NOTES
Phone: [address]           Fax: [phone]                           Outpatient Physical Therapy                                                                            Daily Note    Patient: Christel Sung : 1950  [facility] #: [id number]   Referring Physician: Frantz White MD  Date: 2023     Treatment Diagnosis: s/p Thoracic Laminectomy    Onset Date: 23  PT Insurance Information: Medicare  Total # of Visits Approved: 22 Per Physician Order  Total # of Visits to Date: 20  No Show: 0  Canceled Appointment: 1    23 Plan of Care/Recert Due    Pre-Treatment Pain:  0/10  Subjective: Pt states he isnt feeling to bad today he just gets real tired. Pt denies pain, just feels weakness. Exercises:  Exercise 2: UBE 10' - 5'fwd/5'bwd, level 3.0  Exercise 3: rows/ext blue TB 2 x12  Exercise 4: bicep curls 5# 2x12  Exercise 11: Yellow theraband HAB/ diags 10x2 ea (standing today). Exercise 13: Wall push-up 2x10  Exercise 14: Sit to stands 2x12  Exercise 16: bike 9 min 2.5  Exercise 17: FSU/ LSU 10x ea    Assessment  Assessment: Pt able to amb 525' today prior to a rest break needed. Pt continues to display fatigue with increased standing periods. Will continue. Activity Tolerance  Activity Tolerance: Patient tolerated treatment well    Patient Education  Patient Education: Strength  Pt verbalized/demonstrated good understanding:     [x] Yes         [] No, pt required further clarification.      Post Treatment Pain:  0/10    Plan  Plan Frequency: 2x/week  Plan weeks: 6 weeks     Goals  (Total # of Visits to Date: 21)      Short Term Goals  Time Frame for Short Term Goals: 3 weeks  Short Term Goal 1: Pt to initiate HEP -MET  Short Term Goal 2: Pt to not exceed 8/10 pain to improve functional capacity for therex/act -Met    Long Term Goals  Time Frame for Long Term Goals : 6 weeks  Long Term Goal 1: Pt to be comfortable and compliant with

## 2023-09-21 ENCOUNTER — HOSPITAL ENCOUNTER (OUTPATIENT)
Dept: PHYSICAL THERAPY | Age: 73
Setting detail: THERAPIES SERIES
Discharge: HOME | End: 2023-09-21
Payer: MEDICARE

## 2023-09-21 PROCEDURE — 97110 THERAPEUTIC EXERCISES: CPT

## 2023-09-21 NOTE — PROGRESS NOTES
Goals  Time Frame for Short Term Goals: 3 weeks  Short Term Goal 1: Pt to initiate HEP -MET  Short Term Goal 2: Pt to not exceed 8/10 pain to improve functional capacity for therex/act -Met    Long Term Goals  Time Frame for Long Term Goals : 6 weeks  Long Term Goal 1: Pt to be comfortable and compliant with HEP-progressing  Long Term Goal 2: Pt to not exceed 4/10 pain to improve functional capacity for ADLs. -progressing  Long Term Goal 3: Pt to demonstrate >/= 4+/5 B Shoulder strength to increase dynamic stability and safety of overhead ADLs. -progressing  Long Term Goal 4: Pt to demonstrate >/= 50 degrees cervical flexion without increase in pain to improve functional mobility    Minutes Tracking:  Time In: 9741  Time Out: 600 [facility]  Minutes: [address] [place], [place]     Date: 9/21/2023

## 2023-09-26 ENCOUNTER — HOSPITAL ENCOUNTER (OUTPATIENT)
Dept: PHYSICAL THERAPY | Age: 73
Setting detail: THERAPIES SERIES
Discharge: HOME | End: 2023-09-26
Payer: MEDICARE

## 2023-09-26 PROCEDURE — 97110 THERAPEUTIC EXERCISES: CPT

## 2023-09-28 ENCOUNTER — HOSPITAL ENCOUNTER (OUTPATIENT)
Dept: PHYSICAL THERAPY | Age: 73
Setting detail: THERAPIES SERIES
Discharge: HOME | End: 2023-09-28
Payer: MEDICARE

## 2023-09-28 PROCEDURE — 97110 THERAPEUTIC EXERCISES: CPT

## 2023-09-28 NOTE — PROGRESS NOTES
Phone: [address]           Fax: [phone]                           Outpatient Physical Therapy                                                                            Daily Note    Patient: Christel Sung : 1950  [facility] #: [id number]   Referring Physician: Frantz White MD  Date: 2023    Diagnosis: Thoracic Spinal Laminectomy  Treatment Diagnosis: s/p Thoracic Laminectomy    PT Insurance Information: Medicare    Per Physician Order  Total # of Visits to Date: 21  No Show: 0  Canceled Appointment: 1    23 Plan of Care/Recert Due    Pre-Treatment Pain:  0/10  Subjective: Pt states he feels pretty good today. States he just feels weak    Exercises:  Exercise 2: UBE 10' - 5'fwd/5'bwd, level 3.0  Exercise 3: rows/ext blue TB 2 x12  Exercise 4: bicep curls 5# 2x12  Exercise 5: shloulder rolls/shrugs/retracts 5# 2x10  Exercise 6: chin tucks 2x10  Exercise 7: CROM x5 ea supine  Exercise 11: Yellow theraband HAB/ diags 10x2 ea (standing today). Exercise 12: standing flexion 10x, shoulder scaption 10x with back against wall  Exercise 13: Wall push-up 2x10  Exercise 14: Sit to stands 2x15 - 1 set of 1, 1 set of 5, while holding ball  Exercise 15: Corner pec stretch 2x20 seconds  Exercise 16: bike 10 min 2.5  Exercise 17: FSU/ LSU 10x ea        Assessment  Assessment: Pt is feely pretty good and denies pain. Pt continues to move very slowly with exercise and does fatigue quickly. Geneeral strengthening an endurance training tolerated. Plan to progress as tolerated    Activity Tolerance  Activity Tolerance: Patient tolerated treatment well    Patient Education  Patient Education: HEP  Pt verbalized/demonstrated good understanding:     [x] Yes         [] No, pt required further clarification.        Post Treatment Pain:  0/10      Plan  Plan Frequency: 2x/week  Plan weeks: 6 weeks       Goals  (Total # of Visits to Date: 24)      Short Term Goals  Time Frame

## 2023-09-28 NOTE — PROGRESS NOTES
Phone: Seth Oliviervard           Fax: [phone]                           Outpatient Physical Therapy                                                                            Daily Note    Patient: Marie Cabot : 1950  CSN #: [id number]   Referring Physician: Cass Hinojosa MD  Date: 2023    Diagnosis: Thoracic Spinal Laminectomy  Treatment Diagnosis: s/p Thoracic Laminectomy    Onset Date: 23  PT Insurance Information: Medicare  Total # of Visits Approved: 22 Per Physician Order  Total # of Visits to Date: 22  No Show: 0  Canceled Appointment: 1    23 Plan of Care/Recert Due    Pre-Treatment Pain:  3/10  Subjective: Pt reports 3/10 L SHLD pain today, stating he needed to contact his MD to get another injection. Pt states he has been trying to walk every day for ~.5 mile to help with his endurance and strength. Exercises:  Exercise 1: HEP: Shoulder Rolls, LS Stretching, Thoracic/Cervical Mobility  Exercise 2: UBE 10' - 5'fwd/5'bwd, level 3.0  Exercise 3: rows/ext blue TB 2 x12  Exercise 11: Yellow theraband HAB/ diags 10x2 ea (standing today). Exercise 14: Sit to stands 2x10  Exercise 16: bike 10 min 2.5  Exercise 17: FSU/ LSU 10x ea      Assessment  Assessment: Pt continues to fatigue quickly requiring seated rest breaks throughout. Pt limited on UE exercises this visit d/t L SHLD pain. B SHLD flex/ABD 4 to 4+/5, IR/ER 4/5. Continue to progress as pt tolerates. Activity Tolerance  Activity Tolerance: Patient tolerated treatment well    Patient Education  Patient Education: HEP  Pt verbalized/demonstrated good understanding:     [x] Yes         [] No, pt required further clarification.        Post Treatment Pain:  3/10      Plan  Plan Frequency: 2x/week  Plan weeks: 6 weeks       Goals  (Total # of Visits to Date: 25)      Short Term Goals  Time Frame for Short Term Goals: 3 weeks  Short Term Goal 1: Pt to initiate HEP -MET  Short Term

## 2023-10-10 ENCOUNTER — HOSPITAL ENCOUNTER (OUTPATIENT)
Dept: PHYSICAL THERAPY | Age: 73
Setting detail: THERAPIES SERIES
Discharge: HOME | End: 2023-10-10
Payer: MEDICARE

## 2023-10-10 PROCEDURE — 97110 THERAPEUTIC EXERCISES: CPT

## 2023-10-10 NOTE — PROGRESS NOTES
Phone: [address] [facility]           Fax: [phone]                           Outpatient Physical Therapy                                                                            Daily Note    Patient: Emilee Guzman : 1950  [facility] #: [id number]   Referring Physician: Myah Yeager MD  Date: 10/10/2023     Treatment Diagnosis: s/p Thoracic Laminectomy    Onset Date: 23  PT Insurance Information: Medicare  Total # of Visits Approved: 22 Per Physician Order  Total # of Visits to Date: 23  No Show: 0  Canceled Appointment: 1    23 Plan of Care/Recert Due    Pre-Treatment Pain:  0/10  Subjective: Pt reports his back is a little more sore today but he had nerves burnt yesterday so thats why. Pt rates current pain a 0/10 at rest but it can raise to a 8/10. Exercises:  Exercise 2: UBE 10' - 5'fwd/5'bwd, level 3.0  Exercise 7: CROM x10 seated  Exercise 16: bike 10 min 2.5    Assessment  Assessment: Pt able to complete above exercises then asked to be done with session d/t not feeling well from recent shot. Pt states his sugar has been running approx 350 since the injection. Will conitnue as tolerable. Activity Tolerance  Activity Tolerance: Patient tolerated treatment well    Patient Education  Patient Education: HEP  Pt verbalized/demonstrated good understanding:     [x] Yes         [] No, pt required further clarification.      Post Treatment Pain:  0/10    Plan  Plan Frequency: 2x/week  Plan weeks: 6 weeks     Goals  (Total # of Visits to Date: 21)      Short Term Goals  Time Frame for Short Term Goals: 3 weeks  Short Term Goal 1: Pt to initiate HEP -MET  Short Term Goal 2: Pt to not exceed 8/10 pain to improve functional capacity for therex/act -Met    Long Term Goals  Time Frame for Long Term Goals : 6 weeks  Long Term Goal 1: Pt to be comfortable and compliant with HEP-progressing  Long Term Goal 2: Pt to not exceed 4/10 pain to improve functional

## 2023-10-12 ENCOUNTER — APPOINTMENT (OUTPATIENT)
Dept: PHYSICAL THERAPY | Age: 73
End: 2023-10-12
Payer: MEDICARE

## 2023-10-17 ENCOUNTER — HOSPITAL ENCOUNTER (OUTPATIENT)
Dept: PHYSICAL THERAPY | Age: 73
Setting detail: THERAPIES SERIES
Discharge: HOME | End: 2023-10-17
Payer: MEDICARE

## 2023-10-17 PROCEDURE — 97110 THERAPEUTIC EXERCISES: CPT

## 2023-10-18 NOTE — PROGRESS NOTES
Phone: [address]           Fax: [phone]                           Outpatient Physical Therapy                                                                            Daily Note    Patient: Alessandra Notice : 1950  CSN #: [id number]   Referring Physician: Olga Lidia Cordova MD  Date: 10/17/2023     Treatment Diagnosis: s/p Thoracic Laminectomy    Onset Date: 23  PT Insurance Information: Medicare  Total # of Visits Approved: 22 Per Physician Order  Total # of Visits to Date: 24  No Show: 0  Canceled Appointment: 1    23 Plan of Care/Recert Due    Pre-Treatment Pain:  0/10  Subjective: Pt states his current pain is a 0/10. Pt states his sugar has still been weird but they are trying to change up his meds a bit. Exercises:  Exercise 2: UBE 10' - 5'fwd/5'bwd, level 3.0  Exercise 3: rows/ext blue TB 2 x12  Exercise 7: CROM x10 seated  Exercise 11: Yellow theraband HAB/ diags 10x2 ea (standing today). Exercise 14: Sit to stands 2x10  Exercise 16: bike 10 min 2.5  Exercise 17: FSU/ LSU 10x ea      Assessment  Assessment: Pt 5x sit<>stand time 25.53 seconds and current CROM flex 30*, Ext 32*, L rot 53*,  R rot 40*. Activity Tolerance  Activity Tolerance: Patient tolerated treatment well    Patient Education  Patient Education: HEP  Pt verbalized/demonstrated good understanding:     [x] Yes         [] No, pt required further clarification.      Post Treatment Pain:  0/10    Plan  Plan Frequency: 2x/week  Plan weeks: 6 weeks       Goals  (Total # of Visits to Date: 25)      Short Term Goals  Time Frame for Short Term Goals: 3 weeks  Short Term Goal 1: Pt to initiate HEP -MET  Short Term Goal 2: Pt to not exceed 8/10 pain to improve functional capacity for therex/act -Met    Long Term Goals  Time Frame for Long Term Goals : 6 weeks  Long Term Goal 1: Pt to be comfortable and compliant with HEP-progressing  Long Term Goal 2: Pt to not exceed 4/10

## 2023-10-19 ENCOUNTER — HOSPITAL ENCOUNTER (OUTPATIENT)
Dept: PHYSICAL THERAPY | Age: 73
Setting detail: THERAPIES SERIES
Discharge: HOME | End: 2023-10-19
Payer: MEDICARE

## 2023-10-19 PROCEDURE — 97110 THERAPEUTIC EXERCISES: CPT

## 2023-10-19 NOTE — PROGRESS NOTES
Phone: Seth Ellis           Fax: [phone]                           Outpatient Physical Therapy                                                                            Daily Note    Patient: Wolfgang Bullock : 1950  [facility] #: [id number]   Referring Physician: Padmini Vick MD  Date: 10/19/2023     Treatment Diagnosis: s/p Thoracic Laminectomy    PT Insurance Information: Medicare  Total # of Visits Approved: 22 Per Physician Order  Total # of Visits to Date: 25  No Show: 0  Canceled Appointment: 1    23 Plan of Care/Recert Due    Pre-Treatment Pain:  0/10  Subjective: Pt reports he is feeling a little better today. Pt denies current pain at rest.    Exercises:  Exercise 1: HEP: Shoulder Rolls, LS Stretching, Thoracic/Cervical Mobility  Exercise 2: UBE 10' - 5'fwd/5'bwd, level 3.0  Exercise 3: rows/ext blue TB 2 x12  Exercise 5: shloulder rolls/shrugs/retracts 5# 2x10  Exercise 7: CROM x10 seated  Exercise 8: LS stretch 3x20\";  posterior capsule stretch 3x20\" ea  Exercise 11: Yellow theraband HAB/ diags 10x2 ea (standing today). Exercise 12: standing flexion 10x, shoulder scaption 10x with back against wall  Exercise 14: Sit to stands 2x10  Exercise 15: Corner pec stretch 2x20 seconds  Exercise 16: bike 10 min 2.5  Exercise 17: FSU/ LSU 10x ea    Assessment  Assessment: Pt continues to require frequent rest breaks throughout session d/t fatigue with exercise but less pain noted with general strengthening and postural exercises. WIll continue. Activity Tolerance  Activity Tolerance: Patient tolerated evaluation without incident    Patient Education  Patient Education: HEP  Pt verbalized/demonstrated good understanding:     [x] Yes         [] No, pt required further clarification.      Post Treatment Pain:  0/10    Plan  Plan Frequency: 2x/week  Plan weeks: 6 weeks     Goals  (Total # of Visits to Date: 22)      Short Term Goals  Time Frame for

## 2023-10-20 ENCOUNTER — HOSPITAL ENCOUNTER (OUTPATIENT)
Age: 73
Discharge: HOME | End: 2023-10-20
Payer: MEDICARE

## 2023-10-20 ENCOUNTER — HOSPITAL ENCOUNTER (OUTPATIENT)
Dept: CT IMAGING | Age: 73
End: 2023-10-20
Payer: MEDICARE

## 2023-10-20 DIAGNOSIS — C34.01 LUNG CANCER, MAIN BRONCHUS, RIGHT (HCC): ICD-10-CM

## 2023-10-20 DIAGNOSIS — C64.1 RENAL CELL CARCINOMA OF RIGHT KIDNEY (HCC): ICD-10-CM

## 2023-10-20 LAB
ALBUMIN SERPL-MCNC: 4.3 G/DL (ref 3.5–5.2)
ALBUMIN/GLOB SERPL: 1.5 {RATIO} (ref 1–2.5)
ALP SERPL-CCNC: 96 U/L (ref 40–129)
ALT SERPL-CCNC: 32 U/L (ref 5–41)
ANION GAP SERPL CALCULATED.3IONS-SCNC: 9 MMOL/L (ref 9–17)
AST SERPL-CCNC: 20 U/L
BASOPHILS # BLD: 0.08 K/UL (ref 0–0.2)
BASOPHILS NFR BLD: 1 % (ref 0–2)
BILIRUB SERPL-MCNC: 0.3 MG/DL (ref 0.3–1.2)
BUN SERPL-MCNC: 31 MG/DL (ref 8–23)
BUN/CREAT SERPL: 24 (ref 9–20)
CALCIUM SERPL-MCNC: 9.3 MG/DL (ref 8.6–10.4)
CHLORIDE SERPL-SCNC: 100 MMOL/L (ref 98–107)
CO2 SERPL-SCNC: 25 MMOL/L (ref 20–31)
CREAT SERPL-MCNC: 1.3 MG/DL (ref 0.7–1.2)
EOSINOPHIL # BLD: 0.08 K/UL (ref 0–0.44)
EOSINOPHILS RELATIVE PERCENT: 1 % (ref 1–4)
ERYTHROCYTE [DISTWIDTH] IN BLOOD BY AUTOMATED COUNT: 16.5 % (ref 11.8–14.4)
GFR SERPL CREATININE-BSD FRML MDRD: 58 ML/MIN/1.73M2
GLUCOSE SERPL-MCNC: 154 MG/DL (ref 70–99)
HCT VFR BLD AUTO: 34.6 % (ref 40.7–50.3)
HGB BLD-MCNC: 11 G/DL (ref 13–17)
IMM GRANULOCYTES # BLD AUTO: 0.18 K/UL (ref 0–0.3)
IMM GRANULOCYTES NFR BLD: 2 %
LYMPHOCYTES NFR BLD: 2.31 K/UL (ref 1.1–3.7)
LYMPHOCYTES RELATIVE PERCENT: 26 % (ref 24–43)
MCH RBC QN AUTO: 27.4 PG (ref 25.2–33.5)
MCHC RBC AUTO-ENTMCNC: 31.8 G/DL (ref 28.4–34.8)
MCV RBC AUTO: 86.3 FL (ref 82.6–102.9)
MONOCYTES NFR BLD: 1.12 K/UL (ref 0.1–1.2)
MONOCYTES NFR BLD: 13 % (ref 3–12)
NEUTROPHILS NFR BLD: 57 % (ref 36–65)
NEUTS SEG NFR BLD: 5.15 K/UL (ref 1.5–8.1)
NRBC BLD-RTO: 0 PER 100 WBC
PLATELET # BLD AUTO: 184 K/UL (ref 138–453)
PMV BLD AUTO: 9.4 FL (ref 8.1–13.5)
POTASSIUM SERPL-SCNC: 5.1 MMOL/L (ref 3.7–5.3)
PROT SERPL-MCNC: 7.2 G/DL (ref 6.4–8.3)
RBC # BLD AUTO: 4.01 M/UL (ref 4.21–5.77)
SODIUM SERPL-SCNC: 134 MMOL/L (ref 135–144)
WBC OTHER # BLD: 8.9 K/UL (ref 3.5–11.3)

## 2023-10-20 PROCEDURE — 6360000004 HC RX CONTRAST MEDICATION: Performed by: INTERNAL MEDICINE

## 2023-10-20 PROCEDURE — 80053 COMPREHEN METABOLIC PANEL: CPT

## 2023-10-20 PROCEDURE — 74177 CT ABD & PELVIS W/CONTRAST: CPT

## 2023-10-20 PROCEDURE — 85025 COMPLETE CBC W/AUTO DIFF WBC: CPT

## 2023-10-20 PROCEDURE — 36415 COLL VENOUS BLD VENIPUNCTURE: CPT

## 2023-10-20 RX ADMIN — IOPAMIDOL 18 ML: 755 INJECTION, SOLUTION INTRAVENOUS at 10:41

## 2023-10-20 RX ADMIN — IOPAMIDOL 75 ML: 755 INJECTION, SOLUTION INTRAVENOUS at 10:36

## 2023-10-23 ENCOUNTER — HOSPITAL ENCOUNTER (EMERGENCY)
Age: 73
Discharge: HOME | End: 2023-10-23
Attending: EMERGENCY MEDICINE
Payer: MEDICARE

## 2023-10-23 ENCOUNTER — APPOINTMENT (OUTPATIENT)
Dept: GENERAL RADIOLOGY | Age: 73
End: 2023-10-23
Payer: MEDICARE

## 2023-10-23 ENCOUNTER — APPOINTMENT (OUTPATIENT)
Dept: CT IMAGING | Age: 73
End: 2023-10-23
Attending: EMERGENCY MEDICINE
Payer: MEDICARE

## 2023-10-23 ENCOUNTER — APPOINTMENT (OUTPATIENT)
Dept: CT IMAGING | Age: 73
End: 2023-10-23
Payer: MEDICARE

## 2023-10-23 VITALS
RESPIRATION RATE: 16 BRPM | SYSTOLIC BLOOD PRESSURE: 170 MMHG | HEART RATE: 59 BPM | DIASTOLIC BLOOD PRESSURE: 75 MMHG | OXYGEN SATURATION: 99 % | TEMPERATURE: 97.4 F

## 2023-10-23 DIAGNOSIS — R55 SYNCOPE AND COLLAPSE: Primary | ICD-10-CM

## 2023-10-23 LAB
ANION GAP SERPL CALCULATED.3IONS-SCNC: 10 MMOL/L (ref 9–17)
BASOPHILS # BLD: 0.07 K/UL (ref 0–0.2)
BASOPHILS NFR BLD: 1 % (ref 0–2)
BUN SERPL-MCNC: 26 MG/DL (ref 8–23)
BUN/CREAT SERPL: 17 (ref 9–20)
CALCIUM SERPL-MCNC: 9 MG/DL (ref 8.6–10.4)
CHLORIDE SERPL-SCNC: 100 MMOL/L (ref 98–107)
CO2 SERPL-SCNC: 25 MMOL/L (ref 20–31)
CREAT SERPL-MCNC: 1.5 MG/DL (ref 0.7–1.2)
D DIMER PPP FEU-MCNC: 1.93 UG/ML FEU (ref 0–0.59)
EOSINOPHIL # BLD: 0.06 K/UL (ref 0–0.44)
EOSINOPHILS RELATIVE PERCENT: 0 % (ref 1–4)
ERYTHROCYTE [DISTWIDTH] IN BLOOD BY AUTOMATED COUNT: 16.3 % (ref 11.8–14.4)
GFR SERPL CREATININE-BSD FRML MDRD: 49 ML/MIN/1.73M2
GLUCOSE SERPL-MCNC: 112 MG/DL (ref 70–99)
HCT VFR BLD AUTO: 32.7 % (ref 40.7–50.3)
HGB BLD-MCNC: 10.6 G/DL (ref 13–17)
IMM GRANULOCYTES # BLD AUTO: 0.2 K/UL (ref 0–0.3)
IMM GRANULOCYTES NFR BLD: 2 %
INR PPP: 1
LYMPHOCYTES NFR BLD: 1.59 K/UL (ref 1.1–3.7)
LYMPHOCYTES RELATIVE PERCENT: 12 % (ref 24–43)
MAGNESIUM SERPL-MCNC: 1.7 MG/DL (ref 1.6–2.6)
MCH RBC QN AUTO: 27.5 PG (ref 25.2–33.5)
MCHC RBC AUTO-ENTMCNC: 32.4 G/DL (ref 28.4–34.8)
MCV RBC AUTO: 84.7 FL (ref 82.6–102.9)
MONOCYTES NFR BLD: 1.08 K/UL (ref 0.1–1.2)
MONOCYTES NFR BLD: 8 % (ref 3–12)
NEUTROPHILS NFR BLD: 78 % (ref 36–65)
NEUTS SEG NFR BLD: 10.48 K/UL (ref 1.5–8.1)
NRBC BLD-RTO: 0 PER 100 WBC
PLATELET # BLD AUTO: 177 K/UL (ref 138–453)
PMV BLD AUTO: 10 FL (ref 8.1–13.5)
POTASSIUM SERPL-SCNC: 4.9 MMOL/L (ref 3.7–5.3)
PROTHROMBIN TIME: 13.6 SEC (ref 11.9–14.8)
RBC # BLD AUTO: 3.86 M/UL (ref 4.21–5.77)
SODIUM SERPL-SCNC: 135 MMOL/L (ref 135–144)
TROPONIN I SERPL HS-MCNC: 18 NG/L (ref 0–22)
TROPONIN I SERPL HS-MCNC: 23 NG/L (ref 0–22)
WBC OTHER # BLD: 13.5 K/UL (ref 3.5–11.3)

## 2023-10-23 PROCEDURE — 36415 COLL VENOUS BLD VENIPUNCTURE: CPT

## 2023-10-23 PROCEDURE — 85025 COMPLETE CBC W/AUTO DIFF WBC: CPT

## 2023-10-23 PROCEDURE — 85610 PROTHROMBIN TIME: CPT

## 2023-10-23 PROCEDURE — 83735 ASSAY OF MAGNESIUM: CPT

## 2023-10-23 PROCEDURE — 71045 X-RAY EXAM CHEST 1 VIEW: CPT

## 2023-10-23 PROCEDURE — 93005 ELECTROCARDIOGRAM TRACING: CPT

## 2023-10-23 PROCEDURE — 70450 CT HEAD/BRAIN W/O DYE: CPT

## 2023-10-23 PROCEDURE — 80048 BASIC METABOLIC PNL TOTAL CA: CPT

## 2023-10-23 PROCEDURE — 71260 CT THORAX DX C+: CPT

## 2023-10-23 PROCEDURE — 6360000004 HC RX CONTRAST MEDICATION: Performed by: EMERGENCY MEDICINE

## 2023-10-23 PROCEDURE — 2580000003 HC RX 258: Performed by: EMERGENCY MEDICINE

## 2023-10-23 PROCEDURE — 85379 FIBRIN DEGRADATION QUANT: CPT

## 2023-10-23 PROCEDURE — 84484 ASSAY OF TROPONIN QUANT: CPT

## 2023-10-23 PROCEDURE — 99285 EMERGENCY DEPT VISIT HI MDM: CPT

## 2023-10-23 RX ORDER — 0.9 % SODIUM CHLORIDE 0.9 %
1000 INTRAVENOUS SOLUTION INTRAVENOUS ONCE
Status: COMPLETED | OUTPATIENT
Start: 2023-10-23 | End: 2023-10-23

## 2023-10-23 RX ADMIN — SODIUM CHLORIDE 1000 ML: 9 INJECTION, SOLUTION INTRAVENOUS at 18:30

## 2023-10-23 RX ADMIN — IOPAMIDOL 75 ML: 755 INJECTION, SOLUTION INTRAVENOUS at 19:11

## 2023-10-23 ASSESSMENT — ENCOUNTER SYMPTOMS
SHORTNESS OF BREATH: 0
BACK PAIN: 0
ABDOMINAL DISTENTION: 0
SORE THROAT: 0
CHEST TIGHTNESS: 0

## 2023-10-23 NOTE — ED PROVIDER NOTES
[address] [facility] ED  EMERGENCY DEPARTMENT ENCOUNTER      Pt Name: Zoya Khan  MRN: [id number]  [address] 1950  Date of evaluation: 10/23/2023  Provider: Blu Bashir DO    CHIEF COMPLAINT       Chief Complaint   Patient presents with    Loss of Consciousness     Witnessed syncopal episode by family. Pt. Report lightheadedness prior to episode. Dizziness    Emesis         HISTORY OF PRESENT ILLNESS   (Location/Symptom, Timing/Onset, Context/Setting, Quality, Duration, Modifying Factors, Severity)  Note limiting factors. Zoya Khan is a 68 y.o. male who presents to the emergency department after having an episode of witnessed loss of consciousness in his car. Patient states that he had just parked his car and his wife went inside a restaurant to  pizza and when she came back she found him unresponsive. Patient states that he did feel dizzy and lightheaded prior to passing out and when he woke up he knew where he was at that he had passed out. Patient also complains of nausea at the time. He denies any chest pain or shortness of breath. Patient states that he has had 4 syncopal episodes in the span of 2 years and in the past has had a heart monitor with no acute findings. Patient does have a history of lung cancer status post lobectomy of the right lower lobe. He states that he ate good earlier today and has been trying to drink fluids throughout the day as well. In the past they have not been able to find the reason for his syncopal episodes. He denies any vision changes. He states that after this episode happened he was not able to get up and walk as he felt really weak in his legs. Patient has a history of 1 stent in the past.  His cardiac heart monitor was done in February 2023 which showed 1 episode of nonsustained ventricular tachycardia for which no further intervention was recommended. Patient does take metoprolol every day.     REVIEW OF SYSTEMS    (2-9 systems

## 2023-10-24 LAB
EKG ATRIAL RATE: 53 BPM
EKG P AXIS: 66 DEGREES
EKG P-R INTERVAL: 188 MS
EKG Q-T INTERVAL: 430 MS
EKG QRS DURATION: 92 MS
EKG QTC CALCULATION (BAZETT): 403 MS
EKG R AXIS: 31 DEGREES
EKG T AXIS: 57 DEGREES
EKG VENTRICULAR RATE: 53 BPM

## 2023-10-24 NOTE — DISCHARGE INSTRUCTIONS
Please follow-up with your primary care physician and cardiologist as recommended. Return to the ED if you have any additional episodes of passing out, develop chest pain, shortness of breath or any other concerns.

## 2023-10-24 NOTE — ED PROVIDER NOTES
Care assumed from Dr. Hellen Esquivel at the conclusion of her clinical shift. At that time the plan was to reevaluate the patient after CT imaging and repeat troponin had resulted. If he remained stable and there were no acute findings noted she felt that he would be safe for discharge home. CT of the chest demonstrates a known right pleural effusion without other acute process. CT imaging of the head is unremarkable. Repeat troponin is within normal limits. The patient has remained without complaint while in the ED. I spoke with him and his spouse regarding the results. I did recommend admission given the syncope, his age and comorbidities. However, he has stated that he does not wish to be admitted and would prefer to follow-up in the outpatient setting. Will refer back to his PCP and cardiology. Discharged with strict return precautions.      Christofer Fernández MD  10/23/23 2035

## 2023-10-26 ENCOUNTER — HOSPITAL ENCOUNTER (OUTPATIENT)
Dept: PHYSICAL THERAPY | Age: 73
Setting detail: THERAPIES SERIES
Discharge: HOME | End: 2023-10-26
Payer: MEDICARE

## 2023-10-26 NOTE — PROGRESS NOTES
Physical Therapy  [facility]  Inpatient/Observation/Outpatient Rehabilitation    Date: 10/26/2023  Patient Name: Wolfgang Bullock       [] Inpatient Acute/Observation       []  Outpatient  : 1950         [] Pt no showed for scheduled appointment    [] Pt refused/declined therapy at this time due to:           [x] Pt cancelled due to:  [x] No Reason Given   [] Sick/ill   [] Other:    Therapist/Assistant will attempt to see this patient, at our earliest opportunity.        Jsos Pinon Date: 10/26/2023

## 2023-10-30 ENCOUNTER — HOSPITAL ENCOUNTER (EMERGENCY)
Age: 73
Discharge: HOME | End: 2023-10-30
Attending: EMERGENCY MEDICINE
Payer: MEDICARE

## 2023-10-30 ENCOUNTER — TELEPHONE (OUTPATIENT)
Dept: ONCOLOGY | Age: 73
End: 2023-10-30

## 2023-10-30 ENCOUNTER — OFFICE VISIT (OUTPATIENT)
Dept: ONCOLOGY | Age: 73
End: 2023-10-30
Payer: MEDICARE

## 2023-10-30 ENCOUNTER — APPOINTMENT (OUTPATIENT)
Dept: GENERAL RADIOLOGY | Age: 73
End: 2023-10-30
Payer: MEDICARE

## 2023-10-30 VITALS
HEART RATE: 56 BPM | SYSTOLIC BLOOD PRESSURE: 152 MMHG | TEMPERATURE: 98.3 F | DIASTOLIC BLOOD PRESSURE: 64 MMHG | OXYGEN SATURATION: 100 % | RESPIRATION RATE: 18 BRPM

## 2023-10-30 VITALS
DIASTOLIC BLOOD PRESSURE: 59 MMHG | HEART RATE: 69 BPM | BODY MASS INDEX: 23.46 KG/M2 | RESPIRATION RATE: 18 BRPM | SYSTOLIC BLOOD PRESSURE: 132 MMHG | TEMPERATURE: 95.9 F | WEIGHT: 173 LBS

## 2023-10-30 DIAGNOSIS — R21 SKIN RASH: ICD-10-CM

## 2023-10-30 DIAGNOSIS — N28.89 KIDNEY MASS: ICD-10-CM

## 2023-10-30 DIAGNOSIS — I95.1 ORTHOSTATIC HYPOTENSION: Primary | ICD-10-CM

## 2023-10-30 DIAGNOSIS — C34.01 LUNG CANCER, MAIN BRONCHUS, RIGHT (HCC): Primary | ICD-10-CM

## 2023-10-30 DIAGNOSIS — N18.32 STAGE 3B CHRONIC KIDNEY DISEASE (HCC): ICD-10-CM

## 2023-10-30 DIAGNOSIS — D64.9 NORMOCYTIC ANEMIA: ICD-10-CM

## 2023-10-30 PROBLEM — N18.9 CHRONIC KIDNEY DISEASE: Status: ACTIVE | Noted: 2023-10-30

## 2023-10-30 LAB
ANION GAP SERPL CALCULATED.3IONS-SCNC: 8 MMOL/L (ref 9–17)
BASOPHILS # BLD: 0.07 K/UL (ref 0–0.2)
BASOPHILS NFR BLD: 1 % (ref 0–2)
BUN SERPL-MCNC: 22 MG/DL (ref 8–23)
BUN/CREAT SERPL: 15 (ref 9–20)
CALCIUM SERPL-MCNC: 9.5 MG/DL (ref 8.6–10.4)
CHLORIDE SERPL-SCNC: 99 MMOL/L (ref 98–107)
CO2 SERPL-SCNC: 26 MMOL/L (ref 20–31)
CREAT SERPL-MCNC: 1.5 MG/DL (ref 0.7–1.2)
EKG ATRIAL RATE: 56 BPM
EKG P AXIS: 58 DEGREES
EKG P-R INTERVAL: 166 MS
EKG Q-T INTERVAL: 410 MS
EKG QRS DURATION: 80 MS
EKG QTC CALCULATION (BAZETT): 395 MS
EKG R AXIS: 53 DEGREES
EKG T AXIS: 72 DEGREES
EKG VENTRICULAR RATE: 56 BPM
EOSINOPHIL # BLD: 0.17 K/UL (ref 0–0.44)
EOSINOPHILS RELATIVE PERCENT: 2 % (ref 1–4)
ERYTHROCYTE [DISTWIDTH] IN BLOOD BY AUTOMATED COUNT: 16.8 % (ref 11.8–14.4)
GFR SERPL CREATININE-BSD FRML MDRD: 49 ML/MIN/1.73M2
GLUCOSE SERPL-MCNC: 168 MG/DL (ref 70–99)
HCT VFR BLD AUTO: 36.4 % (ref 40.7–50.3)
HGB BLD-MCNC: 11.7 G/DL (ref 13–17)
IMM GRANULOCYTES # BLD AUTO: 0.1 K/UL (ref 0–0.3)
IMM GRANULOCYTES NFR BLD: 1 %
LYMPHOCYTES NFR BLD: 2.09 K/UL (ref 1.1–3.7)
LYMPHOCYTES RELATIVE PERCENT: 24 % (ref 24–43)
MCH RBC QN AUTO: 27.7 PG (ref 25.2–33.5)
MCHC RBC AUTO-ENTMCNC: 32.1 G/DL (ref 28.4–34.8)
MCV RBC AUTO: 86.1 FL (ref 82.6–102.9)
MONOCYTES NFR BLD: 1.05 K/UL (ref 0.1–1.2)
MONOCYTES NFR BLD: 12 % (ref 3–12)
NEUTROPHILS NFR BLD: 60 % (ref 36–65)
NEUTS SEG NFR BLD: 5.1 K/UL (ref 1.5–8.1)
NRBC BLD-RTO: 0 PER 100 WBC
PLATELET # BLD AUTO: 211 K/UL (ref 138–453)
PMV BLD AUTO: 10.1 FL (ref 8.1–13.5)
POTASSIUM SERPL-SCNC: 5.2 MMOL/L (ref 3.7–5.3)
RBC # BLD AUTO: 4.23 M/UL (ref 4.21–5.77)
SODIUM SERPL-SCNC: 133 MMOL/L (ref 135–144)
TROPONIN I SERPL HS-MCNC: 23 NG/L (ref 0–22)
TROPONIN I SERPL HS-MCNC: 29 NG/L (ref 0–22)
WBC OTHER # BLD: 8.6 K/UL (ref 3.5–11.3)

## 2023-10-30 PROCEDURE — 99214 OFFICE O/P EST MOD 30 MIN: CPT | Performed by: INTERNAL MEDICINE

## 2023-10-30 PROCEDURE — 93005 ELECTROCARDIOGRAM TRACING: CPT | Performed by: EMERGENCY MEDICINE

## 2023-10-30 PROCEDURE — 1123F ACP DISCUSS/DSCN MKR DOCD: CPT | Performed by: INTERNAL MEDICINE

## 2023-10-30 PROCEDURE — 99285 EMERGENCY DEPT VISIT HI MDM: CPT

## 2023-10-30 PROCEDURE — 3075F SYST BP GE 130 - 139MM HG: CPT | Performed by: INTERNAL MEDICINE

## 2023-10-30 PROCEDURE — 85025 COMPLETE CBC W/AUTO DIFF WBC: CPT

## 2023-10-30 PROCEDURE — 80048 BASIC METABOLIC PNL TOTAL CA: CPT

## 2023-10-30 PROCEDURE — 99211 OFF/OP EST MAY X REQ PHY/QHP: CPT | Performed by: INTERNAL MEDICINE

## 2023-10-30 PROCEDURE — G8427 DOCREV CUR MEDS BY ELIG CLIN: HCPCS | Performed by: INTERNAL MEDICINE

## 2023-10-30 PROCEDURE — 84484 ASSAY OF TROPONIN QUANT: CPT

## 2023-10-30 PROCEDURE — 71045 X-RAY EXAM CHEST 1 VIEW: CPT

## 2023-10-30 PROCEDURE — G8420 CALC BMI NORM PARAMETERS: HCPCS | Performed by: INTERNAL MEDICINE

## 2023-10-30 PROCEDURE — 1036F TOBACCO NON-USER: CPT | Performed by: INTERNAL MEDICINE

## 2023-10-30 PROCEDURE — 96360 HYDRATION IV INFUSION INIT: CPT

## 2023-10-30 PROCEDURE — 93010 ELECTROCARDIOGRAM REPORT: CPT | Performed by: FAMILY MEDICINE

## 2023-10-30 PROCEDURE — 36415 COLL VENOUS BLD VENIPUNCTURE: CPT

## 2023-10-30 PROCEDURE — 3017F COLORECTAL CA SCREEN DOC REV: CPT | Performed by: INTERNAL MEDICINE

## 2023-10-30 PROCEDURE — 3078F DIAST BP <80 MM HG: CPT | Performed by: INTERNAL MEDICINE

## 2023-10-30 PROCEDURE — G8484 FLU IMMUNIZE NO ADMIN: HCPCS | Performed by: INTERNAL MEDICINE

## 2023-10-30 PROCEDURE — 2580000003 HC RX 258: Performed by: EMERGENCY MEDICINE

## 2023-10-30 RX ORDER — 0.9 % SODIUM CHLORIDE 0.9 %
1000 INTRAVENOUS SOLUTION INTRAVENOUS ONCE
Status: COMPLETED | OUTPATIENT
Start: 2023-10-30 | End: 2023-10-30

## 2023-10-30 RX ADMIN — SODIUM CHLORIDE 1000 ML: 9 INJECTION, SOLUTION INTRAVENOUS at 10:38

## 2023-10-30 ASSESSMENT — ENCOUNTER SYMPTOMS
SHORTNESS OF BREATH: 0
BACK PAIN: 0
ABDOMINAL DISTENTION: 0
SORE THROAT: 0

## 2023-10-30 ASSESSMENT — PAIN - FUNCTIONAL ASSESSMENT: PAIN_FUNCTIONAL_ASSESSMENT: NONE - DENIES PAIN

## 2023-10-30 NOTE — TELEPHONE ENCOUNTER
Name: Wolfgang Bullock  : 1950  MRN: [id number]    Oncology Navigation Follow-Up Note    Contact Type:  Medical Oncology    Notes: Writer met with Amy Ortega and wife in clinic. Amy Ortega states he has been having issues with passing out. Pt states he was informed he needs to drink at lest 4 bottles of water a day. Amy Ortega notes he is in remission with his lung cancer but has had a rash on his thighs since treatment. Pt his being sent to dermatology. Survivorship offered. Declined at this time. Encouraged to contact writer if he changes his mind. Wife states he has many more surgeries coming up and have a lot going on. Writer will sign off at this time but will be available in future if needed.          Electronically signed by Sri Gibbs RN on 10/30/2023 at 3:26 PM

## 2023-10-30 NOTE — DISCHARGE INSTRUCTIONS
Cut back the metoprolol to 25 mg every day. Continue rest of the medications as is. Make sure to drink at least 60 to 70 ounces of water every day. Keep your appointment with cardiology next week.

## 2023-11-06 ENCOUNTER — TELEPHONE (OUTPATIENT)
Dept: CARDIOLOGY | Age: 73
End: 2023-11-06

## 2023-11-06 ENCOUNTER — HOSPITAL ENCOUNTER (OUTPATIENT)
Age: 73
Discharge: HOME | End: 2023-11-06
Payer: MEDICARE

## 2023-11-06 ENCOUNTER — OFFICE VISIT (OUTPATIENT)
Dept: CARDIOLOGY | Age: 73
End: 2023-11-06
Payer: MEDICARE

## 2023-11-06 ENCOUNTER — HOSPITAL ENCOUNTER (OUTPATIENT)
Age: 73
Discharge: HOME | End: 2023-11-08

## 2023-11-06 ENCOUNTER — HOSPITAL ENCOUNTER (OUTPATIENT)
Age: 73
Discharge: HOME | End: 2023-11-08
Payer: MEDICARE

## 2023-11-06 VITALS
RESPIRATION RATE: 16 BRPM | DIASTOLIC BLOOD PRESSURE: 62 MMHG | HEART RATE: 73 BPM | SYSTOLIC BLOOD PRESSURE: 97 MMHG | OXYGEN SATURATION: 100 % | WEIGHT: 170 LBS | BODY MASS INDEX: 23.03 KG/M2 | HEIGHT: 72 IN

## 2023-11-06 VITALS
WEIGHT: 169.97 LBS | SYSTOLIC BLOOD PRESSURE: 97 MMHG | HEIGHT: 72 IN | BODY MASS INDEX: 23.02 KG/M2 | DIASTOLIC BLOOD PRESSURE: 62 MMHG

## 2023-11-06 DIAGNOSIS — E78.2 MIXED HYPERLIPIDEMIA: ICD-10-CM

## 2023-11-06 DIAGNOSIS — I47.29 NSVT (NONSUSTAINED VENTRICULAR TACHYCARDIA) (HCC): ICD-10-CM

## 2023-11-06 DIAGNOSIS — R55 SYNCOPE AND COLLAPSE: ICD-10-CM

## 2023-11-06 DIAGNOSIS — I25.10 ASHD (ARTERIOSCLEROTIC HEART DISEASE): ICD-10-CM

## 2023-11-06 DIAGNOSIS — Z95.820 S/P ANGIOPLASTY WITH STENT: ICD-10-CM

## 2023-11-06 DIAGNOSIS — I10 PRIMARY HYPERTENSION: ICD-10-CM

## 2023-11-06 LAB
ANION GAP SERPL CALCULATED.3IONS-SCNC: 10 MMOL/L (ref 9–17)
BUN SERPL-MCNC: 18 MG/DL (ref 8–23)
BUN/CREAT SERPL: 13 (ref 9–20)
CALCIUM SERPL-MCNC: 9.1 MG/DL (ref 8.6–10.4)
CHLORIDE SERPL-SCNC: 97 MMOL/L (ref 98–107)
CO2 SERPL-SCNC: 26 MMOL/L (ref 20–31)
CREAT SERPL-MCNC: 1.4 MG/DL (ref 0.7–1.2)
ECHO AO ROOT DIAM: 3.6 CM
ECHO AO ROOT INDEX: 1.81 CM/M2
ECHO AV ACCELERATION TIME: 57.09 MS
ECHO AV CUSP MM: 2 CM
ECHO AV MEAN GRADIENT: 2 MMHG
ECHO AV MEAN VELOCITY: 0.7 M/S
ECHO AV PEAK VELOCITY: 1 M/S
ECHO AV VTI: 19.6 CM
ECHO BSA: 1.98 M2
ECHO BSA: 1.98 M2
ECHO LA DIAMETER INDEX: 1.81 CM/M2
ECHO LA DIAMETER: 3.6 CM
ECHO LA MAJOR AXIS: 5.3 CM
ECHO LA TO AORTIC ROOT RATIO: 1
ECHO LA VOL A-L A2C: 48 ML (ref 18–58)
ECHO LA VOL A-L A4C: 44 ML (ref 18–58)
ECHO LA VOL BP: 47 ML (ref 18–58)
ECHO LA VOL/BSA BIPLANE: 24 ML/M2 (ref 16–34)
ECHO LA VOLUME AREA LENGTH: 49 ML
ECHO LA VOLUME INDEX A-L A2C: 24 ML/M2 (ref 16–34)
ECHO LA VOLUME INDEX A-L A4C: 22 ML/M2 (ref 16–34)
ECHO LA VOLUME INDEX AREA LENGTH: 25 ML/M2 (ref 16–34)
ECHO LV E' LATERAL VELOCITY: 7 CM/S
ECHO LV EDV A2C: 75 ML
ECHO LV EDV A4C: 88 ML
ECHO LV EDV BP: 84 ML (ref 67–155)
ECHO LV EDV INDEX A4C: 44 ML/M2
ECHO LV EDV INDEX BP: 42 ML/M2
ECHO LV EDV NDEX A2C: 38 ML/M2
ECHO LV EJECTION FRACTION BIPLANE: 62 % (ref 55–100)
ECHO LV ESV A2C: 27 ML
ECHO LV ESV A4C: 34 ML
ECHO LV ESV BP: 32 ML (ref 22–58)
ECHO LV ESV INDEX A2C: 14 ML/M2
ECHO LV ESV INDEX A4C: 17 ML/M2
ECHO LV ESV INDEX BP: 16 ML/M2
ECHO LV FRACTIONAL SHORTENING: 30 % (ref 28–44)
ECHO LV INTERNAL DIMENSION DIASTOLE INDEX: 1.86 CM/M2
ECHO LV INTERNAL DIMENSION DIASTOLIC: 3.7 CM (ref 4.2–5.9)
ECHO LV INTERNAL DIMENSION SYSTOLIC INDEX: 1.31 CM/M2
ECHO LV INTERNAL DIMENSION SYSTOLIC: 2.6 CM
ECHO LV IVSD: 1.2 CM (ref 0.6–1)
ECHO LV IVSS: 1.3 CM
ECHO LV MASS 2D: 147.3 G (ref 88–224)
ECHO LV MASS INDEX 2D: 74 G/M2 (ref 49–115)
ECHO LV POSTERIOR WALL DIASTOLIC: 1.2 CM (ref 0.6–1)
ECHO LV POSTERIOR WALL SYSTOLIC: 1.3 CM
ECHO LV RELATIVE WALL THICKNESS RATIO: 0.65
ECHO LVOT AV VTI INDEX: 0.91
ECHO LVOT MEAN GRADIENT: 1 MMHG
ECHO LVOT VTI: 17.9 CM
ECHO MV A VELOCITY: 0.69 M/S
ECHO MV E DECELERATION TIME (DT): 268.9 MS
ECHO MV E VELOCITY: 0.62 M/S
ECHO MV E/A RATIO: 0.9
ECHO MV E/E' LATERAL: 8.86
ECHO PV MAX VELOCITY: 0.9 M/S
ECHO RV INTERNAL DIMENSION: 3.8 CM
ERYTHROCYTE [DISTWIDTH] IN BLOOD BY AUTOMATED COUNT: 16.4 % (ref 11.8–14.4)
GFR SERPL CREATININE-BSD FRML MDRD: 53 ML/MIN/1.73M2
GLUCOSE SERPL-MCNC: 135 MG/DL (ref 70–99)
HCT VFR BLD AUTO: 34.7 % (ref 40.7–50.3)
HGB BLD-MCNC: 11.1 G/DL (ref 13–17)
MCH RBC QN AUTO: 28.1 PG (ref 25.2–33.5)
MCHC RBC AUTO-ENTMCNC: 32 G/DL (ref 28.4–34.8)
MCV RBC AUTO: 87.8 FL (ref 82.6–102.9)
NRBC BLD-RTO: 0 PER 100 WBC
PLATELET # BLD AUTO: 185 K/UL (ref 138–453)
PMV BLD AUTO: 10.2 FL (ref 8.1–13.5)
POTASSIUM SERPL-SCNC: 4.3 MMOL/L (ref 3.7–5.3)
RBC # BLD AUTO: 3.95 M/UL (ref 4.21–5.77)
SODIUM SERPL-SCNC: 133 MMOL/L (ref 135–144)
TROPONIN I SERPL HS-MCNC: 23 NG/L (ref 0–22)
WBC OTHER # BLD: 7.7 K/UL (ref 3.5–11.3)

## 2023-11-06 PROCEDURE — 80048 BASIC METABOLIC PNL TOTAL CA: CPT

## 2023-11-06 PROCEDURE — 1036F TOBACCO NON-USER: CPT | Performed by: PHYSICIAN ASSISTANT

## 2023-11-06 PROCEDURE — G8484 FLU IMMUNIZE NO ADMIN: HCPCS | Performed by: PHYSICIAN ASSISTANT

## 2023-11-06 PROCEDURE — 3074F SYST BP LT 130 MM HG: CPT | Performed by: PHYSICIAN ASSISTANT

## 2023-11-06 PROCEDURE — 3078F DIAST BP <80 MM HG: CPT | Performed by: PHYSICIAN ASSISTANT

## 2023-11-06 PROCEDURE — 93306 TTE W/DOPPLER COMPLETE: CPT | Performed by: INTERNAL MEDICINE

## 2023-11-06 PROCEDURE — G8427 DOCREV CUR MEDS BY ELIG CLIN: HCPCS | Performed by: PHYSICIAN ASSISTANT

## 2023-11-06 PROCEDURE — 85027 COMPLETE CBC AUTOMATED: CPT

## 2023-11-06 PROCEDURE — 93306 TTE W/DOPPLER COMPLETE: CPT

## 2023-11-06 PROCEDURE — 3017F COLORECTAL CA SCREEN DOC REV: CPT | Performed by: PHYSICIAN ASSISTANT

## 2023-11-06 PROCEDURE — 84484 ASSAY OF TROPONIN QUANT: CPT

## 2023-11-06 PROCEDURE — 1123F ACP DISCUSS/DSCN MKR DOCD: CPT | Performed by: PHYSICIAN ASSISTANT

## 2023-11-06 PROCEDURE — 93247 EXT ECG>7D<15D SCAN A/R: CPT

## 2023-11-06 PROCEDURE — 99214 OFFICE O/P EST MOD 30 MIN: CPT | Performed by: PHYSICIAN ASSISTANT

## 2023-11-06 PROCEDURE — 99211 OFF/OP EST MAY X REQ PHY/QHP: CPT | Performed by: PHYSICIAN ASSISTANT

## 2023-11-06 PROCEDURE — 36415 COLL VENOUS BLD VENIPUNCTURE: CPT

## 2023-11-06 PROCEDURE — G8420 CALC BMI NORM PARAMETERS: HCPCS | Performed by: PHYSICIAN ASSISTANT

## 2023-11-06 RX ORDER — MIDODRINE HYDROCHLORIDE 5 MG/1
5 TABLET ORAL 2 TIMES DAILY
Qty: 180 TABLET | Refills: 3 | Status: ON HOLD | OUTPATIENT
Start: 2023-11-06 | End: 2023-11-07 | Stop reason: SDUPTHER

## 2023-11-06 RX ORDER — BUMETANIDE 2 MG/1
2 TABLET ORAL DAILY
COMMUNITY
End: 2023-11-06 | Stop reason: CLARIF

## 2023-11-06 NOTE — PROGRESS NOTES
tolerance. His says that he could walk about one block. His breathing is what would stop him from going farther. Past Medical History:   Diagnosis Date    Abnormal cardiac cath 09/20/2013    LMCA; Mild irregularities 10-20%. LAD: Abnormal.  Mild to moderate irregularities throughout the LAD and branches. LCx:  abnormal.  80-90% stenosis in a moderate sized OM1. RCA: Abnormal.  40-50% mid RCA stenosis. Actinic keratosis     Arthritis     neck, fingers    CAD (coronary artery disease)     Calculus of kidney     Cancer (HCC)     Cervical stenosis of spine     Diabetes mellitus ([address])     Dr. Ankit Humphrey, CNP, [place]    Full dentures     GERD (gastroesophageal reflux disease)     H/O echocardiogram 04/29/2016    Stress echo. Normal resting LV systolic function,normal systolic restponse to exercise. No evidence of reversible  ischemia on this maximal,symptom limited,treadmill exerxiess stress echocardiography study    History of cardiac cath 02/09/2017    LMCA: Lesion on LMCA: Distal subsection . 20% stenosis. LAD: Lesion on 1st Diag: Mid subsection . 75% stenosis. RCA: Lesion on Mid RCA: Mid subsection . 100% stenosis. Comments: The distal RCA fills by left to right collateralization. Dominance: Mixed. LV function assessed as: Normal. EF 65%. History of echocardiogram 06/11/2018    EF 60%. Mildly increased LV wall thickness. Evidence of mild diastolic dysfunction seen. History of echocardiogram 01/18/2019    EF of 65%. LV wall thickness is mildly increased. Aortic valve leaflets are mildly thickened.      History of heart attack     History of removal of tunneled central venous catheter (CVC) with port 11/09/2022    [address] [place] [address]    History of tobacco abuse     Quit 10/2021, 1/4 ppd for 54 years    Hyperlipidemia     Hypertension     Dr. Ankit Humphrey, CNP    Pulmonary nodule 12/2021    Right middle lobe    Raynaud's phenomenon     S/P angioplasty with stent 09/20/2013    PTCA-NEDRA of  the OM 1

## 2023-11-06 NOTE — RESULT ENCOUNTER NOTE
Please let them know their echo looks good, no change since prior. His lab work was also stable. We will discuss at follow up appointment. Thanks.

## 2023-11-06 NOTE — TELEPHONE ENCOUNTER
----- Message from Rajwinder Sainz PA-C sent at 11/6/2023  1:10 PM EST -----  Please let them know their echo looks good, no change since prior. His lab work was also stable. We will discuss at follow up appointment. Thanks.

## 2023-11-06 NOTE — PATIENT INSTRUCTIONS
SURVEY:    You may be receiving a survey from [employer] regarding your visit today. Please complete the survey to enable us to provide the highest quality of care to you and your family. If you cannot score us a very good on any question, please call the office to discuss how we could have made your experience a very good one. Thank you.

## 2023-11-07 ENCOUNTER — HOSPITAL ENCOUNTER (OUTPATIENT)
Age: 73
Setting detail: OUTPATIENT SURGERY
Discharge: HOME | End: 2023-11-07
Attending: FAMILY MEDICINE | Admitting: FAMILY MEDICINE
Payer: MEDICARE

## 2023-11-07 ENCOUNTER — TELEPHONE (OUTPATIENT)
Dept: CARDIOLOGY | Age: 73
End: 2023-11-07

## 2023-11-07 VITALS
HEART RATE: 60 BPM | HEIGHT: 72 IN | OXYGEN SATURATION: 99 % | SYSTOLIC BLOOD PRESSURE: 160 MMHG | TEMPERATURE: 98.1 F | DIASTOLIC BLOOD PRESSURE: 59 MMHG | BODY MASS INDEX: 23.02 KG/M2 | RESPIRATION RATE: 15 BRPM | WEIGHT: 169.97 LBS

## 2023-11-07 DIAGNOSIS — R55 SYNCOPE AND COLLAPSE: ICD-10-CM

## 2023-11-07 DIAGNOSIS — R55 SYNCOPE: ICD-10-CM

## 2023-11-07 DIAGNOSIS — E78.2 MIXED HYPERLIPIDEMIA: ICD-10-CM

## 2023-11-07 DIAGNOSIS — I47.29 NSVT (NONSUSTAINED VENTRICULAR TACHYCARDIA) (HCC): ICD-10-CM

## 2023-11-07 DIAGNOSIS — I10 PRIMARY HYPERTENSION: ICD-10-CM

## 2023-11-07 DIAGNOSIS — I25.10 ASHD (ARTERIOSCLEROTIC HEART DISEASE): ICD-10-CM

## 2023-11-07 DIAGNOSIS — Z95.820 S/P ANGIOPLASTY WITH STENT: ICD-10-CM

## 2023-11-07 PROBLEM — I24.0 RCA OCCLUSION (HCC): Status: ACTIVE | Noted: 2023-11-07

## 2023-11-07 LAB — ECHO BSA: 1.98 M2

## 2023-11-07 PROCEDURE — 93458 L HRT ARTERY/VENTRICLE ANGIO: CPT | Performed by: FAMILY MEDICINE

## 2023-11-07 PROCEDURE — C1769 GUIDE WIRE: HCPCS | Performed by: FAMILY MEDICINE

## 2023-11-07 PROCEDURE — 2500000003 HC RX 250 WO HCPCS: Performed by: FAMILY MEDICINE

## 2023-11-07 PROCEDURE — C1894 INTRO/SHEATH, NON-LASER: HCPCS | Performed by: FAMILY MEDICINE

## 2023-11-07 PROCEDURE — 7100000011 HC PHASE II RECOVERY - ADDTL 15 MIN: Performed by: FAMILY MEDICINE

## 2023-11-07 PROCEDURE — 6360000002 HC RX W HCPCS: Performed by: FAMILY MEDICINE

## 2023-11-07 PROCEDURE — 7100000010 HC PHASE II RECOVERY - FIRST 15 MIN: Performed by: FAMILY MEDICINE

## 2023-11-07 PROCEDURE — 2580000003 HC RX 258: Performed by: FAMILY MEDICINE

## 2023-11-07 PROCEDURE — 6360000004 HC RX CONTRAST MEDICATION: Performed by: FAMILY MEDICINE

## 2023-11-07 PROCEDURE — 99152 MOD SED SAME PHYS/QHP 5/>YRS: CPT | Performed by: FAMILY MEDICINE

## 2023-11-07 PROCEDURE — 2709999900 HC NON-CHARGEABLE SUPPLY: Performed by: FAMILY MEDICINE

## 2023-11-07 RX ORDER — SODIUM CHLORIDE 0.9 % (FLUSH) 0.9 %
5-40 SYRINGE (ML) INJECTION PRN
Status: DISCONTINUED | OUTPATIENT
Start: 2023-11-07 | End: 2023-11-07 | Stop reason: HOSPADM

## 2023-11-07 RX ORDER — MIDAZOLAM HYDROCHLORIDE 1 MG/ML
INJECTION INTRAMUSCULAR; INTRAVENOUS PRN
Status: DISCONTINUED | OUTPATIENT
Start: 2023-11-07 | End: 2023-11-07 | Stop reason: HOSPADM

## 2023-11-07 RX ORDER — HEPARIN SODIUM 10000 [USP'U]/ML
INJECTION, SOLUTION INTRAVENOUS; SUBCUTANEOUS PRN
Status: DISCONTINUED | OUTPATIENT
Start: 2023-11-07 | End: 2023-11-07 | Stop reason: HOSPADM

## 2023-11-07 RX ORDER — SODIUM CHLORIDE 0.9 % (FLUSH) 0.9 %
5-40 SYRINGE (ML) INJECTION EVERY 12 HOURS SCHEDULED
Status: DISCONTINUED | OUTPATIENT
Start: 2023-11-07 | End: 2023-11-07 | Stop reason: HOSPADM

## 2023-11-07 RX ORDER — SODIUM CHLORIDE 9 MG/ML
INJECTION, SOLUTION INTRAVENOUS CONTINUOUS
Status: DISCONTINUED | OUTPATIENT
Start: 2023-11-07 | End: 2023-11-07 | Stop reason: HOSPADM

## 2023-11-07 RX ORDER — LIDOCAINE HYDROCHLORIDE 10 MG/ML
INJECTION, SOLUTION INFILTRATION; PERINEURAL PRN
Status: DISCONTINUED | OUTPATIENT
Start: 2023-11-07 | End: 2023-11-07 | Stop reason: HOSPADM

## 2023-11-07 RX ORDER — SODIUM CHLORIDE 9 MG/ML
INJECTION, SOLUTION INTRAVENOUS PRN
Status: DISCONTINUED | OUTPATIENT
Start: 2023-11-07 | End: 2023-11-07 | Stop reason: HOSPADM

## 2023-11-07 RX ORDER — NITROGLYCERIN 20 MG/100ML
INJECTION INTRAVENOUS PRN
Status: DISCONTINUED | OUTPATIENT
Start: 2023-11-07 | End: 2023-11-07 | Stop reason: HOSPADM

## 2023-11-07 RX ORDER — NITROGLYCERIN 0.4 MG/1
0.4 TABLET SUBLINGUAL EVERY 5 MIN PRN
Status: DISCONTINUED | OUTPATIENT
Start: 2023-11-07 | End: 2023-11-07 | Stop reason: HOSPADM

## 2023-11-07 RX ORDER — DIPHENHYDRAMINE HCL 25 MG
50 CAPSULE ORAL ONCE
Status: DISCONTINUED | OUTPATIENT
Start: 2023-11-07 | End: 2023-11-07 | Stop reason: HOSPADM

## 2023-11-07 RX ORDER — MIDODRINE HYDROCHLORIDE 5 MG/1
TABLET ORAL
Qty: 60 TABLET | Refills: 11 | Status: SHIPPED | OUTPATIENT
Start: 2023-11-07

## 2023-11-07 RX ORDER — ACETAMINOPHEN 325 MG/1
650 TABLET ORAL EVERY 4 HOURS PRN
Status: DISCONTINUED | OUTPATIENT
Start: 2023-11-07 | End: 2023-11-07 | Stop reason: HOSPADM

## 2023-11-07 RX ADMIN — SODIUM CHLORIDE: 9 INJECTION, SOLUTION INTRAVENOUS at 08:21

## 2023-11-07 NOTE — PROGRESS NOTES
Dr Joan Jackson at bedside. Procedure results and plan of care reviewed with patient and family. All questions answered at this time.

## 2023-11-07 NOTE — PROGRESS NOTES
Pt presents to recovery. A&O x 4. Denies c/o pain. Wife at bedside. VS as charted.   Pressure dressing to right wrist.

## 2023-11-07 NOTE — PROGRESS NOTES
Inpatients must meet criteria 1 through 7.   1. Minimum 30 minutes after last dose of sedative medication, minimum 120 minutes after last dose of reversal agent. Yes   2. Systolic BP stable within 20 mmHg for 30 minutes & systolic BP between 90 & 821 or within 10 mmHg of baseline. Yes   3. Pulse between 60 and 100 or within 10 bpm of baseline. Yes   4. Spontaneous respiratory rate >/= 10 per minute. Yes   5. SaO2 >/= 95 or >/= baseline. Yes   6. Able to cough and swallow or return to baseline function. Yes   7. Alert and oriented or return to baseline mental status. Yes   8. Demonstrates controlled, coordinated movements, ambulates with steady gait, or return to baseline activity function. Yes   9. Minimal or no pain or nausea, or at a level tolerable and acceptable to patient. Yes   10. Takes and retains oral fluids as allowed. Yes   11. Procedural / perioperative site stable. Minimal or no bleeding. Yes   12. If GI endoscopy procedure, minimal or no abdominal distention or passing flatus. N/A   13. Written discharge instructions and emergency telephone number provided. Yes   14. Accompanied by a responsible adult. Yes   Adult patient discharged from facility without responsible person meets above criteria plus the following:   a) remains awake without stimulus for 30 minutes   b) oriented appropriate for age   c) all vital signs stable   d) no significant risk of losing protective reflexes   e) able to maintain pre-procedure mobility without assistance   f) no nausea or dizziness   g) transportation arrangements that do not require patient to operate motor Vehicle.    Yes

## 2023-11-07 NOTE — DISCHARGE INSTRUCTIONS
leaving it open to air, no bandaids or ointment. Diet   Drink plenty of fluids after the test to flush the x-ray dye from your system. Return to your normal diet. No alcoholic beverages for 24 hours after the procedure. Physical Activity   The sedative will make you sleepy. Rest until the effects have worn off. Nausea and vomiting from the sedative is normal and usually does not last long. Ask your doctor when you will be able to return to work. Do not drive, operate machinery, do anything that requires attention to detail, or sign important papers for at least 24 hours or until your doctor says it is safe. Avoid heavy lifting, physically demanding activities, and sexual activity for 2-3 days. Lift nothing over 10 pounds (a gallon of milk is 8 pounds). Do not sit for long periods of time. Try to change positions frequently. Medications   Resume taking your normal medicines as advised. Use acetaminophen (Tylenol) for pain relief. (Avoid anti-inflammatory drugs such as- ibuprofen (Advil, Motrin), naproxen sodium (Aleve), Excedrin for a few days)  If you had to stop taking these medications before the procedure, ask your doctor when you can resume taking them:   Anti-inflammatory drugs   Blood thinners, such as warfarin (Coumadin)   If you are taking medicines, follow these general guidelines:   Take your medicine as directed. Do not change the amount or the schedule. Do not stop taking them without talking to your doctor. Do not share them. Know the side effects and report any to your doctor. Some drugs can be dangerous when mixed. Talk to a doctor or pharmacist if you are taking more than one drug. This includes over-the-counter medicine and herb or dietary supplements. Plan ahead for refills so you don't run out. Follow-up   The test results are available right after the procedure. At that point, the doctor will discuss the findings and suggest appropriate treatment options.  In some

## 2023-11-15 ENCOUNTER — OFFICE VISIT (OUTPATIENT)
Dept: PULMONOLOGY | Age: 73
End: 2023-11-15
Payer: MEDICARE

## 2023-11-15 VITALS
TEMPERATURE: 96.5 F | WEIGHT: 166.4 LBS | BODY MASS INDEX: 22.54 KG/M2 | HEART RATE: 75 BPM | OXYGEN SATURATION: 99 % | SYSTOLIC BLOOD PRESSURE: 93 MMHG | HEIGHT: 72 IN | RESPIRATION RATE: 16 BRPM | DIASTOLIC BLOOD PRESSURE: 52 MMHG

## 2023-11-15 DIAGNOSIS — J41.1 MUCOPURULENT CHRONIC BRONCHITIS (HCC): ICD-10-CM

## 2023-11-15 DIAGNOSIS — G89.12 POST-THORACOTOMY PAIN SYNDROME: ICD-10-CM

## 2023-11-15 DIAGNOSIS — Z87.891 STOPPED SMOKING WITH GREATER THAN 40 PACK YEAR HISTORY: ICD-10-CM

## 2023-11-15 DIAGNOSIS — J90 PLEURAL EFFUSION ON RIGHT: Primary | ICD-10-CM

## 2023-11-15 DIAGNOSIS — C34.2 PRIMARY ADENOCARCINOMA OF MIDDLE LOBE OF RIGHT LUNG (HCC): ICD-10-CM

## 2023-11-15 PROCEDURE — G8484 FLU IMMUNIZE NO ADMIN: HCPCS | Performed by: INTERNAL MEDICINE

## 2023-11-15 PROCEDURE — 1123F ACP DISCUSS/DSCN MKR DOCD: CPT | Performed by: INTERNAL MEDICINE

## 2023-11-15 PROCEDURE — 99214 OFFICE O/P EST MOD 30 MIN: CPT | Performed by: INTERNAL MEDICINE

## 2023-11-15 PROCEDURE — 3078F DIAST BP <80 MM HG: CPT | Performed by: INTERNAL MEDICINE

## 2023-11-15 PROCEDURE — 3023F SPIROM DOC REV: CPT | Performed by: INTERNAL MEDICINE

## 2023-11-15 PROCEDURE — G8427 DOCREV CUR MEDS BY ELIG CLIN: HCPCS | Performed by: INTERNAL MEDICINE

## 2023-11-15 PROCEDURE — 99213 OFFICE O/P EST LOW 20 MIN: CPT | Performed by: INTERNAL MEDICINE

## 2023-11-15 PROCEDURE — 1036F TOBACCO NON-USER: CPT | Performed by: INTERNAL MEDICINE

## 2023-11-15 PROCEDURE — 3074F SYST BP LT 130 MM HG: CPT | Performed by: INTERNAL MEDICINE

## 2023-11-15 PROCEDURE — 3017F COLORECTAL CA SCREEN DOC REV: CPT | Performed by: INTERNAL MEDICINE

## 2023-11-15 PROCEDURE — G8420 CALC BMI NORM PARAMETERS: HCPCS | Performed by: INTERNAL MEDICINE

## 2023-11-15 ASSESSMENT — ENCOUNTER SYMPTOMS
EYES NEGATIVE: 1
RESPIRATORY NEGATIVE: 1

## 2023-11-15 NOTE — PATIENT INSTRUCTIONS
SURVEY:    You may be receiving a survey from [facility] regarding your visit today. Please complete the survey to enable us to provide the highest quality of care to you and your family. If you cannot score us a very good on any question, please call the office to discuss how we could have made your experience a very good one. Thank you.

## 2023-11-21 PROBLEM — R63.4 ABNORMAL WEIGHT LOSS: Status: ACTIVE | Noted: 2023-11-21

## 2023-11-21 PROBLEM — E11.22 TYPE 2 DIABETES MELLITUS WITH CHRONIC KIDNEY DISEASE (HCC): Status: ACTIVE | Noted: 2023-11-21

## 2023-11-27 ENCOUNTER — OFFICE VISIT (OUTPATIENT)
Dept: CARDIOLOGY | Age: 73
End: 2023-11-27
Payer: MEDICARE

## 2023-11-27 VITALS
SYSTOLIC BLOOD PRESSURE: 120 MMHG | OXYGEN SATURATION: 99 % | WEIGHT: 165 LBS | HEIGHT: 72 IN | DIASTOLIC BLOOD PRESSURE: 69 MMHG | HEART RATE: 76 BPM | BODY MASS INDEX: 22.35 KG/M2 | RESPIRATION RATE: 18 BRPM

## 2023-11-27 DIAGNOSIS — I25.10 ASHD (ARTERIOSCLEROTIC HEART DISEASE): ICD-10-CM

## 2023-11-27 DIAGNOSIS — E78.2 MIXED HYPERLIPIDEMIA: ICD-10-CM

## 2023-11-27 DIAGNOSIS — Z87.898 HISTORY OF SYNCOPE: Primary | ICD-10-CM

## 2023-11-27 DIAGNOSIS — I95.9 HYPOTENSION, UNSPECIFIED HYPOTENSION TYPE: ICD-10-CM

## 2023-11-27 DIAGNOSIS — I47.29 NSVT (NONSUSTAINED VENTRICULAR TACHYCARDIA) (HCC): ICD-10-CM

## 2023-11-27 DIAGNOSIS — Z95.820 S/P ANGIOPLASTY WITH STENT: ICD-10-CM

## 2023-11-27 DIAGNOSIS — I10 ESSENTIAL HYPERTENSION: ICD-10-CM

## 2023-11-27 PROCEDURE — G8420 CALC BMI NORM PARAMETERS: HCPCS | Performed by: PHYSICIAN ASSISTANT

## 2023-11-27 PROCEDURE — G8484 FLU IMMUNIZE NO ADMIN: HCPCS | Performed by: PHYSICIAN ASSISTANT

## 2023-11-27 PROCEDURE — G8427 DOCREV CUR MEDS BY ELIG CLIN: HCPCS | Performed by: PHYSICIAN ASSISTANT

## 2023-11-27 PROCEDURE — 3078F DIAST BP <80 MM HG: CPT | Performed by: PHYSICIAN ASSISTANT

## 2023-11-27 PROCEDURE — 99211 OFF/OP EST MAY X REQ PHY/QHP: CPT | Performed by: PHYSICIAN ASSISTANT

## 2023-11-27 PROCEDURE — 1123F ACP DISCUSS/DSCN MKR DOCD: CPT | Performed by: PHYSICIAN ASSISTANT

## 2023-11-27 PROCEDURE — 1036F TOBACCO NON-USER: CPT | Performed by: PHYSICIAN ASSISTANT

## 2023-11-27 PROCEDURE — 3017F COLORECTAL CA SCREEN DOC REV: CPT | Performed by: PHYSICIAN ASSISTANT

## 2023-11-27 PROCEDURE — 3074F SYST BP LT 130 MM HG: CPT | Performed by: PHYSICIAN ASSISTANT

## 2023-11-27 PROCEDURE — 99214 OFFICE O/P EST MOD 30 MIN: CPT | Performed by: PHYSICIAN ASSISTANT

## 2023-11-27 RX ORDER — LISINOPRIL 5 MG/1
5 TABLET ORAL DAILY
Qty: 90 TABLET | Refills: 3
Start: 2023-11-27

## 2023-11-27 NOTE — PROGRESS NOTES
Bobbi Sidhu am scribing for and in the presence of Katherine Schmitt     Patient: Mahnaz Villarreal  : 1950  Date of Visit: 2023    REASON FOR VISIT / CONSULTATION:   Chief Complaint   Patient presents with    Loss of Consciousness     HX: Syncope. Denies any further passing out episodes, CP, Palpitations, Lightheaded/ dizziness, SOB same. Dear Dr. Adonay Flores had the pleasure of seeing Mahnaz Villarreal today for a pre-op evaluation. As you know, Mr. Suni Lux is 71 y.o male with PMH of hypertension, hyperlipidemia, type II diabetes and coronary artery disease with  heart cath done on 2013 at [facility]. V's at that time he has NEDRA to [facility]. The cath also showed mild disease of the other coronaries and normal left ventricular function by ventriculogram.      Earlier in , patient had an episode of prolonged chest pain. Retrosternal, burning in nature without radiation. Pain occurred at rest with partial relief with sublingual nitrates. Repeat cardiac cath on  2017 showed new totally occluded mid RCA. An attempt to open the totally occluded RCA on 2017 has failed. Patient treated medically. Echo done on 2018, global left ventricular systolic function appears preserved with an estimated ejection fraction of 60%. Mild LVH. No definite specific wall motion abnormalities were identified. No significant valvular abnormalities. Evidence of mild diastolic dysfunction is seen. Echo done on 2019 that showed EF of 65%. LV wall thickness is mildly increased. Aortic valve leaflets are mildly thickened. Holter Monitor done on 2019: The rhythm was sinus. Average IL interval 0.22 [1st Degree AV Block]. Average QRS duration 0.08. Average daily heart rate  69 ranging from 54 to110 bpm.2. Rare premature supraventricular ectopic beats consisting of 9 isolated PACs. 3. There were no ventricular ectopic beats. 4.  Diary returned with entry of \"sharp pain

## 2023-11-27 NOTE — PATIENT INSTRUCTIONS
SURVEY:    You may be receiving a survey from [employer] regarding your visit today. Please complete the survey to enable us to provide the highest quality of care to you and your family. If you cannot score us a very good on any question, please call the office to discuss how we could have made your experience a very good one. Thank you.

## 2023-11-29 LAB — ECHO BSA: 1.98 M2

## 2023-11-30 ENCOUNTER — TELEPHONE (OUTPATIENT)
Dept: CARDIOLOGY | Age: 73
End: 2023-11-30

## 2023-11-30 NOTE — TELEPHONE ENCOUNTER
----- Message from Lavonna Denver, PA-C sent at 11/30/2023  8:39 AM EST -----  Please let them know that their CAM monitor was overall unremarkable. We will discuss at their follow up appointment.

## 2023-12-22 ENCOUNTER — HOSPITAL ENCOUNTER (OUTPATIENT)
Dept: MRI IMAGING | Age: 73
Discharge: HOME | End: 2023-12-24
Payer: MEDICARE

## 2023-12-22 DIAGNOSIS — M54.12 CERVICAL NEURITIS: ICD-10-CM

## 2023-12-22 DIAGNOSIS — M54.14 THORACIC NEURITIS: ICD-10-CM

## 2023-12-22 PROCEDURE — 72141 MRI NECK SPINE W/O DYE: CPT

## 2023-12-22 PROCEDURE — 72146 MRI CHEST SPINE W/O DYE: CPT

## 2024-01-12 PROBLEM — R35.1 NOCTURIA: Status: ACTIVE | Noted: 2024-01-12

## 2024-01-18 ENCOUNTER — HOSPITAL ENCOUNTER (OUTPATIENT)
Age: 74
Discharge: HOME | End: 2024-01-18
Payer: MEDICARE

## 2024-01-18 DIAGNOSIS — N40.1 BPH WITH OBSTRUCTION/LOWER URINARY TRACT SYMPTOMS: ICD-10-CM

## 2024-01-18 DIAGNOSIS — N13.8 BPH WITH OBSTRUCTION/LOWER URINARY TRACT SYMPTOMS: ICD-10-CM

## 2024-01-18 DIAGNOSIS — R35.1 NOCTURIA: ICD-10-CM

## 2024-01-18 DIAGNOSIS — N28.89 RENAL MASS: ICD-10-CM

## 2024-01-18 LAB
BACTERIA URNS QL MICRO: ABNORMAL
BILIRUB UR QL STRIP: NEGATIVE
CLARITY UR: CLEAR
COLOR UR: YELLOW
EPI CELLS #/AREA URNS HPF: ABNORMAL /HPF (ref 0–5)
GLUCOSE UR STRIP-MCNC: NEGATIVE MG/DL
HGB UR QL STRIP.AUTO: NEGATIVE
KETONES UR STRIP-MCNC: ABNORMAL MG/DL
LEUKOCYTE ESTERASE UR QL STRIP: NEGATIVE
NITRITE UR QL STRIP: NEGATIVE
PH UR STRIP: 6.5 [PH] (ref 5–9)
PROT UR STRIP-MCNC: ABNORMAL MG/DL
PSA SERPL-MCNC: 0.9 NG/ML (ref 0–4)
RBC #/AREA URNS HPF: ABNORMAL /HPF (ref 0–2)
SP GR UR STRIP: 1.02 (ref 1.01–1.02)
UROBILINOGEN UR STRIP-ACNC: NORMAL EU/DL (ref 0–1)
WBC #/AREA URNS HPF: ABNORMAL /HPF (ref 0–5)

## 2024-01-18 PROCEDURE — 87086 URINE CULTURE/COLONY COUNT: CPT

## 2024-01-18 PROCEDURE — 36415 COLL VENOUS BLD VENIPUNCTURE: CPT

## 2024-01-18 PROCEDURE — 84153 ASSAY OF PSA TOTAL: CPT

## 2024-01-18 PROCEDURE — 81001 URINALYSIS AUTO W/SCOPE: CPT

## 2024-01-19 LAB
MICROORGANISM SPEC CULT: NO GROWTH
SPECIMEN DESCRIPTION: NORMAL

## 2024-01-24 ENCOUNTER — HOSPITAL ENCOUNTER (OUTPATIENT)
Dept: ULTRASOUND IMAGING | Age: 74
Discharge: HOME | End: 2024-01-26
Payer: MEDICARE

## 2024-01-24 DIAGNOSIS — N28.89 RENAL MASS: ICD-10-CM

## 2024-01-24 PROCEDURE — 76770 US EXAM ABDO BACK WALL COMP: CPT

## 2024-02-11 DIAGNOSIS — Z87.898 HISTORY OF SYNCOPE: ICD-10-CM

## 2024-02-11 DIAGNOSIS — I10 ESSENTIAL HYPERTENSION: ICD-10-CM

## 2024-02-11 DIAGNOSIS — I47.29 NSVT (NONSUSTAINED VENTRICULAR TACHYCARDIA) (HCC): ICD-10-CM

## 2024-02-11 DIAGNOSIS — Z01.818 PRE-OPERATIVE CLEARANCE: ICD-10-CM

## 2024-02-11 DIAGNOSIS — E78.2 MIXED HYPERLIPIDEMIA: ICD-10-CM

## 2024-02-11 DIAGNOSIS — Z95.820 S/P ANGIOPLASTY WITH STENT: ICD-10-CM

## 2024-02-11 DIAGNOSIS — I25.10 ASHD (ARTERIOSCLEROTIC HEART DISEASE): ICD-10-CM

## 2024-02-12 ENCOUNTER — HOSPITAL ENCOUNTER (OUTPATIENT)
Age: 74
Discharge: HOME | End: 2024-02-14
Payer: MEDICARE

## 2024-02-12 ENCOUNTER — HOSPITAL ENCOUNTER (OUTPATIENT)
Dept: GENERAL RADIOLOGY | Age: 74
Discharge: HOME | End: 2024-02-14
Payer: MEDICARE

## 2024-02-12 ENCOUNTER — HOSPITAL ENCOUNTER (OUTPATIENT)
Age: 74
Discharge: HOME | End: 2024-02-12
Payer: MEDICARE

## 2024-02-12 ENCOUNTER — OFFICE VISIT (OUTPATIENT)
Dept: CARDIOLOGY | Age: 74
End: 2024-02-12
Payer: MEDICARE

## 2024-02-12 ENCOUNTER — TELEPHONE (OUTPATIENT)
Dept: CARDIOLOGY | Age: 74
End: 2024-02-12

## 2024-02-12 VITALS
DIASTOLIC BLOOD PRESSURE: 63 MMHG | HEIGHT: 72 IN | SYSTOLIC BLOOD PRESSURE: 120 MMHG | RESPIRATION RATE: 18 BRPM | WEIGHT: 170 LBS | BODY MASS INDEX: 23.03 KG/M2 | HEART RATE: 75 BPM | OXYGEN SATURATION: 99 %

## 2024-02-12 DIAGNOSIS — E78.2 MIXED HYPERLIPIDEMIA: ICD-10-CM

## 2024-02-12 DIAGNOSIS — I47.29 NSVT (NONSUSTAINED VENTRICULAR TACHYCARDIA) (HCC): ICD-10-CM

## 2024-02-12 DIAGNOSIS — Z79.4 TYPE 2 DIABETES MELLITUS WITHOUT COMPLICATION, WITH LONG-TERM CURRENT USE OF INSULIN (HCC): ICD-10-CM

## 2024-02-12 DIAGNOSIS — Z87.898 HISTORY OF SYNCOPE: ICD-10-CM

## 2024-02-12 DIAGNOSIS — Z95.820 S/P ANGIOPLASTY WITH STENT: ICD-10-CM

## 2024-02-12 DIAGNOSIS — Z01.818 PRE-OPERATIVE CLEARANCE: ICD-10-CM

## 2024-02-12 DIAGNOSIS — I25.10 CORONARY ARTERY DISEASE INVOLVING NATIVE CORONARY ARTERY OF NATIVE HEART WITHOUT ANGINA PECTORIS: ICD-10-CM

## 2024-02-12 DIAGNOSIS — E11.9 TYPE 2 DIABETES MELLITUS WITHOUT COMPLICATION, WITH LONG-TERM CURRENT USE OF INSULIN (HCC): ICD-10-CM

## 2024-02-12 DIAGNOSIS — I10 ESSENTIAL HYPERTENSION: ICD-10-CM

## 2024-02-12 DIAGNOSIS — Z01.818 PRE-OPERATIVE CLEARANCE: Primary | ICD-10-CM

## 2024-02-12 LAB
ANION GAP SERPL CALCULATED.3IONS-SCNC: 13 MMOL/L (ref 9–17)
BUN SERPL-MCNC: 21 MG/DL (ref 8–23)
BUN/CREAT SERPL: 16 (ref 9–20)
CALCIUM SERPL-MCNC: 9.1 MG/DL (ref 8.6–10.4)
CHLORIDE SERPL-SCNC: 99 MMOL/L (ref 98–107)
CO2 SERPL-SCNC: 25 MMOL/L (ref 20–31)
CREAT SERPL-MCNC: 1.3 MG/DL (ref 0.7–1.2)
ERYTHROCYTE [DISTWIDTH] IN BLOOD BY AUTOMATED COUNT: 14 % (ref 11.8–14.4)
GFR SERPL CREATININE-BSD FRML MDRD: 58 ML/MIN/1.73M2
GLUCOSE SERPL-MCNC: 253 MG/DL (ref 70–99)
HCT VFR BLD AUTO: 35.7 % (ref 40.7–50.3)
HGB BLD-MCNC: 11.4 G/DL (ref 13–17)
MCH RBC QN AUTO: 28.9 PG (ref 25.2–33.5)
MCHC RBC AUTO-ENTMCNC: 31.9 G/DL (ref 28.4–34.8)
MCV RBC AUTO: 90.6 FL (ref 82.6–102.9)
NRBC BLD-RTO: 0 PER 100 WBC
PLATELET # BLD AUTO: 169 K/UL (ref 138–453)
PMV BLD AUTO: 10.2 FL (ref 8.1–13.5)
POTASSIUM SERPL-SCNC: 4.5 MMOL/L (ref 3.7–5.3)
RBC # BLD AUTO: 3.94 M/UL (ref 4.21–5.77)
SODIUM SERPL-SCNC: 137 MMOL/L (ref 135–144)
WBC OTHER # BLD: 7.4 K/UL (ref 3.5–11.3)

## 2024-02-12 PROCEDURE — 2022F DILAT RTA XM EVC RTNOPTHY: CPT | Performed by: PHYSICIAN ASSISTANT

## 2024-02-12 PROCEDURE — 1036F TOBACCO NON-USER: CPT | Performed by: PHYSICIAN ASSISTANT

## 2024-02-12 PROCEDURE — 36415 COLL VENOUS BLD VENIPUNCTURE: CPT

## 2024-02-12 PROCEDURE — G8420 CALC BMI NORM PARAMETERS: HCPCS | Performed by: PHYSICIAN ASSISTANT

## 2024-02-12 PROCEDURE — 3078F DIAST BP <80 MM HG: CPT | Performed by: PHYSICIAN ASSISTANT

## 2024-02-12 PROCEDURE — G8427 DOCREV CUR MEDS BY ELIG CLIN: HCPCS | Performed by: PHYSICIAN ASSISTANT

## 2024-02-12 PROCEDURE — 93005 ELECTROCARDIOGRAM TRACING: CPT | Performed by: INTERNAL MEDICINE

## 2024-02-12 PROCEDURE — 3017F COLORECTAL CA SCREEN DOC REV: CPT | Performed by: PHYSICIAN ASSISTANT

## 2024-02-12 PROCEDURE — 3074F SYST BP LT 130 MM HG: CPT | Performed by: PHYSICIAN ASSISTANT

## 2024-02-12 PROCEDURE — G8484 FLU IMMUNIZE NO ADMIN: HCPCS | Performed by: PHYSICIAN ASSISTANT

## 2024-02-12 PROCEDURE — 93005 ELECTROCARDIOGRAM TRACING: CPT | Performed by: PHYSICIAN ASSISTANT

## 2024-02-12 PROCEDURE — 85027 COMPLETE CBC AUTOMATED: CPT

## 2024-02-12 PROCEDURE — 80048 BASIC METABOLIC PNL TOTAL CA: CPT

## 2024-02-12 PROCEDURE — 3046F HEMOGLOBIN A1C LEVEL >9.0%: CPT | Performed by: PHYSICIAN ASSISTANT

## 2024-02-12 PROCEDURE — 93010 ELECTROCARDIOGRAM REPORT: CPT | Performed by: INTERNAL MEDICINE

## 2024-02-12 PROCEDURE — 1123F ACP DISCUSS/DSCN MKR DOCD: CPT | Performed by: PHYSICIAN ASSISTANT

## 2024-02-12 PROCEDURE — 99214 OFFICE O/P EST MOD 30 MIN: CPT | Performed by: PHYSICIAN ASSISTANT

## 2024-02-12 PROCEDURE — 71046 X-RAY EXAM CHEST 2 VIEWS: CPT

## 2024-02-12 PROCEDURE — 99211 OFF/OP EST MAY X REQ PHY/QHP: CPT | Performed by: PHYSICIAN ASSISTANT

## 2024-02-12 RX ORDER — CLOPIDOGREL BISULFATE 75 MG/1
75 TABLET ORAL DAILY
Qty: 90 TABLET | Refills: 3 | Status: SHIPPED | OUTPATIENT
Start: 2024-02-12 | End: 2025-02-11

## 2024-02-12 RX ORDER — METOPROLOL SUCCINATE 50 MG/1
50 TABLET, EXTENDED RELEASE ORAL DAILY
Qty: 90 TABLET | Refills: 3 | Status: SHIPPED | OUTPATIENT
Start: 2024-02-12

## 2024-02-12 RX ORDER — METOPROLOL SUCCINATE 50 MG/1
50 TABLET, EXTENDED RELEASE ORAL DAILY
Qty: 90 TABLET | Refills: 3 | OUTPATIENT
Start: 2024-02-12

## 2024-02-12 NOTE — RESULT ENCOUNTER NOTE
Please notify patient that their lab results are stable.   Please continue current treatment and follow up.

## 2024-02-12 NOTE — TELEPHONE ENCOUNTER
----- Message from Cathie Pelayo PA-C sent at 2/12/2024  9:48 AM EST -----  Please notify patient that their lab results are stable.   Please continue current treatment and follow up.

## 2024-02-12 NOTE — PROGRESS NOTES
showed totally occluded RCA, an attempt to open the RCA failed.  Patient has good left-to-right collaterals.  Treated medically since that time and is doing very good. Repeat heart cath done 11/7/2023 showed stable coronary artery disease.   Antiplatelet Agent: Continue Aspirin 81 mg daily and clopidogrel (Plavix) 75 mg daily. I also reminded him to watch for signs of blood in his stool or black tarry stools and stop the medication immediately if this develops as this could be life threatening.    Beta Blocker: Continue Metoprolol succinate (Toprol XL) 50 mg daily.  Anti-anginal medications: Continue nitroglycerin 0.4 mg tablets as needed for chest pain.  Cholesterol Reduction Therapy: Continue Atorvastatin (Lipitor) 20 mg daily.      History of Non Sustained Ventricular Tachycardia: Rate Control Asymptomatic Seen on CAM done on 2/7/2023: Showed one episode of non sustained ventricular tachycardia five beats in duration.  Ejection fraction is normal.  Known totally occluded RCA with collaterals from the left system.  No chest pain or worsening shortness of breath.  No evidence to suggest that acute coronary syndrome.  Monomorphic ventricular tachycardia is usually non-ischemic in etiology.   V[facility] Connect 11/6/2023: No final results yet however no alerts were reported or called in. We will call him with his final results once we receive them.   Beta Blocker: Continue Metoprolol succinate (Toprol XL) 50 mg daily.     Essential Hypertension: Controlled  Beta Blocker: Continue Metoprolol succinate (Toprol XL) 50 mg daily.    Hyperlipidemia: Mixed - Last LDL on 8/21/2023 was 45 mg/dL  Cholesterol Reduction Therapy: Continue Atorvastatin (Lipitor) 20 mg daily.      Diabetes Mellitus:   Continue current therapy and follow up.   He did mention some low readings via his reader however when he pricks his finger he says it's ok then. He does have an apt with Yaneth Walker this week. I told him to follow up with her for this

## 2024-02-13 ENCOUNTER — TELEPHONE (OUTPATIENT)
Dept: CARDIOLOGY | Age: 74
End: 2024-02-13

## 2024-02-13 NOTE — RESULT ENCOUNTER NOTE
Please notify patient that their CXR results are normal.   Please continue current treatment and follow up.

## 2024-02-13 NOTE — TELEPHONE ENCOUNTER
----- Message from Cathie Pelayo PA-C sent at 2/13/2024  8:47 AM EST -----  Please notify patient that their CXR results are normal.   Please continue current treatment and follow up.

## 2024-03-08 PROBLEM — C67.4 CANCER OF POSTERIOR WALL OF URINARY BLADDER (HCC): Status: ACTIVE | Noted: 2024-03-08

## 2024-03-20 ENCOUNTER — HOSPITAL ENCOUNTER (OUTPATIENT)
Age: 74
Setting detail: SPECIMEN
Discharge: HOME | End: 2024-03-20
Payer: MEDICARE

## 2024-03-20 ENCOUNTER — HOSPITAL ENCOUNTER (OUTPATIENT)
Age: 74
Discharge: HOME | End: 2024-03-20
Payer: MEDICARE

## 2024-03-20 LAB
BACTERIA URNS QL MICRO: ABNORMAL
BILIRUB UR QL STRIP: NEGATIVE
CLARITY UR: ABNORMAL
COLOR UR: YELLOW
EPI CELLS #/AREA URNS HPF: ABNORMAL /HPF (ref 0–5)
GLUCOSE UR STRIP-MCNC: NEGATIVE MG/DL
HGB UR QL STRIP.AUTO: ABNORMAL
KETONES UR STRIP-MCNC: NEGATIVE MG/DL
LEUKOCYTE ESTERASE UR QL STRIP: NEGATIVE
NITRITE UR QL STRIP: NEGATIVE
PH UR STRIP: 7 [PH] (ref 5–9)
PROT UR STRIP-MCNC: ABNORMAL MG/DL
RBC #/AREA URNS HPF: ABNORMAL /HPF (ref 0–2)
RENAL EPITHELIAL, UA: ABNORMAL /HPF
SP GR UR STRIP: 1.01 (ref 1.01–1.02)
UROBILINOGEN UR STRIP-ACNC: NORMAL EU/DL (ref 0–1)
WBC #/AREA URNS HPF: ABNORMAL /HPF (ref 0–5)

## 2024-03-20 PROCEDURE — 87086 URINE CULTURE/COLONY COUNT: CPT

## 2024-03-20 PROCEDURE — 81001 URINALYSIS AUTO W/SCOPE: CPT

## 2024-03-21 DIAGNOSIS — R31.0 GROSS HEMATURIA: ICD-10-CM

## 2024-03-22 PROBLEM — R31.0 GROSS HEMATURIA: Status: ACTIVE | Noted: 2024-03-22

## 2024-03-22 LAB
MICROORGANISM SPEC CULT: NORMAL
SPECIMEN DESCRIPTION: NORMAL

## 2024-03-22 NOTE — RESULT ENCOUNTER NOTE
Please let them know the urine culture that was obtained earlier in the week was negative for infection

## 2024-04-17 ENCOUNTER — OFFICE VISIT (OUTPATIENT)
Dept: PULMONOLOGY | Age: 74
End: 2024-04-17
Payer: MEDICARE

## 2024-04-17 VITALS
SYSTOLIC BLOOD PRESSURE: 101 MMHG | BODY MASS INDEX: 23.69 KG/M2 | RESPIRATION RATE: 16 BRPM | WEIGHT: 174.9 LBS | OXYGEN SATURATION: 98 % | DIASTOLIC BLOOD PRESSURE: 59 MMHG | HEART RATE: 81 BPM | HEIGHT: 72 IN | TEMPERATURE: 96 F

## 2024-04-17 DIAGNOSIS — C67.9 MALIGNANT NEOPLASM OF URINARY BLADDER, UNSPECIFIED SITE (HCC): ICD-10-CM

## 2024-04-17 DIAGNOSIS — C34.2 PRIMARY ADENOCARCINOMA OF MIDDLE LOBE OF RIGHT LUNG (HCC): Primary | ICD-10-CM

## 2024-04-17 DIAGNOSIS — R91.1 RIGHT MIDDLE LOBE PULMONARY NODULE: ICD-10-CM

## 2024-04-17 DIAGNOSIS — C34.01 LUNG CANCER, MAIN BRONCHUS, RIGHT (HCC): ICD-10-CM

## 2024-04-17 DIAGNOSIS — J41.1 MUCOPURULENT CHRONIC BRONCHITIS (HCC): ICD-10-CM

## 2024-04-17 PROCEDURE — 3074F SYST BP LT 130 MM HG: CPT | Performed by: INTERNAL MEDICINE

## 2024-04-17 PROCEDURE — 1123F ACP DISCUSS/DSCN MKR DOCD: CPT | Performed by: INTERNAL MEDICINE

## 2024-04-17 PROCEDURE — G8420 CALC BMI NORM PARAMETERS: HCPCS | Performed by: INTERNAL MEDICINE

## 2024-04-17 PROCEDURE — 3017F COLORECTAL CA SCREEN DOC REV: CPT | Performed by: INTERNAL MEDICINE

## 2024-04-17 PROCEDURE — G8427 DOCREV CUR MEDS BY ELIG CLIN: HCPCS | Performed by: INTERNAL MEDICINE

## 2024-04-17 PROCEDURE — 1036F TOBACCO NON-USER: CPT | Performed by: INTERNAL MEDICINE

## 2024-04-17 PROCEDURE — 99214 OFFICE O/P EST MOD 30 MIN: CPT | Performed by: INTERNAL MEDICINE

## 2024-04-17 PROCEDURE — 99213 OFFICE O/P EST LOW 20 MIN: CPT | Performed by: INTERNAL MEDICINE

## 2024-04-17 PROCEDURE — 3023F SPIROM DOC REV: CPT | Performed by: INTERNAL MEDICINE

## 2024-04-17 PROCEDURE — 3078F DIAST BP <80 MM HG: CPT | Performed by: INTERNAL MEDICINE

## 2024-04-17 ASSESSMENT — ENCOUNTER SYMPTOMS
EYES NEGATIVE: 1
SHORTNESS OF BREATH: 1

## 2024-04-17 NOTE — PROGRESS NOTES
Subjective:      Patient ID: Corwin Acosta is a 73 y.o. male being seen in my clinic for   Chief Complaint   Patient presents with    Lung Cancer     F/u visit, denies any resp. Problems and reporting he has cancer of the bladder at present.       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  Since his last visit 6 months ago he was diagnosed with superficial bladder cancer.  Currently awaiting repeat cystoscopy.  Follows with medical oncology and urology.  Last CT scan of the chest done in October is reviewed.  There are no abnormalities suggestive of recurrent/progressive malignancy.  Pleural thickening is noted at the right lung base.  No adenopathy.    Patient stated he is short of breath with mild to moderate exertion.  Pulmonary function studies done in 2021 showed normal spirometry and lung volumes.  Mild reduction in diffusing capacity.  Echocardiogram showed normal left ventricular systolic function.  Patient is up-to-date with his flu vaccines.  Denies constitutional symptoms such as weight loss or decreased appetite.  Accompanied by wife.    Review of Systems   Constitutional: Negative.    HENT: Negative.     Eyes: Negative.    Respiratory:  Positive for shortness of breath.    Cardiovascular:  Positive for leg swelling.   Genitourinary:  Positive for hematuria.   All other systems reviewed and are negative.      Objective:     Vitals:    04/17/24 0916   BP: (!) 101/59   Pulse: 81   Resp: 16   Temp: (!) 96 °F (35.6 °C)   SpO2: 98%   Weight: 79.3 kg (174 lb 14.4 oz)   Height: 1.829 m (6')     Current Outpatient Medications   Medication Sig Dispense Refill    gabapentin (NEURONTIN) 100 MG capsule Take 1 capsule by mouth in the morning and at bedtime.      Insulin Aspart, w/Niacinamide, (FIASP FLEXTOUCH) 100 UNIT/ML SOPN Inject 8 Units into the skin in the

## 2024-04-19 PROBLEM — N18.32 STAGE 3B CHRONIC KIDNEY DISEASE (HCC): Status: ACTIVE | Noted: 2024-04-19

## 2024-04-30 ENCOUNTER — HOSPITAL ENCOUNTER (OUTPATIENT)
Age: 74
Discharge: HOME | End: 2024-04-30
Payer: MEDICARE

## 2024-04-30 ENCOUNTER — HOSPITAL ENCOUNTER (OUTPATIENT)
Dept: CT IMAGING | Age: 74
Discharge: HOME | End: 2024-05-02
Payer: MEDICARE

## 2024-04-30 DIAGNOSIS — D64.9 NORMOCYTIC ANEMIA: ICD-10-CM

## 2024-04-30 DIAGNOSIS — C34.01 LUNG CANCER, MAIN BRONCHUS, RIGHT (HCC): ICD-10-CM

## 2024-04-30 DIAGNOSIS — N28.89 KIDNEY MASS: ICD-10-CM

## 2024-04-30 LAB
ALBUMIN SERPL-MCNC: 4.1 G/DL (ref 3.5–5.2)
ALBUMIN/GLOB SERPL: 1.4 {RATIO} (ref 1–2.5)
ALP SERPL-CCNC: 91 U/L (ref 40–129)
ALT SERPL-CCNC: 20 U/L (ref 5–41)
ANION GAP SERPL CALCULATED.3IONS-SCNC: 11 MMOL/L (ref 9–17)
AST SERPL-CCNC: 22 U/L
BASOPHILS # BLD: 0.08 K/UL (ref 0–0.2)
BASOPHILS NFR BLD: 1 % (ref 0–2)
BILIRUB SERPL-MCNC: 0.5 MG/DL (ref 0.3–1.2)
BUN SERPL-MCNC: 24 MG/DL (ref 8–23)
BUN/CREAT SERPL: 15 (ref 9–20)
CALCIUM SERPL-MCNC: 9 MG/DL (ref 8.6–10.4)
CHLORIDE SERPL-SCNC: 105 MMOL/L (ref 98–107)
CO2 SERPL-SCNC: 25 MMOL/L (ref 20–31)
CREAT SERPL-MCNC: 1.6 MG/DL (ref 0.7–1.2)
EOSINOPHIL # BLD: 0.18 K/UL (ref 0–0.44)
EOSINOPHILS RELATIVE PERCENT: 2 % (ref 1–4)
ERYTHROCYTE [DISTWIDTH] IN BLOOD BY AUTOMATED COUNT: 15.4 % (ref 11.8–14.4)
FERRITIN SERPL-MCNC: 59 NG/ML (ref 30–400)
GFR SERPL CREATININE-BSD FRML MDRD: 45 ML/MIN/1.73M2
GLUCOSE SERPL-MCNC: 53 MG/DL (ref 70–99)
HCT VFR BLD AUTO: 34.9 % (ref 40.7–50.3)
HGB BLD-MCNC: 11.1 G/DL (ref 13–17)
IMM GRANULOCYTES # BLD AUTO: 0.07 K/UL (ref 0–0.3)
IMM GRANULOCYTES NFR BLD: 1 %
IRON SATN MFR SERPL: 15 % (ref 20–55)
IRON SERPL-MCNC: 55 UG/DL (ref 61–157)
LYMPHOCYTES NFR BLD: 2.52 K/UL (ref 1.1–3.7)
LYMPHOCYTES RELATIVE PERCENT: 29 % (ref 24–43)
MCH RBC QN AUTO: 29.4 PG (ref 25.2–33.5)
MCHC RBC AUTO-ENTMCNC: 31.8 G/DL (ref 28.4–34.8)
MCV RBC AUTO: 92.3 FL (ref 82.6–102.9)
MONOCYTES NFR BLD: 1.05 K/UL (ref 0.1–1.2)
MONOCYTES NFR BLD: 12 % (ref 3–12)
NEUTROPHILS NFR BLD: 55 % (ref 36–65)
NEUTS SEG NFR BLD: 4.74 K/UL (ref 1.5–8.1)
NRBC BLD-RTO: 0 PER 100 WBC
PLATELET # BLD AUTO: 169 K/UL (ref 138–453)
PMV BLD AUTO: 11.1 FL (ref 8.1–13.5)
POTASSIUM SERPL-SCNC: 3.9 MMOL/L (ref 3.7–5.3)
PROT SERPL-MCNC: 7 G/DL (ref 6.4–8.3)
RBC # BLD AUTO: 3.78 M/UL (ref 4.21–5.77)
SODIUM SERPL-SCNC: 141 MMOL/L (ref 135–144)
TIBC SERPL-MCNC: 365 UG/DL (ref 250–450)
UNSATURATED IRON BINDING CAPACITY: 310 UG/DL (ref 112–347)
WBC OTHER # BLD: 8.6 K/UL (ref 3.5–11.3)

## 2024-04-30 PROCEDURE — 74176 CT ABD & PELVIS W/O CONTRAST: CPT

## 2024-04-30 PROCEDURE — 83550 IRON BINDING TEST: CPT

## 2024-04-30 PROCEDURE — 36415 COLL VENOUS BLD VENIPUNCTURE: CPT

## 2024-04-30 PROCEDURE — 80053 COMPREHEN METABOLIC PANEL: CPT

## 2024-04-30 PROCEDURE — 82728 ASSAY OF FERRITIN: CPT

## 2024-04-30 PROCEDURE — 83540 ASSAY OF IRON: CPT

## 2024-04-30 PROCEDURE — 85025 COMPLETE CBC W/AUTO DIFF WBC: CPT

## 2024-05-10 ENCOUNTER — HOSPITAL ENCOUNTER (OUTPATIENT)
Dept: MRI IMAGING | Age: 74
End: 2024-05-10
Payer: MEDICARE

## 2024-05-10 DIAGNOSIS — Z98.890 S/P SPINAL SURGERY: ICD-10-CM

## 2024-05-10 PROCEDURE — A9579 GAD-BASE MR CONTRAST NOS,1ML: HCPCS

## 2024-05-10 PROCEDURE — 72157 MRI CHEST SPINE W/O & W/DYE: CPT

## 2024-05-10 PROCEDURE — 6360000004 HC RX CONTRAST MEDICATION

## 2024-05-10 RX ADMIN — GADOTERIDOL 16 ML: 279.3 INJECTION, SOLUTION INTRAVENOUS at 09:09

## 2024-05-28 ENCOUNTER — OFFICE VISIT (OUTPATIENT)
Dept: CARDIOLOGY | Age: 74
End: 2024-05-28
Payer: MEDICARE

## 2024-05-28 ENCOUNTER — HOSPITAL ENCOUNTER (OUTPATIENT)
Age: 74
Discharge: HOME | End: 2024-05-28
Payer: MEDICARE

## 2024-05-28 ENCOUNTER — HOSPITAL ENCOUNTER (OUTPATIENT)
Dept: ULTRASOUND IMAGING | Age: 74
Discharge: HOME | End: 2024-05-30
Payer: MEDICARE

## 2024-05-28 VITALS
RESPIRATION RATE: 18 BRPM | HEIGHT: 72 IN | SYSTOLIC BLOOD PRESSURE: 127 MMHG | HEART RATE: 64 BPM | DIASTOLIC BLOOD PRESSURE: 63 MMHG | BODY MASS INDEX: 25.63 KG/M2 | OXYGEN SATURATION: 98 % | WEIGHT: 189.2 LBS

## 2024-05-28 DIAGNOSIS — M79.89 SYMPTOM OF LEG SWELLING: ICD-10-CM

## 2024-05-28 DIAGNOSIS — Z95.820 S/P ANGIOPLASTY WITH STENT: ICD-10-CM

## 2024-05-28 DIAGNOSIS — E11.9 TYPE 2 DIABETES MELLITUS WITHOUT COMPLICATION, WITH LONG-TERM CURRENT USE OF INSULIN (HCC): ICD-10-CM

## 2024-05-28 DIAGNOSIS — E78.2 MIXED HYPERLIPIDEMIA: ICD-10-CM

## 2024-05-28 DIAGNOSIS — Z87.898 HISTORY OF SYNCOPE: ICD-10-CM

## 2024-05-28 DIAGNOSIS — D64.9 CHRONIC ANEMIA: Primary | ICD-10-CM

## 2024-05-28 DIAGNOSIS — R06.02 SOB (SHORTNESS OF BREATH): ICD-10-CM

## 2024-05-28 DIAGNOSIS — Z79.4 TYPE 2 DIABETES MELLITUS WITHOUT COMPLICATION, WITH LONG-TERM CURRENT USE OF INSULIN (HCC): ICD-10-CM

## 2024-05-28 DIAGNOSIS — I10 ESSENTIAL HYPERTENSION: ICD-10-CM

## 2024-05-28 DIAGNOSIS — I25.10 CORONARY ARTERY DISEASE INVOLVING NATIVE CORONARY ARTERY OF NATIVE HEART WITHOUT ANGINA PECTORIS: ICD-10-CM

## 2024-05-28 DIAGNOSIS — I47.29 NSVT (NONSUSTAINED VENTRICULAR TACHYCARDIA) (HCC): ICD-10-CM

## 2024-05-28 DIAGNOSIS — I83.893 VARICOSE VEINS OF LEG WITH SWELLING, BILATERAL: ICD-10-CM

## 2024-05-28 DIAGNOSIS — Z87.898 HISTORY OF SYNCOPE: Primary | ICD-10-CM

## 2024-05-28 DIAGNOSIS — R19.8 ABNORMAL ABDOMINAL EXAM: ICD-10-CM

## 2024-05-28 LAB
ALBUMIN SERPL-MCNC: 3.8 G/DL (ref 3.5–5.2)
ALBUMIN/GLOB SERPL: 1.4 {RATIO} (ref 1–2.5)
ALP SERPL-CCNC: 80 U/L (ref 40–129)
ALT SERPL-CCNC: 14 U/L (ref 5–41)
ANION GAP SERPL CALCULATED.3IONS-SCNC: 8 MMOL/L (ref 9–17)
AST SERPL-CCNC: 16 U/L
BILIRUB DIRECT SERPL-MCNC: <0.1 MG/DL
BILIRUB INDIRECT SERPL-MCNC: NORMAL MG/DL (ref 0–1)
BILIRUB SERPL-MCNC: 0.4 MG/DL (ref 0.3–1.2)
BNP SERPL-MCNC: 225 PG/ML
BUN SERPL-MCNC: 22 MG/DL (ref 8–23)
BUN/CREAT SERPL: 15 (ref 9–20)
CALCIUM SERPL-MCNC: 8.6 MG/DL (ref 8.6–10.4)
CHLORIDE SERPL-SCNC: 103 MMOL/L (ref 98–107)
CO2 SERPL-SCNC: 25 MMOL/L (ref 20–31)
CREAT SERPL-MCNC: 1.5 MG/DL (ref 0.7–1.2)
ERYTHROCYTE [DISTWIDTH] IN BLOOD BY AUTOMATED COUNT: 15.2 % (ref 11.8–14.4)
GFR, ESTIMATED: 49 ML/MIN/1.73M2
GLUCOSE SERPL-MCNC: 108 MG/DL (ref 70–99)
HCT VFR BLD AUTO: 31.3 % (ref 40.7–50.3)
HGB BLD-MCNC: 10 G/DL (ref 13–17)
MCH RBC QN AUTO: 29.6 PG (ref 25.2–33.5)
MCHC RBC AUTO-ENTMCNC: 31.9 G/DL (ref 28.4–34.8)
MCV RBC AUTO: 92.6 FL (ref 82.6–102.9)
NRBC BLD-RTO: 0 PER 100 WBC
PLATELET # BLD AUTO: 149 K/UL (ref 138–453)
PMV BLD AUTO: 10.8 FL (ref 8.1–13.5)
POTASSIUM SERPL-SCNC: 4.1 MMOL/L (ref 3.7–5.3)
PROT SERPL-MCNC: 6.5 G/DL (ref 6.4–8.3)
RBC # BLD AUTO: 3.38 M/UL (ref 4.21–5.77)
SODIUM SERPL-SCNC: 136 MMOL/L (ref 135–144)
WBC OTHER # BLD: 7.1 K/UL (ref 3.5–11.3)

## 2024-05-28 PROCEDURE — 85027 COMPLETE CBC AUTOMATED: CPT

## 2024-05-28 PROCEDURE — 3017F COLORECTAL CA SCREEN DOC REV: CPT | Performed by: INTERNAL MEDICINE

## 2024-05-28 PROCEDURE — 80048 BASIC METABOLIC PNL TOTAL CA: CPT

## 2024-05-28 PROCEDURE — 3078F DIAST BP <80 MM HG: CPT | Performed by: INTERNAL MEDICINE

## 2024-05-28 PROCEDURE — 3046F HEMOGLOBIN A1C LEVEL >9.0%: CPT | Performed by: INTERNAL MEDICINE

## 2024-05-28 PROCEDURE — 99211 OFF/OP EST MAY X REQ PHY/QHP: CPT | Performed by: INTERNAL MEDICINE

## 2024-05-28 PROCEDURE — G8419 CALC BMI OUT NRM PARAM NOF/U: HCPCS | Performed by: INTERNAL MEDICINE

## 2024-05-28 PROCEDURE — 36415 COLL VENOUS BLD VENIPUNCTURE: CPT

## 2024-05-28 PROCEDURE — 80076 HEPATIC FUNCTION PANEL: CPT

## 2024-05-28 PROCEDURE — 76705 ECHO EXAM OF ABDOMEN: CPT

## 2024-05-28 PROCEDURE — 1036F TOBACCO NON-USER: CPT | Performed by: INTERNAL MEDICINE

## 2024-05-28 PROCEDURE — G8427 DOCREV CUR MEDS BY ELIG CLIN: HCPCS | Performed by: INTERNAL MEDICINE

## 2024-05-28 PROCEDURE — 2022F DILAT RTA XM EVC RTNOPTHY: CPT | Performed by: INTERNAL MEDICINE

## 2024-05-28 PROCEDURE — 83880 ASSAY OF NATRIURETIC PEPTIDE: CPT

## 2024-05-28 PROCEDURE — 99214 OFFICE O/P EST MOD 30 MIN: CPT | Performed by: INTERNAL MEDICINE

## 2024-05-28 PROCEDURE — 3074F SYST BP LT 130 MM HG: CPT | Performed by: INTERNAL MEDICINE

## 2024-05-28 PROCEDURE — 1123F ACP DISCUSS/DSCN MKR DOCD: CPT | Performed by: INTERNAL MEDICINE

## 2024-05-28 NOTE — PROGRESS NOTES
identified. Aortic leaflets show calcification without restriction of motion. No aortic insufficiency. Mild diastolic dysfunction. Compared to the previous study of 9/29/2022, no significant change was seen.    CAM done on 2/7/2023: Showed one episode of non sustained ventricular tachycardia five beats in duration.     EKG done in office on 5/19/2023: Normal sinus rhythm     ER/ Hospital on 10/23/2023 due to syncope episodes. He has 4 episodes total. First time he was standing at [employer] at the Service desk and went down (his wife tried to catch him).  He reports he was just standing in line, December 2022. The second time he was just sitting at home and it happened. The 3rd time he was sitting in the car in the [employer] parking lot and it happened. The 4th time happened while at his PCP's office and was witnessed while sitting on the exam table. His visions starts fading out and it only lasts seconds. No shaking movements. No loss of bowels or bladder function. When he comes to, he knows what is going on.    Echo done on 11/6/2023: Global left ventricular systolic function is normal with estimated ejection fraction of 60 to 65%. Normal left ventricular cavity size with mildly increased left ventricular wall thickness. No definite specific wall motion abnormalities were identified. No significant valvular abnormalities. Mild diastolic dysfunction noted. When compared with the study on 2/7/2023, no significant changes noted.    [facility] 11/6/2023: One 3 beat SVT run. No symptoms were reported. Largely unremarkable study.     Heart Cath done on 11/7/2023: Severe single vessel coronary artery disease involving a mid 100% stenosis in the right coronary artery. Mild to moderate disease in the Cx and LAD. Normal left ventricular end diastolic pressure. Proceed with aggressive maximal medical management as clinically indicated.     EKG done in office (2/12/2024): Showed normal sinus rhythm.     Mr. Acosta is here

## 2024-05-29 ENCOUNTER — TELEPHONE (OUTPATIENT)
Dept: CARDIOLOGY | Age: 74
End: 2024-05-29

## 2024-05-29 DIAGNOSIS — I25.10 CORONARY ARTERY DISEASE INVOLVING NATIVE CORONARY ARTERY OF NATIVE HEART WITHOUT ANGINA PECTORIS: ICD-10-CM

## 2024-05-29 DIAGNOSIS — I47.29 NSVT (NONSUSTAINED VENTRICULAR TACHYCARDIA) (HCC): ICD-10-CM

## 2024-05-29 DIAGNOSIS — E78.2 MIXED HYPERLIPIDEMIA: ICD-10-CM

## 2024-05-29 DIAGNOSIS — Z79.4 TYPE 2 DIABETES MELLITUS WITHOUT COMPLICATION, WITH LONG-TERM CURRENT USE OF INSULIN (HCC): ICD-10-CM

## 2024-05-29 DIAGNOSIS — Z87.898 HISTORY OF SYNCOPE: ICD-10-CM

## 2024-05-29 DIAGNOSIS — R06.02 SOB (SHORTNESS OF BREATH): ICD-10-CM

## 2024-05-29 DIAGNOSIS — Z95.820 S/P ANGIOPLASTY WITH STENT: ICD-10-CM

## 2024-05-29 DIAGNOSIS — E11.9 TYPE 2 DIABETES MELLITUS WITHOUT COMPLICATION, WITH LONG-TERM CURRENT USE OF INSULIN (HCC): ICD-10-CM

## 2024-05-29 DIAGNOSIS — D64.9 ANEMIA, UNSPECIFIED TYPE: Primary | ICD-10-CM

## 2024-05-29 DIAGNOSIS — I10 ESSENTIAL HYPERTENSION: ICD-10-CM

## 2024-05-29 NOTE — TELEPHONE ENCOUNTER
----- Message from Viola Stahl MD sent at 5/28/2024 10:17 PM EDT -----  Blood work is stable.  Liver ultrasound showed minor abnormalities.   Gall stones noted. Nothing need to be done about this now.     Worsening anemia, continue iron therapy. Repeat iron profile ordered.  Please make sure he is seeing nephrology, if not then please refer him to Dr. Coleman.    Awaiting the venous reflux study. Thank you

## 2024-05-29 NOTE — TELEPHONE ENCOUNTER
Vania called back to report he actually doesn't see a kidney Dr. - got got confused on who he is all seeing. She was okay that we place a referral to Dr. Coleman for him to establish care. Referral placed and I will fax everything over.

## 2024-05-30 NOTE — TELEPHONE ENCOUNTER
Attempted to fax everything over for the last 2 days and it will not go through. Called the office for an alternate fax number and they do not have one. I was provided an email of '[email or web address]' so I did fax the referral this to that email.

## 2024-06-07 ENCOUNTER — HOSPITAL ENCOUNTER (OUTPATIENT)
Dept: VASCULAR LAB | Age: 74
End: 2024-06-07
Attending: INTERNAL MEDICINE
Payer: MEDICARE

## 2024-06-07 DIAGNOSIS — I83.893 VARICOSE VEINS OF LEG WITH SWELLING, BILATERAL: ICD-10-CM

## 2024-06-07 PROCEDURE — 93970 EXTREMITY STUDY: CPT

## 2024-06-08 LAB
VAS LEFT GSV ANKLE DIAM: 3.7 MM
VAS LEFT GSV ANKLE RFX: 0 S
VAS LEFT GSV AT KNEE DIAM: 3.4 MM
VAS LEFT GSV AT KNEE RFX: 0.4 S
VAS LEFT GSV BK MID DIAM: 1.9 MM
VAS LEFT GSV BK MID RFX: 0 S
VAS LEFT GSV JUNC DIAM: 7.2 MM
VAS LEFT GSV JUNC RFX: 0 S
VAS LEFT GSV THIGH MID DIAM: 4.5 MM
VAS LEFT GSV THIGH PROX DIAM: 6.3 MM
VAS LEFT GSV THIGH PROX RFX: 0 S
VAS LEFT GSV THIGHT MID RFX: 0 S
VAS LEFT SSV PROX DIAM: 2.7 MM
VAS LEFT SSV PROX RFX: 0 S
VAS RIGHT GSV AK RFX: 0.2 S
VAS RIGHT GSV ANKLE DIAM: 3.5 MM
VAS RIGHT GSV ANKLE RFX: 0 S
VAS RIGHT GSV AT KNEE DIAM: 2.4 MM
VAS RIGHT GSV BK MID DIAM: 3.1 MM
VAS RIGHT GSV BK MID RFX: 0.3 S
VAS RIGHT GSV JUNC DIAM: 6.8 MM
VAS RIGHT GSV JUNC RFX: 0 S
VAS RIGHT GSV THIGH MID DIAM: 2.2 MM
VAS RIGHT GSV THIGH MID RFX: 0 S
VAS RIGHT GSV THIGH PROX DIAM: 4.5 MM
VAS RIGHT GSV THIGH PROX RFX: 0 S
VAS RIGHT SSV PROX DIAM: 1.9 MM
VAS RIGHT SSV PROX RFX: 0 S

## 2024-06-10 ENCOUNTER — TELEPHONE (OUTPATIENT)
Dept: CARDIOLOGY | Age: 74
End: 2024-06-10

## 2024-06-10 NOTE — TELEPHONE ENCOUNTER
----- Message from Viola Stahl MD sent at 6/9/2024  8:38 PM EDT -----  Test result normal.  No further action needed.

## 2024-06-13 ENCOUNTER — OFFICE VISIT (OUTPATIENT)
Dept: ONCOLOGY | Age: 74
End: 2024-06-13
Payer: MEDICARE

## 2024-06-13 VITALS — BODY MASS INDEX: 25.33 KG/M2 | TEMPERATURE: 97.6 F | RESPIRATION RATE: 18 BRPM | HEIGHT: 72 IN | WEIGHT: 187 LBS

## 2024-06-13 DIAGNOSIS — N18.32 STAGE 3B CHRONIC KIDNEY DISEASE (HCC): ICD-10-CM

## 2024-06-13 DIAGNOSIS — N28.89 KIDNEY MASS: ICD-10-CM

## 2024-06-13 DIAGNOSIS — C67.4 CANCER OF POSTERIOR WALL OF URINARY BLADDER (HCC): ICD-10-CM

## 2024-06-13 DIAGNOSIS — C34.01 LUNG CANCER, MAIN BRONCHUS, RIGHT (HCC): Primary | ICD-10-CM

## 2024-06-13 PROCEDURE — G8419 CALC BMI OUT NRM PARAM NOF/U: HCPCS | Performed by: INTERNAL MEDICINE

## 2024-06-13 PROCEDURE — 3017F COLORECTAL CA SCREEN DOC REV: CPT | Performed by: INTERNAL MEDICINE

## 2024-06-13 PROCEDURE — 1123F ACP DISCUSS/DSCN MKR DOCD: CPT | Performed by: INTERNAL MEDICINE

## 2024-06-13 PROCEDURE — G8427 DOCREV CUR MEDS BY ELIG CLIN: HCPCS | Performed by: INTERNAL MEDICINE

## 2024-06-13 PROCEDURE — 99214 OFFICE O/P EST MOD 30 MIN: CPT | Performed by: INTERNAL MEDICINE

## 2024-06-13 PROCEDURE — 99211 OFF/OP EST MAY X REQ PHY/QHP: CPT | Performed by: INTERNAL MEDICINE

## 2024-06-13 PROCEDURE — 1036F TOBACCO NON-USER: CPT | Performed by: INTERNAL MEDICINE

## 2024-06-13 NOTE — PROGRESS NOTES
(very suspicious) nodule.   Recommend repeat PET-CT and or tissue sampling given appearance and   significant interval increase in size.   2. No major change in the fat density 2.3 cm nodule in the gastric fundus   adjacent to the GE junction.  Note this was FDG avid on the prior PET-CT.   3. Mild emphysema.   The findings were sent to the [facility] at 12:06   pm on 6/10/2021to be communicated to a licensed caregiver.           ASSESSMENT / PLAN:    Corwin was seen today for follow-up.    Diagnoses and all orders for this visit:    Lung cancer, main bronchus, right (HCC)  -     CT CHEST ABDOMEN PELVIS WO CONTRAST Additional Contrast? 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)    Kidney mass               71-year-old man history of smoking diagnosed with pathology stage Ib(T2 aN0 M0) adenocarcinoma of the right middle lobe status post right VATS thoracotomy with right middle lobectomy done on January 4, 2022, due to high risk features manifested by involvement of visceral pleura recommended adjuvant chemotherapy which is Alimta and carboplatin, carboplatin substitute cisplatin due to frailed/nausea related to gastroparesis from diabetes in addition to mild neuropathy, started on chemotherapy on February 21, 2022 and done with 4 cycles of treatment, did well however the last cycle he had weakness fatigue and lightheadedness which improved  Hypotension/orthostatic likely related to diabetic orthostatic  Right-sided pleural effusion status post thoracentesis> fluid sent for cytology and was negative> chest pain resolved  Benign T2-T3 cord compression> status post surgery  Abnormal cystoscopy/bladder wall> was seen at [facility] and under surveillance  MRI of the kidney did show left renal nodule concern about RCC> PET scan was done

## 2024-07-17 ENCOUNTER — HOSPITAL ENCOUNTER (OUTPATIENT)
Dept: ULTRASOUND IMAGING | Age: 74
Discharge: HOME | End: 2024-07-19
Attending: SPECIALIST
Payer: MEDICARE

## 2024-07-17 DIAGNOSIS — N18.31 CHRONIC KIDNEY DISEASE, STAGE 3A (HCC): ICD-10-CM

## 2024-07-17 DIAGNOSIS — I10 ESSENTIAL (PRIMARY) HYPERTENSION: ICD-10-CM

## 2024-07-17 DIAGNOSIS — E11.9 TYPE 2 DIABETES MELLITUS WITHOUT COMPLICATION, UNSPECIFIED WHETHER LONG TERM INSULIN USE (HCC): ICD-10-CM

## 2024-07-17 DIAGNOSIS — E87.70 HYPERVOLEMIA, UNSPECIFIED HYPERVOLEMIA TYPE: ICD-10-CM

## 2024-07-17 PROCEDURE — 76770 US EXAM ABDO BACK WALL COMP: CPT

## 2024-08-04 ENCOUNTER — HOSPITAL ENCOUNTER (OUTPATIENT)
Age: 74
Setting detail: SPECIMEN
Discharge: HOME | End: 2024-08-04
Payer: MEDICARE

## 2024-08-04 PROCEDURE — 84156 ASSAY OF PROTEIN URINE: CPT

## 2024-08-04 PROCEDURE — 82575 CREATININE CLEARANCE TEST: CPT

## 2024-08-05 ENCOUNTER — HOSPITAL ENCOUNTER (OUTPATIENT)
Age: 74
Discharge: HOME | End: 2024-08-05
Payer: MEDICARE

## 2024-08-05 LAB
ALBUMIN: 4.1 G/DL (ref 3.5–5.2)
ANION GAP SERPL CALCULATED.3IONS-SCNC: 12 MMOL/L (ref 9–17)
BUN SERPL-MCNC: 38 MG/DL (ref 8–23)
BUN/CREAT SERPL: 21 (ref 9–20)
CALCIUM SERPL-MCNC: 9.6 MG/DL (ref 8.6–10.4)
CHLORIDE SERPL-SCNC: 99 MMOL/L (ref 98–107)
CO2 SERPL-SCNC: 28 MMOL/L (ref 20–31)
COLLECT DURATION TIME UR: 24 H
CREAT SERPL-MCNC: 1.8 MG/DL (ref 0.7–1.2)
CREAT SERPL-MCNC: 1.8 MG/DL (ref 0.7–1.2)
CREAT UR-MCNC: 146.3 MG/DL (ref 39–259)
CREAT UR-MCNC: 97.2 MG/DL (ref 39–259)
CREATINE 24H UR-MRATE: 1264 MG/24 H (ref 1040–2350)
CREATININE CLEARANCE: 56.8 ML/MIN/BSA (ref 71–151)
GFR, ESTIMATED: 39 ML/MIN/1.73M2
GLUCOSE SERPL-MCNC: 93 MG/DL (ref 70–99)
HOURS COLLECTED: 24 H
MAGNESIUM SERPL-MCNC: 1.8 MG/DL (ref 1.6–2.6)
PATIENT HEIGHT: 72 IN
PHOSPHATE SERPL-MCNC: 3.8 MG/DL (ref 2.5–4.5)
POTASSIUM SERPL-SCNC: 3.9 MMOL/L (ref 3.7–5.3)
PROTEIN 24 HOUR URINE: 78 MG/24 H
PTH-INTACT SERPL-MCNC: 41 PG/ML (ref 15–65)
SODIUM SERPL-SCNC: 139 MMOL/L (ref 135–144)
SPECIMEN VOL 24H UR: 1300 ML
TOTAL PROTEIN, URINE: 8 MG/DL
TSH SERPL DL<=0.05 MIU/L-ACNC: 0.89 UIU/ML (ref 0.3–5)
URATE SERPL-MCNC: 8.1 MG/DL (ref 3.4–7)
URINE TOTAL PROTEIN CREATININE RATIO: 0.05 (ref 0–0.2)
URINE TOTAL PROTEIN: 6 MG/DL
VOLUME: 1300 ML

## 2024-08-05 PROCEDURE — 82570 ASSAY OF URINE CREATININE: CPT

## 2024-08-05 PROCEDURE — 36415 COLL VENOUS BLD VENIPUNCTURE: CPT

## 2024-08-05 PROCEDURE — 80069 RENAL FUNCTION PANEL: CPT

## 2024-08-05 PROCEDURE — 82610 CYSTATIN C: CPT

## 2024-08-05 PROCEDURE — 83970 ASSAY OF PARATHORMONE: CPT

## 2024-08-05 PROCEDURE — 84156 ASSAY OF PROTEIN URINE: CPT

## 2024-08-05 PROCEDURE — 84550 ASSAY OF BLOOD/URIC ACID: CPT

## 2024-08-05 PROCEDURE — 87086 URINE CULTURE/COLONY COUNT: CPT

## 2024-08-05 PROCEDURE — 83735 ASSAY OF MAGNESIUM: CPT

## 2024-08-05 PROCEDURE — 84443 ASSAY THYROID STIM HORMONE: CPT

## 2024-08-06 LAB
CYSTATIN C: 1.9 MG/L (ref 0.5–1.2)
EGFR BY CYSTATIN C: 31 ML/MIN/BSA
MICROORGANISM SPEC CULT: NO GROWTH
SPECIMEN DESCRIPTION: NORMAL

## 2024-08-14 ENCOUNTER — NURSE ONLY (OUTPATIENT)
Dept: UROLOGY | Age: 74
End: 2024-08-14
Payer: MEDICARE

## 2024-08-14 VITALS
WEIGHT: 184 LBS | HEIGHT: 72 IN | SYSTOLIC BLOOD PRESSURE: 130 MMHG | TEMPERATURE: 98.2 F | DIASTOLIC BLOOD PRESSURE: 68 MMHG | BODY MASS INDEX: 24.92 KG/M2

## 2024-08-14 DIAGNOSIS — C67.4 CANCER OF POSTERIOR WALL OF URINARY BLADDER (HCC): Primary | ICD-10-CM

## 2024-08-14 PROCEDURE — PBSHW PBB SHADOW CHARGE: Performed by: PHYSICIAN ASSISTANT

## 2024-08-14 PROCEDURE — 51720 TREATMENT OF BLADDER LESION: CPT | Performed by: PHYSICIAN ASSISTANT

## 2024-08-14 RX ADMIN — BACILLUS CALMETTE-GUERIN 50 MG: 50 POWDER, FOR SUSPENSION INTRAVESICAL at 16:01

## 2024-08-14 NOTE — PROGRESS NOTES
Here for BCG treatment 1 of 6. 16F marcelino catheter placed under sterile technique without difficulty, with 10 mL sterile water instilled into balloon. BCG instilled into the bladder via the catheter. Balloon deflated and catheter removed without difficulty. Patient tolerated procedure well. All post BCG instructions reviewed with patient. Pt instructed to contact office (or go to ER if after hours) if he develops fever, chills, malaise, myalgias, gross hematuria, dysuria, or any other new symptoms.     Follow up in 1 week for next treatment.   BCG  Lot: [id number]  Exp: [id number]     SODIUM CHLORIDE 10 ML (X5 VIALS TO TOTAL 50 ML)  LOT [id number]  EXP 01/01/2025

## 2024-08-21 ENCOUNTER — NURSE ONLY (OUTPATIENT)
Dept: UROLOGY | Age: 74
End: 2024-08-21

## 2024-08-21 VITALS
SYSTOLIC BLOOD PRESSURE: 76 MMHG | BODY MASS INDEX: 25.25 KG/M2 | WEIGHT: 186.2 LBS | HEART RATE: 69 BPM | DIASTOLIC BLOOD PRESSURE: 38 MMHG | TEMPERATURE: 98 F

## 2024-08-21 DIAGNOSIS — I95.89 OTHER SPECIFIED HYPOTENSION: ICD-10-CM

## 2024-08-21 DIAGNOSIS — C67.4 CANCER OF POSTERIOR WALL OF URINARY BLADDER (HCC): Primary | ICD-10-CM

## 2024-08-21 RX ORDER — MIDODRINE HYDROCHLORIDE 5 MG/1
5 TABLET ORAL DAILY
COMMUNITY

## 2024-08-21 RX ORDER — ALLOPURINOL 100 MG/1
1 TABLET ORAL
COMMUNITY
Start: 2024-08-16 | End: 2024-12-13

## 2024-08-21 NOTE — PROGRESS NOTES
Patient was scheduled for BCG No. 2 today.  Patient reported to nurse that he had not been feeling well for the last 1 to 2 days    We will hold off on giving him BCG today    We did check his blood pressure manually multiple times in the office.    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  He only drank a couple coffee antibiotic of 7-Up.    Patient took all of his blood pressure medicine this morning.    He denies any distinct dizziness or lightheadedness.  He denies any syncopal episodes.  He just feels fatigued.    We did call his cardiologist Dr. Viola Stahl discussed the case.    Both Dr. Stahl and myself mutually agreed the patient should be evaluated in the emergency room and possibly get IV fluids.    We did discuss this with the patient.  Patient refused to go to the emergency room.  He instead will go home and drink a couple bottles of Powerade.  He is going to check his blood pressure later on today.  He was instructed that if he does go to the emergency room he should very least give Dr. Stahl's office a call tomorrow with his blood pressure and how he is feeling.  He may also stop by Dr. Stahl's office tomorrow morning for blood pressure check if he chooses.  We did discuss with patient he should have a very low threshold in going to the emergency room if he starts to feel worse.     Diagnosis Orders   1. Cancer of posterior wall of urinary bladder (HCC)        2. Other specified hypotension            PETE GARCIA PA-C, ALFREDO  8/21/2024, 5:11 PM

## 2024-08-22 ENCOUNTER — TELEPHONE (OUTPATIENT)
Dept: UROLOGY | Age: 74
End: 2024-08-22

## 2024-08-22 NOTE — TELEPHONE ENCOUNTER
Patient said his BP was 132/70 at 4:30pm yesterday. He did not go to the ER, he went to [facility] and got some Gatorade and drank it.

## 2024-08-28 ENCOUNTER — NURSE ONLY (OUTPATIENT)
Dept: UROLOGY | Age: 74
End: 2024-08-28
Payer: MEDICARE

## 2024-08-28 VITALS
SYSTOLIC BLOOD PRESSURE: 82 MMHG | BODY MASS INDEX: 24.68 KG/M2 | TEMPERATURE: 98.4 F | WEIGHT: 182.2 LBS | HEIGHT: 72 IN | DIASTOLIC BLOOD PRESSURE: 53 MMHG

## 2024-08-28 DIAGNOSIS — C67.4 MALIGNANT NEOPLASM OF POSTERIOR WALL OF URINARY BLADDER (HCC): Primary | ICD-10-CM

## 2024-08-28 PROCEDURE — PBSHW PBB SHADOW CHARGE: Performed by: PHYSICIAN ASSISTANT

## 2024-08-28 PROCEDURE — 51720 TREATMENT OF BLADDER LESION: CPT | Performed by: PHYSICIAN ASSISTANT

## 2024-08-28 RX ADMIN — BACILLUS CALMETTE-GUERIN 50 MG: 50 POWDER, FOR SUSPENSION INTRAVESICAL at 15:28

## 2024-08-28 NOTE — PROGRESS NOTES
Here for BCG treatment 2 of 6. 16F marcelino catheter placed under sterile technique without difficulty, with 10 mL sterile water instilled into balloon. BCG instilled into the bladder via the catheter. Balloon deflated and catheter removed without difficulty. Patient tolerated procedure well. All post BCG instructions reviewed with patient. Pt instructed to contact office (or go to ER if after hours) if he develops fever, chills, malaise, myalgias, gross hematuria, dysuria, or any other new symptoms.     Follow up in 1 week for next treatment.     Lot: [id number]  Exp: Mar 20 2025     Sodium Chloride 0.9%  LOT [id number]  Exp 01/01/2025

## 2024-09-04 ENCOUNTER — NURSE ONLY (OUTPATIENT)
Dept: UROLOGY | Age: 74
End: 2024-09-04
Payer: MEDICARE

## 2024-09-04 VITALS
BODY MASS INDEX: 25.23 KG/M2 | WEIGHT: 186 LBS | SYSTOLIC BLOOD PRESSURE: 111 MMHG | HEART RATE: 80 BPM | TEMPERATURE: 98.2 F | DIASTOLIC BLOOD PRESSURE: 69 MMHG

## 2024-09-04 DIAGNOSIS — C67.4 MALIGNANT NEOPLASM OF POSTERIOR WALL OF URINARY BLADDER (HCC): Primary | ICD-10-CM

## 2024-09-04 PROCEDURE — PBSHW PBB SHADOW CHARGE: Performed by: PHYSICIAN ASSISTANT

## 2024-09-04 PROCEDURE — 51720 TREATMENT OF BLADDER LESION: CPT | Performed by: PHYSICIAN ASSISTANT

## 2024-09-04 RX ADMIN — BACILLUS CALMETTE-GUERIN 50 MG: 50 POWDER, FOR SUSPENSION INTRAVESICAL at 16:26

## 2024-09-04 NOTE — PROGRESS NOTES
Here for BCG treatment 3 of 6. 16F marcelino catheter placed under sterile technique without difficulty, with 10 mL sterile water instilled into balloon. BCG instilled into the bladder via the catheter. Balloon deflated and catheter removed without difficulty. Patient tolerated procedure well. All post BCG instructions reviewed with patient. Pt instructed to contact office (or go to ER if after hours) if he develops fever, chills, malaise, myalgias, gross hematuria, dysuria, or any other new symptoms.     Follow up in 1 week for next treatment.     Lot: [id number]  Exp: 03/20/2025     Sodium chloride 0.9%  Lot [id number]  Exp 01/01/2025          The patient advised that he was unable to hold the medication last time, \"it just came out\" during his drive home.   This was addressed with Boyd Sanchez and he advised to go ahead with the treatment today and see how he does.  Writer instructed the patient to contact the office tomorrow and give an update how he did with today's treatment.    If he is unable to hold the BCG medication, we may prescribe a medication for him to take prior to having his next treatment.   Patient voiced an understanding.

## 2024-09-05 ENCOUNTER — TELEPHONE (OUTPATIENT)
Dept: UROLOGY | Age: 74
End: 2024-09-05

## 2024-09-05 NOTE — TELEPHONE ENCOUNTER
Mr Acosta called this morning to let the office know he was able to hold his bladder for 3 1/2 hours yesterday after BCG treatment. He stated this week went good.

## 2024-09-11 ENCOUNTER — NURSE ONLY (OUTPATIENT)
Dept: UROLOGY | Age: 74
End: 2024-09-11
Payer: MEDICARE

## 2024-09-11 VITALS
HEART RATE: 78 BPM | BODY MASS INDEX: 24.82 KG/M2 | DIASTOLIC BLOOD PRESSURE: 62 MMHG | TEMPERATURE: 98.6 F | SYSTOLIC BLOOD PRESSURE: 98 MMHG | WEIGHT: 183 LBS

## 2024-09-11 DIAGNOSIS — C67.4 MALIGNANT NEOPLASM OF POSTERIOR WALL OF URINARY BLADDER (HCC): Primary | ICD-10-CM

## 2024-09-11 PROCEDURE — 51720 TREATMENT OF BLADDER LESION: CPT | Performed by: PHYSICIAN ASSISTANT

## 2024-09-11 PROCEDURE — PBSHW PBB SHADOW CHARGE: Performed by: PHYSICIAN ASSISTANT

## 2024-09-11 RX ADMIN — BACILLUS CALMETTE-GUERIN 50 MG: 50 POWDER, FOR SUSPENSION INTRAVESICAL at 16:10

## 2024-09-18 ENCOUNTER — NURSE ONLY (OUTPATIENT)
Dept: UROLOGY | Age: 74
End: 2024-09-18
Payer: MEDICARE

## 2024-09-18 VITALS
BODY MASS INDEX: 24.95 KG/M2 | HEART RATE: 55 BPM | DIASTOLIC BLOOD PRESSURE: 59 MMHG | WEIGHT: 184 LBS | SYSTOLIC BLOOD PRESSURE: 97 MMHG | TEMPERATURE: 97.7 F

## 2024-09-18 DIAGNOSIS — C67.4 MALIGNANT NEOPLASM OF POSTERIOR WALL OF URINARY BLADDER (HCC): Primary | ICD-10-CM

## 2024-09-18 PROCEDURE — 51720 TREATMENT OF BLADDER LESION: CPT | Performed by: PHYSICIAN ASSISTANT

## 2024-09-18 RX ADMIN — BACILLUS CALMETTE-GUERIN 50 MG: 50 POWDER, FOR SUSPENSION INTRAVESICAL at 15:41

## 2024-09-25 ENCOUNTER — NURSE ONLY (OUTPATIENT)
Dept: UROLOGY | Age: 74
End: 2024-09-25
Payer: MEDICARE

## 2024-09-25 VITALS
DIASTOLIC BLOOD PRESSURE: 62 MMHG | HEART RATE: 67 BPM | TEMPERATURE: 97.8 F | WEIGHT: 182 LBS | SYSTOLIC BLOOD PRESSURE: 109 MMHG | BODY MASS INDEX: 24.68 KG/M2

## 2024-09-25 DIAGNOSIS — C67.4 MALIGNANT NEOPLASM OF POSTERIOR WALL OF URINARY BLADDER (HCC): Primary | ICD-10-CM

## 2024-09-25 PROCEDURE — PBSHW PBB SHADOW CHARGE: Performed by: PHYSICIAN ASSISTANT

## 2024-09-25 PROCEDURE — 51720 TREATMENT OF BLADDER LESION: CPT | Performed by: PHYSICIAN ASSISTANT

## 2024-09-25 RX ADMIN — BACILLUS CALMETTE-GUERIN 50 MG: 50 POWDER, FOR SUSPENSION INTRAVESICAL at 16:16

## 2024-10-14 ENCOUNTER — HOSPITAL ENCOUNTER (OUTPATIENT)
Age: 74
Discharge: HOME | End: 2024-10-14
Payer: MEDICARE

## 2024-10-14 LAB
ALBUMIN: 4 G/DL (ref 3.5–5.2)
ANION GAP SERPL CALCULATED.3IONS-SCNC: 10 MMOL/L (ref 9–16)
BILIRUB UR QL STRIP: NEGATIVE
BUN SERPL-MCNC: 22 MG/DL (ref 8–23)
BUN/CREAT SERPL: 15 (ref 9–20)
CALCIUM SERPL-MCNC: 9.2 MG/DL (ref 8.6–10.4)
CHARACTER UR: NORMAL
CHLORIDE SERPL-SCNC: 104 MMOL/L (ref 98–107)
CLARITY UR: CLEAR
CO2 SERPL-SCNC: 26 MMOL/L (ref 20–31)
COLOR UR: YELLOW
CREAT SERPL-MCNC: 1.5 MG/DL (ref 0.7–1.2)
CREAT UR-MCNC: 124 MG/DL (ref 39–259)
EPI CELLS #/AREA URNS HPF: NORMAL /HPF (ref 0–5)
ERYTHROCYTE [DISTWIDTH] IN BLOOD BY AUTOMATED COUNT: 15.1 % (ref 11.8–14.4)
GFR, ESTIMATED: 48 ML/MIN/1.73M2
GLUCOSE SERPL-MCNC: 197 MG/DL (ref 74–99)
GLUCOSE UR STRIP-MCNC: NEGATIVE MG/DL
HCT VFR BLD AUTO: 35.5 % (ref 40.7–50.3)
HGB BLD-MCNC: 11.2 G/DL (ref 13–17)
HGB UR QL STRIP.AUTO: NEGATIVE
KETONES UR STRIP-MCNC: NEGATIVE MG/DL
LEUKOCYTE ESTERASE UR QL STRIP: NEGATIVE
MAGNESIUM SERPL-MCNC: 1.8 MG/DL (ref 1.6–2.4)
MCH RBC QN AUTO: 29.7 PG (ref 25.2–33.5)
MCHC RBC AUTO-ENTMCNC: 31.5 G/DL (ref 28.4–34.8)
MCV RBC AUTO: 94.2 FL (ref 82.6–102.9)
NITRITE UR QL STRIP: NEGATIVE
NRBC BLD-RTO: 0 PER 100 WBC
PH UR STRIP: 7.5 [PH] (ref 5–9)
PHOSPHATE SERPL-MCNC: 2.9 MG/DL (ref 2.5–4.5)
PLATELET # BLD AUTO: 145 K/UL (ref 138–453)
PMV BLD AUTO: 11.5 FL (ref 8.1–13.5)
POTASSIUM SERPL-SCNC: 4.4 MMOL/L (ref 3.7–5.3)
PROT UR STRIP-MCNC: NEGATIVE MG/DL
PTH-INTACT SERPL-MCNC: 51 PG/ML (ref 15–65)
RBC # BLD AUTO: 3.77 M/UL (ref 4.21–5.77)
RBC #/AREA URNS HPF: NORMAL /HPF (ref 0–2)
SODIUM SERPL-SCNC: 140 MMOL/L (ref 136–145)
SP GR UR STRIP: 1.01 (ref 1.01–1.02)
TOTAL PROTEIN, URINE: 11 MG/DL
URATE SERPL-MCNC: 7.3 MG/DL (ref 3.4–7)
URINE TOTAL PROTEIN CREATININE RATIO: 0.09 (ref 0–0.2)
UROBILINOGEN UR STRIP-ACNC: NORMAL EU/DL (ref 0–1)
WBC #/AREA URNS HPF: NORMAL /HPF (ref 0–5)
WBC OTHER # BLD: 6.3 K/UL (ref 3.5–11.3)

## 2024-10-14 PROCEDURE — 81001 URINALYSIS AUTO W/SCOPE: CPT

## 2024-10-14 PROCEDURE — 36415 COLL VENOUS BLD VENIPUNCTURE: CPT

## 2024-10-14 PROCEDURE — 84156 ASSAY OF PROTEIN URINE: CPT

## 2024-10-14 PROCEDURE — 80069 RENAL FUNCTION PANEL: CPT

## 2024-10-14 PROCEDURE — 84550 ASSAY OF BLOOD/URIC ACID: CPT

## 2024-10-14 PROCEDURE — 82570 ASSAY OF URINE CREATININE: CPT

## 2024-10-14 PROCEDURE — 83970 ASSAY OF PARATHORMONE: CPT

## 2024-10-14 PROCEDURE — 83735 ASSAY OF MAGNESIUM: CPT

## 2024-10-14 PROCEDURE — 85027 COMPLETE CBC AUTOMATED: CPT

## 2024-10-16 ENCOUNTER — HOSPITAL ENCOUNTER (OUTPATIENT)
Age: 74
Discharge: HOME | End: 2024-10-16
Payer: MEDICARE

## 2024-10-16 DIAGNOSIS — D64.9 CHRONIC ANEMIA: ICD-10-CM

## 2024-10-16 DIAGNOSIS — E55.9 VITAMIN D DEFICIENCY: ICD-10-CM

## 2024-10-16 DIAGNOSIS — C34.01 LUNG CANCER, MAIN BRONCHUS, RIGHT (HCC): ICD-10-CM

## 2024-10-16 DIAGNOSIS — E87.1 HYPONATREMIA: ICD-10-CM

## 2024-10-16 DIAGNOSIS — E11.9 TYPE 2 DIABETES MELLITUS WITHOUT COMPLICATION, WITHOUT LONG-TERM CURRENT USE OF INSULIN (HCC): ICD-10-CM

## 2024-10-16 DIAGNOSIS — R63.4 ABNORMAL WEIGHT LOSS: ICD-10-CM

## 2024-10-16 LAB
ALBUMIN SERPL-MCNC: 3.9 G/DL (ref 3.5–5.2)
ALBUMIN/GLOB SERPL: 1.5 {RATIO} (ref 1–2.5)
ALP SERPL-CCNC: 103 U/L (ref 40–129)
ALT SERPL-CCNC: 18 U/L (ref 10–50)
ANION GAP SERPL CALCULATED.3IONS-SCNC: 10 MMOL/L (ref 9–16)
AST SERPL-CCNC: 20 U/L (ref 10–50)
BILIRUB SERPL-MCNC: 0.4 MG/DL (ref 0–1.2)
BUN SERPL-MCNC: 25 MG/DL (ref 8–23)
BUN/CREAT SERPL: 16 (ref 9–20)
CALCIUM SERPL-MCNC: 9 MG/DL (ref 8.6–10.4)
CHLORIDE SERPL-SCNC: 102 MMOL/L (ref 98–107)
CO2 SERPL-SCNC: 26 MMOL/L (ref 20–31)
CORTIS SERPL-MCNC: 11.1 UG/DL (ref 2.5–19.5)
CREAT SERPL-MCNC: 1.6 MG/DL (ref 0.7–1.2)
EST. AVERAGE GLUCOSE BLD GHB EST-MCNC: 197 MG/DL
FERRITIN SERPL-MCNC: 103 NG/ML
GFR, ESTIMATED: 44 ML/MIN/1.73M2
GLUCOSE SERPL-MCNC: 191 MG/DL (ref 74–99)
HBA1C MFR BLD: 8.5 % (ref 4–6)
IRON SATN MFR SERPL: 17 % (ref 20–55)
IRON SERPL-MCNC: 55 UG/DL (ref 61–157)
POTASSIUM SERPL-SCNC: 4.5 MMOL/L (ref 3.7–5.3)
PROT SERPL-MCNC: 6.6 G/DL (ref 6.6–8.7)
SODIUM SERPL-SCNC: 138 MMOL/L (ref 136–145)
T4 FREE SERPL-MCNC: 0.9 NG/DL (ref 0.92–1.68)
TIBC SERPL-MCNC: 315 UG/DL (ref 250–450)
TSH SERPL DL<=0.05 MIU/L-ACNC: 1.09 UIU/ML (ref 0.27–4.2)
UNSATURATED IRON BINDING CAPACITY: 260 UG/DL (ref 112–347)

## 2024-10-16 PROCEDURE — 84443 ASSAY THYROID STIM HORMONE: CPT

## 2024-10-16 PROCEDURE — 36415 COLL VENOUS BLD VENIPUNCTURE: CPT

## 2024-10-16 PROCEDURE — 82728 ASSAY OF FERRITIN: CPT

## 2024-10-16 PROCEDURE — 82306 VITAMIN D 25 HYDROXY: CPT

## 2024-10-16 PROCEDURE — 84439 ASSAY OF FREE THYROXINE: CPT

## 2024-10-16 PROCEDURE — 83540 ASSAY OF IRON: CPT

## 2024-10-16 PROCEDURE — 80053 COMPREHEN METABOLIC PANEL: CPT

## 2024-10-16 PROCEDURE — 83036 HEMOGLOBIN GLYCOSYLATED A1C: CPT

## 2024-10-16 PROCEDURE — 83550 IRON BINDING TEST: CPT

## 2024-10-16 PROCEDURE — 82533 TOTAL CORTISOL: CPT

## 2024-10-17 RX ORDER — FERROUS SULFATE 325(65) MG
325 TABLET ORAL 2 TIMES DAILY WITH MEALS
Qty: 180 TABLET | Refills: 1 | Status: SHIPPED | OUTPATIENT
Start: 2024-10-17

## 2024-10-18 ENCOUNTER — TELEPHONE (OUTPATIENT)
Dept: CARDIOLOGY | Age: 74
End: 2024-10-18

## 2024-10-18 NOTE — TELEPHONE ENCOUNTER
----- Message from Dr. Viola Stahl MD sent at 10/17/2024  9:24 PM EDT -----  Please have him increase oral iron therapy to twice daily. New Rx sent to the pharmacy.  Follow up with Saritha ODOM and Jared Odom as previously scheduled.   Please call with questions and/or concerns. Thank you

## 2024-10-20 LAB
VITAMIN D2 AND D3, TOTAL: 21.7 NG/ML (ref 30–80)
VITAMIN D2, 25 HYDROXY: <1 NG/ML
VITAMIN D3,25 HYDROXY: 21.7 NG/ML

## 2024-11-08 PROBLEM — D09.0 CIS (CARCINOMA IN SITU OF BLADDER): Status: ACTIVE | Noted: 2024-11-08

## 2024-11-11 NOTE — PROGRESS NOTES
carefully and recognize using context where substitutions may have occurred.  If any questions please do not hesitate to contact me for clarificationDiscussed with the patient the current USPSTF guidelines released March 9, 2021 for screening for lung cancer.    For adults aged 50 to 80 years who have a 20 pack-year smoking history and currently smoke or have quit within the past 15 years the grade B recommendation is to:  Screen for lung cancer with low-dose computed tomography (LDCT) every year.  Stop screening once a person has not smoked for 15 years or has a health problem that limits life expectancy or the ability to have lung surgery.    The patient  reports that he quit smoking about 3 years ago. His smoking use included cigarettes. He started smoking about 57 years ago. He has a 54 pack-year smoking history. He has never used smokeless tobacco.. Discussed with patient the risks and benefits of screening, including over-diagnosis, false positive rate, and total radiation exposure.  The patient currently exhibits no signs or symptoms suggestive of lung cancer.  Discussed with patient the importance of compliance with yearly annual lung cancer screenings and willingness to undergo diagnosis and treatment if screening scan is positive.  In addition, the patient was counseled regarding the importance of remaining smoke free and/or total smoking cessation.    Also reviewed the following if the patient has Medicare that as of February 10, 2022, Medicare only covers LDCT screening in patients aged 50-77 with at least a 20 pack-year smoking history who currently smoke or have quit in the last 15 years

## 2024-11-14 ENCOUNTER — OFFICE VISIT (OUTPATIENT)
Dept: PULMONOLOGY | Age: 74
End: 2024-11-14
Payer: MEDICARE

## 2024-11-14 VITALS
RESPIRATION RATE: 18 BRPM | HEIGHT: 72 IN | HEART RATE: 60 BPM | OXYGEN SATURATION: 98 % | BODY MASS INDEX: 24.57 KG/M2 | WEIGHT: 181.4 LBS | TEMPERATURE: 97.2 F | SYSTOLIC BLOOD PRESSURE: 136 MMHG | DIASTOLIC BLOOD PRESSURE: 69 MMHG

## 2024-11-14 DIAGNOSIS — C34.2 PRIMARY ADENOCARCINOMA OF MIDDLE LOBE OF RIGHT LUNG (HCC): Primary | ICD-10-CM

## 2024-11-14 DIAGNOSIS — Z87.891 STOPPED SMOKING WITH GREATER THAN 40 PACK YEAR HISTORY: ICD-10-CM

## 2024-11-14 DIAGNOSIS — Z87.891 PERSONAL HISTORY OF TOBACCO USE: ICD-10-CM

## 2024-11-14 DIAGNOSIS — G89.12 POST-THORACOTOMY PAIN SYNDROME: ICD-10-CM

## 2024-11-14 DIAGNOSIS — C67.9 MALIGNANT NEOPLASM OF URINARY BLADDER, UNSPECIFIED SITE (HCC): ICD-10-CM

## 2024-11-14 DIAGNOSIS — J41.1 MUCOPURULENT CHRONIC BRONCHITIS (HCC): ICD-10-CM

## 2024-11-14 DIAGNOSIS — R53.81 PHYSICAL DECONDITIONING: ICD-10-CM

## 2024-11-14 PROCEDURE — G0296 VISIT TO DETERM LDCT ELIG: HCPCS | Performed by: NURSE PRACTITIONER

## 2024-11-14 PROCEDURE — 99214 OFFICE O/P EST MOD 30 MIN: CPT | Performed by: NURSE PRACTITIONER

## 2024-11-14 ASSESSMENT — ENCOUNTER SYMPTOMS
EYES NEGATIVE: 1
GASTROINTESTINAL NEGATIVE: 1
ALLERGIC/IMMUNOLOGIC NEGATIVE: 1

## 2024-11-14 NOTE — PATIENT INSTRUCTIONS
from being exposed to small amounts of radiation is low. It's not a reason to avoid these tests for most people.  What are the benefits of screening?  Your scan may be normal (negative).  For some people who are at higher risk, screening lowers the chance of dying of lung cancer. How much and how long you smoked helps to determine your risk level. Screening can find some cancers early, when treatment may be more likely to work.  What happens after screening?  The results of your CT scan will be sent to your doctor. Someone from your care team will explain the results of your scan and answer any questions you may have. If you need any follow-up, he or she will help you understand what to do next.  After a lung cancer screening, you can go back to your usual activities right away.  A lung cancer screening test can't tell if you have lung cancer. If your results are positive, your doctor can't tell whether an abnormal finding is a harmless nodule, cancer, or something else without doing more tests.  What can you do to help prevent lung cancer?  Some lung cancers can't be prevented. But if you smoke, quitting smoking is the best step you can take to prevent lung cancer. If you want to quit, your doctor can recommend medicines or other ways to help.  Follow-up care is a key part of your treatment and safety. Be sure to make and go to all appointments, and call your doctor if you are having problems. It's also a good idea to know your test results and keep a list of the medicines you take.  Where can you learn more?  Go to https://www.[employer].net/patientEd and enter Q940 to learn more about \"Learning About Lung Cancer Screening.\"  Current as of: October 25, 2023  Content Version: 14.2  © 2024 [employer].   Care instructions adapted under license by [employer]. If you have questions about a medical condition or this instruction, always ask your healthcare professional. [employer] disclaims

## 2024-12-03 ENCOUNTER — OFFICE VISIT (OUTPATIENT)
Dept: CARDIOLOGY | Age: 74
End: 2024-12-03

## 2024-12-03 VITALS
WEIGHT: 181.4 LBS | DIASTOLIC BLOOD PRESSURE: 71 MMHG | OXYGEN SATURATION: 99 % | BODY MASS INDEX: 24.57 KG/M2 | HEART RATE: 68 BPM | RESPIRATION RATE: 18 BRPM | HEIGHT: 72 IN | SYSTOLIC BLOOD PRESSURE: 130 MMHG

## 2024-12-03 DIAGNOSIS — I10 PRIMARY HYPERTENSION: ICD-10-CM

## 2024-12-03 DIAGNOSIS — I47.29 NSVT (NONSUSTAINED VENTRICULAR TACHYCARDIA) (HCC): ICD-10-CM

## 2024-12-03 DIAGNOSIS — Z87.898 HISTORY OF SYNCOPE: ICD-10-CM

## 2024-12-03 DIAGNOSIS — I25.10 ASHD (ARTERIOSCLEROTIC HEART DISEASE): Primary | ICD-10-CM

## 2024-12-03 DIAGNOSIS — Z95.820 S/P ANGIOPLASTY WITH STENT: ICD-10-CM

## 2024-12-03 DIAGNOSIS — R09.89 BRUIT OF RIGHT CAROTID ARTERY: ICD-10-CM

## 2024-12-03 DIAGNOSIS — E78.2 MIXED HYPERLIPIDEMIA: ICD-10-CM

## 2024-12-04 ENCOUNTER — PROCEDURE VISIT (OUTPATIENT)
Dept: UROLOGY | Age: 74
End: 2024-12-04
Payer: MEDICARE

## 2024-12-04 VITALS
TEMPERATURE: 97.7 F | HEART RATE: 63 BPM | HEIGHT: 72 IN | BODY MASS INDEX: 24.92 KG/M2 | DIASTOLIC BLOOD PRESSURE: 70 MMHG | WEIGHT: 184 LBS | SYSTOLIC BLOOD PRESSURE: 111 MMHG

## 2024-12-04 DIAGNOSIS — C67.4 MALIGNANT NEOPLASM OF POSTERIOR WALL OF URINARY BLADDER (HCC): Primary | ICD-10-CM

## 2024-12-04 PROCEDURE — 51720 TREATMENT OF BLADDER LESION: CPT | Performed by: PHYSICIAN ASSISTANT

## 2024-12-04 RX ADMIN — BACILLUS CALMETTE-GUERIN 50 MG: 50 POWDER, FOR SUSPENSION INTRAVESICAL at 11:57

## 2024-12-04 NOTE — PROGRESS NOTES
Here for BCG treatment 1 of 3. 16F marcelino catheter placed in urethra under sterile technique without difficulty,  with 10 mL sterile water instilled into balloon, by Aaliyah House. BCG instilled into the bladder via the catheter. Balloon deflated and catheter removed without difficulty. Patient tolerated procedure well. All post BCG instructions reviewed with patient. Pt instructed to contact office (or go to ER if after hours) if he develops fever, chills, malaise, myalgias, gross hematuria, dysuria, or any other new symptoms.     Follow up in 1 week for next treatment.     Lot: [id number]  Exp: 07/03/2025    Sodium Chloride 0.9% 50 ml total (5-10 ml vials)  LOT [id number]  Exp 2025-Oct 01

## 2024-12-05 ENCOUNTER — TELEPHONE (OUTPATIENT)
Dept: UROLOGY | Age: 74
End: 2024-12-05

## 2024-12-05 NOTE — TELEPHONE ENCOUNTER
If he is not having any symptoms currently lets observe how he does today.  Please call at the end of the day to reevaluate.  Encouraged him to call sooner if his symptoms return    It is very important to increase water intake after BCG to prevent the symptoms

## 2024-12-05 NOTE — TELEPHONE ENCOUNTER
Patient's wife called to report the patient was not feeling well after his BCG treatment.  The patient was able to hold the BCG for 2 hours.  The patient starting having frequency, urgency, and fever of 99.9 F after the procedure.  The patient had chills, hematuria and dysuria.  Patient's wife gave him ibuprofen and the symptoms resolved over night.  The patient denies abdominal pain, flank pain,  nausea or vomiting.

## 2024-12-05 NOTE — TELEPHONE ENCOUNTER
Phone call to the patient and spoke with his wife, Vania.  The patient states he has felt \"fine\" all day today.  The patient denies having a fever, chills, nausea, vomiting, or hematuria.  Educated the patient's wife regarding the signs and symptoms of a urinary tract infection.  Instructed the patient and his wife to call the office if these symptoms occur or go to the ER if it is after hours.  The patient and his wife verbalize understanding and agreement.

## 2024-12-11 ENCOUNTER — HOSPITAL ENCOUNTER (OUTPATIENT)
Dept: VASCULAR LAB | Age: 74
Discharge: HOME | End: 2024-12-13
Attending: INTERNAL MEDICINE
Payer: MEDICARE

## 2024-12-11 ENCOUNTER — PROCEDURE VISIT (OUTPATIENT)
Dept: UROLOGY | Age: 74
End: 2024-12-11
Payer: MEDICARE

## 2024-12-11 VITALS
BODY MASS INDEX: 24.82 KG/M2 | SYSTOLIC BLOOD PRESSURE: 144 MMHG | WEIGHT: 183 LBS | HEART RATE: 67 BPM | DIASTOLIC BLOOD PRESSURE: 73 MMHG | TEMPERATURE: 97.5 F

## 2024-12-11 DIAGNOSIS — R09.89 BRUIT OF RIGHT CAROTID ARTERY: ICD-10-CM

## 2024-12-11 DIAGNOSIS — Z87.898 HISTORY OF SYNCOPE: ICD-10-CM

## 2024-12-11 DIAGNOSIS — I10 PRIMARY HYPERTENSION: ICD-10-CM

## 2024-12-11 DIAGNOSIS — C67.4 MALIGNANT NEOPLASM OF POSTERIOR WALL OF URINARY BLADDER (HCC): Primary | ICD-10-CM

## 2024-12-11 DIAGNOSIS — I47.29 NSVT (NONSUSTAINED VENTRICULAR TACHYCARDIA) (HCC): ICD-10-CM

## 2024-12-11 DIAGNOSIS — E78.2 MIXED HYPERLIPIDEMIA: ICD-10-CM

## 2024-12-11 DIAGNOSIS — I25.10 ASHD (ARTERIOSCLEROTIC HEART DISEASE): ICD-10-CM

## 2024-12-11 DIAGNOSIS — Z95.820 S/P ANGIOPLASTY WITH STENT: ICD-10-CM

## 2024-12-11 LAB
VAS LEFT CCA DIST EDV: 25.7 CM/S
VAS LEFT CCA DIST PSV: 127.4 CM/S
VAS LEFT CCA MID EDV: 20.83 CM/S
VAS LEFT CCA MID PSV: 101.59 CM/S
VAS LEFT CCA PROX EDV: 22.5 CM/S
VAS LEFT CCA PROX PSV: 114.5 CM/S
VAS LEFT ECA EDV: 9.53 CM/S
VAS LEFT ECA PSV: 124.2 CM/S
VAS LEFT ICA DIST EDV: 20.8 CM/S
VAS LEFT ICA DIST PSV: 106.5 CM/S
VAS LEFT ICA MID EDV: 36.9 CM/S
VAS LEFT ICA MID PSV: 160.1 CM/S
VAS LEFT ICA PROX EDV: 33.3 CM/S
VAS LEFT ICA PROX PSV: 141.7 CM/S
VAS LEFT ICA/CCA PSV: 1.26 NO UNITS
VAS LEFT VERTEBRAL EDV: 13.08 CM/S
VAS LEFT VERTEBRAL PSV: 60.3 CM/S
VAS RIGHT CCA DIST EDV: 17.6 CM/S
VAS RIGHT CCA DIST PSV: 104.8 CM/S
VAS RIGHT CCA MID EDV: 15.99 CM/S
VAS RIGHT CCA MID PSV: 127.46 CM/S
VAS RIGHT CCA PROX EDV: 13.7 CM/S
VAS RIGHT CCA PROX PSV: 162.4 CM/S
VAS RIGHT ECA EDV: 27.65 CM/S
VAS RIGHT ECA PSV: 169.4 CM/S
VAS RIGHT ICA DIST EDV: 21.9 CM/S
VAS RIGHT ICA DIST PSV: 89.2 CM/S
VAS RIGHT ICA MID EDV: 22.5 CM/S
VAS RIGHT ICA MID PSV: 103.2 CM/S
VAS RIGHT ICA PROX EDV: 24.8 CM/S
VAS RIGHT ICA PROX PSV: 189.1 CM/S
VAS RIGHT ICA/CCA PSV: 1.8 NO UNITS
VAS RIGHT VERTEBRAL EDV: 8.64 CM/S
VAS RIGHT VERTEBRAL PSV: 41.8 CM/S

## 2024-12-11 PROCEDURE — PBSHW PBB SHADOW CHARGE: Performed by: PHYSICIAN ASSISTANT

## 2024-12-11 PROCEDURE — 51720 TREATMENT OF BLADDER LESION: CPT | Performed by: PHYSICIAN ASSISTANT

## 2024-12-11 PROCEDURE — 93880 EXTRACRANIAL BILAT STUDY: CPT

## 2024-12-11 PROCEDURE — 93880 EXTRACRANIAL BILAT STUDY: CPT | Performed by: INTERNAL MEDICINE

## 2024-12-11 RX ADMIN — BACILLUS CALMETTE-GUERIN 50 MG: 50 POWDER, FOR SUSPENSION INTRAVESICAL at 12:04

## 2024-12-11 NOTE — PROGRESS NOTES
Here for BCG treatment 2 of 3. 16F marcelino catheter placed under sterile technique without difficulty, with 10 mL sterile water instilled into balloon. BCG instilled into the bladder via the catheter. Balloon deflated and catheter removed without difficulty. Patient tolerated procedure well. All post BCG instructions reviewed with patient. Pt instructed to contact office (or go to ER if after hours) if he develops fever, chills, malaise, myalgias, gross hematuria, dysuria, or any other new symptoms.     Follow up in 1 week for next treatment.     Lot: [id number]  Exp: 07/03/2025      Sodium Chloride  LOT [id number]  Exp 10/01/2025

## 2024-12-13 ENCOUNTER — TELEPHONE (OUTPATIENT)
Dept: CARDIOLOGY | Age: 74
End: 2024-12-13

## 2024-12-13 ENCOUNTER — HOSPITAL ENCOUNTER (OUTPATIENT)
Age: 74
Discharge: HOME | End: 2024-12-13
Payer: MEDICARE

## 2024-12-13 ENCOUNTER — HOSPITAL ENCOUNTER (OUTPATIENT)
Dept: CT IMAGING | Age: 74
Discharge: HOME | End: 2024-12-15
Payer: MEDICARE

## 2024-12-13 DIAGNOSIS — C34.01 LUNG CANCER, MAIN BRONCHUS, RIGHT (HCC): ICD-10-CM

## 2024-12-13 LAB
ALBUMIN SERPL-MCNC: 3.7 G/DL (ref 3.5–5.2)
ALBUMIN/GLOB SERPL: 1.2 {RATIO} (ref 1–2.5)
ALP SERPL-CCNC: 102 U/L (ref 40–129)
ALT SERPL-CCNC: 16 U/L (ref 10–50)
ANION GAP SERPL CALCULATED.3IONS-SCNC: 9 MMOL/L (ref 9–16)
AST SERPL-CCNC: 22 U/L (ref 10–50)
BASOPHILS # BLD: 0.06 K/UL (ref 0–0.2)
BASOPHILS NFR BLD: 1 % (ref 0–2)
BILIRUB SERPL-MCNC: 0.3 MG/DL (ref 0–1.2)
BUN SERPL-MCNC: 22 MG/DL (ref 8–23)
BUN/CREAT SERPL: 16 (ref 9–20)
CALCIUM SERPL-MCNC: 9.2 MG/DL (ref 8.6–10.4)
CHLORIDE SERPL-SCNC: 105 MMOL/L (ref 98–107)
CO2 SERPL-SCNC: 26 MMOL/L (ref 20–31)
CREAT SERPL-MCNC: 1.4 MG/DL (ref 0.7–1.2)
EOSINOPHIL # BLD: 0.19 K/UL (ref 0–0.44)
EOSINOPHILS RELATIVE PERCENT: 3 % (ref 1–4)
ERYTHROCYTE [DISTWIDTH] IN BLOOD BY AUTOMATED COUNT: 15 % (ref 11.8–14.4)
GFR, ESTIMATED: 54 ML/MIN/1.73M2
GLUCOSE SERPL-MCNC: 158 MG/DL (ref 74–99)
HCT VFR BLD AUTO: 33.3 % (ref 40.7–50.3)
HGB BLD-MCNC: 10.4 G/DL (ref 13–17)
IMM GRANULOCYTES # BLD AUTO: 0.06 K/UL (ref 0–0.3)
IMM GRANULOCYTES NFR BLD: 1 %
LYMPHOCYTES NFR BLD: 1.29 K/UL (ref 1.1–3.7)
LYMPHOCYTES RELATIVE PERCENT: 20 % (ref 24–43)
MCH RBC QN AUTO: 29.4 PG (ref 25.2–33.5)
MCHC RBC AUTO-ENTMCNC: 31.2 G/DL (ref 28.4–34.8)
MCV RBC AUTO: 94.1 FL (ref 82.6–102.9)
MONOCYTES NFR BLD: 0.94 K/UL (ref 0.1–1.2)
MONOCYTES NFR BLD: 14 % (ref 3–12)
NEUTROPHILS NFR BLD: 61 % (ref 36–65)
NEUTS SEG NFR BLD: 4.06 K/UL (ref 1.5–8.1)
NRBC BLD-RTO: 0 PER 100 WBC
PLATELET # BLD AUTO: 139 K/UL (ref 138–453)
PMV BLD AUTO: 10.1 FL (ref 8.1–13.5)
POTASSIUM SERPL-SCNC: 4.4 MMOL/L (ref 3.7–5.3)
PROT SERPL-MCNC: 6.6 G/DL (ref 6.6–8.7)
RBC # BLD AUTO: 3.54 M/UL (ref 4.21–5.77)
SODIUM SERPL-SCNC: 140 MMOL/L (ref 136–145)
WBC OTHER # BLD: 6.6 K/UL (ref 3.5–11.3)

## 2024-12-13 PROCEDURE — 36415 COLL VENOUS BLD VENIPUNCTURE: CPT

## 2024-12-13 PROCEDURE — 85025 COMPLETE CBC W/AUTO DIFF WBC: CPT

## 2024-12-13 PROCEDURE — 80053 COMPREHEN METABOLIC PANEL: CPT

## 2024-12-13 PROCEDURE — 74176 CT ABD & PELVIS W/O CONTRAST: CPT

## 2024-12-13 NOTE — TELEPHONE ENCOUNTER
----- Message from Dr. Viola Stahl MD sent at 12/12/2024 10:59 PM EST -----  Moderate blockages in the neck arteries but nothing need to be done for it right now.  Continue current therapy and follow-up.  Please call with questions/or concerns.  Thank you

## 2024-12-18 ENCOUNTER — TELEPHONE (OUTPATIENT)
Dept: UROLOGY | Age: 74
End: 2024-12-18

## 2024-12-18 ENCOUNTER — PROCEDURE VISIT (OUTPATIENT)
Dept: UROLOGY | Age: 74
End: 2024-12-18
Payer: MEDICARE

## 2024-12-18 VITALS
WEIGHT: 183 LBS | TEMPERATURE: 98.4 F | SYSTOLIC BLOOD PRESSURE: 122 MMHG | BODY MASS INDEX: 24.82 KG/M2 | DIASTOLIC BLOOD PRESSURE: 71 MMHG | HEART RATE: 69 BPM

## 2024-12-18 DIAGNOSIS — C67.4 MALIGNANT NEOPLASM OF POSTERIOR WALL OF URINARY BLADDER (HCC): Primary | ICD-10-CM

## 2024-12-18 PROCEDURE — 51720 TREATMENT OF BLADDER LESION: CPT | Performed by: PHYSICIAN ASSISTANT

## 2024-12-18 PROCEDURE — PBSHW PBB SHADOW CHARGE: Performed by: PHYSICIAN ASSISTANT

## 2024-12-18 RX ADMIN — BACILLUS CALMETTE-GUERIN 50 MG: 50 POWDER, FOR SUSPENSION INTRAVESICAL at 11:10

## 2024-12-18 NOTE — TELEPHONE ENCOUNTER
Patient calls in reporting he is experiencing burning in his urethra and blood tinged urine. Per LASHAWN Dang LPN possible side effects are blood in urine and burning. She discussed this in office today with patient.  Instructed to increase water intake. Patient VU.  Instructed to call if symptoms worsen or if he develops any new symptoms, VU.

## 2024-12-18 NOTE — PROGRESS NOTES
The patient advised that after both of his previous BCG treatments, he started shaking, had chills and a fever of 101.  The fever lasted for four hours and then subsided.  No other complications.  This was addressed with Boyd Sanchez.  Patient was advised that this is a side effect to this medication and he should take Tylenol today following his treatment.  Patient did voice an understanding.       Here for BCG treatment 3 of 3. 16F marcelino catheter placed under sterile technique without difficulty, with 10 mL sterile water instilled into balloon. BCG instilled into the bladder via the catheter. Balloon deflated and catheter removed without difficulty. Patient tolerated procedure well. All post BCG instructions reviewed with patient. Pt instructed to contact office (or go to ER if after hours) if he develops fever, chills, malaise, myalgias, gross hematuria, dysuria, or any other new symptoms.     Follow up in 1 week for next treatment.     Lot: [id number]  Exp: 07/03/2025     Sodium Chloride 0.9% (used 5, 10 ml vials to total 50 ml)  LOT [id number]  Exp 2025 Nov 30

## 2024-12-18 NOTE — TELEPHONE ENCOUNTER
The patient's wife called and advised that he was having bleeding and burning following his BCG treatment today.   Writer did address this with Boyd Sanchez.  Writer advised the wife that this is normal following this procedure.    The patient denied fever this time.   Patient instructed to continue to increase his water intake. If he develops any issues urinating or unbearable symptoms to go to ER during the night.  He should call us in the morning with an update.

## 2024-12-19 ENCOUNTER — OFFICE VISIT (OUTPATIENT)
Dept: ONCOLOGY | Age: 74
End: 2024-12-19
Payer: MEDICARE

## 2024-12-19 VITALS
RESPIRATION RATE: 18 BRPM | BODY MASS INDEX: 24.92 KG/M2 | HEART RATE: 61 BPM | HEIGHT: 72 IN | WEIGHT: 184 LBS | DIASTOLIC BLOOD PRESSURE: 62 MMHG | TEMPERATURE: 96.7 F | SYSTOLIC BLOOD PRESSURE: 133 MMHG

## 2024-12-19 DIAGNOSIS — C67.4 CANCER OF POSTERIOR WALL OF URINARY BLADDER (HCC): ICD-10-CM

## 2024-12-19 DIAGNOSIS — N18.32 STAGE 3B CHRONIC KIDNEY DISEASE (HCC): ICD-10-CM

## 2024-12-19 DIAGNOSIS — D64.9 NORMOCYTIC ANEMIA: Primary | ICD-10-CM

## 2024-12-19 DIAGNOSIS — D09.0 CIS (CARCINOMA IN SITU OF BLADDER): ICD-10-CM

## 2024-12-19 DIAGNOSIS — C34.01 LUNG CANCER, MAIN BRONCHUS, RIGHT (HCC): ICD-10-CM

## 2024-12-19 PROCEDURE — 99211 OFF/OP EST MAY X REQ PHY/QHP: CPT | Performed by: INTERNAL MEDICINE

## 2024-12-19 PROCEDURE — 99214 OFFICE O/P EST MOD 30 MIN: CPT | Performed by: INTERNAL MEDICINE

## 2024-12-19 PROCEDURE — 1159F MED LIST DOCD IN RCRD: CPT | Performed by: INTERNAL MEDICINE

## 2024-12-19 PROCEDURE — 1123F ACP DISCUSS/DSCN MKR DOCD: CPT | Performed by: INTERNAL MEDICINE

## 2024-12-19 PROCEDURE — 1126F AMNT PAIN NOTED NONE PRSNT: CPT | Performed by: INTERNAL MEDICINE

## 2024-12-19 PROCEDURE — 3017F COLORECTAL CA SCREEN DOC REV: CPT | Performed by: INTERNAL MEDICINE

## 2024-12-19 PROCEDURE — 3078F DIAST BP <80 MM HG: CPT | Performed by: INTERNAL MEDICINE

## 2024-12-19 PROCEDURE — 3075F SYST BP GE 130 - 139MM HG: CPT | Performed by: INTERNAL MEDICINE

## 2024-12-19 PROCEDURE — G8484 FLU IMMUNIZE NO ADMIN: HCPCS | Performed by: INTERNAL MEDICINE

## 2024-12-19 PROCEDURE — 1036F TOBACCO NON-USER: CPT | Performed by: INTERNAL MEDICINE

## 2024-12-19 PROCEDURE — G8420 CALC BMI NORM PARAMETERS: HCPCS | Performed by: INTERNAL MEDICINE

## 2024-12-19 PROCEDURE — G8427 DOCREV CUR MEDS BY ELIG CLIN: HCPCS | Performed by: INTERNAL MEDICINE

## 2024-12-19 NOTE — PROGRESS NOTES
Patient ID: Corwin Acosta, 1950, [id number], 74 y.o.  Referred by :  No ref. 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 male history of tobacco smoking and has been following with pulmonary for right middle lobe lung nodule where CT chest did show increase in the size in the scan on November 2, 2021 patient had biopsy on July 28, 2021 which showed benign parenchyma with chronic inflammation however the lesion was active on the PET there was no evidence of metastasis patient admitted to [facility] on January 4, 2022 for VATS and wedge resection which ended up with lobectomy, final pathology compatible with adenocarcinoma 1.8 cm and carcinoma invade into but not through the visceral pleura lymph nodes were negative patient status post right middle lobe lobectomy and margin were free of involvement by carcinoma   Patient developed right apical pneumothorax chest tube placed in the hospital and was discharged after chest tube removed and patient was started on adjuvant chemotherapy on February 24, 2022  Patient developed pleural effusion on the right side and underwent thoracentesis after 2 cycle of chemotherapy and done with 4 cycles of chemotherapy, if still persistent have right side chest pain  Had loculated pneumothorax status post thoracentesis and recommendation was for no further thoracentesis  evaluated for gallstones with no evidence of cholecystitis of the kidney

## 2025-01-10 ENCOUNTER — HOSPITAL ENCOUNTER (OUTPATIENT)
Dept: GENERAL RADIOLOGY | Age: 75
Discharge: HOME | End: 2025-01-12
Payer: MEDICARE

## 2025-01-10 ENCOUNTER — HOSPITAL ENCOUNTER (OUTPATIENT)
Age: 75
Discharge: HOME | End: 2025-01-12
Payer: MEDICARE

## 2025-01-10 DIAGNOSIS — R50.9 FEVER OF UNKNOWN ORIGIN: ICD-10-CM

## 2025-01-10 PROCEDURE — 71046 X-RAY EXAM CHEST 2 VIEWS: CPT

## 2025-01-17 ENCOUNTER — HOSPITAL ENCOUNTER (OUTPATIENT)
Age: 75
Discharge: HOME | End: 2025-01-17
Payer: MEDICARE

## 2025-01-17 LAB
ALBUMIN: 3.9 G/DL (ref 3.5–5.2)
ANION GAP SERPL CALCULATED.3IONS-SCNC: 8 MMOL/L (ref 9–16)
BILIRUB UR QL STRIP: NEGATIVE
BUN SERPL-MCNC: 32 MG/DL (ref 8–23)
BUN/CREAT SERPL: 23 (ref 9–20)
CALCIUM SERPL-MCNC: 9.1 MG/DL (ref 8.6–10.4)
CHLORIDE SERPL-SCNC: 101 MMOL/L (ref 98–107)
CLARITY UR: CLEAR
CO2 SERPL-SCNC: 28 MMOL/L (ref 20–31)
COLOR UR: YELLOW
CREAT SERPL-MCNC: 1.4 MG/DL (ref 0.7–1.2)
CREAT UR-MCNC: 230 MG/DL (ref 39–259)
EPI CELLS #/AREA URNS HPF: ABNORMAL /HPF (ref 0–5)
ERYTHROCYTE [DISTWIDTH] IN BLOOD BY AUTOMATED COUNT: 15.3 % (ref 11.8–14.4)
GFR, ESTIMATED: 55 ML/MIN/1.73M2
GLUCOSE SERPL-MCNC: 198 MG/DL (ref 74–99)
GLUCOSE UR STRIP-MCNC: NEGATIVE MG/DL
HCT VFR BLD AUTO: 35.9 % (ref 40.7–50.3)
HGB BLD-MCNC: 11.6 G/DL (ref 13–17)
HGB UR QL STRIP.AUTO: NEGATIVE
KETONES UR STRIP-MCNC: NEGATIVE MG/DL
LEUKOCYTE ESTERASE UR QL STRIP: NEGATIVE
MAGNESIUM SERPL-MCNC: 1.7 MG/DL (ref 1.6–2.4)
MCH RBC QN AUTO: 30 PG (ref 25.2–33.5)
MCHC RBC AUTO-ENTMCNC: 32.3 G/DL (ref 28.4–34.8)
MCV RBC AUTO: 92.8 FL (ref 82.6–102.9)
MUCOUS THREADS URNS QL MICRO: ABNORMAL
NITRITE UR QL STRIP: NEGATIVE
NRBC BLD-RTO: 0 PER 100 WBC
PH UR STRIP: 6 [PH] (ref 5–9)
PHOSPHATE SERPL-MCNC: 3.5 MG/DL (ref 2.5–4.5)
PLATELET # BLD AUTO: 182 K/UL (ref 138–453)
PMV BLD AUTO: 11 FL (ref 8.1–13.5)
POTASSIUM SERPL-SCNC: 4.6 MMOL/L (ref 3.7–5.3)
PROT UR STRIP-MCNC: NEGATIVE MG/DL
PTH-INTACT SERPL-MCNC: 46 PG/ML (ref 15–65)
RBC # BLD AUTO: 3.87 M/UL (ref 4.21–5.77)
RBC #/AREA URNS HPF: ABNORMAL /HPF (ref 0–2)
SODIUM SERPL-SCNC: 137 MMOL/L (ref 136–145)
SP GR UR STRIP: 1.02 (ref 1.01–1.02)
TOTAL PROTEIN, URINE: 24 MG/DL
URATE SERPL-MCNC: 4.8 MG/DL (ref 3.4–7)
URINE TOTAL PROTEIN CREATININE RATIO: 0.1 (ref 0–0.2)
UROBILINOGEN UR STRIP-ACNC: NORMAL EU/DL (ref 0–1)
WBC #/AREA URNS HPF: ABNORMAL /HPF (ref 0–5)
WBC OTHER # BLD: 11.3 K/UL (ref 3.5–11.3)

## 2025-01-17 PROCEDURE — 81001 URINALYSIS AUTO W/SCOPE: CPT

## 2025-01-17 PROCEDURE — 84550 ASSAY OF BLOOD/URIC ACID: CPT

## 2025-01-17 PROCEDURE — 85027 COMPLETE CBC AUTOMATED: CPT

## 2025-01-17 PROCEDURE — 83970 ASSAY OF PARATHORMONE: CPT

## 2025-01-17 PROCEDURE — 82570 ASSAY OF URINE CREATININE: CPT

## 2025-01-17 PROCEDURE — 83735 ASSAY OF MAGNESIUM: CPT

## 2025-01-17 PROCEDURE — 84156 ASSAY OF PROTEIN URINE: CPT

## 2025-01-17 PROCEDURE — 80069 RENAL FUNCTION PANEL: CPT

## 2025-01-17 PROCEDURE — 36415 COLL VENOUS BLD VENIPUNCTURE: CPT

## 2025-02-14 PROBLEM — C67.4 MALIGNANT NEOPLASM OF POSTERIOR WALL OF URINARY BLADDER (HCC): Status: ACTIVE | Noted: 2025-02-14

## 2025-02-24 DIAGNOSIS — E11.9 TYPE 2 DIABETES MELLITUS WITHOUT COMPLICATION, WITH LONG-TERM CURRENT USE OF INSULIN (HCC): ICD-10-CM

## 2025-02-24 DIAGNOSIS — I10 ESSENTIAL HYPERTENSION: ICD-10-CM

## 2025-02-24 DIAGNOSIS — Z01.818 PRE-OPERATIVE CLEARANCE: ICD-10-CM

## 2025-02-24 DIAGNOSIS — Z79.4 TYPE 2 DIABETES MELLITUS WITHOUT COMPLICATION, WITH LONG-TERM CURRENT USE OF INSULIN (HCC): ICD-10-CM

## 2025-02-24 DIAGNOSIS — Z95.820 S/P ANGIOPLASTY WITH STENT: ICD-10-CM

## 2025-02-24 DIAGNOSIS — E78.2 MIXED HYPERLIPIDEMIA: ICD-10-CM

## 2025-02-24 DIAGNOSIS — I25.10 CORONARY ARTERY DISEASE INVOLVING NATIVE CORONARY ARTERY OF NATIVE HEART WITHOUT ANGINA PECTORIS: ICD-10-CM

## 2025-02-24 DIAGNOSIS — I47.29 NSVT (NONSUSTAINED VENTRICULAR TACHYCARDIA) (HCC): ICD-10-CM

## 2025-02-24 DIAGNOSIS — Z87.898 HISTORY OF SYNCOPE: ICD-10-CM

## 2025-02-24 RX ORDER — METOPROLOL SUCCINATE 50 MG/1
50 TABLET, EXTENDED RELEASE ORAL DAILY
Qty: 90 TABLET | Refills: 3 | Status: SHIPPED | OUTPATIENT
Start: 2025-02-24

## 2025-02-24 RX ORDER — CLOPIDOGREL BISULFATE 75 MG/1
75 TABLET ORAL DAILY
Qty: 90 TABLET | Refills: 3 | Status: SHIPPED | OUTPATIENT
Start: 2025-02-24

## 2025-02-28 PROBLEM — N50.9 TESTICULAR LESION: Status: ACTIVE | Noted: 2025-02-28

## 2025-02-28 PROBLEM — N45.2 ORCHITIS: Status: ACTIVE | Noted: 2025-02-28

## 2025-02-28 PROBLEM — R93.812: Status: ACTIVE | Noted: 2025-02-28

## 2025-04-21 ENCOUNTER — TRANSCRIBE ORDERS (OUTPATIENT)
Dept: ADMISSION | Age: 75
End: 2025-04-21

## 2025-04-21 ENCOUNTER — HOSPITAL ENCOUNTER (OUTPATIENT)
Dept: GENERAL RADIOLOGY | Age: 75
Discharge: HOME | End: 2025-04-23
Payer: MEDICARE

## 2025-04-21 DIAGNOSIS — R07.89 RIGHT-SIDED CHEST WALL PAIN: Primary | ICD-10-CM

## 2025-04-21 DIAGNOSIS — R07.89 RIGHT-SIDED CHEST WALL PAIN: ICD-10-CM

## 2025-04-21 PROCEDURE — 71046 X-RAY EXAM CHEST 2 VIEWS: CPT

## 2025-04-29 ENCOUNTER — TELEPHONE (OUTPATIENT)
Dept: CARDIOLOGY | Age: 75
End: 2025-04-29

## 2025-04-29 NOTE — TELEPHONE ENCOUNTER
Patient is scheduled to have a hernia repair procedure scheduled for TBD.    Patient is scheduled on 5/8/25.

## 2025-05-08 ENCOUNTER — OFFICE VISIT (OUTPATIENT)
Dept: CARDIOLOGY | Age: 75
End: 2025-05-08
Payer: MEDICARE

## 2025-05-08 ENCOUNTER — OFFICE VISIT (OUTPATIENT)
Dept: PULMONOLOGY | Age: 75
End: 2025-05-08
Payer: MEDICARE

## 2025-05-08 VITALS
RESPIRATION RATE: 20 BRPM | DIASTOLIC BLOOD PRESSURE: 83 MMHG | TEMPERATURE: 96.4 F | HEIGHT: 72 IN | SYSTOLIC BLOOD PRESSURE: 164 MMHG | BODY MASS INDEX: 24.52 KG/M2 | HEART RATE: 69 BPM | WEIGHT: 181 LBS | OXYGEN SATURATION: 97 %

## 2025-05-08 VITALS
BODY MASS INDEX: 24.6 KG/M2 | HEART RATE: 71 BPM | RESPIRATION RATE: 18 BRPM | WEIGHT: 181.6 LBS | HEIGHT: 72 IN | SYSTOLIC BLOOD PRESSURE: 135 MMHG | DIASTOLIC BLOOD PRESSURE: 71 MMHG

## 2025-05-08 DIAGNOSIS — I73.9 PVD (PERIPHERAL VASCULAR DISEASE): ICD-10-CM

## 2025-05-08 DIAGNOSIS — J30.9 ALLERGIC RHINITIS, UNSPECIFIED SEASONALITY, UNSPECIFIED TRIGGER: ICD-10-CM

## 2025-05-08 DIAGNOSIS — Z87.891 STOPPED SMOKING WITH GREATER THAN 40 PACK YEAR HISTORY: ICD-10-CM

## 2025-05-08 DIAGNOSIS — Z79.4 TYPE 2 DIABETES MELLITUS WITHOUT COMPLICATION, WITH LONG-TERM CURRENT USE OF INSULIN (HCC): ICD-10-CM

## 2025-05-08 DIAGNOSIS — I10 ESSENTIAL HYPERTENSION: ICD-10-CM

## 2025-05-08 DIAGNOSIS — E78.2 MIXED HYPERLIPIDEMIA: ICD-10-CM

## 2025-05-08 DIAGNOSIS — I25.10 CORONARY ARTERY DISEASE INVOLVING NATIVE CORONARY ARTERY OF NATIVE HEART WITHOUT ANGINA PECTORIS: ICD-10-CM

## 2025-05-08 DIAGNOSIS — Z01.818 PRE-OPERATIVE CLEARANCE: ICD-10-CM

## 2025-05-08 DIAGNOSIS — C34.2 PRIMARY ADENOCARCINOMA OF MIDDLE LOBE OF RIGHT LUNG (HCC): ICD-10-CM

## 2025-05-08 DIAGNOSIS — J43.2 CENTRILOBULAR EMPHYSEMA (HCC): Primary | ICD-10-CM

## 2025-05-08 DIAGNOSIS — I47.29 NSVT (NONSUSTAINED VENTRICULAR TACHYCARDIA) (HCC): ICD-10-CM

## 2025-05-08 DIAGNOSIS — Z95.820 S/P ANGIOPLASTY WITH STENT: ICD-10-CM

## 2025-05-08 DIAGNOSIS — E11.9 TYPE 2 DIABETES MELLITUS WITHOUT COMPLICATION, WITH LONG-TERM CURRENT USE OF INSULIN (HCC): ICD-10-CM

## 2025-05-08 DIAGNOSIS — Z87.898 HISTORY OF SYNCOPE: Primary | ICD-10-CM

## 2025-05-08 DIAGNOSIS — C67.9 MALIGNANT NEOPLASM OF URINARY BLADDER, UNSPECIFIED SITE (HCC): ICD-10-CM

## 2025-05-08 PROCEDURE — 93010 ELECTROCARDIOGRAM REPORT: CPT | Performed by: INTERNAL MEDICINE

## 2025-05-08 PROCEDURE — 99214 OFFICE O/P EST MOD 30 MIN: CPT | Performed by: INTERNAL MEDICINE

## 2025-05-08 PROCEDURE — 99214 OFFICE O/P EST MOD 30 MIN: CPT | Performed by: STUDENT IN AN ORGANIZED HEALTH CARE EDUCATION/TRAINING PROGRAM

## 2025-05-08 PROCEDURE — 3052F HG A1C>EQUAL 8.0%<EQUAL 9.0%: CPT | Performed by: INTERNAL MEDICINE

## 2025-05-08 PROCEDURE — G8427 DOCREV CUR MEDS BY ELIG CLIN: HCPCS | Performed by: INTERNAL MEDICINE

## 2025-05-08 PROCEDURE — 1036F TOBACCO NON-USER: CPT | Performed by: INTERNAL MEDICINE

## 2025-05-08 PROCEDURE — 2022F DILAT RTA XM EVC RTNOPTHY: CPT | Performed by: INTERNAL MEDICINE

## 2025-05-08 PROCEDURE — 99211 OFF/OP EST MAY X REQ PHY/QHP: CPT | Performed by: INTERNAL MEDICINE

## 2025-05-08 PROCEDURE — 1123F ACP DISCUSS/DSCN MKR DOCD: CPT | Performed by: INTERNAL MEDICINE

## 2025-05-08 PROCEDURE — 3078F DIAST BP <80 MM HG: CPT | Performed by: INTERNAL MEDICINE

## 2025-05-08 PROCEDURE — G8420 CALC BMI NORM PARAMETERS: HCPCS | Performed by: INTERNAL MEDICINE

## 2025-05-08 PROCEDURE — 3017F COLORECTAL CA SCREEN DOC REV: CPT | Performed by: INTERNAL MEDICINE

## 2025-05-08 PROCEDURE — 3075F SYST BP GE 130 - 139MM HG: CPT | Performed by: INTERNAL MEDICINE

## 2025-05-08 PROCEDURE — 93005 ELECTROCARDIOGRAM TRACING: CPT | Performed by: INTERNAL MEDICINE

## 2025-05-08 NOTE — PATIENT INSTRUCTIONS
SURVEY:  Thank you for allowing us to care for you today.  You may be receiving a survey from [facility] regarding your visit today- electronically or via mail.      Please help us by completing the survey as this will provide the needed feedback to ensure we are providing the very best care for you and your family.    If you cannot score us a very good on any question, please call the office to discuss how we could have made your experience a very good one.    Thank you.       STAFF:    Shannon Spring, Karoline METZGER      CLINICAL STAFF:    Abby MONTALVO, Fabiola METZGER, Laura METZGER, Kaila MONTALVO

## 2025-05-08 NOTE — PROGRESS NOTES
Abdominal: Soft, non-tender. He exhibits no organomegaly, mass or bruit.   Extremities: Trace. L<R. No cyanosis or clubbing. 2+ radial and carotid pulses. Distal extremity pulses: 2+ bilaterally.  Neurological: Alertness and orientation as per Constitutional exam. No evidence of gross cranial nerve deficit. Coordination appeared normal.   Skin: Skin is warm and dry. There is no rash or diaphoresis.   Psychiatric: He has a normal mood and affect. His speech is normal and behavior is normal.       ASSESSMENT:     1. History of syncope    2. Pre-operative clearance    3. Coronary artery disease involving native coronary artery of native heart without angina pectoris    4. S/P angioplasty with stent    5. NSVT (nonsustained ventricular tachycardia) ([facility])    6. Essential hypertension    7. Mixed hyperlipidemia    8. Type 2 diabetes mellitus without complication, with long-term current use of insulin ([facility])      PLAN:   Pre-Op Clearance: Hernia repair   Pre-Operative Risk assessment using 2014 ACC/AHA guidelines   Emergent procedure No  Active Cardiac Condition No (decompensated HF, Arrhythmia, MI <3 weeks, severe valve disease)  Risk Level of Procedure Low Risk (endoscopy, superficial skin, breast, ambulatory, or cataract, etc.)  Revised Cardiac Risk Index Risk factors: History of ischemic heart disease  Diabetic treated with insulin  Measurement of Exercise Tolerance before Surgery >4 Yes  According to the 2014 ACC/AHA pre-operative risk assessment guidelines Corwin Acosta is at low to intermediate risk for major cardiac complications during a low risk procedure and may continue as planned. Specific medication recommendations are listed below. Medications recommended to continue should be taken with a sip of water even when NPO.     Medical management to reduce perioperative risk:  Antiplatelet Agent: I would prefer that his aspirin 81 mg be continued throughout the perioperative period but if bleeding risk is to

## 2025-05-08 NOTE — PROGRESS NOTES
metabolic  activity on today's study.  Given combination of the findings including a  rounded lesion that has remained stable since January, findings at this time  favor a benign process.  However, continued monitoring of the lesion with  contrast enhanced CT scanning of the abdomen and pelvis is advised with next  CT advised in 6 months.    Case discussed with referring oncologist at approximately 1115 hours, 23 September 2022    Narrative  EXAMINATION:  WHOLE BODY PET/CT    9/14/2022    TECHNIQUE:  Following IV injection of 14.9 mCi of F-18 FDG, PET  tumor imaging was  acquired from the base of the skull to the mid thighs.  Computed tomography  was used for purposes of attenuation correction and anatomic localization.  Fusion imaging was utilized for interpretation.    Uptake time 50 min.  Glucose level 173 mg/dl.    COMPARISON:  30 June 2021    HISTORY:  ORDERING SYSTEM PROVIDED HISTORY: Lung cancer, main bronchus, right (HCC)  TECHNOLOGIST PROVIDED HISTORY:  Left renal mass question metastasis from the lung versus RCC    FINDINGS:  HEAD/NECK: Normal physiologic uptake of FDG is noted in the brain parenchyma  and salivary glands.  No abnormal hypermetabolic foci are noted within the  cervical lymph node chain or thoracic inlet.    CHEST: Normal physiologic uptake of FDG is noted in the great vessels and  left ventricular myocardium.  Again noted is unchanged mildly increased FDG  uptake with SUV maximum of 3.4 is noted.  The hypermetabolic focus in the  right middle lobe is not redemonstrated.  However, the patient has a moderate  right effusion with associated compressive atelectasis.  There is mildly  increased FDG uptake along the atelectatic lung, likely due to mild  inflammation.  No suspicious FDG uptake is noted within the lung parenchymal  proper.    ABDOMEN/PELVIS: Normal physiologic uptake of FDG is noted in the liver,  spleen and urinary collecting systems.  Bowel activity is within

## 2025-05-09 ENCOUNTER — HOSPITAL ENCOUNTER (OUTPATIENT)
Dept: CT IMAGING | Age: 75
Discharge: HOME | End: 2025-05-11
Payer: MEDICARE

## 2025-05-09 DIAGNOSIS — Z87.891 PERSONAL HISTORY OF TOBACCO USE: ICD-10-CM

## 2025-05-09 PROCEDURE — 71271 CT THORAX LUNG CANCER SCR C-: CPT

## 2025-05-13 PROBLEM — K40.90 RIGHT INGUINAL HERNIA: Status: ACTIVE | Noted: 2025-05-13

## 2025-05-14 RX ORDER — FERROUS SULFATE 325(65) MG
TABLET ORAL
Qty: 180 TABLET | Refills: 3 | Status: SHIPPED | OUTPATIENT
Start: 2025-05-14

## 2025-05-19 ENCOUNTER — HOSPITAL ENCOUNTER (OUTPATIENT)
Age: 75
Setting detail: SPECIMEN
Discharge: HOME | End: 2025-05-19

## 2025-05-19 LAB
ALBUMIN: 4 G/DL (ref 3.5–5.2)
ANION GAP SERPL CALCULATED.3IONS-SCNC: 10 MMOL/L (ref 9–16)
BILIRUB UR QL STRIP: NEGATIVE
BUN SERPL-MCNC: 27 MG/DL (ref 8–23)
CALCIUM SERPL-MCNC: 9.4 MG/DL (ref 8.6–10.4)
CHLORIDE SERPL-SCNC: 99 MMOL/L (ref 98–107)
CLARITY UR: CLEAR
CO2 SERPL-SCNC: 26 MMOL/L (ref 20–31)
COLOR UR: YELLOW
COMMENT: ABNORMAL
CREAT SERPL-MCNC: 1.5 MG/DL (ref 0.7–1.2)
CREAT UR-MCNC: 78.7 MG/DL (ref 39–259)
ERYTHROCYTE [DISTWIDTH] IN BLOOD BY AUTOMATED COUNT: 15 % (ref 11.8–14.4)
GFR, ESTIMATED: 49 ML/MIN/1.73M2
GLUCOSE SERPL-MCNC: 445 MG/DL (ref 74–99)
GLUCOSE UR STRIP-MCNC: ABNORMAL MG/DL
HCT VFR BLD AUTO: 35 % (ref 40.7–50.3)
HGB BLD-MCNC: 11.2 G/DL (ref 13–17)
HGB UR QL STRIP.AUTO: NEGATIVE
KETONES UR STRIP-MCNC: NEGATIVE MG/DL
LEUKOCYTE ESTERASE UR QL STRIP: NEGATIVE
MAGNESIUM SERPL-MCNC: 1.7 MG/DL (ref 1.6–2.4)
MCH RBC QN AUTO: 30.9 PG (ref 25.2–33.5)
MCHC RBC AUTO-ENTMCNC: 32 G/DL (ref 28.4–34.8)
MCV RBC AUTO: 96.7 FL (ref 82.6–102.9)
NITRITE UR QL STRIP: NEGATIVE
NRBC BLD-RTO: 0 PER 100 WBC
PH UR STRIP: 6.5 [PH] (ref 5–8)
PHOSPHATE SERPL-MCNC: 3.2 MG/DL (ref 2.5–4.5)
PLATELET # BLD AUTO: 120 K/UL (ref 138–453)
PMV BLD AUTO: 12.6 FL (ref 8.1–13.5)
POTASSIUM SERPL-SCNC: 4.6 MMOL/L (ref 3.7–5.3)
PROT UR STRIP-MCNC: NEGATIVE MG/DL
PTH-INTACT SERPL-MCNC: 39 PG/ML (ref 17.9–58.6)
RBC # BLD AUTO: 3.62 M/UL (ref 4.21–5.77)
SODIUM SERPL-SCNC: 135 MMOL/L (ref 136–145)
SP GR UR STRIP: 1.02 (ref 1–1.03)
TOTAL PROTEIN, URINE: 8 MG/DL
URATE SERPL-MCNC: 4.7 MG/DL (ref 3.4–7)
URINE TOTAL PROTEIN CREATININE RATIO: 0.1 (ref 0–0.2)
UROBILINOGEN UR STRIP-ACNC: NORMAL EU/DL (ref 0–1)
WBC OTHER # BLD: 6.7 K/UL (ref 3.5–11.3)

## 2025-05-23 ENCOUNTER — APPOINTMENT (OUTPATIENT)
Dept: GENERAL RADIOLOGY | Age: 75
End: 2025-05-23
Payer: MEDICARE

## 2025-05-23 ENCOUNTER — HOSPITAL ENCOUNTER (EMERGENCY)
Age: 75
Discharge: HOME | End: 2025-05-24
Payer: MEDICARE

## 2025-05-23 DIAGNOSIS — J18.9 PNEUMONIA OF RIGHT LOWER LOBE DUE TO INFECTIOUS ORGANISM: Primary | ICD-10-CM

## 2025-05-23 LAB
ANION GAP SERPL CALCULATED.3IONS-SCNC: 12 MMOL/L (ref 9–16)
BUN SERPL-MCNC: 36 MG/DL (ref 8–23)
BUN/CREAT SERPL: 20 (ref 9–20)
CALCIUM SERPL-MCNC: 9.2 MG/DL (ref 8.6–10.4)
CHLORIDE SERPL-SCNC: 97 MMOL/L (ref 98–107)
CO2 SERPL-SCNC: 26 MMOL/L (ref 20–31)
CREAT SERPL-MCNC: 1.8 MG/DL (ref 0.7–1.2)
FLUAV AG SPEC QL: NEGATIVE
FLUBV AG SPEC QL: NEGATIVE
GFR, ESTIMATED: 39 ML/MIN/1.73M2
GLUCOSE SERPL-MCNC: 244 MG/DL (ref 74–99)
POTASSIUM SERPL-SCNC: 4.3 MMOL/L (ref 3.7–5.3)
SARS-COV-2 RDRP RESP QL NAA+PROBE: NOT DETECTED
SODIUM SERPL-SCNC: 135 MMOL/L (ref 136–145)
SPECIMEN DESCRIPTION: NORMAL
SPECIMEN SOURCE: NORMAL
STREP A, MOLECULAR: NEGATIVE
TROPONIN I SERPL HS-MCNC: 16 NG/L (ref 0–22)

## 2025-05-23 PROCEDURE — 93005 ELECTROCARDIOGRAM TRACING: CPT

## 2025-05-23 PROCEDURE — 87635 SARS-COV-2 COVID-19 AMP PRB: CPT

## 2025-05-23 PROCEDURE — 85025 COMPLETE CBC W/AUTO DIFF WBC: CPT

## 2025-05-23 PROCEDURE — 87804 INFLUENZA ASSAY W/OPTIC: CPT

## 2025-05-23 PROCEDURE — 71045 X-RAY EXAM CHEST 1 VIEW: CPT

## 2025-05-23 PROCEDURE — 87651 STREP A DNA AMP PROBE: CPT

## 2025-05-23 PROCEDURE — 84484 ASSAY OF TROPONIN QUANT: CPT

## 2025-05-23 PROCEDURE — 80048 BASIC METABOLIC PNL TOTAL CA: CPT

## 2025-05-23 PROCEDURE — 99285 EMERGENCY DEPT VISIT HI MDM: CPT

## 2025-05-23 ASSESSMENT — PAIN - FUNCTIONAL ASSESSMENT: PAIN_FUNCTIONAL_ASSESSMENT: NONE - DENIES PAIN

## 2025-05-24 VITALS
HEIGHT: 72 IN | WEIGHT: 180 LBS | RESPIRATION RATE: 16 BRPM | BODY MASS INDEX: 24.38 KG/M2 | OXYGEN SATURATION: 95 % | DIASTOLIC BLOOD PRESSURE: 76 MMHG | HEART RATE: 63 BPM | TEMPERATURE: 98.7 F | SYSTOLIC BLOOD PRESSURE: 152 MMHG

## 2025-05-24 LAB
BASOPHILS # BLD: 0.07 K/UL (ref 0–0.2)
BASOPHILS NFR BLD: 1 % (ref 0–2)
EKG ATRIAL RATE: 67 BPM
EKG P AXIS: 31 DEGREES
EKG P-R INTERVAL: 192 MS
EKG Q-T INTERVAL: 400 MS
EKG QRS DURATION: 86 MS
EKG QTC CALCULATION (BAZETT): 422 MS
EKG R AXIS: 42 DEGREES
EKG T AXIS: 62 DEGREES
EKG VENTRICULAR RATE: 67 BPM
EOSINOPHIL # BLD: 0.21 K/UL (ref 0–0.44)
EOSINOPHILS RELATIVE PERCENT: 2 % (ref 1–4)
ERYTHROCYTE [DISTWIDTH] IN BLOOD BY AUTOMATED COUNT: 14.8 % (ref 11.8–14.4)
HCT VFR BLD AUTO: 34 % (ref 40.7–50.3)
HGB BLD-MCNC: 11 G/DL (ref 13–17)
IMM GRANULOCYTES # BLD AUTO: 0.06 K/UL (ref 0–0.3)
IMM GRANULOCYTES NFR BLD: 1 %
LYMPHOCYTES NFR BLD: 1.6 K/UL (ref 1.1–3.7)
LYMPHOCYTES RELATIVE PERCENT: 18 % (ref 24–43)
MCH RBC QN AUTO: 30.6 PG (ref 25.2–33.5)
MCHC RBC AUTO-ENTMCNC: 32.4 G/DL (ref 28.4–34.8)
MCV RBC AUTO: 94.4 FL (ref 82.6–102.9)
MONOCYTES NFR BLD: 1.1 K/UL (ref 0.1–1.2)
MONOCYTES NFR BLD: 13 % (ref 3–12)
NEUTROPHILS NFR BLD: 65 % (ref 36–65)
NEUTS SEG NFR BLD: 5.76 K/UL (ref 1.5–8.1)
NRBC BLD-RTO: 0 PER 100 WBC
PLATELET # BLD AUTO: ABNORMAL K/UL (ref 138–453)
PLATELET, FLUORESCENCE: 110 K/UL (ref 138–453)
PLATELETS.RETICULATED NFR BLD AUTO: 6.6 % (ref 1.1–10.3)
RBC # BLD AUTO: 3.6 M/UL (ref 4.21–5.77)
WBC OTHER # BLD: 8.8 K/UL (ref 3.5–11.3)

## 2025-05-24 PROCEDURE — 93010 ELECTROCARDIOGRAM REPORT: CPT | Performed by: INTERNAL MEDICINE

## 2025-05-24 PROCEDURE — 6370000000 HC RX 637 (ALT 250 FOR IP)

## 2025-05-24 RX ORDER — AZITHROMYCIN 250 MG/1
500 TABLET, FILM COATED ORAL ONCE
Status: COMPLETED | OUTPATIENT
Start: 2025-05-24 | End: 2025-05-24

## 2025-05-24 RX ORDER — CEFDINIR 300 MG/1
300 CAPSULE ORAL ONCE
Status: COMPLETED | OUTPATIENT
Start: 2025-05-24 | End: 2025-05-24

## 2025-05-24 RX ORDER — AZITHROMYCIN 250 MG/1
250 TABLET, FILM COATED ORAL DAILY
Qty: 10 TABLET | Refills: 0 | Status: SHIPPED | OUTPATIENT
Start: 2025-05-24 | End: 2025-06-03

## 2025-05-24 RX ORDER — CEFDINIR 300 MG/1
300 CAPSULE ORAL 2 TIMES DAILY
Qty: 20 CAPSULE | Refills: 0 | Status: SHIPPED | OUTPATIENT
Start: 2025-05-24 | End: 2025-06-03

## 2025-05-24 RX ADMIN — AZITHROMYCIN DIHYDRATE 500 MG: 250 TABLET ORAL at 00:46

## 2025-05-24 RX ADMIN — CEFDINIR 300 MG: 300 CAPSULE ORAL at 00:46

## 2025-05-24 NOTE — ED PROVIDER NOTES
[facility] DEPART[facility]  EMERGENCY DEPARTMENT ENCOUNTER        Pt Name: Corwin Acosta  MRN: [id number]  Birthdate 1950  Date of evaluation: 5/23/2025  Provider: Justina Morris MD  PCP: Yaneth Walker APRN - NP  Note Started: 5:06 AM EDT 5/24/25    CHIEF COMPLAINT       Chief Complaint   Patient presents with    Shortness of Breath     Patient reporting feeling ill this evening and feeling short of breath and coughing       HISTORY OF PRESENT ILLNESS: 1 or more Elements     Corwin Acosta is a 74 y.o. male who presents sore throat, hoarse voice, shortness of breath and progressively worsening cough.  States that the symptoms started yesterday and has been progressively worsening.  States that his cough is productive of a sputum and denies any blood in his sputum.  Denies any fevers.  States that he is from coughing so hard he is now feeling hoarse.  He does endorse some sore throat and nasal congestion.  Endorses some generalized weakness but denies any chest pain or abdominal pain.  Denies any rash.  Denies any fever, chills, n/v, headache, dizziness, vision changes, neck tenderness or stiffness, weakness, cp, palpitations, leg swelling/tenderness,, abd pain, dysuria, hematuria, diarrhea, constipation, bloody stools.    Patient does have a history of lung cancer and had a right lobe removed    Nursing Notes were all reviewed and agreed with or any disagreements were addressed in the HPI.    ROS:   Pertinent positives and negatives are stated within HPI, all other systems reviewed and are negative.      --------------------------------------------- PAST HISTORY ---------------------------------------------  Past Medical History:  has a past medical history of Abnormal cardiac cath, Actinic keratosis, Angina pectoris, Arthritis, ASHD (arteriosclerotic heart disease), BPH with obstruction/lower urinary tract symptoms, CAD (coronary artery disease), Calculus of kidney, Cancer (HCC), Cancer of    Result Value Ref Range    Source .THROAT SWAB     Strep A, Molecular NEGATIVE NEGATIVE   CBC with Auto Differential   Result Value Ref Range    WBC 8.8 3.5 - 11.3 k/uL    RBC 3.60 (L) 4.21 - 5.77 m/uL    Hemoglobin 11.0 (L) 13.0 - 17.0 g/dL    Hematocrit 34.0 (L) 40.7 - 50.3 %    MCV 94.4 82.6 - 102.9 fL    MCH 30.6 25.2 - 33.5 pg    MCHC 32.4 28.4 - 34.8 g/dL    RDW 14.8 (H) 11.8 - 14.4 %    Platelets See Reflexed IPF Result 138 - 453 k/uL    Platelet, Fluorescence 110 (L) 138 - 453 k/uL    Platelet, Immature Fraction 6.6 1.1 - 10.3 %    NRBC Automated 0.0 0.0 per 100 WBC    Neutrophils % 65 36 - 65 %    Lymphocytes % 18 (L) 24 - 43 %    Monocytes % 13 (H) 3 - 12 %    Eosinophils % 2 1 - 4 %    Basophils % 1 0 - 2 %    Immature Granulocytes % 1 (H) 0 %    Neutrophils Absolute 5.76 1.50 - 8.10 k/uL    Lymphocytes Absolute 1.60 1.10 - 3.70 k/uL    Monocytes Absolute 1.10 0.10 - 1.20 k/uL    Eosinophils Absolute 0.21 0.00 - 0.44 k/uL    Basophils Absolute 0.07 0.00 - 0.20 k/uL    Immature Granulocytes Absolute 0.06 0.00 - 0.30 k/uL   Basic Metabolic Panel   Result Value Ref Range    Sodium 135 (L) 136 - 145 mmol/L    Potassium 4.3 3.7 - 5.3 mmol/L    Chloride 97 (L) 98 - 107 mmol/L    CO2 26 20 - 31 mmol/L    Anion Gap 12 9 - 16 mmol/L    Glucose 244 (H) 74 - 99 mg/dL    BUN 36 (H) 8 - 23 mg/dL    Creatinine 1.8 (H) 0.70 - 1.20 mg/dL    Est, Glom Filt Rate 39 (L) >60 mL/min/1.73m2    BUN/Creatinine Ratio 20 9 - 20    Calcium 9.2 8.6 - 10.4 mg/dL   Troponin   Result Value Ref Range    Troponin, High Sensitivity 16 0 - 22 ng/L   EKG 12 Lead   Result Value Ref Range    Ventricular Rate 67 BPM    Atrial Rate 67 BPM    P-R Interval 192 ms    QRS Duration 86 ms    Q-T Interval 400 ms    QTc Calculation (Bazett) 422 ms    P Axis 31 degrees    R Axis 42 degrees    T Axis 62 degrees       RADIOLOGY:   Non-plain film images such as CT, Ultrasound and MRI are read by the radiologist. Plain radiographic images are visualized

## 2025-05-28 ENCOUNTER — HOSPITAL ENCOUNTER (OUTPATIENT)
Dept: VASCULAR LAB | Age: 75
Discharge: HOME | End: 2025-05-30
Attending: INTERNAL MEDICINE
Payer: MEDICARE

## 2025-05-28 ENCOUNTER — HOSPITAL ENCOUNTER (OUTPATIENT)
Age: 75
Discharge: HOME | End: 2025-05-30
Attending: INTERNAL MEDICINE
Payer: MEDICARE

## 2025-05-28 VITALS
SYSTOLIC BLOOD PRESSURE: 152 MMHG | WEIGHT: 179.9 LBS | DIASTOLIC BLOOD PRESSURE: 76 MMHG | HEIGHT: 72 IN | BODY MASS INDEX: 24.37 KG/M2

## 2025-05-28 DIAGNOSIS — I25.10 CORONARY ARTERY DISEASE INVOLVING NATIVE CORONARY ARTERY OF NATIVE HEART WITHOUT ANGINA PECTORIS: ICD-10-CM

## 2025-05-28 DIAGNOSIS — E78.2 MIXED HYPERLIPIDEMIA: ICD-10-CM

## 2025-05-28 DIAGNOSIS — Z87.898 HISTORY OF SYNCOPE: ICD-10-CM

## 2025-05-28 DIAGNOSIS — I10 ESSENTIAL HYPERTENSION: ICD-10-CM

## 2025-05-28 DIAGNOSIS — I47.29 NSVT (NONSUSTAINED VENTRICULAR TACHYCARDIA) (HCC): ICD-10-CM

## 2025-05-28 DIAGNOSIS — Z79.4 TYPE 2 DIABETES MELLITUS WITHOUT COMPLICATION, WITH LONG-TERM CURRENT USE OF INSULIN (HCC): ICD-10-CM

## 2025-05-28 DIAGNOSIS — I73.9 PVD (PERIPHERAL VASCULAR DISEASE): ICD-10-CM

## 2025-05-28 DIAGNOSIS — Z01.818 PRE-OPERATIVE CLEARANCE: ICD-10-CM

## 2025-05-28 DIAGNOSIS — E11.9 TYPE 2 DIABETES MELLITUS WITHOUT COMPLICATION, WITH LONG-TERM CURRENT USE OF INSULIN (HCC): ICD-10-CM

## 2025-05-28 DIAGNOSIS — Z95.820 S/P ANGIOPLASTY WITH STENT: ICD-10-CM

## 2025-05-28 LAB
ECHO AO SINUS VALSALVA DIAM: 3.5 CM
ECHO AO SINUS VALSALVA INDEX: 1.72 CM/M2
ECHO AO ST JNCT DIAM: 2.7 CM
ECHO AV CUSP MM: 2 CM
ECHO AV MEAN GRADIENT: 2 MMHG
ECHO AV MEAN VELOCITY: 0.7 M/S
ECHO AV PEAK GRADIENT: 4 MMHG
ECHO AV PEAK VELOCITY: 1.1 M/S
ECHO AV VELOCITY RATIO: 0.82
ECHO AV VTI: 26.4 CM
ECHO BSA: 2.04 M2
ECHO BSA: 2.04 M2
ECHO EST RA PRESSURE: 3 MMHG
ECHO LA AREA 2C: 22.6 CM2
ECHO LA AREA 4C: 16.6 CM2
ECHO LA MAJOR AXIS: 6 CM
ECHO LA MINOR AXIS: 6.1 CM
ECHO LA VOL BP: 50 ML (ref 18–58)
ECHO LA VOL MOD A2C: 68 ML (ref 18–58)
ECHO LA VOL MOD A4C: 36 ML (ref 18–58)
ECHO LA VOL/BSA BIPLANE: 25 ML/M2 (ref 16–34)
ECHO LA VOLUME INDEX MOD A2C: 33 ML/M2 (ref 16–34)
ECHO LA VOLUME INDEX MOD A4C: 18 ML/M2 (ref 16–34)
ECHO LV E' LATERAL VELOCITY: 7.62 CM/S
ECHO LV EDV A2C: 69 ML
ECHO LV EDV A4C: 68 ML
ECHO LV EDV INDEX A4C: 33 ML/M2
ECHO LV EDV NDEX A2C: 34 ML/M2
ECHO LV EF PHYSICIAN: 55 %
ECHO LV EJECTION FRACTION A2C: 55 %
ECHO LV EJECTION FRACTION A4C: 57 %
ECHO LV EJECTION FRACTION BIPLANE: 57 % (ref 55–100)
ECHO LV ESV A2C: 31 ML
ECHO LV ESV A4C: 29 ML
ECHO LV ESV INDEX A2C: 15 ML/M2
ECHO LV ESV INDEX A4C: 14 ML/M2
ECHO LV FRACTIONAL SHORTENING: 31 % (ref 28–44)
ECHO LV INTERNAL DIMENSION DIASTOLE INDEX: 2.4 CM/M2
ECHO LV INTERNAL DIMENSION DIASTOLIC: 4.9 CM (ref 4.2–5.9)
ECHO LV INTERNAL DIMENSION SYSTOLIC INDEX: 1.67 CM/M2
ECHO LV INTERNAL DIMENSION SYSTOLIC: 3.4 CM
ECHO LV IVSD: 1.3 CM (ref 0.6–1)
ECHO LV MASS 2D: 253.7 G (ref 88–224)
ECHO LV MASS INDEX 2D: 124.4 G/M2 (ref 49–115)
ECHO LV POSTERIOR WALL DIASTOLIC: 1.3 CM (ref 0.6–1)
ECHO LV RELATIVE WALL THICKNESS RATIO: 0.53
ECHO LVOT AV VTI INDEX: 0.79
ECHO LVOT MEAN GRADIENT: 2 MMHG
ECHO LVOT PEAK GRADIENT: 3 MMHG
ECHO LVOT PEAK VELOCITY: 0.9 M/S
ECHO LVOT VTI: 20.9 CM
ECHO MV A VELOCITY: 0.7 M/S
ECHO MV E DECELERATION TIME (DT): 227 MS
ECHO MV E VELOCITY: 0.7 M/S
ECHO MV E/A RATIO: 1
ECHO MV E/E' LATERAL: 9.19
ECHO PV MAX VELOCITY: 0.8 M/S
ECHO PV PEAK GRADIENT: 3 MMHG
ECHO RIGHT VENTRICULAR SYSTOLIC PRESSURE (RVSP): 13 MMHG
ECHO RV TAPSE: 3.3 CM (ref 1.7–?)
ECHO TV REGURGITANT MAX VELOCITY: 1.57 M/S
ECHO TV REGURGITANT PEAK GRADIENT: 10 MMHG
VAS LEFT ABI: 1.13
VAS LEFT ARM BP: 156 MMHG
VAS LEFT ATA MID PSV: 70.9 CM/S
VAS LEFT CFA DIST PSV: 103.8 CM/S
VAS LEFT DORSALIS PEDIS BP: 166 MMHG
VAS LEFT PERONEAL MID PSV: 73.6 CM/S
VAS LEFT PFA PROX PSV: 100 CM/S
VAS LEFT POP A DIST PSV: 60.3 CM/S
VAS LEFT POP A PROX PSV: 70.6 CM/S
VAS LEFT POP A PROX VEL RATIO: 1.02
VAS LEFT PTA BP: 176 MMHG
VAS LEFT PTA MID PSV: 85.3 CM/S
VAS LEFT SFA DIST PSV: 69.5 CM/S
VAS LEFT SFA DIST VEL RATIO: 0.51
VAS LEFT SFA MID PSV: 136.5 CM/S
VAS LEFT SFA MID VEL RATIO: 1.34
VAS LEFT SFA PROX PSV: 102.2 CM/S
VAS LEFT SFA PROX VEL RATIO: 0.98
VAS RIGHT ABI: 1.08
VAS RIGHT ARM BP: 155 MMHG
VAS RIGHT ATA MID PSV: 21.3 CM/S
VAS RIGHT CFA DIST PSV: 85.3 CM/S
VAS RIGHT DORSALIS PEDIS BP: 155 MMHG
VAS RIGHT PERONEAL MID PSV: 89.2 CM/S
VAS RIGHT PFA PROX PSV: 91.7 CM/S
VAS RIGHT POP A DIST PSV: 59.2 CM/S
VAS RIGHT POP A PROX PSV: 64.7 CM/S
VAS RIGHT POP A PROX VEL RATIO: 0.78
VAS RIGHT PTA BP: 169 MMHG
VAS RIGHT PTA MID PSV: 89.2 CM/S
VAS RIGHT SFA DIST PSV: 83.4 CM/S
VAS RIGHT SFA DIST VEL RATIO: 1.06
VAS RIGHT SFA MID PSV: 79 CM/S
VAS RIGHT SFA MID VEL RATIO: 0.7
VAS RIGHT SFA PROX PSV: 116.2 CM/S
VAS RIGHT SFA PROX VEL RATIO: 1.4

## 2025-05-28 PROCEDURE — 93306 TTE W/DOPPLER COMPLETE: CPT | Performed by: FAMILY MEDICINE

## 2025-05-28 PROCEDURE — 93306 TTE W/DOPPLER COMPLETE: CPT

## 2025-05-28 PROCEDURE — 93925 LOWER EXTREMITY STUDY: CPT | Performed by: INTERNAL MEDICINE

## 2025-05-28 PROCEDURE — 93922 UPR/L XTREMITY ART 2 LEVELS: CPT

## 2025-05-30 ENCOUNTER — RESULTS FOLLOW-UP (OUTPATIENT)
Dept: CARDIOLOGY | Age: 75
End: 2025-05-30

## 2025-05-30 NOTE — TELEPHONE ENCOUNTER
----- Message from Dr. Viola Stahl MD sent at 5/30/2025 12:19 PM EDT -----  Echo didn't change from before.

## 2025-06-04 ENCOUNTER — TRANSCRIBE ORDERS (OUTPATIENT)
Dept: ADMISSION | Age: 75
End: 2025-06-04

## 2025-06-04 ENCOUNTER — HOSPITAL ENCOUNTER (OUTPATIENT)
Dept: GENERAL RADIOLOGY | Age: 75
Discharge: HOME | End: 2025-06-06
Payer: MEDICARE

## 2025-06-04 DIAGNOSIS — J18.9 UNRESOLVED PNEUMONIA: Primary | ICD-10-CM

## 2025-06-04 DIAGNOSIS — J18.9 UNRESOLVED PNEUMONIA: ICD-10-CM

## 2025-06-04 PROCEDURE — 71046 X-RAY EXAM CHEST 2 VIEWS: CPT

## 2025-06-05 ENCOUNTER — HOSPITAL ENCOUNTER (OUTPATIENT)
Dept: CT IMAGING | Age: 75
Discharge: HOME | End: 2025-06-07
Payer: MEDICARE

## 2025-06-05 DIAGNOSIS — C34.01 LUNG CANCER, MAIN BRONCHUS, RIGHT (HCC): ICD-10-CM

## 2025-06-05 PROCEDURE — 74176 CT ABD & PELVIS W/O CONTRAST: CPT

## 2025-06-12 DIAGNOSIS — N28.89 KIDNEY MASS: ICD-10-CM

## 2025-06-12 DIAGNOSIS — D64.9 NORMOCYTIC ANEMIA: Primary | ICD-10-CM

## 2025-06-12 DIAGNOSIS — C34.01 LUNG CANCER, MAIN BRONCHUS, RIGHT (HCC): ICD-10-CM

## 2025-06-12 DIAGNOSIS — N18.32 STAGE 3B CHRONIC KIDNEY DISEASE (HCC): ICD-10-CM

## 2025-06-12 DIAGNOSIS — C67.4 CANCER OF POSTERIOR WALL OF URINARY BLADDER (HCC): ICD-10-CM

## 2025-06-13 PROBLEM — N50.89 MASS OF LEFT TESTICLE: Status: ACTIVE | Noted: 2025-06-13

## 2025-06-16 ENCOUNTER — HOSPITAL ENCOUNTER (OUTPATIENT)
Age: 75
Discharge: HOME | End: 2025-06-16
Payer: MEDICARE

## 2025-06-16 DIAGNOSIS — C34.01 LUNG CANCER, MAIN BRONCHUS, RIGHT (HCC): ICD-10-CM

## 2025-06-16 DIAGNOSIS — N18.32 STAGE 3B CHRONIC KIDNEY DISEASE (HCC): ICD-10-CM

## 2025-06-16 DIAGNOSIS — C67.4 CANCER OF POSTERIOR WALL OF URINARY BLADDER (HCC): ICD-10-CM

## 2025-06-16 DIAGNOSIS — D64.9 NORMOCYTIC ANEMIA: ICD-10-CM

## 2025-06-16 DIAGNOSIS — N28.89 KIDNEY MASS: ICD-10-CM

## 2025-06-16 PROBLEM — M77.8 TENDINITIS OF LEFT SHOULDER: Status: ACTIVE | Noted: 2023-07-11

## 2025-06-16 PROBLEM — F17.200 CURRENT SMOKER: Status: ACTIVE | Noted: 2023-05-22

## 2025-06-16 PROBLEM — E87.70 FLUID OVERLOAD: Status: ACTIVE | Noted: 2025-01-29

## 2025-06-16 PROBLEM — F41.1 GENERALIZED ANXIETY DISORDER: Status: ACTIVE | Noted: 2023-05-22

## 2025-06-16 LAB
ALBUMIN SERPL-MCNC: 4.1 G/DL (ref 3.5–5.2)
ALBUMIN/GLOB SERPL: 1.6 {RATIO} (ref 1–2.5)
ALP SERPL-CCNC: 118 U/L (ref 40–129)
ALT SERPL-CCNC: 24 U/L (ref 10–50)
ANION GAP SERPL CALCULATED.3IONS-SCNC: 12 MMOL/L (ref 9–16)
AST SERPL-CCNC: 26 U/L (ref 10–50)
BASOPHILS # BLD: 0.08 K/UL (ref 0–0.2)
BASOPHILS NFR BLD: 1 % (ref 0–2)
BILIRUB SERPL-MCNC: 0.4 MG/DL (ref 0–1.2)
BUN SERPL-MCNC: 28 MG/DL (ref 8–23)
BUN/CREAT SERPL: 19 (ref 9–20)
CALCIUM SERPL-MCNC: 8.8 MG/DL (ref 8.6–10.4)
CHLORIDE SERPL-SCNC: 100 MMOL/L (ref 98–107)
CO2 SERPL-SCNC: 24 MMOL/L (ref 20–31)
CREAT SERPL-MCNC: 1.5 MG/DL (ref 0.7–1.2)
EOSINOPHIL # BLD: 0.22 K/UL (ref 0–0.44)
EOSINOPHILS RELATIVE PERCENT: 4 % (ref 1–4)
ERYTHROCYTE [DISTWIDTH] IN BLOOD BY AUTOMATED COUNT: 14.5 % (ref 11.8–14.4)
GFR, ESTIMATED: 48 ML/MIN/1.73M2
GLUCOSE SERPL-MCNC: 327 MG/DL (ref 74–99)
HCT VFR BLD AUTO: 35.2 % (ref 40.7–50.3)
HGB BLD-MCNC: 11.1 G/DL (ref 13–17)
IMM GRANULOCYTES # BLD AUTO: 0.06 K/UL (ref 0–0.3)
IMM GRANULOCYTES NFR BLD: 1 %
LYMPHOCYTES NFR BLD: 1.44 K/UL (ref 1.1–3.7)
LYMPHOCYTES RELATIVE PERCENT: 24 % (ref 24–43)
MCH RBC QN AUTO: 30.6 PG (ref 25.2–33.5)
MCHC RBC AUTO-ENTMCNC: 31.5 G/DL (ref 28.4–34.8)
MCV RBC AUTO: 97 FL (ref 82.6–102.9)
MONOCYTES NFR BLD: 0.76 K/UL (ref 0.1–1.2)
MONOCYTES NFR BLD: 13 % (ref 3–12)
NEUTROPHILS NFR BLD: 57 % (ref 36–65)
NEUTS SEG NFR BLD: 3.33 K/UL (ref 1.5–8.1)
NRBC BLD-RTO: 0 PER 100 WBC
PLATELET # BLD AUTO: 109 K/UL (ref 138–453)
PMV BLD AUTO: 12.4 FL (ref 8.1–13.5)
POTASSIUM SERPL-SCNC: 4.3 MMOL/L (ref 3.7–5.3)
PROT SERPL-MCNC: 6.7 G/DL (ref 6.6–8.7)
RBC # BLD AUTO: 3.63 M/UL (ref 4.21–5.77)
SODIUM SERPL-SCNC: 136 MMOL/L (ref 136–145)
WBC OTHER # BLD: 5.9 K/UL (ref 3.5–11.3)

## 2025-06-16 PROCEDURE — 85025 COMPLETE CBC W/AUTO DIFF WBC: CPT

## 2025-06-16 PROCEDURE — 80053 COMPREHEN METABOLIC PANEL: CPT

## 2025-06-16 PROCEDURE — 36415 COLL VENOUS BLD VENIPUNCTURE: CPT

## 2025-06-19 ENCOUNTER — OFFICE VISIT (OUTPATIENT)
Dept: ONCOLOGY | Age: 75
End: 2025-06-19
Payer: MEDICARE

## 2025-06-19 VITALS
BODY MASS INDEX: 24.11 KG/M2 | DIASTOLIC BLOOD PRESSURE: 91 MMHG | HEART RATE: 74 BPM | SYSTOLIC BLOOD PRESSURE: 144 MMHG | TEMPERATURE: 96.9 F | RESPIRATION RATE: 18 BRPM | WEIGHT: 178 LBS | HEIGHT: 72 IN

## 2025-06-19 DIAGNOSIS — J90 PLEURAL EFFUSION ON RIGHT: ICD-10-CM

## 2025-06-19 DIAGNOSIS — D69.6 ACQUIRED THROMBOCYTOPENIA: ICD-10-CM

## 2025-06-19 DIAGNOSIS — D64.9 NORMOCYTIC ANEMIA: ICD-10-CM

## 2025-06-19 DIAGNOSIS — D09.0 CIS (CARCINOMA IN SITU OF BLADDER): ICD-10-CM

## 2025-06-19 DIAGNOSIS — N18.32 STAGE 3B CHRONIC KIDNEY DISEASE (HCC): ICD-10-CM

## 2025-06-19 DIAGNOSIS — C34.01 LUNG CANCER, MAIN BRONCHUS, RIGHT (HCC): Primary | ICD-10-CM

## 2025-06-19 DIAGNOSIS — C67.4 MALIGNANT NEOPLASM OF POSTERIOR WALL OF URINARY BLADDER (HCC): ICD-10-CM

## 2025-06-19 PROCEDURE — 99214 OFFICE O/P EST MOD 30 MIN: CPT | Performed by: INTERNAL MEDICINE

## 2025-06-19 PROCEDURE — 1159F MED LIST DOCD IN RCRD: CPT | Performed by: INTERNAL MEDICINE

## 2025-06-19 PROCEDURE — 99211 OFF/OP EST MAY X REQ PHY/QHP: CPT | Performed by: INTERNAL MEDICINE

## 2025-06-19 PROCEDURE — 3017F COLORECTAL CA SCREEN DOC REV: CPT | Performed by: INTERNAL MEDICINE

## 2025-06-19 PROCEDURE — G8427 DOCREV CUR MEDS BY ELIG CLIN: HCPCS | Performed by: INTERNAL MEDICINE

## 2025-06-19 PROCEDURE — 1036F TOBACCO NON-USER: CPT | Performed by: INTERNAL MEDICINE

## 2025-06-19 PROCEDURE — 3077F SYST BP >= 140 MM HG: CPT | Performed by: INTERNAL MEDICINE

## 2025-06-19 PROCEDURE — G8420 CALC BMI NORM PARAMETERS: HCPCS | Performed by: INTERNAL MEDICINE

## 2025-06-19 PROCEDURE — 3080F DIAST BP >= 90 MM HG: CPT | Performed by: INTERNAL MEDICINE

## 2025-06-19 PROCEDURE — 1126F AMNT PAIN NOTED NONE PRSNT: CPT | Performed by: INTERNAL MEDICINE

## 2025-06-19 PROCEDURE — 1123F ACP DISCUSS/DSCN MKR DOCD: CPT | Performed by: INTERNAL MEDICINE

## 2025-06-19 NOTE — PROGRESS NOTES
Patient ID: Corwin Acosta, 1950, [id number], 75 y.o.  Referred by :  No ref. 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 male history of tobacco smoking and has been following with pulmonary for right middle lobe lung nodule where CT chest did show increase in the size in the scan on November 2, 2021 patient had biopsy on July 28, 2021 which showed benign parenchyma with chronic inflammation however the lesion was active on the PET there was no evidence of metastasis patient admitted to [facility] on January 4, 2022 for VATS and wedge resection which ended up with lobectomy, final pathology compatible with adenocarcinoma 1.8 cm and carcinoma invade into but not through the visceral pleura lymph nodes were negative patient status post right middle lobe lobectomy and margin were free of involvement by carcinoma   Patient developed right apical pneumothorax chest tube placed in the hospital and was discharged after chest tube removed and patient was started on adjuvant chemotherapy on February 24, 2022  Patient developed pleural effusion on the right side and underwent thoracentesis after 2 cycle of chemotherapy and done with 4 cycles of chemotherapy, if still persistent have right side chest pain  Had loculated pneumothorax status post thoracentesis and recommendation was for no further thoracentesis  evaluated for gallstones with no evidence of cholecystitis of the kidney

## 2025-08-12 ENCOUNTER — RESULTS FOLLOW-UP (OUTPATIENT)
Dept: UROLOGY | Age: 75
End: 2025-08-12

## 2025-08-26 ENCOUNTER — HOSPITAL ENCOUNTER (OUTPATIENT)
Dept: LAB | Age: 75
Discharge: HOME | End: 2025-08-26
Payer: MEDICARE

## 2025-08-26 LAB
EST. AVERAGE GLUCOSE BLD GHB EST-MCNC: 298 MG/DL
HBA1C MFR BLD: 12 % (ref 4–6)

## 2025-08-26 PROCEDURE — 83036 HEMOGLOBIN GLYCOSYLATED A1C: CPT

## 2025-08-26 PROCEDURE — 36415 COLL VENOUS BLD VENIPUNCTURE: CPT

## 2025-09-06 ENCOUNTER — APPOINTMENT (OUTPATIENT)
Dept: CT IMAGING | Age: 75
End: 2025-09-06
Payer: MEDICARE

## 2025-09-06 ENCOUNTER — HOSPITAL ENCOUNTER (EMERGENCY)
Age: 75
Discharge: HOME | End: 2025-09-06
Attending: EMERGENCY MEDICINE
Payer: MEDICARE

## 2025-09-06 ENCOUNTER — APPOINTMENT (OUTPATIENT)
Dept: GENERAL RADIOLOGY | Age: 75
End: 2025-09-06
Payer: MEDICARE

## 2025-09-06 VITALS
DIASTOLIC BLOOD PRESSURE: 58 MMHG | SYSTOLIC BLOOD PRESSURE: 154 MMHG | HEART RATE: 55 BPM | OXYGEN SATURATION: 95 % | RESPIRATION RATE: 21 BRPM | WEIGHT: 181 LBS | BODY MASS INDEX: 24.52 KG/M2 | HEIGHT: 72 IN | TEMPERATURE: 97.9 F

## 2025-09-06 DIAGNOSIS — D64.9 ANEMIA, UNSPECIFIED TYPE: ICD-10-CM

## 2025-09-06 DIAGNOSIS — R07.89 ATYPICAL CHEST PAIN: Primary | ICD-10-CM

## 2025-09-06 LAB
ALBUMIN SERPL-MCNC: 3.7 G/DL (ref 3.5–5.2)
ALBUMIN/GLOB SERPL: 1.4 {RATIO} (ref 1–2.5)
ALP SERPL-CCNC: 101 U/L (ref 40–129)
ALT SERPL-CCNC: 22 U/L (ref 10–50)
ANION GAP SERPL CALCULATED.3IONS-SCNC: 12 MMOL/L (ref 9–16)
AST SERPL-CCNC: 25 U/L (ref 10–50)
BASOPHILS # BLD: 0.09 K/UL (ref 0–0.2)
BASOPHILS NFR BLD: 1 % (ref 0–2)
BILIRUB SERPL-MCNC: 0.4 MG/DL (ref 0–1.2)
BUN SERPL-MCNC: 28 MG/DL (ref 8–23)
BUN/CREAT SERPL: 16 (ref 9–20)
CALCIUM SERPL-MCNC: 9.1 MG/DL (ref 8.6–10.4)
CHLORIDE SERPL-SCNC: 102 MMOL/L (ref 98–107)
CO2 SERPL-SCNC: 24 MMOL/L (ref 20–31)
CREAT SERPL-MCNC: 1.7 MG/DL (ref 0.7–1.2)
D DIMER PPP FEU-MCNC: 1.87 UG/ML FEU (ref 0–0.59)
EKG ATRIAL RATE: 63 BPM
EKG P AXIS: 24 DEGREES
EKG P-R INTERVAL: 196 MS
EKG Q-T INTERVAL: 410 MS
EKG QRS DURATION: 88 MS
EKG QTC CALCULATION (BAZETT): 419 MS
EKG R AXIS: 21 DEGREES
EKG T AXIS: 41 DEGREES
EKG VENTRICULAR RATE: 63 BPM
EOSINOPHIL # BLD: 0.17 K/UL (ref 0–0.44)
EOSINOPHILS RELATIVE PERCENT: 2 % (ref 1–4)
ERYTHROCYTE [DISTWIDTH] IN BLOOD BY AUTOMATED COUNT: 14.9 % (ref 11.8–14.4)
GFR, ESTIMATED: 41 ML/MIN/1.73M2
GLUCOSE SERPL-MCNC: 187 MG/DL (ref 74–99)
HCT VFR BLD AUTO: 32.9 % (ref 40.7–50.3)
HGB BLD-MCNC: 10.5 G/DL (ref 13–17)
IMM GRANULOCYTES # BLD AUTO: 0.07 K/UL (ref 0–0.3)
IMM GRANULOCYTES NFR BLD: 1 %
LIPASE SERPL-CCNC: 33 U/L (ref 13–60)
LYMPHOCYTES NFR BLD: 1.56 K/UL (ref 1.1–3.7)
LYMPHOCYTES RELATIVE PERCENT: 21 % (ref 24–43)
MCH RBC QN AUTO: 30.5 PG (ref 25.2–33.5)
MCHC RBC AUTO-ENTMCNC: 31.9 G/DL (ref 28.4–34.8)
MCV RBC AUTO: 95.6 FL (ref 82.6–102.9)
MONOCYTES NFR BLD: 0.81 K/UL (ref 0.1–1.2)
MONOCYTES NFR BLD: 11 % (ref 3–12)
NEUTROPHILS NFR BLD: 64 % (ref 36–65)
NEUTS SEG NFR BLD: 4.82 K/UL (ref 1.5–8.1)
NRBC BLD-RTO: 0 PER 100 WBC
PLATELET # BLD AUTO: 108 K/UL (ref 138–453)
PMV BLD AUTO: 11.8 FL (ref 8.1–13.5)
POTASSIUM SERPL-SCNC: 4 MMOL/L (ref 3.7–5.3)
PROT SERPL-MCNC: 6.4 G/DL (ref 6.6–8.7)
RBC # BLD AUTO: 3.44 M/UL (ref 4.21–5.77)
SODIUM SERPL-SCNC: 138 MMOL/L (ref 136–145)
TROPONIN I SERPL HS-MCNC: 23 NG/L (ref 0–22)
TROPONIN I SERPL HS-MCNC: 25 NG/L (ref 0–22)
WBC OTHER # BLD: 7.5 K/UL (ref 3.5–11.3)

## 2025-09-06 PROCEDURE — 83690 ASSAY OF LIPASE: CPT

## 2025-09-06 PROCEDURE — 93010 ELECTROCARDIOGRAM REPORT: CPT | Performed by: INTERNAL MEDICINE

## 2025-09-06 PROCEDURE — 85025 COMPLETE CBC W/AUTO DIFF WBC: CPT

## 2025-09-06 PROCEDURE — 36415 COLL VENOUS BLD VENIPUNCTURE: CPT

## 2025-09-06 PROCEDURE — 93005 ELECTROCARDIOGRAM TRACING: CPT | Performed by: EMERGENCY MEDICINE

## 2025-09-06 PROCEDURE — 6360000004 HC RX CONTRAST MEDICATION: Performed by: EMERGENCY MEDICINE

## 2025-09-06 PROCEDURE — 80053 COMPREHEN METABOLIC PANEL: CPT

## 2025-09-06 PROCEDURE — 74177 CT ABD & PELVIS W/CONTRAST: CPT

## 2025-09-06 PROCEDURE — 71045 X-RAY EXAM CHEST 1 VIEW: CPT

## 2025-09-06 PROCEDURE — 71260 CT THORAX DX C+: CPT

## 2025-09-06 PROCEDURE — 84484 ASSAY OF TROPONIN QUANT: CPT

## 2025-09-06 PROCEDURE — 85379 FIBRIN DEGRADATION QUANT: CPT

## 2025-09-06 RX ORDER — IOPAMIDOL 755 MG/ML
75 INJECTION, SOLUTION INTRAVASCULAR
Status: COMPLETED | OUTPATIENT
Start: 2025-09-06 | End: 2025-09-06

## 2025-09-06 RX ADMIN — IOPAMIDOL 75 ML: 755 INJECTION, SOLUTION INTRAVENOUS at 09:51

## 2025-09-06 ASSESSMENT — PAIN - FUNCTIONAL ASSESSMENT: PAIN_FUNCTIONAL_ASSESSMENT: 0-10

## 2025-09-06 ASSESSMENT — LIFESTYLE VARIABLES
HOW OFTEN DO YOU HAVE A DRINK CONTAINING ALCOHOL: NEVER
HOW MANY STANDARD DRINKS CONTAINING ALCOHOL DO YOU HAVE ON A TYPICAL DAY: PATIENT DOES NOT DRINK

## 2025-09-06 ASSESSMENT — PAIN DESCRIPTION - DESCRIPTORS: DESCRIPTORS: DISCOMFORT

## 2025-09-06 ASSESSMENT — PAIN DESCRIPTION - LOCATION: LOCATION: ABDOMEN

## 2025-09-06 ASSESSMENT — PAIN SCALES - GENERAL: PAINLEVEL_OUTOF10: 4

## 2025-09-06 ASSESSMENT — PAIN DESCRIPTION - FREQUENCY: FREQUENCY: INTERMITTENT

## 2025-09-06 ASSESSMENT — PAIN DESCRIPTION - ORIENTATION: ORIENTATION: UPPER;MID

## 2025-09-06 ASSESSMENT — PAIN DESCRIPTION - PAIN TYPE: TYPE: ACUTE PAIN

## 2025-09-07 ASSESSMENT — HEART SCORE: ECG: NON-SPECIFC REPOLARIZATION DISTURBANCE/LBTB/PM

## (undated) DEVICE — SPONGE,PEANUT,XRAY,ST,SM,3/8",5/CARD: Brand: MEDLINE INDUSTRIES, INC.

## (undated) DEVICE — PACK PROCEDURE SURG SVMMC THORACOTOMY

## (undated) DEVICE — SOLUTION IV IRRIG POUR BRL 0.9% SODIUM CHL 2F7124

## (undated) DEVICE — POSITIONER,HEAD,MULTIRING,36CS: Brand: MEDLINE

## (undated) DEVICE — CANNULA ORAL NSL AD CO2 N INTUB O2 DEL DISP TRU LNK

## (undated) DEVICE — SUTURE VCRL 1 L27IN ABSRB VLT TP-1 L65MM 1/2 CIR TAPERPOINT J650G

## (undated) DEVICE — ADHESIVE SKIN CLOSURE TOP 36 CC HI VISC DERMBND MINI

## (undated) DEVICE — NEEDLE SPINAL 22GA L3.5IN SPINOCAN

## (undated) DEVICE — CATHETER ANGIO 6FR L110CM ID0.056IN VENT PIG CRV ROBUST

## (undated) DEVICE — SUTURE VIC + BR UD PS2 4-0 18IN VCP496G

## (undated) DEVICE — SUTURE VCRL + SZ 0 L27IN ABSRB UD CT-1 L36MM 1/2 CIR TAPR VCP260H

## (undated) DEVICE — DISSECTOR LAP DIA5MM STD W/ COT SWAB DISP FOR SWABBING FLD

## (undated) DEVICE — Z DISCONTINUED BY MEDLINE USE 2711682 TRAY SKIN PREP DRY W/ PREM GLV

## (undated) DEVICE — SUTURE PERMAHAND SZ 0 L30IN NONABSORBABLE BLK SILK BRAID A306H

## (undated) DEVICE — PLATELET CONCENTRATION PACK PROC 14-20 ML SMARTPREP 2

## (undated) DEVICE — TOTAL TRAY, 16FR 10ML SIL FOLEY, URN: Brand: MEDLINE

## (undated) DEVICE — FORCEPS BX L240CM JAW DIA2.4MM ORNG L CAP W/ NDL DISP RAD

## (undated) DEVICE — AGENT HEMSTAT W2XL14IN OXIDIZED REGENERATED CELOS ABSRB FOR

## (undated) DEVICE — TOWEL,OR,DSP,ST,BLUE,DLX,XR,4/PK,20PK/CS: Brand: MEDLINE

## (undated) DEVICE — GOWN,AURORA,NONREINFORCED,LARGE: Brand: MEDLINE

## (undated) DEVICE — CLIP INT L ORNG TI TRNSVRS GRV CHEVRON SHP W/ PRECIS TIP TO

## (undated) DEVICE — GLOVE SURG SZ 7 CRM LTX FREE POLYISOPRENE POLYMER BEAD ANTI

## (undated) DEVICE — BARD® PTFE FELT, 10.2 CM X 10.2 CM: Brand: BARD® PTFE FELT

## (undated) DEVICE — GLOVE SURG SZ 65 CRM LTX FREE POLYISOPRENE POLYMER BEAD ANTI

## (undated) DEVICE — DISSECTOR LAP DIA5MM BLNT TIP ENDOPATH

## (undated) DEVICE — BLADE SCALPEL NO 15 STERILE

## (undated) DEVICE — SUTURE PROL SZ 4-0 L36IN NONABSORBABLE BLU L26MM SH 1/2 CIR 8521H

## (undated) DEVICE — CATHETER THOR 36FR L23CM DIA12MM POLYVI CHL TAPR CONN TIP

## (undated) DEVICE — SUTURE VCRL SZ 1 L27IN ABSRB VLT L70MM XLH 1/2 CIR J583G

## (undated) DEVICE — CONNECTOR TBNG Y 6IN 1 PLAS LTWT

## (undated) DEVICE — SUTURE VCRL + SZ 4-0 L18IN ABSRB UD L19MM PS-2 3/8 CIR PRIM VCP496H

## (undated) DEVICE — MEDI-VAC NON-CONDUCTIVE TUBING7MM X 30.5 (100FT): Brand: CARDINAL HEALTH

## (undated) DEVICE — SUTURE NONABSORBABLE MONOFILAMENT 5-0 C-1 1X24 IN PROLENE 8725H

## (undated) DEVICE — APPLICATOR MEDICATED 26 CC SOLUTION HI LT ORNG CHLORAPREP

## (undated) DEVICE — PROTECTOR ULN NRV PUR FOAM HK LOOP STRP ANATOMICALLY

## (undated) DEVICE — STAPLER INT L340MM 45MM STD 12 FIRING B FRM PWR + GRIPPING

## (undated) DEVICE — GLOVE SURG SZ 65 L12IN FNGR THK79MIL GRN LTX FREE

## (undated) DEVICE — DRAPE, RADIAL, STERILE: Brand: MEDLINE

## (undated) DEVICE — SUTURE VCRL + SZ 3-0 L27IN ABSRB WHT CT-1 1/2 CIR VCP258H

## (undated) DEVICE — RELOAD STPL L45MM H2-4.1MM THCK TISS GRN GRIPPING SURF B

## (undated) DEVICE — GLIDESHEATH NITINOL HYDROPHILIC COATED INTRODUCER SHEATH: Brand: GLIDESHEATH

## (undated) DEVICE — INTENDED FOR TISSUE SEPARATION, AND OTHER PROCEDURES THAT REQUIRE A SHARP SURGICAL BLADE TO PUNCTURE OR CUT.: Brand: BARD-PARKER ® CARBON RIB-BACK BLADES

## (undated) DEVICE — KIT APPL 11:1 PROC W/ FIBRIJET MED CUP APPL TIP TY

## (undated) DEVICE — CANNULA TIP SPRY MAGELLAN

## (undated) DEVICE — CLIP LIG M BLU TI HRT SHP WIRE HORZ 180 PER BX

## (undated) DEVICE — CONNECTOR TBNG WHT PLAS SUCT STR 5IN1 LTWT W/ M CONN

## (undated) DEVICE — BENTSON WIRE GUIDE 20CM DISTAL FLEXIBILITY WITH SOFTENED TIP: Brand: BENTSON

## (undated) DEVICE — RADIFOCUS OPTITORQUE ANGIOGRAPHIC CATHETER: Brand: OPTITORQUE

## (undated) DEVICE — KIT PAINBUSTER W/ SILVERSOAK SOAK CATH 10IN 400ML X4ML PER

## (undated) DEVICE — GLOVE ORANGE PI 7 1/2   MSG9075

## (undated) DEVICE — CONTAINER,SPECIMEN,4OZ,OR STRL: Brand: MEDLINE

## (undated) DEVICE — COVER,LIGHT HANDLE,FLX,2/PK: Brand: MEDLINE INDUSTRIES, INC.

## (undated) DEVICE — TISSUE RETRIEVAL SYSTEM: Brand: INZII RETRIEVAL SYSTEM

## (undated) DEVICE — LAPAROSCOPIC ELECTRODE WITH A 3/32" PIN CONNECTOR, SPATULA 5 MM X 32 MM: Brand: CONMED

## (undated) DEVICE — TUBING, SUCTION, 9/32" X 20', STRAIGHT: Brand: MEDLINE INDUSTRIES, INC.

## (undated) DEVICE — GLOVE SURG SZ 75 CRM LTX FREE POLYISOPRENE POLYMER BEAD ANTI

## (undated) DEVICE — CLIP INT M L GRN TI TRNSVRS GRV CHEVRON SHP W/ PRECIS TIP

## (undated) DEVICE — TROCAR: Brand: KII FIOS FIRST ENTRY

## (undated) DEVICE — MERCY TIFFIN CATH LAB PACK: Brand: MEDLINE INDUSTRIES, INC.

## (undated) DEVICE — NEPTUNE E-SEP SMOKE EVACUATION PENCIL, COATED, 70MM BLADE, PUSH BUTTON SWITCH: Brand: NEPTUNE E-SEP

## (undated) DEVICE — BLADE SCALPEL STERILE NO 10

## (undated) DEVICE — GOWN,SIRUS,NONRNF,SETINSLV,XL,20/CS: Brand: MEDLINE